# Patient Record
Sex: FEMALE | Race: WHITE | NOT HISPANIC OR LATINO | Employment: OTHER | URBAN - METROPOLITAN AREA
[De-identification: names, ages, dates, MRNs, and addresses within clinical notes are randomized per-mention and may not be internally consistent; named-entity substitution may affect disease eponyms.]

---

## 2017-01-04 ENCOUNTER — OFFICE VISIT (OUTPATIENT)
Dept: NEUROLOGY | Facility: CLINIC | Age: 82
End: 2017-01-04

## 2017-01-04 VITALS
DIASTOLIC BLOOD PRESSURE: 62 MMHG | HEIGHT: 60 IN | WEIGHT: 162 LBS | SYSTOLIC BLOOD PRESSURE: 120 MMHG | BODY MASS INDEX: 31.8 KG/M2

## 2017-01-04 DIAGNOSIS — F02.80 LATE ONSET ALZHEIMER'S DISEASE WITHOUT BEHAVIORAL DISTURBANCE (HCC): ICD-10-CM

## 2017-01-04 DIAGNOSIS — I63.10 CEREBRAL INFARCTION DUE TO EMBOLISM OF PRECEREBRAL ARTERY (HCC): Primary | ICD-10-CM

## 2017-01-04 DIAGNOSIS — I10 ESSENTIAL HYPERTENSION: ICD-10-CM

## 2017-01-04 DIAGNOSIS — G30.1 LATE ONSET ALZHEIMER'S DISEASE WITHOUT BEHAVIORAL DISTURBANCE (HCC): ICD-10-CM

## 2017-01-04 PROCEDURE — 99213 OFFICE O/P EST LOW 20 MIN: CPT | Performed by: NURSE PRACTITIONER

## 2017-01-04 RX ORDER — ALBUTEROL SULFATE 90 UG/1
2 AEROSOL, METERED RESPIRATORY (INHALATION) EVERY 6 HOURS PRN
COMMUNITY
Start: 2015-12-31 | End: 2017-08-03 | Stop reason: SDUPTHER

## 2017-01-04 NOTE — MR AVS SNAPSHOT
"                        Jihan Teresa   1/4/2017 3:00 PM   Office Visit    Dept Phone:  [phone]   Encounter #:  [id number]    Provider:  KAREN Pacheco   Department:  [facility] NEUROLOGY                Your Full Care Plan              Today's Medication Changes          These changes are accurate as of: 1/4/17  3:55 PM.  If you have any questions, ask your nurse or doctor.               Stop taking medication(s)listed here:     carbonyl iron 45 MG tablet tablet   Commonly known as:  FEOSOL   Stopped by:  KAREN Pacheco                      Your Updated Medication List          This list is accurate as of: 1/4/17  3:55 PM.  Always use your most recent med list.                albuterol 108 (90 BASE) MCG/ACT inhaler   Commonly known as:  PROVENTIL HFA;VENTOLIN HFA       ascorbic acid 250 MG chewable tablet chewable tablet   Commonly known as:  VITAMIN C       aspirin 325 MG tablet       atorvastatin 80 MG tablet   Commonly known as:  LIPITOR   TAKE 1 TABLET BY MOUTH AT BEDTIME       B-D SYRINGE/NEEDLE 3CC/23GX1\" 23G X 1\" 3 ML misc   Generic drug:  Syringe/Needle (Disp)       budesonide-formoterol 160-4.5 MCG/ACT inhaler   Commonly known as:  SYMBICORT       Calcium Citrate-Vitamin D 250-200 MG-UNIT tablet       cyanocobalamin 1000 MCG/ML injection       furosemide 40 MG tablet   Commonly known as:  LASIX   TAKE 1 TABLET BY MOUTH TWICE A DAY       isosorbide mononitrate 60 MG 24 hr tablet   Commonly known as:  IMDUR   Take 1 tablet by mouth daily.       levothyroxine 150 MCG tablet   Commonly known as:  SYNTHROID, LEVOTHROID       lisinopril 20 MG tablet   Commonly known as:  PRINIVIL,ZESTRIL   Take 2 tablets by mouth Daily.       metFORMIN 500 MG tablet   Commonly known as:  GLUCOPHAGE   TAKE 1 TABLET BY MOUTH TWICE A DAY WITH MEALS       metoprolol tartrate 50 MG tablet   Commonly known as:  LOPRESSOR       multivitamin tablet       Omeprazole Magnesium 20.6 (20 BASE) " MG capsule delayed-release       potassium chloride 10 MEQ CR capsule   Commonly known as:  MICRO-K   TAKE 2 CAPSULES BY MOUTH TWICE A DAY       warfarin 3 MG tablet   Commonly known as:  COUMADIN               Instructions     None    Patient Instructions History      Upcoming Appointments     Visit Type Date Time Department    FOLLOW UP 2017  3:00 PM [facility]    OFFICE VISIT 2017  3:40 PM [facility]    OFFICE VISIT 2017 11:15 AM [facility]      [employer] Signup     [facility] [employer] allows you to send messages to your doctor, view your test results, renew your prescriptions, schedule appointments, and more. To sign up, go to [employer] and click on the Sign Up Now link in the New User? box. Enter your [employer] Activation Code exactly as it appears below along with the last four digits of your Social Security Number and your Date of Birth () to complete the sign-up process. If you do not sign up before the expiration date, you must request a new code.    [employer] Activation Code: [email or web address]  Expires: 2017  3:43 PM    If you have questions, you can email [email or web address] or call [phone] to talk to our [employer] staff. Remember, [employer] is NOT to be used for urgent needs. For medical emergencies, dial 911.               Other Info from Your Visit           Your Appointments     2017  3:40 PM EDT   Office Visit with Gustavo Jackman Jr., MD   [facility] INTERNAL MEDICINE (--)    [address]  [facility] [address] [phone]           Arrive 15 minutes prior to appointment.            Sep 14, 2017 11:15 AM EDT   Office Visit with Jerry Colmenares MD   [facility] CARDIAC SURGERY (--)    [address]  [facility] [address] [phone]           Arrive 15 minutes prior to appointment.              Allergies     Promethazine  Other (See Comments)    Pt becomes  "\"out of it\" when on this medication      Reason for Visit     Stroke     Alzheimer's Disease           Vital Signs     Blood Pressure Height Weight Body Mass Index Smoking Status       120/62 60\" (152.4 cm) 162 lb (73.5 kg) 31.64 kg/m2 Never Smoker         "

## 2017-01-04 NOTE — PROGRESS NOTES
"Subjective:     Patient ID: Jihan Teresa is a 83 y.o. RHWF with a history of HTN, DM, CABG, equine aortic valve replacement 2009, stroke in  due to portable \"blockage\" around the heart. She presents for follow up for TIA on 2016. The patient was in her usual state of health when she began having aphasia. This lasted about 45 minutes and resolved. She had an event similar to this a few years ago when she was informed that her daughter had  of cancer but that episode lasted for far less time. The stroke in 2013 caused left-sided weakness, which the patient and her daughter state they feel has resolved almost completely although she does feel she is slightly weaker to her left side. She has a history of atrial fibrillation and is maintained on Coumadin. INR on admission was 2.2.      MRI of the brain showed a couple of small areas of very questionable small punctate DWI positive lesions in the elft cortex, but these are likely artifactual per Dr. Jesenia Diaz's hospital note. 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    MRA of the neck shows patent vessels. No significant carotid disease.    MRA of the head shows a severe left P1 stenosis and moderate right P1 stenosis and diffuse irregularity of the intracranial vessels.        A1c 5.9     She was on ASA 81 mg at the time of the event. Her ASA was increased to 325 daily and she has continued Coumadin. She was initiated on atorvastatin 80 mg daily. She has not had her cholesterol rechecked.    B12 was 1158 and RPR nonreactive    EEG normal  She had an echocardiogram but results are not in the patient's chart. Will obtain those results for Dr. Jesenia Diaz to review.     History of Present Illness  The following portions of the patient's history were reviewed and updated as appropriate: allergies, current medications, past family history, past medical history, past social history, " "past surgical history and problem list.    Current Outpatient Prescriptions on File Prior to Visit   Medication Sig Dispense Refill   • ascorbic acid (VITAMIN C) 250 MG chewable tablet chewable tablet Chew 250 mg.     • aspirin 325 MG tablet Take 325 mg by mouth Daily.     • atorvastatin (LIPITOR) 80 MG tablet TAKE 1 TABLET BY MOUTH AT BEDTIME 90 tablet 0   • budesonide-formoterol (SYMBICORT) 160-4.5 MCG/ACT inhaler Inhale 2 puffs 2 (two) times a day as needed.     • Calcium Citrate-Vitamin D 250-200 MG-UNIT tablet Take  by mouth.     • cyanocobalamin 1000 MCG/ML injection Inject 1,000 mcg into the shoulder, thigh, or buttocks Every 30 (Thirty) Days.     • furosemide (LASIX) 40 MG tablet TAKE 1 TABLET BY MOUTH TWICE A DAY 30 tablet 0   • isosorbide mononitrate (IMDUR) 60 MG 24 hr tablet Take 1 tablet by mouth daily. 90 tablet 3   • levothyroxine (SYNTHROID, LEVOTHROID) 150 MCG tablet Take 75 mcg by mouth See Admin Instructions. Monday, Tuesday, Thursday, Friday, Saturday, Sunday     • lisinopril (PRINIVIL,ZESTRIL) 20 MG tablet Take 2 tablets by mouth Daily. 120 tablet 5   • metFORMIN (GLUCOPHAGE) 500 MG tablet TAKE 1 TABLET BY MOUTH TWICE A DAY WITH MEALS (Patient taking differently: TAKING 1/2 TABLET TWO TIMES A DAY) 60 tablet 5   • metoprolol tartrate (LOPRESSOR) 50 MG tablet Take 50 mg by mouth daily. 25 MG IN AM AND 25 MG IN PM      • Multiple Vitamin (MULTIVITAMIN) tablet Take 1 tablet by mouth Daily.     • Omeprazole Magnesium 20.6 (20 BASE) MG capsule delayed-release Take 1 capsule by mouth Daily.     • potassium chloride (MICRO-K) 10 MEQ CR capsule TAKE 2 CAPSULES BY MOUTH TWICE A  capsule 11   • Syringe/Needle, Disp, (B-D SYRINGE/NEEDLE 3CC/23GX1\") 23G X 1\" 3 ML misc Use every week for 4 weeks then monthly for B12 injections.     • warfarin (COUMADIN) 3 MG tablet Take 3 mg by mouth See Admin Instructions. Daily Thursday, Sunday     • [DISCONTINUED] carbonyl iron (FEOSOL) 45 MG tablet tablet Take 1 " tablet by mouth Daily.       No current facility-administered medications on file prior to visit.        Review of Systems   Constitutional: Negative for activity change, appetite change, chills, diaphoresis, fatigue, fever and unexpected weight change.   HENT: Negative.    Eyes: Negative.    Respiratory: Negative.    Cardiovascular: Negative.    Gastrointestinal: Positive for nausea. Negative for abdominal distention, abdominal pain, anal bleeding, blood in stool, constipation, diarrhea, rectal pain and vomiting.   Endocrine: Negative.    Musculoskeletal: Negative.    Skin: Negative.    Allergic/Immunologic: Negative.    Neurological: Negative for dizziness, tremors, seizures, syncope, facial asymmetry, speech difficulty, weakness, light-headedness, numbness and headaches.   Hematological: Negative for adenopathy. Bruises/bleeds easily.   Psychiatric/Behavioral: Negative for agitation, behavioral problems, confusion, decreased concentration, dysphoric mood, hallucinations, self-injury, sleep disturbance and suicidal ideas. The patient is not nervous/anxious and is not hyperactive.         Objective:    Neurologic Exam      GENERAL: Well nourished, well developed, no acute distress.    HEENT: Normocephalic, atraumatic. She has a surgical pupil on the right and  very pinpoint pupil on the left. Bilateral faint carotid bruits.  CARDIAC: Irregularly irregular 2/6 systolic murmur    EXTREMITIES: There are 2+ radial pulses, 1+ pedal pulses.    NEUROLOGIC: Oriented to place, not year. Could recall 1 out of 3 with clues. She is attentive. Calculation was poor.  Proverb interpretation was poor, visual spatial function was intact. Fund of  knowledge was limited particularly for recent events. Visuospatial function is intact. Language is  normal. Cranial nerves II-XII intact.    MOTOR: Strength 5 out of 5 throughout. Normal tone and bulk. No abnormal  movements. Reflexes 1+ in the arms, absent in the knees, 1+ at the  ankles.  Plantar responses are flexor.    SENSORY: Light touch, temperature, proprioception, and graphesthesia intact.  Vibration slightly diminished in the feet. No extinction to double simultaneous  stimulation.    COORDINATION: Finger-nose-finger and heel-to-shin intact.      Physical Exam       Assessment/Plan:     There are no diagnoses linked to this encounter.     Mrs. Teresa has been doing well since her last list. They deny any further episodes or aphasia or other S/S of TIA/stroke. Denies headaches. She has not had her cholesterol rechecked. She is going to call her primary care physician regarding this and will have the results faxed to us. She is to continue with risk factor control, , coumadin, and Lipitor 80 mg. Discussed S/S of stroke and to call 911.      F/u in 1 year.

## 2017-01-04 NOTE — LETTER
"2017     Gustavo Jackman Jr., MD  [address]  [address] [address]    Patient: Jihan Teresa   YOB: 1933   Date of Visit: 2017       Dear Dr. Gasper MD:    Thank you for referring Jihan Teresa to me for evaluation. Below are the relevant portions of my assessment and plan of care.    If you have questions, please do not hesitate to call me. I look forward to following Jihan along with you.         Sincerely,        KAREN Pacheco        CC: No Recipients  Javier SheKAREN Magaña  2017  2:25 PM  Signed  Subjective:     Patient ID: Jihan Teresa is a 83 y.o. RHWF with a history of HTN, DM, CABG, equine aortic valve replacement 2009, stroke in  due to portable \"blockage\" around the heart. She presents for follow up for TIA on 2016. The patient was in her usual state of health when she began having aphasia. This lasted about 45 minutes and resolved. She had an event similar to this a few years ago when she was informed that her daughter had  of cancer but that episode lasted for far less time. The stroke in  caused left-sided weakness, which the patient and her daughter state they feel has resolved almost completely although she does feel she is slightly weaker to her left side. She has a history of atrial fibrillation and is maintained on Coumadin. INR on admission was 2.2.      MRI of the brain showed a couple of small areas of very questionable small punctate DWI positive lesions in the elft cortex, but these are likely artifactual per Dr. Jesenia Diaz's hospitals note. 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    MRA of the neck shows patent vessels. No significant carotid disease.    MRA of the head shows a severe left P1 stenosis and moderate right P1 stenosis and diffuse irregularity of the intracranial vessels.        A1c 5.9     She was on ASA 81 mg at the " time of the event. Her ASA was increased to 325 daily and she has continued Coumadin. She was initiated on atorvastatin 80 mg daily. She has not had her cholesterol rechecked.    B12 was 1158 and RPR nonreactive    EEG normal  She had an echocardiogram but results are not in the patient's chart. Will obtain those results for Dr. Jesenia Diaz to review.     History of Present Illness  The following portions of the patient's history were reviewed and updated as appropriate: allergies, current medications, past family history, past medical history, past social history, past surgical history and problem list.    Current Outpatient Prescriptions on File Prior to Visit   Medication Sig Dispense Refill   • ascorbic acid (VITAMIN C) 250 MG chewable tablet chewable tablet Chew 250 mg.     • aspirin 325 MG tablet Take 325 mg by mouth Daily.     • atorvastatin (LIPITOR) 80 MG tablet TAKE 1 TABLET BY MOUTH AT BEDTIME 90 tablet 0   • budesonide-formoterol (SYMBICORT) 160-4.5 MCG/ACT inhaler Inhale 2 puffs 2 (two) times a day as needed.     • Calcium Citrate-Vitamin D 250-200 MG-UNIT tablet Take  by mouth.     • cyanocobalamin 1000 MCG/ML injection Inject 1,000 mcg into the shoulder, thigh, or buttocks Every 30 (Thirty) Days.     • furosemide (LASIX) 40 MG tablet TAKE 1 TABLET BY MOUTH TWICE A DAY 30 tablet 0   • isosorbide mononitrate (IMDUR) 60 MG 24 hr tablet Take 1 tablet by mouth daily. 90 tablet 3   • levothyroxine (SYNTHROID, LEVOTHROID) 150 MCG tablet Take 75 mcg by mouth See Admin Instructions. Monday, Tuesday, Thursday, Friday, Saturday, Sunday     • lisinopril (PRINIVIL,ZESTRIL) 20 MG tablet Take 2 tablets by mouth Daily. 120 tablet 5   • metFORMIN (GLUCOPHAGE) 500 MG tablet TAKE 1 TABLET BY MOUTH TWICE A DAY WITH MEALS (Patient taking differently: TAKING 1/2 TABLET TWO TIMES A DAY) 60 tablet 5   • metoprolol tartrate (LOPRESSOR) 50 MG tablet Take 50 mg by mouth daily. 25 MG IN AM AND 25 MG IN PM      • Multiple Vitamin  "(MULTIVITAMIN) tablet Take 1 tablet by mouth Daily.     • Omeprazole Magnesium 20.6 (20 BASE) MG capsule delayed-release Take 1 capsule by mouth Daily.     • potassium chloride (MICRO-K) 10 MEQ CR capsule TAKE 2 CAPSULES BY MOUTH TWICE A  capsule 11   • Syringe/Needle, Disp, (B-D SYRINGE/NEEDLE 3CC/23GX1\") 23G X 1\" 3 ML misc Use every week for 4 weeks then monthly for B12 injections.     • warfarin (COUMADIN) 3 MG tablet Take 3 mg by mouth See Admin Instructions. Daily Thursday, Sunday     • [DISCONTINUED] carbonyl iron (FEOSOL) 45 MG tablet tablet Take 1 tablet by mouth Daily.       No current facility-administered medications on file prior to visit.        Review of Systems   Constitutional: Negative for activity change, appetite change, chills, diaphoresis, fatigue, fever and unexpected weight change.   HENT: Negative.    Eyes: Negative.    Respiratory: Negative.    Cardiovascular: Negative.    Gastrointestinal: Positive for nausea. Negative for abdominal distention, abdominal pain, anal bleeding, blood in stool, constipation, diarrhea, rectal pain and vomiting.   Endocrine: Negative.    Musculoskeletal: Negative.    Skin: Negative.    Allergic/Immunologic: Negative.    Neurological: Negative for dizziness, tremors, seizures, syncope, facial asymmetry, speech difficulty, weakness, light-headedness, numbness and headaches.   Hematological: Negative for adenopathy. Bruises/bleeds easily.   Psychiatric/Behavioral: Negative for agitation, behavioral problems, confusion, decreased concentration, dysphoric mood, hallucinations, self-injury, sleep disturbance and suicidal ideas. The patient is not nervous/anxious and is not hyperactive.         Objective:    Neurologic Exam      GENERAL: Well nourished, well developed, no acute distress.    HEENT: Normocephalic, atraumatic. She has a surgical pupil on the right and  very pinpoint pupil on the left. Bilateral faint carotid bruits.  CARDIAC: Irregularly irregular " 2/6 systolic murmur    EXTREMITIES: There are 2+ radial pulses, 1+ pedal pulses.    NEUROLOGIC: Oriented to place, not year. Could recall 1 out of 3 with clues. She is attentive. Calculation was poor.  Proverb interpretation was poor, visual spatial function was intact. Fund of  knowledge was limited particularly for recent events. Visuospatial function is intact. Language is  normal. Cranial nerves II-XII intact.    MOTOR: Strength 5 out of 5 throughout. Normal tone and bulk. No abnormal  movements. Reflexes 1+ in the arms, absent in the knees, 1+ at the ankles.  Plantar responses are flexor.    SENSORY: Light touch, temperature, proprioception, and graphesthesia intact.  Vibration slightly diminished in the feet. No extinction to double simultaneous  stimulation.    COORDINATION: Finger-nose-finger and heel-to-shin intact.      Physical Exam       Assessment/Plan:     There are no diagnoses linked to this encounter.     Mrs. Teresa has been doing well since her last list. They deny any further episodes or aphasia or other S/S of TIA/stroke. Denies headaches. She has not had her cholesterol rechecked. She is going to call her primary care physician regarding this and will have the results faxed to us. She is to continue with risk factor control, , coumadin, and Lipitor 80 mg. Discussed S/S of stroke and to call 911.      F/u in 1 year.

## 2017-02-07 RX ORDER — ATORVASTATIN CALCIUM 80 MG/1
TABLET, FILM COATED ORAL
Qty: 90 TABLET | Refills: 1 | Status: SHIPPED | OUTPATIENT
Start: 2017-02-07 | End: 2017-11-13 | Stop reason: SDUPTHER

## 2017-02-09 RX ORDER — POTASSIUM CHLORIDE 750 MG/1
20 CAPSULE, EXTENDED RELEASE ORAL 2 TIMES DAILY
Qty: 360 CAPSULE | Refills: 3 | Status: SHIPPED | OUTPATIENT
Start: 2017-02-09 | End: 2017-06-16 | Stop reason: HOSPADM

## 2017-02-10 ENCOUNTER — ANTICOAGULATION VISIT (OUTPATIENT)
Dept: INTERNAL MEDICINE | Facility: CLINIC | Age: 82
End: 2017-02-10

## 2017-02-10 ENCOUNTER — TELEPHONE (OUTPATIENT)
Dept: INTERNAL MEDICINE | Facility: CLINIC | Age: 82
End: 2017-02-10

## 2017-02-10 ENCOUNTER — CLINICAL SUPPORT (OUTPATIENT)
Dept: INTERNAL MEDICINE | Facility: CLINIC | Age: 82
End: 2017-02-10

## 2017-02-10 DIAGNOSIS — G45.8 OTHER SPECIFIED TRANSIENT CEREBRAL ISCHEMIAS: Primary | ICD-10-CM

## 2017-02-10 DIAGNOSIS — E11.9 TYPE 2 DIABETES MELLITUS WITHOUT COMPLICATION, WITHOUT LONG-TERM CURRENT USE OF INSULIN (HCC): ICD-10-CM

## 2017-02-10 DIAGNOSIS — E53.8 B12 DEFICIENCY: Primary | ICD-10-CM

## 2017-02-10 DIAGNOSIS — I48.91 ATRIAL FIBRILLATION, UNSPECIFIED TYPE (HCC): Primary | ICD-10-CM

## 2017-02-10 DIAGNOSIS — Z79.01 LONG TERM (CURRENT) USE OF ANTICOAGULANTS: ICD-10-CM

## 2017-02-10 LAB — INR PPP: 2.3

## 2017-02-10 PROCEDURE — 36416 COLLJ CAPILLARY BLOOD SPEC: CPT | Performed by: INTERNAL MEDICINE

## 2017-02-10 PROCEDURE — 85610 PROTHROMBIN TIME: CPT | Performed by: INTERNAL MEDICINE

## 2017-02-10 PROCEDURE — 96372 THER/PROPH/DIAG INJ SC/IM: CPT | Performed by: INTERNAL MEDICINE

## 2017-02-10 PROCEDURE — 80061 LIPID PANEL: CPT | Performed by: INTERNAL MEDICINE

## 2017-02-10 RX ORDER — CYANOCOBALAMIN 1000 UG/ML
1000 INJECTION, SOLUTION INTRAMUSCULAR; SUBCUTANEOUS
Status: DISCONTINUED | OUTPATIENT
Start: 2017-02-10 | End: 2017-06-16 | Stop reason: HOSPADM

## 2017-02-10 RX ORDER — FUROSEMIDE 40 MG/1
40 TABLET ORAL 2 TIMES DAILY
Qty: 180 TABLET | Refills: 1 | Status: SHIPPED | OUTPATIENT
Start: 2017-02-10 | End: 2017-07-14 | Stop reason: SDUPTHER

## 2017-02-10 RX ADMIN — CYANOCOBALAMIN 1000 MCG: 1000 INJECTION, SOLUTION INTRAMUSCULAR; SUBCUTANEOUS at 16:00

## 2017-02-10 NOTE — TELEPHONE ENCOUNTER
----- Message from Malu Lawrence sent at 2/10/2017  3:26 PM EST -----  Contact: pt - Dr Jackman's pt - RE: appt  Pt's daughter calling and would like to have pt's PT/INR drawn, B-12 injection given and Lipid panel drawn per Neurologist -She has not had her cholesterol rechecked. She is going to call her primary care physician regarding this and will have the results faxed to us. Could you please put orders in pt's chart? Pt is on way to office for appt. Please advise. Thanks

## 2017-02-10 NOTE — TELEPHONE ENCOUNTER
I spoke with Dr Jackman and he states the patient can get her PT/INR and B12 today but can not get lipid unless she has fasted all day today.

## 2017-02-13 LAB
CHOLEST SERPL-MCNC: 96 MG/DL (ref 0–200)
HDLC SERPL-MCNC: 42 MG/DL (ref 40–81)
LDLC SERPL CALC-MCNC: 38 MG/DL (ref 0–100)
LDLC/HDLC SERPL: 0.9 {RATIO}
TRIGL SERPL-MCNC: 81 MG/DL (ref 0–150)
VLDLC SERPL-MCNC: 16.2 MG/DL

## 2017-02-15 ENCOUNTER — RESULTS ENCOUNTER (OUTPATIENT)
Dept: NEUROLOGY | Facility: CLINIC | Age: 82
End: 2017-02-15

## 2017-02-15 DIAGNOSIS — G45.8 OTHER SPECIFIED TRANSIENT CEREBRAL ISCHEMIAS: ICD-10-CM

## 2017-02-15 RX ORDER — WARFARIN SODIUM 3 MG/1
TABLET ORAL
Qty: 90 TABLET | Refills: 2 | Status: SHIPPED | OUTPATIENT
Start: 2017-02-15 | End: 2017-06-16 | Stop reason: HOSPADM

## 2017-03-05 ENCOUNTER — APPOINTMENT (OUTPATIENT)
Dept: CT IMAGING | Facility: HOSPITAL | Age: 82
End: 2017-03-05

## 2017-03-05 ENCOUNTER — HOSPITAL ENCOUNTER (EMERGENCY)
Facility: HOSPITAL | Age: 82
Discharge: HOME | End: 2017-03-05
Attending: EMERGENCY MEDICINE | Admitting: EMERGENCY MEDICINE

## 2017-03-05 VITALS
OXYGEN SATURATION: 95 % | HEIGHT: 60 IN | BODY MASS INDEX: 30.43 KG/M2 | RESPIRATION RATE: 16 BRPM | DIASTOLIC BLOOD PRESSURE: 83 MMHG | SYSTOLIC BLOOD PRESSURE: 164 MMHG | TEMPERATURE: 98.4 F | WEIGHT: 155 LBS | HEART RATE: 63 BPM
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
## 2017-03-05 DIAGNOSIS — S20.221A BACK CONTUSION, RIGHT, INITIAL ENCOUNTER: Primary | ICD-10-CM

## 2017-03-05 LAB
INR PPP: 2.66 (ref 0.9–1.1)
PROTHROMBIN TIME: 27.5 SECONDS (ref 11.7–14.2)

## 2017-03-05 PROCEDURE — 99283 EMERGENCY DEPT VISIT LOW MDM: CPT

## 2017-03-05 PROCEDURE — 85610 PROTHROMBIN TIME: CPT | Performed by: EMERGENCY MEDICINE

## 2017-03-05 PROCEDURE — 70450 CT HEAD/BRAIN W/O DYE: CPT

## 2017-03-05 NOTE — ED PROVIDER NOTES
" EMERGENCY DEPARTMENT ENCOUNTER    CHIEF COMPLAINT  Chief Complaint: Fall  History given by: patient   History limited by: n/a  Room Number: 10/10  PMD: Gustavo Jackman Jr., MD      HPI:  Pt is a 83 y.o. female who presents complaining of after a fall. Pt states that her leg \"gave out on her\" and she fell backwards down 4 steps and hit her head. She denies LOC, headache, nausea, or any other complaints. Pt is currently on Coumadin.    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   • Cerebral infarction 02/05/2016   • A-fib 02/05/2016   • GERD without esophagitis 02/05/2016   • Sleep apnea in adult 02/05/2016   • Congestive heart failure 02/05/2016   • Cardiomyopathy 02/05/2016   • Cognitive dysfunction 02/05/2016   • Hypothyroidism (acquired) 02/05/2016   • History of recurrent UTIs 02/05/2016   • Mild reactive airways disease 02/05/2016   • Essential hypertension 02/05/2016   • Pneumonia of both lungs due to infectious organism 11/25/2016   • Pulmonary hypertension 12/13/2016   • B12 deficiency 12/13/2016     Resolved Ambulatory Problems     Diagnosis Date Noted   • 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   • Hypokalemia 11/26/2016   • CKD (chronic kidney disease) stage 3, GFR 30-59 ml/min 11/27/2016     Past Medical History   Diagnosis Date   • Anemia    • Aortic valve stenosis    • Asthma    • Atelectasis    • Congenital heart disease    • Diabetes mellitus    • Emphysema of lung    • Esophageal reflux    • HLD (hyperlipidemia)    • Hypotension    • LVH (left ventricular " hypertrophy)    • Pleural effusion        PAST SURGICAL HISTORY  Past Surgical History   Procedure Laterality Date   • Aortic valve repair/replacement     • Knee arthroplasty         FAMILY HISTORY  Family History   Problem Relation Age of Onset   • Dementia Sister    • Stroke Brother        SOCIAL HISTORY  Social History     Social History   • Marital status:      Spouse name: N/A   • Number of children: N/A   • Years of education: N/A     Occupational History   • Not on file.     Social History Main Topics   • Smoking status: Never Smoker   • Smokeless tobacco: Never Used   • Alcohol use No   • Drug use: Not on file   • Sexual activity: Not on file     Other Topics Concern   • Not on file     Social History Narrative       ALLERGIES  Promethazine    REVIEW OF SYSTEMS  Review of Systems   Constitutional: Negative for chills and fever.   HENT: Negative for sore throat.    Respiratory: Negative for cough.    Gastrointestinal: Negative for nausea and vomiting.   Genitourinary: Negative for dysuria.   Musculoskeletal: Negative for back pain.   Psychiatric/Behavioral: The patient is not nervous/anxious.        PHYSICAL EXAM  ED Triage Vitals   Temp Heart Rate Resp BP SpO2   03/05/17 0451 03/05/17 0451 03/05/17 0451 -- 03/05/17 0451   98.4 °F (36.9 °C) 71 18  98 %      Temp src Heart Rate Source Patient Position BP Location FiO2 (%)   -- -- -- -- --              Physical Exam   Constitutional: She is oriented to person, place, and time and well-developed, well-nourished, and in no distress. No distress.   HENT:   Head: Normocephalic and atraumatic.   Eyes: EOM are normal. Pupils are equal, round, and reactive to light.   Neck: Normal range of motion. Neck supple.   Cardiovascular: Normal rate, regular rhythm and normal heart sounds.    Pulmonary/Chest: Effort normal and breath sounds normal. No respiratory distress.   Abdominal: Soft. There is no tenderness. There is no rebound and no guarding.   Musculoskeletal:  Normal range of motion. She exhibits no edema.   Small area of ecchymosis over the right posterior hip. No deformity present   Neurological: She is alert and oriented to person, place, and time.   Skin: Skin is warm and dry. No rash noted.   Psychiatric: Mood and affect normal.   Nursing note and vitals reviewed.      LAB RESULTS  Lab Results (last 24 hours)     ** No results found for the last 24 hours. **          I ordered the above labs and reviewed the results    RADIOLOGY  CT Head Without Contrast   Final Result      CT head shows NAD    I ordered the above noted radiological studies. Interpreted by radiologist. Discussed with radiologist (Dr Lockhart). Reviewed by me in PACS.       PROCEDURES  Procedures      PROGRESS AND CONSULTS  ED Course     05:02  PT/INR and CT head ordered for further evaluation.     05:43  BP-   HR- 71 Temp- 98.4 °F (36.9 °C) O2 sat- 98%  Rechecked the patient who is in NAD and is resting comfortably. Advised pt that the CT shows NAD. Pt will be discharged. Pt understands and agrees with the plan, all questions answered.    MEDICAL DECISION MAKING  Results were reviewed/discussed with the patient and they were also made aware of online access. Pt also made aware that some labs, such as cultures, will not be resulted during ER visit and follow up with PMD is necessary.     MDM  Number of Diagnoses or Management Options     Amount and/or Complexity of Data Reviewed  Tests in the radiology section of CPT®: ordered and reviewed (CT head shows NAD)  Discussion of test results with the performing providers: yes (Dr Lockhart)    Patient Progress  Patient progress: stable         DIAGNOSIS  Final diagnoses:   Back contusion, right, initial encounter   Fall, initial encounter       DISPOSITION  DISCHARGE    Patient discharged in stable condition.    Reviewed implications of results, diagnosis, meds, responsibility to follow up, warning signs and symptoms of possible worsening, potential  complications and reasons to return to ER.    Patient/Family voiced understanding of above instructions.    Discussed plan for discharge, as there is no emergent indication for admission.  Pt/family is agreeable and understands need for follow up and repeat testing.  Pt is aware that discharge does not mean that nothing is wrong but it indicates no emergency is present that requires admission and they must continue care with follow-up as given below or physician of their choice.     FOLLOW-UP  Gustavo Jackman Jr., MD  [address] [address]  [phone]    Schedule an appointment as soon as possible for a visit           Medication List      Notice     No changes were made to your prescriptions during this visit.        Latest Documented Vital Signs:  As of 2:26 AM  BP- 164/83 HR- 63 Temp- 98.4 °F (36.9 °C) O2 sat- 95%    --  Documentation assistance provided by telma Gutiérrez for Dr Lakhani.  Information recorded by the scribe was done at my direction and has been verified and validated by me.        Christine Gutiérrez  03/05/17 0545       Joaquin Lakhani MD  03/05/17 0612       Joaquin Lakhani MD  03/11/17 2667

## 2017-03-07 ENCOUNTER — TELEPHONE (OUTPATIENT)
Dept: SOCIAL WORK | Facility: HOSPITAL | Age: 82
End: 2017-03-07

## 2017-03-13 RX ORDER — METOPROLOL TARTRATE 50 MG/1
TABLET, FILM COATED ORAL
Qty: 180 TABLET | Refills: 2 | Status: SHIPPED | OUTPATIENT
Start: 2017-03-13 | End: 2017-07-14 | Stop reason: SDUPTHER

## 2017-04-03 RX ORDER — ISOSORBIDE MONONITRATE 60 MG/1
TABLET, EXTENDED RELEASE ORAL
Qty: 90 TABLET | Refills: 3 | Status: SHIPPED | OUTPATIENT
Start: 2017-04-03 | End: 2018-04-23 | Stop reason: SDUPTHER

## 2017-05-03 ENCOUNTER — ANTICOAGULATION VISIT (OUTPATIENT)
Dept: INTERNAL MEDICINE | Facility: CLINIC | Age: 82
End: 2017-05-03

## 2017-05-03 ENCOUNTER — CLINICAL SUPPORT (OUTPATIENT)
Dept: INTERNAL MEDICINE | Facility: CLINIC | Age: 82
End: 2017-05-03

## 2017-05-03 DIAGNOSIS — Z79.01 LONG TERM (CURRENT) USE OF ANTICOAGULANTS: ICD-10-CM

## 2017-05-03 DIAGNOSIS — E53.8 B12 DEFICIENCY: ICD-10-CM

## 2017-05-03 DIAGNOSIS — I48.91 ATRIAL FIBRILLATION, UNSPECIFIED TYPE (HCC): Primary | ICD-10-CM

## 2017-05-03 DIAGNOSIS — E53.8 B12 DEFICIENCY: Primary | ICD-10-CM

## 2017-05-03 LAB — INR PPP: 4.3

## 2017-05-03 PROCEDURE — 96372 THER/PROPH/DIAG INJ SC/IM: CPT | Performed by: INTERNAL MEDICINE

## 2017-05-03 PROCEDURE — 36416 COLLJ CAPILLARY BLOOD SPEC: CPT | Performed by: INTERNAL MEDICINE

## 2017-05-03 PROCEDURE — 85610 PROTHROMBIN TIME: CPT | Performed by: INTERNAL MEDICINE

## 2017-05-03 RX ORDER — CYANOCOBALAMIN 1000 UG/ML
1000 INJECTION, SOLUTION INTRAMUSCULAR; SUBCUTANEOUS
Status: DISCONTINUED | OUTPATIENT
Start: 2017-05-03 | End: 2017-06-16 | Stop reason: HOSPADM

## 2017-05-03 RX ADMIN — CYANOCOBALAMIN 1000 MCG: 1000 INJECTION, SOLUTION INTRAMUSCULAR; SUBCUTANEOUS at 15:34

## 2017-06-09 LAB
ALBUMIN SERPL-MCNC: 4 G/DL (ref 3.5–5.2)
ALBUMIN/GLOB SERPL: 1.3 G/DL
ALP SERPL-CCNC: 67 U/L (ref 39–117)
ALT SERPL W P-5'-P-CCNC: 29 U/L (ref 1–33)
ANION GAP SERPL CALCULATED.3IONS-SCNC: 13.2 MMOL/L
AST SERPL-CCNC: 24 U/L (ref 1–32)
BASOPHILS # BLD AUTO: 0.04 10*3/MM3 (ref 0–0.2)
BASOPHILS NFR BLD AUTO: 0.4 % (ref 0–1.5)
BILIRUB SERPL-MCNC: 0.5 MG/DL (ref 0.1–1.2)
BUN BLD-MCNC: 14 MG/DL (ref 8–23)
BUN/CREAT SERPL: 13.9 (ref 7–25)
CALCIUM SPEC-SCNC: 9.4 MG/DL (ref 8.6–10.5)
CHLORIDE SERPL-SCNC: 91 MMOL/L (ref 98–107)
CO2 SERPL-SCNC: 26.8 MMOL/L (ref 22–29)
CREAT BLD-MCNC: 1.01 MG/DL (ref 0.57–1)
DEPRECATED RDW RBC AUTO: 50.5 FL (ref 37–54)
EOSINOPHIL # BLD AUTO: 0.19 10*3/MM3 (ref 0–0.7)
EOSINOPHIL NFR BLD AUTO: 1.8 % (ref 0.3–6.2)
ERYTHROCYTE [DISTWIDTH] IN BLOOD BY AUTOMATED COUNT: 16 % (ref 11.7–13)
GFR SERPL CREATININE-BSD FRML MDRD: 52 ML/MIN/1.73
GLOBULIN UR ELPH-MCNC: 3.1 GM/DL
GLUCOSE BLD-MCNC: 140 MG/DL (ref 65–99)
HCT VFR BLD AUTO: 39.3 % (ref 35.6–45.5)
HGB BLD-MCNC: 13 G/DL (ref 11.9–15.5)
IMM GRANULOCYTES # BLD: 0.02 10*3/MM3 (ref 0–0.03)
IMM GRANULOCYTES NFR BLD: 0.2 % (ref 0–0.5)
INR PPP: 2.01 (ref 0.9–1.1)
LYMPHOCYTES # BLD AUTO: 1.64 10*3/MM3 (ref 0.9–4.8)
LYMPHOCYTES NFR BLD AUTO: 15.7 % (ref 19.6–45.3)
MCH RBC QN AUTO: 29.1 PG (ref 26.9–32)
MCHC RBC AUTO-ENTMCNC: 33.1 G/DL (ref 32.4–36.3)
MCV RBC AUTO: 87.9 FL (ref 80.5–98.2)
MONOCYTES # BLD AUTO: 0.88 10*3/MM3 (ref 0.2–1.2)
MONOCYTES NFR BLD AUTO: 8.4 % (ref 5–12)
NEUTROPHILS # BLD AUTO: 7.67 10*3/MM3 (ref 1.9–8.1)
NEUTROPHILS NFR BLD AUTO: 73.5 % (ref 42.7–76)
PLATELET # BLD AUTO: 179 10*3/MM3 (ref 140–500)
PMV BLD AUTO: 9.5 FL (ref 6–12)
POTASSIUM BLD-SCNC: 3.8 MMOL/L (ref 3.5–5.2)
PROT SERPL-MCNC: 7.1 G/DL (ref 6–8.5)
PROTHROMBIN TIME: 22.1 SECONDS (ref 11.7–14.2)
RBC # BLD AUTO: 4.47 10*6/MM3 (ref 3.9–5.2)
SODIUM BLD-SCNC: 131 MMOL/L (ref 136–145)
WBC NRBC COR # BLD: 10.44 10*3/MM3 (ref 4.5–10.7)

## 2017-06-09 PROCEDURE — 80053 COMPREHEN METABOLIC PANEL: CPT | Performed by: EMERGENCY MEDICINE

## 2017-06-09 PROCEDURE — 99285 EMERGENCY DEPT VISIT HI MDM: CPT

## 2017-06-09 PROCEDURE — 85610 PROTHROMBIN TIME: CPT | Performed by: EMERGENCY MEDICINE

## 2017-06-09 PROCEDURE — 85025 COMPLETE CBC W/AUTO DIFF WBC: CPT | Performed by: EMERGENCY MEDICINE

## 2017-06-09 PROCEDURE — 93005 ELECTROCARDIOGRAM TRACING: CPT | Performed by: EMERGENCY MEDICINE

## 2017-06-09 RX ORDER — SODIUM CHLORIDE 0.9 % (FLUSH) 0.9 %
10 SYRINGE (ML) INJECTION AS NEEDED
Status: DISCONTINUED | OUTPATIENT
Start: 2017-06-09 | End: 2017-06-16 | Stop reason: HOSPADM

## 2017-06-10 ENCOUNTER — HOSPITAL ENCOUNTER (INPATIENT)
Facility: HOSPITAL | Age: 82
LOS: 6 days | Discharge: NURSING, REHAB OR LONG-TERM CARE | End: 2017-06-16
Attending: EMERGENCY MEDICINE | Admitting: INTERNAL MEDICINE

## 2017-06-10 ENCOUNTER — APPOINTMENT (OUTPATIENT)
Dept: GENERAL RADIOLOGY | Facility: HOSPITAL | Age: 82
End: 2017-06-10

## 2017-06-10 ENCOUNTER — APPOINTMENT (OUTPATIENT)
Dept: CT IMAGING | Facility: HOSPITAL | Age: 82
End: 2017-06-10

## 2017-06-10 DIAGNOSIS — R53.1 GENERALIZED WEAKNESS: ICD-10-CM

## 2017-06-10 DIAGNOSIS — E53.8 B12 DEFICIENCY: ICD-10-CM

## 2017-06-10 DIAGNOSIS — A49.9 UTI (URINARY TRACT INFECTION), BACTERIAL: Primary | ICD-10-CM

## 2017-06-10 DIAGNOSIS — R26.2 DIFFICULTY WALKING: ICD-10-CM

## 2017-06-10 DIAGNOSIS — R41.0 CONFUSION: ICD-10-CM

## 2017-06-10 DIAGNOSIS — N39.0 UTI (URINARY TRACT INFECTION), BACTERIAL: Primary | ICD-10-CM

## 2017-06-10 LAB
ALBUMIN SERPL-MCNC: 3.7 G/DL (ref 3.5–5.2)
ALBUMIN/GLOB SERPL: 1.3 G/DL
ALP SERPL-CCNC: 61 U/L (ref 39–117)
ALT SERPL W P-5'-P-CCNC: 26 U/L (ref 1–33)
ANION GAP SERPL CALCULATED.3IONS-SCNC: 12.2 MMOL/L
AST SERPL-CCNC: 19 U/L (ref 1–32)
BACTERIA UR QL AUTO: ABNORMAL /HPF
BASOPHILS # BLD AUTO: 0.03 10*3/MM3 (ref 0–0.2)
BASOPHILS NFR BLD AUTO: 0.3 % (ref 0–1.5)
BILIRUB SERPL-MCNC: 0.5 MG/DL (ref 0.1–1.2)
BILIRUB UR QL STRIP: NEGATIVE
BUN BLD-MCNC: 12 MG/DL (ref 8–23)
BUN/CREAT SERPL: 12.8 (ref 7–25)
CALCIUM SPEC-SCNC: 8.9 MG/DL (ref 8.6–10.5)
CHLORIDE SERPL-SCNC: 95 MMOL/L (ref 98–107)
CLARITY UR: ABNORMAL
CO2 SERPL-SCNC: 26.8 MMOL/L (ref 22–29)
COLOR UR: YELLOW
CREAT BLD-MCNC: 0.94 MG/DL (ref 0.57–1)
D-LACTATE SERPL-SCNC: 1.2 MMOL/L (ref 0.5–2)
DEPRECATED RDW RBC AUTO: 50.9 FL (ref 37–54)
EOSINOPHIL # BLD AUTO: 0.22 10*3/MM3 (ref 0–0.7)
EOSINOPHIL NFR BLD AUTO: 2 % (ref 0.3–6.2)
ERYTHROCYTE [DISTWIDTH] IN BLOOD BY AUTOMATED COUNT: 16.2 % (ref 11.7–13)
GFR SERPL CREATININE-BSD FRML MDRD: 57 ML/MIN/1.73
GLOBULIN UR ELPH-MCNC: 2.9 GM/DL
GLUCOSE BLD-MCNC: 133 MG/DL (ref 65–99)
GLUCOSE BLDC GLUCOMTR-MCNC: 139 MG/DL (ref 70–130)
GLUCOSE BLDC GLUCOMTR-MCNC: 157 MG/DL (ref 70–130)
GLUCOSE BLDC GLUCOMTR-MCNC: 211 MG/DL (ref 70–130)
GLUCOSE UR STRIP-MCNC: NEGATIVE MG/DL
HCT VFR BLD AUTO: 37.4 % (ref 35.6–45.5)
HGB BLD-MCNC: 12 G/DL (ref 11.9–15.5)
HGB UR QL STRIP.AUTO: NEGATIVE
HOLD SPECIMEN: NORMAL
HYALINE CASTS UR QL AUTO: ABNORMAL /LPF
IMM GRANULOCYTES # BLD: 0.02 10*3/MM3 (ref 0–0.03)
IMM GRANULOCYTES NFR BLD: 0.2 % (ref 0–0.5)
INR PPP: 2.29 (ref 0.9–1.1)
KETONES UR QL STRIP: NEGATIVE
LEUKOCYTE ESTERASE UR QL STRIP.AUTO: ABNORMAL
LYMPHOCYTES # BLD AUTO: 2.37 10*3/MM3 (ref 0.9–4.8)
LYMPHOCYTES NFR BLD AUTO: 21.3 % (ref 19.6–45.3)
MCH RBC QN AUTO: 28.1 PG (ref 26.9–32)
MCHC RBC AUTO-ENTMCNC: 32.1 G/DL (ref 32.4–36.3)
MCV RBC AUTO: 87.6 FL (ref 80.5–98.2)
MONOCYTES # BLD AUTO: 0.56 10*3/MM3 (ref 0.2–1.2)
MONOCYTES NFR BLD AUTO: 5 % (ref 5–12)
NEUTROPHILS # BLD AUTO: 7.93 10*3/MM3 (ref 1.9–8.1)
NEUTROPHILS NFR BLD AUTO: 71.2 % (ref 42.7–76)
NITRITE UR QL STRIP: POSITIVE
PH UR STRIP.AUTO: 5.5 [PH] (ref 5–8)
PLATELET # BLD AUTO: 189 10*3/MM3 (ref 140–500)
PMV BLD AUTO: 9.7 FL (ref 6–12)
POTASSIUM BLD-SCNC: 4 MMOL/L (ref 3.5–5.2)
PROT SERPL-MCNC: 6.6 G/DL (ref 6–8.5)
PROT UR QL STRIP: NEGATIVE
PROTHROMBIN TIME: 24.5 SECONDS (ref 11.7–14.2)
RBC # BLD AUTO: 4.27 10*6/MM3 (ref 3.9–5.2)
RBC # UR: ABNORMAL /HPF
REF LAB TEST METHOD: ABNORMAL
SODIUM BLD-SCNC: 134 MMOL/L (ref 136–145)
SP GR UR STRIP: 1.01 (ref 1–1.03)
SQUAMOUS #/AREA URNS HPF: ABNORMAL /HPF
UROBILINOGEN UR QL STRIP: ABNORMAL
WBC NRBC COR # BLD: 11.13 10*3/MM3 (ref 4.5–10.7)
WBC UR QL AUTO: ABNORMAL /HPF
WHOLE BLOOD HOLD SPECIMEN: NORMAL
WHOLE BLOOD HOLD SPECIMEN: NORMAL

## 2017-06-10 PROCEDURE — 25810000003 SODIUM CHLORIDE 0.9 % WITH KCL 20 MEQ 20-0.9 MEQ/L-% SOLUTION: Performed by: INTERNAL MEDICINE

## 2017-06-10 PROCEDURE — 81001 URINALYSIS AUTO W/SCOPE: CPT | Performed by: EMERGENCY MEDICINE

## 2017-06-10 PROCEDURE — 82962 GLUCOSE BLOOD TEST: CPT

## 2017-06-10 PROCEDURE — 25010000002 CYANOCOBALAMIN PER 1000 MCG: Performed by: INTERNAL MEDICINE

## 2017-06-10 PROCEDURE — 85610 PROTHROMBIN TIME: CPT | Performed by: INTERNAL MEDICINE

## 2017-06-10 PROCEDURE — 85025 COMPLETE CBC W/AUTO DIFF WBC: CPT | Performed by: INTERNAL MEDICINE

## 2017-06-10 PROCEDURE — 93010 ELECTROCARDIOGRAM REPORT: CPT | Performed by: INTERNAL MEDICINE

## 2017-06-10 PROCEDURE — 87086 URINE CULTURE/COLONY COUNT: CPT | Performed by: EMERGENCY MEDICINE

## 2017-06-10 PROCEDURE — 94799 UNLISTED PULMONARY SVC/PX: CPT

## 2017-06-10 PROCEDURE — 25010000003 CEFTRIAXONE PER 250 MG: Performed by: INTERNAL MEDICINE

## 2017-06-10 PROCEDURE — 70450 CT HEAD/BRAIN W/O DYE: CPT

## 2017-06-10 PROCEDURE — 83605 ASSAY OF LACTIC ACID: CPT | Performed by: INTERNAL MEDICINE

## 2017-06-10 PROCEDURE — 80053 COMPREHEN METABOLIC PANEL: CPT | Performed by: INTERNAL MEDICINE

## 2017-06-10 PROCEDURE — 71020 HC CHEST PA AND LATERAL: CPT

## 2017-06-10 PROCEDURE — 25010000003 CEFTRIAXONE PER 250 MG: Performed by: EMERGENCY MEDICINE

## 2017-06-10 PROCEDURE — 94640 AIRWAY INHALATION TREATMENT: CPT

## 2017-06-10 PROCEDURE — 87186 SC STD MICRODIL/AGAR DIL: CPT | Performed by: EMERGENCY MEDICINE

## 2017-06-10 RX ORDER — WARFARIN SODIUM 3 MG/1
3 TABLET ORAL
Status: DISCONTINUED | OUTPATIENT
Start: 2017-06-11 | End: 2017-06-10

## 2017-06-10 RX ORDER — SODIUM CHLORIDE 0.9 % (FLUSH) 0.9 %
10 SYRINGE (ML) INJECTION AS NEEDED
Status: DISCONTINUED | OUTPATIENT
Start: 2017-06-10 | End: 2017-06-16 | Stop reason: HOSPADM

## 2017-06-10 RX ORDER — PANTOPRAZOLE SODIUM 40 MG/1
40 TABLET, DELAYED RELEASE ORAL EVERY MORNING
Status: DISCONTINUED | OUTPATIENT
Start: 2017-06-10 | End: 2017-06-16 | Stop reason: HOSPADM

## 2017-06-10 RX ORDER — WARFARIN SODIUM 3 MG/1
3 TABLET ORAL
Status: DISCONTINUED | OUTPATIENT
Start: 2017-06-10 | End: 2017-06-14

## 2017-06-10 RX ORDER — CYANOCOBALAMIN 1000 UG/ML
1000 INJECTION, SOLUTION INTRAMUSCULAR; SUBCUTANEOUS
Status: DISCONTINUED | OUTPATIENT
Start: 2017-06-10 | End: 2017-06-10 | Stop reason: SDUPTHER

## 2017-06-10 RX ORDER — CEFTRIAXONE SODIUM 1 G/50ML
1 INJECTION, SOLUTION INTRAVENOUS ONCE
Status: COMPLETED | OUTPATIENT
Start: 2017-06-10 | End: 2017-06-10

## 2017-06-10 RX ORDER — ALBUTEROL SULFATE 2.5 MG/3ML
2.5 SOLUTION RESPIRATORY (INHALATION)
Status: DISCONTINUED | OUTPATIENT
Start: 2017-06-10 | End: 2017-06-16 | Stop reason: HOSPADM

## 2017-06-10 RX ORDER — LISINOPRIL 40 MG/1
40 TABLET ORAL DAILY
Status: DISCONTINUED | OUTPATIENT
Start: 2017-06-10 | End: 2017-06-16 | Stop reason: HOSPADM

## 2017-06-10 RX ORDER — NICOTINE POLACRILEX 4 MG
15 LOZENGE BUCCAL
Status: DISCONTINUED | OUTPATIENT
Start: 2017-06-10 | End: 2017-06-16 | Stop reason: HOSPADM

## 2017-06-10 RX ORDER — ASPIRIN 325 MG
325 TABLET ORAL DAILY
Status: DISCONTINUED | OUTPATIENT
Start: 2017-06-10 | End: 2017-06-16 | Stop reason: HOSPADM

## 2017-06-10 RX ORDER — WARFARIN SODIUM 3 MG/1
1.5 TABLET ORAL
Status: DISCONTINUED | OUTPATIENT
Start: 2017-06-12 | End: 2017-06-10

## 2017-06-10 RX ORDER — ONDANSETRON 2 MG/ML
4 INJECTION INTRAMUSCULAR; INTRAVENOUS EVERY 6 HOURS PRN
Status: DISCONTINUED | OUTPATIENT
Start: 2017-06-10 | End: 2017-06-16 | Stop reason: HOSPADM

## 2017-06-10 RX ORDER — SODIUM CHLORIDE 0.9 % (FLUSH) 0.9 %
1-10 SYRINGE (ML) INJECTION AS NEEDED
Status: DISCONTINUED | OUTPATIENT
Start: 2017-06-10 | End: 2017-06-16 | Stop reason: HOSPADM

## 2017-06-10 RX ORDER — SODIUM CHLORIDE AND POTASSIUM CHLORIDE 150; 900 MG/100ML; MG/100ML
75 INJECTION, SOLUTION INTRAVENOUS CONTINUOUS
Status: DISCONTINUED | OUTPATIENT
Start: 2017-06-10 | End: 2017-06-12

## 2017-06-10 RX ORDER — POTASSIUM CHLORIDE 750 MG/1
20 CAPSULE, EXTENDED RELEASE ORAL 2 TIMES DAILY
Status: DISCONTINUED | OUTPATIENT
Start: 2017-06-10 | End: 2017-06-15

## 2017-06-10 RX ORDER — DEXTROSE MONOHYDRATE 25 G/50ML
25 INJECTION, SOLUTION INTRAVENOUS
Status: DISCONTINUED | OUTPATIENT
Start: 2017-06-10 | End: 2017-06-16 | Stop reason: HOSPADM

## 2017-06-10 RX ORDER — CEFTRIAXONE SODIUM 1 G/50ML
1 INJECTION, SOLUTION INTRAVENOUS EVERY 24 HOURS
Status: DISCONTINUED | OUTPATIENT
Start: 2017-06-10 | End: 2017-06-13

## 2017-06-10 RX ORDER — LEVOTHYROXINE SODIUM 0.07 MG/1
75 TABLET ORAL SEE ADMIN INSTRUCTIONS
Status: DISCONTINUED | OUTPATIENT
Start: 2017-06-10 | End: 2017-06-12 | Stop reason: SDUPTHER

## 2017-06-10 RX ORDER — DIPHENOXYLATE HYDROCHLORIDE AND ATROPINE SULFATE 2.5; .025 MG/1; MG/1
1 TABLET ORAL DAILY
Status: DISCONTINUED | OUTPATIENT
Start: 2017-06-10 | End: 2017-06-16 | Stop reason: HOSPADM

## 2017-06-10 RX ORDER — FUROSEMIDE 40 MG/1
40 TABLET ORAL 2 TIMES DAILY
Status: DISCONTINUED | OUTPATIENT
Start: 2017-06-10 | End: 2017-06-16 | Stop reason: HOSPADM

## 2017-06-10 RX ORDER — CYANOCOBALAMIN 1000 UG/ML
1000 INJECTION, SOLUTION INTRAMUSCULAR; SUBCUTANEOUS
Status: DISCONTINUED | OUTPATIENT
Start: 2017-06-10 | End: 2017-06-16 | Stop reason: HOSPADM

## 2017-06-10 RX ORDER — ASCORBIC ACID 500 MG
1000 TABLET ORAL DAILY
Status: DISCONTINUED | OUTPATIENT
Start: 2017-06-10 | End: 2017-06-16 | Stop reason: HOSPADM

## 2017-06-10 RX ORDER — METOPROLOL TARTRATE 50 MG/1
50 TABLET, FILM COATED ORAL EVERY 12 HOURS SCHEDULED
Status: DISCONTINUED | OUTPATIENT
Start: 2017-06-10 | End: 2017-06-11

## 2017-06-10 RX ORDER — ALBUTEROL SULFATE 90 UG/1
2 AEROSOL, METERED RESPIRATORY (INHALATION) EVERY 6 HOURS
Status: DISCONTINUED | OUTPATIENT
Start: 2017-06-10 | End: 2017-06-10 | Stop reason: CLARIF

## 2017-06-10 RX ORDER — ATORVASTATIN CALCIUM 80 MG/1
80 TABLET, FILM COATED ORAL DAILY
Status: DISCONTINUED | OUTPATIENT
Start: 2017-06-10 | End: 2017-06-14

## 2017-06-10 RX ORDER — ISOSORBIDE MONONITRATE 60 MG/1
60 TABLET, EXTENDED RELEASE ORAL
Status: DISCONTINUED | OUTPATIENT
Start: 2017-06-10 | End: 2017-06-16 | Stop reason: HOSPADM

## 2017-06-10 RX ORDER — BUDESONIDE AND FORMOTEROL FUMARATE DIHYDRATE 160; 4.5 UG/1; UG/1
2 AEROSOL RESPIRATORY (INHALATION)
Status: DISCONTINUED | OUTPATIENT
Start: 2017-06-10 | End: 2017-06-16 | Stop reason: HOSPADM

## 2017-06-10 RX ADMIN — WARFARIN SODIUM 3 MG: 3 TABLET ORAL at 23:15

## 2017-06-10 RX ADMIN — ALBUTEROL SULFATE 2.5 MG: 2.5 SOLUTION RESPIRATORY (INHALATION) at 19:17

## 2017-06-10 RX ADMIN — ASPIRIN 325 MG: 325 TABLET ORAL at 10:28

## 2017-06-10 RX ADMIN — SODIUM CHLORIDE 1000 ML: 9 INJECTION, SOLUTION INTRAVENOUS at 01:36

## 2017-06-10 RX ADMIN — ALBUTEROL SULFATE 2.5 MG: 2.5 SOLUTION RESPIRATORY (INHALATION) at 08:20

## 2017-06-10 RX ADMIN — CYANOCOBALAMIN 1000 MCG: 1000 INJECTION, SOLUTION INTRAMUSCULAR at 09:37

## 2017-06-10 RX ADMIN — Medication 1 TABLET: at 10:28

## 2017-06-10 RX ADMIN — ALBUTEROL SULFATE 2.5 MG: 2.5 SOLUTION RESPIRATORY (INHALATION) at 14:42

## 2017-06-10 RX ADMIN — CALCIUM CARBONATE-VITAMIN D TAB 500 MG-200 UNIT 500 MG: 500-200 TAB at 10:28

## 2017-06-10 RX ADMIN — CALCIUM CARBONATE-VITAMIN D TAB 500 MG-200 UNIT 500 MG: 500-200 TAB at 23:15

## 2017-06-10 RX ADMIN — ATORVASTATIN CALCIUM 80 MG: 80 TABLET, FILM COATED ORAL at 23:15

## 2017-06-10 RX ADMIN — CEFTRIAXONE SODIUM 1 G: 1 INJECTION, SOLUTION INTRAVENOUS at 02:18

## 2017-06-10 RX ADMIN — CEFTRIAXONE SODIUM 1 G: 1 INJECTION, SOLUTION INTRAVENOUS at 23:13

## 2017-06-10 RX ADMIN — OXYCODONE HYDROCHLORIDE AND ACETAMINOPHEN 1000 MG: 500 TABLET ORAL at 10:28

## 2017-06-10 RX ADMIN — LISINOPRIL 40 MG: 40 TABLET ORAL at 10:28

## 2017-06-10 RX ADMIN — POTASSIUM CHLORIDE AND SODIUM CHLORIDE 75 ML/HR: 900; 150 INJECTION, SOLUTION INTRAVENOUS at 07:41

## 2017-06-10 RX ADMIN — ISOSORBIDE MONONITRATE 60 MG: 60 TABLET, EXTENDED RELEASE ORAL at 10:29

## 2017-06-10 RX ADMIN — METOPROLOL TARTRATE 25 MG: 50 TABLET ORAL at 10:29

## 2017-06-10 RX ADMIN — METOPROLOL TARTRATE 50 MG: 50 TABLET ORAL at 23:14

## 2017-06-10 RX ADMIN — PANTOPRAZOLE SODIUM 40 MG: 40 TABLET, DELAYED RELEASE ORAL at 08:38

## 2017-06-10 NOTE — ED PROVIDER NOTES
EMERGENCY DEPARTMENT ENCOUNTER    CHIEF COMPLAINT  Chief Complaint: Fatigue  History given by: family, patient  History limited by: n/a  Room Number: P690/1  PMD: Gustavo Jackman Jr., MD      HPI:  Pt is a 83 y.o. female who presents for evaluation of fatigue and AMS that has been intermittent for the last few days. Per family, pt has more confused and having hallucinations. Family also reports that the pt is having difficulty transferring to a bedside commode. Family also reports a non productive cough that has been ongoing for about 1 month. Family states that the pt has had similar sx in the past with prior UTI's.     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   • Cerebral infarction 02/05/2016   • A-fib 02/05/2016   • GERD without esophagitis 02/05/2016   • Sleep apnea in adult 02/05/2016   • Congestive heart failure 02/05/2016   • Cardiomyopathy 02/05/2016   • Cognitive dysfunction 02/05/2016   • Hypothyroidism (acquired) 02/05/2016   • History of recurrent UTIs 02/05/2016   • Mild reactive airways disease 02/05/2016   • Essential hypertension 02/05/2016   • Pneumonia of both lungs due to infectious organism 11/25/2016   • Pulmonary hypertension 12/13/2016   • B12 deficiency 12/13/2016     Resolved Ambulatory Problems     Diagnosis Date Noted   • 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   • Hypokalemia 11/26/2016   • CKD (chronic kidney disease) stage 3, GFR 30-59 ml/min 11/27/2016     Past Medical History:   Diagnosis Date   • Anemia    •  Aortic valve stenosis    • Asthma    • Atelectasis    • CHF (congestive heart failure)    • Congenital heart disease    • Diabetes mellitus    • Esophageal reflux    • Essential hypertension    • HLD (hyperlipidemia)    • Hypotension    • LVH (left ventricular hypertrophy)    • Pleural effusion    • Pulmonary hypertension        PAST SURGICAL HISTORY  Past Surgical History:   Procedure Laterality Date   • AORTIC VALVE REPAIR/REPLACEMENT     • CATARACT EXTRACTION     • KNEE ARTHROPLASTY         FAMILY HISTORY  Family History   Problem Relation Age of Onset   • Dementia Sister    • Stroke Brother        SOCIAL HISTORY  Social History     Social History   • Marital status:      Spouse name: N/A   • Number of children: N/A   • Years of education: N/A     Occupational History   • Not on file.     Social History Main Topics   • Smoking status: Never Smoker   • Smokeless tobacco: Never Used   • Alcohol use Yes      Comment: ocasional   • Drug use: No   • Sexual activity: Not on file     Other Topics Concern   • Not on file     Social History Narrative       ALLERGIES  Promethazine    REVIEW OF SYSTEMS  Review of Systems   Constitutional: Positive for fatigue. Negative for chills and fever.   HENT: Negative for congestion and sore throat.    Eyes: Negative.    Respiratory: Negative for cough and shortness of breath.    Cardiovascular: Negative for chest pain and leg swelling.   Gastrointestinal: Negative for abdominal pain, diarrhea and vomiting.   Genitourinary: Negative for difficulty urinating and dysuria.   Musculoskeletal: Negative for back pain and neck pain.   Skin: Negative for rash and wound.   Allergic/Immunologic: Negative.    Neurological: Negative for dizziness, weakness, numbness and headaches.   Psychiatric/Behavioral: Positive for confusion and hallucinations.   All other systems reviewed and are negative.      PHYSICAL EXAM  ED Triage Vitals   Temp Heart Rate Resp BP SpO2   06/09/17 2255 06/09/17  2255 06/09/17 2255 06/09/17 2257 06/09/17 2255   98.2 °F (36.8 °C) 76 20 159/85 91 %      Temp src Heart Rate Source Patient Position BP Location FiO2 (%)   06/09/17 2255 06/09/17 2255 06/09/17 2257 06/09/17 2255 --   Tympanic Monitor Sitting Right arm        Physical Exam   Constitutional: She is oriented to person, place, and time and well-developed, well-nourished, and in no distress. No distress.   HENT:   Head: Normocephalic and atraumatic.   Eyes: EOM are normal. Pupils are equal, round, and reactive to light.   Neck: Normal range of motion. Neck supple.   Cardiovascular: Normal rate, regular rhythm and normal heart sounds.    Pulmonary/Chest: Effort normal and breath sounds normal. No respiratory distress.   Abdominal: Soft. There is no tenderness. There is no rebound and no guarding.   Musculoskeletal: Normal range of motion. She exhibits no edema.   Neurological: She is alert and oriented to person, place, and time.   Skin: Skin is warm and dry. No rash noted.   Psychiatric: Mood and affect normal.   Nursing note and vitals reviewed.      LAB RESULTS  Lab Results (last 24 hours)     Procedure Component Value Units Date/Time    CBC & Differential [[id number]] Collected:  06/09/17 2309    Specimen:  Blood Updated:  06/09/17 2330    Narrative:       The following orders were created for panel order CBC & Differential.  Procedure                               Abnormality         Status                     ---------                               -----------         ------                     CBC Auto Differential[[id number]]        Abnormal            Final result                 Please view results for these tests on the individual orders.    Comprehensive Metabolic Panel [[id number]]  (Abnormal) Collected:  06/09/17 2309    Specimen:  Blood Updated:  06/09/17 2342     Glucose 140 (H) mg/dL      BUN 14 mg/dL      Creatinine 1.01 (H) mg/dL      Sodium 131 (L) mmol/L      Potassium 3.8 mmol/L      Chloride 91 (L)  mmol/L      CO2 26.8 mmol/L      Calcium 9.4 mg/dL      Total Protein 7.1 g/dL      Albumin 4.00 g/dL      ALT (SGPT) 29 U/L      AST (SGOT) 24 U/L      Alkaline Phosphatase 67 U/L      Total Bilirubin 0.5 mg/dL      eGFR Non African Amer 52 (L) mL/min/1.73      Globulin 3.1 gm/dL      A/G Ratio 1.3 g/dL      BUN/Creatinine Ratio 13.9     Anion Gap 13.2 mmol/L     Narrative:       The MDRD GFR formula is only valid for adults with stable renal function between ages 18 and 70.    Protime-INR [[id number]]  (Abnormal) Collected:  06/09/17 2309    Specimen:  Blood Updated:  06/09/17 2333     Protime 22.1 (H) Seconds      INR 2.01 (H)    CBC Auto Differential [[id number]]  (Abnormal) Collected:  06/09/17 2309    Specimen:  Blood Updated:  06/09/17 2330     WBC 10.44 10*3/mm3      RBC 4.47 10*6/mm3      Hemoglobin 13.0 g/dL      Hematocrit 39.3 %      MCV 87.9 fL      MCH 29.1 pg      MCHC 33.1 g/dL      RDW 16.0 (H) %      RDW-SD 50.5 fl      MPV 9.5 fL      Platelets 179 10*3/mm3      Neutrophil % 73.5 %      Lymphocyte % 15.7 (L) %      Monocyte % 8.4 %      Eosinophil % 1.8 %      Basophil % 0.4 %      Immature Grans % 0.2 %      Neutrophils, Absolute 7.67 10*3/mm3      Lymphocytes, Absolute 1.64 10*3/mm3      Monocytes, Absolute 0.88 10*3/mm3      Eosinophils, Absolute 0.19 10*3/mm3      Basophils, Absolute 0.04 10*3/mm3      Immature Grans, Absolute 0.02 10*3/mm3     Urinalysis With / Culture If Indicated [[id number]]  (Abnormal) Collected:  06/10/17 0122    Specimen:  Urine from Urine, Catheter Updated:  06/10/17 0136     Color, UA Yellow     Appearance, UA Cloudy (A)     pH, UA 5.5     Specific Gravity, UA 1.015     Glucose, UA Negative     Ketones, UA Negative     Bilirubin, UA Negative     Blood, UA Negative     Protein, UA Negative     Leuk Esterase, UA Moderate (2+) (A)     Nitrite, UA Positive (A)     Urobilinogen, UA 0.2 E.U./dL    Urinalysis, Microscopic Only [[id number]]  (Abnormal) Collected:  06/10/17 0125     Specimen:  Urine from Urine, Catheter Updated:  06/10/17 0136     RBC, UA 3-5 (A) /HPF      WBC, UA Too Numerous to Count (A) /HPF      Bacteria, UA 4+ (A) /HPF      Squamous Epithelial Cells, UA 0-2 /HPF      Hyaline Casts, UA 3-6 /LPF      Methodology Automated Microscopy    Urine Culture [[id number]] Collected:  06/10/17 0122    Specimen:  Urine from Urine, Catheter Updated:  06/10/17 0133          I ordered the above labs and reviewed the results    RADIOLOGY  XR Chest 2 View   Preliminary Result   No acute findings.              CT Head Without Contrast    (Results Pending)        I ordered the above noted radiological studies. Interpreted by radiologist. Reviewed by me in PACS.       PROCEDURES  Procedures    EKG           EKG time: 00:41  Rhythm/Rate: Afib 78  P waves and MI: absent   QRS, axis: nml   ST and T waves: nml     Interpreted Contemporaneously by me, independently viewed  Unchanged compared to prior 11/2016    PROGRESS AND CONSULTS  ED Course     23:04  Labs and EKG ordered for further evaluation.     02:00  BP- (!) 198/97 HR- 81 Temp- 98.2 °F (36.8 °C) (Tympanic) O2 sat- 93%  Rechecked the patient who is in NAD and is resting comfortably. Advised pt and family of the findings of a UTI and the plan for admission. Pt understands and agrees with the plan, all questions answered.    02:03   Call placed to [facility] for admission. Rocephin ordered to treat UTI.     02:16  Discussed pt's case with Dr Dias ([facility]), who agrees to admit the pt to a med/surg bed.     MEDICAL DECISION MAKING  Results were reviewed/discussed with the patient and they were also made aware of online access. Pt also made aware that some labs, such as cultures, will not be resulted during ER visit and follow up with PMD is necessary.     MDM  Number of Diagnoses or Management Options  Generalized weakness:   UTI (urinary tract infection), bacterial:      Amount and/or Complexity of Data Reviewed  Clinical lab tests: ordered and  reviewed (Urinalysis- RBC 3-5, WBC too numerous to count, Bacteria 4+  )  Tests in the radiology section of CPT®: reviewed and ordered (CXR shows NAD)  Tests in the medicine section of CPT®: ordered and reviewed (See EKG procedure note. )  Decide to obtain previous medical records or to obtain history from someone other than the patient: yes  Review and summarize past medical records: yes (Last admitted in 11/2016 secondary to a fever and pneumonia. )  Discuss the patient with other providers: yes (Dr Dias, admitting physician )  Independent visualization of images, tracings, or specimens: yes    Patient Progress  Patient progress: stable         DIAGNOSIS  Final diagnoses:   UTI (urinary tract infection), bacterial   Generalized weakness   Confusion       DISPOSITION  ADMISSION    Discussed treatment plan and reason for admission with pt/family and admitting physician.  Pt/family voiced understanding of the plan for admission for further testing/treatment as needed.     Latest Documented Vital Signs:  As of 6:03 AM  BP- 170/78 HR- 68 Temp- 97.8 °F (36.6 °C) (Oral) O2 sat- 95%    --  Documentation assistance provided by telma Gutiérrez for Dr Wellington .  Information recorded by the scrruth was done at my direction and has been verified and validated by me.          Christine Gutiérrez  06/10/17 0217       Micah Wellington MD  06/10/17 0603

## 2017-06-10 NOTE — PLAN OF CARE
Problem: Patient Care Overview (Adult)  Goal: Plan of Care Review  Outcome: Ongoing (interventions implemented as appropriate)    06/10/17 0900   Coping/Psychosocial Response Interventions   Plan Of Care Reviewed With patient;daughter   Patient Care Overview   Progress improving   Outcome Evaluation   Outcome Summary/Follow up Plan pt recieved from ed at 0300, vss, no c/o pain or nausea at this time. oriented pt to room and md orders and enc pt to call with needs, pt voiced understanding       Goal: Adult Individualization and Mutuality  Outcome: Ongoing (interventions implemented as appropriate)  Goal: Discharge Needs Assessment  Outcome: Ongoing (interventions implemented as appropriate)    Problem: Fall Risk (Adult)  Goal: Identify Related Risk Factors and Signs and Symptoms  Outcome: Outcome(s) achieved Date Met:  06/10/17  Goal: Absence of Falls  Outcome: Ongoing (interventions implemented as appropriate)    Problem: Infection, Risk/Actual (Adult)  Goal: Identify Related Risk Factors and Signs and Symptoms  Outcome: Outcome(s) achieved Date Met:  06/10/17  Goal: Infection Prevention/Resolution  Outcome: Ongoing (interventions implemented as appropriate)

## 2017-06-10 NOTE — ED TRIAGE NOTES
"Pt's daughter states \"on Wednesday she was talking to people that where not there. We have been trying to give her a lot of water.\"  Pt states \" I just don't feel right.\" Pt is unable to get to bedside commode by herself since Sunday.   Pt mental status is normal for her. She knows where she is, just is unsure of the day.  Pt put in wheelchair in triage     "

## 2017-06-11 ENCOUNTER — APPOINTMENT (OUTPATIENT)
Dept: CT IMAGING | Facility: HOSPITAL | Age: 82
End: 2017-06-11

## 2017-06-11 LAB
B PERT DNA SPEC QL NAA+PROBE: NOT DETECTED
C PNEUM DNA NPH QL NAA+NON-PROBE: NOT DETECTED
CRP SERPL-MCNC: 0.6 MG/DL (ref 0–0.5)
FLUAV H1 2009 PAND RNA NPH QL NAA+PROBE: NOT DETECTED
FLUAV H1 HA GENE NPH QL NAA+PROBE: NOT DETECTED
FLUAV H3 RNA NPH QL NAA+PROBE: NOT DETECTED
FLUAV SUBTYP SPEC NAA+PROBE: NOT DETECTED
FLUBV RNA ISLT QL NAA+PROBE: NOT DETECTED
GLUCOSE BLDC GLUCOMTR-MCNC: 117 MG/DL (ref 70–130)
GLUCOSE BLDC GLUCOMTR-MCNC: 142 MG/DL (ref 70–130)
GLUCOSE BLDC GLUCOMTR-MCNC: 150 MG/DL (ref 70–130)
GLUCOSE BLDC GLUCOMTR-MCNC: 171 MG/DL (ref 70–130)
HADV DNA SPEC NAA+PROBE: NOT DETECTED
HCOV 229E RNA SPEC QL NAA+PROBE: NOT DETECTED
HCOV HKU1 RNA SPEC QL NAA+PROBE: NOT DETECTED
HCOV NL63 RNA SPEC QL NAA+PROBE: NOT DETECTED
HCOV OC43 RNA SPEC QL NAA+PROBE: NOT DETECTED
HMPV RNA NPH QL NAA+NON-PROBE: NOT DETECTED
HPIV1 RNA SPEC QL NAA+PROBE: NOT DETECTED
HPIV2 RNA SPEC QL NAA+PROBE: NOT DETECTED
HPIV3 RNA NPH QL NAA+PROBE: NOT DETECTED
HPIV4 P GENE NPH QL NAA+PROBE: NOT DETECTED
INR PPP: 2.39 (ref 0.9–1.1)
M PNEUMO IGG SER IA-ACNC: NOT DETECTED
PROTHROMBIN TIME: 25.3 SECONDS (ref 11.7–14.2)
RHINOVIRUS RNA SPEC NAA+PROBE: DETECTED
RSV RNA NPH QL NAA+NON-PROBE: NOT DETECTED

## 2017-06-11 PROCEDURE — 86140 C-REACTIVE PROTEIN: CPT | Performed by: HOSPITALIST

## 2017-06-11 PROCEDURE — 85610 PROTHROMBIN TIME: CPT | Performed by: INTERNAL MEDICINE

## 2017-06-11 PROCEDURE — 87581 M.PNEUMON DNA AMP PROBE: CPT | Performed by: HOSPITALIST

## 2017-06-11 PROCEDURE — 82962 GLUCOSE BLOOD TEST: CPT

## 2017-06-11 PROCEDURE — 87633 RESP VIRUS 12-25 TARGETS: CPT | Performed by: HOSPITALIST

## 2017-06-11 PROCEDURE — 87486 CHLMYD PNEUM DNA AMP PROBE: CPT | Performed by: HOSPITALIST

## 2017-06-11 PROCEDURE — 94799 UNLISTED PULMONARY SVC/PX: CPT

## 2017-06-11 PROCEDURE — 87040 BLOOD CULTURE FOR BACTERIA: CPT | Performed by: HOSPITALIST

## 2017-06-11 PROCEDURE — 87205 SMEAR GRAM STAIN: CPT | Performed by: HOSPITALIST

## 2017-06-11 PROCEDURE — 87798 DETECT AGENT NOS DNA AMP: CPT | Performed by: HOSPITALIST

## 2017-06-11 PROCEDURE — 71250 CT THORAX DX C-: CPT

## 2017-06-11 PROCEDURE — 25810000003 SODIUM CHLORIDE 0.9 % WITH KCL 20 MEQ 20-0.9 MEQ/L-% SOLUTION: Performed by: INTERNAL MEDICINE

## 2017-06-11 PROCEDURE — 70450 CT HEAD/BRAIN W/O DYE: CPT

## 2017-06-11 PROCEDURE — 25010000003 CEFTRIAXONE PER 250 MG: Performed by: INTERNAL MEDICINE

## 2017-06-11 PROCEDURE — 87070 CULTURE OTHR SPECIMN AEROBIC: CPT | Performed by: HOSPITALIST

## 2017-06-11 RX ADMIN — ATORVASTATIN CALCIUM 80 MG: 80 TABLET, FILM COATED ORAL at 08:58

## 2017-06-11 RX ADMIN — METOPROLOL TARTRATE 25 MG: 50 TABLET ORAL at 08:58

## 2017-06-11 RX ADMIN — CALCIUM CARBONATE-VITAMIN D TAB 500 MG-200 UNIT 500 MG: 500-200 TAB at 08:58

## 2017-06-11 RX ADMIN — LISINOPRIL 40 MG: 40 TABLET ORAL at 08:58

## 2017-06-11 RX ADMIN — METOPROLOL TARTRATE 25 MG: 25 TABLET ORAL at 20:48

## 2017-06-11 RX ADMIN — OXYCODONE HYDROCHLORIDE AND ACETAMINOPHEN 1000 MG: 500 TABLET ORAL at 08:58

## 2017-06-11 RX ADMIN — CALCIUM CARBONATE-VITAMIN D TAB 500 MG-200 UNIT 500 MG: 500-200 TAB at 18:51

## 2017-06-11 RX ADMIN — POTASSIUM CHLORIDE 20 MEQ: 750 CAPSULE, EXTENDED RELEASE ORAL at 08:59

## 2017-06-11 RX ADMIN — ALBUTEROL SULFATE 2.5 MG: 2.5 SOLUTION RESPIRATORY (INHALATION) at 16:55

## 2017-06-11 RX ADMIN — ALBUTEROL SULFATE 2.5 MG: 2.5 SOLUTION RESPIRATORY (INHALATION) at 10:03

## 2017-06-11 RX ADMIN — ISOSORBIDE MONONITRATE 60 MG: 60 TABLET, EXTENDED RELEASE ORAL at 08:58

## 2017-06-11 RX ADMIN — WARFARIN SODIUM 3 MG: 3 TABLET ORAL at 18:42

## 2017-06-11 RX ADMIN — POTASSIUM CHLORIDE 20 MEQ: 750 CAPSULE, EXTENDED RELEASE ORAL at 18:42

## 2017-06-11 RX ADMIN — POTASSIUM CHLORIDE AND SODIUM CHLORIDE 75 ML/HR: 900; 150 INJECTION, SOLUTION INTRAVENOUS at 10:51

## 2017-06-11 RX ADMIN — ASPIRIN 325 MG: 325 TABLET ORAL at 08:58

## 2017-06-11 RX ADMIN — ALBUTEROL SULFATE 2.5 MG: 2.5 SOLUTION RESPIRATORY (INHALATION) at 01:15

## 2017-06-11 RX ADMIN — Medication 1 TABLET: at 08:58

## 2017-06-11 RX ADMIN — CEFTRIAXONE SODIUM 1 G: 1 INJECTION, SOLUTION INTRAVENOUS at 23:03

## 2017-06-11 RX ADMIN — FUROSEMIDE 40 MG: 40 TABLET ORAL at 08:58

## 2017-06-11 RX ADMIN — POTASSIUM CHLORIDE AND SODIUM CHLORIDE 75 ML/HR: 900; 150 INJECTION, SOLUTION INTRAVENOUS at 23:04

## 2017-06-11 RX ADMIN — PANTOPRAZOLE SODIUM 40 MG: 40 TABLET, DELAYED RELEASE ORAL at 07:07

## 2017-06-11 RX ADMIN — FUROSEMIDE 40 MG: 40 TABLET ORAL at 18:42

## 2017-06-11 NOTE — PROGRESS NOTES
[facility]     LOS: 1 day     Subjective:  Eating some  Wed last week very confused    Usually [facility] with urinary tract infection    Last uti    Coughing and congestion for one month    Objective:    Vital Signs:  Temp:  [96.9 °F (36.1 °C)-98.5 °F (36.9 °C)] 96.9 °F (36.1 °C)  Heart Rate:  [57-75] 69  Resp:  [16-20] 16  BP: (147-177)/(65-92) 156/68    General Appearance: no acute distress, appears comfortable  Heart: regular rate and rhythm  Lungs: clear to auscultation bilaterally, respirations unlabored, good air entry  Abdomen: soft, non-tender, no guarding, no rebound, non-distended  Extremities: no edema, no cyanosis  Neurology: speech normal, CN grossly normal  Psychiatric: normal mood and affect    Results Review:    Glucose   Date Value Ref Range Status   06/10/2017 133 (H) 65 - 99 mg/dL Final   06/09/2017 140 (H) 65 - 99 mg/dL Final       Results from last 7 days  Lab Units 06/10/17  0605   WBC 10*3/mm3 11.13*   HEMOGLOBIN g/dL 12.0   HEMATOCRIT % 37.4   PLATELETS 10*3/mm3 189       Results from last 7 days  Lab Units 06/10/17  0605   SODIUM mmol/L 134*   POTASSIUM mmol/L 4.0   CHLORIDE mmol/L 95*   TOTAL CO2 mmol/L 26.8   BUN mg/dL 12   CREATININE mg/dL 0.94   CALCIUM mg/dL 8.9   BILIRUBIN mg/dL 0.5   ALK PHOS U/L 61   ALT (SGPT) U/L 26   AST (SGOT) U/L 19   GLUCOSE mg/dL 133*       Results from last 7 days  Lab Units 06/11/17  0426   INR  2.39*                                 Urine Culture   Date Value Ref Range Status   06/10/2017 >100,000 CFU/mL Gram Negative Bacilli (A)  Preliminary   06/10/2017 >100,000 CFU/mL Gram Positive Cocci (A)  Preliminary        I have reviewed daily medications and changes in CPOE    Scheduled meds    albuterol 2.5 mg Nebulization Q6H - RT   aspirin 325 mg Oral Daily   atorvastatin 80 mg Oral Daily   budesonide-formoterol 2 puff Inhalation BID - RT   calcium-vitamin D 500 mg Oral BID   ceftriaxone 1 g Intravenous Q24H   cyanocobalamin  1,000 mcg Intramuscular Q30 Days   furosemide 40 mg Oral BID   insulin aspart 0-9 Units Subcutaneous 4x Daily With Meals & Nightly   isosorbide mononitrate 60 mg Oral Q24H   levothyroxine 75 mcg Oral See Admin Instructions   lisinopril 40 mg Oral Daily   metoprolol tartrate 50 mg Oral Q12H   multivitamin 1 tablet Oral Daily   pantoprazole 40 mg Oral QAM   potassium chloride 20 mEq Oral BID   vitamin C 1,000 mg Oral Daily   warfarin 3 mg Oral Daily         sodium chloride 0.9 % with KCl 20 mEq 75 mL/hr Last Rate: 75 mL/hr (06/11/17 1051)     PRN meds  dextrose  •  dextrose  •  glucagon (human recombinant)  •  ondansetron  •  sodium chloride  •  sodium chloride  •  Insert peripheral IV **AND** sodium chloride    Urine cx with 2 bacteria    Assessment:    Principal Problem:    UTI (urinary tract infection), bacterial  Active Problems:    Type 2 diabetes mellitus    Aortic valve replaced, equine valve    A-fib    Hypothyroidism (acquired)    History of recurrent UTIs    Essential hypertension      Plan:  Probable pneumonia, check Ct scan chest    rocephin    ST tx eval    Blood cx/sputum cx/viral resp     Tim Preciado MD  06/11/17  12:11 PM

## 2017-06-11 NOTE — PLAN OF CARE
Problem: Patient Care Overview (Adult)  Goal: Plan of Care Review  Outcome: Ongoing (interventions implemented as appropriate)    06/11/17 4192   Coping/Psychosocial Response Interventions   Plan Of Care Reviewed With patient   Patient Care Overview   Progress improving   Outcome Evaluation   Outcome Summary/Follow up Plan Sputum and resp swab sent. Put in isol for rhinovirus, sputum cx pending. Chest Ct pending. Up to bsc with assist. Voiding well. Rested well.        Goal: Adult Individualization and Mutuality  Outcome: Ongoing (interventions implemented as appropriate)  Goal: Discharge Needs Assessment  Outcome: Ongoing (interventions implemented as appropriate)    Problem: Fall Risk (Adult)  Goal: Absence of Falls  Outcome: Ongoing (interventions implemented as appropriate)    Problem: Infection, Risk/Actual (Adult)  Goal: Infection Prevention/Resolution  Outcome: Ongoing (interventions implemented as appropriate)

## 2017-06-12 ENCOUNTER — APPOINTMENT (OUTPATIENT)
Dept: MRI IMAGING | Facility: HOSPITAL | Age: 82
End: 2017-06-12

## 2017-06-12 LAB
GLUCOSE BLDC GLUCOMTR-MCNC: 111 MG/DL (ref 70–130)
GLUCOSE BLDC GLUCOMTR-MCNC: 114 MG/DL (ref 70–130)
GLUCOSE BLDC GLUCOMTR-MCNC: 161 MG/DL (ref 70–130)
GLUCOSE BLDC GLUCOMTR-MCNC: 171 MG/DL (ref 70–130)
INR PPP: 2.62 (ref 0.9–1.1)
PROTHROMBIN TIME: 27.2 SECONDS (ref 11.7–14.2)

## 2017-06-12 PROCEDURE — 82962 GLUCOSE BLOOD TEST: CPT

## 2017-06-12 PROCEDURE — 94799 UNLISTED PULMONARY SVC/PX: CPT

## 2017-06-12 PROCEDURE — 63710000001 PREDNISONE PER 1 MG: Performed by: HOSPITALIST

## 2017-06-12 PROCEDURE — 92610 EVALUATE SWALLOWING FUNCTION: CPT | Performed by: SPEECH-LANGUAGE PATHOLOGIST

## 2017-06-12 PROCEDURE — 70551 MRI BRAIN STEM W/O DYE: CPT

## 2017-06-12 PROCEDURE — 85610 PROTHROMBIN TIME: CPT | Performed by: INTERNAL MEDICINE

## 2017-06-12 PROCEDURE — 99222 1ST HOSP IP/OBS MODERATE 55: CPT | Performed by: PSYCHIATRY & NEUROLOGY

## 2017-06-12 RX ORDER — PREDNISONE 20 MG/1
40 TABLET ORAL
Status: DISCONTINUED | OUTPATIENT
Start: 2017-06-12 | End: 2017-06-16 | Stop reason: HOSPADM

## 2017-06-12 RX ORDER — LEVOTHYROXINE SODIUM 0.07 MG/1
75 TABLET ORAL
Status: DISCONTINUED | OUTPATIENT
Start: 2017-06-12 | End: 2017-06-16 | Stop reason: HOSPADM

## 2017-06-12 RX ADMIN — CALCIUM CARBONATE-VITAMIN D TAB 500 MG-200 UNIT 500 MG: 500-200 TAB at 09:54

## 2017-06-12 RX ADMIN — ALBUTEROL SULFATE 2.5 MG: 2.5 SOLUTION RESPIRATORY (INHALATION) at 10:37

## 2017-06-12 RX ADMIN — WARFARIN SODIUM 3 MG: 3 TABLET ORAL at 19:14

## 2017-06-12 RX ADMIN — POTASSIUM CHLORIDE 20 MEQ: 750 CAPSULE, EXTENDED RELEASE ORAL at 19:15

## 2017-06-12 RX ADMIN — POTASSIUM CHLORIDE 20 MEQ: 750 CAPSULE, EXTENDED RELEASE ORAL at 09:54

## 2017-06-12 RX ADMIN — FUROSEMIDE 40 MG: 40 TABLET ORAL at 19:14

## 2017-06-12 RX ADMIN — METOPROLOL TARTRATE 25 MG: 25 TABLET ORAL at 21:57

## 2017-06-12 RX ADMIN — ATORVASTATIN CALCIUM 80 MG: 80 TABLET, FILM COATED ORAL at 09:54

## 2017-06-12 RX ADMIN — ALBUTEROL SULFATE 2.5 MG: 2.5 SOLUTION RESPIRATORY (INHALATION) at 20:27

## 2017-06-12 RX ADMIN — ASPIRIN 325 MG: 325 TABLET ORAL at 09:54

## 2017-06-12 RX ADMIN — ALBUTEROL SULFATE 2.5 MG: 2.5 SOLUTION RESPIRATORY (INHALATION) at 01:56

## 2017-06-12 RX ADMIN — ALBUTEROL SULFATE 2.5 MG: 2.5 SOLUTION RESPIRATORY (INHALATION) at 23:42

## 2017-06-12 RX ADMIN — LEVOTHYROXINE SODIUM 75 MCG: 75 TABLET ORAL at 18:09

## 2017-06-12 RX ADMIN — LISINOPRIL 40 MG: 40 TABLET ORAL at 09:55

## 2017-06-12 RX ADMIN — PREDNISONE 40 MG: 20 TABLET ORAL at 19:14

## 2017-06-12 RX ADMIN — Medication 1 TABLET: at 09:55

## 2017-06-12 RX ADMIN — OXYCODONE HYDROCHLORIDE AND ACETAMINOPHEN 1000 MG: 500 TABLET ORAL at 09:54

## 2017-06-12 RX ADMIN — ALBUTEROL SULFATE 2.5 MG: 2.5 SOLUTION RESPIRATORY (INHALATION) at 17:09

## 2017-06-12 RX ADMIN — METOPROLOL TARTRATE 25 MG: 25 TABLET ORAL at 09:54

## 2017-06-12 RX ADMIN — CALCIUM CARBONATE-VITAMIN D TAB 500 MG-200 UNIT 500 MG: 500-200 TAB at 19:15

## 2017-06-12 RX ADMIN — PANTOPRAZOLE SODIUM 40 MG: 40 TABLET, DELAYED RELEASE ORAL at 06:52

## 2017-06-12 RX ADMIN — FUROSEMIDE 40 MG: 40 TABLET ORAL at 09:54

## 2017-06-12 RX ADMIN — ISOSORBIDE MONONITRATE 60 MG: 60 TABLET, EXTENDED RELEASE ORAL at 10:09

## 2017-06-12 NOTE — DISCHARGE PLACEMENT REQUEST
"Jihan Teresa N (83 y.o. Female)     Date of Birth Social Security Number Address Home Phone MRN    09/09/1933  [address] [id number] [phone] [id number]    [employer] Marital Status          [place]        Admission Date Admission Type Admitting Provider Attending Provider Department, Room/Bed    6/10/17 Emergency Avis Dias MD [place], Bert Jimenez MD [address], P690/1    Discharge Date Discharge Disposition Discharge Destination                      Attending Provider: Bert Starr MD     Allergies:  Promethazine    Isolation:  Droplet   Infection:  Rhinovirus  (06/11/17)   Code Status:  FULL    Ht:  60\" (152.4 cm)   Wt:  154 lb 9.6 oz (70.1 kg)    Admission Cmt:  None   Principal Problem:  UTI (urinary tract infection), bacterial [N39.0,A49.9]                 Active Insurance as of 6/10/2017     Primary Coverage     Payor Plan Insurance Group Employer/Plan Group    MEDICARE MEDICARE A & B      Payor Plan Address Payor Plan Phone Number Effective From Effective To    [address] [phone] 9/1/1998     [place] [address]       Subscriber Name Subscriber Birth Date Member ID       JIHAN TERESA N 9/9/1933 [id number]           Secondary Coverage     Payor Plan Insurance Group Employer/Plan Group    [facility][1] [id number]     Payor Plan Address Payor Plan Phone Number Effective From Effective To    [address]  12/1/2016     [place] [address]       Subscriber Name Subscriber Birth Date Member ID       JIHAN TERESA N 9/9/1933 [id number]                 Emergency Contacts      (Rel.) Home Phone Work Phone Mobile Phone    Dione Teresa (Daughter) [phone] -- [phone]    Lo Moralez (Daughter) [phone] -- [phone]    AdrienneBharti (Daughter) [phone] -- --              "

## 2017-06-12 NOTE — PLAN OF CARE
Problem: Patient Care Overview (Adult)  Goal: Plan of Care Review    06/12/17 1018   Coping/Psychosocial Response Interventions   Plan Of Care Reviewed With patient   Outcome Evaluation   Outcome Summary/Follow up Plan swallow WNL at baseline

## 2017-06-12 NOTE — THERAPY EVALUATION
Acute Care - Speech Language Pathology   Swallow Initial Evaluation [facility]     Patient Name: Jihan Teresa  : 1933  MRN: [id number]  Today's Date: 2017               Admit Date: 6/10/2017    SPEECH-LANGUAGE PATHOLOGY EVALUATION - SWALLOW  Subjective: The patient was seen on this date for a Clinical Swallow evaluation.  Patient was alert and cooperative.    The patient's history is significant for admitted with confusion and cough. DX=  UTI and rhinovirus. CT of chest=no definite airspace consolidation to suggest PNA.   Objective: Textures given included thin liquid, puree consistency, mechanical soft consistency and regular consistency.  Assessment: Difficulties were noted with none of the above consistencies, characterized by SLP assessed patient during her breakfast meal, and no overt s/s of pen/asp with tested consistencies. Functional mastication and timely swallow initiation.  SLP Findings:  Patient presents with functional swallow, without esophageal component.   Recommendations: Diet Textures: thin liquid, regular consistency food.  Medications should be taken whole with thin liquids.   Recommended Strategies: Upright for PO and small bites and sips. Oral care before breakfast, after all meals and PRN.  Dysphagia therapy is not recommended. Rationale: functional swallow at baseline.  Visit Dx:     ICD-10-CM ICD-9-CM   1. UTI (urinary tract infection), bacterial N39.0 599.0    A49.9 041.9   2. Generalized weakness R53.1 780.79   3. Confusion R41.0 298.9   4. B12 deficiency E53.8 266.2     Patient Active Problem List   Diagnosis   • Type 2 diabetes mellitus   • Aortic valve replaced, equine valve   • Cerebral infarction   • A-fib   • GERD without esophagitis   • Sleep apnea in adult   • Congestive heart failure   • Cardiomyopathy   • Cognitive dysfunction   • Hypothyroidism (acquired)   • History of recurrent UTIs   • Mild reactive airways disease   • Essential hypertension   • Pneumonia  of both lungs due to infectious organism   • Pulmonary hypertension   • B12 deficiency   • UTI (urinary tract infection), bacterial     Past Medical History:   Diagnosis Date   • Anemia    • Aortic valve stenosis    • Asthma    • Atelectasis    • CHF (congestive heart failure)    • Congenital heart disease    • Diabetes mellitus    • Esophageal reflux    • Essential hypertension    • HLD (hyperlipidemia)    • Hypotension    • LVH (left ventricular hypertrophy)    • Pleural effusion    • Pulmonary hypertension      Past Surgical History:   Procedure Laterality Date   • AORTIC VALVE REPAIR/REPLACEMENT     • CATARACT EXTRACTION     • KNEE ARTHROPLASTY            SWALLOW EVALUATION (last 72 hours)      Swallow Evaluation       06/12/17 1000                Rehab Evaluation    Document Type evaluation  -KA        Subjective Information no complaints  -KA        Patient Effort, Rehab Treatment excellent  -KA        Symptoms Noted During/After Treatment none  -KA        General Information    Patient Profile Review yes  -KA        Current Diet Limitations thin liquids;regular solid  -KA        Prior Level of Function- Swallowing no diet consistency restrictions;safe, efficient swallowing in all situations  -KA        Plans/Goals Discussed With patient and family  -KA        Barriers to Rehab cognitive status  -KA        Clinical Impression    Patient's Goals For Discharge return home  -KA        Family Goals For Discharge family did not state  -KA        SLP Swallowing Diagnosis other (see comments)   WNL  -KA        Criteria for Skilled Therapeutic Interventions Met no problems identified which require skilled intervention  -KA        SLP Diet Recommendation regular textures;thin liquids  -KA        Recommended Diagnostics other (see comments)   not indicated   -KA        Recommended Feeding/Eating Techniques small sips/bites  -KA        SLP Rec. for Method of Medication Administration meds whole with thin liquid  -KA         Monitor For Signs Of Aspiration right lower lobe infiltrates  -KA        Anticipated Discharge Disposition other (see comments)   unknown   -KA        Oral Motor Structure and Function    Oral Motor Anatomy and Physiology patient demonstrates anatomy and physiology that is WNL  -KA        Dentition Assessment present and adequate  -KA        Secretion Management WNL/WFL  -KA        Oral Musculature General Assessment WNL (within normal limits)  -KA          User Key  (r) = Recorded By, (t) = Taken By, (c) = Cosigned By    Initials Name Effective Dates    TRINO Medrano MA,CCC-SLP 04/13/15 -         EDUCATION  The patient has been educated in the following areas:   Dysphagia (Swallowing Impairment).    SLP Recommendation and Plan  SLP Swallowing Diagnosis: other (see comments) (WNL)  SLP Diet Recommendation: regular textures, thin liquids  Recommended Feeding/Eating Techniques: small sips/bites  SLP Rec. for Method of Medication Administration: meds whole with thin liquid  Monitor For Signs Of Aspiration: right lower lobe infiltrates  Recommended Diagnostics: other (see comments) (not indicated )  Criteria for Skilled Therapeutic Interventions Met: no problems identified which require skilled intervention  Anticipated Discharge Disposition: other (see comments) (unknown )                   Plan of Care Review  Plan Of Care Reviewed With: patient  Outcome Summary/Follow up Plan: swallow WNL at baseline            SLP Outcome Measures (last 72 hours)      SLP Outcome Measures       06/12/17 1000          SLP Outcome Measures    Outcome Measure Used? Adult NOMS  -KA      FCM Scores    FCM Chosen Swallowing  -KA      Swallowing FCM Score 7  -KA        User Key  (r) = Recorded By, (t) = Taken By, (c) = Cosigned By    Initials Name Effective Dates    TRINO Medrano MA,CCC-SLP 04/13/15 -            Time Calculation:         Time Calculation- SLP       06/12/17 1019          Time Calculation- SLP    SLP Start Time  0830  -      SLP Stop Time 0915  -      SLP Time Calculation (min) 45 min  -      SLP Received On 06/12/17  -        User Key  (r) = Recorded By, (t) = Taken By, (c) = Cosigned By    Initials Name Provider Type    TRINO Medrano MA,CCC-SLP Speech and Language Pathologist          Therapy Charges for Today     Code Description Service Date Service Provider Modifiers Qty    [id number] HC ST EVAL ORAL PHARYNG SWALLOW 3 6/12/2017 Ron Medrano MA,CCC-SLP GN 1               Ron Medrano MA,ALEKSANDRA-SLP  6/12/2017

## 2017-06-12 NOTE — PROGRESS NOTES
"    DAILY PROGRESS NOTE  [facility]    Patient Identification:  Name: Jihan Teresa  Age: 83 y.o.  Sex: female  :  1933  MRN: [id number]         Primary Care Physician: Gustavo Jackman Jr., MD    Subjective:  Interval History: transient facial droop overnight led to stat CT which was NAD - today doing just fine - good PO w/ breakfast w/out dysphagia - still w/ NP cough. No new seizure/loc/lof/cp    Objective:daughter at bs    Scheduled Meds:  albuterol 2.5 mg Nebulization Q6H - RT   aspirin 325 mg Oral Daily   atorvastatin 80 mg Oral Daily   budesonide-formoterol 2 puff Inhalation BID - RT   calcium-vitamin D 500 mg Oral BID   ceftriaxone 1 g Intravenous Q24H   cyanocobalamin 1,000 mcg Intramuscular Q30 Days   furosemide 40 mg Oral BID   insulin aspart 0-9 Units Subcutaneous 4x Daily With Meals & Nightly   isosorbide mononitrate 60 mg Oral Q24H   levothyroxine 75 mcg Oral Q AM   lisinopril 40 mg Oral Daily   metoprolol tartrate 25 mg Oral Q12H   multivitamin 1 tablet Oral Daily   pantoprazole 40 mg Oral QAM   potassium chloride 20 mEq Oral BID   vitamin C 1,000 mg Oral Daily   warfarin 3 mg Oral Daily     Continuous Infusions:  sodium chloride 0.9 % with KCl 20 mEq 75 mL/hr Last Rate: 75 mL/hr (17 2304)       Vital signs in last 24 hours:  Temp:  [97.5 °F (36.4 °C)-99.2 °F (37.3 °C)] 98.3 °F (36.8 °C)  Heart Rate:  [53-73] 53  Resp:  [16-20] 20  BP: (131-178)/(73-86) 177/86    Intake/Output:    Intake/Output Summary (Last 24 hours) at 17 1224  Last data filed at 17 1000   Gross per 24 hour   Intake             1641 ml   Output              150 ml   Net             1491 ml       Exam:  /86 (BP Location: Left arm, Patient Position: Lying)  Pulse 53  Temp 98.3 °F (36.8 °C) (Oral)   Resp 20  Ht 60\" (152.4 cm)  Wt 154 lb 9.6 oz (70.1 kg)  SpO2 93%  BMI 30.19 kg/m2    General Appearance:    Alert, cooperative, no distress, AAOx1 - pleasantly demented, fluent " speech                          Head:    Normocephalic, without obvious abnormality, atraumatic                           Eyes:    PERRL, conjunctiva/corneas clear, EOM's intact, both eyes                         Throat:   Lips, tongue, gums normal; oral mucosa pink and moist                           Neck:   Supple, no JVD                         Lungs:    Coarse rhonchi throughout R>L to auscultation bilaterally, respirations unlabored                 Chest Wall:    No tenderness or deformity                          Heart:    Regular rate and rhythm, S1 and S2 normal                  Abdomen:     Soft, non-tender, bowel sounds active, no masses                 Extremities:   Extremities normal, atraumatic, no cyanosis or edema                 Neurologic:   CNII-XII intact, motor strength grossly intact, no focal deficits noted     Data Review:  Labs in chart were reviewed.    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       Plan:  UCx sensitivity pending - on [place]    Transient facial droop overnight - CT NAD - I don't see need for MRI but will ask Neuro to evaluate    AF - RC w/ therapeutic INR    RSV bronchitis - CT chest negative - needs steroids as cough will linger - nebs helping     Pancreatic cyst - klaudia d/w further - outpt CT surveillance       DM2 - watch for reactive hyperglycemia     SLIVF    Dispo - home hopefully 6/13    Bert Starr MD  6/12/2017  12:24 PM

## 2017-06-12 NOTE — PLAN OF CARE
Problem: Patient Care Overview (Adult)  Goal: Plan of Care Review  Outcome: Ongoing (interventions implemented as appropriate)    06/12/17 0457   Coping/Psychosocial Response Interventions   Plan Of Care Reviewed With patient   Patient Care Overview   Progress improving   Outcome Evaluation   Outcome Summary/Follow up Plan Patient resting well. No signs of acute distress. Tolerating diet. Vital signs stable. Denies pain. family at bedside       Goal: Adult Individualization and Mutuality  Outcome: Ongoing (interventions implemented as appropriate)  Goal: Discharge Needs Assessment  Outcome: Ongoing (interventions implemented as appropriate)    Problem: Fall Risk (Adult)  Goal: Absence of Falls  Outcome: Ongoing (interventions implemented as appropriate)    Problem: Infection, Risk/Actual (Adult)  Goal: Infection Prevention/Resolution  Outcome: Ongoing (interventions implemented as appropriate)

## 2017-06-12 NOTE — PLAN OF CARE
Problem: Patient Care Overview (Adult)  Goal: Plan of Care Review  Outcome: Ongoing (interventions implemented as appropriate)    06/12/17 1800   Coping/Psychosocial Response Interventions   Plan Of Care Reviewed With patient   Patient Care Overview   Progress progress towards functional goals is fair   Outcome Evaluation   Outcome Summary/Follow up Plan no complaints        Goal: Adult Individualization and Mutuality  Outcome: Ongoing (interventions implemented as appropriate)  Goal: Discharge Needs Assessment  Outcome: Ongoing (interventions implemented as appropriate)    Problem: Fall Risk (Adult)  Goal: Absence of Falls  Outcome: Ongoing (interventions implemented as appropriate)    Problem: Infection, Risk/Actual (Adult)  Goal: Infection Prevention/Resolution  Outcome: Ongoing (interventions implemented as appropriate)

## 2017-06-12 NOTE — CONSULTS
Patient Identification:  NAME:  Jihan Teresa  Age:  83 y.o.   Sex:  female   :  1933   MRN:  [id number]       Chief complaint: She doesn't have one reason for consult facial droop    History of present illness:  This patient is an 83-year-old right-handed white female to history of hypotension and anemia aortic valve stenosis congenital heart disease diabetes essential hypertension who comes to the hospital with urinary tract infection associated symptoms some mild lethargy and confusion additional symptoms last night she had some right facial weakness the duration is unknown the quality is described no other associated symptoms she had a CAT scan that shows no acute abnormality certainly no bleed I'm being called to see the patient.  The patient denies any weakness numbness or any problems.  The daughter notes she had been a little confused but may be better today.      Past medical history:  Past Medical History:   Diagnosis Date   • Anemia    • Aortic valve stenosis    • Asthma    • Atelectasis    • CHF (congestive heart failure)    • Congenital heart disease    • Diabetes mellitus    • Esophageal reflux    • Essential hypertension    • HLD (hyperlipidemia)    • Hypotension    • LVH (left ventricular hypertrophy)    • Pleural effusion    • Pulmonary hypertension        Past surgical history:  Past Surgical History:   Procedure Laterality Date   • AORTIC VALVE REPAIR/REPLACEMENT     • CATARACT EXTRACTION     • KNEE ARTHROPLASTY         Allergies:  Promethazine    Home medications:  Facility-Administered Medications Prior to Admission   Medication Dose Route Frequency Provider Last Rate Last Dose   • cyanocobalamin injection 1,000 mcg  1,000 mcg Intramuscular Q28 Days Gustavo Jackman Jr., MD   1,000 mcg at 02/10/17 1600   • cyanocobalamin injection 1,000 mcg  1,000 mcg Intramuscular Q28 Days Gustavo Jackman Jr., MD   1,000 mcg at 17 1534     Prescriptions Prior to Admission   Medication Sig  Dispense Refill Last Dose   • ascorbic acid (VITAMIN C) 250 MG chewable tablet chewable tablet Chew 500 mg Daily.   6/9/2017 at Unknown time   • aspirin 325 MG tablet Take 325 mg by mouth Daily.   6/9/2017 at Unknown time   • atorvastatin (LIPITOR) 80 MG tablet TAKE 1 TABLET BY MOUTH AT BEDTIME 90 tablet 1 6/9/2017 at Unknown time   • Calcium Citrate-Vitamin D 250-200 MG-UNIT tablet Take  by mouth 2 (Two) Times a Day.   6/9/2017 at Unknown time   • cyanocobalamin 1000 MCG/ML injection Inject 1,000 mcg into the shoulder, thigh, or buttocks Every 30 (Thirty) Days.   Past Month at Unknown time   • furosemide (LASIX) 40 MG tablet Take 1 tablet by mouth 2 (Two) Times a Day. 180 tablet 1 6/9/2017 at Unknown time   • isosorbide mononitrate (IMDUR) 60 MG 24 hr tablet TAKE 1 TABLET EVERY DAY 90 tablet 3 6/9/2017 at Unknown time   • levothyroxine (SYNTHROID, LEVOTHROID) 150 MCG tablet Take 75 mcg by mouth See Admin Instructions. Monday, Tuesday, Thursday, Friday, Saturday, Sunday 6/9/2017 at Unknown time   • lisinopril (PRINIVIL,ZESTRIL) 20 MG tablet Take 2 tablets by mouth Daily. (Patient taking differently: Take 20 mg by mouth Daily.) 120 tablet 5 6/9/2017 at Unknown time   • metFORMIN (GLUCOPHAGE) 500 MG tablet TAKE 1 TABLET BY MOUTH TWICE A DAY WITH MEALS (Patient taking differently: takes half a day 250mg) 60 tablet 5 6/9/2017 at Unknown time   • metoprolol tartrate (LOPRESSOR) 50 MG tablet TAKE 1 TABLET BY MOUTH 2 (TWO) TIMES DAILY (Patient taking differently: takes 25mg twice a dy) 180 tablet 2 6/9/2017 at Unknown time   • Multiple Vitamin (MULTIVITAMIN) tablet Take 1 tablet by mouth Daily.   6/9/2017 at Unknown time   • Omeprazole Magnesium 20.6 (20 BASE) MG capsule delayed-release Take 1 capsule by mouth Daily.   6/9/2017 at Unknown time   • potassium chloride (MICRO-K) 10 MEQ CR capsule Take 2 capsules by mouth 2 (Two) Times a Day. 360 capsule 3 6/9/2017 at Unknown time   • warfarin (COUMADIN) 3 MG tablet TUES-SUN  "TAKE 3 MG. DO NOT TAKE ON MONDAY 90 tablet 2 6/8/2017 at Unknown time   • albuterol (PROVENTIL HFA;VENTOLIN HFA) 108 (90 BASE) MCG/ACT inhaler Inhale 2 puffs Every 6 (Six) Hours As Needed.   More than a month at Unknown time   • budesonide-formoterol (SYMBICORT) 160-4.5 MCG/ACT inhaler Inhale 2 puffs 2 (two) times a day as needed.   More than a month at Unknown time   • Syringe/Needle, Disp, (B-D SYRINGE/NEEDLE 3CC/23GX1\") 23G X 1\" 3 ML misc Use every week for 4 weeks then monthly for B12 injections.   Taking   • warfarin (COUMADIN) 3 MG tablet Take 3 mg by mouth See Admin Instructions. Daily Thursday, Sunday   Taking        Hospital medications:    albuterol 2.5 mg Nebulization Q6H - RT   aspirin 325 mg Oral Daily   atorvastatin 80 mg Oral Daily   budesonide-formoterol 2 puff Inhalation BID - RT   calcium-vitamin D 500 mg Oral BID   ceftriaxone 1 g Intravenous Q24H   cyanocobalamin 1,000 mcg Intramuscular Q30 Days   furosemide 40 mg Oral BID   insulin aspart 0-9 Units Subcutaneous 4x Daily With Meals & Nightly   isosorbide mononitrate 60 mg Oral Q24H   levothyroxine 75 mcg Oral Q AM   lisinopril 40 mg Oral Daily   metoprolol tartrate 25 mg Oral Q12H   multivitamin 1 tablet Oral Daily   pantoprazole 40 mg Oral QAM   potassium chloride 20 mEq Oral BID   predniSONE 40 mg Oral Daily With Breakfast   vitamin C 1,000 mg Oral Daily   warfarin 3 mg Oral Daily        dextrose  •  dextrose  •  glucagon (human recombinant)  •  ondansetron  •  sodium chloride  •  sodium chloride  •  Insert peripheral IV **AND** sodium chloride    Family history:  Family History   Problem Relation Age of Onset   • Dementia Sister    • Stroke Brother        Social history:  Social History   Substance Use Topics   • Smoking status: Never Smoker   • Smokeless tobacco: Never Used   • Alcohol use Yes      Comment: ocasional       Review of systems:    He denies everything hallucinations hair eyes ears nose throat skin bone joint  lymphatic " hematologic or oncologic complaints no neck pain chest pain abdominal pain bowel bladder incontinence fever chills or rash    Objective:  Vitals Ranges:   Temp:  [97.5 °F (36.4 °C)-99.2 °F (37.3 °C)] 98 °F (36.7 °C)  Heart Rate:  [53-73] 64  Resp:  [18-20] 18  BP: (157-178)/(69-91) 158/69      Physical Exam:  Awake alert moderately well oriented fund of knowledge good attention span and concentration fair recent and remote memory fair language function normal well-developed well-nourished in no distress pupils to constricting to 1 one half normal disc retinas visual fields extraocular movements full without nystagmus nasolabial folds palate tongue roughly symmetrical normal hearing facial sensation head turning shoulder shrug motor 5 minus out of 5 times for not the best effort but no rigidity some distal atrophy mild bradykinesia but no resting tremor reflexes all toes downgoing bilaterally sensation normal light touch in the face both arms both legs coordination in the upper extremities bilaterally station and gait was not tested for fear of would let her fall heart regular without murmur neck supple without bruits    Results review:   I reviewed the patient's new clinical results.    Data review:  Lab Results (last 24 hours)     Procedure Component Value Units Date/Time    POC Glucose Fingerstick [[id number]]  (Abnormal) Collected:  06/11/17 1842    Specimen:  Blood Updated:  06/11/17 1851     Glucose 150 (H) mg/dL     Narrative:       Meter: [id number] : [id number] Miki Costa    POC Glucose Fingerstick [[id number]]  (Normal) Collected:  06/11/17 1927    Specimen:  Blood Updated:  06/11/17 1937     Glucose 117 mg/dL     Narrative:       Meter: [id number] : [id number] Ganesh Coreas    Blood Culture [[id number]]  (Normal) Collected:  06/11/17 1640    Specimen:  Blood from Hand, Right Updated:  06/12/17 0501     Blood Culture No growth at less than 24 hours    Protime-INR [[id number]]  (Abnormal) Collected:   06/12/17 0508    Specimen:  Blood Updated:  06/12/17 0532     Protime 27.2 (H) Seconds      INR 2.62 (H)    POC Glucose Fingerstick [[id number]]  (Normal) Collected:  06/12/17 0558    Specimen:  Blood Updated:  06/12/17 0614     Glucose 114 mg/dL     Narrative:       Meter: [id number] : [id number] Alonso Garduno    Urine Culture [[id number]]  (Abnormal) Collected:  06/10/17 0122    Specimen:  Urine from Urine, Catheter Updated:  06/12/17 0853     Urine Culture >100,000 CFU/mL Gram Negative Bacilli (A)      >100,000 CFU/mL Gram Positive Cocci (A)    Respiratory Culture [[id number]] Collected:  06/11/17 1244    Specimen:  Sputum from Cough Updated:  06/12/17 1014     Respiratory Culture Moderate growth (3+) Normal Respiratory Aliyah     Gram Stain Result Moderate (3+) Mixed bacterial morphotypes seen on Gram Stain    POC Glucose Fingerstick [[id number]]  (Abnormal) Collected:  06/12/17 1202    Specimen:  Blood Updated:  06/12/17 1223     Glucose 171 (H) mg/dL     Narrative:       Meter: [id number] : [id number] Dana Malikaldmisty MILES    Blood Culture [[id number]]  (Normal) Collected:  06/11/17 1439    Specimen:  Blood from Arm, Left Updated:  06/12/17 1501     Blood Culture No growth at 24 hours    POC Glucose Fingerstick [[id number]]  (Normal) Collected:  06/12/17 1626    Specimen:  Blood Updated:  06/12/17 1629     Glucose 111 mg/dL     Narrative:       Meter: [id number] : [id number] Renetta Burnette           Imaging:  Imaging Results (last 24 hours)     Procedure Component Value Units Date/Time    XR Chest 2 View [[id number]] Collected:  06/10/17 0153     Updated:  06/12/17 0827    Narrative:       CHEST PA AND LATERAL.     HISTORY: Cough.     COMPARISON: 11/28/2016.     FINDINGS:  Cardiomegaly is unchanged. Pulmonary congestion has improved.     There is no consolidation or effusion. Chronic lung changes are present.          Impression:       No acute findings.         This report was finalized on 6/12/2017  8:24 AM by Dr. John Valdivia MD.       CT Head Without Contrast [[id number]] Collected:  06/10/17 1749     Updated:  06/12/17 0940    Narrative:       CRANIAL CT SCAN WITHOUT CONTRAST     CLINICAL HISTORY: Confusion and history of anticoagulation;  N39.0-Urinary tract infection, site not specified; A49.9-Bacterial  infection, unspecified; R53.1-Weakness; R41.0-Disorientation,  unspecified; E53.8-Deficiency of other specified B group vitamins.     COMPARISON: None.     FINDINGS: Multiple axial images of the head were obtained without  contrast. There is very mild atrophy. There are patchy areas of  decreased attenuation in the white matter likely related to chronic  ischemic gliotic changes. There is an old appearing lacunar type infarct  anteriorly in the left thalamus, image 24. Ventricles, sulci, and  cisterns appear normal. Bone window images show an air-fluid level in  the right maxillary sinus possibly related to acute sinusitis. There is  chronic appearing mucosal thickening in the right ethmoid and left  maxillary sinuses.       Impression:       1. Mild atrophy with chronic appearing parenchymal changes, no acute  finding.  2. Paranasal sinus findings as above.         This study was performed with techniques to keep radiation doses as low  as reasonably achievable (ALARA). Individualized dose reduction  techniques using automated exposure control or adjustment of vA and/or  kV according to the patient size were employed.      This report was finalized on 6/10/2017 7:46 PM by Dr. Guido Richard MD.       CT Head Without Contrast [[id number]] Collected:  06/11/17 2014     Updated:  06/12/17 1033    Narrative:       CRANIAL CT SCAN WITHOUT CONTRAST     CLINICAL HISTORY: Slight facial droop /RO stroke; N39.0-Urinary tract  infection, site not specified; A49.9-Bacterial infection, unspecified;  R53.1-Weakness; R41.0-Disorientation, unspecified; E53.8-Deficiency of  other specified B group vitamins.      COMPARISON: 06/10/2017.     FINDINGS: Multiple axial images of the head were obtained without  contrast. Very mild atrophy again noted along with patchy hypodensities  in the white matter felt to reflect chronic ischemic gliotic changes. 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 No acute hemorrhage is  demonstrated. Ventricles, sulci, and cisterns appear normal. Bone window  images again showing an air-fluid level in the right maxillary sinus  possibly indicative of acute sinusitis.     Called to patient's nurse at extension [phone] at 8:09 PM.       Impression:       No change from one day earlier.         This study was performed with techniques to keep radiation doses as low  as reasonably achievable (ALARA). Individualized dose reduction  techniques using automated exposure control or adjustment of vA and/or  kV according to the patient size were employed.        CT Chest Without Contrast [[id number]] Collected:  06/11/17 1359     Updated:  06/12/17 1126    Narrative:       CT CHEST WITHOUT CONTRAST     HISTORY: 83-year-old female with cough. Possible pneumonia.     TECHNIQUE: 3 mm images were obtained through the chest without the  administration of IV contrast. Compared with yesterday's chest  radiograph as well as chest CT from 04/06/2013.     FINDINGS: No definite new airspace consolidations are seen. There are  scattered regions of pleural parenchymal thickening and coarsened  interstitium. Nodular pleural-based opacity at the right lower lobe  which is stable. There is fine linear scarring at the lower lobes which  is unchanged. There are no pleural or pericardial effusions. There is a  mosaic pattern of density within both lung fields which was present  previously as well. There are multiple shotty mediastinal nodes which  appear stable. There are extensive thoracic aortic atherosclerotic  changes and prominent coronary artery calcifications. There is aberrant  origin of the right subclavian artery coursing  posterior to the  esophagus. The pulmonary arteries are markedly enlarged with the main  pulmonary artery measuring 4.2 cm transversely, not significantly  changed. There is a tiny hiatal hernia. There has been interval increase  in size of a 2.3 cm right renal cyst, previously measuring 1.6 cm on CT  of the abdomen from 05/29/2012. There has also been interval increase in  size of a cystic lesion exophytic from the pancreatic body measuring 2.3  cm, previously up to 1.7 cm. The internal density measurements average  less than 10 Hounsfield units.       Impression:       1. There is no definite acute airspace consolidation to suggest  pneumonia. There are chronic interstitial lung markings which are stable  and there is also a stable mosaic pattern of density within both lung  fields which can be seen with air trapping.  2. Extensive atherosclerotic disease. Aberrant origin of the right  subclavian artery.  3. Markedly enlarged pulmonary arteries consistent with pulmonary  arterial hypertension.  4. There has been interval increase in size of a 2.3 cm cystic lesion  exophytic from the pancreatic body measuring up to 1.7 cm on the CT of  the abdomen from 05/29/2012. The slow-growing cystic lesion is  indeterminant. Follow-up is recommended. FNA with endoscopic ultrasound  may be difficult given the location of the lesion. Conservative  surveillance can be performed with CT of the abdomen in 6 months and  surgical consultation may be considered as well.     This report was finalized on 6/12/2017 11:23 AM by Dr. Aileen Gloria MD.                Assessment and Plan:     Principal Problem:    UTI (urinary tract infection), bacterial  Active Problems:    Type 2 diabetes mellitus    Aortic valve replaced, equine valve    A-fib    Hypothyroidism (acquired)    History of recurrent UTIs    Essential hypertension  I do not believe this patient has suffered a true stroke or TIA syndrome.  She may of had some facial asymmetry but  "also appeared to have some lethargy and confusion consistent with metabolic encephalopathy.  She currently has a nonfocal non-lateralized exam and I will check additional diagnostic testing in the form of an MRI of the brain.  She has been on Coumadin with a \"good\" INR of 2.01 and aspirin and I will not change that at this point.  I have ordered additional diagnostic testing in the form of lipid panel and hemoglobin A1c, in addition to the MRI scan that I will independently eyeball review!!    Shane Mota MD  06/12/17  4:42 PM  "

## 2017-06-12 NOTE — PROGRESS NOTES
Discharge Planning Assessment  [facility]     Patient Name: Jihan Teresa  MRN: [id number]  Today's Date: 6/12/2017    Admit Date: 6/10/2017          Discharge Needs Assessment       06/12/17 8540    Living Environment    Lives With child(david), adult    Living Arrangements house    Transportation Available car;family or friend will provide    Living Environment    Provides Primary Care For no one, unable/limited ability to care for self    Quality Of Family Relationships supportive    Able to Return to Prior Living Arrangements other (see comments)    Discharge Needs Assessment    Concerns To Be Addressed adjustment to diagnosis/illness concerns    Anticipated Changes Related to Illness none    Equipment Currently Used at Home walker, standard    Equipment Needed After Discharge none    Discharge Facility/Level Of Care Needs nursing facility, skilled            Discharge Plan       06/12/17 5273    Case Management/Social Work Plan    Additional Comments I met with pt and her daughter Bharti Deleon ([phone]), pt confirmed the address, PCP & pharmacy are correct. Pt said she is assisted with ADL by her daughter Dione who lives wih her, pt said seh ambulates with a walker, has never had home ben, Bharti said pt has been to [facility] and [facility] and if rehab is needed they would want [facility] and if they do not have a bed she will obtain a back-up facility. Pt said she plans to return home at discharge. Pt said her daugher provides her with transportation, Bharti said her sister with make the ([facility]) follow-up appointment with Dr Jackman.      06/12/17 7651    Case Management/Social Work Plan    Plan home at discharge w/o needs, if rehab needed wants [facility]    Patient/Family In Agreement With Plan yes        Discharge Placement     Facility/Agency Request Status Selected? Address Phone Number Fax Number    [facility] Pending - Request Sent     [address] [facility] [address]  [phone]                Demographic Summary       06/12/17 1648    Referral Information    Admission Type inpatient    Arrived From admitted as an inpatient;home or self-care    Referral Source admission list    Reason For Consult discharge planning    Contact Information    Permission Granted to Share Information With facility ;family/designee            Functional Status       06/12/17 1648    Functional Status Current    Ambulation 2-->assistive person    Transferring 2-->assistive person    Toileting 2-->assistive person    Bathing 2-->assistive person    Dressing 2-->assistive person    Eating 0-->independent    Communication 0-->understands/communicates without difficulty    Swallowing (if score 2 or more for any item, consult Rehab Services) 0-->swallows foods/liquids without difficulty    Change in Functional Status Since Onset of Current Illness/Injury yes    Functional Status Prior    Ambulation 1-->assistive equipment    Transferring 1-->assistive equipment    Toileting 1-->assistive equipment    Bathing 2-->assistive person    Dressing 2-->assistive person    Eating 0-->independent    Communication 0-->understands/communicates without difficulty    Swallowing 0-->swallows foods/liquids without difficulty               Patient Forms       06/12/17 1649    Patient Forms    Provider Choice List Delivered    Delivered to Support person   given to pt's teodoro Hay    Method of delivery In person          Tanika Hatfield RN

## 2017-06-13 ENCOUNTER — APPOINTMENT (OUTPATIENT)
Dept: CT IMAGING | Facility: HOSPITAL | Age: 82
End: 2017-06-13
Attending: PSYCHIATRY & NEUROLOGY

## 2017-06-13 LAB
BACTERIA SPEC AEROBE CULT: ABNORMAL
BACTERIA SPEC AEROBE CULT: ABNORMAL
BACTERIA SPEC RESP CULT: NORMAL
CHOLEST SERPL-MCNC: 109 MG/DL (ref 0–200)
GLUCOSE BLDC GLUCOMTR-MCNC: 190 MG/DL (ref 70–130)
GLUCOSE BLDC GLUCOMTR-MCNC: 216 MG/DL (ref 70–130)
GLUCOSE BLDC GLUCOMTR-MCNC: 266 MG/DL (ref 70–130)
GLUCOSE BLDC GLUCOMTR-MCNC: 270 MG/DL (ref 70–130)
GRAM STN SPEC: NORMAL
HBA1C MFR BLD: 6.07 % (ref 4.8–5.6)
HDLC SERPL-MCNC: 51 MG/DL (ref 40–60)
LDLC SERPL CALC-MCNC: 51 MG/DL (ref 0–100)
LDLC/HDLC SERPL: 1 {RATIO}
TRIGL SERPL-MCNC: 36 MG/DL (ref 0–150)
VLDLC SERPL-MCNC: 7.2 MG/DL (ref 5–40)

## 2017-06-13 PROCEDURE — 82962 GLUCOSE BLOOD TEST: CPT

## 2017-06-13 PROCEDURE — 0 IOPAMIDOL 61 % SOLUTION: Performed by: HOSPITALIST

## 2017-06-13 PROCEDURE — 99233 SBSQ HOSP IP/OBS HIGH 50: CPT | Performed by: NURSE PRACTITIONER

## 2017-06-13 PROCEDURE — 94799 UNLISTED PULMONARY SVC/PX: CPT

## 2017-06-13 PROCEDURE — 70496 CT ANGIOGRAPHY HEAD: CPT

## 2017-06-13 PROCEDURE — 70498 CT ANGIOGRAPHY NECK: CPT

## 2017-06-13 PROCEDURE — 80061 LIPID PANEL: CPT | Performed by: PSYCHIATRY & NEUROLOGY

## 2017-06-13 PROCEDURE — 83036 HEMOGLOBIN GLYCOSYLATED A1C: CPT | Performed by: PSYCHIATRY & NEUROLOGY

## 2017-06-13 PROCEDURE — 63710000001 PREDNISONE PER 1 MG: Performed by: HOSPITALIST

## 2017-06-13 PROCEDURE — 25010000003 CEFTRIAXONE PER 250 MG: Performed by: INTERNAL MEDICINE

## 2017-06-13 RX ORDER — SODIUM CHLORIDE 9 MG/ML
75 INJECTION, SOLUTION INTRAVENOUS CONTINUOUS
Status: ACTIVE | OUTPATIENT
Start: 2017-06-13 | End: 2017-06-14

## 2017-06-13 RX ORDER — AMOXICILLIN 250 MG/1
500 CAPSULE ORAL EVERY 12 HOURS SCHEDULED
Status: DISCONTINUED | OUTPATIENT
Start: 2017-06-13 | End: 2017-06-16 | Stop reason: HOSPADM

## 2017-06-13 RX ADMIN — POTASSIUM CHLORIDE 20 MEQ: 750 CAPSULE, EXTENDED RELEASE ORAL at 09:17

## 2017-06-13 RX ADMIN — FUROSEMIDE 40 MG: 40 TABLET ORAL at 09:17

## 2017-06-13 RX ADMIN — ATORVASTATIN CALCIUM 80 MG: 80 TABLET, FILM COATED ORAL at 09:18

## 2017-06-13 RX ADMIN — FUROSEMIDE 40 MG: 40 TABLET ORAL at 18:00

## 2017-06-13 RX ADMIN — POTASSIUM CHLORIDE 20 MEQ: 750 CAPSULE, EXTENDED RELEASE ORAL at 18:00

## 2017-06-13 RX ADMIN — Medication 1 TABLET: at 09:19

## 2017-06-13 RX ADMIN — PREDNISONE 40 MG: 20 TABLET ORAL at 09:17

## 2017-06-13 RX ADMIN — AMOXICILLIN 500 MG: 250 CAPSULE ORAL at 14:57

## 2017-06-13 RX ADMIN — ASPIRIN 325 MG: 325 TABLET ORAL at 09:17

## 2017-06-13 RX ADMIN — ALBUTEROL SULFATE 2.5 MG: 2.5 SOLUTION RESPIRATORY (INHALATION) at 14:38

## 2017-06-13 RX ADMIN — LISINOPRIL 40 MG: 40 TABLET ORAL at 09:17

## 2017-06-13 RX ADMIN — METOPROLOL TARTRATE 25 MG: 25 TABLET ORAL at 20:34

## 2017-06-13 RX ADMIN — WARFARIN SODIUM 3 MG: 3 TABLET ORAL at 18:00

## 2017-06-13 RX ADMIN — METOPROLOL TARTRATE 25 MG: 25 TABLET ORAL at 09:17

## 2017-06-13 RX ADMIN — CALCIUM CARBONATE-VITAMIN D TAB 500 MG-200 UNIT 500 MG: 500-200 TAB at 09:17

## 2017-06-13 RX ADMIN — CEFTRIAXONE SODIUM 1 G: 1 INJECTION, SOLUTION INTRAVENOUS at 00:03

## 2017-06-13 RX ADMIN — ALBUTEROL SULFATE 2.5 MG: 2.5 SOLUTION RESPIRATORY (INHALATION) at 09:04

## 2017-06-13 RX ADMIN — OXYCODONE HYDROCHLORIDE AND ACETAMINOPHEN 1000 MG: 500 TABLET ORAL at 09:17

## 2017-06-13 RX ADMIN — AMOXICILLIN 500 MG: 250 CAPSULE ORAL at 20:34

## 2017-06-13 RX ADMIN — SODIUM CHLORIDE 75 ML/HR: 9 INJECTION, SOLUTION INTRAVENOUS at 14:57

## 2017-06-13 RX ADMIN — PANTOPRAZOLE SODIUM 40 MG: 40 TABLET, DELAYED RELEASE ORAL at 06:15

## 2017-06-13 RX ADMIN — ISOSORBIDE MONONITRATE 60 MG: 60 TABLET, EXTENDED RELEASE ORAL at 14:26

## 2017-06-13 RX ADMIN — ALBUTEROL SULFATE 2.5 MG: 2.5 SOLUTION RESPIRATORY (INHALATION) at 19:49

## 2017-06-13 RX ADMIN — CALCIUM CARBONATE-VITAMIN D TAB 500 MG-200 UNIT 500 MG: 500-200 TAB at 18:00

## 2017-06-13 RX ADMIN — IOPAMIDOL 95 ML: 612 INJECTION, SOLUTION INTRAVENOUS at 14:09

## 2017-06-13 RX ADMIN — LEVOTHYROXINE SODIUM 75 MCG: 75 TABLET ORAL at 06:15

## 2017-06-13 NOTE — PROGRESS NOTES
Continued Stay Note  [facility]     Patient Name: Jihan Teresa  MRN: [id number]  Today's Date: 6/13/2017    Admit Date: 6/10/2017          Discharge Plan       06/13/17 1249    Case Management/Social Work Plan    Plan [facility] (pending PT eval) v/s home    Additional Comments Janie with [facility] said they have bed available today pending PT eval.              Discharge Codes     None        Expected Discharge Date and Time     Expected Discharge Date Expected Discharge Time    Jun 13, 2017             Tanika Hatfield RN

## 2017-06-13 NOTE — PROGRESS NOTES
"DOS: 2017  NAME: Jihan Teresa   : 1933  PCP: Gustavo Jackman Jr., MD  Chief Complaint   Patient presents with   • Fatigue     CC: Left facial droop    Subjective: Ms. Teresa is a 82 yo RHW female with a PMHx of DM, HLD, HTN, Chronic afib, AVR, baseline dementia and stroke (in  with residual left side weakness) who presented on 17 with progressive generalized weakness and confusion and  is being treated for UTI and RSV. History is obtained from her daughter who is at bedside and lives with patient. According to the daughter on while in the hospital on  the patient developed a left facial droop that lasted about 10-20 minutes along with \"slurred speech\". She denies the patient had any unilateral weakness any other associated symptoms. She had a CT head and was evaluate by Dr. Mota who ordered an MRI that was negative for acute findings. She had a second episode today that was essentially the same in character as the previous episode and a CTA head/neck was ordered.     Interval History  Patient Complaints: none  Patient Denies: Headache, weakness, speech difficulty  History taken from: patient chart family RN    Objective:  Vital signs: /82 (BP Location: Right arm, Patient Position: Lying)  Pulse 74  Temp 97.2 °F (36.2 °C) (Oral)   Resp 20  Ht 60\" (152.4 cm)  Wt 154 lb 9.6 oz (70.1 kg)  SpO2 92%  BMI 30.19 kg/m2      Physical Exam:  GENERAL: NAD  HEENT: Normocephalic, atraumatic   Resp: Even and unlabored  Extremities: No signs of distal embolization.    Neurological:   MS: Alert. Oriented to self, daughter, place, year. She could pick president out of multiple choice options. Naming, repetition and reading intact. No neglect. She followed all commands. Somewhat literal interpretation of idioms. Higher integrative function fair.  CN: II-XII normal, specifically no facial weakness.  Motor: Normal strength and tone throughout except for some mild downward drift of RUE on " initial examination but likely due to poor effort.   Sensory: Intact to light touch in all extremities.  Coordination: Finger to nose normal. Heel to shin normal.     Laboratory results:  Lab Results   Component Value Date    GLUCOSE 133 (H) 06/10/2017    CALCIUM 8.9 06/10/2017     (L) 06/10/2017    K 4.0 06/10/2017    CO2 26.8 06/10/2017    CL 95 (L) 06/10/2017    BUN 12 06/10/2017    CREATININE 0.94 06/10/2017    EGFRIFAFRI 56 (L) 05/05/2016    EGFRIFNONA 57 (L) 06/10/2017    BCR 12.8 06/10/2017    ANIONGAP 12.2 06/10/2017     Lab Results   Component Value Date    WBC 11.13 (H) 06/10/2017    HGB 12.0 06/10/2017    HCT 37.4 06/10/2017    MCV 87.6 06/10/2017     06/10/2017        Results from last 7 days  Lab Units 06/13/17  0547   CHOLESTEROL mg/dL 109     Lab Results   Component Value Date    INR 2.62 (H) 06/12/2017    INR 2.39 (H) 06/11/2017    INR 2.29 (H) 06/10/2017    PROTIME 27.2 (H) 06/12/2017    PROTIME 25.3 (H) 06/11/2017    PROTIME 24.5 (H) 06/10/2017     Lab Results   Component Value Date    LDLCALC 51 06/13/2017     Lab Results   Component Value Date    HGBA1C 6.07 (H) 06/13/2017       Review  of imaging:  CT head 6/10/17: IMPRESSION:  1. Mild atrophy with chronic appearing parenchymal changes, no acute  finding.  2. Paranasal sinus findings as above.   CT head 6/11/17: IMPRESSION:  No change from one day earlier.   MRI brain without contrast 6/12/17: IMPRESSION:  1. Stable atrophy and chronic-appearing parenchymal changes as  discussed. No acute intracranial abnormality.  2. Paranasal sinus findings as above  MRA head 1/6/2016: IMPRESSION- No evidence of focal high-grade stenosis or aneurysm.  MRA neck 1/6/2016: IMPRESSION- No evidence of carotid stenosis. Aberrant right subclavian  artery.   CTA head/neck 6/13/17: Report pending.     Impression: Ms. Teresa is a 82 yo RHW female with a PMHx of DM, HLD, HTN, Chronic afib (on warfarin), AVR (11/2009), baseline dementia, stroke (in 2013 with  residual left side weakness) and TIA (2016) who presented on 6/9/17 with progressive generalized weakness and confusion in the setting of a UTI and RSV. Since her admission she had two episodes of acute left facial weakness, each lasting about 10-20 minutes. I reviewed her imaging with Dr. Strickland and her MRI after the first event was negative for a stroke. MRA head/neck done in in 2016 was negative for any significant stenosis.CTA head/neck report pending. We cannot rule out that these are cerebrovascular events and the question is the etiology. She has chronic afib and h/o bioprothestic AV. Her INR on admission was 2.01 and has remained in the therapeutic range. In reviewing her previous cardiology records, it is noted that in 2013 a ALEX revealed a LINETTE thrombus despite being on Pradaxa at the time and hematology recommended that the patient be changed to warfarin with ASA 81 mg. After a TIA in January 2016 her ASA was increased to 325 mg.     Given that she is having recurrent events on  mg and therapeutic levels of warfarin, will check a transthoracic echocardiogram and consult cardiology for their opinion on whether she may benefit from further diagnostic testing, possible Watchman's device or another NOAC agent. For now, continue  mg, warfarin and Lipitor. Patient is to be moved to [facility]. I discussed plan with RN and with Dr. Strickland.    Plan:  Patient to be moved to [facility].  TTE  Consult cardiology  ?ALEX/NOAC  Aspirin 325mg  Anticoagulation  Lipitor 40 mg  [facility]ydrate  [facility]  Non-pharmacological DVT prophylaxis  EKG Tele  PT/OT/ST  Stroke Education      I have discussed the above with the RN and Dr. Em Mazariegos, APRN  06/13/17  4:25 PM

## 2017-06-13 NOTE — SIGNIFICANT NOTE
06/13/17 1338   Rehab Treatment   Discipline physical therapist   Rehab Evaluation   Evaluation Not Performed patient unavailable for evaluation  (off the floor to CT. Will follow up tomorrow)   Recommendation   PT - Next Appointment 06/14/17

## 2017-06-13 NOTE — PLAN OF CARE
Problem: Patient Care Overview (Adult)  Goal: Plan of Care Review  Outcome: Ongoing (interventions implemented as appropriate)    06/13/17 1603   Coping/Psychosocial Response Interventions   Plan Of Care Reviewed With patient;daughter   Patient Care Overview   Progress progress toward functional goals is gradual   Outcome Evaluation   Outcome Summary/Follow up Plan brief episode of left facial drooping. no pain or nausea       Goal: Adult Individualization and Mutuality  Outcome: Ongoing (interventions implemented as appropriate)  Goal: Discharge Needs Assessment  Outcome: Ongoing (interventions implemented as appropriate)    Problem: Fall Risk (Adult)  Goal: Absence of Falls  Outcome: Ongoing (interventions implemented as appropriate)    Problem: Infection, Risk/Actual (Adult)  Goal: Infection Prevention/Resolution  Outcome: Ongoing (interventions implemented as appropriate)

## 2017-06-13 NOTE — PROGRESS NOTES
"    DAILY PROGRESS NOTE  [facility]    Patient Identification:  Name: Jihan Teresa  Age: 83 y.o.  Sex: female  :  1933  MRN: [id number]         Primary Care Physician: Gustavo Jackman Jr., MD    Subjective:  Interval History: another transient neuro spell and now has pending CTA. Per patient \"I feel tired\" - denies ha/n/v/d/cp    Objective:adifferent daughter at  today     Scheduled Meds:  albuterol 2.5 mg Nebulization Q6H - RT   aspirin 325 mg Oral Daily   atorvastatin 80 mg Oral Daily   budesonide-formoterol 2 puff Inhalation BID - RT   calcium-vitamin D 500 mg Oral BID   ceftriaxone 1 g Intravenous Q24H   cyanocobalamin 1,000 mcg Intramuscular Q30 Days   furosemide 40 mg Oral BID   insulin aspart 0-9 Units Subcutaneous 4x Daily With Meals & Nightly   isosorbide mononitrate 60 mg Oral Q24H   levothyroxine 75 mcg Oral Q AM   lisinopril 40 mg Oral Daily   metoprolol tartrate 25 mg Oral Q12H   multivitamin 1 tablet Oral Daily   pantoprazole 40 mg Oral QAM   potassium chloride 20 mEq Oral BID   predniSONE 40 mg Oral Daily With Breakfast   vitamin C 1,000 mg Oral Daily   warfarin 3 mg Oral Daily     Continuous Infusions:     Vital signs in last 24 hours:  Temp:  [96.7 °F (35.9 °C)-98.1 °F (36.7 °C)] 97.2 °F (36.2 °C)  Heart Rate:  [54-80] 76  Resp:  [16-20] 20  BP: (132-177)/(69-81) 177/81    Intake/Output:    Intake/Output Summary (Last 24 hours) at 17 1317  Last data filed at 17 0930   Gross per 24 hour   Intake             1025 ml   Output              120 ml   Net              905 ml       Exam:  /81 (BP Location: Right arm, Patient Position: Lying)  Pulse 76  Temp 97.2 °F (36.2 °C) (Oral)   Resp 20  Ht 60\" (152.4 cm)  Wt 154 lb 9.6 oz (70.1 kg)  SpO2 93%  BMI 30.19 kg/m2    General Appearance:    Alert, cooperative, no distress, AAOx3, fluent speech                           Head:    Normocephalic, without obvious abnormality, atraumatic                         "   Eyes:    PERRL, conjunctiva/corneas clear, EOM's intact, both eyes                         Throat:   Lips, tongue, gums normal; oral mucosa pink and moist                           Neck:   Supple, no JVD                         Lungs:    Improved rhonchi throughout right side to auscultation bilaterally, respirations unlabored                          Heart:    Regular rate and rhythm, S1 and S2 normal, + murmur                  Abdomen:     Soft, non-tender, bowel sounds active                 Extremities:   Extremities normal, atraumatic, no cyanosis or edema                 Neurologic:   CNII-XII intact, moving all extremities, no focal deficits noted     Data Review:  Labs in chart were reviewed.    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       Plan:  pansensitive UTI - change Rocephin to Amox    Persistent neuro changes - MRI negative and now doing CTA per Neuro - I don't really like putting this patient through additional testing if if is not going to change treatment but daughters seem all for it   -already on ASA/statin/therapeutic INR   -add IVF [id number] for renal protection and will recheck crt in am    RSV bronchitis - prednisone/nebs    AF w/ therapeutic INR    DM2 - mild reactive hyperglycemia - A1c 6.07    Dispo - ready 6/14 pending Neuro/CTA    Bert Starr MD  6/13/2017  1:17 PM

## 2017-06-14 ENCOUNTER — APPOINTMENT (OUTPATIENT)
Dept: CARDIOLOGY | Facility: HOSPITAL | Age: 82
End: 2017-06-14

## 2017-06-14 LAB
ANION GAP SERPL CALCULATED.3IONS-SCNC: 15.9 MMOL/L
ASCENDING AORTA: 1.8 CM
BH CV ECHO MEAS - ACS: 1.1 CM
BH CV ECHO MEAS - AO MAX PG: 4 MMHG
BH CV ECHO MEAS - AO MEAN PG (FULL): 9 MMHG
BH CV ECHO MEAS - AO MEAN PG: 11 MMHG
BH CV ECHO MEAS - AO ROOT AREA (BSA CORRECTED): 1.8
BH CV ECHO MEAS - AO ROOT AREA: 7.1 CM^2
BH CV ECHO MEAS - AO ROOT DIAM: 3 CM
BH CV ECHO MEAS - AO V2 MAX: 224 CM/SEC
BH CV ECHO MEAS - AO V2 MEAN: 158 CM/SEC
BH CV ECHO MEAS - AO V2 VTI: 52.8 CM
BH CV ECHO MEAS - ASC AORTA: 1.8 CM
BH CV ECHO MEAS - AVA(I,A): 0.76 CM^2
BH CV ECHO MEAS - AVA(I,D): 0.76 CM^2
BH CV ECHO MEAS - BSA(HAYCOCK): 1.7 M^2
BH CV ECHO MEAS - BSA: 1.7 M^2
BH CV ECHO MEAS - BZI_BMI: 30.1 KILOGRAMS/M^2
BH CV ECHO MEAS - BZI_METRIC_HEIGHT: 152.4 CM
BH CV ECHO MEAS - BZI_METRIC_WEIGHT: 69.9 KG
BH CV ECHO MEAS - CONTRAST EF (2CH): 61.7 ML/M^2
BH CV ECHO MEAS - CONTRAST EF 4CH: 46.9 ML/M^2
BH CV ECHO MEAS - EDV(CUBED): 68.9 ML
BH CV ECHO MEAS - EDV(MOD-SP2): 94 ML
BH CV ECHO MEAS - EDV(MOD-SP4): 64 ML
BH CV ECHO MEAS - EDV(TEICH): 74.2 ML
BH CV ECHO MEAS - EF(CUBED): 56.8 %
BH CV ECHO MEAS - EF(MOD-SP2): 61.7 %
BH CV ECHO MEAS - EF(MOD-SP4): 46.9 %
BH CV ECHO MEAS - EF(TEICH): 48.9 %
BH CV ECHO MEAS - ESV(CUBED): 29.8 ML
BH CV ECHO MEAS - ESV(MOD-SP2): 36 ML
BH CV ECHO MEAS - ESV(MOD-SP4): 34 ML
BH CV ECHO MEAS - ESV(TEICH): 37.9 ML
BH CV ECHO MEAS - FS: 24.4 %
BH CV ECHO MEAS - IVS/LVPW: 1
BH CV ECHO MEAS - IVSD: 1.1 CM
BH CV ECHO MEAS - LA DIMENSION(2D): 24 CM
BH CV ECHO MEAS - LA DIMENSION: 6 CM
BH CV ECHO MEAS - LAT PEAK E' VEL: 11 CM/SEC
BH CV ECHO MEAS - LV DIASTOLIC VOL/BSA (35-75): 38.3 ML/M^2
BH CV ECHO MEAS - LV MASS(C)D: 151.3 GRAMS
BH CV ECHO MEAS - LV MASS(C)DI: 90.6 GRAMS/M^2
BH CV ECHO MEAS - LV MEAN PG: 2 MMHG
BH CV ECHO MEAS - LV SYSTOLIC VOL/BSA (12-30): 20.4 ML/M^2
BH CV ECHO MEAS - LV V1 MEAN: 63.6 CM/SEC
BH CV ECHO MEAS - LV V1 VTI: 12.7 CM
BH CV ECHO MEAS - LVIDD: 4.1 CM
BH CV ECHO MEAS - LVIDS: 3.1 CM
BH CV ECHO MEAS - LVLD AP2: 8.3 CM
BH CV ECHO MEAS - LVLD AP4: 6.9 CM
BH CV ECHO MEAS - LVLS AP2: 6.4 CM
BH CV ECHO MEAS - LVLS AP4: 6 CM
BH CV ECHO MEAS - LVOT AREA (M): 3.1 CM^2
BH CV ECHO MEAS - LVOT AREA: 3.1 CM^2
BH CV ECHO MEAS - LVOT DIAM: 2 CM
BH CV ECHO MEAS - LVPWD: 1.1 CM
BH CV ECHO MEAS - MED PEAK E' VEL: 5 CM/SEC
BH CV ECHO MEAS - MR MAX PG: 86.1 MMHG
BH CV ECHO MEAS - MR MAX VEL: 457.3 CM/SEC
BH CV ECHO MEAS - MV A DUR: 0.09 SEC
BH CV ECHO MEAS - MV A MAX VEL: 51.5 CM/SEC
BH CV ECHO MEAS - MV DEC SLOPE: 1122 CM/SEC^2
BH CV ECHO MEAS - MV DEC TIME: 0.16 SEC
BH CV ECHO MEAS - MV E MAX VEL: 145 CM/SEC
BH CV ECHO MEAS - MV E/A: 2.8
BH CV ECHO MEAS - MV MEAN PG: 3 MMHG
BH CV ECHO MEAS - MV P1/2T MAX VEL: 178 CM/SEC
BH CV ECHO MEAS - MV P1/2T: 46.5 MSEC
BH CV ECHO MEAS - MV V2 MEAN: 68.6 CM/SEC
BH CV ECHO MEAS - MV V2 VTI: 44.2 CM
BH CV ECHO MEAS - MVA P1/2T LCG: 1.2 CM^2
BH CV ECHO MEAS - MVA(P1/2T): 4.7 CM^2
BH CV ECHO MEAS - MVA(VTI): 0.9 CM^2
BH CV ECHO MEAS - PA ACC SLOPE: 1289 CM/SEC^2
BH CV ECHO MEAS - PA ACC TIME: 0.12 SEC
BH CV ECHO MEAS - PA MAX PG (FULL): 5.5 MMHG
BH CV ECHO MEAS - PA MAX PG: 9.2 MMHG
BH CV ECHO MEAS - PA PR(ACCEL): 23.7 MMHG
BH CV ECHO MEAS - PA V2 MAX: 152 CM/SEC
BH CV ECHO MEAS - PULM DIAS VEL: 47.4 CM/SEC
BH CV ECHO MEAS - PULM S/D: 1.8
BH CV ECHO MEAS - PULM SYS VEL: 85.4 CM/SEC
BH CV ECHO MEAS - PVA(V,A): 2.6 CM^2
BH CV ECHO MEAS - PVA(V,D): 2.6 CM^2
BH CV ECHO MEAS - QP/QS: 2.2
BH CV ECHO MEAS - RAP SYSTOLE: 3 MMHG
BH CV ECHO MEAS - RV MAX PG: 3.7 MMHG
BH CV ECHO MEAS - RV MEAN PG: 2 MMHG
BH CV ECHO MEAS - RV V1 MAX: 96.1 CM/SEC
BH CV ECHO MEAS - RV V1 MEAN: 67.1 CM/SEC
BH CV ECHO MEAS - RV V1 VTI: 20.7 CM
BH CV ECHO MEAS - RVOT AREA: 4.2 CM^2
BH CV ECHO MEAS - RVOT DIAM: 2.3 CM
BH CV ECHO MEAS - RVSP: 66.7 MMHG
BH CV ECHO MEAS - SI(AO): 223.4 ML/M^2
BH CV ECHO MEAS - SI(CUBED): 23.4 ML/M^2
BH CV ECHO MEAS - SI(LVOT): 23.9 ML/M^2
BH CV ECHO MEAS - SI(MOD-SP2): 34.7 ML/M^2
BH CV ECHO MEAS - SI(MOD-SP4): 18 ML/M^2
BH CV ECHO MEAS - SI(TEICH): 21.7 ML/M^2
BH CV ECHO MEAS - SV(AO): 373.2 ML
BH CV ECHO MEAS - SV(CUBED): 39.1 ML
BH CV ECHO MEAS - SV(LVOT): 39.9 ML
BH CV ECHO MEAS - SV(MOD-SP2): 58 ML
BH CV ECHO MEAS - SV(MOD-SP4): 30 ML
BH CV ECHO MEAS - SV(RVOT): 86 ML
BH CV ECHO MEAS - SV(TEICH): 36.3 ML
BH CV ECHO MEAS - TAPSE (>1.6): 1.5 CM2
BH CV ECHO MEAS - TR MAX VEL: 399 CM/SEC
BH CV VAS BP RIGHT ARM: NORMAL MMHG
BH CV XLRA - RV BASE: 4.7 CM
BH CV XLRA - TDI S': 11 CM/SEC
BUN BLD-MCNC: 20 MG/DL (ref 8–23)
BUN/CREAT SERPL: 20.8 (ref 7–25)
CALCIUM SPEC-SCNC: 9 MG/DL (ref 8.6–10.5)
CHLORIDE SERPL-SCNC: 98 MMOL/L (ref 98–107)
CO2 SERPL-SCNC: 26.1 MMOL/L (ref 22–29)
CREAT BLD-MCNC: 0.96 MG/DL (ref 0.57–1)
DEPRECATED RDW RBC AUTO: 53 FL (ref 37–54)
E/E' RATIO: 20
ERYTHROCYTE [DISTWIDTH] IN BLOOD BY AUTOMATED COUNT: 16.6 % (ref 11.7–13)
GFR SERPL CREATININE-BSD FRML MDRD: 56 ML/MIN/1.73
GLUCOSE BLD-MCNC: 152 MG/DL (ref 65–99)
GLUCOSE BLDC GLUCOMTR-MCNC: 147 MG/DL (ref 70–130)
GLUCOSE BLDC GLUCOMTR-MCNC: 163 MG/DL (ref 70–130)
GLUCOSE BLDC GLUCOMTR-MCNC: 167 MG/DL (ref 70–130)
GLUCOSE BLDC GLUCOMTR-MCNC: 294 MG/DL (ref 70–130)
HCT VFR BLD AUTO: 35.2 % (ref 35.6–45.5)
HGB BLD-MCNC: 11.6 G/DL (ref 11.9–15.5)
INR PPP: 4.6 (ref 0.9–1.1)
LEFT ATRIUM VOLUME INDEX: 49 ML/M2
LEFT ATRIUM VOLUME: 77 CM3
MCH RBC QN AUTO: 29 PG (ref 26.9–32)
MCHC RBC AUTO-ENTMCNC: 33 G/DL (ref 32.4–36.3)
MCV RBC AUTO: 88 FL (ref 80.5–98.2)
PLATELET # BLD AUTO: 182 10*3/MM3 (ref 140–500)
PMV BLD AUTO: 10.5 FL (ref 6–12)
POTASSIUM BLD-SCNC: 3.2 MMOL/L (ref 3.5–5.2)
PROTHROMBIN TIME: 42.1 SECONDS (ref 11.7–14.2)
RBC # BLD AUTO: 4 10*6/MM3 (ref 3.9–5.2)
SODIUM BLD-SCNC: 140 MMOL/L (ref 136–145)
WBC NRBC COR # BLD: 13.24 10*3/MM3 (ref 4.5–10.7)

## 2017-06-14 PROCEDURE — 97110 THERAPEUTIC EXERCISES: CPT

## 2017-06-14 PROCEDURE — 85610 PROTHROMBIN TIME: CPT | Performed by: HOSPITALIST

## 2017-06-14 PROCEDURE — 99222 1ST HOSP IP/OBS MODERATE 55: CPT | Performed by: INTERNAL MEDICINE

## 2017-06-14 PROCEDURE — 93010 ELECTROCARDIOGRAM REPORT: CPT | Performed by: INTERNAL MEDICINE

## 2017-06-14 PROCEDURE — 63710000001 PREDNISONE PER 1 MG: Performed by: HOSPITALIST

## 2017-06-14 PROCEDURE — 25010000002 PERFLUTREN (DEFINITY) 8.476 MG IN SODIUM CHLORIDE 10 ML INJECTION: Performed by: HOSPITALIST

## 2017-06-14 PROCEDURE — 80048 BASIC METABOLIC PNL TOTAL CA: CPT | Performed by: HOSPITALIST

## 2017-06-14 PROCEDURE — 97162 PT EVAL MOD COMPLEX 30 MIN: CPT

## 2017-06-14 PROCEDURE — 93306 TTE W/DOPPLER COMPLETE: CPT | Performed by: INTERNAL MEDICINE

## 2017-06-14 PROCEDURE — 93005 ELECTROCARDIOGRAM TRACING: CPT | Performed by: INTERNAL MEDICINE

## 2017-06-14 PROCEDURE — 85027 COMPLETE CBC AUTOMATED: CPT | Performed by: HOSPITALIST

## 2017-06-14 PROCEDURE — 99232 SBSQ HOSP IP/OBS MODERATE 35: CPT | Performed by: NURSE PRACTITIONER

## 2017-06-14 PROCEDURE — 82962 GLUCOSE BLOOD TEST: CPT

## 2017-06-14 PROCEDURE — C8929 TTE W OR WO FOL WCON,DOPPLER: HCPCS

## 2017-06-14 PROCEDURE — 94799 UNLISTED PULMONARY SVC/PX: CPT

## 2017-06-14 RX ORDER — POTASSIUM CHLORIDE 750 MG/1
40 CAPSULE, EXTENDED RELEASE ORAL ONCE
Status: COMPLETED | OUTPATIENT
Start: 2017-06-14 | End: 2017-06-14

## 2017-06-14 RX ORDER — METOPROLOL TARTRATE 50 MG/1
50 TABLET, FILM COATED ORAL EVERY 12 HOURS SCHEDULED
Status: DISCONTINUED | OUTPATIENT
Start: 2017-06-14 | End: 2017-06-16 | Stop reason: HOSPADM

## 2017-06-14 RX ORDER — ATORVASTATIN CALCIUM 80 MG/1
80 TABLET, FILM COATED ORAL NIGHTLY
Status: DISCONTINUED | OUTPATIENT
Start: 2017-06-14 | End: 2017-06-16 | Stop reason: HOSPADM

## 2017-06-14 RX ORDER — HYDRALAZINE HYDROCHLORIDE 20 MG/ML
10 INJECTION INTRAMUSCULAR; INTRAVENOUS EVERY 4 HOURS PRN
Status: DISCONTINUED | OUTPATIENT
Start: 2017-06-14 | End: 2017-06-16 | Stop reason: HOSPADM

## 2017-06-14 RX ORDER — HYDRALAZINE HYDROCHLORIDE 50 MG/1
50 TABLET, FILM COATED ORAL EVERY 8 HOURS SCHEDULED
Status: DISCONTINUED | OUTPATIENT
Start: 2017-06-14 | End: 2017-06-15

## 2017-06-14 RX ORDER — HYDRALAZINE HYDROCHLORIDE 25 MG/1
25 TABLET, FILM COATED ORAL EVERY 8 HOURS SCHEDULED
Status: DISCONTINUED | OUTPATIENT
Start: 2017-06-14 | End: 2017-06-14

## 2017-06-14 RX ADMIN — LISINOPRIL 40 MG: 40 TABLET ORAL at 06:43

## 2017-06-14 RX ADMIN — ALBUTEROL SULFATE 2.5 MG: 2.5 SOLUTION RESPIRATORY (INHALATION) at 07:51

## 2017-06-14 RX ADMIN — AMOXICILLIN 500 MG: 250 CAPSULE ORAL at 08:07

## 2017-06-14 RX ADMIN — LEVOTHYROXINE SODIUM 75 MCG: 75 TABLET ORAL at 05:47

## 2017-06-14 RX ADMIN — PANTOPRAZOLE SODIUM 40 MG: 40 TABLET, DELAYED RELEASE ORAL at 05:47

## 2017-06-14 RX ADMIN — CALCIUM CARBONATE-VITAMIN D TAB 500 MG-200 UNIT 500 MG: 500-200 TAB at 18:02

## 2017-06-14 RX ADMIN — ALBUTEROL SULFATE 2.5 MG: 2.5 SOLUTION RESPIRATORY (INHALATION) at 19:58

## 2017-06-14 RX ADMIN — CALCIUM CARBONATE-VITAMIN D TAB 500 MG-200 UNIT 500 MG: 500-200 TAB at 08:05

## 2017-06-14 RX ADMIN — METOPROLOL TARTRATE 25 MG: 25 TABLET ORAL at 06:43

## 2017-06-14 RX ADMIN — HYDRALAZINE HYDROCHLORIDE 50 MG: 50 TABLET, FILM COATED ORAL at 21:35

## 2017-06-14 RX ADMIN — OXYCODONE HYDROCHLORIDE AND ACETAMINOPHEN 1000 MG: 500 TABLET ORAL at 08:05

## 2017-06-14 RX ADMIN — Medication 1 TABLET: at 08:07

## 2017-06-14 RX ADMIN — POTASSIUM CHLORIDE 40 MEQ: 750 CAPSULE, EXTENDED RELEASE ORAL at 08:08

## 2017-06-14 RX ADMIN — BUDESONIDE AND FORMOTEROL FUMARATE DIHYDRATE 2 PUFF: 160; 4.5 AEROSOL RESPIRATORY (INHALATION) at 07:53

## 2017-06-14 RX ADMIN — HYDRALAZINE HYDROCHLORIDE 50 MG: 50 TABLET, FILM COATED ORAL at 15:34

## 2017-06-14 RX ADMIN — METOPROLOL TARTRATE 50 MG: 50 TABLET ORAL at 21:35

## 2017-06-14 RX ADMIN — PERFLUTREN 4 ML: 6.52 INJECTION, SUSPENSION INTRAVENOUS at 14:28

## 2017-06-14 RX ADMIN — ATORVASTATIN CALCIUM 80 MG: 80 TABLET, FILM COATED ORAL at 21:34

## 2017-06-14 RX ADMIN — FUROSEMIDE 40 MG: 40 TABLET ORAL at 18:02

## 2017-06-14 RX ADMIN — POTASSIUM CHLORIDE 20 MEQ: 750 CAPSULE, EXTENDED RELEASE ORAL at 18:02

## 2017-06-14 RX ADMIN — AMOXICILLIN 500 MG: 250 CAPSULE ORAL at 21:34

## 2017-06-14 RX ADMIN — POTASSIUM CHLORIDE 20 MEQ: 750 CAPSULE, EXTENDED RELEASE ORAL at 08:07

## 2017-06-14 RX ADMIN — ASPIRIN 325 MG: 325 TABLET ORAL at 08:05

## 2017-06-14 RX ADMIN — PREDNISONE 40 MG: 20 TABLET ORAL at 08:07

## 2017-06-14 RX ADMIN — ISOSORBIDE MONONITRATE 60 MG: 60 TABLET, EXTENDED RELEASE ORAL at 06:43

## 2017-06-14 RX ADMIN — FUROSEMIDE 40 MG: 40 TABLET ORAL at 08:05

## 2017-06-14 RX ADMIN — ALBUTEROL SULFATE 2.5 MG: 2.5 SOLUTION RESPIRATORY (INHALATION) at 00:09

## 2017-06-14 NOTE — PROGRESS NOTES
Continued Stay Note  [facility]     Patient Name: Jihan Teresa  MRN: [id number]  Today's Date: 6/14/2017    Admit Date: 6/10/2017          Discharge Plan       06/14/17 1148    Case Management/Social Work Plan    Plan Pt has been approved for a bed @ [facility], and they will have a bed on Thursday    Patient/Family In Agreement With Plan yes              Discharge Codes     None        Expected Discharge Date and Time     Expected Discharge Date Expected Discharge Time    Jun 14, 2017             Luiza Lamb RN

## 2017-06-14 NOTE — THERAPY EVALUATION
Acute Care - Physical Therapy Initial Evaluation  [facility]     Patient Name: Jihan Teresa  : 1933  MRN: [id number]  Today's Date: 2017   Onset of Illness/Injury or Date of Surgery Date: 06/10/17            Admit Date: 6/10/2017     Visit Dx:    ICD-10-CM ICD-9-CM   1. UTI (urinary tract infection), bacterial N39.0 599.0    A49.9 041.9   2. Generalized weakness R53.1 780.79   3. Confusion R41.0 298.9   4. B12 deficiency E53.8 266.2   5. Difficulty walking R26.2 719.7     Patient Active Problem List   Diagnosis   • Type 2 diabetes mellitus   • Aortic valve replaced, equine valve   • Cerebral infarction   • A-fib   • GERD without esophagitis   • Sleep apnea in adult   • Congestive heart failure   • Cardiomyopathy   • Cognitive dysfunction   • Hypothyroidism (acquired)   • History of recurrent UTIs   • Mild reactive airways disease   • Essential hypertension   • Pneumonia of both lungs due to infectious organism   • Pulmonary hypertension   • B12 deficiency   • UTI (urinary tract infection), bacterial     Past Medical History:   Diagnosis Date   • Anemia    • Aortic valve stenosis    • Asthma    • Atelectasis    • CHF (congestive heart failure)    • Congenital heart disease    • Diabetes mellitus    • Esophageal reflux    • Essential hypertension    • HLD (hyperlipidemia)    • Hypotension    • LVH (left ventricular hypertrophy)    • Pleural effusion    • Pulmonary hypertension      Past Surgical History:   Procedure Laterality Date   • AORTIC VALVE REPAIR/REPLACEMENT     • CATARACT EXTRACTION     • KNEE ARTHROPLASTY            PT ASSESSMENT (last 72 hours)      PT Evaluation       17 1026 17 1338    Rehab Evaluation    Document Type evaluation  -EF     Subjective Information agree to therapy;complains of;fatigue;weakness  -EF     Evaluation Not Performed  patient unavailable for evaluation   off the floor to CT. Will follow up tomorrow  -KH    Patient Effort, Rehab Treatment good  -EF      Symptoms Noted During/After Treatment fatigue  -EF     General Information    Patient Profile Review yes  -EF     Onset of Illness/Injury or Date of Surgery Date 06/10/17  -EF     General Observations sitting on BSC with NA  -EF     Pertinent History Of Current Problem Pt admitted after episode of L facial weakness  -EF     Precautions/Limitations fall precautions   droplet  -EF     Prior Level of Function --   per dgt, pt hasn't walked in ~1 week due to weakness  -EF     Plans/Goals Discussed With patient;family;agreed upon  -EF     Barriers to Rehab medically complex  -EF     Clinical Impression    Criteria for Skilled Therapeutic Interventions Met yes;treatment indicated  -EF     Impairments Found (describe specific impairments) aerobic capacity/endurance;gait, locomotion, and balance;muscle performance;ROM  -EF     Rehab Potential good, to achieve stated therapy goals  -EF     Predicted Duration of Therapy Intervention (days/wks) 2 weeks  -EF     Pain Assessment    Pain Assessment No/denies pain  -EF     Cognitive Assessment/Intervention    Current Cognitive/Communication Assessment functional  -EF     Orientation Status oriented x 4  -EF     Follows Commands/Answers Questions 100% of the time  -EF     Personal Safety good awareness, safety precautions  -EF     ROM (Range of Motion)    General ROM Detail WFL except B shoulders ~90 degrees  -EF     MMT (Manual Muscle Testing)    General MMT Assessment Detail WFL except B shoulders 2+/5  -EF     Bed Mobility, Assessment/Treatment    Bed Mobility, Assistive Device bed rails  -EF     Bed Mob, Sit to Supine, [place] verbal cues required;nonverbal cues required (demo/gesture);minimum assist (75% patient effort);moderate assist (50% patient effort)  -EF     Bed Mobility, Safety Issues decreased use of arms for pushing/pulling  -EF     Bed Mobility, Impairments strength decreased  -EF     Transfer Assessment/Treatment    Transfers, Sit-Stand [place] verbal  cues required;nonverbal cues required (demo/gesture);minimum assist (75% patient effort);moderate assist (50% patient effort)  -EF     Transfers, Stand-Sit [place] verbal cues required;nonverbal cues required (demo/gesture);minimum assist (75% patient effort);moderate assist (50% patient effort)  -EF     Toilet Transfer, [place] verbal cues required;nonverbal cues required (demo/gesture);minimum assist (75% patient effort);moderate assist (50% patient effort)  -EF     Transfer, Safety Issues step length decreased;weight-shifting ability decreased  -EF     Transfer, Impairments strength decreased;impaired balance  -EF     Gait Assessment/Treatment    Gait, [place] Level verbal cues required;nonverbal cues required (demo/gesture);moderate assist (50% patient effort);upper extremity support  -EF     Gait, Assistive Device --   HHA  -EF     Gait, Distance (Feet) --   several steps fwd, back, & to HOB  -EF     Gait, Gait Deviations laxmi decreased;step length decreased;weight-shifting ability decreased  -EF     Gait, Safety Issues balance decreased during turns;step length decreased;weight-shifting ability decreased  -EF     Gait, Impairments strength decreased;impaired balance  -EF     Therapy Exercises    Bilateral Lower Extremities AROM:;10 reps;sitting;ankle pumps/circles;hip flexion;LAQ  -EF     Positioning and Restraints    Pre-Treatment Position bedside commode  -EF     Post Treatment Position bed  -EF     In Bed supine;call light within reach;encouraged to call for assist;with family/caregiver  -EF       06/12/17 1648 06/12/17 1000    Rehab Evaluation    Document Type  evaluation  -KA    Subjective Information  no complaints  -KA    Patient Effort, Rehab Treatment  excellent  -KA    Symptoms Noted During/After Treatment  none  -KA    General Information    Equipment Currently Used at Home walker, standard  -AW     Living Environment    Lives With child(david), adult  -AW     Living Arrangements  house  -AW     Transportation Available car;family or friend will provide  -AW       User Key  (r) = Recorded By, (t) = Taken By, (c) = Cosigned By    Initials Name Provider Type    SANTO Yuen, PT Physical Therapist     Reyna Souza, PT Physical Therapist    TRINO Medrano MA,CCC-SLP Speech and Language Pathologist     Tanika Hatfield, RN Case Manager          Physical Therapy Education     Title: PT OT SLP Therapies (Done)     Topic: Physical Therapy (Done)     Point: Mobility training (Done)    Learning Progress Summary    Learner Readiness Method Response Comment Documented by Status   Patient Acceptance E VU,NR  EF 06/14/17 1035 Done   Family Acceptance E VU,NR   06/14/17 1035 Done               Point: Home exercise program (Done)    Learning Progress Summary    Learner Readiness Method Response Comment Documented by Status   Patient Acceptance E VU,NR   06/14/17 1035 Done   Family Acceptance E VU,NR   06/14/17 1035 Done               Point: Body mechanics (Done)    Learning Progress Summary    Learner Readiness Method Response Comment Documented by Status   Patient Acceptance E VU,NR   06/14/17 1035 Done   Family Acceptance E VU,NR   06/14/17 1035 Done               Point: Precautions (Done)    Learning Progress Summary    Learner Readiness Method Response Comment Documented by Status   Patient Acceptance E VU,NR   06/14/17 1035 Done   Family Acceptance E VU,NR   06/14/17 1035 Done                      User Key     Initials Effective Dates Name Provider Type Discipline     04/24/15 -  Reyna Souza, PT Physical Therapist PT                PT Recommendation and Plan  Anticipated Discharge Disposition: skilled nursing facility, home with home health  Planned Therapy Interventions: bed mobility training, gait training, home exercise program, transfer training  PT Frequency: daily  Plan of Care Review  Plan Of Care Reviewed With: patient, daughter  Outcome  Summary/Follow up Plan: Pt presents with overall weakness and impaired bedmobility, txfs, & gait. She will benfit from skilled PT to address deficits to facilitate improved function & safety at discharge.          IP PT Goals       06/14/17 1035          Bed Mobility PT LTG    Bed Mobility PT LTG, Time to Achieve 2 wks  -EF      Bed Mobility PT LTG, Activity Type all bed mobility  -EF      Bed Mobility PT LTG, [place] Level conditional independence;supervision required  -EF      Transfer Training PT LTG    Transfer Training PT LTG, Time to Achieve 2 wks  -EF      Transfer Training PT LTG, Activity Type all transfers  -EF      Transfer Training PT LTG, [place] Level contact guard assist  -EF      Transfer Training PT LTG, Assist Device walker, rolling  -EF      Gait Training PT LTG    Gait Training Goal PT LTG, Time to Achieve 2 wks  -EF      Gait Training Goal PT LTG, [place] Level contact guard assist  -EF      Gait Training Goal PT LTG, Assist Device walker, rolling  -EF      Gait Training Goal PT LTG, Distance to Achieve 60  -EF        User Key  (r) = Recorded By, (t) = Taken By, (c) = Cosigned By    Initials Name Provider Type    EF Reyna Souza, PT Physical Therapist                Outcome Measures       06/14/17 1000          How much help from another person do you currently need...    Turning from your back to your side while in flat bed without using bedrails? 3  -EF      Moving from lying on back to sitting on the side of a flat bed without bedrails? 3  -EF      Moving to and from a bed to a chair (including a wheelchair)? 2  -EF      Standing up from a chair using your arms (e.g., wheelchair, bedside chair)? 2  -EF      Climbing 3-5 steps with a railing? 1  -EF      To walk in hospital room? 2  -EF      AM-PAC 6 Clicks Score 13  -EF      Functional Assessment    Outcome Measure Options AM-PAC 6 Clicks Basic Mobility (PT)  -EF        User Key  (r) = Recorded By, (t) = Taken By, (c) =  Cosigned By    Initials Name Provider Type    EF Reyna Souza PT Physical Therapist           Time Calculation:         PT Charges       06/14/17 1038          Time Calculation    Start Time 1005  -EF      Stop Time 1024  -EF      Time Calculation (min) 19 min  -EF      PT Received On 06/14/17  -EF      PT - Next Appointment 06/15/17  -EF      PT Goal Re-Cert Due Date 06/28/17  -EF        User Key  (r) = Recorded By, (t) = Taken By, (c) = Cosigned By    Initials Name Provider Type    NATHALIE Souza PT Physical Therapist          Therapy Charges for Today     Code Description Service Date Service Provider Modifiers Qty    57471548856 HC PT EVAL MOD COMPLEXITY 2 6/14/2017 Reyna Souza, PT GP 1    26907845941 HC PT THER PROC EA 15 MIN 6/14/2017 Reyna Souza, PT GP 1          PT G-Codes  Outcome Measure Options: AM-PAC 6 Clicks Basic Mobility (PT)      Reyna Souza PT  6/14/2017

## 2017-06-14 NOTE — CONSULTS
Patient Name: Jihan Teresa  :1933  83 y.o.    Date of Admission: 6/10/2017  Date of Consultation:  17  Encounter Provider: Ger Fiore MD  Place of Service: [facility] CARDIOLOGY  Referring Provider: Avis Dias MD  Patient Care Team:  Gustavo Jackman Jr., MD as PCP - General  Gustavo Jackman Jr., MD as PCP - Family Medicine  KAREN Morse as PCP - Claims Attributed      Chief complaint: confusion, left facial droop, evaluation for ALEX    History of Present Illness:    83-year-old female usually followed by Dr. Barton.  Patient has a history of a bioprosthetic aortic valve that was placed in .  She also has a history of chronic atrial fibrillation history of stroke and a history of a left atrial appendage thrombus.  Patient now presents with possible TIA symptoms in which she had facial droop on the right side.  Patient was admitted for urinary tract infection as well as RSV bronchitis.  She said 2 days ago there was no episode of facial droop that resolved in a few minutes and then she had another episode.  We were asked to see for evaluation of a ALEX, possible watchman's device, or further anticoagulation.  Patient was therapeutic on the day of presentation with an INR of 2.62.        MRI of brain on 17-  IMPRESSION:  1. Stable atrophy and chronic-appearing parenchymal changes as  discussed. No acute intracranial abnormality.  2. Paranasal sinus findings as above    Previous Testing at [facility]-  Echocardiogram on 4/8/15-  Conclusions:  The estimated ejection fraction is 60-65%.   The left atrium is moderately enlarged.  The right atrial cavity size is moderately enlarged.  The right ventricular chamber size and systolic function are within  normal limits.  The bio-prosthetic aortic valve appears to be functioning normally.   There is evidence of borderline pulmonary hypertension.    Past Medical History:   Diagnosis Date   • Anemia    •  Aortic valve stenosis    • Asthma    • Atelectasis    • CHF (congestive heart failure)    • Congenital heart disease    • Diabetes mellitus    • Esophageal reflux    • Essential hypertension    • HLD (hyperlipidemia)    • Hypotension    • LVH (left ventricular hypertrophy)    • Pleural effusion    • Pulmonary hypertension        Past Surgical History:   Procedure Laterality Date   • AORTIC VALVE REPAIR/REPLACEMENT     • CATARACT EXTRACTION     • KNEE ARTHROPLASTY           Prior to Admission medications    Medication Sig Start Date End Date Taking? Authorizing Provider   ascorbic acid (VITAMIN C) 250 MG chewable tablet chewable tablet Chew 500 mg Daily.   Yes Historical Provider, MD   aspirin 325 MG tablet Take 325 mg by mouth Daily.   Yes Historical Provider, MD   atorvastatin (LIPITOR) 80 MG tablet TAKE 1 TABLET BY MOUTH AT BEDTIME 2/7/17  Yes Gustavo Jackman Jr., MD   Calcium Citrate-Vitamin D 250-200 MG-UNIT tablet Take  by mouth 2 (Two) Times a Day.   Yes Historical Provider, MD   cyanocobalamin 1000 MCG/ML injection Inject 1,000 mcg into the shoulder, thigh, or buttocks Every 30 (Thirty) Days. 7/25/13  Yes Historical Provider, MD   furosemide (LASIX) 40 MG tablet Take 1 tablet by mouth 2 (Two) Times a Day. 2/10/17  Yes Gustavo Jackman Jr., MD   isosorbide mononitrate (IMDUR) 60 MG 24 hr tablet TAKE 1 TABLET EVERY DAY 4/3/17  Yes Gustavo Jackman Jr., MD   levothyroxine (SYNTHROID, LEVOTHROID) 150 MCG tablet Take 75 mcg by mouth See Admin Instructions. Monday, Tuesday, Thursday, Friday, Saturday, Sunday   Yes Historical Provider, MD   lisinopril (PRINIVIL,ZESTRIL) 20 MG tablet Take 2 tablets by mouth Daily.  Patient taking differently: Take 20 mg by mouth Daily. 12/1/16  Yes Milton Gonzalez MD   metFORMIN (GLUCOPHAGE) 500 MG tablet TAKE 1 TABLET BY MOUTH TWICE A DAY WITH MEALS  Patient taking differently: takes half a day 250mg 2/13/17  Yes Gustavo Jackman Jr., MD   metoprolol tartrate (LOPRESSOR) 50 MG tablet  "TAKE 1 TABLET BY MOUTH 2 (TWO) TIMES DAILY  Patient taking differently: takes 25mg twice a dy 3/13/17  Yes Gustavo Jackman Jr., MD   Multiple Vitamin (MULTIVITAMIN) tablet Take 1 tablet by mouth Daily.   Yes Historical Provider, MD   Omeprazole Magnesium 20.6 (20 BASE) MG capsule delayed-release Take 1 capsule by mouth Daily.   Yes Historical Provider, MD   potassium chloride (MICRO-K) 10 MEQ CR capsule Take 2 capsules by mouth 2 (Two) Times a Day. 2/9/17  Yes Gustavo Jackman Jr., MD   warfarin (COUMADIN) 3 MG tablet TUES-SUN TAKE 3 MG. DO NOT TAKE ON MONDAY 2/15/17  Yes Gustavo Jackman Jr., MD   albuterol (PROVENTIL HFA;VENTOLIN HFA) 108 (90 BASE) MCG/ACT inhaler Inhale 2 puffs Every 6 (Six) Hours As Needed. 12/31/15   Historical Provider, MD   budesonide-formoterol (SYMBICORT) 160-4.5 MCG/ACT inhaler Inhale 2 puffs 2 (two) times a day as needed.    Historical Provider, MD   Syringe/Needle, Disp, (B-D SYRINGE/NEEDLE 3CC/23GX1\") 23G X 1\" 3 ML misc Use every week for 4 weeks then monthly for B12 injections. 7/19/13   Historical Provider, MD   warfarin (COUMADIN) 3 MG tablet Take 3 mg by mouth See Admin Instructions. Daily Thursday, Sunday    Historical Provider, MD       Allergies   Allergen Reactions   • Promethazine Other (See Comments)     Pt becomes \"out of it\" when on this medication  Pt becomes \"out of it\" when on this medication       Social History     Social History   • Marital status:      Spouse name: N/A   • Number of children: N/A   • Years of education: N/A     Social History Main Topics   • Smoking status: Never Smoker   • Smokeless tobacco: Never Used   • Alcohol use Yes      Comment: ocasional   • Drug use: No   • Sexual activity: Not Asked     Other Topics Concern   • None     Social History Narrative       Family History   Problem Relation Age of Onset   • Dementia Sister    • Stroke Brother        REVIEW OF SYSTEMS:   All systems reviewed.  Pertinent positives identified in HPI.  All other " systems are negative.      Objective:     Vitals:    06/14/17 0009 06/14/17 0534 06/14/17 0753 06/14/17 1010   BP:  (!) 194/91  (!) 184/81   BP Location:  Left arm  Right arm   Patient Position:  Lying  Sitting   Pulse: 67 66 65 66   Resp: 18 18 18 24   Temp:  98.2 °F (36.8 °C)  97.5 °F (36.4 °C)   TempSrc:  Oral  Oral   SpO2: 91% 90% 92% 93%   Weight:       Height:         Body mass index is 30.19 kg/(m^2).    General Appearance:    Alert, cooperative, in no acute distress   Head:    Normocephalic, without obvious abnormality, atraumatic   Eyes:            Lids and lashes normal, conjunctivae and sclerae normal, no   icterus, no pallor, corneas clear, PERRLA   Ears:    Ears appear intact with no abnormalities noted   Throat:   No oral lesions, no thrush, oral mucosa moist   Neck:   No adenopathy, supple, trachea midline, no thyromegaly, no   carotid bruit, no JVD   Back:     No kyphosis present, no scoliosis present, no skin lesions, erythema or scars, no tenderness to percussion or palpation, range of motion normal   Lungs:     Clear to auscultation,respirations regular, even and unlabored    Heart:    irregular rhythm and normal rate, normal S1 and S2, no murmur, no gallop, no rub, no click   Chest Wall:    No abnormalities observed   Abdomen:     Normal bowel sounds, no masses, no organomegaly, soft        non-tender, non-distended, no guarding, no rebound  tenderness   Extremities:   Moves all extremities well, no edema, no cyanosis, no redness   Pulses:   Pulses palpable and equal bilaterally. Normal radial, carotid, femoral, dorsalis pedis and posterior tibial pulses bilaterally. Normal abdominal aorta   Skin:  Psychiatric:   No bleeding, bruising or rash    Alert and oriented x 3, normal mood and affect   Lab Review:       Results from last 7 days  Lab Units 06/14/17  0705 06/10/17  0605   SODIUM mmol/L 140 134*   POTASSIUM mmol/L 3.2* 4.0   CHLORIDE mmol/L 98 95*   TOTAL CO2 mmol/L 26.1 26.8   BUN mg/dL 20  12   CREATININE mg/dL 0.96 0.94   CALCIUM mg/dL 9.0 8.9   BILIRUBIN mg/dL  --  0.5   ALK PHOS U/L  --  61   ALT (SGPT) U/L  --  26   AST (SGOT) U/L  --  19   GLUCOSE mg/dL 152* 133*           Results from last 7 days  Lab Units 06/14/17  0705   WBC 10*3/mm3 13.24*   HEMOGLOBIN g/dL 11.6*   HEMATOCRIT % 35.2*   PLATELETS 10*3/mm3 182       Results from last 7 days  Lab Units 06/14/17  0705 06/12/17  0508 06/11/17  0426   INR  4.60* 2.62* 2.39*           Results from last 7 days  Lab Units 06/13/17  0547   CHOLESTEROL mg/dL 109   TRIGLYCERIDES mg/dL 36   HDL CHOL mg/dL 51             EKG on 6/10/17-        I personally viewed and interpreted the patient's EKG/Telemetry data.        Assessment and Plan:       1.  History of CVA chronic atrial fibrillation as well as a bioprosthetic aortic valve.  Ration is on anticoagulation and was therapeutic upon presentation.  There is now concerned because she had 2 episodes of a facial droop in the exact same side.  I was asked to see for possible options including a transesophageal echocardiogram, watchman his device, or changing of anticoagulation.  In my current opinion a transesophageal echocardiogram is not appropriate.  We know she has atrial fibrillation.  There is also no evidence of stroke by MRI this is a clinical finding of what is probably a TIA.  This however is also in the setting of a significant urinary tract infection with neurologic changes associated with that.  Patient also has RSV bronchitis.  In light of the multiple illnesses I do not think that I would aggressively pursue any further workup at this point.  A watchman his device is not appropriate in this setting either.  Patient is anticoagulated.  Patient did have a baseline echo ordered which I'll follow make sure there is nothing on her valvular incompetency of the valve.  The time being I would let all infections clear if there is evidence of a clear stroke or she continues to have TIA symptoms then I  would consider a further workup at that point.    Ger Fiore MD  06/14/17  1:07 PM

## 2017-06-14 NOTE — PROGRESS NOTES
"DOS: 2017  NAME: Jihan Teresa   : 1933  PCP: Gustavo Jackman Jr., MD  Chief Complaint   Patient presents with   • Fatigue     CC: Transient left facial droop    Subjective: No recurrence of episode per RN, patient and daughter who is at bedside. No new stroke/TIA symptoms.     Interval History  Patient Complaints: \"When can I go home?\"  Patient Denies:  Headache, new stroke/TIA symptoms.   History taken from: patient chart family RN    Objective:  Vital signs: BP (!) 184/81 (BP Location: Right arm, Patient Position: Sitting)  Pulse 66  Temp 97.5 °F (36.4 °C) (Oral)   Resp 24  Ht 60\" (152.4 cm)  Wt 154 lb 9.6 oz (70.1 kg)  SpO2 93%  BMI 30.19 kg/m2      Physical Exam:  GENERAL: NAD  HEENT: Normocephalic, atraumatic   COR: RRR  Resp: Even and unlabored  Extremities: Equal pulses, no signs of distal embolization. TEDs and SCDs in place.    Neurological:   MS: Alert to self. Oriented to self, daughter, place. Language normal. No neglect. Higher integrative function normal  CN: II-XII normal  Motor: Normal strength and tone throughout.  Sensory: Intact  Coordination: Normal    Laboratory results:  Lab Results   Component Value Date    GLUCOSE 152 (H) 2017    CALCIUM 9.0 2017     2017    K 3.2 (L) 2017    CO2 26.1 2017    CL 98 2017    BUN 20 2017    CREATININE 0.96 2017    EGFRIFAFRI 56 (L) 2016    EGFRIFNONA 56 (L) 2017    BCR 20.8 2017    ANIONGAP 15.9 2017     Lab Results   Component Value Date    WBC 13.24 (H) 2017    HGB 11.6 (L) 2017    HCT 35.2 (L) 2017    MCV 88.0 2017     2017        Results from last 7 days  Lab Units 17  0547   CHOLESTEROL mg/dL 109     Lab Results   Component Value Date    INR 4.60 (C) 2017    INR 2.62 (H) 2017    INR 2.39 (H) 2017    PROTIME 42.1 (H) 2017    PROTIME 27.2 (H) 2017    PROTIME 25.3 (H) 2017     Lab " Results   Component Value Date    LDLCALC 51 06/13/2017     Lab Results   Component Value Date    HGBA1C 6.07 (H) 06/13/2017       Review and interpretation of imaging:  CT head 6/10/17: IMPRESSION:  1. Mild atrophy with chronic appearing parenchymal changes, no acute  finding.  2. Paranasal sinus findings as above.   CT head 6/11/17: IMPRESSION:  No change from one day earlier.   MRI brain without contrast 6/12/17: IMPRESSION:  1. Stable atrophy and chronic-appearing parenchymal changes as  discussed. No acute intracranial abnormality.  2. Paranasal sinus findings as above  MRA head 1/6/2016: IMPRESSION- No evidence of focal high-grade stenosis or aneurysm.  MRA neck 1/6/2016: IMPRESSION- No evidence of carotid stenosis. Aberrant right subclavian  artery.   CTA head/neck 6/13/17: IMPRESSION:  Small vessel ischemic disease and lacunar infarcts as  described. There is no evidence of carotid stenosis using NASCET  criteria. There is irregularity involving the right posterior cerebral  artery and involving the anterior cerebral arteries bilaterally  suggesting intracranial atherosclerotic disease with moderate stenoses  as described.    TTE: Pending    Impression: Ms. Teresa is a 84 yo RHW female with a PMHx of DM, HLD, HTN, Chronic afib (on warfarin), AVR (11/2009), baseline dementia, stroke (in 2013 with residual left side weakness) and TIA (2016) who presented on 6/9/17 with progressive generalized weakness and confusion in the setting of a UTI and RSV. Since her admission she had two episodes of acute left facial weakness, each lasting about 10-20 minutes. Her MRI following her first episode  was negative for a stroke. MRA head/neck done in in 2016 was negative for any significant stenosis. I reviewed her CTA head/neck with Dr. Strickland which is negative for any significant stenosis that would explain her symptoms. A TTE is pending. In light of her chronic afib, bioprosthetic AV and h.o of LINETTE thrombus while on  Pradaxa, we have asked cardiology to give an opinion on need for any further diagnostic testing or change in anticoagulation as her INR at the time of her episodes. For now continue ASA, Lipitor.     Plan:  TTE pending  Consult cardiology  ?ALEX/change AC to NOAC  Continue Aspirin 325mg  Anticoagulation  Lipitor 40 mg  Hydrate  Neurochecks  Non-pharmacological DVT prophylaxis  EKG Tele  PT/OT/ST  Stroke Education    I have discussed the above with the patient and family.  Eulalia Mazariegos, APRN  06/14/17  11:51 AM       ADDENDUM:   TTE 6/14/17: LVEF 47%, normal size; RV severely dilated; LA severely dilated, saline tests negative; RA severely dilated.     TTE results noted, unrevealing. Appreciate cardiology input, no further testing/medication changes at this time. Recommend continued therapy with warfarin, full dose ASA and Lipitor 40 mg. No further neurologic work-up indicated at this time. Patient is to follow-up in stroke clinic in 3 months. Call [phone] for an appointment. We discussed importance of calling [phone] for any signs/symptoms of stroke.     Please call Dr. Strickland if there are question or concerns.  Discussed above with RN.

## 2017-06-14 NOTE — PROGRESS NOTES
"    DAILY PROGRESS NOTE  [facility]    Patient Identification:  Name: Jihan Teresa  Age: 83 y.o.  Sex: female  :  1933  MRN: [id number]         Primary Care Physician: Gustavo Jackman Jr., MD    Subjective:  Interval History: no further issues - \"I'm feeling much better\" - denies cp/sob/n/v/d/loc/lof    Objective:all ?'s answered to daughter at bs - also d/w rn     Scheduled Meds:  albuterol 2.5 mg Nebulization Q6H - RT   amoxicillin 500 mg Oral Q12H   aspirin 325 mg Oral Daily   atorvastatin 80 mg Oral Nightly   budesonide-formoterol 2 puff Inhalation BID - RT   calcium-vitamin D 500 mg Oral BID   cyanocobalamin 1,000 mcg Intramuscular Q30 Days   furosemide 40 mg Oral BID   hydrALAZINE 25 mg Oral Q8H   insulin aspart 0-9 Units Subcutaneous 4x Daily With Meals & Nightly   isosorbide mononitrate 60 mg Oral Q24H   levothyroxine 75 mcg Oral Q AM   lisinopril 40 mg Oral Daily   metoprolol tartrate 50 mg Oral Q12H   multivitamin 1 tablet Oral Daily   pantoprazole 40 mg Oral QAM   potassium chloride 20 mEq Oral BID   predniSONE 40 mg Oral Daily With Breakfast   vitamin C 1,000 mg Oral Daily   warfarin 3 mg Oral Daily     Continuous Infusions:     Vital signs in last 24 hours:  Temp:  [97.2 °F (36.2 °C)-98.2 °F (36.8 °C)] 97.5 °F (36.4 °C)  Heart Rate:  [65-76] 66  Resp:  [18-24] 24  BP: (151-194)/(74-91) 184/81    Intake/Output:    Intake/Output Summary (Last 24 hours) at 17 1116  Last data filed at 17 1600   Gross per 24 hour   Intake              240 ml   Output               50 ml   Net              190 ml       Exam:  BP (!) 184/81 (BP Location: Right arm, Patient Position: Sitting)  Pulse 66  Temp 97.5 °F (36.4 °C) (Oral)   Resp 24  Ht 60\" (152.4 cm)  Wt 154 lb 9.6 oz (70.1 kg)  SpO2 93%  BMI 30.19 kg/m2    General Appearance:    Alert, cooperative, no distress, AAOx3, fluent speech                           Head:    Normocephalic, without obvious abnormality, " atraumatic                           Eyes:    PERRL, conjunctiva/corneas clear, EOM's intact, both eyes                         Throat:   Lips, tongue, gums normal; oral mucosa pink and moist                           Neck:   Supple, no JVD                         Lungs:    Much improved right sided rhonchi to auscultation bilaterally, respirations unlabored                          Heart:    Regular rate and rhythm, S1 and S2 normal                  Abdomen:     Soft, non-tender, bowel sounds active                 Extremities:   Extremities normal, atraumatic, no cyanosis or edema                 Neurologic:   CNII-XII intact, motor strength grossly intact, no focal deficits noted     Data Review:  Labs in chart were reviewed.    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       Plan:  Pan-sensitive UTI - amox (completed tomorrow)    RSV bronchitis - Prednisone D3/5    Persistent neuro changes - MRI/CTA negative for acute disease   -d/w Dr Strickland - ASAincreased/statin/AC via Coumadin (dc'd for now given INR4+)   -check EEG   -no Cards note but no plans for ALEX or changes to AC per Cards per d/w daughter at bedside    HTN - increase Hydralazine and monitor    Hypokalemia - replace K     Dispo - d/w CCP - probable DC 6/15 whether [facility] vs HH    Bert Starr MD  6/14/2017  11:16 AM

## 2017-06-14 NOTE — PLAN OF CARE
Problem: Patient Care Overview (Adult)  Goal: Plan of Care Review    06/14/17 1035   Coping/Psychosocial Response Interventions   Plan Of Care Reviewed With patient;daughter   Outcome Evaluation   Outcome Summary/Follow up Plan Pt presents with overall weakness and impaired bedmobility, txfs, & gait. She will benfit from skilled PT to address deficits to facilitate improved function & safety at discharge.         Problem: Inpatient Physical Therapy  Goal: Bed Mobility Goal LTG- PT    06/14/17 1035   Bed Mobility PT LTG   Bed Mobility PT LTG, Time to Achieve 2 wks   Bed Mobility PT LTG, Activity Type all bed mobility   Bed Mobility PT LTG, [place] Level conditional independence;supervision required       Goal: Transfer Training Goal 1 LTG- PT    06/14/17 1035   Transfer Training PT LTG   Transfer Training PT LTG, Time to Achieve 2 wks   Transfer Training PT LTG, Activity Type all transfers   Transfer Training PT LTG, [place] Level contact guard assist   Transfer Training PT LTG, Assist Device walker, rolling       Goal: Gait Training Goal LTG- PT    06/14/17 1035   Gait Training PT LTG   Gait Training Goal PT LTG, Time to Achieve 2 wks   Gait Training Goal PT LTG, [place] Level contact guard assist   Gait Training Goal PT LTG, Assist Device walker, rolling   Gait Training Goal PT LTG, Distance to Achieve 60

## 2017-06-15 ENCOUNTER — APPOINTMENT (OUTPATIENT)
Dept: NEUROLOGY | Facility: HOSPITAL | Age: 82
End: 2017-06-15
Attending: HOSPITALIST

## 2017-06-15 LAB
ANION GAP SERPL CALCULATED.3IONS-SCNC: 15.9 MMOL/L
BUN BLD-MCNC: 25 MG/DL (ref 8–23)
BUN/CREAT SERPL: 25.5 (ref 7–25)
CALCIUM SPEC-SCNC: 9.2 MG/DL (ref 8.6–10.5)
CHLORIDE SERPL-SCNC: 93 MMOL/L (ref 98–107)
CO2 SERPL-SCNC: 28.1 MMOL/L (ref 22–29)
CREAT BLD-MCNC: 0.98 MG/DL (ref 0.57–1)
GFR SERPL CREATININE-BSD FRML MDRD: 54 ML/MIN/1.73
GLUCOSE BLD-MCNC: 135 MG/DL (ref 65–99)
GLUCOSE BLDC GLUCOMTR-MCNC: 123 MG/DL (ref 70–130)
GLUCOSE BLDC GLUCOMTR-MCNC: 155 MG/DL (ref 70–130)
GLUCOSE BLDC GLUCOMTR-MCNC: 171 MG/DL (ref 70–130)
GLUCOSE BLDC GLUCOMTR-MCNC: 182 MG/DL (ref 70–130)
INR PPP: 3.64 (ref 0.9–1.1)
POTASSIUM BLD-SCNC: 3.2 MMOL/L (ref 3.5–5.2)
PROTHROMBIN TIME: 35.1 SECONDS (ref 11.7–14.2)
SODIUM BLD-SCNC: 137 MMOL/L (ref 136–145)

## 2017-06-15 PROCEDURE — 94799 UNLISTED PULMONARY SVC/PX: CPT

## 2017-06-15 PROCEDURE — 95816 EEG AWAKE AND DROWSY: CPT | Performed by: PSYCHIATRY & NEUROLOGY

## 2017-06-15 PROCEDURE — 95819 EEG AWAKE AND ASLEEP: CPT

## 2017-06-15 PROCEDURE — 82962 GLUCOSE BLOOD TEST: CPT

## 2017-06-15 PROCEDURE — 97110 THERAPEUTIC EXERCISES: CPT

## 2017-06-15 PROCEDURE — 63710000001 PREDNISONE PER 1 MG: Performed by: HOSPITALIST

## 2017-06-15 PROCEDURE — 99232 SBSQ HOSP IP/OBS MODERATE 35: CPT | Performed by: INTERNAL MEDICINE

## 2017-06-15 PROCEDURE — 85610 PROTHROMBIN TIME: CPT | Performed by: HOSPITALIST

## 2017-06-15 PROCEDURE — 80048 BASIC METABOLIC PNL TOTAL CA: CPT | Performed by: HOSPITALIST

## 2017-06-15 RX ORDER — POTASSIUM CHLORIDE 750 MG/1
40 CAPSULE, EXTENDED RELEASE ORAL 2 TIMES DAILY
Status: DISCONTINUED | OUTPATIENT
Start: 2017-06-15 | End: 2017-06-16 | Stop reason: HOSPADM

## 2017-06-15 RX ORDER — HYDRALAZINE HYDROCHLORIDE 25 MG/1
25 TABLET, FILM COATED ORAL EVERY 8 HOURS SCHEDULED
Status: DISCONTINUED | OUTPATIENT
Start: 2017-06-15 | End: 2017-06-16 | Stop reason: HOSPADM

## 2017-06-15 RX ORDER — POTASSIUM CHLORIDE 750 MG/1
40 CAPSULE, EXTENDED RELEASE ORAL ONCE
Status: COMPLETED | OUTPATIENT
Start: 2017-06-15 | End: 2017-06-15

## 2017-06-15 RX ADMIN — METOPROLOL TARTRATE 50 MG: 50 TABLET ORAL at 22:27

## 2017-06-15 RX ADMIN — OXYCODONE HYDROCHLORIDE AND ACETAMINOPHEN 1000 MG: 500 TABLET ORAL at 07:48

## 2017-06-15 RX ADMIN — ALBUTEROL SULFATE 2.5 MG: 2.5 SOLUTION RESPIRATORY (INHALATION) at 00:21

## 2017-06-15 RX ADMIN — POTASSIUM CHLORIDE 20 MEQ: 750 CAPSULE, EXTENDED RELEASE ORAL at 07:48

## 2017-06-15 RX ADMIN — METOPROLOL TARTRATE 50 MG: 50 TABLET ORAL at 07:47

## 2017-06-15 RX ADMIN — FUROSEMIDE 40 MG: 40 TABLET ORAL at 07:47

## 2017-06-15 RX ADMIN — PANTOPRAZOLE SODIUM 40 MG: 40 TABLET, DELAYED RELEASE ORAL at 04:39

## 2017-06-15 RX ADMIN — CALCIUM CARBONATE-VITAMIN D TAB 500 MG-200 UNIT 500 MG: 500-200 TAB at 16:43

## 2017-06-15 RX ADMIN — ISOSORBIDE MONONITRATE 60 MG: 60 TABLET, EXTENDED RELEASE ORAL at 07:47

## 2017-06-15 RX ADMIN — ALBUTEROL SULFATE 2.5 MG: 2.5 SOLUTION RESPIRATORY (INHALATION) at 19:14

## 2017-06-15 RX ADMIN — HYDRALAZINE HYDROCHLORIDE 25 MG: 25 TABLET, FILM COATED ORAL at 16:42

## 2017-06-15 RX ADMIN — ALBUTEROL SULFATE 2.5 MG: 2.5 SOLUTION RESPIRATORY (INHALATION) at 11:43

## 2017-06-15 RX ADMIN — HYDRALAZINE HYDROCHLORIDE 25 MG: 25 TABLET, FILM COATED ORAL at 22:27

## 2017-06-15 RX ADMIN — LEVOTHYROXINE SODIUM 75 MCG: 75 TABLET ORAL at 04:38

## 2017-06-15 RX ADMIN — POTASSIUM CHLORIDE 40 MEQ: 750 CAPSULE, EXTENDED RELEASE ORAL at 16:42

## 2017-06-15 RX ADMIN — CALCIUM CARBONATE-VITAMIN D TAB 500 MG-200 UNIT 500 MG: 500-200 TAB at 07:47

## 2017-06-15 RX ADMIN — LISINOPRIL 40 MG: 40 TABLET ORAL at 07:47

## 2017-06-15 RX ADMIN — ATORVASTATIN CALCIUM 80 MG: 80 TABLET, FILM COATED ORAL at 22:27

## 2017-06-15 RX ADMIN — PREDNISONE 40 MG: 20 TABLET ORAL at 07:47

## 2017-06-15 RX ADMIN — ASPIRIN 325 MG: 325 TABLET ORAL at 07:47

## 2017-06-15 RX ADMIN — Medication 1 TABLET: at 07:47

## 2017-06-15 RX ADMIN — AMOXICILLIN 500 MG: 250 CAPSULE ORAL at 22:26

## 2017-06-15 RX ADMIN — AMOXICILLIN 500 MG: 250 CAPSULE ORAL at 07:46

## 2017-06-15 RX ADMIN — FUROSEMIDE 40 MG: 40 TABLET ORAL at 16:42

## 2017-06-15 NOTE — PROGRESS NOTES
Hospital Follow Up    LOS:  LOS: 5 days   Patient Name: Jihan Teresa  Age/Sex: 83 y.o. female  : 1933  MRN: [id number]    Day of Service: 06/15/17   Length of Stay: 5  Encounter Provider: Ger Fiore MD  Place of Service: [facility]  Patient Care Team:  Gustavo Jackman Jr., MD as PCP - General  Gustavo Jackman Jr., MD as PCP - Family Medicine  KAREN Morse as PCP - Claims Attributed    Subjective:     Chief Complaint:Possible TIA.    Interval History: Patient more sedate today.  I spoke with another daughter this morning.  She was concerned because she said she just wanted to go home.    Objective:     Objective:  Temp:  [96.2 °F (35.7 °C)-98.1 °F (36.7 °C)] 98.1 °F (36.7 °C)  Heart Rate:  [64-94] 68  Resp:  [18-24] 18  BP: (154-210)/() 160/100     Intake/Output Summary (Last 24 hours) at 06/15/17 0952  Last data filed at 17 1300   Gross per 24 hour   Intake              360 ml   Output                0 ml   Net              360 ml     Body mass index is 30.19 kg/(m^2).  Last 3 weights    17  2255 06/10/17  0330   Weight: 156 lb (70.8 kg) 154 lb 9.6 oz (70.1 kg)     Weight change:       Physical Exam:   General :Patient sedate not interacting  Neuro: alert,cooperative and oriented  Lungs: CTAB. Normal respiratory effort and rate.  CV:: Regular rate and rhythm, normal S1 and S2, no murmurs, gallops or rubs.  ABD: Soft, nontender, non-distended. positive bowel sounds  Extr: No edema or cyanosis, moves all extremities    Lab Review:     Results from last 7 days  Lab Units 06/15/17  0436 17  0705 06/10/17  0605 17  2309   SODIUM mmol/L 137 140 134* 131*   POTASSIUM mmol/L 3.2* 3.2* 4.0 3.8   CHLORIDE mmol/L 93* 98 95* 91*   TOTAL CO2 mmol/L 28.1 26.1 26.8 26.8   BUN mg/dL 25* 20 12 14   CREATININE mg/dL 0.98 0.96 0.94 1.01*   GLUCOSE mg/dL 135* 152* 133* 140*   CALCIUM mg/dL 9.2 9.0 8.9 9.4   AST (SGOT) U/L  --   --  19  "24   ALT (SGPT) U/L  --   --  26 29           Results from last 7 days  Lab Units 06/14/17  0705 06/10/17  0605   WBC 10*3/mm3 13.24* 11.13*   HEMOGLOBIN g/dL 11.6* 12.0   HEMATOCRIT % 35.2* 37.4   PLATELETS 10*3/mm3 182 189       Results from last 7 days  Lab Units 06/15/17  0436 06/14/17  0705   INR  3.64* 4.60*           Results from last 7 days  Lab Units 06/13/17  0547   CHOLESTEROL mg/dL 109   TRIGLYCERIDES mg/dL 36   HDL CHOL mg/dL 51   LDL CHOL mg/dL 51               Current Medications:   Scheduled Meds:  albuterol 2.5 mg Nebulization Q6H - RT   amoxicillin 500 mg Oral Q12H   aspirin 325 mg Oral Daily   atorvastatin 80 mg Oral Nightly   budesonide-formoterol 2 puff Inhalation BID - RT   calcium-vitamin D 500 mg Oral BID   cyanocobalamin 1,000 mcg Intramuscular Q30 Days   furosemide 40 mg Oral BID   hydrALAZINE 50 mg Oral Q8H   insulin aspart 0-9 Units Subcutaneous 4x Daily With Meals & Nightly   isosorbide mononitrate 60 mg Oral Q24H   levothyroxine 75 mcg Oral Q AM   lisinopril 40 mg Oral Daily   metoprolol tartrate 50 mg Oral Q12H   multivitamin 1 tablet Oral Daily   pantoprazole 40 mg Oral QAM   potassium chloride 20 mEq Oral BID   predniSONE 40 mg Oral Daily With Breakfast   vitamin C 1,000 mg Oral Daily     Continuous Infusions:     Allergies:  Allergies   Allergen Reactions   • Promethazine Other (See Comments)     Pt becomes \"out of it\" when on this medication  Pt becomes \"out of it\" when on this medication     · Calculated EF = 46.9%.  · Left ventricular systolic function is low normal.  · Left amd right atrial cavity size is severely dilated.  · Right ventricular cavity is severely dilated.  · calcification of the aortic valve  · Severe mitral valve regurgitation is present  · Mitral annular calcification is present. Posterior leaflet very calcified and immobile.  · Severe tricuspid valve regurgitation is present.  · Estimated right ventricular systolic pressure from tricuspid regurgitation is " markedly elevated (>55 mmHg). Calculated right ventricular systolic pressure from tricuspid regurgitation is 66.7 mmHg.      Assessment:     Principal Problem:    UTI (urinary tract infection), bacterial  Active Problems:    Type 2 diabetes mellitus    Aortic valve replaced, equine valve    A-fib    Hypothyroidism (acquired)    History of recurrent UTIs    Essential hypertension        Plan:     1.  Chronic atrial fibrillation.  2.  Bioprosthetic aortic valve.  3.  Echocardiogram yesterday showed severe point hypertension.  4.  RSV bronchitis.  5.  Urinary tract infection  6.  Patient is more sedate today my concern is she is deteriorating.  Nothing really to add from my standpoint except supportive care will follow infections will improve and she'll turn around.        Ger Fiore MD  06/15/17  9:52 AM

## 2017-06-15 NOTE — PLAN OF CARE
Problem: Patient Care Overview (Adult)  Goal: Plan of Care Review  Outcome: Ongoing (interventions implemented as appropriate)    06/15/17 9846   Coping/Psychosocial Response Interventions   Plan Of Care Reviewed With patient   Outcome Evaluation   Outcome Summary/Follow up Plan Pt with improved upright stability and ambulation endurance this date, but requires additional skilled PT prior to returning home. Pt demo's decreased strength and activity endurance, requires occasional cuing for improved safety awareness and sequencing w/ RWX.

## 2017-06-15 NOTE — PROGRESS NOTES
"    DAILY PROGRESS NOTE  [facility]    Patient Identification:  Name: Jihan Teresa  Age: 83 y.o.  Sex: female  :  1933  MRN: [id number]         Primary Care Physician: Gustavo Jackman Jr., MD    Subjective:  Interval History: called by RN last night for some dizziness and today because daughter declining BP meds - regardless Hydralazine is a great choice for this patient given remainder of BP regimen. Patient still feeling better - breathing back to baseline. While daughters are very nice, they are also hindering care at times w/ too much emphasis on the small things. Patient denies cp/n/v/d    Objective:    Scheduled Meds:  albuterol 2.5 mg Nebulization Q6H - RT   amoxicillin 500 mg Oral Q12H   aspirin 325 mg Oral Daily   atorvastatin 80 mg Oral Nightly   budesonide-formoterol 2 puff Inhalation BID - RT   calcium-vitamin D 500 mg Oral BID   cyanocobalamin 1,000 mcg Intramuscular Q30 Days   furosemide 40 mg Oral BID   hydrALAZINE 25 mg Oral Q8H   insulin aspart 0-9 Units Subcutaneous 4x Daily With Meals & Nightly   isosorbide mononitrate 60 mg Oral Q24H   levothyroxine 75 mcg Oral Q AM   lisinopril 40 mg Oral Daily   metoprolol tartrate 50 mg Oral Q12H   multivitamin 1 tablet Oral Daily   pantoprazole 40 mg Oral QAM   potassium chloride 20 mEq Oral BID   predniSONE 40 mg Oral Daily With Breakfast   vitamin C 1,000 mg Oral Daily     Continuous Infusions:     Vital signs in last 24 hours:  Temp:  [96.2 °F (35.7 °C)-98.1 °F (36.7 °C)] 98.1 °F (36.7 °C)  Heart Rate:  [59-94] 59  Resp:  [18] 18  BP: (154-210)/() 160/100    Intake/Output:  No intake or output data in the 24 hours ending 06/15/17 1415    Exam:  /100 (BP Location: Right arm, Patient Position: Lying)  Pulse 59  Temp 98.1 °F (36.7 °C) (Oral)   Resp 18  Ht 60\" (152.4 cm)  Wt 154 lb 9.6 oz (70.1 kg)  SpO2 94%  BMI 30.19 kg/m2    General Appearance:    Alert, cooperative, no distress, AAOx3                         " Throat:   Lips, tongue, gums normal; oral mucosa pink and moist                           Neck:   no JVD                         Lungs:    Clear to auscultation bilaterally, respirations unlabored                          Heart:    Regular rate and rhythm, S1 and S2 normal                  Abdomen:     Soft, non-tender, bowel sounds active                 Extremities:   Extremities normal, atraumatic, no cyanosis or edema                  Neurologic:   CNII-XII intact, moving all extremities, no focal deficits noted     Data Review:  Labs in chart were reviewed.    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       Plan:  Pan-sensitive UTI - amox completed tonight     RSV bronchitis - Prednisone D4/5     Persistent neuro changes - MRI/CTA negative for acute disease  -d/w Dr Strickland - ASAincreased/statin/AC via Coumadin (dc'd for now given INR4+)  -pending EEG  -no Cards note but no plans for ALEX or changes to AC per Cards per d/w daughter at bedside     HTN - continue Hydralazine -d/w importance of better BP control      Hypokalemia - replace K - check Mg     Dispo - d/w CCP - probable DC 6/16 - LUIS Starr MD  6/15/2017  2:15 PM  ]

## 2017-06-15 NOTE — SIGNIFICANT NOTE
06/15/17 0825   Rehab Treatment   Discipline physical therapy assistant   Treatment Not Performed patient/family declined treatment  (Pt's family has stated pt had restless night has just fallen asleep, requests PT follow-up at later time.  PT to follow-up in PM. )   Recommendation   PT - Next Appointment 06/15/17

## 2017-06-15 NOTE — THERAPY TREATMENT NOTE
Acute Care - Physical Therapy Treatment Note  [facility]     Patient Name: Jihan Teresa  : 1933  MRN: [id number]  Today's Date: 6/15/2017  Onset of Illness/Injury or Date of Surgery Date: 06/10/17          Admit Date: 6/10/2017    Visit Dx:    ICD-10-CM ICD-9-CM   1. UTI (urinary tract infection), bacterial N39.0 599.0    A49.9 041.9   2. Generalized weakness R53.1 780.79   3. Confusion R41.0 298.9   4. B12 deficiency E53.8 266.2   5. Difficulty walking R26.2 719.7     Patient Active Problem List   Diagnosis   • Type 2 diabetes mellitus   • Aortic valve replaced, equine valve   • Cerebral infarction   • A-fib   • GERD without esophagitis   • Sleep apnea in adult   • Congestive heart failure   • Cardiomyopathy   • Cognitive dysfunction   • Hypothyroidism (acquired)   • History of recurrent UTIs   • Mild reactive airways disease   • Essential hypertension   • Pneumonia of both lungs due to infectious organism   • Pulmonary hypertension   • B12 deficiency   • UTI (urinary tract infection), bacterial               Adult Rehabilitation Note       06/15/17 1312          Rehab Assessment/Intervention    Discipline physical therapy assistant  -JI      Document Type therapy note (daily note)  -JI      Subjective Information agree to therapy  -JI      Patient Effort, Rehab Treatment good  -JI      Precautions/Limitations fall precautions  -JI      Recorded by [IJ] Carl Le PTA      Pain Assessment    Pain Assessment No/denies pain  -JI      Recorded by [JI] Carl Le PTA      Cognitive Assessment/Intervention    Current Cognitive/Communication Assessment functional  -JI      Orientation Status oriented x 4  -JI      Follows Commands/Answers Questions 100% of the time  -JI      Personal Safety WNL/WFL  -JI      Personal Safety Interventions fall prevention program maintained;gait belt;nonskid shoes/slippers when out of bed  -JI      Recorded by [JI] Carl Le PTA      Bed Mobility, Assessment/Treatment     Bed Mobility, Assistive Device bed rails;head of bed elevated  -JI      Bed Mob, Supine to Sit, [place] minimum assist (75% patient effort)  -JI      Bed Mob, Sit to Supine, [place] minimum assist (75% patient effort);verbal cues required;nonverbal cues required (demo/gesture)  -JI      Bed Mobility, Safety Issues decreased use of arms for pushing/pulling;decreased use of legs for bridging/pushing  -JI      Bed Mobility, Impairments strength decreased  -JI      Recorded by [JI] Carl Le PTA      Transfer Assessment/Treatment    Transfers, Sit-Stand [place] minimum assist (75% patient effort);verbal cues required;nonverbal cues required (demo/gesture)  -JI      Transfers, Stand-Sit [place] minimum assist (75% patient effort);verbal cues required;nonverbal cues required (demo/gesture)  -JI      Transfers, Sit-Stand-Sit, Assist Device rolling walker  -JI      Toilet Transfer, [place] minimum assist (75% patient effort);verbal cues required;nonverbal cues required (demo/gesture)  -JI      Toilet Transfer, Assistive Device rolling walker  -JI      Transfer, Safety Issues step length decreased;weight-shifting ability decreased  -JI      Transfer, Impairments strength decreased;impaired balance  -JI      Recorded by [JI] Carl Le PTA      Gait Assessment/Treatment    Gait, [place] Level contact guard assist;minimum assist (75% patient effort);2 person assist required;verbal cues required;nonverbal cues required (demo/gesture)  -JI      Gait, Assistive Device rolling walker  -JI      Gait, Distance (Feet) 45  -JI      Gait, Gait Deviations laxmi decreased;decreased heel strike;forward flexed posture;limb motion velocity decreased;step length decreased;toe-to-floor clearance decreased  -JI      Gait, Safety Issues step length decreased;balance decreased during turns  -JI      Gait, Impairments strength decreased;impaired balance  -JI      Gait, Comment Cued for improved step  length/height as well as improved upright posture.   -JI      Recorded by [JI] Carl Le PTA      Therapy Exercises    Bilateral Lower Extremities AROM:;10 reps;ankle pumps/circles;LAQ  -JI      Recorded by [JI] Carl Le PTA      Positioning and Restraints    Pre-Treatment Position in bed  -JI      Post Treatment Position bed  -JI      In Bed supine;call light within reach;encouraged to call for assist;with family/caregiver;exit alarm on;with nsg   w/ RN Judith  -JI      Recorded by [JI] Carl Le PTA        User Key  (r) = Recorded By, (t) = Taken By, (c) = Cosigned By    Initials Name Effective Dates    JI Le PTA 04/24/15 -                 IP PT Goals       06/14/17 1035          Bed Mobility PT LTG    Bed Mobility PT LTG, Time to Achieve 2 wks  -EF      Bed Mobility PT LTG, Activity Type all bed mobility  -EF      Bed Mobility PT LTG, [place] Level conditional independence;supervision required  -EF      Transfer Training PT LTG    Transfer Training PT LTG, Time to Achieve 2 wks  -EF      Transfer Training PT LTG, Activity Type all transfers  -EF      Transfer Training PT LTG, [place] Level contact guard assist  -EF      Transfer Training PT LTG, Assist Device walker, rolling  -EF      Gait Training PT LTG    Gait Training Goal PT LTG, Time to Achieve 2 wks  -EF      Gait Training Goal PT LTG, [place] Level contact guard assist  -EF      Gait Training Goal PT LTG, Assist Device walker, rolling  -EF      Gait Training Goal PT LTG, Distance to Achieve 60  -EF        User Key  (r) = Recorded By, (t) = Taken By, (c) = Cosigned By    Initials Name Provider Type    NATHALIE Souza PT Physical Therapist          Physical Therapy Education     Title: PT OT SLP Therapies (Done)     Topic: Physical Therapy (Done)     Point: Mobility training (Done)    Learning Progress Summary    Learner Readiness Method Response Comment Documented by Status   Patient Acceptance PRABHAKAR VILLARREAL 06/15/17  1336 Done    Acceptance E VU  AK 06/15/17 0529 Done    Acceptance E VU,NR   06/14/17 1035 Done   Family Acceptance E VU,NR   06/14/17 1035 Done               Point: Home exercise program (Done)    Learning Progress Summary    Learner Readiness Method Response Comment Documented by Status   Patient Acceptance E VU,NR  JI 06/15/17 1336 Done    Acceptance E VU  AK 06/15/17 0529 Done    Acceptance E VU,NR   06/14/17 1035 Done   Family Acceptance E VU,NR   06/14/17 1035 Done               Point: Body mechanics (Done)    Learning Progress Summary    Learner Readiness Method Response Comment Documented by Status   Patient Acceptance E VU,NR   06/15/17 1336 Done    Acceptance E VU  AK 06/15/17 0529 Done    Acceptance E VU,NR   06/14/17 1035 Done   Family Acceptance E VU,NR   06/14/17 1035 Done               Point: Precautions (Done)    Learning Progress Summary    Learner Readiness Method Response Comment Documented by Status   Patient Acceptance E VU,NR   06/15/17 1336 Done    Acceptance E VU  AK 06/15/17 0529 Done    Acceptance E VU,NR   06/14/17 1035 Done   Family Acceptance E VU,NR   06/14/17 1035 Done                      User Key     Initials Effective Dates Name Provider Type Discipline     04/24/15 -  Reyna Souza, PT Physical Therapist PT     04/24/15 -  Carl Le PTA Physical Therapy Assistant PT    AK 03/17/17 -  Katelyn Shah RN Registered Nurse Nurse                    PT Recommendation and Plan  Anticipated Discharge Disposition: skilled nursing facility, home with home health  Planned Therapy Interventions: bed mobility training, gait training, home exercise program, transfer training  PT Frequency: daily  Plan of Care Review  Plan Of Care Reviewed With: patient  Outcome Summary/Follow up Plan: Pt with improved upright stability and ambulation endurance this date, but requires additional skilled PT prior to returning home.  Pt demo's decreased strength and activity endurance,  requires occasional cuing for improved safety awareness and sequencing w/ RWX.           Outcome Measures       06/15/17 1300 06/14/17 1000       How much help from another person do you currently need...    Turning from your back to your side while in flat bed without using bedrails? 3  -JI 3  -EF     Moving from lying on back to sitting on the side of a flat bed without bedrails? 3  -JI 3  -EF     Moving to and from a bed to a chair (including a wheelchair)? 3  -JI 2  -EF     Standing up from a chair using your arms (e.g., wheelchair, bedside chair)? 3  -JI 2  -EF     Climbing 3-5 steps with a railing? 1  -JI 1  -EF     To walk in hospital room? 3  -JI 2  -EF     AM-PAC 6 Clicks Score 16  -JI 13  -EF     Functional Assessment    Outcome Measure Options AM-PAC 6 Clicks Basic Mobility (PT)  -JI AM-PAC 6 Clicks Basic Mobility (PT)  -EF       User Key  (r) = Recorded By, (t) = Taken By, (c) = Cosigned By    Initials Name Provider Type    EF Reyna Souza, PT Physical Therapist    JI Le PTA Physical Therapy Assistant           Time Calculation:         PT Charges       06/15/17 1335 06/15/17 0825       Time Calculation    Start Time 1312  -JI      Stop Time 1328  -JI      Time Calculation (min) 16 min  -JI      PT Received On 06/15/17  -JI      PT - Next Appointment 06/16/17  -JI 06/15/17  -JI       User Key  (r) = Recorded By, (t) = Taken By, (c) = Cosigned By    Initials Name Provider Type    JI Le PTA Physical Therapy Assistant          Therapy Charges for Today     Code Description Service Date Service Provider Modifiers Qty    60245425983 HC PT THER PROC EA 15 MIN 6/15/2017 Carl Le PTA GP 1    33152613636 HC PT THER SUPP EA 15 MIN 6/15/2017 Carl Le PTA GP 1          PT G-Codes  Outcome Measure Options: AM-PAC 6 Clicks Basic Mobility (PT)    Carl Le PTA  6/15/2017

## 2017-06-15 NOTE — PLAN OF CARE
"Problem: Patient Care Overview (Adult)  Goal: Plan of Care Review  Outcome: Ongoing (interventions implemented as appropriate)    06/15/17 0544   Coping/Psychosocial Response Interventions   Plan Of Care Reviewed With patient;family   Patient Care Overview   Progress progress toward functional goals as expected   Outcome Evaluation   Outcome Summary/Follow up Plan Pt improving both in mobility and breathing. Pt had two bouts of \"vibrating\" sensation with dizziness. Family attributes to addition of hydralazine for BP control. Medicine contacted, no further orders given. Will continue to monitor patient and assess for any differences in neuro status         Problem: Fall Risk (Adult)  Goal: Absence of Falls  Outcome: Ongoing (interventions implemented as appropriate)    Problem: Infection, Risk/Actual (Adult)  Goal: Infection Prevention/Resolution  Outcome: Ongoing (interventions implemented as appropriate)      "

## 2017-06-16 VITALS
BODY MASS INDEX: 30.35 KG/M2 | DIASTOLIC BLOOD PRESSURE: 90 MMHG | OXYGEN SATURATION: 94 % | WEIGHT: 154.6 LBS | TEMPERATURE: 96.6 F | HEIGHT: 60 IN | RESPIRATION RATE: 20 BRPM | SYSTOLIC BLOOD PRESSURE: 170 MMHG | HEART RATE: 58 BPM

## 2017-06-16 PROBLEM — A49.9 UTI (URINARY TRACT INFECTION), BACTERIAL: Status: RESOLVED | Noted: 2017-06-10 | Resolved: 2017-06-16

## 2017-06-16 PROBLEM — N39.0 UTI (URINARY TRACT INFECTION), BACTERIAL: Status: RESOLVED | Noted: 2017-06-10 | Resolved: 2017-06-16

## 2017-06-16 LAB
ANION GAP SERPL CALCULATED.3IONS-SCNC: 15.6 MMOL/L
BACTERIA SPEC AEROBE CULT: NORMAL
BACTERIA SPEC AEROBE CULT: NORMAL
BUN BLD-MCNC: 27 MG/DL (ref 8–23)
BUN/CREAT SERPL: 21.6 (ref 7–25)
CALCIUM SPEC-SCNC: 9.1 MG/DL (ref 8.6–10.5)
CHLORIDE SERPL-SCNC: 93 MMOL/L (ref 98–107)
CO2 SERPL-SCNC: 28.4 MMOL/L (ref 22–29)
CREAT BLD-MCNC: 1.25 MG/DL (ref 0.57–1)
GFR SERPL CREATININE-BSD FRML MDRD: 41 ML/MIN/1.73
GLUCOSE BLD-MCNC: 161 MG/DL (ref 65–99)
GLUCOSE BLDC GLUCOMTR-MCNC: 119 MG/DL (ref 70–130)
GLUCOSE BLDC GLUCOMTR-MCNC: 123 MG/DL (ref 70–130)
INR PPP: 2.38 (ref 0.9–1.1)
MAGNESIUM SERPL-MCNC: 2.3 MG/DL (ref 1.6–2.4)
POTASSIUM BLD-SCNC: 3.4 MMOL/L (ref 3.5–5.2)
PROTHROMBIN TIME: 25.2 SECONDS (ref 11.7–14.2)
SODIUM BLD-SCNC: 137 MMOL/L (ref 136–145)

## 2017-06-16 PROCEDURE — 80048 BASIC METABOLIC PNL TOTAL CA: CPT | Performed by: HOSPITALIST

## 2017-06-16 PROCEDURE — 83735 ASSAY OF MAGNESIUM: CPT | Performed by: HOSPITALIST

## 2017-06-16 PROCEDURE — 97110 THERAPEUTIC EXERCISES: CPT

## 2017-06-16 PROCEDURE — 82962 GLUCOSE BLOOD TEST: CPT

## 2017-06-16 PROCEDURE — 85610 PROTHROMBIN TIME: CPT | Performed by: HOSPITALIST

## 2017-06-16 PROCEDURE — 94799 UNLISTED PULMONARY SVC/PX: CPT

## 2017-06-16 RX ORDER — POTASSIUM CHLORIDE 750 MG/1
40 CAPSULE, EXTENDED RELEASE ORAL 2 TIMES DAILY
Start: 2017-06-16 | End: 2017-07-14

## 2017-06-16 RX ORDER — HYDRALAZINE HYDROCHLORIDE 25 MG/1
25 TABLET, FILM COATED ORAL EVERY 8 HOURS SCHEDULED
Start: 2017-06-16 | End: 2017-07-14

## 2017-06-16 RX ORDER — WARFARIN SODIUM 3 MG/1
3 TABLET ORAL
Status: DISCONTINUED | OUTPATIENT
Start: 2017-06-16 | End: 2017-06-16 | Stop reason: HOSPADM

## 2017-06-16 RX ORDER — WARFARIN SODIUM 3 MG/1
TABLET ORAL
Start: 2017-06-16 | End: 2017-11-14

## 2017-06-16 RX ADMIN — ALBUTEROL SULFATE 2.5 MG: 2.5 SOLUTION RESPIRATORY (INHALATION) at 12:35

## 2017-06-16 RX ADMIN — CALCIUM CARBONATE-VITAMIN D TAB 500 MG-200 UNIT 500 MG: 500-200 TAB at 14:16

## 2017-06-16 RX ADMIN — OXYCODONE HYDROCHLORIDE AND ACETAMINOPHEN 1000 MG: 500 TABLET ORAL at 14:14

## 2017-06-16 RX ADMIN — AMOXICILLIN 500 MG: 250 CAPSULE ORAL at 14:16

## 2017-06-16 RX ADMIN — HYDRALAZINE HYDROCHLORIDE 25 MG: 25 TABLET, FILM COATED ORAL at 14:19

## 2017-06-16 RX ADMIN — ISOSORBIDE MONONITRATE 60 MG: 60 TABLET, EXTENDED RELEASE ORAL at 14:16

## 2017-06-16 RX ADMIN — METOPROLOL TARTRATE 50 MG: 50 TABLET ORAL at 14:15

## 2017-06-16 RX ADMIN — ASPIRIN 325 MG: 325 TABLET ORAL at 14:16

## 2017-06-16 RX ADMIN — FUROSEMIDE 40 MG: 40 TABLET ORAL at 14:17

## 2017-06-16 RX ADMIN — LEVOTHYROXINE SODIUM 75 MCG: 75 TABLET ORAL at 12:16

## 2017-06-16 RX ADMIN — ALBUTEROL SULFATE 2.5 MG: 2.5 SOLUTION RESPIRATORY (INHALATION) at 00:28

## 2017-06-16 RX ADMIN — PANTOPRAZOLE SODIUM 40 MG: 40 TABLET, DELAYED RELEASE ORAL at 12:16

## 2017-06-16 RX ADMIN — POTASSIUM CHLORIDE 40 MEQ: 750 CAPSULE, EXTENDED RELEASE ORAL at 14:14

## 2017-06-16 RX ADMIN — Medication 1 TABLET: at 14:15

## 2017-06-16 RX ADMIN — ALBUTEROL SULFATE 2.5 MG: 2.5 SOLUTION RESPIRATORY (INHALATION) at 07:41

## 2017-06-16 RX ADMIN — LISINOPRIL 40 MG: 40 TABLET ORAL at 14:17

## 2017-06-16 NOTE — PROGRESS NOTES
Continued Stay Note  [facility]     Patient Name: Jihan Teresa  MRN: [id number]  Today's Date: 6/16/2017    Admit Date: 6/10/2017          Discharge Plan       06/16/17 1227    Case Management/Social Work Plan    Plan Skilled care at [facility]    Patient/Family In Agreement With Plan yes    Additional Comments Bed available today at [facility].  [facility] to [facility] at 1500.  Patient and family agree to transfer.      Final Note    Final Note Skilled care at [facility]              Discharge Codes     None        Expected Discharge Date and Time     Expected Discharge Date Expected Discharge Time    Jun 16, 2017             Sarah Guerrero RN

## 2017-06-16 NOTE — PLAN OF CARE
Problem: Inpatient Physical Therapy  Goal: Bed Mobility Goal LTG- PT  Outcome: Unable to achieve outcome(s) by discharge Date Met:  06/16/17 06/16/17 1341   Bed Mobility PT LTG   Bed Mobility PT LTG, Date Goal Reviewed 06/16/17   Bed Mobility PT LTG, Outcome goal partially met   Bed Mobility PT LTG, Reason Goal Not Met discharged from facility       Goal: Transfer Training Goal 1 LTG- PT  Outcome: Unable to achieve outcome(s) by discharge Date Met:  06/16/17 06/16/17 1341   Transfer Training PT LTG   Transfer Training PT LTG, Date Goal Reviewed 06/16/17   Transfer Training PT LTG, Outcome goal partially met   Transfer Training PT LTG, Reason Goal Not Met discharged from facility       Goal: Gait Training Goal LTG- PT  Outcome: Unable to achieve outcome(s) by discharge Date Met:  06/16/17 06/16/17 1341   Gait Training PT LTG   Gait Training Goal PT LTG, Date Goal Reviewed 06/16/17   Gait Training Goal PT LTG, Outcome goal partially met   Gait Training Goal PT LTG, Reason Goal Not Met discharged from facility

## 2017-06-16 NOTE — THERAPY DISCHARGE NOTE
Acute Care - Physical Therapy Treatment Note/Discharge  [facility]     Patient Name: Jihan Teresa  : 1933  MRN: [id number]  Today's Date: 2017  Onset of Illness/Injury or Date of Surgery Date: 06/10/17          Admit Date: 6/10/2017    Visit Dx:    ICD-10-CM ICD-9-CM   1. UTI (urinary tract infection), bacterial N39.0 599.0    A49.9 041.9   2. Generalized weakness R53.1 780.79   3. Confusion R41.0 298.9   4. B12 deficiency E53.8 266.2   5. Difficulty walking R26.2 719.7     Patient Active Problem List   Diagnosis   • Type 2 diabetes mellitus   • Aortic valve replaced, equine valve   • Cerebral infarction   • A-fib   • GERD without esophagitis   • Sleep apnea in adult   • Congestive heart failure   • Cardiomyopathy   • Cognitive dysfunction   • Hypothyroidism (acquired)   • History of recurrent UTIs   • Mild reactive airways disease   • Essential hypertension   • Pneumonia of both lungs due to infectious organism   • Pulmonary hypertension   • B12 deficiency       Physical Therapy Education     Title: PT OT SLP Therapies (Resolved)     Topic: Physical Therapy (Resolved)     Point: Mobility training (Resolved)    Learning Progress Summary    Learner Readiness Method Response Comment Documented by Status   Patient Acceptance E VU  JI 17 1340 Done    Acceptance E VU,NR   06/15/17 1336 Done    Acceptance E VU  AK 06/15/17 0529 Done    Acceptance E VU,NR   17 1035 Done   Family Acceptance E VU,NR   17 1035 Done               Point: Home exercise program (Resolved)    Learning Progress Summary    Learner Readiness Method Response Comment Documented by Status   Patient Acceptance E VU  JI 17 1340 Done    Acceptance E VU,NR   06/15/17 1336 Done    Acceptance E VU  AK 06/15/17 0529 Done    Acceptance E VU,NR   17 1035 Done   Family Acceptance E VU,NR   17 1035 Done               Point: Body mechanics (Resolved)    Learning Progress Summary    Learner  Readiness Method Response Comment Documented by Status   Patient Acceptance E VU  JI 06/16/17 1340 Done    Acceptance E VU,NR  JI 06/15/17 1336 Done    Acceptance E VU  AK 06/15/17 0529 Done    Acceptance E VU,NR  EF 06/14/17 1035 Done   Family Acceptance E VU,NR  EF 06/14/17 1035 Done               Point: Precautions (Resolved)    Learning Progress Summary    Learner Readiness Method Response Comment Documented by Status   Patient Acceptance E VU  JI 06/16/17 1340 Done    Acceptance E VU,NR  JI 06/15/17 1336 Done    Acceptance E VU  AK 06/15/17 0529 Done    Acceptance E VU,NR  EF 06/14/17 1035 Done   Family Acceptance E VU,NR  EF 06/14/17 1035 Done                      User Key     Initials Effective Dates Name Provider Type Discipline     04/24/15 -  Reyna Souza, PT Physical Therapist PT     04/24/15 -  Carl Le, PTA Physical Therapy Assistant PT    AK 03/17/17 -  Katelyn Shah, RN Registered Nurse Nurse                    IP PT Goals       06/16/17 1341 06/14/17 1035       Bed Mobility PT LTG    Bed Mobility PT LTG, Time to Achieve  2 wks  -EF     Bed Mobility PT LTG, Activity Type  all bed mobility  -EF     Bed Mobility PT LTG, [place] Level  conditional independence;supervision required  -EF     Bed Mobility PT LTG, Date Goal Reviewed 06/16/17  -JI      Bed Mobility PT LTG, Outcome goal partially met  -JI      Bed Mobility PT LTG, Reason Goal Not Met discharged from facility  -JI      Transfer Training PT LTG    Transfer Training PT LTG, Time to Achieve  2 wks  -EF     Transfer Training PT LTG, Activity Type  all transfers  -EF     Transfer Training PT LTG, [place] Level  contact guard assist  -EF     Transfer Training PT LTG, Assist Device  walker, rolling  -EF     Transfer Training PT  LTG, Date Goal Reviewed 06/16/17  -JI      Transfer Training PT LTG, Outcome goal partially met  -JI      Transfer Training PT LTG, Reason Goal Not Met discharged from facility  -JI      Gait Training PT LTG     Gait Training Goal PT LTG, Time to Achieve  2 wks  -EF     Gait Training Goal PT LTG, [place] Level  contact guard assist  -EF     Gait Training Goal PT LTG, Assist Device  walker, rolling  -EF     Gait Training Goal PT LTG, Distance to Achieve  60  -EF     Gait Training Goal PT LTG, Date Goal Reviewed 06/16/17  -JI      Gait Training Goal PT LTG, Outcome goal partially met  -JI      Gait Training Goal PT LTG, Reason Goal Not Met discharged from facility  -JI        User Key  (r) = Recorded By, (t) = Taken By, (c) = Cosigned By    Initials Name Provider Type    EF Reyna Souza, PT Physical Therapist    JI Le PTA Physical Therapy Assistant              Adult Rehabilitation Note       06/16/17 1320 06/15/17 1312       Rehab Assessment/Intervention    Discipline physical therapy assistant  -JI physical therapy assistant  -JI     Document Type therapy note (daily note)  -JI therapy note (daily note)  -JI     Subjective Information agree to therapy  -JI agree to therapy  -JI     Patient Effort, Rehab Treatment good  -JI good  -JI     Precautions/Limitations fall precautions  -JI fall precautions  -JI     Recorded by [JI] Carl Le PTA [JI] Carl Le PTA     Pain Assessment    Pain Assessment No/denies pain  -JI No/denies pain  -JI     Recorded by [JI] Carl Le PTA [JI] Carl Le PTA     Cognitive Assessment/Intervention    Current Cognitive/Communication Assessment functional  -JI functional  -JI     Orientation Status oriented x 4  -JI oriented x 4  -JI     Follows Commands/Answers Questions 100% of the time  -% of the time  -JI     Personal Safety WNL/WFL  -JI WNL/WFL  -JI     Personal Safety Interventions fall prevention program maintained;gait belt;nonskid shoes/slippers when out of bed  -JI fall prevention program maintained;gait belt;nonskid shoes/slippers when out of bed  -JI     Recorded by [JI] Carl Le PTA [JI] Carl Le PTA     Bed Mobility, Assessment/Treatment     Bed Mobility, Assistive Device bed rails;head of bed elevated  -JI bed rails;head of bed elevated  -JI     Bed Mob, Supine to Sit, [place] minimum assist (75% patient effort)  -JI minimum assist (75% patient effort)  -JI     Bed Mob, Sit to Supine, [place] minimum assist (75% patient effort);verbal cues required;nonverbal cues required (demo/gesture)  -JI minimum assist (75% patient effort);verbal cues required;nonverbal cues required (demo/gesture)  -JI     Bed Mobility, Safety Issues decreased use of arms for pushing/pulling;decreased use of legs for bridging/pushing  -JI decreased use of arms for pushing/pulling;decreased use of legs for bridging/pushing  -JI     Bed Mobility, Impairments strength decreased  -JI strength decreased  -JI     Recorded by [JI] Carl Le PTA [JI] Carl Le PTA     Transfer Assessment/Treatment    Transfers, Sit-Stand [place] minimum assist (75% patient effort);verbal cues required;nonverbal cues required (demo/gesture)  -JI minimum assist (75% patient effort);verbal cues required;nonverbal cues required (demo/gesture)  -JI     Transfers, Stand-Sit [place] minimum assist (75% patient effort);verbal cues required;nonverbal cues required (demo/gesture)  -JI minimum assist (75% patient effort);verbal cues required;nonverbal cues required (demo/gesture)  -JI     Transfers, Sit-Stand-Sit, Assist Device rolling walker  -JI rolling walker  -JI     Toilet Transfer, [place]  minimum assist (75% patient effort);verbal cues required;nonverbal cues required (demo/gesture)  -JI     Toilet Transfer, Assistive Device  rolling walker  -JI     Transfer, Safety Issues step length decreased;weight-shifting ability decreased  -JI step length decreased;weight-shifting ability decreased  -JI     Transfer, Impairments strength decreased;impaired balance  -JI strength decreased;impaired balance  -JI     Recorded by [JI] Carl Le PTA [JI] Carl Le PTA     Gait  Assessment/Treatment    Gait, [place] Level contact guard assist;minimum assist (75% patient effort);2 person assist required;verbal cues required;nonverbal cues required (demo/gesture)  -JI contact guard assist;minimum assist (75% patient effort);2 person assist required;verbal cues required;nonverbal cues required (demo/gesture)  -JI     Gait, Assistive Device rolling walker  -JI rolling walker  -JI     Gait, Distance (Feet) 75  -JI 45  -JI     Gait, Gait Deviations laxmi decreased;decreased heel strike;forward flexed posture;limb motion velocity decreased;step length decreased;toe-to-floor clearance decreased  -JI laxmi decreased;decreased heel strike;forward flexed posture;limb motion velocity decreased;step length decreased;toe-to-floor clearance decreased  -JI     Gait, Safety Issues step length decreased;balance decreased during turns  -JI step length decreased;balance decreased during turns  -JI     Gait, Impairments strength decreased;impaired balance  -JI strength decreased;impaired balance  -JI     Gait, Comment  Cued for improved step length/height as well as improved upright posture.   -JI     Recorded by [JI] Carl Le PTA [JI] Carl Le PTA     Therapy Exercises    Bilateral Lower Extremities AROM:;10 reps;ankle pumps/circles;LAQ  -JI AROM:;10 reps;ankle pumps/circles;LAQ  -JI     Recorded by [JI] Carl Le PTA [JI] Carl Le PTA     Positioning and Restraints    Pre-Treatment Position in bed  -JI in bed  -JI     Post Treatment Position chair  -JI bed  -JI     In Bed  supine;call light within reach;encouraged to call for assist;with family/caregiver;exit alarm on;with nsg   w/ RN Judith  -JI     In Chair reclined;call light within reach;encouraged to call for assist;with family/caregiver;notified nsg  -JI      Recorded by [JI] Carl Le PTA [JI] Carl Le PTA       User Key  (r) = Recorded By, (t) = Taken By, (c) = Cosigned By    Initials Name Effective Dates    JI Henry  REJI Le 04/24/15 -           PT Recommendation and Plan  Anticipated Discharge Disposition: skilled nursing facility, home with home health  Planned Therapy Interventions: bed mobility training, gait training, home exercise program, transfer training  PT Frequency: daily  Plan of Care Review  Plan Of Care Reviewed With: patient  Outcome Summary/Follow up Plan: Pt with improved upright stability and ambulation endurance this date, but requires additional skilled PT prior to returning home.  Pt demo's decreased strength and activity endurance, requires occasional cuing for improved safety awareness and sequencing w/ RWX.           Outcome Measures       06/16/17 1300 06/15/17 1300 06/14/17 1000    How much help from another person do you currently need...    Turning from your back to your side while in flat bed without using bedrails? 3  -JI 3  -JI 3  -EF    Moving from lying on back to sitting on the side of a flat bed without bedrails? 3  -JI 3  -JI 3  -EF    Moving to and from a bed to a chair (including a wheelchair)? 3  -JI 3  -JI 2  -EF    Standing up from a chair using your arms (e.g., wheelchair, bedside chair)? 3  -JI 3  -JI 2  -EF    Climbing 3-5 steps with a railing? 1  -JI 1  -JI 1  -EF    To walk in hospital room? 3  -JI 3  -JI 2  -EF    AM-PAC 6 Clicks Score 16  -JI 16  -JI 13  -EF    Functional Assessment    Outcome Measure Options AM-PAC 6 Clicks Basic Mobility (PT)  -JI AM-PAC 6 Clicks Basic Mobility (PT)  -JI AM-PAC 6 Clicks Basic Mobility (PT)  -EF      User Key  (r) = Recorded By, (t) = Taken By, (c) = Cosigned By    Initials Name Provider Type    EF Reyna Souza, PT Physical Therapist    JI Le, REJI Physical Therapy Assistant           Time Calculation:         PT Charges       06/16/17 1339          Time Calculation    Start Time 1320  -JI      Stop Time 1338  -JI      Time Calculation (min) 18 min  -JI      PT Received On 06/16/17  -JI        User Key  (r) = Recorded By, (t) =  Taken By, (c) = Cosigned By    Initials Name Provider Type    JI Carl Le PTA Physical Therapy Assistant          Therapy Charges for Today     Code Description Service Date Service Provider Modifiers Qty    00609036942 HC PT THER PROC EA 15 MIN 6/15/2017 Carl Le PTA GP 1    73445591338 HC PT THER SUPP EA 15 MIN 6/15/2017 Carl Le PTA GP 1    01812353296 HC PT THER PROC EA 15 MIN 6/16/2017 Carl Le PTA GP 1    16629415181 HC PT THER SUPP EA 15 MIN 6/16/2017 Carl Le PTA GP 1          PT G-Codes  Outcome Measure Options: AM-PAC 6 Clicks Basic Mobility (PT)    PT Discharge Summary  Reason for Discharge: Discharge from facility  Outcomes Achieved: Patient able to partially acheive established goals  Discharge Destination: SNF    Carl Le PTA  6/16/2017

## 2017-06-16 NOTE — PLAN OF CARE
Problem: Patient Care Overview (Adult)  Goal: Plan of Care Review    06/16/17 0435   Outcome Evaluation   Outcome Summary/Follow up Plan Patient plans to go home soon. Working with PT with some improvement. Patient will most likely need further PT to gain addditional strenght          Problem: Fall Risk (Adult)  Goal: Absence of Falls  Outcome: Ongoing (interventions implemented as appropriate)    06/16/17 0435   Fall Risk (Adult)   Absence of Falls making progress toward outcome         Problem: Infection, Risk/Actual (Adult)  Goal: Infection Prevention/Resolution  Outcome: Ongoing (interventions implemented as appropriate)    06/16/17 0435   Infection, Risk/Actual (Adult)   Infection Prevention/Resolution making progress toward outcome

## 2017-07-03 ENCOUNTER — TELEPHONE (OUTPATIENT)
Dept: CARDIOLOGY | Facility: CLINIC | Age: 82
End: 2017-07-03

## 2017-07-03 NOTE — TELEPHONE ENCOUNTER
This is a Dr. Fiore pt.  He is out all week.  I appreciate you addressing this msg.    Dione, pt's daughter, called and said her mother was admitted to [facility] on June 10th and during her stay Dr. Fiore was consulted & ordered an echo.  She is now in [facility] in rehab and they are telling her one thing about her results and they were told something very different in the hospital.  They are very upset by this and would like some clarification today if possible.   I spoke with her and told her Dr. Fiore was not available this week but I would send this msg to the on call doctor.     Dione can be reached @ [phone]      Thanks,  Adali

## 2017-07-10 ENCOUNTER — PATIENT OUTREACH (OUTPATIENT)
Dept: CASE MANAGEMENT | Facility: OTHER | Age: 82
End: 2017-07-10

## 2017-07-11 ENCOUNTER — ANTICOAGULATION VISIT (OUTPATIENT)
Dept: INTERNAL MEDICINE | Facility: CLINIC | Age: 82
End: 2017-07-11

## 2017-07-11 DIAGNOSIS — Z79.01 LONG TERM (CURRENT) USE OF ANTICOAGULANTS: ICD-10-CM

## 2017-07-11 DIAGNOSIS — I48.91 ATRIAL FIBRILLATION, UNSPECIFIED TYPE (HCC): Primary | ICD-10-CM

## 2017-07-11 LAB — INR PPP: 4.3

## 2017-07-11 PROCEDURE — 85610 PROTHROMBIN TIME: CPT | Performed by: INTERNAL MEDICINE

## 2017-07-11 PROCEDURE — 36416 COLLJ CAPILLARY BLOOD SPEC: CPT | Performed by: INTERNAL MEDICINE

## 2017-07-14 ENCOUNTER — TELEPHONE (OUTPATIENT)
Dept: INTERNAL MEDICINE | Facility: CLINIC | Age: 82
End: 2017-07-14

## 2017-07-14 ENCOUNTER — OFFICE VISIT (OUTPATIENT)
Dept: INTERNAL MEDICINE | Facility: CLINIC | Age: 82
End: 2017-07-14

## 2017-07-14 VITALS
BODY MASS INDEX: 30.23 KG/M2 | DIASTOLIC BLOOD PRESSURE: 50 MMHG | SYSTOLIC BLOOD PRESSURE: 90 MMHG | HEART RATE: 72 BPM | HEIGHT: 60 IN | WEIGHT: 154 LBS

## 2017-07-14 DIAGNOSIS — I42.9 CARDIOMYOPATHY (HCC): ICD-10-CM

## 2017-07-14 DIAGNOSIS — I48.91 ATRIAL FIBRILLATION, UNSPECIFIED TYPE (HCC): Primary | ICD-10-CM

## 2017-07-14 DIAGNOSIS — J45.909 MILD REACTIVE AIRWAYS DISEASE: ICD-10-CM

## 2017-07-14 DIAGNOSIS — E11.9 TYPE 2 DIABETES MELLITUS WITHOUT COMPLICATION, WITHOUT LONG-TERM CURRENT USE OF INSULIN (HCC): ICD-10-CM

## 2017-07-14 DIAGNOSIS — Z74.09 MOBILITY IMPAIRED: ICD-10-CM

## 2017-07-14 DIAGNOSIS — I27.20 PULMONARY HYPERTENSION (HCC): ICD-10-CM

## 2017-07-14 DIAGNOSIS — I10 ESSENTIAL HYPERTENSION: ICD-10-CM

## 2017-07-14 DIAGNOSIS — Z87.440 HISTORY OF RECURRENT UTIS: ICD-10-CM

## 2017-07-14 DIAGNOSIS — I63.10 CEREBRAL INFARCTION DUE TO EMBOLISM OF PRECEREBRAL ARTERY (HCC): ICD-10-CM

## 2017-07-14 DIAGNOSIS — I34.0 MITRAL VALVE INSUFFICIENCY, UNSPECIFIED ETIOLOGY: Primary | ICD-10-CM

## 2017-07-14 DIAGNOSIS — F09 COGNITIVE DYSFUNCTION: ICD-10-CM

## 2017-07-14 DIAGNOSIS — I50.42 CHRONIC COMBINED SYSTOLIC AND DIASTOLIC CONGESTIVE HEART FAILURE (HCC): ICD-10-CM

## 2017-07-14 DIAGNOSIS — E03.9 HYPOTHYROIDISM (ACQUIRED): ICD-10-CM

## 2017-07-14 LAB
ALBUMIN SERPL-MCNC: 3.4 G/DL (ref 3.4–4.6)
ALBUMIN/GLOB SERPL: 1.3 G/DL
ALP SERPL-CCNC: 71 U/L (ref 46–116)
ALT SERPL W P-5'-P-CCNC: 39 U/L (ref 14–59)
ANION GAP SERPL CALCULATED.3IONS-SCNC: 9 MMOL/L
AST SERPL-CCNC: 19 U/L (ref 7–37)
BASOPHILS # BLD AUTO: 0.02 10*3/MM3 (ref 0–0.2)
BASOPHILS NFR BLD AUTO: 0.2 % (ref 0–2)
BILIRUB SERPL-MCNC: 0.7 MG/DL (ref 0.2–1)
BUN BLD-MCNC: 16 MG/DL (ref 6–22)
BUN/CREAT SERPL: 13.7 (ref 7–25)
CALCIUM SPEC-SCNC: 9.4 MG/DL (ref 8.6–10.5)
CHLORIDE SERPL-SCNC: 102 MMOL/L (ref 95–107)
CO2 SERPL-SCNC: 28 MMOL/L (ref 23–32)
CREAT BLD-MCNC: 1.17 MG/DL (ref 0.55–1.02)
DEPRECATED RDW RBC AUTO: 56.6 FL (ref 37–54)
EOSINOPHIL # BLD AUTO: 0.18 10*3/MM3 (ref 0–0.7)
EOSINOPHIL NFR BLD AUTO: 2.2 % (ref 0–5)
ERYTHROCYTE [DISTWIDTH] IN BLOOD BY AUTOMATED COUNT: 17.7 % (ref 11.5–15)
GFR SERPL CREATININE-BSD FRML MDRD: 44 ML/MIN/1.73
GLOBULIN UR ELPH-MCNC: 2.7 GM/DL
GLUCOSE BLD-MCNC: 199 MG/DL (ref 70–100)
HCT VFR BLD AUTO: 34.5 % (ref 34.1–44.9)
HGB BLD-MCNC: 11 G/DL (ref 11.2–15.7)
INR PPP: 1.7
LYMPHOCYTES # BLD AUTO: 1.72 10*3/MM3 (ref 0.8–7)
LYMPHOCYTES NFR BLD AUTO: 21 % (ref 10–60)
MCH RBC QN AUTO: 28.7 PG (ref 26–34)
MCHC RBC AUTO-ENTMCNC: 31.9 G/DL (ref 31–37)
MCV RBC AUTO: 90.1 FL (ref 80–100)
MONOCYTES # BLD AUTO: 0.54 10*3/MM3 (ref 0–1)
MONOCYTES NFR BLD AUTO: 6.6 % (ref 0–13)
NEUTROPHILS # BLD AUTO: 5.72 10*3/MM3 (ref 1–11)
NEUTROPHILS NFR BLD AUTO: 70 % (ref 30–85)
PLATELET # BLD AUTO: 258 10*3/MM3 (ref 150–450)
PMV BLD AUTO: 9.7 FL (ref 6–12)
POTASSIUM BLD-SCNC: 3.9 MMOL/L (ref 3.3–5.3)
PROT SERPL-MCNC: 6.1 G/DL (ref 6.3–8.4)
RBC # BLD AUTO: 3.83 10*6/MM3 (ref 3.93–5.22)
SODIUM BLD-SCNC: 139 MMOL/L (ref 136–145)
TSH SERPL DL<=0.05 MIU/L-ACNC: 1.88 MIU/ML (ref 0.4–4.2)
WBC NRBC COR # BLD: 8.18 10*3/MM3 (ref 5–10)

## 2017-07-14 PROCEDURE — 99215 OFFICE O/P EST HI 40 MIN: CPT | Performed by: INTERNAL MEDICINE

## 2017-07-14 PROCEDURE — 84443 ASSAY THYROID STIM HORMONE: CPT | Performed by: INTERNAL MEDICINE

## 2017-07-14 PROCEDURE — 80053 COMPREHEN METABOLIC PANEL: CPT | Performed by: INTERNAL MEDICINE

## 2017-07-14 PROCEDURE — 36416 COLLJ CAPILLARY BLOOD SPEC: CPT | Performed by: INTERNAL MEDICINE

## 2017-07-14 PROCEDURE — 85610 PROTHROMBIN TIME: CPT | Performed by: INTERNAL MEDICINE

## 2017-07-14 PROCEDURE — 85025 COMPLETE CBC W/AUTO DIFF WBC: CPT | Performed by: INTERNAL MEDICINE

## 2017-07-14 RX ORDER — LISINOPRIL 20 MG/1
20 TABLET ORAL DAILY
Qty: 90 TABLET | Refills: 3 | OUTPATIENT
Start: 2017-07-14 | End: 2018-01-29 | Stop reason: SDUPTHER

## 2017-07-14 RX ORDER — POTASSIUM CHLORIDE 1500 MG/1
40 TABLET, FILM COATED, EXTENDED RELEASE ORAL 4 TIMES DAILY
Qty: 240 TABLET | Refills: 0 | OUTPATIENT
Start: 2017-07-14 | End: 2017-08-03 | Stop reason: SDUPTHER

## 2017-07-14 RX ORDER — METOPROLOL TARTRATE 50 MG/1
50 TABLET, FILM COATED ORAL 2 TIMES DAILY
Qty: 180 TABLET | Refills: 3 | OUTPATIENT
Start: 2017-07-14 | End: 2017-08-03 | Stop reason: SDUPTHER

## 2017-07-14 RX ORDER — FUROSEMIDE 40 MG/1
60 TABLET ORAL 2 TIMES DAILY
Qty: 135 TABLET | Refills: 3 | OUTPATIENT
Start: 2017-07-14 | End: 2018-03-13 | Stop reason: DRUGHIGH

## 2017-07-14 NOTE — TELEPHONE ENCOUNTER
She does have significant mitral regurgitation.  It is not an emergent danger but it does need to be addressed in the near future.  She may require valvuloplasty of the mitral valve.  I would suggest we set up an appointment for her to see Dr. Parada her thoracic surgeon.

## 2017-07-14 NOTE — TELEPHONE ENCOUNTER
Tried calling Bharti. She did not answer and her voice mail box was full.  I was NOT able to leave a message.

## 2017-07-14 NOTE — PROGRESS NOTES
Subjective   Jihan Teresa is a 83 y.o. female.     History of Present Illness she is here today for her yearly follow-up examination which includes hypertension, cardiomyopathy, severe pulmonary hypertension, cognitive dysfunction, and recurrent urinary tract infection.  She was hospitalized in mid June for urinary tract infection.  Subsequently she was transferred to rehabilitation and she has been home approximately one week.  She actually has done well.  Her appetite is good.  She is using a walker to get about at home he has not had any falls.  Her medications were modified somewhat at rehabilitation.  She denies any dizziness or focal neurologic symptoms.  She has not been having any dyspnea on exertion or PND.  There has been no swelling in the ankles.  She denies any chest pain.  She has not been having any abdominal pain, melanotic stool, or rectal bleeding.  Occasionally she will have some wheezing but nothing frequent prolonged or severe.    Metformin was discontinued at rehabilitation.  Generally she is on Lasix 60 mg twice a day and metoprolol 50 mg twice a day.  She is on lisinopril 20 mg by mouth daily and KCl 40 mEq by mouth daily.  Early this week her PT/INR was 4.1 and her Coumadin was held for 2 days.  She took 1.5 mg last night.        Review of Systems   Constitutional: Positive for activity change and appetite change. Negative for chills, diaphoresis, fatigue and fever.   HENT: Negative for trouble swallowing.    Respiratory: Positive for wheezing. Negative for cough, chest tightness and shortness of breath.    Cardiovascular: Negative for chest pain, palpitations and leg swelling.   Gastrointestinal: Negative for abdominal pain, anal bleeding, blood in stool, constipation, diarrhea, nausea and vomiting.   Genitourinary: Negative for dysuria, flank pain, frequency and hematuria.   Musculoskeletal: Positive for gait problem. Negative for arthralgias and back pain.   Neurological: Negative  for dizziness, syncope, facial asymmetry, speech difficulty, numbness and headaches.   Psychiatric/Behavioral: Negative for dysphoric mood. The patient is not nervous/anxious.        Objective   Physical Exam   Constitutional: She is oriented to person, place, and time. Vital signs are normal. She appears well-developed and well-nourished. She is active. She does not appear ill.   Eyes: Conjunctivae are normal.   Fundoscopic exam:       The right eye shows no AV nicking, no exudate and no hemorrhage.        The left eye shows no AV nicking, no exudate and no hemorrhage.   Neck: Carotid bruit is not present. No thyroid mass and no thyromegaly present.   Cardiovascular: Normal rate, regular rhythm, S1 normal and S2 normal.  Exam reveals no S3 and no S4.    Murmur heard.   Systolic murmur is present with a grade of 4/6   Pulses:       Posterior tibial pulses are 2+ on the right side, and 2+ on the left side.   As a grade 4/6 systolic blow heard best at the left lower sternal border radiating to the base and toward the apex   Pulmonary/Chest: No tachypnea. No respiratory distress. She has no decreased breath sounds. She has no wheezes. She has no rhonchi. She has no rales.   Abdominal: Soft. Normal appearance and bowel sounds are normal. She exhibits no abdominal bruit and no mass. There is no hepatosplenomegaly. There is no tenderness.       Vascular Status -  Her exam exhibits no right foot edema. Her exam exhibits no left foot edema.  Neurological: She is alert and oriented to person, place, and time.   Reflex Scores:       Bicep reflexes are 2+ on the right side.  Psychiatric: She has a normal mood and affect. Her speech is normal and behavior is normal. Judgment and thought content normal. Cognition and memory are normal.       Assessment/Plan in June MRI of the head showed old infarcts only.  The MRA showed moderate right posterior cerebral artery and bilateral anterior cerebral artery stenosis.  The carotids did  not evidence any significant stenosis.  Echocardiogram showed an ejection fraction of 47%.  There was severe MR and severe TR.  Pulmonary hypertension was present with right ventricular pressure 66 mmHg.  Blood pressure today by me is 118/76    Assessment #1 severe pulmonary hypertension-controlled at present #2 asthma-mild and not really a factor for her #3 cerebral vascular disease-without recurrent symptoms #4 chronic atrial fibrillation-rate controlled and she is on anticoagulative therapy #5 cognitive dysfunction-mild to moderate severity and stable without medication #6 severe MR-at present she is asymptomatic but at some point she may require valvuloplasty #7diabetes mellitus-question control of metformin    Plan #1 no change medication #2 physical therapy as an outpatient #3 CBC, CMP, PT INR, hemoglobin A1c today.  Routine follow-up with me in 4 months.  40 minutes was spent in this visit.

## 2017-07-14 NOTE — TELEPHONE ENCOUNTER
"----- Message from Jihan Henriquez sent at 2017  3:06 PM EDT -----  Pt's daughter stated she could not talk to Dr. Jcakman at today's . Pt's daughter stated Dr. Melendez that sees her mother at the [facility] stated when pt was in the hospital her Echo (done on 17) showed the tricuspid valve and the mitral valve showed a lot back flow. Pt's daughter stated that Dr. Melendez stated that pt's health was greatly declining and would be surprised if pt was  within six months. Pt's daughter stated that when she inpatient at , the cardiologist stated the Echo was \"normal.\" Pt's daughter tried to get an appt with her cadiologist, but he is out of the office until 17. Pt's family would like to discuss this with Dr. Jackman without the pt. Please advise    Bharti (pt's daughter) # [phone]  "

## 2017-07-17 ENCOUNTER — PATIENT OUTREACH (OUTPATIENT)
Dept: CASE MANAGEMENT | Facility: OTHER | Age: 82
End: 2017-07-17

## 2017-07-24 ENCOUNTER — PATIENT OUTREACH (OUTPATIENT)
Dept: CASE MANAGEMENT | Facility: OTHER | Age: 82
End: 2017-07-24

## 2017-07-31 ENCOUNTER — ANTICOAGULATION VISIT (OUTPATIENT)
Dept: INTERNAL MEDICINE | Facility: CLINIC | Age: 82
End: 2017-07-31

## 2017-07-31 ENCOUNTER — TELEPHONE (OUTPATIENT)
Dept: INTERNAL MEDICINE | Facility: CLINIC | Age: 82
End: 2017-07-31

## 2017-07-31 DIAGNOSIS — Z79.01 LONG TERM (CURRENT) USE OF ANTICOAGULANTS: ICD-10-CM

## 2017-07-31 DIAGNOSIS — I48.91 ATRIAL FIBRILLATION, UNSPECIFIED TYPE (HCC): Primary | ICD-10-CM

## 2017-07-31 LAB — INR PPP: 2.1

## 2017-07-31 PROCEDURE — 85610 PROTHROMBIN TIME: CPT | Performed by: INTERNAL MEDICINE

## 2017-07-31 PROCEDURE — 36416 COLLJ CAPILLARY BLOOD SPEC: CPT | Performed by: INTERNAL MEDICINE

## 2017-07-31 RX ORDER — LEVOTHYROXINE SODIUM 0.15 MG/1
TABLET ORAL
Qty: 90 TABLET | Refills: 2 | Status: SHIPPED | OUTPATIENT
Start: 2017-07-31 | End: 2017-08-03 | Stop reason: ALTCHOICE

## 2017-07-31 NOTE — TELEPHONE ENCOUNTER
Left message that patient could call and schedule a lab appointment for anytime.  She does need a protime.

## 2017-07-31 NOTE — TELEPHONE ENCOUNTER
----- Message from Gayle Valdovinos sent at 7/31/2017 10:47 AM EDT -----  Contact: Pt Daughter jennifer  Pt would like to get her protime done possibly today, She said she thought they were scheduled to have one 7/28/17 last Friday however there wasn't an appointment in the chart or an order.  Last protime in chart was 7/14/17.  Please advise.    [phone]

## 2017-08-03 ENCOUNTER — OFFICE VISIT (OUTPATIENT)
Dept: CARDIAC SURGERY | Facility: CLINIC | Age: 82
End: 2017-08-03

## 2017-08-03 VITALS
HEIGHT: 60 IN | OXYGEN SATURATION: 94 % | DIASTOLIC BLOOD PRESSURE: 84 MMHG | BODY MASS INDEX: 30.63 KG/M2 | HEART RATE: 64 BPM | TEMPERATURE: 97.7 F | SYSTOLIC BLOOD PRESSURE: 120 MMHG | RESPIRATION RATE: 20 BRPM | WEIGHT: 156 LBS

## 2017-08-03 DIAGNOSIS — Z95.2 AORTIC VALVE REPLACED: Primary | ICD-10-CM

## 2017-08-03 PROCEDURE — 99213 OFFICE O/P EST LOW 20 MIN: CPT | Performed by: THORACIC SURGERY (CARDIOTHORACIC VASCULAR SURGERY)

## 2017-08-03 RX ORDER — ALBUTEROL SULFATE 90 UG/1
2 AEROSOL, METERED RESPIRATORY (INHALATION) AS NEEDED
COMMUNITY
Start: 2015-12-31 | End: 2020-01-01

## 2017-08-03 RX ORDER — ATORVASTATIN CALCIUM 80 MG/1
40 TABLET, FILM COATED ORAL
COMMUNITY
End: 2017-08-03

## 2017-08-03 RX ORDER — KETOCONAZOLE 20 MG/ML
SHAMPOO TOPICAL
Refills: 5 | COMMUNITY
Start: 2017-05-03 | End: 2020-01-01 | Stop reason: HOSPADM

## 2017-08-03 RX ORDER — FUROSEMIDE 40 MG/1
TABLET ORAL
COMMUNITY
End: 2017-08-03

## 2017-08-03 RX ORDER — ASPIRIN 325 MG
325 TABLET ORAL
COMMUNITY
End: 2017-08-03

## 2017-08-03 RX ORDER — CYANOCOBALAMIN 1000 UG/ML
1000 INJECTION, SOLUTION INTRAMUSCULAR; SUBCUTANEOUS
COMMUNITY
Start: 2013-07-25 | End: 2017-08-03

## 2017-08-03 RX ORDER — BUDESONIDE AND FORMOTEROL FUMARATE DIHYDRATE 160; 4.5 UG/1; UG/1
AEROSOL RESPIRATORY (INHALATION)
COMMUNITY
Start: 2015-10-29 | End: 2017-08-03

## 2017-08-03 RX ORDER — METOPROLOL TARTRATE 50 MG/1
50 TABLET, FILM COATED ORAL 2 TIMES DAILY
COMMUNITY
End: 2018-02-17 | Stop reason: SDUPTHER

## 2017-08-03 RX ORDER — AMOXICILLIN 500 MG/1
2000 CAPSULE ORAL
COMMUNITY
Start: 2014-05-09 | End: 2017-08-03

## 2017-08-03 RX ORDER — POTASSIUM CHLORIDE 750 MG/1
20 CAPSULE, EXTENDED RELEASE ORAL
COMMUNITY
End: 2018-07-17 | Stop reason: SDUPTHER

## 2017-08-03 RX ORDER — ALBUTEROL SULFATE 90 UG/1
2 AEROSOL, METERED RESPIRATORY (INHALATION)
COMMUNITY
End: 2017-08-03

## 2017-08-03 RX ORDER — ACETAMINOPHEN 500 MG
TABLET ORAL AS NEEDED
COMMUNITY
End: 2020-01-01 | Stop reason: HOSPADM

## 2017-08-03 RX ORDER — MULTIVITAMIN
1 TABLET ORAL
COMMUNITY
End: 2017-08-03 | Stop reason: SDUPTHER

## 2017-08-03 RX ORDER — WARFARIN SODIUM 3 MG/1
TABLET ORAL
COMMUNITY
Start: 2014-11-28 | End: 2018-12-11

## 2017-08-03 RX ORDER — CLOBETASOL PROPIONATE 0.5 MG/G
AEROSOL, FOAM TOPICAL
Refills: 1 | COMMUNITY
Start: 2017-05-03 | End: 2020-01-01 | Stop reason: HOSPADM

## 2017-08-03 RX ORDER — LISINOPRIL 20 MG/1
40 TABLET ORAL
COMMUNITY
Start: 2015-08-03 | End: 2017-08-03

## 2017-08-03 RX ORDER — OMEPRAZOLE 20 MG/1
20 TABLET, DELAYED RELEASE ORAL
COMMUNITY
End: 2017-08-03

## 2017-08-03 RX ORDER — LEVOTHYROXINE SODIUM 0.15 MG/1
150 TABLET ORAL
COMMUNITY
Start: 2015-02-20 | End: 2017-08-03

## 2017-08-03 RX ORDER — ISOSORBIDE MONONITRATE 60 MG/1
60 TABLET, EXTENDED RELEASE ORAL
COMMUNITY
End: 2017-08-03

## 2017-08-03 NOTE — PROGRESS NOTES
"CARDIOVASCULAR SURGERY FOLLOW-UP PROGRESS NOTE  Chief Complaint: Here for follow-up after aortic valve replacement 8 years ago, mitral incompetence tricuspid incompetence.        HPI:   Dear Dr. Gustavo Jackman Jr., MD and colleagues:    It was nice to see Jihan Teresa in follow up 8 years  after surgery.  As you know, she is a 83 y.o. female with aortic stenosis who underwent aortic valve replacement on 11/4/2009. She did well postoperatively and continues to do well.  She was recently in the hospital in June and had an echocardiogram that showed severe mitral incompetence and tricuspid incompetence but she was quite ill at the time.  She comes in today complaining of nothing.  Her activity level has been fair.   She is followed closely by Dr. Barton and is to see him soon.  She is completely asymptomatic now and with her age and frailty I would not recommend any intervention.    Physical Exam:         /84 (BP Location: Right arm, Patient Position: Sitting, Cuff Size: Adult)  Pulse 64  Temp 97.7 °F (36.5 °C) (Oral)   Resp 20  Ht 60\" (152.4 cm)  Wt 156 lb (70.8 kg)  SpO2 94%  BMI 30.47 kg/m2  Heart:  regular rate and rhythm, S1, S2 normal, unchanged 2/6 systolic ejection murmur, click, rub or gallop  Lungs:  clear to auscultation bilaterally  Extremities:  no edema  Incision(s):  sternum stable    Assessment/Plan:     S/P AVR. Overall, she is doing well.I don't see any indication to have surgery and I don't think she would survive at any rate and if recommended medical treatment.    I would recommend continued medical management    Follow-up as scheduled with cardiology  Follow-up as scheduled with PCP  Return to clinic in 1 year(s) with no new studies    No restrictions of activity.      Thank you for allowing me to participate in the care of your   patient.  Regards,  Jerry Colmenares MD    "

## 2017-08-21 ENCOUNTER — EPISODE CHANGES (OUTPATIENT)
Dept: CASE MANAGEMENT | Facility: OTHER | Age: 82
End: 2017-08-21

## 2017-09-07 ENCOUNTER — CLINICAL SUPPORT (OUTPATIENT)
Dept: INTERNAL MEDICINE | Facility: CLINIC | Age: 82
End: 2017-09-07

## 2017-09-07 ENCOUNTER — ANTICOAGULATION VISIT (OUTPATIENT)
Dept: INTERNAL MEDICINE | Facility: CLINIC | Age: 82
End: 2017-09-07

## 2017-09-07 DIAGNOSIS — E53.8 B12 DEFICIENCY: Primary | ICD-10-CM

## 2017-09-07 DIAGNOSIS — Z79.01 LONG TERM (CURRENT) USE OF ANTICOAGULANTS: ICD-10-CM

## 2017-09-07 DIAGNOSIS — I48.91 ATRIAL FIBRILLATION, UNSPECIFIED TYPE (HCC): Primary | ICD-10-CM

## 2017-09-07 LAB — INR PPP: 4.2

## 2017-09-07 PROCEDURE — 96372 THER/PROPH/DIAG INJ SC/IM: CPT | Performed by: INTERNAL MEDICINE

## 2017-09-07 PROCEDURE — 36416 COLLJ CAPILLARY BLOOD SPEC: CPT | Performed by: INTERNAL MEDICINE

## 2017-09-07 PROCEDURE — 85610 PROTHROMBIN TIME: CPT | Performed by: INTERNAL MEDICINE

## 2017-09-07 RX ORDER — CYANOCOBALAMIN 1000 UG/ML
1000 INJECTION, SOLUTION INTRAMUSCULAR; SUBCUTANEOUS
Status: DISCONTINUED | OUTPATIENT
Start: 2017-09-07 | End: 2020-01-01 | Stop reason: HOSPADM

## 2017-09-07 RX ADMIN — CYANOCOBALAMIN 1000 MCG: 1000 INJECTION, SOLUTION INTRAMUSCULAR; SUBCUTANEOUS at 12:15

## 2017-09-18 ENCOUNTER — EPISODE CHANGES (OUTPATIENT)
Dept: CASE MANAGEMENT | Facility: OTHER | Age: 82
End: 2017-09-18

## 2017-09-28 ENCOUNTER — ANTICOAGULATION VISIT (OUTPATIENT)
Dept: INTERNAL MEDICINE | Facility: CLINIC | Age: 82
End: 2017-09-28

## 2017-09-28 DIAGNOSIS — Z79.01 LONG TERM (CURRENT) USE OF ANTICOAGULANTS: ICD-10-CM

## 2017-09-28 DIAGNOSIS — I48.91 ATRIAL FIBRILLATION, UNSPECIFIED TYPE (HCC): Primary | ICD-10-CM

## 2017-09-28 LAB — INR PPP: 2.2

## 2017-09-28 PROCEDURE — 36416 COLLJ CAPILLARY BLOOD SPEC: CPT | Performed by: INTERNAL MEDICINE

## 2017-09-28 PROCEDURE — 85610 PROTHROMBIN TIME: CPT | Performed by: INTERNAL MEDICINE

## 2017-10-05 RX ORDER — FUROSEMIDE 40 MG/1
TABLET ORAL
Qty: 180 TABLET | Refills: 1 | Status: SHIPPED | OUTPATIENT
Start: 2017-10-05 | End: 2017-11-14

## 2017-11-02 ENCOUNTER — ANTICOAGULATION VISIT (OUTPATIENT)
Dept: INTERNAL MEDICINE | Facility: CLINIC | Age: 82
End: 2017-11-02

## 2017-11-02 ENCOUNTER — CLINICAL SUPPORT (OUTPATIENT)
Dept: INTERNAL MEDICINE | Facility: CLINIC | Age: 82
End: 2017-11-02

## 2017-11-02 DIAGNOSIS — Z23 NEED FOR IMMUNIZATION AGAINST INFLUENZA: Primary | ICD-10-CM

## 2017-11-02 DIAGNOSIS — Z79.01 LONG TERM CURRENT USE OF ANTICOAGULANT: ICD-10-CM

## 2017-11-02 DIAGNOSIS — I48.91 ATRIAL FIBRILLATION, UNSPECIFIED TYPE (HCC): Primary | ICD-10-CM

## 2017-11-02 DIAGNOSIS — E53.8 B12 DEFICIENCY: ICD-10-CM

## 2017-11-02 LAB — INR PPP: 2

## 2017-11-02 PROCEDURE — 96372 THER/PROPH/DIAG INJ SC/IM: CPT | Performed by: INTERNAL MEDICINE

## 2017-11-02 PROCEDURE — G0008 ADMIN INFLUENZA VIRUS VAC: HCPCS | Performed by: INTERNAL MEDICINE

## 2017-11-02 PROCEDURE — 85610 PROTHROMBIN TIME: CPT | Performed by: INTERNAL MEDICINE

## 2017-11-02 PROCEDURE — 36416 COLLJ CAPILLARY BLOOD SPEC: CPT | Performed by: INTERNAL MEDICINE

## 2017-11-02 RX ORDER — CYANOCOBALAMIN 1000 UG/ML
1000 INJECTION, SOLUTION INTRAMUSCULAR; SUBCUTANEOUS
Status: DISCONTINUED | OUTPATIENT
Start: 2017-11-02 | End: 2018-03-13

## 2017-11-02 RX ADMIN — CYANOCOBALAMIN 1000 MCG: 1000 INJECTION, SOLUTION INTRAMUSCULAR; SUBCUTANEOUS at 15:30

## 2017-11-13 RX ORDER — ATORVASTATIN CALCIUM 80 MG/1
TABLET, FILM COATED ORAL
Qty: 90 TABLET | Refills: 1 | Status: SHIPPED | OUTPATIENT
Start: 2017-11-13 | End: 2018-03-13 | Stop reason: DRUGHIGH

## 2017-11-14 ENCOUNTER — OFFICE VISIT (OUTPATIENT)
Dept: INTERNAL MEDICINE | Facility: CLINIC | Age: 82
End: 2017-11-14

## 2017-11-14 VITALS
HEIGHT: 60 IN | WEIGHT: 155 LBS | HEART RATE: 58 BPM | BODY MASS INDEX: 30.43 KG/M2 | SYSTOLIC BLOOD PRESSURE: 110 MMHG | DIASTOLIC BLOOD PRESSURE: 68 MMHG

## 2017-11-14 DIAGNOSIS — I42.9 CARDIOMYOPATHY, UNSPECIFIED TYPE (HCC): ICD-10-CM

## 2017-11-14 DIAGNOSIS — I63.10 CEREBRAL INFARCTION DUE TO EMBOLISM OF PRECEREBRAL ARTERY (HCC): ICD-10-CM

## 2017-11-14 DIAGNOSIS — Z95.2 AORTIC VALVE REPLACED: Primary | ICD-10-CM

## 2017-11-14 DIAGNOSIS — I10 ESSENTIAL HYPERTENSION: ICD-10-CM

## 2017-11-14 DIAGNOSIS — E11.9 TYPE 2 DIABETES MELLITUS WITHOUT COMPLICATION, WITHOUT LONG-TERM CURRENT USE OF INSULIN (HCC): ICD-10-CM

## 2017-11-14 DIAGNOSIS — I27.20 PULMONARY HYPERTENSION (HCC): ICD-10-CM

## 2017-11-14 DIAGNOSIS — D64.9 CHRONIC ANEMIA: ICD-10-CM

## 2017-11-14 DIAGNOSIS — F09 COGNITIVE DYSFUNCTION: ICD-10-CM

## 2017-11-14 DIAGNOSIS — J45.20 MILD INTERMITTENT REACTIVE AIRWAY DISEASE WITHOUT COMPLICATION: ICD-10-CM

## 2017-11-14 DIAGNOSIS — I34.0 NON-RHEUMATIC MITRAL REGURGITATION: ICD-10-CM

## 2017-11-14 DIAGNOSIS — I36.1 NON-RHEUMATIC TRICUSPID VALVE INSUFFICIENCY: ICD-10-CM

## 2017-11-14 DIAGNOSIS — E03.9 HYPOTHYROIDISM (ACQUIRED): ICD-10-CM

## 2017-11-14 DIAGNOSIS — I48.91 ATRIAL FIBRILLATION, UNSPECIFIED TYPE (HCC): ICD-10-CM

## 2017-11-14 LAB
ALBUMIN SERPL-MCNC: 4.3 G/DL (ref 3.5–5.2)
ALBUMIN/GLOB SERPL: 1.5 G/DL
ALP SERPL-CCNC: 69 U/L (ref 39–117)
ALT SERPL W P-5'-P-CCNC: 20 U/L (ref 1–33)
ANION GAP SERPL CALCULATED.3IONS-SCNC: 14.7 MMOL/L
AST SERPL-CCNC: 21 U/L (ref 1–32)
BILIRUB SERPL-MCNC: 0.5 MG/DL (ref 0.1–1.2)
BUN BLD-MCNC: 24 MG/DL (ref 8–23)
BUN/CREAT SERPL: 21.2 (ref 7–25)
CALCIUM SPEC-SCNC: 9.8 MG/DL (ref 8.6–10.5)
CHLORIDE SERPL-SCNC: 99 MMOL/L (ref 98–107)
CO2 SERPL-SCNC: 29.3 MMOL/L (ref 22–29)
CREAT BLD-MCNC: 1.13 MG/DL (ref 0.57–1)
GFR SERPL CREATININE-BSD FRML MDRD: 46 ML/MIN/1.73
GLOBULIN UR ELPH-MCNC: 2.9 GM/DL
GLUCOSE BLD-MCNC: 174 MG/DL (ref 65–99)
HBA1C MFR BLD: 6.6 % (ref 4.8–5.6)
POTASSIUM BLD-SCNC: 4.3 MMOL/L (ref 3.5–5.2)
PROT SERPL-MCNC: 7.2 G/DL (ref 6–8.5)
SODIUM BLD-SCNC: 143 MMOL/L (ref 136–145)

## 2017-11-14 PROCEDURE — 80053 COMPREHEN METABOLIC PANEL: CPT | Performed by: INTERNAL MEDICINE

## 2017-11-14 PROCEDURE — 83036 HEMOGLOBIN GLYCOSYLATED A1C: CPT | Performed by: INTERNAL MEDICINE

## 2017-11-14 PROCEDURE — 99214 OFFICE O/P EST MOD 30 MIN: CPT | Performed by: INTERNAL MEDICINE

## 2017-11-14 NOTE — PROGRESS NOTES
Subjective   Jihan Teresa is a 84 y.o. female.     History of Present Illness she is here today for her yearly follow-up of hypertension, chronic atrial fibrillation, nonischemic cardiomyopathy, severe pulmonary hypertension due to valvular heart disease, history of aortic valve replacement, and cognitive dysfunction.  Since being hospitalized earlier this year with bilateral pneumonia she really has done well.  Her cognition has improved and she is even playing bridge.  She is much more alert and interactive with her daughters.  She uses a walker to get about at home.  She denies any PND, AGUILERA, swelling in the ankles, or chest pain.  She has one per night nocturia.  She denies any dysuria.  She has not had any dizziness or focal neurologic events.  Presently she is taking Lasix 80 mg in the morning with a second dose of 40 mg 2 or 3 hours later.      Review of Systems   Constitutional: Positive for activity change. Negative for appetite change and fatigue.   HENT: Negative for trouble swallowing.    Respiratory: Negative for cough, chest tightness, shortness of breath and wheezing.    Cardiovascular: Negative for chest pain, palpitations and leg swelling.   Gastrointestinal: Negative for abdominal pain, anal bleeding, blood in stool, constipation, diarrhea, nausea and vomiting.   Genitourinary: Negative for dysuria, frequency and hematuria.   Musculoskeletal: Positive for gait problem.   Neurological: Negative for dizziness, syncope, facial asymmetry, speech difficulty, weakness, numbness and headaches.   Psychiatric/Behavioral: Negative for confusion and dysphoric mood. The patient is not nervous/anxious.        Objective   Physical Exam   Constitutional: Vital signs are normal. She appears well-developed and well-nourished. She is active. She does not appear ill.   Eyes: Conjunctivae are normal.   Fundoscopic exam:       The right eye shows no AV nicking, no exudate and no hemorrhage.        The left eye  shows no AV nicking, no exudate and no hemorrhage.   Neck: Carotid bruit is not present. No thyroid mass and no thyromegaly present.   Cardiovascular: Normal rate, S1 normal and S2 normal.  An irregularly irregular rhythm present. Exam reveals no S4.    Murmur heard.   Systolic murmur is present with a grade of 3/6   Pulses:       Dorsalis pedis pulses are 2+ on the right side, and 2+ on the left side.   There is a grade 3/6 systolic ejection murmur heard best at the base radiating down left sternal border.  There is a grade 3 to 4/6 systolic blow heard best at the left lower sternal border radiating to the apex.   Pulmonary/Chest: No tachypnea. No respiratory distress. She has no decreased breath sounds. She has no wheezes. She has no rhonchi. She has no rales.   Abdominal: Soft. Normal appearance and bowel sounds are normal. She exhibits no abdominal bruit and no mass. There is no hepatosplenomegaly. There is no tenderness.       Vascular Status -  Her exam exhibits no right foot edema. Her exam exhibits no left foot edema.  Neurological: She is alert. Gait abnormal.   Her walk is very slow when she requires assistance   Psychiatric: She has a normal mood and affect. Her speech is normal and behavior is normal. Judgment and thought content normal. Cognition and memory are impaired.       Assessment/Plan July lab showed a normal TSH and CMP.  Hematocrit 34.5 hemoglobin 11.5 MCV 90.1.  Hemoglobin A1c 6.0.  Total cholesterol 109 triglycerides 36 HDL cholesterol 51 LDL cholesterol 51 echocardiogram done earlier in the year showed an ejection fraction of 47%.  RVP was 66 mmHg.  Severe mitral regurgitation and severe tricuspid regurgitation were noted    Assessment #1 cardiomyopathy-compensated #2 hypertension-good control #3 pulmonary hypertension-severe but totally asymptomatic on present regimen #4 diabetes mellitus-thus far controlled without medication #5 hypothyroidism-euthyroid clinically #6  hypercholesterolemia-good control.    Plan #1 no change medication #2 BMP and hemoglobin A1c today.  She has already received flu vaccination for this season.  Routine follow-up with me in 4 months.

## 2017-11-17 ENCOUNTER — EPISODE CHANGES (OUTPATIENT)
Dept: CASE MANAGEMENT | Facility: OTHER | Age: 82
End: 2017-11-17

## 2017-12-12 ENCOUNTER — ANTICOAGULATION VISIT (OUTPATIENT)
Dept: INTERNAL MEDICINE | Facility: CLINIC | Age: 82
End: 2017-12-12

## 2017-12-12 ENCOUNTER — CLINICAL SUPPORT (OUTPATIENT)
Dept: INTERNAL MEDICINE | Facility: CLINIC | Age: 82
End: 2017-12-12

## 2017-12-12 DIAGNOSIS — Z79.01 LONG-TERM (CURRENT) USE OF ANTICOAGULANTS: ICD-10-CM

## 2017-12-12 DIAGNOSIS — I48.91 ATRIAL FIBRILLATION, UNSPECIFIED TYPE (HCC): Primary | ICD-10-CM

## 2017-12-12 DIAGNOSIS — E53.8 B12 DEFICIENCY: Primary | ICD-10-CM

## 2017-12-12 LAB — INR PPP: 3

## 2017-12-12 PROCEDURE — 96372 THER/PROPH/DIAG INJ SC/IM: CPT | Performed by: INTERNAL MEDICINE

## 2017-12-12 PROCEDURE — 36416 COLLJ CAPILLARY BLOOD SPEC: CPT | Performed by: INTERNAL MEDICINE

## 2017-12-12 PROCEDURE — 85610 PROTHROMBIN TIME: CPT | Performed by: INTERNAL MEDICINE

## 2017-12-12 RX ORDER — CYANOCOBALAMIN 1000 UG/ML
1000 INJECTION, SOLUTION INTRAMUSCULAR; SUBCUTANEOUS ONCE
Status: COMPLETED | OUTPATIENT
Start: 2017-12-12 | End: 2017-12-12

## 2017-12-12 RX ADMIN — CYANOCOBALAMIN 1000 MCG: 1000 INJECTION, SOLUTION INTRAMUSCULAR; SUBCUTANEOUS at 16:01

## 2018-01-29 ENCOUNTER — CLINICAL SUPPORT (OUTPATIENT)
Dept: INTERNAL MEDICINE | Facility: CLINIC | Age: 83
End: 2018-01-29

## 2018-01-29 ENCOUNTER — ANTICOAGULATION VISIT (OUTPATIENT)
Dept: INTERNAL MEDICINE | Facility: CLINIC | Age: 83
End: 2018-01-29

## 2018-01-29 DIAGNOSIS — I10 ESSENTIAL HYPERTENSION: ICD-10-CM

## 2018-01-29 DIAGNOSIS — E53.8 B12 DEFICIENCY: Primary | ICD-10-CM

## 2018-01-29 DIAGNOSIS — Z79.01 LONG-TERM (CURRENT) USE OF ANTICOAGULANTS: ICD-10-CM

## 2018-01-29 DIAGNOSIS — I48.91 ATRIAL FIBRILLATION, UNSPECIFIED TYPE (HCC): Primary | ICD-10-CM

## 2018-01-29 LAB — INR PPP: 2.5

## 2018-01-29 PROCEDURE — 96372 THER/PROPH/DIAG INJ SC/IM: CPT | Performed by: INTERNAL MEDICINE

## 2018-01-29 PROCEDURE — 85610 PROTHROMBIN TIME: CPT | Performed by: INTERNAL MEDICINE

## 2018-01-29 PROCEDURE — 36416 COLLJ CAPILLARY BLOOD SPEC: CPT | Performed by: INTERNAL MEDICINE

## 2018-01-29 RX ORDER — CYANOCOBALAMIN 1000 UG/ML
1000 INJECTION, SOLUTION INTRAMUSCULAR; SUBCUTANEOUS
Status: DISCONTINUED | OUTPATIENT
Start: 2018-01-29 | End: 2018-03-13

## 2018-01-29 RX ADMIN — CYANOCOBALAMIN 1000 MCG: 1000 INJECTION, SOLUTION INTRAMUSCULAR; SUBCUTANEOUS at 15:53

## 2018-01-30 RX ORDER — LISINOPRIL 20 MG/1
TABLET ORAL
Qty: 120 TABLET | Refills: 2 | Status: SHIPPED | OUTPATIENT
Start: 2018-01-30 | End: 2018-03-13 | Stop reason: DRUGHIGH

## 2018-02-19 RX ORDER — METOPROLOL TARTRATE 50 MG/1
TABLET, FILM COATED ORAL
Qty: 180 TABLET | Refills: 2 | Status: SHIPPED | OUTPATIENT
Start: 2018-02-19 | End: 2020-01-01 | Stop reason: HOSPADM

## 2018-03-06 RX ORDER — FUROSEMIDE 40 MG/1
TABLET ORAL
Qty: 180 TABLET | Refills: 1 | Status: SHIPPED | OUTPATIENT
Start: 2018-03-06 | End: 2018-03-11 | Stop reason: SDUPTHER

## 2018-03-12 RX ORDER — FUROSEMIDE 40 MG/1
TABLET ORAL
Qty: 180 TABLET | Refills: 1 | Status: SHIPPED | OUTPATIENT
Start: 2018-03-12 | End: 2018-03-13

## 2018-03-13 ENCOUNTER — OFFICE VISIT (OUTPATIENT)
Dept: INTERNAL MEDICINE | Facility: CLINIC | Age: 83
End: 2018-03-13

## 2018-03-13 VITALS
RESPIRATION RATE: 14 BRPM | WEIGHT: 157.4 LBS | BODY MASS INDEX: 30.9 KG/M2 | HEIGHT: 60 IN | SYSTOLIC BLOOD PRESSURE: 112 MMHG | DIASTOLIC BLOOD PRESSURE: 68 MMHG

## 2018-03-13 DIAGNOSIS — I34.0 NON-RHEUMATIC MITRAL REGURGITATION: ICD-10-CM

## 2018-03-13 DIAGNOSIS — I27.20 PULMONARY HYPERTENSION (HCC): ICD-10-CM

## 2018-03-13 DIAGNOSIS — I48.91 ATRIAL FIBRILLATION, UNSPECIFIED TYPE (HCC): ICD-10-CM

## 2018-03-13 DIAGNOSIS — E11.9 TYPE 2 DIABETES MELLITUS WITHOUT COMPLICATION, WITHOUT LONG-TERM CURRENT USE OF INSULIN (HCC): ICD-10-CM

## 2018-03-13 DIAGNOSIS — I42.9 CARDIOMYOPATHY, UNSPECIFIED TYPE (HCC): ICD-10-CM

## 2018-03-13 DIAGNOSIS — I50.42 CHRONIC COMBINED SYSTOLIC AND DIASTOLIC CONGESTIVE HEART FAILURE (HCC): ICD-10-CM

## 2018-03-13 DIAGNOSIS — I63.10 CEREBRAL INFARCTION DUE TO EMBOLISM OF PRECEREBRAL ARTERY (HCC): ICD-10-CM

## 2018-03-13 DIAGNOSIS — Z95.2 AORTIC VALVE REPLACED: ICD-10-CM

## 2018-03-13 DIAGNOSIS — I10 ESSENTIAL HYPERTENSION: Primary | ICD-10-CM

## 2018-03-13 PROCEDURE — 99214 OFFICE O/P EST MOD 30 MIN: CPT | Performed by: INTERNAL MEDICINE

## 2018-03-13 RX ORDER — ATORVASTATIN CALCIUM 40 MG/1
40 TABLET, FILM COATED ORAL DAILY
Qty: 90 TABLET | Refills: 3 | Status: SHIPPED | OUTPATIENT
Start: 2018-03-13 | End: 2019-05-09 | Stop reason: SDUPTHER

## 2018-03-13 RX ORDER — OMEPRAZOLE 20 MG/1
20 CAPSULE, DELAYED RELEASE ORAL DAILY
COMMUNITY
End: 2020-01-01 | Stop reason: HOSPADM

## 2018-03-13 RX ORDER — LEVOTHYROXINE SODIUM 0.07 MG/1
75 TABLET ORAL DAILY
Qty: 90 TABLET | Refills: 3 | Status: SHIPPED | OUTPATIENT
Start: 2018-03-13 | End: 2018-11-21 | Stop reason: SDUPTHER

## 2018-03-13 RX ORDER — FUROSEMIDE 40 MG/1
40 TABLET ORAL 3 TIMES DAILY
Qty: 270 TABLET | Refills: 3 | Status: SHIPPED | OUTPATIENT
Start: 2018-03-13 | End: 2019-05-16 | Stop reason: SDUPTHER

## 2018-03-13 RX ORDER — LISINOPRIL 10 MG/1
10 TABLET ORAL DAILY
Qty: 90 TABLET | Refills: 3 | Status: SHIPPED | OUTPATIENT
Start: 2018-03-13 | End: 2018-05-02 | Stop reason: SDUPTHER

## 2018-03-13 NOTE — PROGRESS NOTES
Subjective   Jihan Teresa is a 84 y.o. female.     History of Present Illness she is here today for follow-up of chronic atrial fibrillation, pulmonary hypertension, recurrent congestive heart failure, hypertension, diabetes mellitus, and cerebral vascular disease with a history of prior stroke.  All that being said she has continued to do very well under the care of HER-2 daughters.  She gets about at home walker and she has not had any falls.  Her appetite is good.  She denies any swelling in the ankles, AGUILERA, PND, or chest pain.  She has not had dizziness, gait, syncope, or focal neurologic episodes.  There has been no abdominal pain, melanotic stool, or rectal bleeding.  Presently she is using Lasix 80 mg at breakfast time followed by 40 mg at lunch.        Review of Systems   Constitutional: Negative for activity change, appetite change, fatigue and unexpected weight gain.   HENT: Negative for trouble swallowing.    Respiratory: Negative for cough, chest tightness, shortness of breath and wheezing.    Cardiovascular: Negative for chest pain, palpitations and leg swelling.   Gastrointestinal: Negative for abdominal pain, anal bleeding, blood in stool, diarrhea, nausea and vomiting.   Genitourinary: Negative for flank pain and hematuria.   Neurological: Negative for dizziness, syncope, facial asymmetry, speech difficulty, weakness, numbness and headaches.   Psychiatric/Behavioral: Negative for depressed mood. The patient is not nervous/anxious.        Objective   Physical Exam   Constitutional: She is oriented to person, place, and time. Vital signs are normal. She appears well-developed and well-nourished. She is active. She does not appear ill.   Eyes: Conjunctivae are normal.   Neck: Carotid bruit is not present.   Cardiovascular: Normal rate and S1 normal.  An irregularly irregular rhythm present. Exam reveals no S3 and no S4.    Murmur heard.   Systolic murmur is present with a grade of 3/6   As a grade   3/6 systolic ejection murmur heard best at the base radiating down left sternal border.   Pulmonary/Chest: No tachypnea. No respiratory distress. She has no decreased breath sounds. She has no wheezes. She has no rhonchi. She has no rales.   Abdominal: Soft. Normal appearance and bowel sounds are normal. She exhibits no abdominal bruit and no mass. There is no hepatosplenomegaly. There is no tenderness.     Vascular Status -  Her right foot exhibits normal right foot edema. Her left foot exhibits normal left foot edema.  Neurological: She is alert and oriented to person, place, and time.   Psychiatric: She has a normal mood and affect. Her speech is normal and behavior is normal. Judgment and thought content normal. Cognition and memory are impaired.         Assessment/Plan recent lab showed normal CMP.  Hemoglobin A1c 6.6.    Assessment #1 chronic atrial fibrillation-rate controlled and she is on chronic anticoagulation therapy #2 pulmonary hypertension-severe but well compensated #3 hypertension-good control #4 diabetes mellitus-good control with diet #5 cognitive dysfunction-mild to moderate severity but stable    Plan #1 no change medication.  Routine follow-up with me in 4 months.  45 minutes was spent in this visit 30 minutes of which related to counseling and answering of questions and the verification of present medication doses.

## 2018-03-14 RX ORDER — FUROSEMIDE 40 MG/1
TABLET ORAL
Qty: 180 TABLET | Refills: 1 | Status: SHIPPED | OUTPATIENT
Start: 2018-03-14 | End: 2018-04-25

## 2018-03-19 RX ORDER — POTASSIUM CHLORIDE 750 MG/1
20 CAPSULE, EXTENDED RELEASE ORAL
Qty: 360 CAPSULE | Refills: 2 | Status: SHIPPED | OUTPATIENT
Start: 2018-03-19 | End: 2019-02-08 | Stop reason: SDUPTHER

## 2018-03-21 ENCOUNTER — EPISODE CHANGES (OUTPATIENT)
Dept: CASE MANAGEMENT | Facility: OTHER | Age: 83
End: 2018-03-21

## 2018-04-11 ENCOUNTER — LAB (OUTPATIENT)
Dept: INTERNAL MEDICINE | Facility: CLINIC | Age: 83
End: 2018-04-11

## 2018-04-11 ENCOUNTER — CLINICAL SUPPORT (OUTPATIENT)
Dept: INTERNAL MEDICINE | Facility: CLINIC | Age: 83
End: 2018-04-11

## 2018-04-11 DIAGNOSIS — N39.0 URINARY TRACT INFECTION WITHOUT HEMATURIA, SITE UNSPECIFIED: Primary | ICD-10-CM

## 2018-04-11 DIAGNOSIS — E53.8 B12 DEFICIENCY: Primary | ICD-10-CM

## 2018-04-11 PROCEDURE — 96372 THER/PROPH/DIAG INJ SC/IM: CPT | Performed by: INTERNAL MEDICINE

## 2018-04-11 PROCEDURE — 87086 URINE CULTURE/COLONY COUNT: CPT | Performed by: INTERNAL MEDICINE

## 2018-04-11 PROCEDURE — 87186 SC STD MICRODIL/AGAR DIL: CPT | Performed by: INTERNAL MEDICINE

## 2018-04-11 RX ORDER — CYANOCOBALAMIN 1000 UG/ML
1000 INJECTION, SOLUTION INTRAMUSCULAR; SUBCUTANEOUS
Status: DISCONTINUED | OUTPATIENT
Start: 2018-04-11 | End: 2018-04-25

## 2018-04-11 RX ADMIN — CYANOCOBALAMIN 1000 MCG: 1000 INJECTION, SOLUTION INTRAMUSCULAR; SUBCUTANEOUS at 16:16

## 2018-04-12 ENCOUNTER — OFFICE VISIT (OUTPATIENT)
Dept: INTERNAL MEDICINE | Facility: CLINIC | Age: 83
End: 2018-04-12

## 2018-04-12 ENCOUNTER — TELEPHONE (OUTPATIENT)
Dept: INTERNAL MEDICINE | Facility: CLINIC | Age: 83
End: 2018-04-12

## 2018-04-12 VITALS
BODY MASS INDEX: 30.63 KG/M2 | OXYGEN SATURATION: 95 % | SYSTOLIC BLOOD PRESSURE: 118 MMHG | HEART RATE: 53 BPM | DIASTOLIC BLOOD PRESSURE: 76 MMHG | HEIGHT: 60 IN | WEIGHT: 156 LBS

## 2018-04-12 DIAGNOSIS — I63.10 CEREBRAL INFARCTION DUE TO EMBOLISM OF PRECEREBRAL ARTERY (HCC): Primary | ICD-10-CM

## 2018-04-12 PROCEDURE — 99213 OFFICE O/P EST LOW 20 MIN: CPT | Performed by: INTERNAL MEDICINE

## 2018-04-12 RX ORDER — CLOPIDOGREL BISULFATE 75 MG/1
75 TABLET ORAL DAILY
Qty: 90 TABLET | Refills: 3 | Status: SHIPPED | OUTPATIENT
Start: 2018-04-12 | End: 2019-04-19 | Stop reason: SDUPTHER

## 2018-04-12 RX ORDER — NITROFURANTOIN MACROCRYSTALS 100 MG/1
CAPSULE ORAL
Qty: 15 CAPSULE | Refills: 0 | Status: SHIPPED | OUTPATIENT
Start: 2018-04-12 | End: 2018-06-01

## 2018-04-12 RX ORDER — NITROFURANTOIN MACROCRYSTALS 100 MG/1
100 CAPSULE ORAL 3 TIMES DAILY
Qty: 15 CAPSULE | Refills: 0 | Status: SHIPPED | OUTPATIENT
Start: 2018-04-12 | End: 2018-06-01

## 2018-04-12 RX ORDER — NITROFURANTOIN MACROCRYSTALS 100 MG/1
CAPSULE ORAL
Qty: 15 CAPSULE | Refills: 0 | Status: SHIPPED | OUTPATIENT
Start: 2018-04-12 | End: 2018-04-12 | Stop reason: SDUPTHER

## 2018-04-12 NOTE — PROGRESS NOTES
Subjective   Jihan Teresa is a 84 y.o. female.     History of Present Illness she is here today for evaluation and treatment of right facial numbness and tingling over the last 2 or 3 days.  Fortunately it has not been associated with any other symptoms.  She denies any headache, dizziness, dysarthria, dysphagia, focal paresis, diplopia, blurred vision, or ataxia.        Review of Systems   Constitutional: Negative for activity change, appetite change and fatigue.   HENT: Negative for trouble swallowing.    Respiratory: Negative for cough, chest tightness, shortness of breath and wheezing.    Cardiovascular: Negative for chest pain, palpitations and leg swelling.   Neurological: Positive for numbness. Negative for dizziness, syncope, facial asymmetry, weakness and headaches.   Psychiatric/Behavioral: Negative for depressed mood. The patient is not nervous/anxious.        Objective   Physical Exam   Constitutional: She is oriented to person, place, and time. Vital signs are normal. She appears well-developed and well-nourished. She is active. She does not appear ill.   Neck: Carotid bruit is not present.   Cardiovascular: Normal rate, S1 normal and S2 normal.  An irregularly irregular rhythm present. Exam reveals no S3 and no S4.    Murmur heard.   Systolic murmur is present with a grade of 3/6   There is a grade 3/6 systolic ejection murmur heard best at the base radiating down the left sternal border.   Pulmonary/Chest: No tachypnea. No respiratory distress. She has no decreased breath sounds. She has no wheezes. She has no rhonchi. She has no rales.   Abdominal: Soft. Normal appearance and bowel sounds are normal. She exhibits no abdominal bruit and no mass. There is no hepatosplenomegaly. There is no tenderness.     Vascular Status -  Her right foot exhibits no edema. Her left foot exhibits no edema.  Neurological: She is alert and oriented to person, place, and time. She has normal strength. No cranial  nerve deficit. Gait abnormal.   Reflex Scores:       Bicep reflexes are 2+ on the right side and 2+ on the left side.  Mild slowing but stable with use of a walker.  Sensation does appear somewhat diminished on the right side of the face.   Psychiatric: She has a normal mood and affect. Her speech is normal and behavior is normal. Judgment and thought content normal. Cognition and memory are impaired.         Assessment/Plan PT INR yesterday was 3.8.  MRI of the brain in June 2017 showed several small lacunar strokes.  MRA showed no carotid disease of significance.  There was moderate bilateral anterior cerebral artery stenosis.    Assessment #1 cerebral vascular disease-likely a very limited repeat CVA.  This was discussed with the patient and her daughter.    Plan #1 discontinue aspirin No. 2 begin Plavix 75 mg by mouth daily.  Routine follow-up with me in one month.  She will call if other symptoms or signs develop.

## 2018-04-12 NOTE — TELEPHONE ENCOUNTER
----- Message from Gustavo Jackman Jr., MD sent at 4/12/2018  8:10 AM EDT -----  Urine culture shows urinary tract infection.  Macrodantin 100 mg 3 times a day for 5 days.

## 2018-04-13 LAB
BACTERIA SPEC AEROBE CULT: ABNORMAL
BACTERIA SPEC AEROBE CULT: ABNORMAL

## 2018-04-23 RX ORDER — ISOSORBIDE MONONITRATE 60 MG/1
TABLET, EXTENDED RELEASE ORAL
Qty: 90 TABLET | Refills: 3 | Status: SHIPPED | OUTPATIENT
Start: 2018-04-23 | End: 2019-04-22 | Stop reason: SDUPTHER

## 2018-04-24 ENCOUNTER — TELEPHONE (OUTPATIENT)
Dept: INTERNAL MEDICINE | Facility: CLINIC | Age: 83
End: 2018-04-24

## 2018-04-24 DIAGNOSIS — N39.0 RECURRENT UTI: Primary | ICD-10-CM

## 2018-04-24 NOTE — TELEPHONE ENCOUNTER
----- Message from Daphne Abraham sent at 4/24/2018 10:39 AM EDT -----  Patient scheduled for UA tomorrow afternoon.  Need order please.  Thanks.

## 2018-04-25 ENCOUNTER — OFFICE VISIT (OUTPATIENT)
Dept: INTERNAL MEDICINE | Facility: CLINIC | Age: 83
End: 2018-04-25

## 2018-04-25 ENCOUNTER — LAB (OUTPATIENT)
Dept: INTERNAL MEDICINE | Facility: CLINIC | Age: 83
End: 2018-04-25

## 2018-04-25 VITALS
WEIGHT: 151 LBS | SYSTOLIC BLOOD PRESSURE: 108 MMHG | BODY MASS INDEX: 29.64 KG/M2 | DIASTOLIC BLOOD PRESSURE: 64 MMHG | HEIGHT: 60 IN

## 2018-04-25 DIAGNOSIS — N39.0 RECURRENT UTI: ICD-10-CM

## 2018-04-25 DIAGNOSIS — R35.0 FREQUENCY OF URINATION: Primary | ICD-10-CM

## 2018-04-25 DIAGNOSIS — L03.011 CELLULITIS OF FINGER OF RIGHT HAND: ICD-10-CM

## 2018-04-25 DIAGNOSIS — Z87.440 HISTORY OF RECURRENT UTIS: ICD-10-CM

## 2018-04-25 PROBLEM — L03.90 CELLULITIS: Status: ACTIVE | Noted: 2018-04-25

## 2018-04-25 LAB
BACTERIA UR QL AUTO: ABNORMAL /HPF
BILIRUB UR QL STRIP: NEGATIVE
CLARITY UR: ABNORMAL
COLOR UR: YELLOW
GLUCOSE UR STRIP-MCNC: NEGATIVE MG/DL
HGB UR QL STRIP.AUTO: NEGATIVE
HYALINE CASTS UR QL AUTO: ABNORMAL /LPF
KETONES UR QL STRIP: NEGATIVE
LEUKOCYTE ESTERASE UR QL STRIP.AUTO: ABNORMAL
NITRITE UR QL STRIP: NEGATIVE
PH UR STRIP.AUTO: 6.5 [PH] (ref 5–8)
PROT UR QL STRIP: NEGATIVE
RBC # UR: ABNORMAL /HPF
REF LAB TEST METHOD: ABNORMAL
SP GR UR STRIP: 1.01 (ref 1–1.03)
SQUAMOUS #/AREA URNS HPF: ABNORMAL /HPF
UROBILINOGEN UR QL STRIP: ABNORMAL
WBC CLUMPS # UR AUTO: ABNORMAL /HPF
WBC UR QL AUTO: ABNORMAL /HPF

## 2018-04-25 PROCEDURE — 81001 URINALYSIS AUTO W/SCOPE: CPT | Performed by: INTERNAL MEDICINE

## 2018-04-25 PROCEDURE — 87086 URINE CULTURE/COLONY COUNT: CPT | Performed by: INTERNAL MEDICINE

## 2018-04-25 PROCEDURE — 87186 SC STD MICRODIL/AGAR DIL: CPT | Performed by: INTERNAL MEDICINE

## 2018-04-25 PROCEDURE — 99213 OFFICE O/P EST LOW 20 MIN: CPT | Performed by: INTERNAL MEDICINE

## 2018-04-25 RX ORDER — CEPHALEXIN 250 MG/1
250 CAPSULE ORAL 4 TIMES DAILY
Qty: 40 CAPSULE | Refills: 0 | Status: SHIPPED | OUTPATIENT
Start: 2018-04-25 | End: 2018-06-01

## 2018-04-27 LAB
BACTERIA SPEC AEROBE CULT: ABNORMAL
BACTERIA SPEC AEROBE CULT: ABNORMAL

## 2018-05-02 RX ORDER — LISINOPRIL 10 MG/1
10 TABLET ORAL DAILY
Qty: 90 TABLET | Refills: 3 | Status: SHIPPED | OUTPATIENT
Start: 2018-05-02 | End: 2018-12-11 | Stop reason: SDUPTHER

## 2018-05-14 ENCOUNTER — TELEPHONE (OUTPATIENT)
Dept: INTERNAL MEDICINE | Facility: CLINIC | Age: 83
End: 2018-05-14

## 2018-05-14 RX ORDER — WARFARIN SODIUM 3 MG/1
TABLET ORAL
Qty: 90 TABLET | Refills: 2 | Status: SHIPPED | OUTPATIENT
Start: 2018-05-14 | End: 2019-05-29 | Stop reason: SDUPTHER

## 2018-05-14 NOTE — TELEPHONE ENCOUNTER
[facility] pharmacy called to stating we sent in coumadin today and patient is also taking Plavix. I informed them patient is supposed to be taking both.

## 2018-05-30 ENCOUNTER — TELEPHONE (OUTPATIENT)
Dept: INTERNAL MEDICINE | Facility: CLINIC | Age: 83
End: 2018-05-30

## 2018-05-30 DIAGNOSIS — R35.0 FREQUENT URINATION: Primary | ICD-10-CM

## 2018-05-30 NOTE — TELEPHONE ENCOUNTER
----- Message from Joe Che MLT sent at 5/30/2018 12:03 PM EDT -----  Regarding: urine order  This patient is coming in tomorrow for urinalysis and we need an order.  The patient's daughter said that her she has frequency, dark urine, and is hearing voices.  Could you please ask Dr. Jackman if it is ok to do the urine?    Thanks, Joe

## 2018-06-01 ENCOUNTER — OFFICE VISIT (OUTPATIENT)
Dept: INTERNAL MEDICINE | Facility: CLINIC | Age: 83
End: 2018-06-01

## 2018-06-01 ENCOUNTER — LAB (OUTPATIENT)
Dept: INTERNAL MEDICINE | Facility: CLINIC | Age: 83
End: 2018-06-01

## 2018-06-01 VITALS
DIASTOLIC BLOOD PRESSURE: 60 MMHG | BODY MASS INDEX: 30.04 KG/M2 | WEIGHT: 153 LBS | SYSTOLIC BLOOD PRESSURE: 118 MMHG | HEIGHT: 60 IN

## 2018-06-01 DIAGNOSIS — R35.0 FREQUENT URINATION: ICD-10-CM

## 2018-06-01 DIAGNOSIS — Z87.440 HISTORY OF RECURRENT UTIS: ICD-10-CM

## 2018-06-01 DIAGNOSIS — I63.10 CEREBRAL INFARCTION DUE TO EMBOLISM OF PRECEREBRAL ARTERY (HCC): ICD-10-CM

## 2018-06-01 DIAGNOSIS — R82.90 ABNORMAL URINE FINDINGS: ICD-10-CM

## 2018-06-01 DIAGNOSIS — R35.0 FREQUENT URINATION: Primary | ICD-10-CM

## 2018-06-01 PROBLEM — L03.90 CELLULITIS: Status: RESOLVED | Noted: 2018-04-25 | Resolved: 2018-06-01

## 2018-06-01 LAB
BACTERIA UR QL AUTO: ABNORMAL /HPF
BILIRUB UR QL STRIP: NEGATIVE
CLARITY UR: ABNORMAL
COLOR UR: YELLOW
GLUCOSE UR STRIP-MCNC: NEGATIVE MG/DL
HGB UR QL STRIP.AUTO: ABNORMAL
HYALINE CASTS UR QL AUTO: ABNORMAL /LPF
KETONES UR QL STRIP: NEGATIVE
LEUKOCYTE ESTERASE UR QL STRIP.AUTO: ABNORMAL
NITRITE UR QL STRIP: NEGATIVE
PH UR STRIP.AUTO: <=5 [PH] (ref 5–8)
PROT UR QL STRIP: NEGATIVE
RBC # UR: ABNORMAL /HPF
REF LAB TEST METHOD: ABNORMAL
SP GR UR STRIP: 1.01 (ref 1–1.03)
SQUAMOUS #/AREA URNS HPF: ABNORMAL /HPF
UROBILINOGEN UR QL STRIP: ABNORMAL
WBC UR QL AUTO: ABNORMAL /HPF

## 2018-06-01 PROCEDURE — 87086 URINE CULTURE/COLONY COUNT: CPT | Performed by: INTERNAL MEDICINE

## 2018-06-01 PROCEDURE — 81001 URINALYSIS AUTO W/SCOPE: CPT | Performed by: INTERNAL MEDICINE

## 2018-06-01 PROCEDURE — 99213 OFFICE O/P EST LOW 20 MIN: CPT | Performed by: INTERNAL MEDICINE

## 2018-06-01 PROCEDURE — 87186 SC STD MICRODIL/AGAR DIL: CPT | Performed by: INTERNAL MEDICINE

## 2018-06-01 RX ORDER — CIPROFLOXACIN 250 MG/1
TABLET, FILM COATED ORAL
Qty: 40 TABLET | Refills: 0 | Status: SHIPPED | OUTPATIENT
Start: 2018-06-01 | End: 2018-12-11

## 2018-06-01 NOTE — PROGRESS NOTES
Subjective   Jihan Teresa is a 84 y.o. female.     History of Present Illness she is here today for follow-up of recent left CVA as well as recurrent urinary tract infections.  The right facial and right arm and leg numbness lasted for 3 weeks and then resolved.  She denies any recurrence of similar symptoms and she has not had any dizziness, diplopia, dysarthria, dysphagia.  She does relate episodic hearing voices and music when there are no people or obvious sources of the music over the last 2 or 3 years.  Her daughters state that usually this means she has a urinary tract infection.  It may last for an hour.  She has been having them more frequently here in the last few weeks.  She denies any visual hallucinations.  She denies any dysuria or nocturia though she does have some increase in daytime urinary frequency.        Review of Systems   Constitutional: Negative for activity change, appetite change, diaphoresis and fatigue.   Respiratory: Negative for cough, chest tightness, shortness of breath and wheezing.    Cardiovascular: Negative for chest pain and leg swelling.   Gastrointestinal: Negative for abdominal pain, nausea and vomiting.   Genitourinary: Negative for dysuria, flank pain, frequency and hematuria.   Neurological: Negative for dizziness, syncope, facial asymmetry, speech difficulty, weakness, numbness and headache.   Psychiatric/Behavioral: Negative for depressed mood. The patient is not nervous/anxious.        Objective   Physical Exam   Constitutional: She is oriented to person, place, and time. Vital signs are normal. She appears well-developed and well-nourished. She is active. She does not appear ill.   Eyes: Conjunctivae are normal.   Cardiovascular: Normal rate, regular rhythm, S1 normal and S2 normal.  Exam reveals no S3 and no S4.    Murmur heard.   Systolic murmur is present with a grade of 3/6   There is a grade 2 to 3/6 systolic ejection murmur heard best at the base radiating  down the left sternal border   Pulmonary/Chest: No tachypnea. No respiratory distress. She has no decreased breath sounds. She has no wheezes. She has no rhonchi. She has no rales.     Vascular Status -  Her right foot exhibits no edema. Her left foot exhibits no edema.  Neurological: She is alert and oriented to person, place, and time.   Psychiatric: She has a normal mood and affect. Her speech is normal and behavior is normal. Judgment and thought content normal. Cognition and memory are impaired.         Assessment/Plan urinalysis shows 4+ bacteria and too numerous to count white cells once again    Assessment #1 recurrent urinary tract infection #2 recent CVA-without recurrent symptoms now on Plavix and Coumadin    Plan #1 Cipro 250 mg twice a day for 5 days and then 250 mg daily for one month.  #2 decrease Coumadin to 1.5 mg by mouth daily.  Routine follow-up scheduled for approximately 6 weeks.

## 2018-06-03 LAB — BACTERIA SPEC AEROBE CULT: ABNORMAL

## 2018-07-17 ENCOUNTER — OFFICE VISIT (OUTPATIENT)
Dept: INTERNAL MEDICINE | Facility: CLINIC | Age: 83
End: 2018-07-17

## 2018-07-17 VITALS
TEMPERATURE: 98.7 F | SYSTOLIC BLOOD PRESSURE: 116 MMHG | WEIGHT: 154 LBS | HEIGHT: 60 IN | DIASTOLIC BLOOD PRESSURE: 70 MMHG | BODY MASS INDEX: 30.23 KG/M2

## 2018-07-17 DIAGNOSIS — I42.9 CARDIOMYOPATHY, UNSPECIFIED TYPE (HCC): ICD-10-CM

## 2018-07-17 DIAGNOSIS — E53.8 B12 DEFICIENCY: ICD-10-CM

## 2018-07-17 DIAGNOSIS — I27.20 PULMONARY HYPERTENSION (HCC): ICD-10-CM

## 2018-07-17 DIAGNOSIS — I10 ESSENTIAL HYPERTENSION: ICD-10-CM

## 2018-07-17 DIAGNOSIS — Z87.440 HISTORY OF RECURRENT UTIS: Primary | ICD-10-CM

## 2018-07-17 DIAGNOSIS — I48.91 ATRIAL FIBRILLATION, UNSPECIFIED TYPE (HCC): ICD-10-CM

## 2018-07-17 DIAGNOSIS — R35.0 URINARY FREQUENCY: ICD-10-CM

## 2018-07-17 DIAGNOSIS — I50.42 CHRONIC COMBINED SYSTOLIC AND DIASTOLIC CONGESTIVE HEART FAILURE (HCC): ICD-10-CM

## 2018-07-17 LAB
BACTERIA UR QL AUTO: ABNORMAL /HPF
BILIRUB UR QL STRIP: NEGATIVE
CLARITY UR: ABNORMAL
COLOR UR: YELLOW
GLUCOSE UR STRIP-MCNC: NEGATIVE MG/DL
HGB UR QL STRIP.AUTO: NEGATIVE
HYALINE CASTS UR QL AUTO: ABNORMAL /LPF
KETONES UR QL STRIP: NEGATIVE
LEUKOCYTE ESTERASE UR QL STRIP.AUTO: ABNORMAL
MUCOUS THREADS URNS QL MICRO: ABNORMAL /HPF
NITRITE UR QL STRIP: NEGATIVE
PH UR STRIP.AUTO: 6 [PH] (ref 5–8)
PROT UR QL STRIP: NEGATIVE
RBC # UR: ABNORMAL /HPF
REF LAB TEST METHOD: ABNORMAL
SP GR UR STRIP: 1.01 (ref 1–1.03)
SQUAMOUS #/AREA URNS HPF: ABNORMAL /HPF
TRANS CELLS #/AREA URNS HPF: ABNORMAL /HPF
UROBILINOGEN UR QL STRIP: ABNORMAL
WBC UR QL AUTO: ABNORMAL /HPF

## 2018-07-17 PROCEDURE — 87086 URINE CULTURE/COLONY COUNT: CPT | Performed by: INTERNAL MEDICINE

## 2018-07-17 PROCEDURE — 81001 URINALYSIS AUTO W/SCOPE: CPT | Performed by: INTERNAL MEDICINE

## 2018-07-17 PROCEDURE — 87186 SC STD MICRODIL/AGAR DIL: CPT | Performed by: INTERNAL MEDICINE

## 2018-07-17 PROCEDURE — 99213 OFFICE O/P EST LOW 20 MIN: CPT | Performed by: INTERNAL MEDICINE

## 2018-07-17 NOTE — PROGRESS NOTES
Subjective   Jihan Teresa is a 84 y.o. female.     History of Present Illness she is here today for follow-up of chronic atrial fibrillation with chronic anticoagulation, history of congestive heart failure with cardiomyopathy and pulmonary hypertension, recurrent urinary tract infection.  She has done well since her last visit approximately 6 weeks ago.  She was placed on Cipro for 5 days for acute urinary tract infection and then a suppressive dose for one month which was completed 2 days ago.  She denies any daytime urinary frequency or dysuria.  Occasionally she'll have one or 2 per night nocturia.  She has not had any abdominal pain fever sweats or chills.  She denies any dyspnea at rest or with exertion on walking with her walker.  She denies any chest pain, dizziness, syncope, or focal neurologic episodes.  She has not had any abdominal pain, melanotic stool, or rectal bleeding.        Review of Systems   Constitutional: Negative for activity change, appetite change, chills, diaphoresis, fatigue and fever.   HENT: Negative for trouble swallowing.    Respiratory: Negative for cough, chest tightness, shortness of breath and wheezing.    Cardiovascular: Negative for chest pain, palpitations and leg swelling.   Gastrointestinal: Negative for abdominal pain, anal bleeding, blood in stool, diarrhea, nausea and vomiting.   Genitourinary: Negative for dysuria, flank pain, frequency and hematuria.   Neurological: Negative for dizziness, syncope, facial asymmetry, speech difficulty, weakness, numbness and headache.   Psychiatric/Behavioral: Negative for depressed mood. The patient is not nervous/anxious.        Objective   Physical Exam   Constitutional: She is oriented to person, place, and time. Vital signs are normal. She appears well-developed and well-nourished. She is active. She does not appear ill.   Cardiovascular: Normal rate, S1 normal and S2 normal.  An irregularly irregular rhythm present. Exam  reveals no S3 and no S4.    Murmur heard.   Systolic murmur is present with a grade of 3/6   There is a grade 3/6 systolic ejection murmur heard best at the base radiating down the left sternal border   Pulmonary/Chest: No tachypnea. No respiratory distress. She has no decreased breath sounds. She has no wheezes. She has no rhonchi. She has no rales.     Vascular Status -  Her right foot exhibits no edema. Her left foot exhibits no edema.  Neurological: She is alert and oriented to person, place, and time.   Psychiatric: She has a normal mood and affect. Her speech is normal and behavior is normal. Judgment and thought content normal.         Assessment/Plan PT INR today 1.7    Assessment #1 chronic atrial fibrillation-rate controlled #2 history of congestive heart failure secondary to cardiomyopathy-she continues to be nicely compensated #3 recurrent urinary tract infection #4 cerebral vascular disease-without recurrent episodes    Plan #1 increase Coumadin to 3 mg by mouth tonight and then 3 mg on Mondays and Fridays with 1.5 mg the other 5 days a week #2 urine C&S today.  #3 PT/INR 1 month.  Routine follow-up in 3 months.

## 2018-07-19 DIAGNOSIS — R31.9 URINARY TRACT INFECTION WITH HEMATURIA, SITE UNSPECIFIED: Primary | ICD-10-CM

## 2018-07-19 DIAGNOSIS — N39.0 URINARY TRACT INFECTION WITH HEMATURIA, SITE UNSPECIFIED: Primary | ICD-10-CM

## 2018-07-19 LAB — BACTERIA SPEC AEROBE CULT: ABNORMAL

## 2018-07-19 RX ORDER — CEPHALEXIN 500 MG/1
500 CAPSULE ORAL 2 TIMES DAILY
Qty: 10 CAPSULE | Refills: 0 | Status: SHIPPED | OUTPATIENT
Start: 2018-07-19 | End: 2018-12-11

## 2018-07-19 RX ORDER — CEPHALEXIN 250 MG/1
250 CAPSULE ORAL DAILY
Qty: 90 CAPSULE | Refills: 0 | Status: SHIPPED | OUTPATIENT
Start: 2018-07-19 | End: 2018-12-11

## 2018-07-25 ENCOUNTER — TELEPHONE (OUTPATIENT)
Dept: INTERNAL MEDICINE | Facility: CLINIC | Age: 83
End: 2018-07-25

## 2018-07-25 NOTE — TELEPHONE ENCOUNTER
Dione patient's daughter states you have instructed for patient to continue keflex for 3 months but her concern is is that patient could get resistant to this antibiotic and it will not help patient any longer

## 2018-07-25 NOTE — TELEPHONE ENCOUNTER
----- Message from Gayle Valdovinos sent at 7/25/2018  3:41 PM EDT -----  Contact: Pt daughter audrey   Pt's daughter is calling concerned about Jihan being on   cephalexin (KEFLEX) 250 MG capsule     For 3 weeks and would like to speak to MD.    She is on the release   Caller# [phone]

## 2018-10-03 ENCOUNTER — OFFICE VISIT (OUTPATIENT)
Dept: CARDIAC SURGERY | Facility: CLINIC | Age: 83
End: 2018-10-03

## 2018-10-03 VITALS
TEMPERATURE: 97.9 F | HEIGHT: 60 IN | WEIGHT: 153 LBS | DIASTOLIC BLOOD PRESSURE: 68 MMHG | RESPIRATION RATE: 20 BRPM | OXYGEN SATURATION: 93 % | BODY MASS INDEX: 30.04 KG/M2 | SYSTOLIC BLOOD PRESSURE: 108 MMHG | HEART RATE: 65 BPM

## 2018-10-03 DIAGNOSIS — Z95.2 AORTIC VALVE REPLACED: Primary | ICD-10-CM

## 2018-10-03 PROCEDURE — 99213 OFFICE O/P EST LOW 20 MIN: CPT | Performed by: THORACIC SURGERY (CARDIOTHORACIC VASCULAR SURGERY)

## 2018-10-03 NOTE — PROGRESS NOTES
"CARDIOVASCULAR SURGERY FOLLOW-UP PROGRESS NOTE  Chief Complaint: Here for follow-up 9 years after aortic valve replacement.        HPI:   Dear Russ and colleagues:    It was nice to see Jihan Teresa in follow up 9 years  after surgery.  As you know, she is a 85 y.o. female with aortic stenosis who underwent aortic valve replacement in 2009. She did well postoperatively and continues to do well. She comes in today complaining of really nothing and doing fairly well.  Her activity level has been good.  He is looking for another family doctor.     Physical Exam:         /68 (BP Location: Left arm, Patient Position: Sitting, Cuff Size: Adult)   Pulse 65   Temp 97.9 °F (36.6 °C) (Oral)   Resp 20   Ht 152.4 cm (60\")   Wt 69.4 kg (153 lb)   SpO2 93%   BMI 29.88 kg/m²   Heart:  irregularly irregular rhythm, normal rate, grade 2/6 systolic murmur no change  Lungs:  clear to auscultation bilaterally  Extremities:  no edema  Incision(s):  sternum stable    Assessment/Plan:     S/P AVR. Overall, she is doing well.    Overall she is doing well and there are no signs of any heart failure.    Follow-up as scheduled with cardiology  Follow-up as scheduled with PCP  Return to clinic in 1 year(s) with no new studies    No restrictions of activity.      Thank you for allowing me to participate in the care of your   patient.  Regards,  Jerry Colmenares MD    "

## 2018-10-08 ENCOUNTER — EPISODE CHANGES (OUTPATIENT)
Dept: CASE MANAGEMENT | Facility: OTHER | Age: 83
End: 2018-10-08

## 2018-11-21 RX ORDER — LEVOTHYROXINE SODIUM 0.15 MG/1
150 TABLET ORAL DAILY
Qty: 30 TABLET | Refills: 0 | Status: SHIPPED | OUTPATIENT
Start: 2018-11-21 | End: 2018-12-21 | Stop reason: SDUPTHER

## 2018-12-11 ENCOUNTER — OFFICE VISIT (OUTPATIENT)
Dept: INTERNAL MEDICINE | Facility: CLINIC | Age: 83
End: 2018-12-11

## 2018-12-11 ENCOUNTER — ANTICOAGULATION VISIT (OUTPATIENT)
Dept: INTERNAL MEDICINE | Facility: CLINIC | Age: 83
End: 2018-12-11

## 2018-12-11 VITALS
OXYGEN SATURATION: 88 % | SYSTOLIC BLOOD PRESSURE: 136 MMHG | HEART RATE: 58 BPM | WEIGHT: 155 LBS | DIASTOLIC BLOOD PRESSURE: 72 MMHG | TEMPERATURE: 98 F | HEIGHT: 60 IN | BODY MASS INDEX: 30.43 KG/M2

## 2018-12-11 DIAGNOSIS — I10 ESSENTIAL HYPERTENSION: ICD-10-CM

## 2018-12-11 DIAGNOSIS — G47.30 SLEEP APNEA IN ADULT: ICD-10-CM

## 2018-12-11 DIAGNOSIS — G81.94 LEFT HEMIPARESIS (HCC): ICD-10-CM

## 2018-12-11 DIAGNOSIS — I42.9 CARDIOMYOPATHY, UNSPECIFIED TYPE (HCC): Primary | ICD-10-CM

## 2018-12-11 DIAGNOSIS — I63.10 CEREBRAL INFARCTION DUE TO EMBOLISM OF PRECEREBRAL ARTERY (HCC): ICD-10-CM

## 2018-12-11 DIAGNOSIS — E11.9 TYPE 2 DIABETES MELLITUS WITHOUT COMPLICATION, WITHOUT LONG-TERM CURRENT USE OF INSULIN (HCC): ICD-10-CM

## 2018-12-11 DIAGNOSIS — E53.8 B12 DEFICIENCY: ICD-10-CM

## 2018-12-11 DIAGNOSIS — Z79.01 WARFARIN ANTICOAGULATION: ICD-10-CM

## 2018-12-11 DIAGNOSIS — E03.9 HYPOTHYROIDISM (ACQUIRED): ICD-10-CM

## 2018-12-11 LAB — INR PPP: 3.3 (ref 2–3)

## 2018-12-11 PROCEDURE — G0009 ADMIN PNEUMOCOCCAL VACCINE: HCPCS | Performed by: FAMILY MEDICINE

## 2018-12-11 PROCEDURE — 90662 IIV NO PRSV INCREASED AG IM: CPT | Performed by: FAMILY MEDICINE

## 2018-12-11 PROCEDURE — 96372 THER/PROPH/DIAG INJ SC/IM: CPT | Performed by: FAMILY MEDICINE

## 2018-12-11 PROCEDURE — 99214 OFFICE O/P EST MOD 30 MIN: CPT | Performed by: FAMILY MEDICINE

## 2018-12-11 PROCEDURE — 85610 PROTHROMBIN TIME: CPT | Performed by: FAMILY MEDICINE

## 2018-12-11 PROCEDURE — G0008 ADMIN INFLUENZA VIRUS VAC: HCPCS | Performed by: FAMILY MEDICINE

## 2018-12-11 PROCEDURE — 36416 COLLJ CAPILLARY BLOOD SPEC: CPT | Performed by: FAMILY MEDICINE

## 2018-12-11 PROCEDURE — 90732 PPSV23 VACC 2 YRS+ SUBQ/IM: CPT | Performed by: FAMILY MEDICINE

## 2018-12-11 RX ORDER — LISINOPRIL 10 MG/1
10 TABLET ORAL DAILY
Qty: 90 TABLET | Refills: 3 | Status: SHIPPED | OUTPATIENT
Start: 2018-12-11 | End: 2019-12-12 | Stop reason: SDUPTHER

## 2018-12-11 RX ADMIN — CYANOCOBALAMIN 1000 MCG: 1000 INJECTION, SOLUTION INTRAMUSCULAR; SUBCUTANEOUS at 14:05

## 2018-12-11 NOTE — PROGRESS NOTES
Subjective   Jihan Teresa is a 85 y.o. female.     Chief Complaint   Patient presents with   • Hypertension   • Diabetes   • Hypothyroidism         History of Present Illness   New patient is delightful lady who has a history of some degree of gait abnormality based on bilateral knee replacements ×2.  We discussed plan for reducing fall risk with her daughters.    Otherwise treatment of hypertension hypothyroidism diabetes type 2 as well as anticoagulation with history of equine heart valve replacement.  She has close follow-up with cardiology.    We discussed immunizations with Pneumovax 23 and Prevnar 13 next year.  Also flu shot.  She is B12 injections monthly.  She gets monthly INR.      The following portions of the patient's history were reviewed and updated as appropriate: allergies, current medications, past social history and problem list.    Review of Systems   Constitutional: Negative.    HENT: Negative.    Eyes: Negative.    Respiratory: Negative.    Cardiovascular: Negative.    Gastrointestinal: Negative.    Endocrine: Negative.    Genitourinary: Negative.    Musculoskeletal: Negative.    Skin: Negative.    Allergic/Immunologic: Negative.    Neurological: Negative.    Hematological: Negative.    Psychiatric/Behavioral: Negative.        Objective   Vitals:    12/11/18 1304   BP: 136/72   Pulse: 58   Temp: 98 °F (36.7 °C)   SpO2: (!) 88%     Physical Exam   Constitutional: She is oriented to person, place, and time. She appears well-developed and well-nourished.   HENT:   Head: Normocephalic and atraumatic.   Right Ear: Tympanic membrane and external ear normal.   Left Ear: Tympanic membrane and external ear normal.   Nose: Nose normal.   Mouth/Throat: Oropharynx is clear and moist.   Eyes: Conjunctivae and EOM are normal. Pupils are equal, round, and reactive to light.   Neck: Normal range of motion. Neck supple. No JVD present. No thyromegaly present.   Cardiovascular: Normal rate, regular rhythm  and intact distal pulses.   Murmur heard.   Systolic murmur is present with a grade of 2/6.  Pulmonary/Chest: Effort normal and breath sounds normal.   Abdominal: Soft. Bowel sounds are normal.   Musculoskeletal:        Right knee: She exhibits decreased range of motion. Tenderness found.        Left knee: She exhibits decreased range of motion. Tenderness found.   Lymphadenopathy:     She has no cervical adenopathy.   Neurological: She is alert and oriented to person, place, and time. No cranial nerve deficit. Coordination normal.   Skin: Skin is warm and dry. No rash noted.   Psychiatric: She has a normal mood and affect. Her behavior is normal. Judgment and thought content normal.   Vitals reviewed.      Assessment/Plan   Problem List Items Addressed This Visit        Cardiovascular and Mediastinum    Essential hypertension    Relevant Medications    lisinopril (PRINIVIL,ZESTRIL) 10 MG tablet    Cardiomyopathy (CMS/HCC) - Primary       Respiratory    Sleep apnea in adult       Digestive    B12 deficiency       Endocrine    Type 2 diabetes mellitus (CMS/HCC)    Hypothyroidism (acquired)       Nervous and Auditory    Cerebral infarction (CMS/HCC)      Other Visit Diagnoses     Left hemiparesis (CMS/HCC)        Warfarin anticoagulation        Relevant Orders    POCT INR      Plan: INR 3.3 B12 injection Pneumovax 23 flu shot given return visit in 6 months sooner if needed.  Refill lisinopril.

## 2018-12-21 RX ORDER — LEVOTHYROXINE SODIUM 0.15 MG/1
TABLET ORAL
Qty: 30 TABLET | Refills: 5 | Status: SHIPPED | OUTPATIENT
Start: 2018-12-21 | End: 2019-12-14 | Stop reason: SDUPTHER

## 2019-01-11 ENCOUNTER — ANTICOAGULATION VISIT (OUTPATIENT)
Dept: INTERNAL MEDICINE | Facility: CLINIC | Age: 84
End: 2019-01-11

## 2019-01-11 ENCOUNTER — CLINICAL SUPPORT (OUTPATIENT)
Dept: INTERNAL MEDICINE | Facility: CLINIC | Age: 84
End: 2019-01-11

## 2019-01-11 DIAGNOSIS — R30.0 DYSURIA: Primary | ICD-10-CM

## 2019-01-11 DIAGNOSIS — E53.8 B12 DEFICIENCY: ICD-10-CM

## 2019-01-11 LAB — INR PPP: 2.5 (ref 2–3)

## 2019-01-11 PROCEDURE — 85610 PROTHROMBIN TIME: CPT | Performed by: FAMILY MEDICINE

## 2019-01-11 PROCEDURE — 36416 COLLJ CAPILLARY BLOOD SPEC: CPT | Performed by: FAMILY MEDICINE

## 2019-01-11 PROCEDURE — 96372 THER/PROPH/DIAG INJ SC/IM: CPT | Performed by: FAMILY MEDICINE

## 2019-01-11 PROCEDURE — 93793 ANTICOAG MGMT PT WARFARIN: CPT | Performed by: FAMILY MEDICINE

## 2019-01-11 RX ADMIN — CYANOCOBALAMIN 1000 MCG: 1000 INJECTION, SOLUTION INTRAMUSCULAR; SUBCUTANEOUS at 16:40

## 2019-01-14 LAB
APPEARANCE UR: CLEAR
BACTERIA #/AREA URNS HPF: ABNORMAL /HPF
BACTERIA UR CULT: ABNORMAL
BACTERIA UR CULT: ABNORMAL
BILIRUB UR QL STRIP: NEGATIVE
COLOR UR: YELLOW
CRYSTALS URNS MICRO: ABNORMAL
EPI CELLS #/AREA URNS HPF: ABNORMAL /HPF
GLUCOSE UR QL: NEGATIVE
HGB UR QL STRIP: NEGATIVE
KETONES UR QL STRIP: NEGATIVE
LEUKOCYTE ESTERASE UR QL STRIP: ABNORMAL
MICRO URNS: ABNORMAL
MUCOUS THREADS URNS QL MICRO: PRESENT /HPF
NITRITE UR QL STRIP: NEGATIVE
OTHER ANTIBIOTIC SUSC ISLT: ABNORMAL
PH UR STRIP: 6.5 [PH] (ref 5–7.5)
PROT UR QL STRIP: NEGATIVE
RBC #/AREA URNS HPF: ABNORMAL /HPF
SP GR UR: 1.01 (ref 1–1.03)
UNIDENT CRYS URNS QL MICRO: PRESENT /LPF
URINALYSIS REFLEX: ABNORMAL
UROBILINOGEN UR STRIP-MCNC: 0.2 MG/DL (ref 0.2–1)
WBC #/AREA URNS HPF: >30 /HPF

## 2019-01-14 RX ORDER — CEPHALEXIN 500 MG/1
500 CAPSULE ORAL 2 TIMES DAILY
Qty: 20 CAPSULE | Refills: 0 | Status: SHIPPED | OUTPATIENT
Start: 2019-01-14 | End: 2019-02-08

## 2019-02-08 ENCOUNTER — TELEPHONE (OUTPATIENT)
Dept: INTERNAL MEDICINE | Facility: CLINIC | Age: 84
End: 2019-02-08

## 2019-02-08 RX ORDER — CEFDINIR 300 MG/1
300 CAPSULE ORAL DAILY
Qty: 7 CAPSULE | Refills: 0 | Status: SHIPPED | OUTPATIENT
Start: 2019-02-08 | End: 2020-01-01

## 2019-02-08 RX ORDER — POTASSIUM CHLORIDE 750 MG/1
20 CAPSULE, EXTENDED RELEASE ORAL
Qty: 360 CAPSULE | Refills: 2 | Status: SHIPPED | OUTPATIENT
Start: 2019-02-08 | End: 2020-01-01

## 2019-02-08 NOTE — TELEPHONE ENCOUNTER
I sent her Omnicef 300 mg daily #7.  I talked her daughter on the phone.  She will get a specimen cup on Monday and bring back urinalysis for culture.

## 2019-02-08 NOTE — TELEPHONE ENCOUNTER
Pt's Daughter called because she believes the pt has another UTI or the one she had never went away  She'd like to bring in another specimen to recheck it on Monday  Do you want to go ahead and treat her again and then after she completes the RX have her daughter bring in another specimen or have her bring the specimen in first?    The only symptom she has is being out of it and seeing things that arent there, but the daughter says she never has sx's

## 2019-02-11 ENCOUNTER — RESULTS ENCOUNTER (OUTPATIENT)
Dept: INTERNAL MEDICINE | Facility: CLINIC | Age: 84
End: 2019-02-11

## 2019-02-11 DIAGNOSIS — N39.0 URINARY TRACT INFECTION WITHOUT HEMATURIA, SITE UNSPECIFIED: Primary | ICD-10-CM

## 2019-02-11 DIAGNOSIS — N39.0 URINARY TRACT INFECTION WITHOUT HEMATURIA, SITE UNSPECIFIED: ICD-10-CM

## 2019-02-14 LAB
BACTERIA UR CULT: ABNORMAL
BACTERIA UR CULT: ABNORMAL
OTHER ANTIBIOTIC SUSC ISLT: ABNORMAL

## 2019-02-26 ENCOUNTER — TELEPHONE (OUTPATIENT)
Dept: INTERNAL MEDICINE | Facility: CLINIC | Age: 84
End: 2019-02-26

## 2019-02-26 DIAGNOSIS — N39.0 RECURRENT UTI: Primary | ICD-10-CM

## 2019-02-26 NOTE — TELEPHONE ENCOUNTER
.  Urine culture again grew out Citrobacter which should be susceptible to her recent antibiotic.  I would like to consider sending her to urology Dr. Antelmo Lopez for recurrent UTI.  Please send her a copy of the culture.

## 2019-04-22 RX ORDER — CLOPIDOGREL BISULFATE 75 MG/1
75 TABLET ORAL DAILY
Qty: 90 TABLET | Refills: 1 | Status: SHIPPED | OUTPATIENT
Start: 2019-04-22 | End: 2019-10-29 | Stop reason: SDUPTHER

## 2019-04-22 RX ORDER — ISOSORBIDE MONONITRATE 60 MG/1
TABLET, EXTENDED RELEASE ORAL
Qty: 90 TABLET | Refills: 1 | Status: SHIPPED | OUTPATIENT
Start: 2019-04-22 | End: 2019-10-12 | Stop reason: SDUPTHER

## 2019-05-09 RX ORDER — ATORVASTATIN CALCIUM 40 MG/1
40 TABLET, FILM COATED ORAL DAILY
Qty: 90 TABLET | Refills: 0 | Status: SHIPPED | OUTPATIENT
Start: 2019-05-09 | End: 2019-08-15 | Stop reason: SDUPTHER

## 2019-05-16 RX ORDER — FUROSEMIDE 40 MG/1
40 TABLET ORAL 3 TIMES DAILY
Qty: 270 TABLET | Refills: 0 | Status: SHIPPED | OUTPATIENT
Start: 2019-05-16 | End: 2019-09-23 | Stop reason: SDUPTHER

## 2019-05-29 RX ORDER — WARFARIN SODIUM 3 MG/1
TABLET ORAL
Qty: 90 TABLET | Refills: 1 | Status: SHIPPED | OUTPATIENT
Start: 2019-05-29 | End: 2020-01-01 | Stop reason: SDUPTHER

## 2019-08-15 RX ORDER — ATORVASTATIN CALCIUM 40 MG/1
40 TABLET, FILM COATED ORAL DAILY
Qty: 90 TABLET | Refills: 0 | Status: SHIPPED | OUTPATIENT
Start: 2019-08-15 | End: 2019-11-06 | Stop reason: SDUPTHER

## 2019-09-23 RX ORDER — FUROSEMIDE 40 MG/1
40 TABLET ORAL 3 TIMES DAILY
Qty: 90 TABLET | Refills: 0 | Status: SHIPPED | OUTPATIENT
Start: 2019-09-23 | End: 2019-10-17 | Stop reason: SDUPTHER

## 2019-10-14 RX ORDER — ISOSORBIDE MONONITRATE 60 MG/1
TABLET, EXTENDED RELEASE ORAL
Qty: 90 TABLET | Refills: 0 | Status: SHIPPED | OUTPATIENT
Start: 2019-10-14 | End: 2020-01-01

## 2019-10-17 RX ORDER — FUROSEMIDE 40 MG/1
TABLET ORAL
Qty: 90 TABLET | Refills: 0 | Status: SHIPPED | OUTPATIENT
Start: 2019-10-17 | End: 2019-11-15 | Stop reason: SDUPTHER

## 2019-10-29 RX ORDER — CLOPIDOGREL BISULFATE 75 MG/1
75 TABLET ORAL DAILY
Qty: 90 TABLET | Refills: 0 | Status: SHIPPED | OUTPATIENT
Start: 2019-10-29 | End: 2020-01-01

## 2019-11-06 RX ORDER — ATORVASTATIN CALCIUM 40 MG/1
TABLET, FILM COATED ORAL
Qty: 30 TABLET | Refills: 0 | Status: SHIPPED | OUTPATIENT
Start: 2019-11-06 | End: 2020-01-01

## 2019-11-15 RX ORDER — FUROSEMIDE 40 MG/1
TABLET ORAL
Qty: 90 TABLET | Refills: 0 | Status: SHIPPED | OUTPATIENT
Start: 2019-11-15 | End: 2019-12-19

## 2019-12-12 RX ORDER — LISINOPRIL 10 MG/1
TABLET ORAL
Qty: 30 TABLET | Refills: 0 | Status: SHIPPED | OUTPATIENT
Start: 2019-12-12 | End: 2020-01-01

## 2019-12-16 RX ORDER — LEVOTHYROXINE SODIUM 0.15 MG/1
TABLET ORAL
Qty: 30 TABLET | Refills: 0 | Status: SHIPPED | OUTPATIENT
Start: 2019-12-16 | End: 2020-01-01

## 2019-12-19 RX ORDER — FUROSEMIDE 40 MG/1
TABLET ORAL
Qty: 30 TABLET | Refills: 0 | Status: SHIPPED | OUTPATIENT
Start: 2019-12-19 | End: 2020-01-01

## 2020-01-01 ENCOUNTER — APPOINTMENT (OUTPATIENT)
Dept: CT IMAGING | Facility: HOSPITAL | Age: 85
End: 2020-01-01

## 2020-01-01 ENCOUNTER — HOSPITAL ENCOUNTER (OUTPATIENT)
Dept: CT IMAGING | Facility: HOSPITAL | Age: 85
Discharge: HOME | End: 2020-09-01
Admitting: NEUROLOGICAL SURGERY

## 2020-01-01 ENCOUNTER — HOSPITAL ENCOUNTER (INPATIENT)
Facility: HOSPITAL | Age: 85
LOS: 21 days | Discharge: NURSING, REHAB OR LONG-TERM CARE | End: 2020-08-24
Attending: EMERGENCY MEDICINE | Admitting: INTERNAL MEDICINE

## 2020-01-01 ENCOUNTER — APPOINTMENT (OUTPATIENT)
Dept: NUCLEAR MEDICINE | Facility: HOSPITAL | Age: 85
End: 2020-01-01

## 2020-01-01 ENCOUNTER — APPOINTMENT (OUTPATIENT)
Dept: GENERAL RADIOLOGY | Facility: HOSPITAL | Age: 85
End: 2020-01-01

## 2020-01-01 ENCOUNTER — APPOINTMENT (OUTPATIENT)
Dept: MRI IMAGING | Facility: HOSPITAL | Age: 85
End: 2020-01-01

## 2020-01-01 ENCOUNTER — TELEPHONE (OUTPATIENT)
Dept: NEUROSURGERY | Facility: CLINIC | Age: 85
End: 2020-01-01

## 2020-01-01 ENCOUNTER — ANESTHESIA (OUTPATIENT)
Dept: PERIOP | Facility: HOSPITAL | Age: 85
End: 2020-01-01

## 2020-01-01 ENCOUNTER — OFFICE VISIT (OUTPATIENT)
Dept: INTERNAL MEDICINE | Facility: CLINIC | Age: 85
End: 2020-01-01

## 2020-01-01 ENCOUNTER — TELEPHONE (OUTPATIENT)
Dept: INTERNAL MEDICINE | Facility: CLINIC | Age: 85
End: 2020-01-01

## 2020-01-01 ENCOUNTER — LAB REQUISITION (OUTPATIENT)
Dept: LAB | Facility: HOSPITAL | Age: 85
End: 2020-01-01

## 2020-01-01 ENCOUNTER — ANESTHESIA (OUTPATIENT)
Dept: GASTROENTEROLOGY | Facility: HOSPITAL | Age: 85
End: 2020-01-01

## 2020-01-01 ENCOUNTER — HOSPITAL ENCOUNTER (INPATIENT)
Facility: HOSPITAL | Age: 85
LOS: 10 days | Discharge: NURSING, REHAB OR LONG-TERM CARE | End: 2020-09-21
Attending: EMERGENCY MEDICINE | Admitting: HOSPITALIST

## 2020-01-01 ENCOUNTER — ANESTHESIA EVENT (OUTPATIENT)
Dept: PERIOP | Facility: HOSPITAL | Age: 85
End: 2020-01-01

## 2020-01-01 ENCOUNTER — OFFICE VISIT (OUTPATIENT)
Dept: NEUROSURGERY | Facility: CLINIC | Age: 85
End: 2020-01-01

## 2020-01-01 ENCOUNTER — APPOINTMENT (OUTPATIENT)
Dept: CARDIOLOGY | Facility: HOSPITAL | Age: 85
End: 2020-01-01

## 2020-01-01 ENCOUNTER — TELEPHONE (OUTPATIENT)
Dept: GASTROENTEROLOGY | Facility: CLINIC | Age: 85
End: 2020-01-01

## 2020-01-01 ENCOUNTER — HOSPITAL ENCOUNTER (INPATIENT)
Facility: HOSPITAL | Age: 85
LOS: 3 days | Discharge: NURSING, REHAB OR LONG-TERM CARE | End: 2020-11-27
Attending: EMERGENCY MEDICINE | Admitting: HOSPITALIST

## 2020-01-01 ENCOUNTER — APPOINTMENT (OUTPATIENT)
Dept: NEUROLOGY | Facility: HOSPITAL | Age: 85
End: 2020-01-01

## 2020-01-01 ENCOUNTER — OFFICE VISIT (OUTPATIENT)
Dept: GASTROENTEROLOGY | Facility: CLINIC | Age: 85
End: 2020-01-01

## 2020-01-01 ENCOUNTER — ANESTHESIA EVENT (OUTPATIENT)
Dept: GASTROENTEROLOGY | Facility: HOSPITAL | Age: 85
End: 2020-01-01

## 2020-01-01 ENCOUNTER — HOSPITAL ENCOUNTER (INPATIENT)
Facility: HOSPITAL | Age: 85
LOS: 3 days | End: 2021-01-02
Attending: EMERGENCY MEDICINE | Admitting: INTERNAL MEDICINE

## 2020-01-01 VITALS
OXYGEN SATURATION: 98 % | DIASTOLIC BLOOD PRESSURE: 73 MMHG | TEMPERATURE: 98 F | HEART RATE: 67 BPM | HEIGHT: 61 IN | RESPIRATION RATE: 18 BRPM | SYSTOLIC BLOOD PRESSURE: 147 MMHG | BODY MASS INDEX: 25.11 KG/M2 | WEIGHT: 133 LBS

## 2020-01-01 VITALS
WEIGHT: 137 LBS | TEMPERATURE: 97.8 F | BODY MASS INDEX: 26.9 KG/M2 | OXYGEN SATURATION: 83 % | HEART RATE: 75 BPM | SYSTOLIC BLOOD PRESSURE: 124 MMHG | DIASTOLIC BLOOD PRESSURE: 70 MMHG | HEIGHT: 60 IN

## 2020-01-01 VITALS
WEIGHT: 149 LBS | BODY MASS INDEX: 29.1 KG/M2 | TEMPERATURE: 98.7 F | SYSTOLIC BLOOD PRESSURE: 116 MMHG | HEART RATE: 60 BPM | DIASTOLIC BLOOD PRESSURE: 68 MMHG | OXYGEN SATURATION: 90 %

## 2020-01-01 VITALS
WEIGHT: 136.8 LBS | SYSTOLIC BLOOD PRESSURE: 145 MMHG | TEMPERATURE: 99 F | DIASTOLIC BLOOD PRESSURE: 64 MMHG | HEIGHT: 60 IN | OXYGEN SATURATION: 90 % | BODY MASS INDEX: 26.86 KG/M2 | HEART RATE: 65 BPM | RESPIRATION RATE: 16 BRPM

## 2020-01-01 VITALS
WEIGHT: 152.34 LBS | HEART RATE: 70 BPM | BODY MASS INDEX: 29.91 KG/M2 | DIASTOLIC BLOOD PRESSURE: 64 MMHG | HEIGHT: 60 IN | SYSTOLIC BLOOD PRESSURE: 113 MMHG | TEMPERATURE: 97.8 F | OXYGEN SATURATION: 92 % | RESPIRATION RATE: 16 BRPM

## 2020-01-01 VITALS
SYSTOLIC BLOOD PRESSURE: 130 MMHG | WEIGHT: 122 LBS | HEIGHT: 60 IN | BODY MASS INDEX: 23.95 KG/M2 | DIASTOLIC BLOOD PRESSURE: 65 MMHG

## 2020-01-01 DIAGNOSIS — R54 FRAILTY: ICD-10-CM

## 2020-01-01 DIAGNOSIS — R79.1 SUBTHERAPEUTIC INTERNATIONAL NORMALIZED RATIO (INR): ICD-10-CM

## 2020-01-01 DIAGNOSIS — R09.02 HYPOXEMIA: ICD-10-CM

## 2020-01-01 DIAGNOSIS — I61.0 NONTRAUMATIC SUBCORTICAL HEMORRHAGE OF LEFT CEREBRAL HEMISPHERE (HCC): Primary | ICD-10-CM

## 2020-01-01 DIAGNOSIS — Z87.440 HISTORY OF RECURRENT UTIS: ICD-10-CM

## 2020-01-01 DIAGNOSIS — D50.0 BLOOD LOSS ANEMIA: Primary | ICD-10-CM

## 2020-01-01 DIAGNOSIS — D68.32 WARFARIN-INDUCED COAGULOPATHY (HCC): ICD-10-CM

## 2020-01-01 DIAGNOSIS — E11.9 TYPE 2 DIABETES MELLITUS WITHOUT COMPLICATION, WITHOUT LONG-TERM CURRENT USE OF INSULIN (HCC): ICD-10-CM

## 2020-01-01 DIAGNOSIS — N39.0 UTI (URINARY TRACT INFECTION), BACTERIAL: ICD-10-CM

## 2020-01-01 DIAGNOSIS — I61.0 NONTRAUMATIC SUBCORTICAL HEMORRHAGE OF LEFT CEREBRAL HEMISPHERE (HCC): ICD-10-CM

## 2020-01-01 DIAGNOSIS — E53.8 B12 DEFICIENCY: ICD-10-CM

## 2020-01-01 DIAGNOSIS — I48.91 ATRIAL FIBRILLATION, UNSPECIFIED TYPE (HCC): Primary | ICD-10-CM

## 2020-01-01 DIAGNOSIS — D50.0 BLOOD LOSS ANEMIA: ICD-10-CM

## 2020-01-01 DIAGNOSIS — K64.2 GRADE III HEMORRHOIDS: ICD-10-CM

## 2020-01-01 DIAGNOSIS — K92.1 MELENA: ICD-10-CM

## 2020-01-01 DIAGNOSIS — R79.1 SUPRATHERAPEUTIC INR: ICD-10-CM

## 2020-01-01 DIAGNOSIS — K20.90 ESOPHAGITIS: ICD-10-CM

## 2020-01-01 DIAGNOSIS — J96.01 ACUTE HYPOXEMIC RESPIRATORY FAILURE (HCC): Primary | ICD-10-CM

## 2020-01-01 DIAGNOSIS — I50.9 CONGESTIVE HEART FAILURE, UNSPECIFIED HF CHRONICITY, UNSPECIFIED HEART FAILURE TYPE (HCC): ICD-10-CM

## 2020-01-01 DIAGNOSIS — Z79.01 ANTICOAGULATED: ICD-10-CM

## 2020-01-01 DIAGNOSIS — I25.5 ISCHEMIC CARDIOMYOPATHY: ICD-10-CM

## 2020-01-01 DIAGNOSIS — I48.19 PERSISTENT ATRIAL FIBRILLATION (HCC): Primary | ICD-10-CM

## 2020-01-01 DIAGNOSIS — I63.9 CEREBROVASCULAR ACCIDENT (CVA), UNSPECIFIED MECHANISM (HCC): Primary | ICD-10-CM

## 2020-01-01 DIAGNOSIS — E55.9 VITAMIN D DEFICIENCY: ICD-10-CM

## 2020-01-01 DIAGNOSIS — N39.0 ACUTE UTI: ICD-10-CM

## 2020-01-01 DIAGNOSIS — Z20.822 SUSPECTED 2019 NOVEL CORONAVIRUS INFECTION: ICD-10-CM

## 2020-01-01 DIAGNOSIS — I50.43 ACUTE ON CHRONIC COMBINED SYSTOLIC AND DIASTOLIC CONGESTIVE HEART FAILURE (HCC): ICD-10-CM

## 2020-01-01 DIAGNOSIS — A49.9 UTI (URINARY TRACT INFECTION), BACTERIAL: ICD-10-CM

## 2020-01-01 DIAGNOSIS — I48.20 CHRONIC ATRIAL FIBRILLATION (HCC): ICD-10-CM

## 2020-01-01 DIAGNOSIS — J45.20 MILD INTERMITTENT REACTIVE AIRWAY DISEASE WITHOUT COMPLICATION: ICD-10-CM

## 2020-01-01 DIAGNOSIS — D62 ACUTE BLOOD LOSS ANEMIA: ICD-10-CM

## 2020-01-01 DIAGNOSIS — E03.9 HYPOTHYROIDISM (ACQUIRED): ICD-10-CM

## 2020-01-01 DIAGNOSIS — I50.22 CHRONIC SYSTOLIC CONGESTIVE HEART FAILURE (HCC): ICD-10-CM

## 2020-01-01 DIAGNOSIS — Z00.00 ROUTINE GENERAL MEDICAL EXAMINATION AT A HEALTH CARE FACILITY: ICD-10-CM

## 2020-01-01 DIAGNOSIS — K92.2 ACUTE UPPER GASTROINTESTINAL BLEEDING: Primary | ICD-10-CM

## 2020-01-01 DIAGNOSIS — Z95.2 AORTIC VALVE REPLACED: ICD-10-CM

## 2020-01-01 DIAGNOSIS — E78.2 MIXED HYPERLIPIDEMIA: ICD-10-CM

## 2020-01-01 DIAGNOSIS — T45.515A WARFARIN-INDUCED COAGULOPATHY (HCC): ICD-10-CM

## 2020-01-01 DIAGNOSIS — I61.9 NONTRAUMATIC INTRACEREBRAL HEMORRHAGE, UNSPECIFIED CEREBRAL LOCATION, UNSPECIFIED LATERALITY (HCC): Primary | ICD-10-CM

## 2020-01-01 LAB
25(OH)D3+25(OH)D2 SERPL-MCNC: 40.8 NG/ML (ref 30–100)
ABO GROUP BLD: NORMAL
ALBUMIN SERPL-MCNC: 3 G/DL (ref 3.5–5.2)
ALBUMIN SERPL-MCNC: 3.2 G/DL (ref 3.5–5.2)
ALBUMIN SERPL-MCNC: 3.3 G/DL (ref 3.5–5.2)
ALBUMIN SERPL-MCNC: 3.8 G/DL (ref 3.5–5.2)
ALBUMIN SERPL-MCNC: 3.8 G/DL (ref 3.5–5.2)
ALBUMIN SERPL-MCNC: 4 G/DL (ref 3.5–5.2)
ALBUMIN SERPL-MCNC: 4 G/DL (ref 3.5–5.2)
ALBUMIN SERPL-MCNC: 4.2 G/DL (ref 3.5–4.7)
ALBUMIN/GLOB SERPL: 1.3 G/DL
ALBUMIN/GLOB SERPL: 1.4 G/DL
ALBUMIN/GLOB SERPL: 1.5 G/DL
ALBUMIN/GLOB SERPL: 1.8 G/DL
ALBUMIN/GLOB SERPL: 2 G/DL
ALBUMIN/GLOB SERPL: 2 {RATIO} (ref 1.2–2.2)
ALP SERPL-CCNC: 36 U/L (ref 39–117)
ALP SERPL-CCNC: 38 U/L (ref 39–117)
ALP SERPL-CCNC: 46 U/L (ref 39–117)
ALP SERPL-CCNC: 52 U/L (ref 39–117)
ALP SERPL-CCNC: 55 U/L (ref 39–117)
ALP SERPL-CCNC: 60 IU/L (ref 39–117)
ALP SERPL-CCNC: 70 U/L (ref 39–117)
ALP SERPL-CCNC: 73 U/L (ref 39–117)
ALT SERPL W P-5'-P-CCNC: 10 U/L (ref 1–33)
ALT SERPL W P-5'-P-CCNC: 15 U/L (ref 1–33)
ALT SERPL W P-5'-P-CCNC: 17 U/L (ref 1–33)
ALT SERPL W P-5'-P-CCNC: 31 U/L (ref 1–33)
ALT SERPL W P-5'-P-CCNC: 6 U/L (ref 1–33)
ALT SERPL W P-5'-P-CCNC: 7 U/L (ref 1–33)
ALT SERPL W P-5'-P-CCNC: 8 U/L (ref 1–33)
ALT SERPL-CCNC: 23 IU/L (ref 0–32)
AMMONIA BLD-SCNC: 11 UMOL/L (ref 11–51)
ANION GAP SERPL CALCULATED.3IONS-SCNC: 10 MMOL/L (ref 5–15)
ANION GAP SERPL CALCULATED.3IONS-SCNC: 10.1 MMOL/L (ref 5–15)
ANION GAP SERPL CALCULATED.3IONS-SCNC: 10.3 MMOL/L (ref 5–15)
ANION GAP SERPL CALCULATED.3IONS-SCNC: 10.5 MMOL/L (ref 5–15)
ANION GAP SERPL CALCULATED.3IONS-SCNC: 10.5 MMOL/L (ref 5–15)
ANION GAP SERPL CALCULATED.3IONS-SCNC: 11.8 MMOL/L (ref 5–15)
ANION GAP SERPL CALCULATED.3IONS-SCNC: 11.8 MMOL/L (ref 5–15)
ANION GAP SERPL CALCULATED.3IONS-SCNC: 11.9 MMOL/L (ref 5–15)
ANION GAP SERPL CALCULATED.3IONS-SCNC: 12.1 MMOL/L (ref 5–15)
ANION GAP SERPL CALCULATED.3IONS-SCNC: 12.6 MMOL/L (ref 5–15)
ANION GAP SERPL CALCULATED.3IONS-SCNC: 17.3 MMOL/L (ref 5–15)
ANION GAP SERPL CALCULATED.3IONS-SCNC: 5.5 MMOL/L (ref 5–15)
ANION GAP SERPL CALCULATED.3IONS-SCNC: 6 MMOL/L (ref 5–15)
ANION GAP SERPL CALCULATED.3IONS-SCNC: 6.1 MMOL/L (ref 5–15)
ANION GAP SERPL CALCULATED.3IONS-SCNC: 6.5 MMOL/L (ref 5–15)
ANION GAP SERPL CALCULATED.3IONS-SCNC: 6.6 MMOL/L (ref 5–15)
ANION GAP SERPL CALCULATED.3IONS-SCNC: 6.8 MMOL/L (ref 5–15)
ANION GAP SERPL CALCULATED.3IONS-SCNC: 6.9 MMOL/L (ref 5–15)
ANION GAP SERPL CALCULATED.3IONS-SCNC: 7.2 MMOL/L (ref 5–15)
ANION GAP SERPL CALCULATED.3IONS-SCNC: 7.7 MMOL/L (ref 5–15)
ANION GAP SERPL CALCULATED.3IONS-SCNC: 8 MMOL/L (ref 5–15)
ANION GAP SERPL CALCULATED.3IONS-SCNC: 8.1 MMOL/L (ref 5–15)
ANION GAP SERPL CALCULATED.3IONS-SCNC: 8.3 MMOL/L (ref 5–15)
ANION GAP SERPL CALCULATED.3IONS-SCNC: 8.4 MMOL/L (ref 5–15)
ANION GAP SERPL CALCULATED.3IONS-SCNC: 8.6 MMOL/L (ref 5–15)
ANION GAP SERPL CALCULATED.3IONS-SCNC: 8.6 MMOL/L (ref 5–15)
ANION GAP SERPL CALCULATED.3IONS-SCNC: 8.7 MMOL/L (ref 5–15)
ANION GAP SERPL CALCULATED.3IONS-SCNC: 8.8 MMOL/L (ref 5–15)
ANION GAP SERPL CALCULATED.3IONS-SCNC: 8.8 MMOL/L (ref 5–15)
ANION GAP SERPL CALCULATED.3IONS-SCNC: 9.3 MMOL/L (ref 5–15)
ANION GAP SERPL CALCULATED.3IONS-SCNC: 9.3 MMOL/L (ref 5–15)
ANION GAP SERPL CALCULATED.3IONS-SCNC: 9.4 MMOL/L (ref 5–15)
APTT PPP: 102.2 SECONDS (ref 22.7–35.4)
APTT PPP: 117.5 SECONDS (ref 22.7–35.4)
APTT PPP: 150.7 SECONDS (ref 22.7–35.4)
APTT PPP: 30.5 SECONDS (ref 22.7–35.4)
APTT PPP: 31.5 SECONDS (ref 22.7–35.4)
APTT PPP: 32.7 SECONDS (ref 22.7–35.4)
APTT PPP: 33.4 SECONDS (ref 22.7–35.4)
APTT PPP: 34.5 SECONDS (ref 22.7–35.4)
APTT PPP: 35.2 SECONDS (ref 22.7–35.4)
APTT PPP: 36.9 SECONDS (ref 22.7–35.4)
APTT PPP: 41 SECONDS (ref 22.7–35.4)
APTT PPP: 42.7 SECONDS (ref 22.7–35.4)
APTT PPP: 43.1 SECONDS (ref 22.7–35.4)
APTT PPP: 43.9 SECONDS (ref 22.7–35.4)
APTT PPP: 48.9 SECONDS (ref 22.7–35.4)
APTT PPP: 53.6 SECONDS (ref 22.7–35.4)
APTT PPP: 63.5 SECONDS (ref 22.7–35.4)
APTT PPP: 67.5 SECONDS (ref 22.7–35.4)
APTT PPP: 68.2 SECONDS (ref 22.7–35.4)
APTT PPP: 68.7 SECONDS (ref 22.7–35.4)
APTT PPP: 73.8 SECONDS (ref 22.7–35.4)
APTT PPP: 74.3 SECONDS (ref 22.7–35.4)
APTT PPP: 83.7 SECONDS (ref 22.7–35.4)
APTT PPP: 85.6 SECONDS (ref 22.7–35.4)
ARTERIAL PATENCY WRIST A: POSITIVE
ARTERIAL PATENCY WRIST A: POSITIVE
AST SERPL-CCNC: 17 U/L (ref 1–32)
AST SERPL-CCNC: 21 U/L (ref 1–32)
AST SERPL-CCNC: 23 U/L (ref 1–32)
AST SERPL-CCNC: 24 IU/L (ref 0–40)
AST SERPL-CCNC: 24 U/L (ref 1–32)
AST SERPL-CCNC: 6 U/L (ref 1–32)
AST SERPL-CCNC: 8 U/L (ref 1–32)
AST SERPL-CCNC: 8 U/L (ref 1–32)
ATMOSPHERIC PRESS: 751.8 MMHG
ATMOSPHERIC PRESS: 754.2 MMHG
B PARAPERT DNA SPEC QL NAA+PROBE: NOT DETECTED
B PARAPERT DNA SPEC QL NAA+PROBE: NOT DETECTED
B PERT DNA SPEC QL NAA+PROBE: NOT DETECTED
B PERT DNA SPEC QL NAA+PROBE: NOT DETECTED
BACTERIA BLD CULT: NORMAL
BACTERIA SPEC AEROBE CULT: ABNORMAL
BACTERIA SPEC AEROBE CULT: ABNORMAL
BACTERIA SPEC AEROBE CULT: NORMAL
BACTERIA UR QL AUTO: ABNORMAL /HPF
BASE EXCESS BLDA CALC-SCNC: 2.4 MMOL/L (ref 0–2)
BASE EXCESS BLDA CALC-SCNC: 4.7 MMOL/L (ref 0–2)
BASOPHILS # BLD AUTO: 0 X10E3/UL (ref 0–0.2)
BASOPHILS # BLD AUTO: 0.01 10*3/MM3 (ref 0–0.2)
BASOPHILS # BLD AUTO: 0.03 10*3/MM3 (ref 0–0.2)
BASOPHILS # BLD AUTO: 0.04 10*3/MM3 (ref 0–0.2)
BASOPHILS # BLD AUTO: 0.05 10*3/MM3 (ref 0–0.2)
BASOPHILS # BLD AUTO: 0.06 10*3/MM3 (ref 0–0.2)
BASOPHILS NFR BLD AUTO: 0 %
BASOPHILS NFR BLD AUTO: 0.2 % (ref 0–1.5)
BASOPHILS NFR BLD AUTO: 0.5 % (ref 0–1.5)
BASOPHILS NFR BLD AUTO: 0.6 % (ref 0–1.5)
BASOPHILS NFR BLD AUTO: 0.6 % (ref 0–1.5)
BASOPHILS NFR BLD AUTO: 0.7 % (ref 0–1.5)
BASOPHILS NFR BLD AUTO: 0.7 % (ref 0–1.5)
BASOPHILS NFR BLD AUTO: 0.8 % (ref 0–1.5)
BASOPHILS NFR BLD AUTO: 0.8 % (ref 0–1.5)
BASOPHILS NFR BLD AUTO: 0.9 % (ref 0–1.5)
BASOPHILS NFR BLD AUTO: 1 % (ref 0–1.5)
BDY SITE: ABNORMAL
BDY SITE: ABNORMAL
BH BB BLOOD EXPIRATION DATE: NORMAL
BH BB BLOOD TYPE BARCODE: 6200
BH BB DISPENSE STATUS: NORMAL
BH BB PRODUCT CODE: NORMAL
BH BB UNIT NUMBER: NORMAL
BH CV LOWER VASCULAR LEFT COMMON FEMORAL AUGMENT: NORMAL
BH CV LOWER VASCULAR LEFT COMMON FEMORAL COMPETENT: NORMAL
BH CV LOWER VASCULAR LEFT COMMON FEMORAL COMPRESS: NORMAL
BH CV LOWER VASCULAR LEFT COMMON FEMORAL PHASIC: NORMAL
BH CV LOWER VASCULAR LEFT COMMON FEMORAL SPONT: NORMAL
BH CV LOWER VASCULAR RIGHT COMMON FEMORAL AUGMENT: NORMAL
BH CV LOWER VASCULAR RIGHT COMMON FEMORAL COMPETENT: NORMAL
BH CV LOWER VASCULAR RIGHT COMMON FEMORAL COMPRESS: NORMAL
BH CV LOWER VASCULAR RIGHT COMMON FEMORAL PHASIC: NORMAL
BH CV LOWER VASCULAR RIGHT COMMON FEMORAL SPONT: NORMAL
BH CV LOWER VASCULAR RIGHT DISTAL FEMORAL COMPRESS: NORMAL
BH CV LOWER VASCULAR RIGHT GASTRONEMIUS COMPRESS: NORMAL
BH CV LOWER VASCULAR RIGHT GREATER SAPH AK COMPRESS: NORMAL
BH CV LOWER VASCULAR RIGHT GREATER SAPH BK COMPRESS: NORMAL
BH CV LOWER VASCULAR RIGHT MID FEMORAL AUGMENT: NORMAL
BH CV LOWER VASCULAR RIGHT MID FEMORAL COMPETENT: NORMAL
BH CV LOWER VASCULAR RIGHT MID FEMORAL COMPRESS: NORMAL
BH CV LOWER VASCULAR RIGHT MID FEMORAL PHASIC: NORMAL
BH CV LOWER VASCULAR RIGHT MID FEMORAL SPONT: NORMAL
BH CV LOWER VASCULAR RIGHT PERONEAL COMPRESS: NORMAL
BH CV LOWER VASCULAR RIGHT POPLITEAL AUGMENT: NORMAL
BH CV LOWER VASCULAR RIGHT POPLITEAL COMPETENT: NORMAL
BH CV LOWER VASCULAR RIGHT POPLITEAL COMPRESS: NORMAL
BH CV LOWER VASCULAR RIGHT POPLITEAL PHASIC: NORMAL
BH CV LOWER VASCULAR RIGHT POPLITEAL SPONT: NORMAL
BH CV LOWER VASCULAR RIGHT POSTERIOR TIBIAL COMPRESS: NORMAL
BH CV LOWER VASCULAR RIGHT PROFUNDA FEMORAL COMPRESS: NORMAL
BH CV LOWER VASCULAR RIGHT PROXIMAL FEMORAL COMPRESS: NORMAL
BH CV LOWER VASCULAR RIGHT SAPHENOFEMORAL JUNCTION COMPRESS: NORMAL
BH CV STRESS COMMENTS STAGE 1: NORMAL
BH CV STRESS DOSE REGADENOSON STAGE 1: 0.4
BH CV STRESS DURATION MIN STAGE 1: 0
BH CV STRESS DURATION SEC STAGE 1: 10
BH CV STRESS PROTOCOL 1: NORMAL
BH CV STRESS RECOVERY BP: NORMAL MMHG
BH CV STRESS RECOVERY HR: 90 BPM
BH CV STRESS STAGE 1: 1
BILIRUB SERPL-MCNC: 0.3 MG/DL (ref 0–1.2)
BILIRUB SERPL-MCNC: 0.3 MG/DL (ref 0–1.2)
BILIRUB SERPL-MCNC: 0.4 MG/DL (ref 0–1.2)
BILIRUB SERPL-MCNC: 0.4 MG/DL (ref 0–1.2)
BILIRUB SERPL-MCNC: 0.5 MG/DL (ref 0–1.2)
BILIRUB SERPL-MCNC: 0.6 MG/DL (ref 0–1.2)
BILIRUB SERPL-MCNC: 0.6 MG/DL (ref 0–1.2)
BILIRUB SERPL-MCNC: 0.8 MG/DL (ref 0–1.2)
BILIRUB UR QL STRIP: NEGATIVE
BLD GP AB SCN SERPL QL: NEGATIVE
BUN SERPL-MCNC: 10 MG/DL (ref 8–23)
BUN SERPL-MCNC: 10 MG/DL (ref 8–23)
BUN SERPL-MCNC: 11 MG/DL (ref 8–23)
BUN SERPL-MCNC: 12 MG/DL (ref 8–23)
BUN SERPL-MCNC: 14 MG/DL (ref 8–23)
BUN SERPL-MCNC: 14 MG/DL (ref 8–23)
BUN SERPL-MCNC: 15 MG/DL (ref 8–23)
BUN SERPL-MCNC: 16 MG/DL (ref 8–23)
BUN SERPL-MCNC: 17 MG/DL (ref 8–23)
BUN SERPL-MCNC: 17 MG/DL (ref 8–23)
BUN SERPL-MCNC: 19 MG/DL (ref 8–23)
BUN SERPL-MCNC: 2 MG/DL (ref 8–23)
BUN SERPL-MCNC: 2 MG/DL (ref 8–23)
BUN SERPL-MCNC: 21 MG/DL (ref 8–23)
BUN SERPL-MCNC: 23 MG/DL (ref 8–23)
BUN SERPL-MCNC: 23 MG/DL (ref 8–23)
BUN SERPL-MCNC: 26 MG/DL (ref 8–27)
BUN SERPL-MCNC: 29 MG/DL (ref 8–23)
BUN SERPL-MCNC: 46 MG/DL (ref 8–23)
BUN SERPL-MCNC: 56 MG/DL (ref 8–23)
BUN SERPL-MCNC: 7 MG/DL (ref 8–23)
BUN SERPL-MCNC: 8 MG/DL (ref 8–23)
BUN SERPL-MCNC: 9 MG/DL (ref 8–23)
BUN SERPL-MCNC: 9 MG/DL (ref 8–23)
BUN/CREAT SERPL: 10.1 (ref 7–25)
BUN/CREAT SERPL: 10.9 (ref 7–25)
BUN/CREAT SERPL: 11 (ref 7–25)
BUN/CREAT SERPL: 11.1 (ref 7–25)
BUN/CREAT SERPL: 11.1 (ref 7–25)
BUN/CREAT SERPL: 11.3 (ref 7–25)
BUN/CREAT SERPL: 11.4 (ref 7–25)
BUN/CREAT SERPL: 11.8 (ref 7–25)
BUN/CREAT SERPL: 12.2 (ref 7–25)
BUN/CREAT SERPL: 12.7 (ref 7–25)
BUN/CREAT SERPL: 14.1 (ref 7–25)
BUN/CREAT SERPL: 15 (ref 7–25)
BUN/CREAT SERPL: 15.2 (ref 7–25)
BUN/CREAT SERPL: 15.7 (ref 7–25)
BUN/CREAT SERPL: 16.1 (ref 7–25)
BUN/CREAT SERPL: 16.3 (ref 7–25)
BUN/CREAT SERPL: 16.4 (ref 7–25)
BUN/CREAT SERPL: 16.9 (ref 7–25)
BUN/CREAT SERPL: 17.6 (ref 7–25)
BUN/CREAT SERPL: 19.3 (ref 7–25)
BUN/CREAT SERPL: 19.8 (ref 7–25)
BUN/CREAT SERPL: 22.9 (ref 7–25)
BUN/CREAT SERPL: 23.9 (ref 7–25)
BUN/CREAT SERPL: 24.2 (ref 7–25)
BUN/CREAT SERPL: 25 (ref 7–25)
BUN/CREAT SERPL: 25 (ref 7–25)
BUN/CREAT SERPL: 26 (ref 12–28)
BUN/CREAT SERPL: 26.3 (ref 7–25)
BUN/CREAT SERPL: 26.6 (ref 7–25)
BUN/CREAT SERPL: 28 (ref 7–25)
BUN/CREAT SERPL: 3.3 (ref 7–25)
BUN/CREAT SERPL: 3.7 (ref 7–25)
BUN/CREAT SERPL: 35.8 (ref 7–25)
BUN/CREAT SERPL: 49.5 (ref 7–25)
BUN/CREAT SERPL: 52.3 (ref 7–25)
C PNEUM DNA NPH QL NAA+NON-PROBE: NOT DETECTED
C PNEUM DNA NPH QL NAA+NON-PROBE: NOT DETECTED
CALCIUM SERPL-MCNC: 9.3 MG/DL (ref 8.7–10.3)
CALCIUM SPEC-SCNC: 7.9 MG/DL (ref 8.6–10.5)
CALCIUM SPEC-SCNC: 8 MG/DL (ref 8.6–10.5)
CALCIUM SPEC-SCNC: 8 MG/DL (ref 8.6–10.5)
CALCIUM SPEC-SCNC: 8.1 MG/DL (ref 8.6–10.5)
CALCIUM SPEC-SCNC: 8.2 MG/DL (ref 8.6–10.5)
CALCIUM SPEC-SCNC: 8.3 MG/DL (ref 8.6–10.5)
CALCIUM SPEC-SCNC: 8.4 MG/DL (ref 8.6–10.5)
CALCIUM SPEC-SCNC: 8.4 MG/DL (ref 8.6–10.5)
CALCIUM SPEC-SCNC: 8.5 MG/DL (ref 8.6–10.5)
CALCIUM SPEC-SCNC: 8.5 MG/DL (ref 8.6–10.5)
CALCIUM SPEC-SCNC: 8.6 MG/DL (ref 8.6–10.5)
CALCIUM SPEC-SCNC: 8.7 MG/DL (ref 8.6–10.5)
CALCIUM SPEC-SCNC: 8.8 MG/DL (ref 8.6–10.5)
CALCIUM SPEC-SCNC: 9 MG/DL (ref 8.6–10.5)
CALCIUM SPEC-SCNC: 9.1 MG/DL (ref 8.6–10.5)
CALCIUM SPEC-SCNC: 9.2 MG/DL (ref 8.6–10.5)
CALCIUM SPEC-SCNC: 9.8 MG/DL (ref 8.6–10.5)
CHLORIDE SERPL-SCNC: 100 MMOL/L (ref 98–107)
CHLORIDE SERPL-SCNC: 101 MMOL/L (ref 98–107)
CHLORIDE SERPL-SCNC: 101 MMOL/L (ref 98–107)
CHLORIDE SERPL-SCNC: 102 MMOL/L (ref 98–107)
CHLORIDE SERPL-SCNC: 103 MMOL/L (ref 98–107)
CHLORIDE SERPL-SCNC: 103 MMOL/L (ref 98–107)
CHLORIDE SERPL-SCNC: 104 MMOL/L (ref 96–106)
CHLORIDE SERPL-SCNC: 104 MMOL/L (ref 98–107)
CHLORIDE SERPL-SCNC: 105 MMOL/L (ref 98–107)
CHLORIDE SERPL-SCNC: 106 MMOL/L (ref 98–107)
CHLORIDE SERPL-SCNC: 108 MMOL/L (ref 98–107)
CHLORIDE SERPL-SCNC: 108 MMOL/L (ref 98–107)
CHLORIDE SERPL-SCNC: 109 MMOL/L (ref 98–107)
CHLORIDE SERPL-SCNC: 109 MMOL/L (ref 98–107)
CHLORIDE SERPL-SCNC: 110 MMOL/L (ref 98–107)
CHLORIDE SERPL-SCNC: 110 MMOL/L (ref 98–107)
CHLORIDE SERPL-SCNC: 112 MMOL/L (ref 98–107)
CHLORIDE SERPL-SCNC: 95 MMOL/L (ref 98–107)
CHLORIDE SERPL-SCNC: 99 MMOL/L (ref 98–107)
CHLORIDE SERPL-SCNC: 99 MMOL/L (ref 98–107)
CHOLEST SERPL-MCNC: 108 MG/DL (ref 100–199)
CHOLEST SERPL-MCNC: 204 MG/DL (ref 0–200)
CHOLEST/HDLC SERPL: 3 RATIO (ref 0–4.4)
CK SERPL-CCNC: 26 U/L (ref 20–180)
CLARITY UR: ABNORMAL
CLARITY UR: CLEAR
CO2 SERPL-SCNC: 18 MMOL/L (ref 20–29)
CO2 SERPL-SCNC: 20.5 MMOL/L (ref 22–29)
CO2 SERPL-SCNC: 20.7 MMOL/L (ref 22–29)
CO2 SERPL-SCNC: 20.9 MMOL/L (ref 22–29)
CO2 SERPL-SCNC: 21.7 MMOL/L (ref 22–29)
CO2 SERPL-SCNC: 21.8 MMOL/L (ref 22–29)
CO2 SERPL-SCNC: 21.9 MMOL/L (ref 22–29)
CO2 SERPL-SCNC: 22.2 MMOL/L (ref 22–29)
CO2 SERPL-SCNC: 23.2 MMOL/L (ref 22–29)
CO2 SERPL-SCNC: 23.3 MMOL/L (ref 22–29)
CO2 SERPL-SCNC: 23.5 MMOL/L (ref 22–29)
CO2 SERPL-SCNC: 23.7 MMOL/L (ref 22–29)
CO2 SERPL-SCNC: 24.1 MMOL/L (ref 22–29)
CO2 SERPL-SCNC: 24.4 MMOL/L (ref 22–29)
CO2 SERPL-SCNC: 24.4 MMOL/L (ref 22–29)
CO2 SERPL-SCNC: 25.2 MMOL/L (ref 22–29)
CO2 SERPL-SCNC: 25.6 MMOL/L (ref 22–29)
CO2 SERPL-SCNC: 26 MMOL/L (ref 22–29)
CO2 SERPL-SCNC: 26.5 MMOL/L (ref 22–29)
CO2 SERPL-SCNC: 26.7 MMOL/L (ref 22–29)
CO2 SERPL-SCNC: 26.9 MMOL/L (ref 22–29)
CO2 SERPL-SCNC: 27.2 MMOL/L (ref 22–29)
CO2 SERPL-SCNC: 27.4 MMOL/L (ref 22–29)
CO2 SERPL-SCNC: 27.4 MMOL/L (ref 22–29)
CO2 SERPL-SCNC: 27.6 MMOL/L (ref 22–29)
CO2 SERPL-SCNC: 28.5 MMOL/L (ref 22–29)
CO2 SERPL-SCNC: 28.7 MMOL/L (ref 22–29)
CO2 SERPL-SCNC: 28.9 MMOL/L (ref 22–29)
CO2 SERPL-SCNC: 29 MMOL/L (ref 22–29)
CO2 SERPL-SCNC: 29.3 MMOL/L (ref 22–29)
CO2 SERPL-SCNC: 29.9 MMOL/L (ref 22–29)
CO2 SERPL-SCNC: 29.9 MMOL/L (ref 22–29)
CO2 SERPL-SCNC: 30 MMOL/L (ref 22–29)
CO2 SERPL-SCNC: 31.1 MMOL/L (ref 22–29)
CO2 SERPL-SCNC: 31.2 MMOL/L (ref 22–29)
COLOR UR: YELLOW
CREAT SERPL-MCNC: 0.54 MG/DL (ref 0.57–1)
CREAT SERPL-MCNC: 0.57 MG/DL (ref 0.57–1)
CREAT SERPL-MCNC: 0.6 MG/DL (ref 0.57–1)
CREAT SERPL-MCNC: 0.61 MG/DL (ref 0.57–1)
CREAT SERPL-MCNC: 0.63 MG/DL (ref 0.57–1)
CREAT SERPL-MCNC: 0.64 MG/DL (ref 0.57–1)
CREAT SERPL-MCNC: 0.66 MG/DL (ref 0.57–1)
CREAT SERPL-MCNC: 0.66 MG/DL (ref 0.57–1)
CREAT SERPL-MCNC: 0.68 MG/DL (ref 0.57–1)
CREAT SERPL-MCNC: 0.68 MG/DL (ref 0.57–1)
CREAT SERPL-MCNC: 0.69 MG/DL (ref 0.57–1)
CREAT SERPL-MCNC: 0.7 MG/DL (ref 0.57–1)
CREAT SERPL-MCNC: 0.71 MG/DL (ref 0.57–1)
CREAT SERPL-MCNC: 0.73 MG/DL (ref 0.57–1)
CREAT SERPL-MCNC: 0.76 MG/DL (ref 0.57–1)
CREAT SERPL-MCNC: 0.79 MG/DL (ref 0.57–1)
CREAT SERPL-MCNC: 0.8 MG/DL (ref 0.57–1)
CREAT SERPL-MCNC: 0.81 MG/DL (ref 0.57–1)
CREAT SERPL-MCNC: 0.81 MG/DL (ref 0.57–1)
CREAT SERPL-MCNC: 0.82 MG/DL (ref 0.57–1)
CREAT SERPL-MCNC: 0.82 MG/DL (ref 0.57–1)
CREAT SERPL-MCNC: 0.83 MG/DL (ref 0.57–1)
CREAT SERPL-MCNC: 0.83 MG/DL (ref 0.57–1)
CREAT SERPL-MCNC: 0.85 MG/DL (ref 0.57–1)
CREAT SERPL-MCNC: 0.86 MG/DL (ref 0.57–1)
CREAT SERPL-MCNC: 0.86 MG/DL (ref 0.57–1)
CREAT SERPL-MCNC: 0.93 MG/DL (ref 0.57–1)
CREAT SERPL-MCNC: 0.93 MG/DL (ref 0.57–1)
CREAT SERPL-MCNC: 0.99 MG/DL (ref 0.57–1)
CREAT SERPL-MCNC: 1.07 MG/DL (ref 0.57–1)
CREAT SERPL-MCNC: 1.4 MG/DL (ref 0.57–1)
CROSSMATCH INTERPRETATION: NORMAL
CRP SERPL-MCNC: 0.58 MG/DL (ref 0–0.5)
CRP SERPL-MCNC: 1 MG/DL (ref 0–0.5)
CRP SERPL-MCNC: 1.08 MG/DL (ref 0–0.5)
CYTO UR: NORMAL
D DIMER PPP FEU-MCNC: 0.67 MCGFEU/ML (ref 0–0.49)
D DIMER PPP FEU-MCNC: 0.75 MCGFEU/ML (ref 0–0.49)
D-LACTATE SERPL-SCNC: 0.6 MMOL/L (ref 0.5–2)
D-LACTATE SERPL-SCNC: 1.6 MMOL/L (ref 0.5–2)
DEPRECATED RDW RBC AUTO: 47.4 FL (ref 37–54)
DEPRECATED RDW RBC AUTO: 47.6 FL (ref 37–54)
DEPRECATED RDW RBC AUTO: 48.3 FL (ref 37–54)
DEPRECATED RDW RBC AUTO: 48.6 FL (ref 37–54)
DEPRECATED RDW RBC AUTO: 48.7 FL (ref 37–54)
DEPRECATED RDW RBC AUTO: 49.3 FL (ref 37–54)
DEPRECATED RDW RBC AUTO: 49.9 FL (ref 37–54)
DEPRECATED RDW RBC AUTO: 50.2 FL (ref 37–54)
DEPRECATED RDW RBC AUTO: 50.3 FL (ref 37–54)
DEPRECATED RDW RBC AUTO: 50.4 FL (ref 37–54)
DEPRECATED RDW RBC AUTO: 50.6 FL (ref 37–54)
DEPRECATED RDW RBC AUTO: 50.7 FL (ref 37–54)
DEPRECATED RDW RBC AUTO: 50.7 FL (ref 37–54)
DEPRECATED RDW RBC AUTO: 51.1 FL (ref 37–54)
DEPRECATED RDW RBC AUTO: 51.6 FL (ref 37–54)
DEPRECATED RDW RBC AUTO: 51.6 FL (ref 37–54)
DEPRECATED RDW RBC AUTO: 52 FL (ref 37–54)
DEPRECATED RDW RBC AUTO: 52.3 FL (ref 37–54)
DEPRECATED RDW RBC AUTO: 52.3 FL (ref 37–54)
DEPRECATED RDW RBC AUTO: 52.4 FL (ref 37–54)
DEPRECATED RDW RBC AUTO: 52.4 FL (ref 37–54)
DEPRECATED RDW RBC AUTO: 52.6 FL (ref 37–54)
DEPRECATED RDW RBC AUTO: 52.7 FL (ref 37–54)
DEPRECATED RDW RBC AUTO: 52.8 FL (ref 37–54)
DEPRECATED RDW RBC AUTO: 52.9 FL (ref 37–54)
DEPRECATED RDW RBC AUTO: 53 FL (ref 37–54)
DEPRECATED RDW RBC AUTO: 53.2 FL (ref 37–54)
DEPRECATED RDW RBC AUTO: 53.4 FL (ref 37–54)
DEPRECATED RDW RBC AUTO: 53.4 FL (ref 37–54)
DEPRECATED RDW RBC AUTO: 53.6 FL (ref 37–54)
DEPRECATED RDW RBC AUTO: 53.6 FL (ref 37–54)
DEPRECATED RDW RBC AUTO: 53.7 FL (ref 37–54)
DEPRECATED RDW RBC AUTO: 53.8 FL (ref 37–54)
DEPRECATED RDW RBC AUTO: 54.2 FL (ref 37–54)
DEPRECATED RDW RBC AUTO: 54.4 FL (ref 37–54)
DEPRECATED RDW RBC AUTO: 54.4 FL (ref 37–54)
DEPRECATED RDW RBC AUTO: 54.7 FL (ref 37–54)
DEPRECATED RDW RBC AUTO: 55 FL (ref 37–54)
DEPRECATED RDW RBC AUTO: 55.2 FL (ref 37–54)
DEPRECATED RDW RBC AUTO: 55.2 FL (ref 37–54)
DEPRECATED RDW RBC AUTO: 55.5 FL (ref 37–54)
DEPRECATED RDW RBC AUTO: 55.6 FL (ref 37–54)
DEPRECATED RDW RBC AUTO: 55.7 FL (ref 37–54)
DEPRECATED RDW RBC AUTO: 56.3 FL (ref 37–54)
DEPRECATED RDW RBC AUTO: 56.4 FL (ref 37–54)
DEPRECATED RDW RBC AUTO: 58.1 FL (ref 37–54)
EOSINOPHIL # BLD AUTO: 0.06 10*3/MM3 (ref 0–0.4)
EOSINOPHIL # BLD AUTO: 0.06 10*3/MM3 (ref 0–0.4)
EOSINOPHIL # BLD AUTO: 0.09 10*3/MM3 (ref 0–0.4)
EOSINOPHIL # BLD AUTO: 0.1 10*3/MM3 (ref 0–0.4)
EOSINOPHIL # BLD AUTO: 0.1 X10E3/UL (ref 0–0.4)
EOSINOPHIL # BLD AUTO: 0.11 10*3/MM3 (ref 0–0.4)
EOSINOPHIL # BLD AUTO: 0.11 10*3/MM3 (ref 0–0.4)
EOSINOPHIL # BLD AUTO: 0.12 10*3/MM3 (ref 0–0.4)
EOSINOPHIL # BLD AUTO: 0.12 10*3/MM3 (ref 0–0.4)
EOSINOPHIL # BLD AUTO: 0.14 10*3/MM3 (ref 0–0.4)
EOSINOPHIL # BLD AUTO: 0.17 10*3/MM3 (ref 0–0.4)
EOSINOPHIL # BLD AUTO: 0.19 10*3/MM3 (ref 0–0.4)
EOSINOPHIL # BLD AUTO: 0.23 10*3/MM3 (ref 0–0.4)
EOSINOPHIL NFR BLD AUTO: 0.7 % (ref 0.3–6.2)
EOSINOPHIL NFR BLD AUTO: 1 %
EOSINOPHIL NFR BLD AUTO: 1.1 % (ref 0.3–6.2)
EOSINOPHIL NFR BLD AUTO: 1.2 % (ref 0.3–6.2)
EOSINOPHIL NFR BLD AUTO: 1.6 % (ref 0.3–6.2)
EOSINOPHIL NFR BLD AUTO: 1.8 % (ref 0.3–6.2)
EOSINOPHIL NFR BLD AUTO: 2.1 % (ref 0.3–6.2)
EOSINOPHIL NFR BLD AUTO: 2.2 % (ref 0.3–6.2)
EOSINOPHIL NFR BLD AUTO: 2.4 % (ref 0.3–6.2)
EOSINOPHIL NFR BLD AUTO: 2.4 % (ref 0.3–6.2)
EOSINOPHIL NFR BLD AUTO: 2.5 % (ref 0.3–6.2)
EOSINOPHIL NFR BLD AUTO: 2.6 % (ref 0.3–6.2)
EOSINOPHIL NFR BLD AUTO: 3.7 % (ref 0.3–6.2)
ERYTHROCYTE [DISTWIDTH] IN BLOOD BY AUTOMATED COUNT: 15.6 % (ref 12.3–15.4)
ERYTHROCYTE [DISTWIDTH] IN BLOOD BY AUTOMATED COUNT: 15.8 % (ref 12.3–15.4)
ERYTHROCYTE [DISTWIDTH] IN BLOOD BY AUTOMATED COUNT: 15.8 % (ref 12.3–15.4)
ERYTHROCYTE [DISTWIDTH] IN BLOOD BY AUTOMATED COUNT: 16 % (ref 12.3–15.4)
ERYTHROCYTE [DISTWIDTH] IN BLOOD BY AUTOMATED COUNT: 16 % (ref 12.3–15.4)
ERYTHROCYTE [DISTWIDTH] IN BLOOD BY AUTOMATED COUNT: 16.1 % (ref 12.3–15.4)
ERYTHROCYTE [DISTWIDTH] IN BLOOD BY AUTOMATED COUNT: 16.2 % (ref 12.3–15.4)
ERYTHROCYTE [DISTWIDTH] IN BLOOD BY AUTOMATED COUNT: 16.3 % (ref 12.3–15.4)
ERYTHROCYTE [DISTWIDTH] IN BLOOD BY AUTOMATED COUNT: 16.4 % (ref 12.3–15.4)
ERYTHROCYTE [DISTWIDTH] IN BLOOD BY AUTOMATED COUNT: 16.5 % (ref 12.3–15.4)
ERYTHROCYTE [DISTWIDTH] IN BLOOD BY AUTOMATED COUNT: 16.5 % (ref 12.3–15.4)
ERYTHROCYTE [DISTWIDTH] IN BLOOD BY AUTOMATED COUNT: 16.6 % (ref 11.7–15.4)
ERYTHROCYTE [DISTWIDTH] IN BLOOD BY AUTOMATED COUNT: 16.7 % (ref 12.3–15.4)
ERYTHROCYTE [DISTWIDTH] IN BLOOD BY AUTOMATED COUNT: 16.7 % (ref 12.3–15.4)
ERYTHROCYTE [DISTWIDTH] IN BLOOD BY AUTOMATED COUNT: 16.9 % (ref 12.3–15.4)
ERYTHROCYTE [DISTWIDTH] IN BLOOD BY AUTOMATED COUNT: 17 % (ref 12.3–15.4)
ERYTHROCYTE [DISTWIDTH] IN BLOOD BY AUTOMATED COUNT: 17 % (ref 12.3–15.4)
ERYTHROCYTE [DISTWIDTH] IN BLOOD BY AUTOMATED COUNT: 17.1 % (ref 12.3–15.4)
ERYTHROCYTE [DISTWIDTH] IN BLOOD BY AUTOMATED COUNT: 17.2 % (ref 12.3–15.4)
ERYTHROCYTE [DISTWIDTH] IN BLOOD BY AUTOMATED COUNT: 17.3 % (ref 12.3–15.4)
ERYTHROCYTE [DISTWIDTH] IN BLOOD BY AUTOMATED COUNT: 17.4 % (ref 12.3–15.4)
ERYTHROCYTE [DISTWIDTH] IN BLOOD BY AUTOMATED COUNT: 17.4 % (ref 12.3–15.4)
ERYTHROCYTE [DISTWIDTH] IN BLOOD BY AUTOMATED COUNT: 17.6 % (ref 12.3–15.4)
ERYTHROCYTE [DISTWIDTH] IN BLOOD BY AUTOMATED COUNT: 17.7 % (ref 12.3–15.4)
ERYTHROCYTE [DISTWIDTH] IN BLOOD BY AUTOMATED COUNT: 17.8 % (ref 12.3–15.4)
ERYTHROCYTE [SEDIMENTATION RATE] IN BLOOD: 10 MM/HR (ref 0–30)
FERRITIN SERPL-MCNC: 102 NG/ML (ref 13–150)
FERRITIN SERPL-MCNC: 94.7 NG/ML (ref 13–150)
FIBRINOGEN PPP-MCNC: 368 MG/DL (ref 219–464)
FLUAV H1 2009 PAND RNA NPH QL NAA+PROBE: NOT DETECTED
FLUAV H1 HA GENE NPH QL NAA+PROBE: NOT DETECTED
FLUAV H3 RNA NPH QL NAA+PROBE: NOT DETECTED
FLUAV SUBTYP SPEC NAA+PROBE: NOT DETECTED
FLUAV SUBTYP SPEC NAA+PROBE: NOT DETECTED
FLUBV RNA ISLT QL NAA+PROBE: NOT DETECTED
FLUBV RNA ISLT QL NAA+PROBE: NOT DETECTED
FOLATE SERPL-MCNC: >20 NG/ML
GAS FLOW AIRWAY: 2 LPM
GAS FLOW AIRWAY: 2 LPM
GFR SERPL CREATININE-BSD FRML MDRD: 101 ML/MIN/1.73
GFR SERPL CREATININE-BSD FRML MDRD: 107 ML/MIN/1.73
GFR SERPL CREATININE-BSD FRML MDRD: 36 ML/MIN/1.73
GFR SERPL CREATININE-BSD FRML MDRD: 49 ML/MIN/1.73
GFR SERPL CREATININE-BSD FRML MDRD: 57 ML/MIN/1.73
GFR SERPL CREATININE-BSD FRML MDRD: 57 ML/MIN/1.73
GFR SERPL CREATININE-BSD FRML MDRD: 62 ML/MIN/1.73
GFR SERPL CREATININE-BSD FRML MDRD: 62 ML/MIN/1.73
GFR SERPL CREATININE-BSD FRML MDRD: 63 ML/MIN/1.73
GFR SERPL CREATININE-BSD FRML MDRD: 65 ML/MIN/1.73
GFR SERPL CREATININE-BSD FRML MDRD: 65 ML/MIN/1.73
GFR SERPL CREATININE-BSD FRML MDRD: 66 ML/MIN/1.73
GFR SERPL CREATININE-BSD FRML MDRD: 66 ML/MIN/1.73
GFR SERPL CREATININE-BSD FRML MDRD: 67 ML/MIN/1.73
GFR SERPL CREATININE-BSD FRML MDRD: 67 ML/MIN/1.73
GFR SERPL CREATININE-BSD FRML MDRD: 68 ML/MIN/1.73
GFR SERPL CREATININE-BSD FRML MDRD: 69 ML/MIN/1.73
GFR SERPL CREATININE-BSD FRML MDRD: 72 ML/MIN/1.73
GFR SERPL CREATININE-BSD FRML MDRD: 76 ML/MIN/1.73
GFR SERPL CREATININE-BSD FRML MDRD: 78 ML/MIN/1.73
GFR SERPL CREATININE-BSD FRML MDRD: 79 ML/MIN/1.73
GFR SERPL CREATININE-BSD FRML MDRD: 81 ML/MIN/1.73
GFR SERPL CREATININE-BSD FRML MDRD: 82 ML/MIN/1.73
GFR SERPL CREATININE-BSD FRML MDRD: 82 ML/MIN/1.73
GFR SERPL CREATININE-BSD FRML MDRD: 85 ML/MIN/1.73
GFR SERPL CREATININE-BSD FRML MDRD: 85 ML/MIN/1.73
GFR SERPL CREATININE-BSD FRML MDRD: 88 ML/MIN/1.73
GFR SERPL CREATININE-BSD FRML MDRD: 90 ML/MIN/1.73
GFR SERPL CREATININE-BSD FRML MDRD: 93 ML/MIN/1.73
GFR SERPL CREATININE-BSD FRML MDRD: 95 ML/MIN/1.73
GLOBULIN SER CALC-MCNC: 2.1 G/DL (ref 1.5–4.5)
GLOBULIN UR ELPH-MCNC: 1.5 GM/DL
GLOBULIN UR ELPH-MCNC: 1.8 GM/DL
GLOBULIN UR ELPH-MCNC: 2.6 GM/DL
GLOBULIN UR ELPH-MCNC: 2.6 GM/DL
GLOBULIN UR ELPH-MCNC: 2.7 GM/DL
GLOBULIN UR ELPH-MCNC: 3 GM/DL
GLOBULIN UR ELPH-MCNC: 3.1 GM/DL
GLUCOSE BLDC GLUCOMTR-MCNC: 101 MG/DL (ref 70–130)
GLUCOSE BLDC GLUCOMTR-MCNC: 105 MG/DL (ref 70–130)
GLUCOSE BLDC GLUCOMTR-MCNC: 106 MG/DL (ref 70–130)
GLUCOSE BLDC GLUCOMTR-MCNC: 108 MG/DL (ref 70–130)
GLUCOSE BLDC GLUCOMTR-MCNC: 116 MG/DL (ref 70–130)
GLUCOSE BLDC GLUCOMTR-MCNC: 119 MG/DL (ref 70–130)
GLUCOSE BLDC GLUCOMTR-MCNC: 122 MG/DL (ref 70–130)
GLUCOSE BLDC GLUCOMTR-MCNC: 122 MG/DL (ref 70–130)
GLUCOSE BLDC GLUCOMTR-MCNC: 125 MG/DL (ref 70–130)
GLUCOSE BLDC GLUCOMTR-MCNC: 126 MG/DL (ref 70–130)
GLUCOSE BLDC GLUCOMTR-MCNC: 127 MG/DL (ref 70–130)
GLUCOSE BLDC GLUCOMTR-MCNC: 127 MG/DL (ref 70–130)
GLUCOSE BLDC GLUCOMTR-MCNC: 128 MG/DL (ref 70–130)
GLUCOSE BLDC GLUCOMTR-MCNC: 129 MG/DL (ref 70–130)
GLUCOSE BLDC GLUCOMTR-MCNC: 129 MG/DL (ref 70–130)
GLUCOSE BLDC GLUCOMTR-MCNC: 130 MG/DL (ref 70–130)
GLUCOSE BLDC GLUCOMTR-MCNC: 132 MG/DL (ref 70–130)
GLUCOSE BLDC GLUCOMTR-MCNC: 133 MG/DL (ref 70–130)
GLUCOSE BLDC GLUCOMTR-MCNC: 133 MG/DL (ref 70–130)
GLUCOSE BLDC GLUCOMTR-MCNC: 141 MG/DL (ref 70–130)
GLUCOSE BLDC GLUCOMTR-MCNC: 141 MG/DL (ref 70–130)
GLUCOSE BLDC GLUCOMTR-MCNC: 155 MG/DL (ref 70–130)
GLUCOSE BLDC GLUCOMTR-MCNC: 183 MG/DL (ref 70–130)
GLUCOSE BLDC GLUCOMTR-MCNC: 222 MG/DL (ref 70–130)
GLUCOSE BLDC GLUCOMTR-MCNC: 69 MG/DL (ref 70–130)
GLUCOSE BLDC GLUCOMTR-MCNC: 83 MG/DL (ref 70–130)
GLUCOSE BLDC GLUCOMTR-MCNC: 87 MG/DL (ref 70–130)
GLUCOSE SERPL-MCNC: 101 MG/DL (ref 65–99)
GLUCOSE SERPL-MCNC: 103 MG/DL (ref 65–99)
GLUCOSE SERPL-MCNC: 105 MG/DL (ref 65–99)
GLUCOSE SERPL-MCNC: 106 MG/DL (ref 65–99)
GLUCOSE SERPL-MCNC: 108 MG/DL (ref 65–99)
GLUCOSE SERPL-MCNC: 111 MG/DL (ref 65–99)
GLUCOSE SERPL-MCNC: 111 MG/DL (ref 65–99)
GLUCOSE SERPL-MCNC: 113 MG/DL (ref 65–99)
GLUCOSE SERPL-MCNC: 115 MG/DL (ref 65–99)
GLUCOSE SERPL-MCNC: 115 MG/DL (ref 65–99)
GLUCOSE SERPL-MCNC: 117 MG/DL (ref 65–99)
GLUCOSE SERPL-MCNC: 118 MG/DL (ref 65–99)
GLUCOSE SERPL-MCNC: 120 MG/DL (ref 65–99)
GLUCOSE SERPL-MCNC: 120 MG/DL (ref 65–99)
GLUCOSE SERPL-MCNC: 126 MG/DL (ref 65–99)
GLUCOSE SERPL-MCNC: 128 MG/DL (ref 65–99)
GLUCOSE SERPL-MCNC: 130 MG/DL (ref 65–99)
GLUCOSE SERPL-MCNC: 132 MG/DL (ref 65–99)
GLUCOSE SERPL-MCNC: 134 MG/DL (ref 65–99)
GLUCOSE SERPL-MCNC: 140 MG/DL (ref 65–99)
GLUCOSE SERPL-MCNC: 145 MG/DL (ref 65–99)
GLUCOSE SERPL-MCNC: 146 MG/DL (ref 65–99)
GLUCOSE SERPL-MCNC: 157 MG/DL (ref 65–99)
GLUCOSE SERPL-MCNC: 76 MG/DL (ref 65–99)
GLUCOSE SERPL-MCNC: 81 MG/DL (ref 65–99)
GLUCOSE SERPL-MCNC: 87 MG/DL (ref 65–99)
GLUCOSE SERPL-MCNC: 88 MG/DL (ref 65–99)
GLUCOSE SERPL-MCNC: 89 MG/DL (ref 65–99)
GLUCOSE SERPL-MCNC: 90 MG/DL (ref 65–99)
GLUCOSE SERPL-MCNC: 92 MG/DL (ref 65–99)
GLUCOSE SERPL-MCNC: 94 MG/DL (ref 65–99)
GLUCOSE SERPL-MCNC: 95 MG/DL (ref 65–99)
GLUCOSE SERPL-MCNC: 99 MG/DL (ref 65–99)
GLUCOSE UR QL: NORMAL
GLUCOSE UR STRIP-MCNC: NEGATIVE MG/DL
GRAM STN SPEC: ABNORMAL
HADV DNA SPEC NAA+PROBE: NOT DETECTED
HADV DNA SPEC NAA+PROBE: NOT DETECTED
HBA1C MFR BLD: 6.32 % (ref 4.8–5.6)
HBA1C MFR BLD: 6.8 % (ref 4.8–5.6)
HCO3 BLDA-SCNC: 26.3 MMOL/L (ref 22–28)
HCO3 BLDA-SCNC: 29.8 MMOL/L (ref 22–28)
HCOV 229E RNA SPEC QL NAA+PROBE: NOT DETECTED
HCOV 229E RNA SPEC QL NAA+PROBE: NOT DETECTED
HCOV HKU1 RNA SPEC QL NAA+PROBE: NOT DETECTED
HCOV HKU1 RNA SPEC QL NAA+PROBE: NOT DETECTED
HCOV NL63 RNA SPEC QL NAA+PROBE: NOT DETECTED
HCOV NL63 RNA SPEC QL NAA+PROBE: NOT DETECTED
HCOV OC43 RNA SPEC QL NAA+PROBE: NOT DETECTED
HCOV OC43 RNA SPEC QL NAA+PROBE: NOT DETECTED
HCT VFR BLD AUTO: 17.9 % (ref 34–46.6)
HCT VFR BLD AUTO: 21 % (ref 34–46.6)
HCT VFR BLD AUTO: 21.4 % (ref 34–46.6)
HCT VFR BLD AUTO: 21.6 % (ref 34–46.6)
HCT VFR BLD AUTO: 21.8 % (ref 34–46.6)
HCT VFR BLD AUTO: 21.9 % (ref 34–46.6)
HCT VFR BLD AUTO: 22.3 % (ref 34–46.6)
HCT VFR BLD AUTO: 22.8 % (ref 34–46.6)
HCT VFR BLD AUTO: 23.1 % (ref 34–46.6)
HCT VFR BLD AUTO: 23.4 % (ref 34–46.6)
HCT VFR BLD AUTO: 24.5 % (ref 34–46.6)
HCT VFR BLD AUTO: 24.5 % (ref 34–46.6)
HCT VFR BLD AUTO: 25.6 % (ref 34–46.6)
HCT VFR BLD AUTO: 25.7 % (ref 34–46.6)
HCT VFR BLD AUTO: 25.8 % (ref 34–46.6)
HCT VFR BLD AUTO: 25.9 % (ref 34–46.6)
HCT VFR BLD AUTO: 26 % (ref 34–46.6)
HCT VFR BLD AUTO: 26.1 % (ref 34–46.6)
HCT VFR BLD AUTO: 26.4 % (ref 34–46.6)
HCT VFR BLD AUTO: 26.5 % (ref 34–46.6)
HCT VFR BLD AUTO: 27 % (ref 34–46.6)
HCT VFR BLD AUTO: 27.3 % (ref 34–46.6)
HCT VFR BLD AUTO: 27.7 % (ref 34–46.6)
HCT VFR BLD AUTO: 28 % (ref 34–46.6)
HCT VFR BLD AUTO: 28.2 % (ref 34–46.6)
HCT VFR BLD AUTO: 28.6 % (ref 34–46.6)
HCT VFR BLD AUTO: 28.8 % (ref 34–46.6)
HCT VFR BLD AUTO: 28.9 % (ref 34–46.6)
HCT VFR BLD AUTO: 29 % (ref 34–46.6)
HCT VFR BLD AUTO: 30.1 % (ref 34–46.6)
HCT VFR BLD AUTO: 30.2 % (ref 34–46.6)
HCT VFR BLD AUTO: 31.1 % (ref 34–46.6)
HCT VFR BLD AUTO: 31.6 % (ref 34–46.6)
HCT VFR BLD AUTO: 31.6 % (ref 34–46.6)
HCT VFR BLD AUTO: 31.7 % (ref 34–46.6)
HCT VFR BLD AUTO: 31.8 % (ref 34–46.6)
HCT VFR BLD AUTO: 32.1 % (ref 34–46.6)
HCT VFR BLD AUTO: 32.1 % (ref 34–46.6)
HCT VFR BLD AUTO: 32.3 % (ref 34–46.6)
HCT VFR BLD AUTO: 33.9 % (ref 34–46.6)
HCT VFR BLD AUTO: 34.3 % (ref 34–46.6)
HCT VFR BLD AUTO: 34.4 % (ref 34–46.6)
HCT VFR BLD AUTO: 36.5 % (ref 34–46.6)
HCT VFR BLD AUTO: 37.2 % (ref 34–46.6)
HCT VFR BLD AUTO: 37.5 % (ref 34–46.6)
HCT VFR BLD AUTO: 37.9 % (ref 34–46.6)
HCT VFR BLD AUTO: 38.5 % (ref 34–46.6)
HCT VFR BLD AUTO: 39 % (ref 34–46.6)
HCT VFR BLD AUTO: 39.5 % (ref 34–46.6)
HCT VFR BLD AUTO: 39.8 % (ref 34–46.6)
HCT VFR BLD AUTO: 44.5 % (ref 34–46.6)
HDLC SERPL-MCNC: 35 MG/DL (ref 40–60)
HDLC SERPL-MCNC: 36 MG/DL
HGB BLD-MCNC: 10 G/DL (ref 12–15.9)
HGB BLD-MCNC: 10.1 G/DL (ref 12–15.9)
HGB BLD-MCNC: 10.2 G/DL (ref 12–15.9)
HGB BLD-MCNC: 10.3 G/DL (ref 12–15.9)
HGB BLD-MCNC: 10.3 G/DL (ref 12–15.9)
HGB BLD-MCNC: 10.4 G/DL (ref 12–15.9)
HGB BLD-MCNC: 10.5 G/DL (ref 12–15.9)
HGB BLD-MCNC: 10.8 G/DL (ref 12–15.9)
HGB BLD-MCNC: 11.1 G/DL (ref 12–15.9)
HGB BLD-MCNC: 11.2 G/DL (ref 12–15.9)
HGB BLD-MCNC: 11.4 G/DL (ref 12–15.9)
HGB BLD-MCNC: 11.6 G/DL (ref 12–15.9)
HGB BLD-MCNC: 12.2 G/DL (ref 12–15.9)
HGB BLD-MCNC: 12.4 G/DL (ref 12–15.9)
HGB BLD-MCNC: 12.6 G/DL (ref 12–15.9)
HGB BLD-MCNC: 12.6 G/DL (ref 12–15.9)
HGB BLD-MCNC: 12.7 G/DL (ref 12–15.9)
HGB BLD-MCNC: 13.1 G/DL (ref 11.1–15.9)
HGB BLD-MCNC: 13.4 G/DL (ref 12–15.9)
HGB BLD-MCNC: 14.1 G/DL (ref 12–15.9)
HGB BLD-MCNC: 5.8 G/DL (ref 12–15.9)
HGB BLD-MCNC: 7 G/DL (ref 12–15.9)
HGB BLD-MCNC: 7.1 G/DL (ref 12–15.9)
HGB BLD-MCNC: 7.2 G/DL (ref 12–15.9)
HGB BLD-MCNC: 7.3 G/DL (ref 12–15.9)
HGB BLD-MCNC: 7.3 G/DL (ref 12–15.9)
HGB BLD-MCNC: 7.4 G/DL (ref 12–15.9)
HGB BLD-MCNC: 7.5 G/DL (ref 12–15.9)
HGB BLD-MCNC: 8 G/DL (ref 12–15.9)
HGB BLD-MCNC: 8.2 G/DL (ref 12–15.9)
HGB BLD-MCNC: 8.2 G/DL (ref 12–15.9)
HGB BLD-MCNC: 8.5 G/DL (ref 12–15.9)
HGB BLD-MCNC: 8.6 G/DL (ref 12–15.9)
HGB BLD-MCNC: 8.6 G/DL (ref 12–15.9)
HGB BLD-MCNC: 8.7 G/DL (ref 12–15.9)
HGB BLD-MCNC: 8.9 G/DL (ref 12–15.9)
HGB BLD-MCNC: 9 G/DL (ref 12–15.9)
HGB BLD-MCNC: 9.2 G/DL (ref 12–15.9)
HGB BLD-MCNC: 9.2 G/DL (ref 12–15.9)
HGB BLD-MCNC: 9.4 G/DL (ref 12–15.9)
HGB BLD-MCNC: 9.4 G/DL (ref 12–15.9)
HGB BLD-MCNC: 9.5 G/DL (ref 12–15.9)
HGB BLD-MCNC: 9.6 G/DL (ref 12–15.9)
HGB UR QL STRIP.AUTO: NEGATIVE
HMPV RNA NPH QL NAA+NON-PROBE: NOT DETECTED
HMPV RNA NPH QL NAA+NON-PROBE: NOT DETECTED
HOLD SPECIMEN: NORMAL
HOLD SPECIMEN: NORMAL
HPIV1 RNA SPEC QL NAA+PROBE: NOT DETECTED
HPIV1 RNA SPEC QL NAA+PROBE: NOT DETECTED
HPIV2 RNA SPEC QL NAA+PROBE: NOT DETECTED
HPIV2 RNA SPEC QL NAA+PROBE: NOT DETECTED
HPIV3 RNA NPH QL NAA+PROBE: NOT DETECTED
HPIV3 RNA NPH QL NAA+PROBE: NOT DETECTED
HPIV4 P GENE NPH QL NAA+PROBE: NOT DETECTED
HPIV4 P GENE NPH QL NAA+PROBE: NOT DETECTED
HYALINE CASTS UR QL AUTO: ABNORMAL /LPF
IMM GRANULOCYTES # BLD AUTO: 0 X10E3/UL (ref 0–0.1)
IMM GRANULOCYTES # BLD AUTO: 0.01 10*3/MM3 (ref 0–0.05)
IMM GRANULOCYTES # BLD AUTO: 0.01 10*3/MM3 (ref 0–0.05)
IMM GRANULOCYTES # BLD AUTO: 0.03 10*3/MM3 (ref 0–0.05)
IMM GRANULOCYTES # BLD AUTO: 0.04 10*3/MM3 (ref 0–0.05)
IMM GRANULOCYTES # BLD AUTO: 0.08 10*3/MM3 (ref 0–0.05)
IMM GRANULOCYTES # BLD AUTO: 0.08 10*3/MM3 (ref 0–0.05)
IMM GRANULOCYTES # BLD AUTO: 0.1 10*3/MM3 (ref 0–0.05)
IMM GRANULOCYTES # BLD AUTO: 0.17 10*3/MM3 (ref 0–0.05)
IMM GRANULOCYTES # BLD AUTO: 0.17 10*3/MM3 (ref 0–0.05)
IMM GRANULOCYTES # BLD AUTO: 0.24 10*3/MM3 (ref 0–0.05)
IMM GRANULOCYTES NFR BLD AUTO: 0 %
IMM GRANULOCYTES NFR BLD AUTO: 0.2 % (ref 0–0.5)
IMM GRANULOCYTES NFR BLD AUTO: 0.2 % (ref 0–0.5)
IMM GRANULOCYTES NFR BLD AUTO: 0.4 % (ref 0–0.5)
IMM GRANULOCYTES NFR BLD AUTO: 0.6 % (ref 0–0.5)
IMM GRANULOCYTES NFR BLD AUTO: 0.7 % (ref 0–0.5)
IMM GRANULOCYTES NFR BLD AUTO: 0.8 % (ref 0–0.5)
IMM GRANULOCYTES NFR BLD AUTO: 0.9 % (ref 0–0.5)
IMM GRANULOCYTES NFR BLD AUTO: 1.1 % (ref 0–0.5)
IMM GRANULOCYTES NFR BLD AUTO: 1.9 % (ref 0–0.5)
IMM GRANULOCYTES NFR BLD AUTO: 2.6 % (ref 0–0.5)
IMM GRANULOCYTES NFR BLD AUTO: 3 % (ref 0–0.5)
IMM GRANULOCYTES NFR BLD AUTO: 4.2 % (ref 0–0.5)
INR PPP: 1.21 (ref 0.9–1.1)
INR PPP: 1.24 (ref 0.9–1.1)
INR PPP: 1.24 (ref 0.9–1.1)
INR PPP: 1.26 (ref 0.9–1.1)
INR PPP: 1.3 (ref 0.9–1.1)
INR PPP: 1.31 (ref 0.9–1.1)
INR PPP: 1.32 (ref 0.9–1.1)
INR PPP: 1.34 (ref 0.9–1.1)
INR PPP: 1.37 (ref 0.9–1.1)
INR PPP: 1.38 (ref 0.9–1.1)
INR PPP: 1.4 (ref 0.9–1.1)
INR PPP: 1.44 (ref 0.9–1.1)
INR PPP: 1.49 (ref 0.9–1.1)
INR PPP: 1.53 (ref 0.9–1.1)
INR PPP: 1.6 (ref 0.9–1.1)
INR PPP: 1.63 (ref 0.9–1.1)
INR PPP: 1.74 (ref 0.9–1.1)
INR PPP: 1.77 (ref 0.9–1.1)
INR PPP: 1.8 (ref 0.9–1.1)
INR PPP: 1.81 (ref 0.9–1.1)
INR PPP: 1.85 (ref 0.9–1.1)
INR PPP: 1.89 (ref 0.9–1.1)
INR PPP: 1.9 (ref 0.9–1.1)
INR PPP: 1.94 (ref 0.9–1.1)
INR PPP: 1.99 (ref 0.9–1.1)
INR PPP: 2.03 (ref 0.9–1.1)
INR PPP: 2.18 (ref 0.9–1.1)
INR PPP: 2.26 (ref 0.9–1.1)
INR PPP: 2.27 (ref 0.9–1.1)
INR PPP: 2.34 (ref 0.9–1.1)
INR PPP: 2.43 (ref 0.9–1.1)
INR PPP: 2.52 (ref 0.9–1.1)
INR PPP: 2.75 (ref 0.9–1.1)
INR PPP: 2.77 (ref 0.9–1.1)
INR PPP: 2.8 (ref 0.9–1.1)
INR PPP: 2.86 (ref 0.9–1.1)
INR PPP: 8.77 (ref 0.9–1.1)
ISOLATED FROM: ABNORMAL
KETONES UR QL STRIP: NEGATIVE
KETONES UR QL STRIP: NORMAL
LAB AP CASE REPORT: NORMAL
LAB AP DIAGNOSIS COMMENT: NORMAL
LDH SERPL-CCNC: 208 U/L (ref 135–214)
LDH SERPL-CCNC: 295 U/L (ref 135–214)
LDLC SERPL CALC-MCNC: 150 MG/DL (ref 0–100)
LDLC SERPL CALC-MCNC: 49 MG/DL (ref 0–99)
LDLC/HDLC SERPL: 4.23 {RATIO}
LEUKOCYTE ESTERASE UR QL STRIP.AUTO: ABNORMAL
LEUKOCYTE ESTERASE UR QL STRIP.AUTO: NEGATIVE
LV EF NUC BP: 60 %
LYMPHOCYTES # BLD AUTO: 1.03 10*3/MM3 (ref 0.7–3.1)
LYMPHOCYTES # BLD AUTO: 1.08 10*3/MM3 (ref 0.7–3.1)
LYMPHOCYTES # BLD AUTO: 1.1 10*3/MM3 (ref 0.7–3.1)
LYMPHOCYTES # BLD AUTO: 1.11 10*3/MM3 (ref 0.7–3.1)
LYMPHOCYTES # BLD AUTO: 1.2 10*3/MM3 (ref 0.7–3.1)
LYMPHOCYTES # BLD AUTO: 1.25 10*3/MM3 (ref 0.7–3.1)
LYMPHOCYTES # BLD AUTO: 1.4 10*3/MM3 (ref 0.7–3.1)
LYMPHOCYTES # BLD AUTO: 1.48 10*3/MM3 (ref 0.7–3.1)
LYMPHOCYTES # BLD AUTO: 1.51 10*3/MM3 (ref 0.7–3.1)
LYMPHOCYTES # BLD AUTO: 1.52 10*3/MM3 (ref 0.7–3.1)
LYMPHOCYTES # BLD AUTO: 2.14 10*3/MM3 (ref 0.7–3.1)
LYMPHOCYTES # BLD AUTO: 2.25 10*3/MM3 (ref 0.7–3.1)
LYMPHOCYTES # BLD AUTO: 2.3 X10E3/UL (ref 0.7–3.1)
LYMPHOCYTES NFR BLD AUTO: 16.3 % (ref 19.6–45.3)
LYMPHOCYTES NFR BLD AUTO: 17 % (ref 19.6–45.3)
LYMPHOCYTES NFR BLD AUTO: 17.3 % (ref 19.6–45.3)
LYMPHOCYTES NFR BLD AUTO: 17.5 % (ref 19.6–45.3)
LYMPHOCYTES NFR BLD AUTO: 18.8 % (ref 19.6–45.3)
LYMPHOCYTES NFR BLD AUTO: 19.4 % (ref 19.6–45.3)
LYMPHOCYTES NFR BLD AUTO: 21.5 % (ref 19.6–45.3)
LYMPHOCYTES NFR BLD AUTO: 22 %
LYMPHOCYTES NFR BLD AUTO: 23.3 % (ref 19.6–45.3)
LYMPHOCYTES NFR BLD AUTO: 24.7 % (ref 19.6–45.3)
LYMPHOCYTES NFR BLD AUTO: 29.7 % (ref 19.6–45.3)
LYMPHOCYTES NFR BLD AUTO: 32.9 % (ref 19.6–45.3)
LYMPHOCYTES NFR BLD AUTO: 33.1 % (ref 19.6–45.3)
M PNEUMO IGG SER IA-ACNC: NOT DETECTED
M PNEUMO IGG SER IA-ACNC: NOT DETECTED
MAGNESIUM SERPL-MCNC: 1.7 MG/DL (ref 1.6–2.4)
MAGNESIUM SERPL-MCNC: 2.1 MG/DL (ref 1.6–2.4)
MAGNESIUM SERPL-MCNC: 2.1 MG/DL (ref 1.6–2.4)
MAGNESIUM SERPL-MCNC: 2.5 MG/DL (ref 1.6–2.4)
MAXIMAL PREDICTED HEART RATE: 134 BPM
MCH RBC QN AUTO: 25.4 PG (ref 26.6–33)
MCH RBC QN AUTO: 26.1 PG (ref 26.6–33)
MCH RBC QN AUTO: 26.6 PG (ref 26.6–33)
MCH RBC QN AUTO: 27.6 PG (ref 26.6–33)
MCH RBC QN AUTO: 27.7 PG (ref 26.6–33)
MCH RBC QN AUTO: 27.9 PG (ref 26.6–33)
MCH RBC QN AUTO: 28 PG (ref 26.6–33)
MCH RBC QN AUTO: 28 PG (ref 26.6–33)
MCH RBC QN AUTO: 28.2 PG (ref 26.6–33)
MCH RBC QN AUTO: 28.3 PG (ref 26.6–33)
MCH RBC QN AUTO: 28.4 PG (ref 26.6–33)
MCH RBC QN AUTO: 28.5 PG (ref 26.6–33)
MCH RBC QN AUTO: 28.5 PG (ref 26.6–33)
MCH RBC QN AUTO: 28.6 PG (ref 26.6–33)
MCH RBC QN AUTO: 28.6 PG (ref 26.6–33)
MCH RBC QN AUTO: 28.7 PG (ref 26.6–33)
MCH RBC QN AUTO: 28.7 PG (ref 26.6–33)
MCH RBC QN AUTO: 28.8 PG (ref 26.6–33)
MCH RBC QN AUTO: 28.9 PG (ref 26.6–33)
MCH RBC QN AUTO: 29 PG (ref 26.6–33)
MCH RBC QN AUTO: 29.1 PG (ref 26.6–33)
MCH RBC QN AUTO: 29.2 PG (ref 26.6–33)
MCH RBC QN AUTO: 29.2 PG (ref 26.6–33)
MCH RBC QN AUTO: 29.3 PG (ref 26.6–33)
MCH RBC QN AUTO: 29.3 PG (ref 26.6–33)
MCH RBC QN AUTO: 29.4 PG (ref 26.6–33)
MCH RBC QN AUTO: 29.7 PG (ref 26.6–33)
MCHC RBC AUTO-ENTMCNC: 31.2 G/DL (ref 31.5–35.7)
MCHC RBC AUTO-ENTMCNC: 31.2 G/DL (ref 31.5–35.7)
MCHC RBC AUTO-ENTMCNC: 31.5 G/DL (ref 31.5–35.7)
MCHC RBC AUTO-ENTMCNC: 31.6 G/DL (ref 31.5–35.7)
MCHC RBC AUTO-ENTMCNC: 31.7 G/DL (ref 31.5–35.7)
MCHC RBC AUTO-ENTMCNC: 31.8 G/DL (ref 31.5–35.7)
MCHC RBC AUTO-ENTMCNC: 31.9 G/DL (ref 31.5–35.7)
MCHC RBC AUTO-ENTMCNC: 32 G/DL (ref 31.5–35.7)
MCHC RBC AUTO-ENTMCNC: 32 G/DL (ref 31.5–35.7)
MCHC RBC AUTO-ENTMCNC: 32.1 G/DL (ref 31.5–35.7)
MCHC RBC AUTO-ENTMCNC: 32.1 G/DL (ref 31.5–35.7)
MCHC RBC AUTO-ENTMCNC: 32.3 G/DL (ref 31.5–35.7)
MCHC RBC AUTO-ENTMCNC: 32.4 G/DL (ref 31.5–35.7)
MCHC RBC AUTO-ENTMCNC: 32.4 G/DL (ref 31.5–35.7)
MCHC RBC AUTO-ENTMCNC: 32.5 G/DL (ref 31.5–35.7)
MCHC RBC AUTO-ENTMCNC: 32.6 G/DL (ref 31.5–35.7)
MCHC RBC AUTO-ENTMCNC: 32.6 G/DL (ref 31.5–35.7)
MCHC RBC AUTO-ENTMCNC: 32.9 G/DL (ref 31.5–35.7)
MCHC RBC AUTO-ENTMCNC: 33 G/DL (ref 31.5–35.7)
MCHC RBC AUTO-ENTMCNC: 33.1 G/DL (ref 31.5–35.7)
MCHC RBC AUTO-ENTMCNC: 33.2 G/DL (ref 31.5–35.7)
MCHC RBC AUTO-ENTMCNC: 33.2 G/DL (ref 31.5–35.7)
MCHC RBC AUTO-ENTMCNC: 33.3 G/DL (ref 31.5–35.7)
MCHC RBC AUTO-ENTMCNC: 33.4 G/DL (ref 31.5–35.7)
MCHC RBC AUTO-ENTMCNC: 33.4 G/DL (ref 31.5–35.7)
MCHC RBC AUTO-ENTMCNC: 33.6 G/DL (ref 31.5–35.7)
MCHC RBC AUTO-ENTMCNC: 33.8 G/DL (ref 31.5–35.7)
MCHC RBC AUTO-ENTMCNC: 33.9 G/DL (ref 31.5–35.7)
MCHC RBC AUTO-ENTMCNC: 34 G/DL (ref 31.5–35.7)
MCHC RBC AUTO-ENTMCNC: 34.1 G/DL (ref 31.5–35.7)
MCHC RBC AUTO-ENTMCNC: 34.2 G/DL (ref 31.5–35.7)
MCHC RBC AUTO-ENTMCNC: 34.2 G/DL (ref 31.5–35.7)
MCV RBC AUTO: 81.5 FL (ref 79–97)
MCV RBC AUTO: 81.9 FL (ref 79–97)
MCV RBC AUTO: 82.8 FL (ref 79–97)
MCV RBC AUTO: 82.9 FL (ref 79–97)
MCV RBC AUTO: 83 FL (ref 79–97)
MCV RBC AUTO: 83.8 FL (ref 79–97)
MCV RBC AUTO: 84.4 FL (ref 79–97)
MCV RBC AUTO: 84.5 FL (ref 79–97)
MCV RBC AUTO: 84.5 FL (ref 79–97)
MCV RBC AUTO: 84.6 FL (ref 79–97)
MCV RBC AUTO: 85 FL (ref 79–97)
MCV RBC AUTO: 85 FL (ref 79–97)
MCV RBC AUTO: 85.3 FL (ref 79–97)
MCV RBC AUTO: 85.4 FL (ref 79–97)
MCV RBC AUTO: 85.8 FL (ref 79–97)
MCV RBC AUTO: 85.8 FL (ref 79–97)
MCV RBC AUTO: 86 FL (ref 79–97)
MCV RBC AUTO: 86 FL (ref 79–97)
MCV RBC AUTO: 86.4 FL (ref 79–97)
MCV RBC AUTO: 86.4 FL (ref 79–97)
MCV RBC AUTO: 86.6 FL (ref 79–97)
MCV RBC AUTO: 86.7 FL (ref 79–97)
MCV RBC AUTO: 86.8 FL (ref 79–97)
MCV RBC AUTO: 87 FL (ref 79–97)
MCV RBC AUTO: 87 FL (ref 79–97)
MCV RBC AUTO: 87.1 FL (ref 79–97)
MCV RBC AUTO: 87.4 FL (ref 79–97)
MCV RBC AUTO: 87.4 FL (ref 79–97)
MCV RBC AUTO: 87.5 FL (ref 79–97)
MCV RBC AUTO: 87.7 FL (ref 79–97)
MCV RBC AUTO: 87.9 FL (ref 79–97)
MCV RBC AUTO: 88 FL (ref 79–97)
MCV RBC AUTO: 88.4 FL (ref 79–97)
MCV RBC AUTO: 88.5 FL (ref 79–97)
MCV RBC AUTO: 88.7 FL (ref 79–97)
MCV RBC AUTO: 88.9 FL (ref 79–97)
MCV RBC AUTO: 89 FL (ref 79–97)
MCV RBC AUTO: 89.5 FL (ref 79–97)
MCV RBC AUTO: 90.1 FL (ref 79–97)
MCV RBC AUTO: 90.7 FL (ref 79–97)
MODALITY: ABNORMAL
MODALITY: ABNORMAL
MONOCYTES # BLD AUTO: 0.33 10*3/MM3 (ref 0.1–0.9)
MONOCYTES # BLD AUTO: 0.36 10*3/MM3 (ref 0.1–0.9)
MONOCYTES # BLD AUTO: 0.37 10*3/MM3 (ref 0.1–0.9)
MONOCYTES # BLD AUTO: 0.37 10*3/MM3 (ref 0.1–0.9)
MONOCYTES # BLD AUTO: 0.38 10*3/MM3 (ref 0.1–0.9)
MONOCYTES # BLD AUTO: 0.41 10*3/MM3 (ref 0.1–0.9)
MONOCYTES # BLD AUTO: 0.44 10*3/MM3 (ref 0.1–0.9)
MONOCYTES # BLD AUTO: 0.53 10*3/MM3 (ref 0.1–0.9)
MONOCYTES # BLD AUTO: 0.55 10*3/MM3 (ref 0.1–0.9)
MONOCYTES # BLD AUTO: 0.56 10*3/MM3 (ref 0.1–0.9)
MONOCYTES # BLD AUTO: 0.56 10*3/MM3 (ref 0.1–0.9)
MONOCYTES # BLD AUTO: 0.59 10*3/MM3 (ref 0.1–0.9)
MONOCYTES # BLD AUTO: 0.8 X10E3/UL (ref 0.1–0.9)
MONOCYTES NFR BLD AUTO: 5.8 % (ref 5–12)
MONOCYTES NFR BLD AUTO: 6.1 % (ref 5–12)
MONOCYTES NFR BLD AUTO: 6.2 % (ref 5–12)
MONOCYTES NFR BLD AUTO: 6.4 % (ref 5–12)
MONOCYTES NFR BLD AUTO: 6.5 % (ref 5–12)
MONOCYTES NFR BLD AUTO: 7 %
MONOCYTES NFR BLD AUTO: 7.4 % (ref 5–12)
MONOCYTES NFR BLD AUTO: 9.3 % (ref 5–12)
MONOCYTES NFR BLD AUTO: 9.6 % (ref 5–12)
MONOCYTES NFR BLD AUTO: 9.6 % (ref 5–12)
NEUTROPHILS # BLD AUTO: 7.5 X10E3/UL (ref 1.4–7)
NEUTROPHILS NFR BLD AUTO: 2.22 10*3/MM3 (ref 1.7–7)
NEUTROPHILS NFR BLD AUTO: 2.46 10*3/MM3 (ref 1.7–7)
NEUTROPHILS NFR BLD AUTO: 2.98 10*3/MM3 (ref 1.7–7)
NEUTROPHILS NFR BLD AUTO: 3.81 10*3/MM3 (ref 1.7–7)
NEUTROPHILS NFR BLD AUTO: 3.94 10*3/MM3 (ref 1.7–7)
NEUTROPHILS NFR BLD AUTO: 3.98 10*3/MM3 (ref 1.7–7)
NEUTROPHILS NFR BLD AUTO: 4.15 10*3/MM3 (ref 1.7–7)
NEUTROPHILS NFR BLD AUTO: 4.71 10*3/MM3 (ref 1.7–7)
NEUTROPHILS NFR BLD AUTO: 5.13 10*3/MM3 (ref 1.7–7)
NEUTROPHILS NFR BLD AUTO: 5.9 10*3/MM3 (ref 1.7–7)
NEUTROPHILS NFR BLD AUTO: 52.1 % (ref 42.7–76)
NEUTROPHILS NFR BLD AUTO: 53.5 % (ref 42.7–76)
NEUTROPHILS NFR BLD AUTO: 58.5 % (ref 42.7–76)
NEUTROPHILS NFR BLD AUTO: 6.25 10*3/MM3 (ref 1.7–7)
NEUTROPHILS NFR BLD AUTO: 6.84 10*3/MM3 (ref 1.7–7)
NEUTROPHILS NFR BLD AUTO: 64.7 % (ref 42.7–76)
NEUTROPHILS NFR BLD AUTO: 67.4 % (ref 42.7–76)
NEUTROPHILS NFR BLD AUTO: 68.1 % (ref 42.7–76)
NEUTROPHILS NFR BLD AUTO: 69.2 % (ref 42.7–76)
NEUTROPHILS NFR BLD AUTO: 69.8 % (ref 42.7–76)
NEUTROPHILS NFR BLD AUTO: 70 %
NEUTROPHILS NFR BLD AUTO: 70.5 % (ref 42.7–76)
NEUTROPHILS NFR BLD AUTO: 71.5 % (ref 42.7–76)
NEUTROPHILS NFR BLD AUTO: 72 % (ref 42.7–76)
NEUTROPHILS NFR BLD AUTO: 75.3 % (ref 42.7–76)
NITRITE UR QL STRIP: NEGATIVE
NRBC BLD AUTO-RTO: 0 /100 WBC (ref 0–0.2)
NRBC BLD AUTO-RTO: 0.2 /100 WBC (ref 0–0.2)
NRBC BLD AUTO-RTO: 0.2 /100 WBC (ref 0–0.2)
NRBC BLD AUTO-RTO: 0.5 /100 WBC (ref 0–0.2)
NRBC BLD AUTO-RTO: 0.5 /100 WBC (ref 0–0.2)
NRBC BLD AUTO-RTO: 0.6 /100 WBC (ref 0–0.2)
NRBC BLD AUTO-RTO: 0.9 /100 WBC (ref 0–0.2)
NRBC BLD AUTO-RTO: 1.1 /100 WBC (ref 0–0.2)
NT-PROBNP SERPL-MCNC: 1434 PG/ML (ref 0–1800)
NT-PROBNP SERPL-MCNC: 2908 PG/ML (ref 0–1800)
PATH REPORT.FINAL DX SPEC: NORMAL
PATH REPORT.GROSS SPEC: NORMAL
PCO2 BLDA: 36.6 MM HG (ref 35–45)
PCO2 BLDA: 45.8 MM HG (ref 35–45)
PERCENT MAX PREDICTED HR: 77.61 %
PH BLDA: 7.42 PH UNITS (ref 7.35–7.45)
PH BLDA: 7.46 PH UNITS (ref 7.35–7.45)
PH UR STRIP.AUTO: 5.5 [PH] (ref 5–8)
PH UR STRIP.AUTO: 6.5 [PH] (ref 5–8)
PH UR STRIP.AUTO: 7 [PH] (ref 5–8)
PH UR STRIP.AUTO: 7 [PH] (ref 5–8)
PH UR STRIP.AUTO: <=5 [PH] (ref 5–8)
PH UR STRIP: NORMAL [PH]
PLATELET # BLD AUTO: 104 10*3/MM3 (ref 140–450)
PLATELET # BLD AUTO: 107 10*3/MM3 (ref 140–450)
PLATELET # BLD AUTO: 109 10*3/MM3 (ref 140–450)
PLATELET # BLD AUTO: 110 10*3/MM3 (ref 140–450)
PLATELET # BLD AUTO: 122 10*3/MM3 (ref 140–450)
PLATELET # BLD AUTO: 123 10*3/MM3 (ref 140–450)
PLATELET # BLD AUTO: 125 10*3/MM3 (ref 140–450)
PLATELET # BLD AUTO: 129 10*3/MM3 (ref 140–450)
PLATELET # BLD AUTO: 130 10*3/MM3 (ref 140–450)
PLATELET # BLD AUTO: 131 10*3/MM3 (ref 140–450)
PLATELET # BLD AUTO: 132 10*3/MM3 (ref 140–450)
PLATELET # BLD AUTO: 133 10*3/MM3 (ref 140–450)
PLATELET # BLD AUTO: 135 10*3/MM3 (ref 140–450)
PLATELET # BLD AUTO: 138 10*3/MM3 (ref 140–450)
PLATELET # BLD AUTO: 138 10*3/MM3 (ref 140–450)
PLATELET # BLD AUTO: 140 10*3/MM3 (ref 140–450)
PLATELET # BLD AUTO: 141 10*3/MM3 (ref 140–450)
PLATELET # BLD AUTO: 146 10*3/MM3 (ref 140–450)
PLATELET # BLD AUTO: 147 10*3/MM3 (ref 140–450)
PLATELET # BLD AUTO: 149 10*3/MM3 (ref 140–450)
PLATELET # BLD AUTO: 149 10*3/MM3 (ref 140–450)
PLATELET # BLD AUTO: 152 10*3/MM3 (ref 140–450)
PLATELET # BLD AUTO: 153 10*3/MM3 (ref 140–450)
PLATELET # BLD AUTO: 161 10*3/MM3 (ref 140–450)
PLATELET # BLD AUTO: 164 10*3/MM3 (ref 140–450)
PLATELET # BLD AUTO: 166 10*3/MM3 (ref 140–450)
PLATELET # BLD AUTO: 168 10*3/MM3 (ref 140–450)
PLATELET # BLD AUTO: 170 10*3/MM3 (ref 140–450)
PLATELET # BLD AUTO: 171 10*3/MM3 (ref 140–450)
PLATELET # BLD AUTO: 178 10*3/MM3 (ref 140–450)
PLATELET # BLD AUTO: 178 10*3/MM3 (ref 140–450)
PLATELET # BLD AUTO: 179 10*3/MM3 (ref 140–450)
PLATELET # BLD AUTO: 179 10*3/MM3 (ref 140–450)
PLATELET # BLD AUTO: 188 10*3/MM3 (ref 140–450)
PLATELET # BLD AUTO: 189 10*3/MM3 (ref 140–450)
PLATELET # BLD AUTO: 192 X10E3/UL (ref 150–450)
PLATELET # BLD AUTO: 201 10*3/MM3 (ref 140–450)
PLATELET # BLD AUTO: 210 10*3/MM3 (ref 140–450)
PLATELET # BLD AUTO: 222 10*3/MM3 (ref 140–450)
PLATELET # BLD AUTO: 244 10*3/MM3 (ref 140–450)
PLATELET # BLD AUTO: 247 10*3/MM3 (ref 140–450)
PLATELET # BLD AUTO: 262 10*3/MM3 (ref 140–450)
PLATELET # BLD AUTO: 265 10*3/MM3 (ref 140–450)
PLATELET # BLD AUTO: 268 10*3/MM3 (ref 140–450)
PLATELET # BLD AUTO: 280 10*3/MM3 (ref 140–450)
PLATELET # BLD AUTO: 292 10*3/MM3 (ref 140–450)
PLATELET # BLD AUTO: 313 10*3/MM3 (ref 140–450)
PMV BLD AUTO: 10 FL (ref 6–12)
PMV BLD AUTO: 10.1 FL (ref 6–12)
PMV BLD AUTO: 10.2 FL (ref 6–12)
PMV BLD AUTO: 10.2 FL (ref 6–12)
PMV BLD AUTO: 10.3 FL (ref 6–12)
PMV BLD AUTO: 10.3 FL (ref 6–12)
PMV BLD AUTO: 10.5 FL (ref 6–12)
PMV BLD AUTO: 10.5 FL (ref 6–12)
PMV BLD AUTO: 10.6 FL (ref 6–12)
PMV BLD AUTO: 8.6 FL (ref 6–12)
PMV BLD AUTO: 8.8 FL (ref 6–12)
PMV BLD AUTO: 9 FL (ref 6–12)
PMV BLD AUTO: 9 FL (ref 6–12)
PMV BLD AUTO: 9.1 FL (ref 6–12)
PMV BLD AUTO: 9.2 FL (ref 6–12)
PMV BLD AUTO: 9.2 FL (ref 6–12)
PMV BLD AUTO: 9.3 FL (ref 6–12)
PMV BLD AUTO: 9.4 FL (ref 6–12)
PMV BLD AUTO: 9.5 FL (ref 6–12)
PMV BLD AUTO: 9.5 FL (ref 6–12)
PMV BLD AUTO: 9.6 FL (ref 6–12)
PMV BLD AUTO: 9.7 FL (ref 6–12)
PO2 BLDA: 59.6 MM HG (ref 80–100)
PO2 BLDA: 86.9 MM HG (ref 80–100)
POTASSIUM SERPL-SCNC: 3 MMOL/L (ref 3.5–5.2)
POTASSIUM SERPL-SCNC: 3.1 MMOL/L (ref 3.5–5.2)
POTASSIUM SERPL-SCNC: 3.2 MMOL/L (ref 3.5–5.2)
POTASSIUM SERPL-SCNC: 3.3 MMOL/L (ref 3.5–5.2)
POTASSIUM SERPL-SCNC: 3.4 MMOL/L (ref 3.5–5.2)
POTASSIUM SERPL-SCNC: 3.5 MMOL/L (ref 3.5–5.2)
POTASSIUM SERPL-SCNC: 3.6 MMOL/L (ref 3.5–5.2)
POTASSIUM SERPL-SCNC: 3.7 MMOL/L (ref 3.5–5.2)
POTASSIUM SERPL-SCNC: 3.8 MMOL/L (ref 3.5–5.2)
POTASSIUM SERPL-SCNC: 3.9 MMOL/L (ref 3.5–5.2)
POTASSIUM SERPL-SCNC: 4 MMOL/L (ref 3.5–5.2)
POTASSIUM SERPL-SCNC: 4 MMOL/L (ref 3.5–5.2)
POTASSIUM SERPL-SCNC: 4.1 MMOL/L (ref 3.5–5.2)
POTASSIUM SERPL-SCNC: 4.9 MMOL/L (ref 3.5–5.2)
PROCALCITONIN SERPL-MCNC: 0.06 NG/ML (ref 0–0.25)
PROCALCITONIN SERPL-MCNC: 0.07 NG/ML (ref 0–0.25)
PROCALCITONIN SERPL-MCNC: 0.09 NG/ML (ref 0–0.25)
PROT SERPL-MCNC: 4.5 G/DL (ref 6–8.5)
PROT SERPL-MCNC: 5 G/DL (ref 6–8.5)
PROT SERPL-MCNC: 5.9 G/DL (ref 6–8.5)
PROT SERPL-MCNC: 6.3 G/DL (ref 6–8.5)
PROT SERPL-MCNC: 6.4 G/DL (ref 6–8.5)
PROT SERPL-MCNC: 6.5 G/DL (ref 6–8.5)
PROT SERPL-MCNC: 7 G/DL (ref 6–8.5)
PROT SERPL-MCNC: 7.1 G/DL (ref 6–8.5)
PROT UR QL STRIP: NEGATIVE
PROT UR QL STRIP: NORMAL
PROTHROMBIN TIME: 15.2 SECONDS (ref 11.7–14.2)
PROTHROMBIN TIME: 15.4 SECONDS (ref 11.7–14.2)
PROTHROMBIN TIME: 15.4 SECONDS (ref 11.7–14.2)
PROTHROMBIN TIME: 15.6 SECONDS (ref 11.7–14.2)
PROTHROMBIN TIME: 16 SECONDS (ref 11.7–14.2)
PROTHROMBIN TIME: 16 SECONDS (ref 11.7–14.2)
PROTHROMBIN TIME: 16.2 SECONDS (ref 11.7–14.2)
PROTHROMBIN TIME: 16.3 SECONDS (ref 11.7–14.2)
PROTHROMBIN TIME: 16.6 SECONDS (ref 11.7–14.2)
PROTHROMBIN TIME: 16.7 SECONDS (ref 11.7–14.2)
PROTHROMBIN TIME: 16.9 SECONDS (ref 11.7–14.2)
PROTHROMBIN TIME: 17.3 SECONDS (ref 11.7–14.2)
PROTHROMBIN TIME: 17.8 SECONDS (ref 11.7–14.2)
PROTHROMBIN TIME: 18.1 SECONDS (ref 11.7–14.2)
PROTHROMBIN TIME: 18.7 SECONDS (ref 11.7–14.2)
PROTHROMBIN TIME: 19.1 SECONDS (ref 11.7–14.2)
PROTHROMBIN TIME: 20.1 SECONDS (ref 11.7–14.2)
PROTHROMBIN TIME: 20.2 SECONDS (ref 11.7–14.2)
PROTHROMBIN TIME: 20.5 SECONDS (ref 11.7–14.2)
PROTHROMBIN TIME: 20.7 SECONDS (ref 11.7–14.2)
PROTHROMBIN TIME: 21.1 SECONDS (ref 11.7–14.2)
PROTHROMBIN TIME: 21.2 SECONDS (ref 11.7–14.2)
PROTHROMBIN TIME: 21.5 SECONDS (ref 11.7–14.2)
PROTHROMBIN TIME: 21.9 SECONDS (ref 11.7–14.2)
PROTHROMBIN TIME: 22.3 SECONDS (ref 11.7–14.2)
PROTHROMBIN TIME: 22.4 SECONDS (ref 11.7–14.2)
PROTHROMBIN TIME: 23.7 SECONDS (ref 11.7–14.2)
PROTHROMBIN TIME: 24.3 SECONDS (ref 11.7–14.2)
PROTHROMBIN TIME: 24.3 SECONDS (ref 11.7–14.2)
PROTHROMBIN TIME: 24.9 SECONDS (ref 11.7–14.2)
PROTHROMBIN TIME: 25.7 SECONDS (ref 11.7–14.2)
PROTHROMBIN TIME: 26.3 SECONDS (ref 11.7–14.2)
PROTHROMBIN TIME: 28.2 SECONDS (ref 11.7–14.2)
PROTHROMBIN TIME: 28.3 SECONDS (ref 11.7–14.2)
PROTHROMBIN TIME: 28.6 SECONDS (ref 11.7–14.2)
PROTHROMBIN TIME: 29 SECONDS (ref 11.7–14.2)
PROTHROMBIN TIME: 67.8 SECONDS (ref 11.7–14.2)
QT INTERVAL: 439 MS
QT INTERVAL: 456 MS
RBC # BLD AUTO: 2.04 10*6/MM3 (ref 3.77–5.28)
RBC # BLD AUTO: 2.43 10*6/MM3 (ref 3.77–5.28)
RBC # BLD AUTO: 2.47 10*6/MM3 (ref 3.77–5.28)
RBC # BLD AUTO: 2.47 10*6/MM3 (ref 3.77–5.28)
RBC # BLD AUTO: 2.52 10*6/MM3 (ref 3.77–5.28)
RBC # BLD AUTO: 2.54 10*6/MM3 (ref 3.77–5.28)
RBC # BLD AUTO: 2.58 10*6/MM3 (ref 3.77–5.28)
RBC # BLD AUTO: 2.58 10*6/MM3 (ref 3.77–5.28)
RBC # BLD AUTO: 2.59 10*6/MM3 (ref 3.77–5.28)
RBC # BLD AUTO: 2.63 10*6/MM3 (ref 3.77–5.28)
RBC # BLD AUTO: 2.94 10*6/MM3 (ref 3.77–5.28)
RBC # BLD AUTO: 2.95 10*6/MM3 (ref 3.77–5.28)
RBC # BLD AUTO: 3 10*6/MM3 (ref 3.77–5.28)
RBC # BLD AUTO: 3.01 10*6/MM3 (ref 3.77–5.28)
RBC # BLD AUTO: 3.03 10*6/MM3 (ref 3.77–5.28)
RBC # BLD AUTO: 3.07 10*6/MM3 (ref 3.77–5.28)
RBC # BLD AUTO: 3.11 10*6/MM3 (ref 3.77–5.28)
RBC # BLD AUTO: 3.15 10*6/MM3 (ref 3.77–5.28)
RBC # BLD AUTO: 3.19 10*6/MM3 (ref 3.77–5.28)
RBC # BLD AUTO: 3.22 10*6/MM3 (ref 3.77–5.28)
RBC # BLD AUTO: 3.24 10*6/MM3 (ref 3.77–5.28)
RBC # BLD AUTO: 3.25 10*6/MM3 (ref 3.77–5.28)
RBC # BLD AUTO: 3.26 10*6/MM3 (ref 3.77–5.28)
RBC # BLD AUTO: 3.28 10*6/MM3 (ref 3.77–5.28)
RBC # BLD AUTO: 3.32 10*6/MM3 (ref 3.77–5.28)
RBC # BLD AUTO: 3.4 10*6/MM3 (ref 3.77–5.28)
RBC # BLD AUTO: 3.55 10*6/MM3 (ref 3.77–5.28)
RBC # BLD AUTO: 3.57 10*6/MM3 (ref 3.77–5.28)
RBC # BLD AUTO: 3.64 10*6/MM3 (ref 3.77–5.28)
RBC # BLD AUTO: 3.65 10*6/MM3 (ref 3.77–5.28)
RBC # BLD AUTO: 3.65 10*6/MM3 (ref 3.77–5.28)
RBC # BLD AUTO: 3.68 10*6/MM3 (ref 3.77–5.28)
RBC # BLD AUTO: 3.73 10*6/MM3 (ref 3.77–5.28)
RBC # BLD AUTO: 3.8 10*6/MM3 (ref 3.77–5.28)
RBC # BLD AUTO: 3.8 10*6/MM3 (ref 3.77–5.28)
RBC # BLD AUTO: 3.86 10*6/MM3 (ref 3.77–5.28)
RBC # BLD AUTO: 3.93 10*6/MM3 (ref 3.77–5.28)
RBC # BLD AUTO: 4.09 10*6/MM3 (ref 3.77–5.28)
RBC # BLD AUTO: 4.31 10*6/MM3 (ref 3.77–5.28)
RBC # BLD AUTO: 4.37 10*6/MM3 (ref 3.77–5.28)
RBC # BLD AUTO: 4.44 10*6/MM3 (ref 3.77–5.28)
RBC # BLD AUTO: 4.48 10*6/MM3 (ref 3.77–5.28)
RBC # BLD AUTO: 4.56 10*6/MM3 (ref 3.77–5.28)
RBC # BLD AUTO: 4.57 10*6/MM3 (ref 3.77–5.28)
RBC # BLD AUTO: 4.61 X10E6/UL (ref 3.77–5.28)
RBC # BLD AUTO: 4.76 10*6/MM3 (ref 3.77–5.28)
RBC # BLD AUTO: 5.09 10*6/MM3 (ref 3.77–5.28)
RBC # UR: ABNORMAL /HPF
REF LAB TEST METHOD: ABNORMAL
RH BLD: POSITIVE
RHINOVIRUS RNA SPEC NAA+PROBE: NOT DETECTED
RHINOVIRUS RNA SPEC NAA+PROBE: NOT DETECTED
RSV RNA NPH QL NAA+NON-PROBE: NOT DETECTED
RSV RNA NPH QL NAA+NON-PROBE: NOT DETECTED
SAO2 % BLDCOA: 90.7 % (ref 92–99)
SAO2 % BLDCOA: 97.2 % (ref 92–99)
SARS-COV-2 ORF1AB RESP QL NAA+PROBE: NOT DETECTED
SARS-COV-2 RNA NPH QL NAA+NON-PROBE: NOT DETECTED
SARS-COV-2 RNA NPH QL NAA+NON-PROBE: NOT DETECTED
SARS-COV-2 RNA RESP QL NAA+PROBE: NOT DETECTED
SODIUM SERPL-SCNC: 129 MMOL/L (ref 136–145)
SODIUM SERPL-SCNC: 134 MMOL/L (ref 136–145)
SODIUM SERPL-SCNC: 134 MMOL/L (ref 136–145)
SODIUM SERPL-SCNC: 136 MMOL/L (ref 136–145)
SODIUM SERPL-SCNC: 137 MMOL/L (ref 136–145)
SODIUM SERPL-SCNC: 137 MMOL/L (ref 136–145)
SODIUM SERPL-SCNC: 138 MMOL/L (ref 136–145)
SODIUM SERPL-SCNC: 139 MMOL/L (ref 134–144)
SODIUM SERPL-SCNC: 139 MMOL/L (ref 136–145)
SODIUM SERPL-SCNC: 140 MMOL/L (ref 136–145)
SODIUM SERPL-SCNC: 141 MMOL/L (ref 136–145)
SODIUM SERPL-SCNC: 142 MMOL/L (ref 136–145)
SODIUM SERPL-SCNC: 142 MMOL/L (ref 136–145)
SODIUM SERPL-SCNC: 143 MMOL/L (ref 136–145)
SODIUM SERPL-SCNC: 143 MMOL/L (ref 136–145)
SODIUM SERPL-SCNC: 144 MMOL/L (ref 136–145)
SODIUM SERPL-SCNC: 146 MMOL/L (ref 136–145)
SP GR UR STRIP: 1.01 (ref 1–1.03)
SP GR UR STRIP: <=1.005 (ref 1–1.03)
SP GR UR: NORMAL
SQUAMOUS #/AREA URNS HPF: ABNORMAL /HPF
STRESS BASELINE BP: NORMAL MMHG
STRESS BASELINE HR: 104 BPM
STRESS PERCENT HR: 91 %
STRESS POST PEAK BP: NORMAL MMHG
STRESS POST PEAK HR: 104 BPM
STRESS TARGET HR: 114 BPM
T&S EXPIRATION DATE: NORMAL
T3FREE SERPL-MCNC: 2.2 PG/ML (ref 2–4.4)
T4 FREE SERPL-MCNC: 1.15 NG/DL (ref 0.93–1.7)
T4 FREE SERPL-MCNC: 1.43 NG/DL (ref 0.82–1.77)
TOTAL RATE: 18 BREATHS/MINUTE
TRIGL SERPL-MCNC: 104 MG/DL (ref 0–150)
TRIGL SERPL-MCNC: 114 MG/DL (ref 0–149)
TROPONIN T SERPL-MCNC: <0.01 NG/ML (ref 0–0.03)
TSH SERPL DL<=0.005 MIU/L-ACNC: 2.29 UIU/ML (ref 0.45–4.5)
TSH SERPL DL<=0.05 MIU/L-ACNC: 1 UIU/ML (ref 0.27–4.2)
TSH SERPL DL<=0.05 MIU/L-ACNC: 3.8 UIU/ML (ref 0.27–4.2)
UNIT  ABO: NORMAL
UNIT  RH: NORMAL
URATE SERPL-MCNC: 8.7 MG/DL (ref 2.4–5.7)
URATE SERPL-MCNC: 9.4 MG/DL (ref 2.4–5.7)
UROBILINOGEN UR QL STRIP: ABNORMAL
UROBILINOGEN UR QL STRIP: NORMAL
VIT B12 BLD-MCNC: 1344 PG/ML (ref 211–946)
VIT B12 SERPL-MCNC: 1337 PG/ML (ref 232–1245)
VLDLC SERPL CALC-MCNC: 23 MG/DL (ref 5–40)
VLDLC SERPL-MCNC: 19 MG/DL (ref 5–40)
WBC # BLD AUTO: 10.05 10*3/MM3 (ref 3.4–10.8)
WBC # BLD AUTO: 10.07 10*3/MM3 (ref 3.4–10.8)
WBC # BLD AUTO: 10.1 10*3/MM3 (ref 3.4–10.8)
WBC # BLD AUTO: 10.15 10*3/MM3 (ref 3.4–10.8)
WBC # BLD AUTO: 10.19 10*3/MM3 (ref 3.4–10.8)
WBC # BLD AUTO: 10.7 X10E3/UL (ref 3.4–10.8)
WBC # BLD AUTO: 10.77 10*3/MM3 (ref 3.4–10.8)
WBC # BLD AUTO: 11.45 10*3/MM3 (ref 3.4–10.8)
WBC # BLD AUTO: 11.76 10*3/MM3 (ref 3.4–10.8)
WBC # BLD AUTO: 12.19 10*3/MM3 (ref 3.4–10.8)
WBC # BLD AUTO: 12.24 10*3/MM3 (ref 3.4–10.8)
WBC # BLD AUTO: 4.26 10*3/MM3 (ref 3.4–10.8)
WBC # BLD AUTO: 4.27 10*3/MM3 (ref 3.4–10.8)
WBC # BLD AUTO: 4.44 10*3/MM3 (ref 3.4–10.8)
WBC # BLD AUTO: 4.59 10*3/MM3 (ref 3.4–10.8)
WBC # BLD AUTO: 4.86 10*3/MM3 (ref 3.4–10.8)
WBC # BLD AUTO: 4.9 10*3/MM3 (ref 3.4–10.8)
WBC # BLD AUTO: 5.09 10*3/MM3 (ref 3.4–10.8)
WBC # BLD AUTO: 5.23 10*3/MM3 (ref 3.4–10.8)
WBC # BLD AUTO: 5.58 10*3/MM3 (ref 3.4–10.8)
WBC # BLD AUTO: 5.58 10*3/MM3 (ref 3.4–10.8)
WBC # BLD AUTO: 5.66 10*3/MM3 (ref 3.4–10.8)
WBC # BLD AUTO: 5.75 10*3/MM3 (ref 3.4–10.8)
WBC # BLD AUTO: 5.78 10*3/MM3 (ref 3.4–10.8)
WBC # BLD AUTO: 5.89 10*3/MM3 (ref 3.4–10.8)
WBC # BLD AUTO: 5.94 10*3/MM3 (ref 3.4–10.8)
WBC # BLD AUTO: 5.95 10*3/MM3 (ref 3.4–10.8)
WBC # BLD AUTO: 6.18 10*3/MM3 (ref 3.4–10.8)
WBC # BLD AUTO: 6.35 10*3/MM3 (ref 3.4–10.8)
WBC # BLD AUTO: 6.54 10*3/MM3 (ref 3.4–10.8)
WBC # BLD AUTO: 6.73 10*3/MM3 (ref 3.4–10.8)
WBC # BLD AUTO: 7.12 10*3/MM3 (ref 3.4–10.8)
WBC # BLD AUTO: 7.16 10*3/MM3 (ref 3.4–10.8)
WBC # BLD AUTO: 7.16 10*3/MM3 (ref 3.4–10.8)
WBC # BLD AUTO: 7.42 10*3/MM3 (ref 3.4–10.8)
WBC # BLD AUTO: 8.33 10*3/MM3 (ref 3.4–10.8)
WBC # BLD AUTO: 8.42 10*3/MM3 (ref 3.4–10.8)
WBC # BLD AUTO: 8.52 10*3/MM3 (ref 3.4–10.8)
WBC # BLD AUTO: 8.67 10*3/MM3 (ref 3.4–10.8)
WBC # BLD AUTO: 8.69 10*3/MM3 (ref 3.4–10.8)
WBC # BLD AUTO: 9.03 10*3/MM3 (ref 3.4–10.8)
WBC # BLD AUTO: 9.08 10*3/MM3 (ref 3.4–10.8)
WBC # BLD AUTO: 9.1 10*3/MM3 (ref 3.4–10.8)
WBC # BLD AUTO: 9.11 10*3/MM3 (ref 3.4–10.8)
WBC # BLD AUTO: 9.17 10*3/MM3 (ref 3.4–10.8)
WBC # BLD AUTO: 9.18 10*3/MM3 (ref 3.4–10.8)
WBC # BLD AUTO: 9.3 10*3/MM3 (ref 3.4–10.8)
WBC UR QL AUTO: ABNORMAL /HPF
WHOLE BLOOD HOLD SPECIMEN: NORMAL
WHOLE BLOOD HOLD SPECIMEN: NORMAL

## 2020-01-01 PROCEDURE — 85610 PROTHROMBIN TIME: CPT | Performed by: SURGERY

## 2020-01-01 PROCEDURE — 99232 SBSQ HOSP IP/OBS MODERATE 35: CPT | Performed by: NURSE PRACTITIONER

## 2020-01-01 PROCEDURE — 70551 MRI BRAIN STEM W/O DYE: CPT

## 2020-01-01 PROCEDURE — 94799 UNLISTED PULMONARY SVC/PX: CPT

## 2020-01-01 PROCEDURE — 85610 PROTHROMBIN TIME: CPT | Performed by: INTERNAL MEDICINE

## 2020-01-01 PROCEDURE — 82962 GLUCOSE BLOOD TEST: CPT

## 2020-01-01 PROCEDURE — A9577 INJ MULTIHANCE: HCPCS | Performed by: INTERNAL MEDICINE

## 2020-01-01 PROCEDURE — 86850 RBC ANTIBODY SCREEN: CPT | Performed by: ANESTHESIOLOGY

## 2020-01-01 PROCEDURE — 85730 THROMBOPLASTIN TIME PARTIAL: CPT | Performed by: SURGERY

## 2020-01-01 PROCEDURE — 70450 CT HEAD/BRAIN W/O DYE: CPT

## 2020-01-01 PROCEDURE — 85027 COMPLETE CBC AUTOMATED: CPT | Performed by: SURGERY

## 2020-01-01 PROCEDURE — 25010000002 PROPOFOL 10 MG/ML EMULSION: Performed by: ANESTHESIOLOGY

## 2020-01-01 PROCEDURE — U0004 COV-19 TEST NON-CDC HGH THRU: HCPCS | Performed by: EMERGENCY MEDICINE

## 2020-01-01 PROCEDURE — 99231 SBSQ HOSP IP/OBS SF/LOW 25: CPT | Performed by: INTERNAL MEDICINE

## 2020-01-01 PROCEDURE — 85027 COMPLETE CBC AUTOMATED: CPT | Performed by: INTERNAL MEDICINE

## 2020-01-01 PROCEDURE — 25010000002 REGADENOSON 0.4 MG/5ML SOLUTION: Performed by: INTERNAL MEDICINE

## 2020-01-01 PROCEDURE — 83615 LACTATE (LD) (LDH) ENZYME: CPT | Performed by: INTERNAL MEDICINE

## 2020-01-01 PROCEDURE — 93010 ELECTROCARDIOGRAM REPORT: CPT | Performed by: INTERNAL MEDICINE

## 2020-01-01 PROCEDURE — 25010000002 HYDRALAZINE PER 20 MG: Performed by: INTERNAL MEDICINE

## 2020-01-01 PROCEDURE — 85730 THROMBOPLASTIN TIME PARTIAL: CPT | Performed by: EMERGENCY MEDICINE

## 2020-01-01 PROCEDURE — 99223 1ST HOSP IP/OBS HIGH 75: CPT | Performed by: PSYCHIATRY & NEUROLOGY

## 2020-01-01 PROCEDURE — 25010000003 CEFAZOLIN IN DEXTROSE 2-4 GM/100ML-% SOLUTION: Performed by: SURGERY

## 2020-01-01 PROCEDURE — 80048 BASIC METABOLIC PNL TOTAL CA: CPT | Performed by: HOSPITALIST

## 2020-01-01 PROCEDURE — 80053 COMPREHEN METABOLIC PANEL: CPT | Performed by: EMERGENCY MEDICINE

## 2020-01-01 PROCEDURE — P9016 RBC LEUKOCYTES REDUCED: HCPCS

## 2020-01-01 PROCEDURE — 84145 PROCALCITONIN (PCT): CPT | Performed by: EMERGENCY MEDICINE

## 2020-01-01 PROCEDURE — C1769 GUIDE WIRE: HCPCS | Performed by: NEUROLOGICAL SURGERY

## 2020-01-01 PROCEDURE — 85025 COMPLETE CBC W/AUTO DIFF WBC: CPT | Performed by: EMERGENCY MEDICINE

## 2020-01-01 PROCEDURE — 70496 CT ANGIOGRAPHY HEAD: CPT

## 2020-01-01 PROCEDURE — 80048 BASIC METABOLIC PNL TOTAL CA: CPT | Performed by: SURGERY

## 2020-01-01 PROCEDURE — 99231 SBSQ HOSP IP/OBS SF/LOW 25: CPT | Performed by: NURSE PRACTITIONER

## 2020-01-01 PROCEDURE — 84443 ASSAY THYROID STIM HORMONE: CPT | Performed by: NURSE PRACTITIONER

## 2020-01-01 PROCEDURE — 99232 SBSQ HOSP IP/OBS MODERATE 35: CPT | Performed by: INTERNAL MEDICINE

## 2020-01-01 PROCEDURE — 86901 BLOOD TYPING SEROLOGIC RH(D): CPT | Performed by: HOSPITALIST

## 2020-01-01 PROCEDURE — 25010000003 LEVETIRACETAM IN NACL 0.75% 1000 MG/100ML SOLUTION: Performed by: PSYCHIATRY & NEUROLOGY

## 2020-01-01 PROCEDURE — 36227 PLACE CATH XTRNL CAROTID: CPT | Performed by: NEUROLOGICAL SURGERY

## 2020-01-01 PROCEDURE — 36430 TRANSFUSION BLD/BLD COMPNT: CPT

## 2020-01-01 PROCEDURE — C9132 KCENTRA, PER I.U.: HCPCS | Performed by: EMERGENCY MEDICINE

## 2020-01-01 PROCEDURE — 83036 HEMOGLOBIN GLYCOSYLATED A1C: CPT | Performed by: NURSE PRACTITIONER

## 2020-01-01 PROCEDURE — 0042T HC CT CEREBRAL PERFUSION W/WO CONTRAST: CPT

## 2020-01-01 PROCEDURE — 83735 ASSAY OF MAGNESIUM: CPT | Performed by: NURSE PRACTITIONER

## 2020-01-01 PROCEDURE — 97110 THERAPEUTIC EXERCISES: CPT

## 2020-01-01 PROCEDURE — 71045 X-RAY EXAM CHEST 1 VIEW: CPT

## 2020-01-01 PROCEDURE — 25010000003 POTASSIUM CHLORIDE 10 MEQ/100ML SOLUTION: Performed by: INTERNAL MEDICINE

## 2020-01-01 PROCEDURE — 25010000002 CEFTRIAXONE PER 250 MG: Performed by: INTERNAL MEDICINE

## 2020-01-01 PROCEDURE — C1894 INTRO/SHEATH, NON-LASER: HCPCS | Performed by: NEUROLOGICAL SURGERY

## 2020-01-01 PROCEDURE — 25010000002 ONDANSETRON PER 1 MG: Performed by: ANESTHESIOLOGY

## 2020-01-01 PROCEDURE — 80048 BASIC METABOLIC PNL TOTAL CA: CPT | Performed by: INTERNAL MEDICINE

## 2020-01-01 PROCEDURE — 99285 EMERGENCY DEPT VISIT HI MDM: CPT

## 2020-01-01 PROCEDURE — A9500 TC99M SESTAMIBI: HCPCS | Performed by: INTERNAL MEDICINE

## 2020-01-01 PROCEDURE — 97530 THERAPEUTIC ACTIVITIES: CPT

## 2020-01-01 PROCEDURE — 36226 PLACE CATH VERTEBRAL ART: CPT | Performed by: NEUROLOGICAL SURGERY

## 2020-01-01 PROCEDURE — 25010000002 PHENYLEPHRINE PER 1 ML: Performed by: ANESTHESIOLOGY

## 2020-01-01 PROCEDURE — 25010000002 FUROSEMIDE PER 20 MG: Performed by: INTERNAL MEDICINE

## 2020-01-01 PROCEDURE — 93971 EXTREMITY STUDY: CPT

## 2020-01-01 PROCEDURE — 97110 THERAPEUTIC EXERCISES: CPT | Performed by: PHYSICAL THERAPIST

## 2020-01-01 PROCEDURE — 36600 WITHDRAWAL OF ARTERIAL BLOOD: CPT

## 2020-01-01 PROCEDURE — 87086 URINE CULTURE/COLONY COUNT: CPT | Performed by: EMERGENCY MEDICINE

## 2020-01-01 PROCEDURE — 85730 THROMBOPLASTIN TIME PARTIAL: CPT | Performed by: NURSE PRACTITIONER

## 2020-01-01 PROCEDURE — 78452 HT MUSCLE IMAGE SPECT MULT: CPT

## 2020-01-01 PROCEDURE — C1757 CATH, THROMBECTOMY/EMBOLECT: HCPCS | Performed by: SURGERY

## 2020-01-01 PROCEDURE — 86901 BLOOD TYPING SEROLOGIC RH(D): CPT

## 2020-01-01 PROCEDURE — 86850 RBC ANTIBODY SCREEN: CPT | Performed by: INTERNAL MEDICINE

## 2020-01-01 PROCEDURE — 85025 COMPLETE CBC W/AUTO DIFF WBC: CPT | Performed by: INTERNAL MEDICINE

## 2020-01-01 PROCEDURE — 93005 ELECTROCARDIOGRAM TRACING: CPT | Performed by: EMERGENCY MEDICINE

## 2020-01-01 PROCEDURE — 90670 PCV13 VACCINE IM: CPT | Performed by: FAMILY MEDICINE

## 2020-01-01 PROCEDURE — 94640 AIRWAY INHALATION TREATMENT: CPT

## 2020-01-01 PROCEDURE — 78452 HT MUSCLE IMAGE SPECT MULT: CPT | Performed by: INTERNAL MEDICINE

## 2020-01-01 PROCEDURE — 25010000002 IOPAMIDOL 61 % SOLUTION: Performed by: INTERNAL MEDICINE

## 2020-01-01 PROCEDURE — 36415 COLL VENOUS BLD VENIPUNCTURE: CPT | Performed by: INTERNAL MEDICINE

## 2020-01-01 PROCEDURE — 99233 SBSQ HOSP IP/OBS HIGH 50: CPT | Performed by: INTERNAL MEDICINE

## 2020-01-01 PROCEDURE — 25010000002 HEPARIN (PORCINE) PER 1000 UNITS: Performed by: SURGERY

## 2020-01-01 PROCEDURE — 84484 ASSAY OF TROPONIN QUANT: CPT | Performed by: EMERGENCY MEDICINE

## 2020-01-01 PROCEDURE — 85610 PROTHROMBIN TIME: CPT | Performed by: NURSE PRACTITIONER

## 2020-01-01 PROCEDURE — 83880 ASSAY OF NATRIURETIC PEPTIDE: CPT | Performed by: EMERGENCY MEDICINE

## 2020-01-01 PROCEDURE — 85652 RBC SED RATE AUTOMATED: CPT | Performed by: NEUROLOGICAL SURGERY

## 2020-01-01 PROCEDURE — 25010000002 FUROSEMIDE PER 20 MG: Performed by: EMERGENCY MEDICINE

## 2020-01-01 PROCEDURE — 86923 COMPATIBILITY TEST ELECTRIC: CPT

## 2020-01-01 PROCEDURE — 99231 SBSQ HOSP IP/OBS SF/LOW 25: CPT | Performed by: PSYCHIATRY & NEUROLOGY

## 2020-01-01 PROCEDURE — 0 IOPAMIDOL PER 1 ML: Performed by: INTERNAL MEDICINE

## 2020-01-01 PROCEDURE — 85014 HEMATOCRIT: CPT | Performed by: NURSE PRACTITIONER

## 2020-01-01 PROCEDURE — 25010000003 CEFAZOLIN PER 500 MG: Performed by: SURGERY

## 2020-01-01 PROCEDURE — 81003 URINALYSIS AUTO W/O SCOPE: CPT | Performed by: EMERGENCY MEDICINE

## 2020-01-01 PROCEDURE — 74176 CT ABD & PELVIS W/O CONTRAST: CPT

## 2020-01-01 PROCEDURE — 82728 ASSAY OF FERRITIN: CPT | Performed by: INTERNAL MEDICINE

## 2020-01-01 PROCEDURE — 92610 EVALUATE SWALLOWING FUNCTION: CPT

## 2020-01-01 PROCEDURE — C1760 CLOSURE DEV, VASC: HCPCS | Performed by: NEUROLOGICAL SURGERY

## 2020-01-01 PROCEDURE — 85018 HEMOGLOBIN: CPT | Performed by: INTERNAL MEDICINE

## 2020-01-01 PROCEDURE — 85610 PROTHROMBIN TIME: CPT | Performed by: EMERGENCY MEDICINE

## 2020-01-01 PROCEDURE — 99222 1ST HOSP IP/OBS MODERATE 55: CPT | Performed by: INTERNAL MEDICINE

## 2020-01-01 PROCEDURE — 0202U NFCT DS 22 TRGT SARS-COV-2: CPT | Performed by: INTERNAL MEDICINE

## 2020-01-01 PROCEDURE — 97535 SELF CARE MNGMENT TRAINING: CPT

## 2020-01-01 PROCEDURE — 81001 URINALYSIS AUTO W/SCOPE: CPT | Performed by: NURSE PRACTITIONER

## 2020-01-01 PROCEDURE — 99221 1ST HOSP IP/OBS SF/LOW 40: CPT | Performed by: INTERNAL MEDICINE

## 2020-01-01 PROCEDURE — 82728 ASSAY OF FERRITIN: CPT | Performed by: EMERGENCY MEDICINE

## 2020-01-01 PROCEDURE — 85379 FIBRIN DEGRADATION QUANT: CPT | Performed by: INTERNAL MEDICINE

## 2020-01-01 PROCEDURE — 85730 THROMBOPLASTIN TIME PARTIAL: CPT | Performed by: HOSPITALIST

## 2020-01-01 PROCEDURE — 25010000002 HEPARIN (PORCINE) PER 1000 UNITS: Performed by: NURSE PRACTITIONER

## 2020-01-01 PROCEDURE — 97162 PT EVAL MOD COMPLEX 30 MIN: CPT

## 2020-01-01 PROCEDURE — 84484 ASSAY OF TROPONIN QUANT: CPT | Performed by: NURSE PRACTITIONER

## 2020-01-01 PROCEDURE — 25010000003 POTASSIUM CHLORIDE 10 MEQ/100ML SOLUTION: Performed by: HOSPITALIST

## 2020-01-01 PROCEDURE — 99233 SBSQ HOSP IP/OBS HIGH 50: CPT | Performed by: NURSE PRACTITIONER

## 2020-01-01 PROCEDURE — 86140 C-REACTIVE PROTEIN: CPT | Performed by: NEUROLOGICAL SURGERY

## 2020-01-01 PROCEDURE — 25010000002 ENOXAPARIN PER 10 MG: Performed by: NURSE PRACTITIONER

## 2020-01-01 PROCEDURE — 84145 PROCALCITONIN (PCT): CPT | Performed by: NURSE PRACTITIONER

## 2020-01-01 PROCEDURE — 80048 BASIC METABOLIC PNL TOTAL CA: CPT | Performed by: NURSE PRACTITIONER

## 2020-01-01 PROCEDURE — 0 TECHNETIUM SESTAMIBI: Performed by: INTERNAL MEDICINE

## 2020-01-01 PROCEDURE — 86900 BLOOD TYPING SEROLOGIC ABO: CPT | Performed by: EMERGENCY MEDICINE

## 2020-01-01 PROCEDURE — 95816 EEG AWAKE AND DROWSY: CPT | Performed by: PSYCHIATRY & NEUROLOGY

## 2020-01-01 PROCEDURE — 25010000002 MIDAZOLAM PER 1 MG: Performed by: ANESTHESIOLOGY

## 2020-01-01 PROCEDURE — 85018 HEMOGLOBIN: CPT | Performed by: NURSE PRACTITIONER

## 2020-01-01 PROCEDURE — 87040 BLOOD CULTURE FOR BACTERIA: CPT | Performed by: NURSE PRACTITIONER

## 2020-01-01 PROCEDURE — P9612 CATHETERIZE FOR URINE SPEC: HCPCS

## 2020-01-01 PROCEDURE — 0202U NFCT DS 22 TRGT SARS-COV-2: CPT | Performed by: EMERGENCY MEDICINE

## 2020-01-01 PROCEDURE — 99232 SBSQ HOSP IP/OBS MODERATE 35: CPT | Performed by: PSYCHIATRY & NEUROLOGY

## 2020-01-01 PROCEDURE — 0DB58ZX EXCISION OF ESOPHAGUS, VIA NATURAL OR ARTIFICIAL OPENING ENDOSCOPIC, DIAGNOSTIC: ICD-10-PCS | Performed by: INTERNAL MEDICINE

## 2020-01-01 PROCEDURE — 85025 COMPLETE CBC W/AUTO DIFF WBC: CPT | Performed by: NURSE PRACTITIONER

## 2020-01-01 PROCEDURE — 86900 BLOOD TYPING SEROLOGIC ABO: CPT | Performed by: INTERNAL MEDICINE

## 2020-01-01 PROCEDURE — 83735 ASSAY OF MAGNESIUM: CPT | Performed by: INTERNAL MEDICINE

## 2020-01-01 PROCEDURE — 87088 URINE BACTERIA CULTURE: CPT | Performed by: EMERGENCY MEDICINE

## 2020-01-01 PROCEDURE — 99214 OFFICE O/P EST MOD 30 MIN: CPT | Performed by: FAMILY MEDICINE

## 2020-01-01 PROCEDURE — 86901 BLOOD TYPING SEROLOGIC RH(D): CPT | Performed by: ANESTHESIOLOGY

## 2020-01-01 PROCEDURE — 25010000002 ONDANSETRON PER 1 MG: Performed by: NURSE PRACTITIONER

## 2020-01-01 PROCEDURE — 0DB68ZX EXCISION OF STOMACH, VIA NATURAL OR ARTIFICIAL OPENING ENDOSCOPIC, DIAGNOSTIC: ICD-10-PCS | Performed by: INTERNAL MEDICINE

## 2020-01-01 PROCEDURE — 02HV33Z INSERTION OF INFUSION DEVICE INTO SUPERIOR VENA CAVA, PERCUTANEOUS APPROACH: ICD-10-PCS | Performed by: HOSPITALIST

## 2020-01-01 PROCEDURE — 93005 ELECTROCARDIOGRAM TRACING: CPT | Performed by: NURSE PRACTITIONER

## 2020-01-01 PROCEDURE — 36225 PLACE CATH SUBCLAVIAN ART: CPT | Performed by: NEUROLOGICAL SURGERY

## 2020-01-01 PROCEDURE — 86850 RBC ANTIBODY SCREEN: CPT | Performed by: HOSPITALIST

## 2020-01-01 PROCEDURE — 25010000002 HYDRALAZINE PER 20 MG: Performed by: NEUROLOGICAL SURGERY

## 2020-01-01 PROCEDURE — 84550 ASSAY OF BLOOD/URIC ACID: CPT | Performed by: HOSPITALIST

## 2020-01-01 PROCEDURE — 85610 PROTHROMBIN TIME: CPT

## 2020-01-01 PROCEDURE — 86900 BLOOD TYPING SEROLOGIC ABO: CPT | Performed by: ANESTHESIOLOGY

## 2020-01-01 PROCEDURE — 86140 C-REACTIVE PROTEIN: CPT | Performed by: INTERNAL MEDICINE

## 2020-01-01 PROCEDURE — 86140 C-REACTIVE PROTEIN: CPT | Performed by: EMERGENCY MEDICINE

## 2020-01-01 PROCEDURE — 80053 COMPREHEN METABOLIC PANEL: CPT | Performed by: NURSE PRACTITIONER

## 2020-01-01 PROCEDURE — 85027 COMPLETE CBC AUTOMATED: CPT | Performed by: NEUROLOGICAL SURGERY

## 2020-01-01 PROCEDURE — 83605 ASSAY OF LACTIC ACID: CPT | Performed by: NURSE PRACTITIONER

## 2020-01-01 PROCEDURE — 86901 BLOOD TYPING SEROLOGIC RH(D): CPT | Performed by: INTERNAL MEDICINE

## 2020-01-01 PROCEDURE — 85730 THROMBOPLASTIN TIME PARTIAL: CPT | Performed by: INTERNAL MEDICINE

## 2020-01-01 PROCEDURE — 04CK0ZZ EXTIRPATION OF MATTER FROM RIGHT FEMORAL ARTERY, OPEN APPROACH: ICD-10-PCS | Performed by: SURGERY

## 2020-01-01 PROCEDURE — 80048 BASIC METABOLIC PNL TOTAL CA: CPT | Performed by: NEUROLOGICAL SURGERY

## 2020-01-01 PROCEDURE — C1887 CATHETER, GUIDING: HCPCS | Performed by: NEUROLOGICAL SURGERY

## 2020-01-01 PROCEDURE — 86900 BLOOD TYPING SEROLOGIC ABO: CPT

## 2020-01-01 PROCEDURE — 70498 CT ANGIOGRAPHY NECK: CPT

## 2020-01-01 PROCEDURE — 36415 COLL VENOUS BLD VENIPUNCTURE: CPT | Performed by: NURSE PRACTITIONER

## 2020-01-01 PROCEDURE — 97162 PT EVAL MOD COMPLEX 30 MIN: CPT | Performed by: PHYSICAL THERAPIST

## 2020-01-01 PROCEDURE — 86901 BLOOD TYPING SEROLOGIC RH(D): CPT | Performed by: EMERGENCY MEDICINE

## 2020-01-01 PROCEDURE — 82565 ASSAY OF CREATININE: CPT

## 2020-01-01 PROCEDURE — 83605 ASSAY OF LACTIC ACID: CPT | Performed by: EMERGENCY MEDICINE

## 2020-01-01 PROCEDURE — 25010000002 PROTHROMBIN COMPLEX CONC HUMAN 500 UNITS KIT: Performed by: EMERGENCY MEDICINE

## 2020-01-01 PROCEDURE — 97164 PT RE-EVAL EST PLAN CARE: CPT

## 2020-01-01 PROCEDURE — B31R1ZZ FLUOROSCOPY OF INTRACRANIAL ARTERIES USING LOW OSMOLAR CONTRAST: ICD-10-PCS | Performed by: NEUROLOGICAL SURGERY

## 2020-01-01 PROCEDURE — 74177 CT ABD & PELVIS W/CONTRAST: CPT

## 2020-01-01 PROCEDURE — C1751 CATH, INF, PER/CENT/MIDLINE: HCPCS | Performed by: ANESTHESIOLOGY

## 2020-01-01 PROCEDURE — 25010000002 SUCCINYLCHOLINE PER 20 MG: Performed by: ANESTHESIOLOGY

## 2020-01-01 PROCEDURE — 99222 1ST HOSP IP/OBS MODERATE 55: CPT | Performed by: PSYCHIATRY & NEUROLOGY

## 2020-01-01 PROCEDURE — 82607 VITAMIN B-12: CPT | Performed by: NURSE PRACTITIONER

## 2020-01-01 PROCEDURE — 0DB98ZX EXCISION OF DUODENUM, VIA NATURAL OR ARTIFICIAL OPENING ENDOSCOPIC, DIAGNOSTIC: ICD-10-PCS | Performed by: INTERNAL MEDICINE

## 2020-01-01 PROCEDURE — 85027 COMPLETE CBC AUTOMATED: CPT | Performed by: NURSE PRACTITIONER

## 2020-01-01 PROCEDURE — 76377 3D RENDER W/INTRP POSTPROCES: CPT

## 2020-01-01 PROCEDURE — 25010000002 ENOXAPARIN PER 10 MG: Performed by: EMERGENCY MEDICINE

## 2020-01-01 PROCEDURE — 99253 IP/OBS CNSLTJ NEW/EST LOW 45: CPT | Performed by: INTERNAL MEDICINE

## 2020-01-01 PROCEDURE — 84145 PROCALCITONIN (PCT): CPT | Performed by: INTERNAL MEDICINE

## 2020-01-01 PROCEDURE — 81003 URINALYSIS AUTO W/O SCOPE: CPT | Performed by: INTERNAL MEDICINE

## 2020-01-01 PROCEDURE — 25010000002 MIDAZOLAM PER 1 MG: Performed by: NURSE ANESTHETIST, CERTIFIED REGISTERED

## 2020-01-01 PROCEDURE — 84132 ASSAY OF SERUM POTASSIUM: CPT | Performed by: INTERNAL MEDICINE

## 2020-01-01 PROCEDURE — 99223 1ST HOSP IP/OBS HIGH 75: CPT | Performed by: INTERNAL MEDICINE

## 2020-01-01 PROCEDURE — 84439 ASSAY OF FREE THYROXINE: CPT | Performed by: NURSE PRACTITIONER

## 2020-01-01 PROCEDURE — 86850 RBC ANTIBODY SCREEN: CPT | Performed by: EMERGENCY MEDICINE

## 2020-01-01 PROCEDURE — 70544 MR ANGIOGRAPHY HEAD W/O DYE: CPT

## 2020-01-01 PROCEDURE — 25010000002 CEFTRIAXONE PER 250 MG: Performed by: NEUROLOGICAL SURGERY

## 2020-01-01 PROCEDURE — 99441 PR PHYS/QHP TELEPHONE EVALUATION 5-10 MIN: CPT | Performed by: NEUROLOGICAL SURGERY

## 2020-01-01 PROCEDURE — 93018 CV STRESS TEST I&R ONLY: CPT | Performed by: INTERNAL MEDICINE

## 2020-01-01 PROCEDURE — 87150 DNA/RNA AMPLIFIED PROBE: CPT | Performed by: NURSE PRACTITIONER

## 2020-01-01 PROCEDURE — 80053 COMPREHEN METABOLIC PANEL: CPT | Performed by: INTERNAL MEDICINE

## 2020-01-01 PROCEDURE — 82140 ASSAY OF AMMONIA: CPT | Performed by: NURSE PRACTITIONER

## 2020-01-01 PROCEDURE — 25010000002 HEPARIN (PORCINE) PER 1000 UNITS: Performed by: ANESTHESIOLOGY

## 2020-01-01 PROCEDURE — 0 IODIXANOL PER 1 ML: Performed by: NEUROLOGICAL SURGERY

## 2020-01-01 PROCEDURE — 87186 SC STD MICRODIL/AGAR DIL: CPT | Performed by: EMERGENCY MEDICINE

## 2020-01-01 PROCEDURE — 97166 OT EVAL MOD COMPLEX 45 MIN: CPT

## 2020-01-01 PROCEDURE — 81003 URINALYSIS AUTO W/O SCOPE: CPT | Performed by: NURSE PRACTITIONER

## 2020-01-01 PROCEDURE — 92526 ORAL FUNCTION THERAPY: CPT

## 2020-01-01 PROCEDURE — 82550 ASSAY OF CK (CPK): CPT | Performed by: INTERNAL MEDICINE

## 2020-01-01 PROCEDURE — 93017 CV STRESS TEST TRACING ONLY: CPT

## 2020-01-01 PROCEDURE — 43239 EGD BIOPSY SINGLE/MULTIPLE: CPT | Performed by: INTERNAL MEDICINE

## 2020-01-01 PROCEDURE — 0 GADOBENATE DIMEGLUMINE 529 MG/ML SOLUTION: Performed by: INTERNAL MEDICINE

## 2020-01-01 PROCEDURE — 25010000002 CEFTRIAXONE PER 250 MG: Performed by: EMERGENCY MEDICINE

## 2020-01-01 PROCEDURE — 82803 BLOOD GASES ANY COMBINATION: CPT

## 2020-01-01 PROCEDURE — 86900 BLOOD TYPING SEROLOGIC ABO: CPT | Performed by: HOSPITALIST

## 2020-01-01 PROCEDURE — U0004 COV-19 TEST NON-CDC HGH THRU: HCPCS | Performed by: NURSE PRACTITIONER

## 2020-01-01 PROCEDURE — 99284 EMERGENCY DEPT VISIT MOD MDM: CPT

## 2020-01-01 PROCEDURE — 85384 FIBRINOGEN ACTIVITY: CPT | Performed by: INTERNAL MEDICINE

## 2020-01-01 PROCEDURE — 99214 OFFICE O/P EST MOD 30 MIN: CPT | Performed by: INTERNAL MEDICINE

## 2020-01-01 PROCEDURE — 88305 TISSUE EXAM BY PATHOLOGIST: CPT | Performed by: INTERNAL MEDICINE

## 2020-01-01 PROCEDURE — 36224 PLACE CATH CAROTD ART: CPT | Performed by: NEUROLOGICAL SURGERY

## 2020-01-01 PROCEDURE — 95816 EEG AWAKE AND DROWSY: CPT

## 2020-01-01 PROCEDURE — 25010000002 HEPARIN (PORCINE) PER 1000 UNITS: Performed by: NEUROLOGICAL SURGERY

## 2020-01-01 PROCEDURE — 25010000002 PROTHROMBIN COMPLEX CONC HUMAN 1000 UNITS KIT: Performed by: EMERGENCY MEDICINE

## 2020-01-01 PROCEDURE — 25010000002 FENTANYL CITRATE (PF) 100 MCG/2ML SOLUTION: Performed by: NURSE ANESTHETIST, CERTIFIED REGISTERED

## 2020-01-01 PROCEDURE — 25010000002 HYDROMORPHONE PER 4 MG: Performed by: ANESTHESIOLOGY

## 2020-01-01 PROCEDURE — 80061 LIPID PANEL: CPT | Performed by: NURSE PRACTITIONER

## 2020-01-01 PROCEDURE — 25010000003 PHYTONADIONE 10 MG/ML SOLUTION 1 ML AMPULE: Performed by: EMERGENCY MEDICINE

## 2020-01-01 PROCEDURE — 70553 MRI BRAIN STEM W/O & W/DYE: CPT

## 2020-01-01 PROCEDURE — 85014 HEMATOCRIT: CPT | Performed by: INTERNAL MEDICINE

## 2020-01-01 PROCEDURE — C1751 CATH, INF, PER/CENT/MIDLINE: HCPCS

## 2020-01-01 PROCEDURE — 83615 LACTATE (LD) (LDH) ENZYME: CPT | Performed by: EMERGENCY MEDICINE

## 2020-01-01 PROCEDURE — G0009 ADMIN PNEUMOCOCCAL VACCINE: HCPCS | Performed by: FAMILY MEDICINE

## 2020-01-01 RX ORDER — POTASSIUM CHLORIDE 7.45 MG/ML
10 INJECTION INTRAVENOUS
Status: DISCONTINUED | OUTPATIENT
Start: 2020-01-01 | End: 2020-01-01 | Stop reason: HOSPADM

## 2020-01-01 RX ORDER — CEFAZOLIN SODIUM 2 G/100ML
2 INJECTION, SOLUTION INTRAVENOUS ONCE
Status: COMPLETED | OUTPATIENT
Start: 2020-01-01 | End: 2020-01-01

## 2020-01-01 RX ORDER — ACETAMINOPHEN 325 MG/1
650 TABLET ORAL EVERY 4 HOURS PRN
Status: DISCONTINUED | OUTPATIENT
Start: 2020-01-01 | End: 2020-01-01 | Stop reason: HOSPADM

## 2020-01-01 RX ORDER — HEPARIN SODIUM 5000 [USP'U]/ML
30-60 INJECTION, SOLUTION INTRAVENOUS; SUBCUTANEOUS EVERY 6 HOURS PRN
Status: DISCONTINUED | OUTPATIENT
Start: 2020-01-01 | End: 2020-01-01 | Stop reason: HOSPADM

## 2020-01-01 RX ORDER — BISACODYL 10 MG
10 SUPPOSITORY, RECTAL RECTAL DAILY PRN
Status: DISCONTINUED | OUTPATIENT
Start: 2020-01-01 | End: 2020-01-01 | Stop reason: HOSPADM

## 2020-01-01 RX ORDER — ACETAMINOPHEN 325 MG/1
650 TABLET ORAL ONCE
Status: COMPLETED | OUTPATIENT
Start: 2020-01-01 | End: 2020-01-01

## 2020-01-01 RX ORDER — FUROSEMIDE 40 MG/1
TABLET ORAL
Qty: 30 TABLET | Refills: 4 | Status: SHIPPED | OUTPATIENT
Start: 2020-01-01 | End: 2020-01-01

## 2020-01-01 RX ORDER — PANTOPRAZOLE SODIUM 40 MG/1
40 TABLET, DELAYED RELEASE ORAL
Start: 2020-01-01

## 2020-01-01 RX ORDER — HYDROMORPHONE HYDROCHLORIDE 1 MG/ML
0.5 INJECTION, SOLUTION INTRAMUSCULAR; INTRAVENOUS; SUBCUTANEOUS
Status: DISCONTINUED | OUTPATIENT
Start: 2020-01-01 | End: 2020-01-01 | Stop reason: HOSPADM

## 2020-01-01 RX ORDER — ISOSORBIDE MONONITRATE 60 MG/1
60 TABLET, EXTENDED RELEASE ORAL DAILY
Status: DISCONTINUED | OUTPATIENT
Start: 2020-01-01 | End: 2020-01-01 | Stop reason: HOSPADM

## 2020-01-01 RX ORDER — FUROSEMIDE 20 MG/1
20 TABLET ORAL DAILY
Status: DISCONTINUED | OUTPATIENT
Start: 2020-01-01 | End: 2020-01-01 | Stop reason: HOSPADM

## 2020-01-01 RX ORDER — LISINOPRIL 10 MG/1
TABLET ORAL
Qty: 30 TABLET | Refills: 5 | Status: ON HOLD | OUTPATIENT
Start: 2020-01-01 | End: 2020-01-01

## 2020-01-01 RX ORDER — ONDANSETRON 2 MG/ML
4 INJECTION INTRAMUSCULAR; INTRAVENOUS EVERY 6 HOURS PRN
Status: DISCONTINUED | OUTPATIENT
Start: 2020-01-01 | End: 2020-01-01 | Stop reason: HOSPADM

## 2020-01-01 RX ORDER — SODIUM CHLORIDE 0.9 % (FLUSH) 0.9 %
10 SYRINGE (ML) INJECTION EVERY 12 HOURS SCHEDULED
Status: DISCONTINUED | OUTPATIENT
Start: 2020-01-01 | End: 2020-01-01 | Stop reason: HOSPADM

## 2020-01-01 RX ORDER — FENTANYL CITRATE 50 UG/ML
50 INJECTION, SOLUTION INTRAMUSCULAR; INTRAVENOUS
Status: DISCONTINUED | OUTPATIENT
Start: 2020-01-01 | End: 2020-01-01 | Stop reason: HOSPADM

## 2020-01-01 RX ORDER — PROPOFOL 10 MG/ML
VIAL (ML) INTRAVENOUS AS NEEDED
Status: DISCONTINUED | OUTPATIENT
Start: 2020-01-01 | End: 2020-01-01 | Stop reason: SURG

## 2020-01-01 RX ORDER — FUROSEMIDE 40 MG/1
TABLET ORAL
Qty: 90 TABLET | Refills: 1 | Status: SHIPPED | OUTPATIENT
Start: 2020-01-01 | End: 2020-01-01 | Stop reason: HOSPADM

## 2020-01-01 RX ORDER — HYDROCODONE BITARTRATE AND ACETAMINOPHEN 7.5; 325 MG/1; MG/1
1 TABLET ORAL ONCE AS NEEDED
Status: DISCONTINUED | OUTPATIENT
Start: 2020-01-01 | End: 2020-01-01 | Stop reason: HOSPADM

## 2020-01-01 RX ORDER — HYDROCODONE BITARTRATE AND ACETAMINOPHEN 7.5; 325 MG/1; MG/1
1 TABLET ORAL ONCE AS NEEDED
Status: DISCONTINUED | OUTPATIENT
Start: 2020-01-01 | End: 2020-01-01

## 2020-01-01 RX ORDER — FUROSEMIDE 10 MG/ML
40 INJECTION INTRAMUSCULAR; INTRAVENOUS EVERY 12 HOURS
Status: DISCONTINUED | OUTPATIENT
Start: 2020-01-01 | End: 2020-01-01 | Stop reason: HOSPADM

## 2020-01-01 RX ORDER — FENTANYL CITRATE 50 UG/ML
INJECTION, SOLUTION INTRAMUSCULAR; INTRAVENOUS AS NEEDED
Status: DISCONTINUED | OUTPATIENT
Start: 2020-01-01 | End: 2020-01-01 | Stop reason: SURG

## 2020-01-01 RX ORDER — FLUMAZENIL 0.1 MG/ML
0.2 INJECTION INTRAVENOUS AS NEEDED
Status: DISCONTINUED | OUTPATIENT
Start: 2020-01-01 | End: 2020-01-01 | Stop reason: HOSPADM

## 2020-01-01 RX ORDER — LEVOTHYROXINE SODIUM 0.15 MG/1
TABLET ORAL
Qty: 30 TABLET | Refills: 0 | Status: SHIPPED | OUTPATIENT
Start: 2020-01-01 | End: 2020-01-01

## 2020-01-01 RX ORDER — ACETAMINOPHEN 160 MG/5ML
650 SOLUTION ORAL EVERY 4 HOURS PRN
Status: DISCONTINUED | OUTPATIENT
Start: 2020-01-01 | End: 2020-01-01 | Stop reason: HOSPADM

## 2020-01-01 RX ORDER — IPRATROPIUM BROMIDE AND ALBUTEROL SULFATE 2.5; .5 MG/3ML; MG/3ML
3 SOLUTION RESPIRATORY (INHALATION) EVERY 4 HOURS PRN
Qty: 360 ML
Start: 2020-01-01

## 2020-01-01 RX ORDER — ACETAMINOPHEN 325 MG/1
650 TABLET ORAL EVERY 6 HOURS PRN
Status: DISPENSED | OUTPATIENT
Start: 2020-01-01 | End: 2020-01-01

## 2020-01-01 RX ORDER — HYDRALAZINE HYDROCHLORIDE 20 MG/ML
5 INJECTION INTRAMUSCULAR; INTRAVENOUS
Status: DISCONTINUED | OUTPATIENT
Start: 2020-01-01 | End: 2020-01-01

## 2020-01-01 RX ORDER — WARFARIN SODIUM 3 MG/1
1.5 TABLET ORAL
Status: DISCONTINUED | OUTPATIENT
Start: 2020-01-01 | End: 2020-01-01

## 2020-01-01 RX ORDER — SODIUM CHLORIDE 0.9 % (FLUSH) 0.9 %
10 SYRINGE (ML) INJECTION AS NEEDED
Status: DISCONTINUED | OUTPATIENT
Start: 2020-01-01 | End: 2020-01-01 | Stop reason: HOSPADM

## 2020-01-01 RX ORDER — OXYCODONE AND ACETAMINOPHEN 7.5; 325 MG/1; MG/1
1 TABLET ORAL ONCE AS NEEDED
Status: DISCONTINUED | OUTPATIENT
Start: 2020-01-01 | End: 2020-01-01 | Stop reason: HOSPADM

## 2020-01-01 RX ORDER — WARFARIN SODIUM 3 MG/1
3 TABLET ORAL
Status: DISCONTINUED | OUTPATIENT
Start: 2020-01-01 | End: 2020-01-01

## 2020-01-01 RX ORDER — CEFDINIR 300 MG/1
300 CAPSULE ORAL EVERY 12 HOURS SCHEDULED
Status: DISCONTINUED | OUTPATIENT
Start: 2020-01-01 | End: 2020-01-01

## 2020-01-01 RX ORDER — ASPIRIN 325 MG
325 TABLET ORAL DAILY
Status: DISCONTINUED | OUTPATIENT
Start: 2020-01-01 | End: 2020-01-01

## 2020-01-01 RX ORDER — OLANZAPINE 2.5 MG/1
2.5 TABLET ORAL
Status: DISCONTINUED | OUTPATIENT
Start: 2020-01-01 | End: 2020-01-01

## 2020-01-01 RX ORDER — FUROSEMIDE 10 MG/ML
40 INJECTION INTRAMUSCULAR; INTRAVENOUS ONCE
Status: COMPLETED | OUTPATIENT
Start: 2020-01-01 | End: 2020-01-01

## 2020-01-01 RX ORDER — MIDAZOLAM HYDROCHLORIDE 1 MG/ML
INJECTION INTRAMUSCULAR; INTRAVENOUS AS NEEDED
Status: DISCONTINUED | OUTPATIENT
Start: 2020-01-01 | End: 2020-01-01 | Stop reason: SURG

## 2020-01-01 RX ORDER — SODIUM PHOSPHATE, DIBASIC AND SODIUM PHOSPHATE, MONOBASIC 7; 19 G/133ML; G/133ML
1 ENEMA RECTAL ONCE AS NEEDED
COMMUNITY
End: 2020-01-01 | Stop reason: HOSPADM

## 2020-01-01 RX ORDER — HEPARIN SODIUM 5000 [USP'U]/ML
60 INJECTION, SOLUTION INTRAVENOUS; SUBCUTANEOUS ONCE
Status: COMPLETED | OUTPATIENT
Start: 2020-01-01 | End: 2020-01-01

## 2020-01-01 RX ORDER — IPRATROPIUM BROMIDE AND ALBUTEROL SULFATE 2.5; .5 MG/3ML; MG/3ML
3 SOLUTION RESPIRATORY (INHALATION) EVERY 4 HOURS PRN
Status: DISCONTINUED | OUTPATIENT
Start: 2020-01-01 | End: 2020-01-01 | Stop reason: SDUPTHER

## 2020-01-01 RX ORDER — DIPHENHYDRAMINE HCL 25 MG
25 CAPSULE ORAL
Status: DISCONTINUED | OUTPATIENT
Start: 2020-01-01 | End: 2020-01-01 | Stop reason: HOSPADM

## 2020-01-01 RX ORDER — ISOSORBIDE MONONITRATE 60 MG/1
TABLET, EXTENDED RELEASE ORAL
Qty: 90 TABLET | Refills: 1 | Status: SHIPPED | OUTPATIENT
Start: 2020-01-01 | End: 2020-01-01

## 2020-01-01 RX ORDER — FUROSEMIDE 20 MG/1
20 TABLET ORAL DAILY
Start: 2020-01-01 | End: 2020-01-01

## 2020-01-01 RX ORDER — AMLODIPINE BESYLATE 10 MG/1
10 TABLET ORAL
Status: DISCONTINUED | OUTPATIENT
Start: 2020-01-01 | End: 2020-01-01 | Stop reason: HOSPADM

## 2020-01-01 RX ORDER — CLOPIDOGREL BISULFATE 75 MG/1
TABLET ORAL
Qty: 30 TABLET | Refills: 2 | Status: SHIPPED | OUTPATIENT
Start: 2020-01-01 | End: 2020-01-01 | Stop reason: HOSPADM

## 2020-01-01 RX ORDER — LEVOTHYROXINE SODIUM 0.07 MG/1
75 TABLET ORAL EVERY MORNING
Status: DISCONTINUED | OUTPATIENT
Start: 2020-01-01 | End: 2020-01-01 | Stop reason: HOSPADM

## 2020-01-01 RX ORDER — LEVETIRACETAM 10 MG/ML
1000 INJECTION INTRAVASCULAR EVERY 12 HOURS SCHEDULED
Status: DISCONTINUED | OUTPATIENT
Start: 2020-01-01 | End: 2021-01-01

## 2020-01-01 RX ORDER — DIPHENHYDRAMINE HYDROCHLORIDE 50 MG/ML
12.5 INJECTION INTRAMUSCULAR; INTRAVENOUS
Status: DISCONTINUED | OUTPATIENT
Start: 2020-01-01 | End: 2020-01-01

## 2020-01-01 RX ORDER — POTASSIUM CHLORIDE 750 MG/1
40 CAPSULE, EXTENDED RELEASE ORAL AS NEEDED
Status: DISCONTINUED | OUTPATIENT
Start: 2020-01-01 | End: 2020-01-01 | Stop reason: HOSPADM

## 2020-01-01 RX ORDER — NICOTINE POLACRILEX 4 MG
15 LOZENGE BUCCAL
Status: DISCONTINUED | OUTPATIENT
Start: 2020-01-01 | End: 2020-01-01 | Stop reason: HOSPADM

## 2020-01-01 RX ORDER — LIDOCAINE HYDROCHLORIDE 10 MG/ML
0.5 INJECTION, SOLUTION EPIDURAL; INFILTRATION; INTRACAUDAL; PERINEURAL ONCE AS NEEDED
Status: DISCONTINUED | OUTPATIENT
Start: 2020-01-01 | End: 2020-01-01 | Stop reason: HOSPADM

## 2020-01-01 RX ORDER — LEVOTHYROXINE SODIUM 0.15 MG/1
TABLET ORAL
Qty: 30 TABLET | Refills: 2 | Status: SHIPPED | OUTPATIENT
Start: 2020-01-01 | End: 2020-01-01 | Stop reason: HOSPADM

## 2020-01-01 RX ORDER — CEFUROXIME AXETIL 500 MG/1
500 TABLET ORAL 2 TIMES DAILY
COMMUNITY
Start: 2020-01-01 | End: 2020-01-01 | Stop reason: HOSPADM

## 2020-01-01 RX ORDER — POTASSIUM CHLORIDE 750 MG/1
40 TABLET, FILM COATED, EXTENDED RELEASE ORAL ONCE
Status: COMPLETED | OUTPATIENT
Start: 2020-01-01 | End: 2020-01-01

## 2020-01-01 RX ORDER — SODIUM CHLORIDE, SODIUM LACTATE, POTASSIUM CHLORIDE, CALCIUM CHLORIDE 600; 310; 30; 20 MG/100ML; MG/100ML; MG/100ML; MG/100ML
9 INJECTION, SOLUTION INTRAVENOUS CONTINUOUS
Status: DISCONTINUED | OUTPATIENT
Start: 2020-01-01 | End: 2020-01-01 | Stop reason: HOSPADM

## 2020-01-01 RX ORDER — EPHEDRINE SULFATE 50 MG/ML
5 INJECTION, SOLUTION INTRAVENOUS ONCE AS NEEDED
Status: CANCELLED | OUTPATIENT
Start: 2020-01-01

## 2020-01-01 RX ORDER — SACCHAROMYCES BOULARDII 250 MG
250 CAPSULE ORAL DAILY
Status: DISCONTINUED | OUTPATIENT
Start: 2020-01-01 | End: 2020-01-01 | Stop reason: HOSPADM

## 2020-01-01 RX ORDER — MIDAZOLAM HYDROCHLORIDE 1 MG/ML
1 INJECTION INTRAMUSCULAR; INTRAVENOUS
Status: DISCONTINUED | OUTPATIENT
Start: 2020-01-01 | End: 2020-01-01 | Stop reason: HOSPADM

## 2020-01-01 RX ORDER — LABETALOL HYDROCHLORIDE 5 MG/ML
5 INJECTION, SOLUTION INTRAVENOUS
Status: DISCONTINUED | OUTPATIENT
Start: 2020-01-01 | End: 2020-01-01 | Stop reason: HOSPADM

## 2020-01-01 RX ORDER — POTASSIUM CHLORIDE 1.5 G/1.77G
40 POWDER, FOR SOLUTION ORAL AS NEEDED
Status: DISCONTINUED | OUTPATIENT
Start: 2020-01-01 | End: 2021-01-01 | Stop reason: HOSPADM

## 2020-01-01 RX ORDER — ONDANSETRON 2 MG/ML
4 INJECTION INTRAMUSCULAR; INTRAVENOUS ONCE AS NEEDED
Status: DISCONTINUED | OUTPATIENT
Start: 2020-01-01 | End: 2020-01-01

## 2020-01-01 RX ORDER — LISINOPRIL 10 MG/1
TABLET ORAL
Qty: 30 TABLET | Refills: 5 | OUTPATIENT
Start: 2020-01-01 | End: 2020-01-01 | Stop reason: HOSPADM

## 2020-01-01 RX ORDER — WARFARIN SODIUM 3 MG/1
TABLET ORAL
Qty: 90 TABLET | Refills: 1 | Status: SHIPPED | OUTPATIENT
Start: 2020-01-01 | End: 2020-01-01 | Stop reason: HOSPADM

## 2020-01-01 RX ORDER — ASPIRIN 300 MG/1
300 SUPPOSITORY RECTAL DAILY
Status: DISCONTINUED | OUTPATIENT
Start: 2020-01-01 | End: 2021-01-01

## 2020-01-01 RX ORDER — FLUMAZENIL 0.1 MG/ML
0.2 INJECTION INTRAVENOUS AS NEEDED
Status: DISCONTINUED | OUTPATIENT
Start: 2020-01-01 | End: 2020-01-01

## 2020-01-01 RX ORDER — SODIUM CHLORIDE 0.9 % (FLUSH) 0.9 %
3-10 SYRINGE (ML) INJECTION AS NEEDED
Status: DISCONTINUED | OUTPATIENT
Start: 2020-01-01 | End: 2020-01-01 | Stop reason: HOSPADM

## 2020-01-01 RX ORDER — POTASSIUM CHLORIDE 750 MG/1
10 TABLET, FILM COATED, EXTENDED RELEASE ORAL DAILY
Status: DISCONTINUED | OUTPATIENT
Start: 2020-01-01 | End: 2020-01-01

## 2020-01-01 RX ORDER — EPHEDRINE SULFATE 50 MG/ML
5 INJECTION, SOLUTION INTRAVENOUS ONCE AS NEEDED
Status: DISCONTINUED | OUTPATIENT
Start: 2020-01-01 | End: 2020-01-01 | Stop reason: HOSPADM

## 2020-01-01 RX ORDER — IPRATROPIUM BROMIDE AND ALBUTEROL SULFATE 2.5; .5 MG/3ML; MG/3ML
3 SOLUTION RESPIRATORY (INHALATION) EVERY 4 HOURS PRN
Status: DISCONTINUED | OUTPATIENT
Start: 2020-01-01 | End: 2020-01-01 | Stop reason: HOSPADM

## 2020-01-01 RX ORDER — POTASSIUM CHLORIDE 750 MG/1
40 TABLET, FILM COATED, EXTENDED RELEASE ORAL AS NEEDED
Status: DISCONTINUED | OUTPATIENT
Start: 2020-01-01 | End: 2021-01-01 | Stop reason: HOSPADM

## 2020-01-01 RX ORDER — POTASSIUM CHLORIDE 750 MG/1
20 TABLET, FILM COATED, EXTENDED RELEASE ORAL DAILY
Status: DISCONTINUED | OUTPATIENT
Start: 2020-01-01 | End: 2020-01-01 | Stop reason: HOSPADM

## 2020-01-01 RX ORDER — DIPHENHYDRAMINE HYDROCHLORIDE 50 MG/ML
12.5 INJECTION INTRAMUSCULAR; INTRAVENOUS
Status: DISCONTINUED | OUTPATIENT
Start: 2020-01-01 | End: 2020-01-01 | Stop reason: HOSPADM

## 2020-01-01 RX ORDER — HYDRALAZINE HYDROCHLORIDE 20 MG/ML
10 INJECTION INTRAMUSCULAR; INTRAVENOUS EVERY 4 HOURS PRN
Status: DISCONTINUED | OUTPATIENT
Start: 2020-01-01 | End: 2020-01-01 | Stop reason: HOSPADM

## 2020-01-01 RX ORDER — BISACODYL 10 MG
10 SUPPOSITORY, RECTAL RECTAL DAILY PRN
Start: 2020-01-01

## 2020-01-01 RX ORDER — ZINC OXIDE 20 %
OINTMENT (GRAM) TOPICAL 2 TIMES DAILY PRN
COMMUNITY

## 2020-01-01 RX ORDER — CLOPIDOGREL BISULFATE 75 MG/1
75 TABLET ORAL DAILY
Qty: 30 TABLET | Refills: 2 | Status: SHIPPED | OUTPATIENT
Start: 2020-01-01 | End: 2020-01-01

## 2020-01-01 RX ORDER — IPRATROPIUM BROMIDE AND ALBUTEROL SULFATE 2.5; .5 MG/3ML; MG/3ML
3 SOLUTION RESPIRATORY (INHALATION) ONCE
Status: COMPLETED | OUTPATIENT
Start: 2020-01-01 | End: 2020-01-01

## 2020-01-01 RX ORDER — NALOXONE HCL 0.4 MG/ML
0.2 VIAL (ML) INJECTION AS NEEDED
Status: DISCONTINUED | OUTPATIENT
Start: 2020-01-01 | End: 2020-01-01 | Stop reason: HOSPADM

## 2020-01-01 RX ORDER — POTASSIUM CHLORIDE 1.5 G/1.77G
40 POWDER, FOR SOLUTION ORAL AS NEEDED
Status: DISCONTINUED | OUTPATIENT
Start: 2020-01-01 | End: 2020-01-01 | Stop reason: HOSPADM

## 2020-01-01 RX ORDER — FAMOTIDINE 10 MG/ML
20 INJECTION, SOLUTION INTRAVENOUS ONCE
Status: COMPLETED | OUTPATIENT
Start: 2020-01-01 | End: 2020-01-01

## 2020-01-01 RX ORDER — WARFARIN SODIUM 2.5 MG/1
2.5 TABLET ORAL
Status: ON HOLD | COMMUNITY
End: 2020-01-01

## 2020-01-01 RX ORDER — HEPARIN SODIUM 10000 [USP'U]/100ML
500 INJECTION, SOLUTION INTRAVENOUS CONTINUOUS
Status: DISCONTINUED | OUTPATIENT
Start: 2020-01-01 | End: 2020-01-01

## 2020-01-01 RX ORDER — NALOXONE HCL 0.4 MG/ML
0.2 VIAL (ML) INJECTION AS NEEDED
Status: DISCONTINUED | OUTPATIENT
Start: 2020-01-01 | End: 2020-01-01

## 2020-01-01 RX ORDER — LEVOTHYROXINE SODIUM 0.07 MG/1
75 TABLET ORAL EVERY MORNING
Start: 2020-01-01

## 2020-01-01 RX ORDER — HYDROMORPHONE HCL 110MG/55ML
PATIENT CONTROLLED ANALGESIA SYRINGE INTRAVENOUS AS NEEDED
Status: DISCONTINUED | OUTPATIENT
Start: 2020-01-01 | End: 2020-01-01 | Stop reason: SURG

## 2020-01-01 RX ORDER — ATORVASTATIN CALCIUM 40 MG/1
TABLET, FILM COATED ORAL
Qty: 30 TABLET | Refills: 5 | Status: SHIPPED | OUTPATIENT
Start: 2020-01-01 | End: 2020-01-01 | Stop reason: HOSPADM

## 2020-01-01 RX ORDER — SODIUM CHLORIDE, SODIUM LACTATE, POTASSIUM CHLORIDE, CALCIUM CHLORIDE 600; 310; 30; 20 MG/100ML; MG/100ML; MG/100ML; MG/100ML
9 INJECTION, SOLUTION INTRAVENOUS CONTINUOUS
Status: DISCONTINUED | OUTPATIENT
Start: 2020-01-01 | End: 2020-01-01

## 2020-01-01 RX ORDER — POTASSIUM CHLORIDE 750 MG/1
20 CAPSULE, EXTENDED RELEASE ORAL
Qty: 120 CAPSULE | Refills: 5 | Status: SHIPPED | OUTPATIENT
Start: 2020-01-01 | End: 2020-01-01

## 2020-01-01 RX ORDER — WARFARIN SODIUM 1 MG/1
1 TABLET ORAL
Status: COMPLETED | OUTPATIENT
Start: 2020-01-01 | End: 2020-01-01

## 2020-01-01 RX ORDER — LIDOCAINE HYDROCHLORIDE 20 MG/ML
INJECTION, SOLUTION INFILTRATION; PERINEURAL AS NEEDED
Status: DISCONTINUED | OUTPATIENT
Start: 2020-01-01 | End: 2020-01-01 | Stop reason: SURG

## 2020-01-01 RX ORDER — ONDANSETRON 2 MG/ML
4 INJECTION INTRAMUSCULAR; INTRAVENOUS ONCE AS NEEDED
Status: DISCONTINUED | OUTPATIENT
Start: 2020-01-01 | End: 2020-01-01 | Stop reason: HOSPADM

## 2020-01-01 RX ORDER — WARFARIN SODIUM 1 MG/1
0.5 TABLET ORAL
Status: DISCONTINUED | OUTPATIENT
Start: 2020-01-01 | End: 2021-01-01

## 2020-01-01 RX ORDER — AMLODIPINE BESYLATE 5 MG/1
5 TABLET ORAL
Status: DISCONTINUED | OUTPATIENT
Start: 2020-01-01 | End: 2020-01-01

## 2020-01-01 RX ORDER — SODIUM CHLORIDE, SODIUM LACTATE, POTASSIUM CHLORIDE, CALCIUM CHLORIDE 600; 310; 30; 20 MG/100ML; MG/100ML; MG/100ML; MG/100ML
9 INJECTION, SOLUTION INTRAVENOUS CONTINUOUS
Status: CANCELLED | OUTPATIENT
Start: 2020-01-01

## 2020-01-01 RX ORDER — HALOPERIDOL 5 MG/ML
2 INJECTION INTRAMUSCULAR
Status: DISCONTINUED | OUTPATIENT
Start: 2020-01-01 | End: 2020-01-01 | Stop reason: HOSPADM

## 2020-01-01 RX ORDER — EPHEDRINE SULFATE 50 MG/ML
5 INJECTION, SOLUTION INTRAVENOUS ONCE AS NEEDED
Status: DISCONTINUED | OUTPATIENT
Start: 2020-01-01 | End: 2020-01-01

## 2020-01-01 RX ORDER — GLYCOPYRROLATE 0.2 MG/ML
INJECTION INTRAMUSCULAR; INTRAVENOUS AS NEEDED
Status: DISCONTINUED | OUTPATIENT
Start: 2020-01-01 | End: 2020-01-01 | Stop reason: SURG

## 2020-01-01 RX ORDER — CEFTRIAXONE SODIUM 1 G/50ML
1 INJECTION, SOLUTION INTRAVENOUS ONCE
Status: COMPLETED | OUTPATIENT
Start: 2020-01-01 | End: 2020-01-01

## 2020-01-01 RX ORDER — LEVOTHYROXINE SODIUM 0.07 MG/1
75 TABLET ORAL
Status: DISCONTINUED | OUTPATIENT
Start: 2020-01-01 | End: 2020-01-01 | Stop reason: HOSPADM

## 2020-01-01 RX ORDER — POTASSIUM CHLORIDE 750 MG/1
10 CAPSULE, EXTENDED RELEASE ORAL DAILY
Status: DISCONTINUED | OUTPATIENT
Start: 2020-01-01 | End: 2020-01-01 | Stop reason: HOSPADM

## 2020-01-01 RX ORDER — LIDOCAINE 50 MG/G
1 PATCH TOPICAL EVERY 24 HOURS
COMMUNITY
Start: 2020-01-01

## 2020-01-01 RX ORDER — FLUTICASONE PROPIONATE 50 MCG
1 SPRAY, SUSPENSION (ML) NASAL DAILY
Status: DISCONTINUED | OUTPATIENT
Start: 2020-01-01 | End: 2020-01-01 | Stop reason: HOSPADM

## 2020-01-01 RX ORDER — FUROSEMIDE 40 MG/1
TABLET ORAL
Qty: 90 TABLET | Refills: 1 | Status: SHIPPED | OUTPATIENT
Start: 2020-01-01 | End: 2020-01-01

## 2020-01-01 RX ORDER — CEFTRIAXONE SODIUM 1 G/50ML
1 INJECTION, SOLUTION INTRAVENOUS EVERY 24 HOURS
Status: DISCONTINUED | OUTPATIENT
Start: 2020-01-01 | End: 2020-01-01

## 2020-01-01 RX ORDER — SODIUM CHLORIDE 0.9 % (FLUSH) 0.9 %
10 SYRINGE (ML) INJECTION AS NEEDED
Status: DISCONTINUED | OUTPATIENT
Start: 2020-01-01 | End: 2020-01-01

## 2020-01-01 RX ORDER — ASPIRIN 300 MG/1
300 SUPPOSITORY RECTAL ONCE
Status: COMPLETED | OUTPATIENT
Start: 2020-01-01 | End: 2020-01-01

## 2020-01-01 RX ORDER — IODIXANOL 320 MG/ML
400 INJECTION, SOLUTION INTRAVASCULAR
Status: COMPLETED | OUTPATIENT
Start: 2020-01-01 | End: 2020-01-01

## 2020-01-01 RX ORDER — ATORVASTATIN CALCIUM 80 MG/1
80 TABLET, FILM COATED ORAL NIGHTLY
Status: DISCONTINUED | OUTPATIENT
Start: 2020-01-01 | End: 2021-01-01

## 2020-01-01 RX ORDER — DIPHENHYDRAMINE HCL 25 MG
25 CAPSULE ORAL
Status: DISCONTINUED | OUTPATIENT
Start: 2020-01-01 | End: 2020-01-01

## 2020-01-01 RX ORDER — HEPARIN SODIUM 1000 [USP'U]/ML
INJECTION, SOLUTION INTRAVENOUS; SUBCUTANEOUS AS NEEDED
Status: DISCONTINUED | OUTPATIENT
Start: 2020-01-01 | End: 2020-01-01 | Stop reason: SURG

## 2020-01-01 RX ORDER — HYDROCODONE BITARTRATE AND ACETAMINOPHEN 5; 325 MG/1; MG/1
1 TABLET ORAL EVERY 4 HOURS PRN
Status: DISCONTINUED | OUTPATIENT
Start: 2020-01-01 | End: 2020-01-01

## 2020-01-01 RX ORDER — ALBUTEROL SULFATE 1.25 MG/3ML
1 SOLUTION RESPIRATORY (INHALATION) 2 TIMES DAILY
COMMUNITY
Start: 2020-01-01

## 2020-01-01 RX ORDER — HYDRALAZINE HYDROCHLORIDE 20 MG/ML
5 INJECTION INTRAMUSCULAR; INTRAVENOUS
Status: CANCELLED | OUTPATIENT
Start: 2020-01-01

## 2020-01-01 RX ORDER — ACETAMINOPHEN 325 MG/1
650 TABLET ORAL ONCE AS NEEDED
Status: DISCONTINUED | OUTPATIENT
Start: 2020-01-01 | End: 2020-01-01 | Stop reason: HOSPADM

## 2020-01-01 RX ORDER — SODIUM CHLORIDE 9 MG/ML
75 INJECTION, SOLUTION INTRAVENOUS CONTINUOUS
Status: DISCONTINUED | OUTPATIENT
Start: 2020-01-01 | End: 2020-01-01

## 2020-01-01 RX ORDER — AMLODIPINE BESYLATE 10 MG/1
10 TABLET ORAL DAILY
Status: DISCONTINUED | OUTPATIENT
Start: 2020-01-01 | End: 2020-01-01 | Stop reason: HOSPADM

## 2020-01-01 RX ORDER — NALOXONE HCL 0.4 MG/ML
0.4 VIAL (ML) INJECTION AS NEEDED
Status: CANCELLED | OUTPATIENT
Start: 2020-01-01

## 2020-01-01 RX ORDER — LIDOCAINE HYDROCHLORIDE AND EPINEPHRINE 10; 10 MG/ML; UG/ML
INJECTION, SOLUTION INFILTRATION; PERINEURAL AS NEEDED
Status: DISCONTINUED | OUTPATIENT
Start: 2020-01-01 | End: 2020-01-01 | Stop reason: HOSPADM

## 2020-01-01 RX ORDER — SODIUM CHLORIDE 0.9 % (FLUSH) 0.9 %
10 SYRINGE (ML) INJECTION EVERY 12 HOURS SCHEDULED
Status: DISCONTINUED | OUTPATIENT
Start: 2020-01-01 | End: 2020-01-01

## 2020-01-01 RX ORDER — NITROGLYCERIN 0.4 MG/1
0.4 TABLET SUBLINGUAL
Status: DISCONTINUED | OUTPATIENT
Start: 2020-01-01 | End: 2020-01-01 | Stop reason: HOSPADM

## 2020-01-01 RX ORDER — DIPHENHYDRAMINE HYDROCHLORIDE 50 MG/ML
6.25 INJECTION INTRAMUSCULAR; INTRAVENOUS
Status: CANCELLED | OUTPATIENT
Start: 2020-01-01

## 2020-01-01 RX ORDER — ONDANSETRON 2 MG/ML
4 INJECTION INTRAMUSCULAR; INTRAVENOUS EVERY 6 HOURS PRN
Status: DISCONTINUED | OUTPATIENT
Start: 2020-01-01 | End: 2021-01-01 | Stop reason: HOSPADM

## 2020-01-01 RX ORDER — POTASSIUM CHLORIDE 750 MG/1
20 CAPSULE, EXTENDED RELEASE ORAL
Qty: 120 CAPSULE | Refills: 5 | Status: SHIPPED | OUTPATIENT
Start: 2020-01-01 | End: 2020-01-01 | Stop reason: HOSPADM

## 2020-01-01 RX ORDER — WARFARIN SODIUM 3 MG/1
1.5 TABLET ORAL NIGHTLY
Status: DISCONTINUED | OUTPATIENT
Start: 2020-01-01 | End: 2020-01-01 | Stop reason: HOSPADM

## 2020-01-01 RX ORDER — POTASSIUM CHLORIDE 1.5 G/1.77G
40 POWDER, FOR SOLUTION ORAL AS NEEDED
Status: DISCONTINUED | OUTPATIENT
Start: 2020-01-01 | End: 2020-01-01

## 2020-01-01 RX ORDER — AMLODIPINE BESYLATE 5 MG/1
10 TABLET ORAL DAILY
COMMUNITY

## 2020-01-01 RX ORDER — ATORVASTATIN CALCIUM 40 MG/1
TABLET, FILM COATED ORAL
Qty: 30 TABLET | Refills: 0 | Status: SHIPPED | OUTPATIENT
Start: 2020-01-01 | End: 2020-01-01

## 2020-01-01 RX ORDER — CEPHALEXIN 500 MG/1
500 CAPSULE ORAL EVERY 8 HOURS SCHEDULED
Status: DISPENSED | OUTPATIENT
Start: 2020-01-01 | End: 2020-01-01

## 2020-01-01 RX ORDER — SODIUM CHLORIDE, SODIUM LACTATE, POTASSIUM CHLORIDE, CALCIUM CHLORIDE 600; 310; 30; 20 MG/100ML; MG/100ML; MG/100ML; MG/100ML
INJECTION, SOLUTION INTRAVENOUS CONTINUOUS PRN
Status: DISCONTINUED | OUTPATIENT
Start: 2020-01-01 | End: 2020-01-01 | Stop reason: SURG

## 2020-01-01 RX ORDER — LABETALOL HYDROCHLORIDE 5 MG/ML
5 INJECTION, SOLUTION INTRAVENOUS
Status: DISCONTINUED | OUTPATIENT
Start: 2020-01-01 | End: 2020-01-01

## 2020-01-01 RX ORDER — PANTOPRAZOLE SODIUM 40 MG/10ML
80 INJECTION, POWDER, LYOPHILIZED, FOR SOLUTION INTRAVENOUS ONCE
Status: COMPLETED | OUTPATIENT
Start: 2020-01-01 | End: 2020-01-01

## 2020-01-01 RX ORDER — CLOPIDOGREL BISULFATE 75 MG/1
TABLET ORAL
Qty: 30 TABLET | Refills: 2 | Status: SHIPPED | OUTPATIENT
Start: 2020-01-01 | End: 2020-01-01

## 2020-01-01 RX ORDER — FUROSEMIDE 20 MG/1
60 TABLET ORAL 2 TIMES DAILY
Qty: 40 TABLET | Refills: 0 | Status: SHIPPED | OUTPATIENT
Start: 2020-01-01

## 2020-01-01 RX ORDER — WARFARIN SODIUM 1 MG/1
1.5 TABLET ORAL NIGHTLY
Start: 2020-01-01

## 2020-01-01 RX ORDER — PANTOPRAZOLE SODIUM 40 MG/1
40 TABLET, DELAYED RELEASE ORAL
Status: DISCONTINUED | OUTPATIENT
Start: 2020-01-01 | End: 2020-01-01 | Stop reason: HOSPADM

## 2020-01-01 RX ORDER — SUCCINYLCHOLINE CHLORIDE 20 MG/ML
INJECTION INTRAMUSCULAR; INTRAVENOUS AS NEEDED
Status: DISCONTINUED | OUTPATIENT
Start: 2020-01-01 | End: 2020-01-01 | Stop reason: SURG

## 2020-01-01 RX ORDER — ACETAMINOPHEN 650 MG/1
650 SUPPOSITORY RECTAL EVERY 4 HOURS PRN
Status: DISCONTINUED | OUTPATIENT
Start: 2020-01-01 | End: 2021-01-01 | Stop reason: HOSPADM

## 2020-01-01 RX ORDER — ACETAMINOPHEN 650 MG/1
650 SUPPOSITORY RECTAL EVERY 4 HOURS PRN
Status: DISCONTINUED | OUTPATIENT
Start: 2020-01-01 | End: 2020-01-01 | Stop reason: HOSPADM

## 2020-01-01 RX ORDER — ASPIRIN 325 MG
325 TABLET ORAL DAILY
Status: DISCONTINUED | OUTPATIENT
Start: 2020-01-01 | End: 2021-01-01

## 2020-01-01 RX ORDER — SODIUM CHLORIDE 0.9 % (FLUSH) 0.9 %
20 SYRINGE (ML) INJECTION AS NEEDED
Status: DISCONTINUED | OUTPATIENT
Start: 2020-01-01 | End: 2020-01-01 | Stop reason: HOSPADM

## 2020-01-01 RX ORDER — SODIUM CHLORIDE 0.9 % (FLUSH) 0.9 %
3 SYRINGE (ML) INJECTION EVERY 12 HOURS SCHEDULED
Status: DISCONTINUED | OUTPATIENT
Start: 2020-01-01 | End: 2020-01-01 | Stop reason: HOSPADM

## 2020-01-01 RX ORDER — FLUTICASONE PROPIONATE 50 MCG
1 SPRAY, SUSPENSION (ML) NASAL DAILY
COMMUNITY

## 2020-01-01 RX ORDER — POTASSIUM CHLORIDE 750 MG/1
10 CAPSULE, EXTENDED RELEASE ORAL DAILY
Start: 2020-01-01 | End: 2020-01-01

## 2020-01-01 RX ORDER — BISACODYL 10 MG
10 SUPPOSITORY, RECTAL RECTAL DAILY PRN
Status: DISCONTINUED | OUTPATIENT
Start: 2020-01-01 | End: 2021-01-01 | Stop reason: HOSPADM

## 2020-01-01 RX ORDER — ASPIRIN 300 MG/1
300 SUPPOSITORY RECTAL DAILY
Status: DISCONTINUED | OUTPATIENT
Start: 2020-01-01 | End: 2020-01-01

## 2020-01-01 RX ORDER — FENTANYL CITRATE 50 UG/ML
25 INJECTION, SOLUTION INTRAMUSCULAR; INTRAVENOUS
Status: CANCELLED | OUTPATIENT
Start: 2020-01-01

## 2020-01-01 RX ORDER — AMLODIPINE BESYLATE 10 MG/1
10 TABLET ORAL
Start: 2020-01-01 | End: 2020-01-01

## 2020-01-01 RX ORDER — SACCHAROMYCES BOULARDII 250 MG
250 CAPSULE ORAL DAILY
COMMUNITY
Start: 2020-01-01 | End: 2020-01-01

## 2020-01-01 RX ORDER — ALBUTEROL SULFATE 1.25 MG/3ML
1 SOLUTION RESPIRATORY (INHALATION)
Status: DISCONTINUED | OUTPATIENT
Start: 2020-01-01 | End: 2020-01-01 | Stop reason: HOSPADM

## 2020-01-01 RX ORDER — PROPOFOL 10 MG/ML
VIAL (ML) INTRAVENOUS CONTINUOUS PRN
Status: DISCONTINUED | OUTPATIENT
Start: 2020-01-01 | End: 2020-01-01

## 2020-01-01 RX ORDER — WARFARIN SODIUM 2.5 MG/1
2.5 TABLET ORAL NIGHTLY
Status: COMPLETED | OUTPATIENT
Start: 2020-01-01 | End: 2020-01-01

## 2020-01-01 RX ORDER — WARFARIN SODIUM 3 MG/1
3 TABLET ORAL
Status: COMPLETED | OUTPATIENT
Start: 2020-01-01 | End: 2020-01-01

## 2020-01-01 RX ORDER — ACETAMINOPHEN 650 MG/1
650 SUPPOSITORY RECTAL ONCE AS NEEDED
Status: DISCONTINUED | OUTPATIENT
Start: 2020-01-01 | End: 2020-01-01

## 2020-01-01 RX ORDER — HEPARIN SODIUM 10000 [USP'U]/100ML
12 INJECTION, SOLUTION INTRAVENOUS
Status: DISCONTINUED | OUTPATIENT
Start: 2020-01-01 | End: 2020-01-01

## 2020-01-01 RX ORDER — LEVOTHYROXINE SODIUM 0.15 MG/1
150 TABLET ORAL EVERY MORNING
Status: DISCONTINUED | OUTPATIENT
Start: 2020-01-01 | End: 2020-01-01

## 2020-01-01 RX ORDER — SODIUM CHLORIDE, SODIUM LACTATE, POTASSIUM CHLORIDE, CALCIUM CHLORIDE 600; 310; 30; 20 MG/100ML; MG/100ML; MG/100ML; MG/100ML
1000 INJECTION, SOLUTION INTRAVENOUS CONTINUOUS
Status: CANCELLED | OUTPATIENT
Start: 2020-01-01

## 2020-01-01 RX ORDER — ZINC OXIDE 20 %
OINTMENT (GRAM) TOPICAL 2 TIMES DAILY PRN
Status: DISCONTINUED | OUTPATIENT
Start: 2020-01-01 | End: 2020-01-01 | Stop reason: HOSPADM

## 2020-01-01 RX ORDER — ACETAMINOPHEN 325 MG/1
650 TABLET ORAL ONCE AS NEEDED
Status: DISCONTINUED | OUTPATIENT
Start: 2020-01-01 | End: 2020-01-01

## 2020-01-01 RX ORDER — FUROSEMIDE 40 MG/1
40 TABLET ORAL 2 TIMES DAILY
COMMUNITY
End: 2020-01-01 | Stop reason: HOSPADM

## 2020-01-01 RX ORDER — DOXYCYCLINE HYCLATE 100 MG
TABLET ORAL
COMMUNITY
Start: 2020-01-01 | End: 2020-01-01

## 2020-01-01 RX ORDER — SODIUM CHLORIDE, SODIUM LACTATE, POTASSIUM CHLORIDE, CALCIUM CHLORIDE 600; 310; 30; 20 MG/100ML; MG/100ML; MG/100ML; MG/100ML
30 INJECTION, SOLUTION INTRAVENOUS CONTINUOUS
Status: DISCONTINUED | OUTPATIENT
Start: 2020-01-01 | End: 2020-01-01

## 2020-01-01 RX ORDER — ROCURONIUM BROMIDE 10 MG/ML
INJECTION, SOLUTION INTRAVENOUS AS NEEDED
Status: DISCONTINUED | OUTPATIENT
Start: 2020-01-01 | End: 2020-01-01 | Stop reason: SURG

## 2020-01-01 RX ORDER — EPHEDRINE SULFATE 50 MG/ML
INJECTION, SOLUTION INTRAVENOUS AS NEEDED
Status: DISCONTINUED | OUTPATIENT
Start: 2020-01-01 | End: 2020-01-01 | Stop reason: SURG

## 2020-01-01 RX ORDER — WARFARIN SODIUM 3 MG/1
1.5 TABLET ORAL
Status: COMPLETED | OUTPATIENT
Start: 2020-01-01 | End: 2020-01-01

## 2020-01-01 RX ORDER — POTASSIUM CHLORIDE 750 MG/1
10 TABLET, EXTENDED RELEASE ORAL DAILY
COMMUNITY
Start: 2020-01-01

## 2020-01-01 RX ORDER — ATORVASTATIN CALCIUM 40 MG/1
TABLET, FILM COATED ORAL
Qty: 30 TABLET | Refills: 5 | Status: SHIPPED | OUTPATIENT
Start: 2020-01-01 | End: 2020-01-01

## 2020-01-01 RX ORDER — ISOSORBIDE MONONITRATE 60 MG/1
60 TABLET, EXTENDED RELEASE ORAL DAILY
Start: 2020-01-01

## 2020-01-01 RX ORDER — LIDOCAINE 50 MG/G
1 PATCH TOPICAL EVERY 24 HOURS
Status: DISCONTINUED | OUTPATIENT
Start: 2020-01-01 | End: 2020-01-01 | Stop reason: HOSPADM

## 2020-01-01 RX ORDER — POTASSIUM CHLORIDE 750 MG/1
40 TABLET, FILM COATED, EXTENDED RELEASE ORAL AS NEEDED
Status: DISCONTINUED | OUTPATIENT
Start: 2020-01-01 | End: 2020-01-01 | Stop reason: HOSPADM

## 2020-01-01 RX ORDER — HYDROMORPHONE HYDROCHLORIDE 1 MG/ML
0.5 INJECTION, SOLUTION INTRAMUSCULAR; INTRAVENOUS; SUBCUTANEOUS
Status: DISCONTINUED | OUTPATIENT
Start: 2020-01-01 | End: 2020-01-01

## 2020-01-01 RX ORDER — LABETALOL HYDROCHLORIDE 5 MG/ML
10 INJECTION, SOLUTION INTRAVENOUS
Status: DISCONTINUED | OUTPATIENT
Start: 2020-01-01 | End: 2021-01-01 | Stop reason: HOSPADM

## 2020-01-01 RX ORDER — ONDANSETRON 4 MG/1
4 TABLET, FILM COATED ORAL AS NEEDED
COMMUNITY

## 2020-01-01 RX ORDER — HYDRALAZINE HYDROCHLORIDE 20 MG/ML
5 INJECTION INTRAMUSCULAR; INTRAVENOUS
Status: DISCONTINUED | OUTPATIENT
Start: 2020-01-01 | End: 2020-01-01 | Stop reason: HOSPADM

## 2020-01-01 RX ORDER — LISINOPRIL 10 MG/1
TABLET ORAL
Qty: 30 TABLET | Refills: 0 | Status: SHIPPED | OUTPATIENT
Start: 2020-01-01 | End: 2020-01-01

## 2020-01-01 RX ORDER — FAMOTIDINE 10 MG/ML
20 INJECTION, SOLUTION INTRAVENOUS ONCE
Status: DISCONTINUED | OUTPATIENT
Start: 2020-01-01 | End: 2020-01-01

## 2020-01-01 RX ORDER — POTASSIUM CHLORIDE 7.45 MG/ML
10 INJECTION INTRAVENOUS
Status: DISCONTINUED | OUTPATIENT
Start: 2020-01-01 | End: 2021-01-01 | Stop reason: HOSPADM

## 2020-01-01 RX ORDER — LABETALOL HYDROCHLORIDE 5 MG/ML
5 INJECTION, SOLUTION INTRAVENOUS
Status: CANCELLED | OUTPATIENT
Start: 2020-01-01

## 2020-01-01 RX ORDER — ONDANSETRON 2 MG/ML
INJECTION INTRAMUSCULAR; INTRAVENOUS AS NEEDED
Status: DISCONTINUED | OUTPATIENT
Start: 2020-01-01 | End: 2020-01-01 | Stop reason: SURG

## 2020-01-01 RX ORDER — WARFARIN SODIUM 3 MG/1
1.5 TABLET ORAL NIGHTLY
Status: DISCONTINUED | OUTPATIENT
Start: 2020-01-01 | End: 2020-01-01

## 2020-01-01 RX ORDER — POTASSIUM CHLORIDE 29.8 MG/ML
20 INJECTION INTRAVENOUS
Status: DISCONTINUED | OUTPATIENT
Start: 2020-01-01 | End: 2020-01-01 | Stop reason: HOSPADM

## 2020-01-01 RX ORDER — SACCHAROMYCES BOULARDII 250 MG
250 CAPSULE ORAL DAILY
COMMUNITY

## 2020-01-01 RX ORDER — WARFARIN SODIUM 2 MG/1
TABLET ORAL
Start: 2020-01-01 | End: 2020-01-01

## 2020-01-01 RX ORDER — OXYCODONE AND ACETAMINOPHEN 7.5; 325 MG/1; MG/1
1 TABLET ORAL ONCE AS NEEDED
Status: DISCONTINUED | OUTPATIENT
Start: 2020-01-01 | End: 2020-01-01

## 2020-01-01 RX ORDER — WARFARIN SODIUM 2 MG/1
2 TABLET ORAL
Status: DISCONTINUED | OUTPATIENT
Start: 2020-01-01 | End: 2020-01-01 | Stop reason: HOSPADM

## 2020-01-01 RX ORDER — ONDANSETRON 2 MG/ML
4 INJECTION INTRAMUSCULAR; INTRAVENOUS ONCE AS NEEDED
Status: CANCELLED | OUTPATIENT
Start: 2020-01-01

## 2020-01-01 RX ORDER — SODIUM CHLORIDE 9 MG/ML
50 INJECTION, SOLUTION INTRAVENOUS CONTINUOUS
Status: DISCONTINUED | OUTPATIENT
Start: 2020-01-01 | End: 2020-01-01

## 2020-01-01 RX ORDER — ACETAMINOPHEN 325 MG/1
650 TABLET ORAL EVERY 4 HOURS PRN
Status: DISCONTINUED | OUTPATIENT
Start: 2020-01-01 | End: 2021-01-01 | Stop reason: HOSPADM

## 2020-01-01 RX ORDER — DEXTROSE MONOHYDRATE 25 G/50ML
25 INJECTION, SOLUTION INTRAVENOUS
Status: DISCONTINUED | OUTPATIENT
Start: 2020-01-01 | End: 2020-01-01 | Stop reason: HOSPADM

## 2020-01-01 RX ORDER — FENTANYL CITRATE 50 UG/ML
50 INJECTION, SOLUTION INTRAMUSCULAR; INTRAVENOUS
Status: DISCONTINUED | OUTPATIENT
Start: 2020-01-01 | End: 2020-01-01

## 2020-01-01 RX ORDER — SODIUM CHLORIDE 0.9 % (FLUSH) 0.9 %
10 SYRINGE (ML) INJECTION AS NEEDED
Status: DISCONTINUED | OUTPATIENT
Start: 2020-01-01 | End: 2021-01-01

## 2020-01-01 RX ORDER — ISOSORBIDE MONONITRATE 60 MG/1
TABLET, EXTENDED RELEASE ORAL
Qty: 90 TABLET | Refills: 1 | Status: SHIPPED | OUTPATIENT
Start: 2020-01-01 | End: 2020-01-01 | Stop reason: HOSPADM

## 2020-01-01 RX ORDER — POTASSIUM CHLORIDE 750 MG/1
40 CAPSULE, EXTENDED RELEASE ORAL AS NEEDED
Status: DISCONTINUED | OUTPATIENT
Start: 2020-01-01 | End: 2020-01-01

## 2020-01-01 RX ADMIN — PANTOPRAZOLE SODIUM 40 MG: 40 TABLET, DELAYED RELEASE ORAL at 16:25

## 2020-01-01 RX ADMIN — ISOSORBIDE MONONITRATE 60 MG: 60 TABLET ORAL at 09:25

## 2020-01-01 RX ADMIN — CEPHALEXIN 500 MG: 500 CAPSULE ORAL at 21:08

## 2020-01-01 RX ADMIN — NYSTATIN 500000 UNITS: 100000 SUSPENSION ORAL at 12:30

## 2020-01-01 RX ADMIN — LEVOTHYROXINE SODIUM 75 MCG: 75 TABLET ORAL at 10:27

## 2020-01-01 RX ADMIN — PROPOFOL 120 MG: 10 INJECTION, EMULSION INTRAVENOUS at 20:15

## 2020-01-01 RX ADMIN — ASPIRIN 300 MG: 300 SUPPOSITORY RECTAL at 07:28

## 2020-01-01 RX ADMIN — SODIUM CHLORIDE, PRESERVATIVE FREE 10 ML: 5 INJECTION INTRAVENOUS at 17:01

## 2020-01-01 RX ADMIN — NYSTATIN 500000 UNITS: 100000 SUSPENSION ORAL at 10:12

## 2020-01-01 RX ADMIN — METOPROLOL TARTRATE 25 MG: 25 TABLET, FILM COATED ORAL at 21:36

## 2020-01-01 RX ADMIN — ISOSORBIDE MONONITRATE 60 MG: 60 TABLET ORAL at 09:17

## 2020-01-01 RX ADMIN — ISOSORBIDE MONONITRATE 60 MG: 60 TABLET ORAL at 08:21

## 2020-01-01 RX ADMIN — AMLODIPINE BESYLATE 5 MG: 5 TABLET ORAL at 08:29

## 2020-01-01 RX ADMIN — FUROSEMIDE 40 MG: 10 INJECTION, SOLUTION INTRAMUSCULAR; INTRAVENOUS at 12:21

## 2020-01-01 RX ADMIN — METOPROLOL TARTRATE 25 MG: 25 TABLET, FILM COATED ORAL at 20:22

## 2020-01-01 RX ADMIN — LEVOTHYROXINE SODIUM 75 MCG: 75 TABLET ORAL at 07:33

## 2020-01-01 RX ADMIN — CEPHALEXIN 500 MG: 500 CAPSULE ORAL at 20:14

## 2020-01-01 RX ADMIN — NYSTATIN 500000 UNITS: 100000 SUSPENSION ORAL at 18:37

## 2020-01-01 RX ADMIN — AMLODIPINE BESYLATE 10 MG: 10 TABLET ORAL at 08:21

## 2020-01-01 RX ADMIN — HYDRALAZINE HYDROCHLORIDE 10 MG: 20 INJECTION INTRAMUSCULAR; INTRAVENOUS at 09:02

## 2020-01-01 RX ADMIN — PANTOPRAZOLE SODIUM 40 MG: 40 TABLET, DELAYED RELEASE ORAL at 13:23

## 2020-01-01 RX ADMIN — NYSTATIN 500000 UNITS: 100000 SUSPENSION ORAL at 13:05

## 2020-01-01 RX ADMIN — SODIUM CHLORIDE, PRESERVATIVE FREE 10 ML: 5 INJECTION INTRAVENOUS at 08:00

## 2020-01-01 RX ADMIN — SODIUM CHLORIDE 100 ML/HR: 9 INJECTION, SOLUTION INTRAVENOUS at 04:00

## 2020-01-01 RX ADMIN — WARFARIN 2.5 MG: 2.5 TABLET ORAL at 21:00

## 2020-01-01 RX ADMIN — FENTANYL CITRATE 25 MCG: 50 INJECTION INTRAMUSCULAR; INTRAVENOUS at 18:57

## 2020-01-01 RX ADMIN — AMLODIPINE BESYLATE 10 MG: 10 TABLET ORAL at 13:22

## 2020-01-01 RX ADMIN — METOPROLOL TARTRATE 25 MG: 25 TABLET, FILM COATED ORAL at 09:22

## 2020-01-01 RX ADMIN — METOPROLOL TARTRATE 25 MG: 25 TABLET, FILM COATED ORAL at 08:06

## 2020-01-01 RX ADMIN — SODIUM CHLORIDE, PRESERVATIVE FREE 10 ML: 5 INJECTION INTRAVENOUS at 20:57

## 2020-01-01 RX ADMIN — PANTOPRAZOLE SODIUM 40 MG: 40 TABLET, DELAYED RELEASE ORAL at 16:48

## 2020-01-01 RX ADMIN — PROPOFOL 80 MG: 10 INJECTION, EMULSION INTRAVENOUS at 09:13

## 2020-01-01 RX ADMIN — METOPROLOL TARTRATE 25 MG: 25 TABLET, FILM COATED ORAL at 08:55

## 2020-01-01 RX ADMIN — LEVETIRACETAM 1000 MG: 10 INJECTION INTRAVENOUS at 21:54

## 2020-01-01 RX ADMIN — LEVOTHYROXINE SODIUM 75 MCG: 75 TABLET ORAL at 13:23

## 2020-01-01 RX ADMIN — METOPROLOL TARTRATE 25 MG: 25 TABLET, FILM COATED ORAL at 13:24

## 2020-01-01 RX ADMIN — WARFARIN 1.5 MG: 3 TABLET ORAL at 18:49

## 2020-01-01 RX ADMIN — MIDAZOLAM 0.5 MG: 1 INJECTION INTRAMUSCULAR; INTRAVENOUS at 18:31

## 2020-01-01 RX ADMIN — HEPARIN SODIUM 500 UNITS/HR: 10000 INJECTION, SOLUTION INTRAVENOUS at 02:49

## 2020-01-01 RX ADMIN — SODIUM CHLORIDE 100 ML/HR: 9 INJECTION, SOLUTION INTRAVENOUS at 14:16

## 2020-01-01 RX ADMIN — ISOSORBIDE MONONITRATE 60 MG: 60 TABLET ORAL at 13:55

## 2020-01-01 RX ADMIN — CEFTRIAXONE SODIUM 1 G: 1 INJECTION, SOLUTION INTRAVENOUS at 08:04

## 2020-01-01 RX ADMIN — POTASSIUM CHLORIDE 10 MEQ: 7.46 INJECTION, SOLUTION INTRAVENOUS at 03:14

## 2020-01-01 RX ADMIN — IPRATROPIUM BROMIDE AND ALBUTEROL SULFATE 3 ML: 2.5; .5 SOLUTION RESPIRATORY (INHALATION) at 14:43

## 2020-01-01 RX ADMIN — SODIUM CHLORIDE 8 MG/HR: 900 INJECTION INTRAVENOUS at 18:01

## 2020-01-01 RX ADMIN — SODIUM CHLORIDE, PRESERVATIVE FREE 10 ML: 5 INJECTION INTRAVENOUS at 20:19

## 2020-01-01 RX ADMIN — LIDOCAINE HYDROCHLORIDE 100 MG: 20 INJECTION, SOLUTION INFILTRATION; PERINEURAL at 19:35

## 2020-01-01 RX ADMIN — ISOSORBIDE MONONITRATE 60 MG: 60 TABLET ORAL at 08:54

## 2020-01-01 RX ADMIN — ISOSORBIDE MONONITRATE 60 MG: 60 TABLET ORAL at 09:39

## 2020-01-01 RX ADMIN — Medication 250 MG: at 08:41

## 2020-01-01 RX ADMIN — IOPAMIDOL 150 ML: 755 INJECTION, SOLUTION INTRAVENOUS at 13:00

## 2020-01-01 RX ADMIN — METOPROLOL TARTRATE 25 MG: 25 TABLET, FILM COATED ORAL at 20:45

## 2020-01-01 RX ADMIN — FENTANYL CITRATE 25 MCG: 50 INJECTION INTRAMUSCULAR; INTRAVENOUS at 18:28

## 2020-01-01 RX ADMIN — Medication 250 MG: at 09:25

## 2020-01-01 RX ADMIN — SODIUM CHLORIDE, POTASSIUM CHLORIDE, SODIUM LACTATE AND CALCIUM CHLORIDE 9 ML/HR: 600; 310; 30; 20 INJECTION, SOLUTION INTRAVENOUS at 19:23

## 2020-01-01 RX ADMIN — POTASSIUM CHLORIDE 40 MEQ: 750 TABLET, EXTENDED RELEASE ORAL at 06:03

## 2020-01-01 RX ADMIN — SODIUM CHLORIDE, PRESERVATIVE FREE 10 ML: 5 INJECTION INTRAVENOUS at 23:37

## 2020-01-01 RX ADMIN — LIDOCAINE 1 PATCH: 50 PATCH TOPICAL at 21:51

## 2020-01-01 RX ADMIN — METOPROLOL TARTRATE 25 MG: 25 TABLET, FILM COATED ORAL at 13:22

## 2020-01-01 RX ADMIN — AMLODIPINE BESYLATE 10 MG: 10 TABLET ORAL at 08:40

## 2020-01-01 RX ADMIN — SODIUM CHLORIDE, PRESERVATIVE FREE 10 ML: 5 INJECTION INTRAVENOUS at 20:56

## 2020-01-01 RX ADMIN — SODIUM CHLORIDE 50 ML/HR: 9 INJECTION, SOLUTION INTRAVENOUS at 10:21

## 2020-01-01 RX ADMIN — ISOSORBIDE MONONITRATE 60 MG: 60 TABLET ORAL at 08:20

## 2020-01-01 RX ADMIN — ISOSORBIDE MONONITRATE 60 MG: 60 TABLET ORAL at 09:38

## 2020-01-01 RX ADMIN — SODIUM CHLORIDE 100 ML/HR: 9 INJECTION, SOLUTION INTRAVENOUS at 07:08

## 2020-01-01 RX ADMIN — AMLODIPINE BESYLATE 10 MG: 10 TABLET ORAL at 09:39

## 2020-01-01 RX ADMIN — LEVOTHYROXINE SODIUM 75 MCG: 75 TABLET ORAL at 06:24

## 2020-01-01 RX ADMIN — METOPROLOL TARTRATE 25 MG: 25 TABLET, FILM COATED ORAL at 14:28

## 2020-01-01 RX ADMIN — ALBUTEROL SULFATE 1.25 MG: 1.25 SOLUTION RESPIRATORY (INHALATION) at 19:39

## 2020-01-01 RX ADMIN — CEFTRIAXONE SODIUM 1 G: 1 INJECTION, SOLUTION INTRAVENOUS at 08:29

## 2020-01-01 RX ADMIN — METOPROLOL TARTRATE 25 MG: 25 TABLET, FILM COATED ORAL at 08:56

## 2020-01-01 RX ADMIN — METOPROLOL TARTRATE 25 MG: 25 TABLET, FILM COATED ORAL at 09:39

## 2020-01-01 RX ADMIN — POTASSIUM CHLORIDE 10 MEQ: 7.46 INJECTION, SOLUTION INTRAVENOUS at 21:28

## 2020-01-01 RX ADMIN — SODIUM CHLORIDE, PRESERVATIVE FREE 10 ML: 5 INJECTION INTRAVENOUS at 23:48

## 2020-01-01 RX ADMIN — FUROSEMIDE 20 MG: 20 TABLET ORAL at 08:51

## 2020-01-01 RX ADMIN — PHENYLEPHRINE HYDROCHLORIDE 100 MCG: 10 INJECTION INTRAVENOUS at 20:33

## 2020-01-01 RX ADMIN — GLYCOPYRROLATE 0.2 MG: 0.2 INJECTION INTRAMUSCULAR; INTRAVENOUS at 21:33

## 2020-01-01 RX ADMIN — PANTOPRAZOLE SODIUM 40 MG: 40 TABLET, DELAYED RELEASE ORAL at 07:33

## 2020-01-01 RX ADMIN — LEVOTHYROXINE SODIUM 75 MCG: 75 TABLET ORAL at 13:10

## 2020-01-01 RX ADMIN — AMLODIPINE BESYLATE 10 MG: 10 TABLET ORAL at 13:23

## 2020-01-01 RX ADMIN — PANTOPRAZOLE SODIUM 40 MG: 40 TABLET, DELAYED RELEASE ORAL at 13:56

## 2020-01-01 RX ADMIN — METOPROLOL TARTRATE 25 MG: 25 TABLET, FILM COATED ORAL at 20:28

## 2020-01-01 RX ADMIN — ISOSORBIDE MONONITRATE 60 MG: 60 TABLET ORAL at 08:02

## 2020-01-01 RX ADMIN — HYDROCODONE BITARTRATE AND ACETAMINOPHEN 1 TABLET: 5; 325 TABLET ORAL at 12:07

## 2020-01-01 RX ADMIN — HYDRALAZINE HYDROCHLORIDE 10 MG: 20 INJECTION INTRAMUSCULAR; INTRAVENOUS at 02:32

## 2020-01-01 RX ADMIN — NYSTATIN 500000 UNITS: 100000 SUSPENSION ORAL at 10:29

## 2020-01-01 RX ADMIN — METOPROLOL TARTRATE 25 MG: 25 TABLET, FILM COATED ORAL at 13:07

## 2020-01-01 RX ADMIN — IODIXANOL 278 ML: 320 INJECTION, SOLUTION INTRAVASCULAR at 21:50

## 2020-01-01 RX ADMIN — PANTOPRAZOLE SODIUM 40 MG: 40 TABLET, DELAYED RELEASE ORAL at 13:25

## 2020-01-01 RX ADMIN — FUROSEMIDE 20 MG: 20 TABLET ORAL at 16:58

## 2020-01-01 RX ADMIN — REGADENOSON 0.4 MG: 0.08 INJECTION, SOLUTION INTRAVENOUS at 10:45

## 2020-01-01 RX ADMIN — HYDRALAZINE HYDROCHLORIDE 10 MG: 20 INJECTION INTRAMUSCULAR; INTRAVENOUS at 20:42

## 2020-01-01 RX ADMIN — FUROSEMIDE 20 MG: 20 TABLET ORAL at 08:21

## 2020-01-01 RX ADMIN — AMLODIPINE BESYLATE 5 MG: 5 TABLET ORAL at 14:28

## 2020-01-01 RX ADMIN — NYSTATIN 500000 UNITS: 100000 SUSPENSION ORAL at 09:17

## 2020-01-01 RX ADMIN — SODIUM CHLORIDE 8 MG/HR: 900 INJECTION INTRAVENOUS at 08:21

## 2020-01-01 RX ADMIN — ISOSORBIDE MONONITRATE 60 MG: 60 TABLET ORAL at 08:56

## 2020-01-01 RX ADMIN — SODIUM CHLORIDE 50 ML/HR: 9 INJECTION, SOLUTION INTRAVENOUS at 18:05

## 2020-01-01 RX ADMIN — HYDRALAZINE HYDROCHLORIDE 10 MG: 20 INJECTION INTRAMUSCULAR; INTRAVENOUS at 14:35

## 2020-01-01 RX ADMIN — LEVOTHYROXINE SODIUM 75 MCG: 75 TABLET ORAL at 06:08

## 2020-01-01 RX ADMIN — HEPARIN SODIUM 3000 UNITS: 1000 INJECTION, SOLUTION INTRAVENOUS; SUBCUTANEOUS at 19:43

## 2020-01-01 RX ADMIN — POTASSIUM CHLORIDE 10 MEQ: 7.46 INJECTION, SOLUTION INTRAVENOUS at 19:20

## 2020-01-01 RX ADMIN — POTASSIUM CHLORIDE 10 MEQ: 10 CAPSULE, COATED, EXTENDED RELEASE ORAL at 13:23

## 2020-01-01 RX ADMIN — METOPROLOL TARTRATE 25 MG: 25 TABLET, FILM COATED ORAL at 09:17

## 2020-01-01 RX ADMIN — WARFARIN 1.5 MG: 3 TABLET ORAL at 17:06

## 2020-01-01 RX ADMIN — METOPROLOL TARTRATE 25 MG: 25 TABLET, FILM COATED ORAL at 20:13

## 2020-01-01 RX ADMIN — AMLODIPINE BESYLATE 10 MG: 10 TABLET ORAL at 10:22

## 2020-01-01 RX ADMIN — HYDRALAZINE HYDROCHLORIDE 10 MG: 20 INJECTION INTRAMUSCULAR; INTRAVENOUS at 03:24

## 2020-01-01 RX ADMIN — Medication 250 MG: at 09:30

## 2020-01-01 RX ADMIN — POTASSIUM CHLORIDE 10 MEQ: 10 CAPSULE, COATED, EXTENDED RELEASE ORAL at 09:39

## 2020-01-01 RX ADMIN — LEVOTHYROXINE SODIUM 75 MCG: 75 TABLET ORAL at 06:32

## 2020-01-01 RX ADMIN — SODIUM CHLORIDE 8 MG/HR: 900 INJECTION INTRAVENOUS at 00:32

## 2020-01-01 RX ADMIN — PHENYLEPHRINE HYDROCHLORIDE 200 MCG: 10 INJECTION INTRAVENOUS at 09:17

## 2020-01-01 RX ADMIN — METOPROLOL TARTRATE 25 MG: 25 TABLET, FILM COATED ORAL at 21:16

## 2020-01-01 RX ADMIN — ALBUTEROL SULFATE 1.25 MG: 1.25 SOLUTION RESPIRATORY (INHALATION) at 07:43

## 2020-01-01 RX ADMIN — LEVOTHYROXINE SODIUM 75 MCG: 75 TABLET ORAL at 07:44

## 2020-01-01 RX ADMIN — SODIUM CHLORIDE 5 MG/HR: 9 INJECTION, SOLUTION INTRAVENOUS at 15:12

## 2020-01-01 RX ADMIN — METOPROLOL TARTRATE 25 MG: 25 TABLET, FILM COATED ORAL at 20:14

## 2020-01-01 RX ADMIN — HEPARIN SODIUM 3600 UNITS: 5000 INJECTION, SOLUTION INTRAVENOUS; SUBCUTANEOUS at 16:27

## 2020-01-01 RX ADMIN — ACETAMINOPHEN 650 MG: 325 TABLET, FILM COATED ORAL at 17:53

## 2020-01-01 RX ADMIN — ENOXAPARIN SODIUM 60 MG: 60 INJECTION SUBCUTANEOUS at 21:54

## 2020-01-01 RX ADMIN — SODIUM CHLORIDE 8 MG/HR: 900 INJECTION INTRAVENOUS at 15:15

## 2020-01-01 RX ADMIN — SODIUM CHLORIDE, PRESERVATIVE FREE 10 ML: 5 INJECTION INTRAVENOUS at 08:02

## 2020-01-01 RX ADMIN — POTASSIUM CHLORIDE 10 MEQ: 7.46 INJECTION, SOLUTION INTRAVENOUS at 22:43

## 2020-01-01 RX ADMIN — NYSTATIN 500000 UNITS: 100000 SUSPENSION ORAL at 21:16

## 2020-01-01 RX ADMIN — ISOSORBIDE MONONITRATE 60 MG: 60 TABLET ORAL at 11:12

## 2020-01-01 RX ADMIN — METOPROLOL TARTRATE 25 MG: 25 TABLET, FILM COATED ORAL at 09:37

## 2020-01-01 RX ADMIN — POTASSIUM CHLORIDE 40 MEQ: 10 CAPSULE, COATED, EXTENDED RELEASE ORAL at 15:01

## 2020-01-01 RX ADMIN — CEPHALEXIN 500 MG: 500 CAPSULE ORAL at 06:19

## 2020-01-01 RX ADMIN — HYDROMORPHONE HYDROCHLORIDE 0.25 MG: 2 INJECTION, SOLUTION INTRAMUSCULAR; INTRAVENOUS; SUBCUTANEOUS at 20:21

## 2020-01-01 RX ADMIN — CEFTRIAXONE SODIUM 1 G: 1 INJECTION, SOLUTION INTRAVENOUS at 23:33

## 2020-01-01 RX ADMIN — POTASSIUM CHLORIDE 40 MEQ: 10 CAPSULE, COATED, EXTENDED RELEASE ORAL at 08:05

## 2020-01-01 RX ADMIN — SODIUM CHLORIDE, PRESERVATIVE FREE 10 ML: 5 INJECTION INTRAVENOUS at 20:27

## 2020-01-01 RX ADMIN — LEVOTHYROXINE SODIUM 75 MCG: 75 TABLET ORAL at 06:36

## 2020-01-01 RX ADMIN — FUROSEMIDE 20 MG: 20 TABLET ORAL at 09:41

## 2020-01-01 RX ADMIN — SODIUM CHLORIDE, PRESERVATIVE FREE 10 ML: 5 INJECTION INTRAVENOUS at 13:24

## 2020-01-01 RX ADMIN — ACETAMINOPHEN 650 MG: 325 TABLET, FILM COATED ORAL at 23:36

## 2020-01-01 RX ADMIN — METOPROLOL TARTRATE 25 MG: 25 TABLET, FILM COATED ORAL at 23:52

## 2020-01-01 RX ADMIN — METOPROLOL TARTRATE 25 MG: 25 TABLET, FILM COATED ORAL at 20:00

## 2020-01-01 RX ADMIN — LEVETIRACETAM 1000 MG: 10 INJECTION INTRAVENOUS at 08:36

## 2020-01-01 RX ADMIN — METOPROLOL TARTRATE 25 MG: 25 TABLET, FILM COATED ORAL at 08:39

## 2020-01-01 RX ADMIN — HEPARIN SODIUM 500 UNITS/HR: 10000 INJECTION, SOLUTION INTRAVENOUS at 19:44

## 2020-01-01 RX ADMIN — SODIUM CHLORIDE, PRESERVATIVE FREE 10 ML: 5 INJECTION INTRAVENOUS at 21:17

## 2020-01-01 RX ADMIN — ROCURONIUM BROMIDE 20 MG: 10 INJECTION INTRAVENOUS at 20:15

## 2020-01-01 RX ADMIN — SODIUM CHLORIDE 5 MG/HR: 9 INJECTION, SOLUTION INTRAVENOUS at 18:47

## 2020-01-01 RX ADMIN — LEVETIRACETAM 1000 MG: 10 INJECTION INTRAVENOUS at 15:09

## 2020-01-01 RX ADMIN — ISOSORBIDE MONONITRATE 60 MG: 60 TABLET ORAL at 10:10

## 2020-01-01 RX ADMIN — SODIUM CHLORIDE, PRESERVATIVE FREE 10 ML: 5 INJECTION INTRAVENOUS at 00:41

## 2020-01-01 RX ADMIN — SODIUM CHLORIDE, PRESERVATIVE FREE 10 ML: 5 INJECTION INTRAVENOUS at 13:57

## 2020-01-01 RX ADMIN — FLUTICASONE PROPIONATE 1 SPRAY: 50 SPRAY, METERED NASAL at 09:25

## 2020-01-01 RX ADMIN — NYSTATIN 500000 UNITS: 100000 SUSPENSION ORAL at 08:51

## 2020-01-01 RX ADMIN — ALBUTEROL SULFATE 1.25 MG: 1.25 SOLUTION RESPIRATORY (INHALATION) at 07:00

## 2020-01-01 RX ADMIN — POTASSIUM CHLORIDE 40 MEQ: 750 TABLET, EXTENDED RELEASE ORAL at 09:30

## 2020-01-01 RX ADMIN — SODIUM CHLORIDE, PRESERVATIVE FREE 10 ML: 5 INJECTION INTRAVENOUS at 09:30

## 2020-01-01 RX ADMIN — METOPROLOL TARTRATE 25 MG: 25 TABLET, FILM COATED ORAL at 08:51

## 2020-01-01 RX ADMIN — SODIUM CHLORIDE, POTASSIUM CHLORIDE, SODIUM LACTATE AND CALCIUM CHLORIDE 30 ML/HR: 600; 310; 30; 20 INJECTION, SOLUTION INTRAVENOUS at 08:39

## 2020-01-01 RX ADMIN — SODIUM CHLORIDE, PRESERVATIVE FREE 10 ML: 5 INJECTION INTRAVENOUS at 21:27

## 2020-01-01 RX ADMIN — SODIUM CHLORIDE, PRESERVATIVE FREE 10 ML: 5 INJECTION INTRAVENOUS at 23:47

## 2020-01-01 RX ADMIN — HYDRALAZINE HYDROCHLORIDE 10 MG: 20 INJECTION INTRAMUSCULAR; INTRAVENOUS at 08:41

## 2020-01-01 RX ADMIN — AMLODIPINE BESYLATE 10 MG: 10 TABLET ORAL at 09:30

## 2020-01-01 RX ADMIN — SODIUM CHLORIDE, PRESERVATIVE FREE 10 ML: 5 INJECTION INTRAVENOUS at 20:38

## 2020-01-01 RX ADMIN — CEPHALEXIN 500 MG: 500 CAPSULE ORAL at 09:37

## 2020-01-01 RX ADMIN — ISOSORBIDE MONONITRATE 60 MG: 60 TABLET ORAL at 13:22

## 2020-01-01 RX ADMIN — PANTOPRAZOLE SODIUM 40 MG: 40 TABLET, DELAYED RELEASE ORAL at 17:42

## 2020-01-01 RX ADMIN — FUROSEMIDE 20 MG: 20 TABLET ORAL at 08:05

## 2020-01-01 RX ADMIN — METOPROLOL TARTRATE 25 MG: 25 TABLET, FILM COATED ORAL at 10:30

## 2020-01-01 RX ADMIN — METOPROLOL TARTRATE 25 MG: 25 TABLET, FILM COATED ORAL at 08:02

## 2020-01-01 RX ADMIN — METOPROLOL TARTRATE 25 MG: 25 TABLET, FILM COATED ORAL at 20:20

## 2020-01-01 RX ADMIN — FUROSEMIDE 20 MG: 20 TABLET ORAL at 10:12

## 2020-01-01 RX ADMIN — SODIUM CHLORIDE 8 MG/HR: 900 INJECTION INTRAVENOUS at 05:20

## 2020-01-01 RX ADMIN — POTASSIUM CHLORIDE 20 MEQ: 750 TABLET, EXTENDED RELEASE ORAL at 08:41

## 2020-01-01 RX ADMIN — SODIUM CHLORIDE 50 ML/HR: 9 INJECTION, SOLUTION INTRAVENOUS at 14:16

## 2020-01-01 RX ADMIN — SODIUM CHLORIDE, POTASSIUM CHLORIDE, SODIUM LACTATE AND CALCIUM CHLORIDE 9 ML/HR: 600; 310; 30; 20 INJECTION, SOLUTION INTRAVENOUS at 23:01

## 2020-01-01 RX ADMIN — AMLODIPINE BESYLATE 10 MG: 10 TABLET ORAL at 10:28

## 2020-01-01 RX ADMIN — METOPROLOL TARTRATE 25 MG: 25 TABLET, FILM COATED ORAL at 08:41

## 2020-01-01 RX ADMIN — CEFDINIR 300 MG: 300 CAPSULE ORAL at 20:00

## 2020-01-01 RX ADMIN — PANTOPRAZOLE SODIUM 40 MG: 40 TABLET, DELAYED RELEASE ORAL at 09:26

## 2020-01-01 RX ADMIN — PANTOPRAZOLE SODIUM 40 MG: 40 TABLET, DELAYED RELEASE ORAL at 16:33

## 2020-01-01 RX ADMIN — HYDRALAZINE HYDROCHLORIDE 10 MG: 20 INJECTION INTRAMUSCULAR; INTRAVENOUS at 22:28

## 2020-01-01 RX ADMIN — SODIUM CHLORIDE 1000 ML: 9 INJECTION, SOLUTION INTRAVENOUS at 08:07

## 2020-01-01 RX ADMIN — METOPROLOL TARTRATE 25 MG: 25 TABLET, FILM COATED ORAL at 09:41

## 2020-01-01 RX ADMIN — SODIUM CHLORIDE, PRESERVATIVE FREE 10 ML: 5 INJECTION INTRAVENOUS at 08:28

## 2020-01-01 RX ADMIN — ACETAMINOPHEN 650 MG: 325 TABLET, FILM COATED ORAL at 18:33

## 2020-01-01 RX ADMIN — FUROSEMIDE 20 MG: 20 TABLET ORAL at 13:09

## 2020-01-01 RX ADMIN — WARFARIN 3 MG: 3 TABLET ORAL at 17:44

## 2020-01-01 RX ADMIN — SODIUM CHLORIDE, PRESERVATIVE FREE 10 ML: 5 INJECTION INTRAVENOUS at 21:12

## 2020-01-01 RX ADMIN — WARFARIN 3 MG: 3 TABLET ORAL at 17:42

## 2020-01-01 RX ADMIN — HEPARIN SODIUM 12 UNITS/KG/HR: 10000 INJECTION, SOLUTION INTRAVENOUS at 16:28

## 2020-01-01 RX ADMIN — FENTANYL CITRATE 25 MCG: 50 INJECTION INTRAMUSCULAR; INTRAVENOUS at 19:52

## 2020-01-01 RX ADMIN — METOPROLOL TARTRATE 25 MG: 25 TABLET, FILM COATED ORAL at 16:57

## 2020-01-01 RX ADMIN — PANTOPRAZOLE SODIUM 40 MG: 40 TABLET, DELAYED RELEASE ORAL at 06:32

## 2020-01-01 RX ADMIN — LEVOTHYROXINE SODIUM 75 MCG: 75 TABLET ORAL at 10:10

## 2020-01-01 RX ADMIN — SODIUM CHLORIDE, POTASSIUM CHLORIDE, SODIUM LACTATE AND CALCIUM CHLORIDE: 600; 310; 30; 20 INJECTION, SOLUTION INTRAVENOUS at 18:17

## 2020-01-01 RX ADMIN — WARFARIN 3 MG: 3 TABLET ORAL at 18:50

## 2020-01-01 RX ADMIN — AMLODIPINE BESYLATE 10 MG: 10 TABLET ORAL at 08:51

## 2020-01-01 RX ADMIN — LEVOTHYROXINE SODIUM 75 MCG: 75 TABLET ORAL at 08:21

## 2020-01-01 RX ADMIN — SODIUM CHLORIDE, PRESERVATIVE FREE 10 ML: 5 INJECTION INTRAVENOUS at 09:53

## 2020-01-01 RX ADMIN — WARFARIN 1.5 MG: 3 TABLET ORAL at 16:23

## 2020-01-01 RX ADMIN — CEFAZOLIN SODIUM 2 G: 2 INJECTION, SOLUTION INTRAVENOUS at 19:38

## 2020-01-01 RX ADMIN — METOPROLOL TARTRATE 25 MG: 25 TABLET, FILM COATED ORAL at 09:38

## 2020-01-01 RX ADMIN — ACETAMINOPHEN 650 MG: 325 TABLET, FILM COATED ORAL at 15:09

## 2020-01-01 RX ADMIN — FLUTICASONE PROPIONATE 1 SPRAY: 50 SPRAY, METERED NASAL at 08:41

## 2020-01-01 RX ADMIN — FUROSEMIDE 20 MG: 20 TABLET ORAL at 10:21

## 2020-01-01 RX ADMIN — CEPHALEXIN 500 MG: 500 CAPSULE ORAL at 06:18

## 2020-01-01 RX ADMIN — ISOSORBIDE MONONITRATE 60 MG: 60 TABLET ORAL at 16:58

## 2020-01-01 RX ADMIN — IOPAMIDOL 85 ML: 612 INJECTION, SOLUTION INTRAVENOUS at 21:04

## 2020-01-01 RX ADMIN — POTASSIUM CHLORIDE 40 MEQ: 10 CAPSULE, COATED, EXTENDED RELEASE ORAL at 15:56

## 2020-01-01 RX ADMIN — POTASSIUM CHLORIDE 40 MEQ: 1.5 POWDER, FOR SOLUTION ORAL at 18:09

## 2020-01-01 RX ADMIN — HEPARIN SODIUM 9 UNITS/KG/HR: 10000 INJECTION, SOLUTION INTRAVENOUS at 13:29

## 2020-01-01 RX ADMIN — METOPROLOL TARTRATE 25 MG: 25 TABLET, FILM COATED ORAL at 13:56

## 2020-01-01 RX ADMIN — ISOSORBIDE MONONITRATE 60 MG: 60 TABLET ORAL at 08:41

## 2020-01-01 RX ADMIN — SODIUM CHLORIDE 50 ML/HR: 9 INJECTION, SOLUTION INTRAVENOUS at 06:32

## 2020-01-01 RX ADMIN — FUROSEMIDE 20 MG: 20 TABLET ORAL at 08:29

## 2020-01-01 RX ADMIN — LEVOTHYROXINE SODIUM 75 MCG: 75 TABLET ORAL at 06:19

## 2020-01-01 RX ADMIN — CEPHALEXIN 500 MG: 500 CAPSULE ORAL at 13:25

## 2020-01-01 RX ADMIN — CEFTRIAXONE SODIUM 1 G: 1 INJECTION, SOLUTION INTRAVENOUS at 09:19

## 2020-01-01 RX ADMIN — SODIUM CHLORIDE 8 MG/HR: 900 INJECTION INTRAVENOUS at 04:00

## 2020-01-01 RX ADMIN — PANTOPRAZOLE SODIUM 40 MG: 40 TABLET, DELAYED RELEASE ORAL at 17:06

## 2020-01-01 RX ADMIN — LEVOTHYROXINE SODIUM 75 MCG: 75 TABLET ORAL at 05:35

## 2020-01-01 RX ADMIN — ISOSORBIDE MONONITRATE 60 MG: 60 TABLET ORAL at 10:28

## 2020-01-01 RX ADMIN — METOPROLOL TARTRATE 25 MG: 25 TABLET, FILM COATED ORAL at 06:30

## 2020-01-01 RX ADMIN — FAMOTIDINE 20 MG: 10 INJECTION INTRAVENOUS at 18:14

## 2020-01-01 RX ADMIN — PANTOPRAZOLE SODIUM 40 MG: 40 TABLET, DELAYED RELEASE ORAL at 06:30

## 2020-01-01 RX ADMIN — SODIUM CHLORIDE 8 MG/HR: 900 INJECTION INTRAVENOUS at 02:33

## 2020-01-01 RX ADMIN — HYDRALAZINE HYDROCHLORIDE 10 MG: 20 INJECTION INTRAMUSCULAR; INTRAVENOUS at 15:53

## 2020-01-01 RX ADMIN — METOPROLOL TARTRATE 25 MG: 25 TABLET, FILM COATED ORAL at 09:30

## 2020-01-01 RX ADMIN — ISOSORBIDE MONONITRATE 60 MG: 60 TABLET ORAL at 13:11

## 2020-01-01 RX ADMIN — FUROSEMIDE 20 MG: 20 TABLET ORAL at 13:22

## 2020-01-01 RX ADMIN — FAMOTIDINE 20 MG: 10 INJECTION INTRAVENOUS at 19:23

## 2020-01-01 RX ADMIN — WARFARIN 3 MG: 3 TABLET ORAL at 18:06

## 2020-01-01 RX ADMIN — FUROSEMIDE 20 MG: 20 TABLET ORAL at 09:17

## 2020-01-01 RX ADMIN — SODIUM CHLORIDE 8 MG/HR: 900 INJECTION INTRAVENOUS at 23:28

## 2020-01-01 RX ADMIN — SODIUM CHLORIDE 8 MG/HR: 900 INJECTION INTRAVENOUS at 21:52

## 2020-01-01 RX ADMIN — ACETAMINOPHEN 650 MG: 325 TABLET, FILM COATED ORAL at 15:37

## 2020-01-01 RX ADMIN — ISOSORBIDE MONONITRATE 60 MG: 60 TABLET ORAL at 08:05

## 2020-01-01 RX ADMIN — SODIUM CHLORIDE 8 MG/HR: 900 INJECTION INTRAVENOUS at 10:17

## 2020-01-01 RX ADMIN — POTASSIUM CHLORIDE 10 MEQ: 7.46 INJECTION, SOLUTION INTRAVENOUS at 04:17

## 2020-01-01 RX ADMIN — HYDRALAZINE HYDROCHLORIDE 10 MG: 20 INJECTION INTRAMUSCULAR; INTRAVENOUS at 09:45

## 2020-01-01 RX ADMIN — PANTOPRAZOLE SODIUM 40 MG: 40 TABLET, DELAYED RELEASE ORAL at 16:57

## 2020-01-01 RX ADMIN — SODIUM CHLORIDE, PRESERVATIVE FREE 10 ML: 5 INJECTION INTRAVENOUS at 08:40

## 2020-01-01 RX ADMIN — OLANZAPINE 2.5 MG: 2.5 TABLET ORAL at 17:58

## 2020-01-01 RX ADMIN — PANTOPRAZOLE SODIUM 80 MG: 40 INJECTION, POWDER, FOR SOLUTION INTRAVENOUS at 17:59

## 2020-01-01 RX ADMIN — METOPROLOL TARTRATE 25 MG: 25 TABLET, FILM COATED ORAL at 20:27

## 2020-01-01 RX ADMIN — ALBUTEROL SULFATE 1.25 MG: 1.25 SOLUTION RESPIRATORY (INHALATION) at 19:36

## 2020-01-01 RX ADMIN — METOPROLOL TARTRATE 25 MG: 25 TABLET, FILM COATED ORAL at 20:19

## 2020-01-01 RX ADMIN — FUROSEMIDE 40 MG: 10 INJECTION, SOLUTION INTRAMUSCULAR; INTRAVENOUS at 02:34

## 2020-01-01 RX ADMIN — LEVOTHYROXINE SODIUM 75 MCG: 75 TABLET ORAL at 08:05

## 2020-01-01 RX ADMIN — PANTOPRAZOLE SODIUM 40 MG: 40 TABLET, DELAYED RELEASE ORAL at 07:44

## 2020-01-01 RX ADMIN — METOPROLOL TARTRATE 25 MG: 25 TABLET, FILM COATED ORAL at 21:32

## 2020-01-01 RX ADMIN — SODIUM CHLORIDE, PRESERVATIVE FREE 10 ML: 5 INJECTION INTRAVENOUS at 13:58

## 2020-01-01 RX ADMIN — POTASSIUM CHLORIDE 10 MEQ: 7.46 INJECTION, SOLUTION INTRAVENOUS at 20:28

## 2020-01-01 RX ADMIN — FUROSEMIDE 40 MG: 10 INJECTION, SOLUTION INTRAMUSCULAR; INTRAVENOUS at 13:52

## 2020-01-01 RX ADMIN — ISOSORBIDE MONONITRATE 60 MG: 60 TABLET ORAL at 08:40

## 2020-01-01 RX ADMIN — METOPROLOL TARTRATE 25 MG: 25 TABLET, FILM COATED ORAL at 20:56

## 2020-01-01 RX ADMIN — NYSTATIN 500000 UNITS: 100000 SUSPENSION ORAL at 18:34

## 2020-01-01 RX ADMIN — FUROSEMIDE 40 MG: 10 INJECTION, SOLUTION INTRAMUSCULAR; INTRAVENOUS at 00:28

## 2020-01-01 RX ADMIN — LIDOCAINE HYDROCHLORIDE 60 MG: 20 INJECTION, SOLUTION INFILTRATION; PERINEURAL at 09:13

## 2020-01-01 RX ADMIN — ENOXAPARIN SODIUM 60 MG: 60 INJECTION SUBCUTANEOUS at 07:32

## 2020-01-01 RX ADMIN — SODIUM CHLORIDE, PRESERVATIVE FREE 10 ML: 5 INJECTION INTRAVENOUS at 09:08

## 2020-01-01 RX ADMIN — EPHEDRINE SULFATE 10 MG: 50 INJECTION INTRAVENOUS at 20:28

## 2020-01-01 RX ADMIN — TECHNETIUM TC 99M SESTAMIBI 1 DOSE: 1 INJECTION INTRAVENOUS at 09:15

## 2020-01-01 RX ADMIN — ISOSORBIDE MONONITRATE 60 MG: 60 TABLET ORAL at 13:09

## 2020-01-01 RX ADMIN — AMLODIPINE BESYLATE 10 MG: 10 TABLET ORAL at 09:17

## 2020-01-01 RX ADMIN — SODIUM CHLORIDE 5 MG/HR: 9 INJECTION, SOLUTION INTRAVENOUS at 10:28

## 2020-01-01 RX ADMIN — WARFARIN 1 MG: 1 TABLET ORAL at 18:34

## 2020-01-01 RX ADMIN — SODIUM CHLORIDE 8 MG/HR: 900 INJECTION INTRAVENOUS at 13:27

## 2020-01-01 RX ADMIN — LEVOTHYROXINE SODIUM 75 MCG: 75 TABLET ORAL at 07:07

## 2020-01-01 RX ADMIN — LEVOTHYROXINE SODIUM 75 MCG: 75 TABLET ORAL at 10:21

## 2020-01-01 RX ADMIN — METOPROLOL TARTRATE 25 MG: 25 TABLET, FILM COATED ORAL at 20:52

## 2020-01-01 RX ADMIN — NYSTATIN 500000 UNITS: 100000 SUSPENSION ORAL at 20:20

## 2020-01-01 RX ADMIN — MIDAZOLAM 0.5 MG: 1 INJECTION INTRAMUSCULAR; INTRAVENOUS at 19:07

## 2020-01-01 RX ADMIN — WARFARIN 3 MG: 3 TABLET ORAL at 17:09

## 2020-01-01 RX ADMIN — TECHNETIUM TC 99M SESTAMIBI 1 DOSE: 1 INJECTION INTRAVENOUS at 11:01

## 2020-01-01 RX ADMIN — ISOSORBIDE MONONITRATE 60 MG: 60 TABLET ORAL at 08:30

## 2020-01-01 RX ADMIN — LEVETIRACETAM 1000 MG: 10 INJECTION INTRAVENOUS at 20:03

## 2020-01-01 RX ADMIN — METOPROLOL TARTRATE 25 MG: 25 TABLET, FILM COATED ORAL at 10:13

## 2020-01-01 RX ADMIN — CEFDINIR 300 MG: 300 CAPSULE ORAL at 08:54

## 2020-01-01 RX ADMIN — AMLODIPINE BESYLATE 10 MG: 10 TABLET ORAL at 08:42

## 2020-01-01 RX ADMIN — CARBAMIDE PEROXIDE 6.5% 5 DROP: 6.5 LIQUID AURICULAR (OTIC) at 13:26

## 2020-01-01 RX ADMIN — AMLODIPINE BESYLATE 10 MG: 10 TABLET ORAL at 09:25

## 2020-01-01 RX ADMIN — POTASSIUM CHLORIDE 40 MEQ: 750 TABLET, EXTENDED RELEASE ORAL at 21:36

## 2020-01-01 RX ADMIN — AMLODIPINE BESYLATE 10 MG: 10 TABLET ORAL at 08:06

## 2020-01-01 RX ADMIN — LIDOCAINE 1 PATCH: 50 PATCH TOPICAL at 20:52

## 2020-01-01 RX ADMIN — SODIUM CHLORIDE, PRESERVATIVE FREE 10 ML: 5 INJECTION INTRAVENOUS at 20:28

## 2020-01-01 RX ADMIN — CEPHALEXIN 500 MG: 500 CAPSULE ORAL at 05:35

## 2020-01-01 RX ADMIN — LEVOTHYROXINE SODIUM 75 MCG: 75 TABLET ORAL at 13:22

## 2020-01-01 RX ADMIN — FLUTICASONE PROPIONATE 1 SPRAY: 50 SPRAY, METERED NASAL at 09:30

## 2020-01-01 RX ADMIN — FUROSEMIDE 20 MG: 20 TABLET ORAL at 14:44

## 2020-01-01 RX ADMIN — PANTOPRAZOLE SODIUM 40 MG: 40 TABLET, DELAYED RELEASE ORAL at 06:03

## 2020-01-01 RX ADMIN — HYDRALAZINE HYDROCHLORIDE 10 MG: 20 INJECTION INTRAMUSCULAR; INTRAVENOUS at 00:09

## 2020-01-01 RX ADMIN — ISOSORBIDE MONONITRATE 60 MG: 60 TABLET ORAL at 11:56

## 2020-01-01 RX ADMIN — SODIUM CHLORIDE, PRESERVATIVE FREE 10 ML: 5 INJECTION INTRAVENOUS at 08:21

## 2020-01-01 RX ADMIN — CEPHALEXIN 500 MG: 500 CAPSULE ORAL at 06:08

## 2020-01-01 RX ADMIN — LEVOTHYROXINE SODIUM 75 MCG: 75 TABLET ORAL at 06:17

## 2020-01-01 RX ADMIN — WARFARIN 2 MG: 2 TABLET ORAL at 18:36

## 2020-01-01 RX ADMIN — PANTOPRAZOLE SODIUM 40 MG: 40 TABLET, DELAYED RELEASE ORAL at 16:40

## 2020-01-01 RX ADMIN — GADOBENATE DIMEGLUMINE 13 ML: 529 INJECTION, SOLUTION INTRAVENOUS at 18:18

## 2020-01-01 RX ADMIN — FUROSEMIDE 20 MG: 20 TABLET ORAL at 10:28

## 2020-01-01 RX ADMIN — PROTHROMBIN, COAGULATION FACTOR VII HUMAN, COAGULATION FACTOR IX HUMAN, COAGULATION FACTOR X HUMAN, PROTEIN C, PROTEIN S HUMAN, AND WATER 1596 UNITS: KIT at 09:30

## 2020-01-01 RX ADMIN — POTASSIUM CHLORIDE 40 MEQ: 1.5 POWDER, FOR SOLUTION ORAL at 13:28

## 2020-01-01 RX ADMIN — NYSTATIN 500000 UNITS: 100000 SUSPENSION ORAL at 19:27

## 2020-01-01 RX ADMIN — PROPOFOL 160 MCG/KG/MIN: 10 INJECTION, EMULSION INTRAVENOUS at 09:13

## 2020-01-01 RX ADMIN — ISOSORBIDE MONONITRATE 60 MG: 60 TABLET ORAL at 08:55

## 2020-01-01 RX ADMIN — POTASSIUM CHLORIDE 40 MEQ: 10 CAPSULE, COATED, EXTENDED RELEASE ORAL at 14:11

## 2020-01-01 RX ADMIN — ISOSORBIDE MONONITRATE 60 MG: 60 TABLET ORAL at 08:51

## 2020-01-01 RX ADMIN — CEPHALEXIN 500 MG: 500 CAPSULE ORAL at 17:14

## 2020-01-01 RX ADMIN — METOPROLOL TARTRATE 25 MG: 25 TABLET, FILM COATED ORAL at 23:47

## 2020-01-01 RX ADMIN — METOPROLOL TARTRATE 25 MG: 25 TABLET, FILM COATED ORAL at 10:57

## 2020-01-01 RX ADMIN — ACETAMINOPHEN 650 MG: 325 TABLET, FILM COATED ORAL at 10:33

## 2020-01-01 RX ADMIN — FUROSEMIDE 20 MG: 20 TABLET ORAL at 09:39

## 2020-01-01 RX ADMIN — ACETAMINOPHEN 650 MG: 325 TABLET, FILM COATED ORAL at 15:53

## 2020-01-01 RX ADMIN — ISOSORBIDE MONONITRATE 60 MG: 60 TABLET ORAL at 08:27

## 2020-01-01 RX ADMIN — FUROSEMIDE 40 MG: 10 INJECTION, SOLUTION INTRAMUSCULAR; INTRAVENOUS at 12:55

## 2020-01-01 RX ADMIN — IOPAMIDOL 95 ML: 755 INJECTION, SOLUTION INTRAVENOUS at 17:00

## 2020-01-01 RX ADMIN — ISOSORBIDE MONONITRATE 60 MG: 60 TABLET ORAL at 09:30

## 2020-01-01 RX ADMIN — CEPHALEXIN 500 MG: 500 CAPSULE ORAL at 14:55

## 2020-01-01 RX ADMIN — AMLODIPINE BESYLATE 10 MG: 10 TABLET ORAL at 13:08

## 2020-01-01 RX ADMIN — MIDAZOLAM 1 MG: 1 INJECTION INTRAMUSCULAR; INTRAVENOUS at 19:23

## 2020-01-01 RX ADMIN — METOPROLOL TARTRATE 25 MG: 25 TABLET, FILM COATED ORAL at 08:54

## 2020-01-01 RX ADMIN — ISOSORBIDE MONONITRATE 60 MG: 60 TABLET ORAL at 10:12

## 2020-01-01 RX ADMIN — SODIUM CHLORIDE, PRESERVATIVE FREE 10 ML: 5 INJECTION INTRAVENOUS at 20:04

## 2020-01-01 RX ADMIN — NYSTATIN 500000 UNITS: 100000 SUSPENSION ORAL at 16:31

## 2020-01-01 RX ADMIN — PROPOFOL 100 MG: 10 INJECTION, EMULSION INTRAVENOUS at 19:35

## 2020-01-01 RX ADMIN — ENOXAPARIN SODIUM 60 MG: 60 INJECTION SUBCUTANEOUS at 20:02

## 2020-01-01 RX ADMIN — ISOSORBIDE MONONITRATE 60 MG: 60 TABLET ORAL at 14:28

## 2020-01-01 RX ADMIN — POTASSIUM CHLORIDE 40 MEQ: 10 CAPSULE, COATED, EXTENDED RELEASE ORAL at 21:18

## 2020-01-01 RX ADMIN — AMLODIPINE BESYLATE 10 MG: 10 TABLET ORAL at 16:56

## 2020-01-01 RX ADMIN — AMLODIPINE BESYLATE 10 MG: 10 TABLET ORAL at 10:10

## 2020-01-01 RX ADMIN — AMLODIPINE BESYLATE 10 MG: 10 TABLET ORAL at 11:56

## 2020-01-01 RX ADMIN — METOPROLOL TARTRATE 25 MG: 25 TABLET, FILM COATED ORAL at 21:18

## 2020-01-01 RX ADMIN — WARFARIN 1 MG: 1 TABLET ORAL at 18:29

## 2020-01-01 RX ADMIN — ISOSORBIDE MONONITRATE 60 MG: 60 TABLET ORAL at 10:21

## 2020-01-01 RX ADMIN — METOPROLOL TARTRATE 25 MG: 25 TABLET, FILM COATED ORAL at 21:39

## 2020-01-01 RX ADMIN — ONDANSETRON HYDROCHLORIDE 4 MG: 2 SOLUTION INTRAMUSCULAR; INTRAVENOUS at 20:15

## 2020-01-01 RX ADMIN — WARFARIN 1.5 MG: 3 TABLET ORAL at 18:39

## 2020-01-01 RX ADMIN — SODIUM CHLORIDE, POTASSIUM CHLORIDE, SODIUM LACTATE AND CALCIUM CHLORIDE 30 ML/HR: 600; 310; 30; 20 INJECTION, SOLUTION INTRAVENOUS at 18:46

## 2020-01-01 RX ADMIN — WARFARIN 1.5 MG: 3 TABLET ORAL at 23:33

## 2020-01-01 RX ADMIN — LEVOTHYROXINE SODIUM 75 MCG: 75 TABLET ORAL at 06:03

## 2020-01-01 RX ADMIN — SODIUM CHLORIDE, PRESERVATIVE FREE 10 ML: 5 INJECTION INTRAVENOUS at 21:00

## 2020-01-01 RX ADMIN — FUROSEMIDE 20 MG: 20 TABLET ORAL at 09:38

## 2020-01-01 RX ADMIN — HEPARIN SODIUM 500 UNITS/HR: 10000 INJECTION, SOLUTION INTRAVENOUS at 22:39

## 2020-01-01 RX ADMIN — METOPROLOL TARTRATE 25 MG: 25 TABLET, FILM COATED ORAL at 08:21

## 2020-01-01 RX ADMIN — CEPHALEXIN 500 MG: 500 CAPSULE ORAL at 21:31

## 2020-01-01 RX ADMIN — LEVOTHYROXINE SODIUM 75 MCG: 75 TABLET ORAL at 07:11

## 2020-01-01 RX ADMIN — SODIUM CHLORIDE, PRESERVATIVE FREE 10 ML: 5 INJECTION INTRAVENOUS at 09:25

## 2020-01-01 RX ADMIN — METOPROLOL TARTRATE 25 MG: 25 TABLET, FILM COATED ORAL at 08:26

## 2020-01-01 RX ADMIN — NYSTATIN 500000 UNITS: 100000 SUSPENSION ORAL at 12:42

## 2020-01-01 RX ADMIN — LEVOTHYROXINE SODIUM 75 MCG: 75 TABLET ORAL at 09:38

## 2020-01-01 RX ADMIN — FUROSEMIDE 40 MG: 10 INJECTION, SOLUTION INTRAMUSCULAR; INTRAVENOUS at 07:29

## 2020-01-01 RX ADMIN — METOPROLOL TARTRATE 25 MG: 25 TABLET, FILM COATED ORAL at 20:48

## 2020-01-01 RX ADMIN — CEFDINIR 300 MG: 300 CAPSULE ORAL at 12:15

## 2020-01-01 RX ADMIN — FENTANYL CITRATE 25 MCG: 50 INJECTION INTRAMUSCULAR; INTRAVENOUS at 19:07

## 2020-01-01 RX ADMIN — SODIUM CHLORIDE, PRESERVATIVE FREE 10 ML: 5 INJECTION INTRAVENOUS at 08:42

## 2020-01-01 RX ADMIN — METOPROLOL TARTRATE 25 MG: 25 TABLET, FILM COATED ORAL at 10:26

## 2020-01-01 RX ADMIN — ONDANSETRON 4 MG: 2 INJECTION INTRAMUSCULAR; INTRAVENOUS at 06:56

## 2020-01-01 RX ADMIN — POTASSIUM CHLORIDE 40 MEQ: 1.5 POWDER, FOR SOLUTION ORAL at 11:13

## 2020-01-01 RX ADMIN — AMLODIPINE BESYLATE 10 MG: 10 TABLET ORAL at 10:13

## 2020-01-01 RX ADMIN — METOPROLOL TARTRATE 25 MG: 25 TABLET, FILM COATED ORAL at 09:26

## 2020-01-01 RX ADMIN — CEPHALEXIN 500 MG: 500 CAPSULE ORAL at 20:48

## 2020-01-01 RX ADMIN — METOPROLOL TARTRATE 25 MG: 25 TABLET, FILM COATED ORAL at 00:36

## 2020-01-01 RX ADMIN — SUCCINYLCHOLINE CHLORIDE 100 MG: 20 INJECTION, SOLUTION INTRAMUSCULAR; INTRAVENOUS; PARENTERAL at 19:35

## 2020-01-01 RX ADMIN — PANTOPRAZOLE SODIUM 40 MG: 40 TABLET, DELAYED RELEASE ORAL at 18:57

## 2020-01-01 RX ADMIN — FUROSEMIDE 40 MG: 10 INJECTION, SOLUTION INTRAMUSCULAR; INTRAVENOUS at 00:34

## 2020-01-01 RX ADMIN — PHYTONADIONE 10 MG: 10 INJECTION, EMULSION INTRAMUSCULAR; INTRAVENOUS; SUBCUTANEOUS at 09:48

## 2020-01-01 RX ADMIN — METOPROLOL TARTRATE 25 MG: 25 TABLET, FILM COATED ORAL at 20:38

## 2020-01-01 RX ADMIN — ENOXAPARIN SODIUM 60 MG: 60 INJECTION SUBCUTANEOUS at 08:37

## 2020-01-01 RX ADMIN — LEVOTHYROXINE SODIUM 75 MCG: 75 TABLET ORAL at 10:13

## 2020-01-01 RX ADMIN — SODIUM CHLORIDE, PRESERVATIVE FREE 10 ML: 5 INJECTION INTRAVENOUS at 13:25

## 2020-01-01 RX ADMIN — POTASSIUM CHLORIDE 10 MEQ: 7.46 INJECTION, SOLUTION INTRAVENOUS at 17:02

## 2020-01-01 RX ADMIN — NYSTATIN 500000 UNITS: 100000 SUSPENSION ORAL at 20:22

## 2020-01-01 RX ADMIN — HYDRALAZINE HYDROCHLORIDE 10 MG: 20 INJECTION INTRAMUSCULAR; INTRAVENOUS at 05:49

## 2020-01-01 RX ADMIN — MIDAZOLAM 0.5 MG: 1 INJECTION INTRAMUSCULAR; INTRAVENOUS at 19:53

## 2020-01-01 RX ADMIN — LEVOTHYROXINE SODIUM 75 MCG: 75 TABLET ORAL at 06:43

## 2020-01-01 RX ADMIN — CEPHALEXIN 500 MG: 500 CAPSULE ORAL at 15:44

## 2020-01-01 RX ADMIN — LEVOTHYROXINE SODIUM 75 MCG: 75 TABLET ORAL at 16:54

## 2020-01-01 RX ADMIN — METOPROLOL TARTRATE 25 MG: 25 TABLET, FILM COATED ORAL at 22:18

## 2020-01-01 RX ADMIN — IPRATROPIUM BROMIDE AND ALBUTEROL SULFATE 3 ML: 2.5; .5 SOLUTION RESPIRATORY (INHALATION) at 03:45

## 2020-01-01 RX ADMIN — WARFARIN 3 MG: 3 TABLET ORAL at 16:53

## 2020-01-01 RX ADMIN — POTASSIUM CHLORIDE 10 MEQ: 7.46 INJECTION, SOLUTION INTRAVENOUS at 18:07

## 2020-01-01 RX ADMIN — AMLODIPINE BESYLATE 10 MG: 10 TABLET ORAL at 09:38

## 2020-01-01 RX ADMIN — NYSTATIN 500000 UNITS: 100000 SUSPENSION ORAL at 15:38

## 2020-01-01 RX ADMIN — WARFARIN 3 MG: 3 TABLET ORAL at 16:40

## 2020-01-01 RX ADMIN — METOPROLOL TARTRATE 25 MG: 25 TABLET, FILM COATED ORAL at 20:09

## 2020-01-09 PROBLEM — K64.2 GRADE III HEMORRHOIDS: Status: ACTIVE | Noted: 2020-01-01

## 2020-01-09 NOTE — PROGRESS NOTES
Subjective   Jihan Teresa is a 86 y.o. female.     Chief Complaint   Patient presents with   • GI Problem   • Atrial Fibrillation   Hemorrhoids hypothyroidism mild reactive airway disease.      History of Present Illness   Delightful lady with a history of difficult ambulation after having  Lateral knee replacement.  She has a equine mitral valve and otherwise is treated for atrial fibrillation and is on warfarin.  History includes hemorrhoids which appear to be grade 3 by history.  There is been little bleeding.  We will check labs today.  Treatment here for hemorrhoids will include Anusol HC up D and may use Preparation H gel over-the-counter.  Consider referral to Dr. Singh for no resolution.    She will get Prevnar 13 today.  She is encouraged to get a flu shot we do not have flu vaccine today.    History of treatment of B12 deficiency is noted on B12 level be drawn today along with folate level.  Also check thyroid panel.      The following portions of the patient's history were reviewed and updated as appropriate: allergies, current medications, past social history and problem list.    Review of Systems   Constitutional: Negative.    HENT: Negative.    Eyes: Negative.    Respiratory: Negative.    Cardiovascular: Negative.    Gastrointestinal: Negative.    Endocrine: Negative.    Genitourinary: Negative.    Musculoskeletal: Negative.    Skin: Negative.    Allergic/Immunologic: Negative.    Neurological: Negative.    Hematological: Negative.    Psychiatric/Behavioral: Negative.        Objective   Vitals:    01/09/20 1503   BP: 116/68   Pulse: 60   Temp: 98.7 °F (37.1 °C)   SpO2: 90%     Physical Exam   Constitutional: She is oriented to person, place, and time. She appears well-developed and well-nourished.   HENT:   Head: Normocephalic and atraumatic.   Right Ear: Tympanic membrane and external ear normal.   Left Ear: Tympanic membrane and external ear normal.   Nose: Nose normal.   Mouth/Throat:  Oropharynx is clear and moist.   Eyes: Pupils are equal, round, and reactive to light. Conjunctivae and EOM are normal.   Neck: Normal range of motion. Neck supple. No JVD present. No thyromegaly present.   Cardiovascular: Normal rate, regular rhythm, normal heart sounds and intact distal pulses.   Pulmonary/Chest: Effort normal and breath sounds normal.   Abdominal: Soft. Bowel sounds are normal.   Musculoskeletal: Normal range of motion.   Lymphadenopathy:     She has no cervical adenopathy.   Neurological: She is alert and oriented to person, place, and time. No cranial nerve deficit. Coordination normal.   Skin: Skin is warm and dry. No rash noted.   Psychiatric: She has a normal mood and affect. Her behavior is normal. Judgment and thought content normal.   Vitals reviewed.      Assessment/Plan   Problem List Items Addressed This Visit        Cardiovascular and Mediastinum    Grade III hemorrhoids    A-fib (CMS/[facility]) - Primary    Relevant Orders    CBC & Differential    Comprehensive Metabolic Panel    Hemoglobin A1c    Lipid Panel With / Chol / HDL Ratio    Vitamin B12    TSH    T4, Free    T3, Free    Urinalysis With Culture If Indicated -    Vitamin D 25 Hydroxy    Folate       Respiratory    Mild reactive airways disease       Digestive    B12 deficiency    Relevant Orders    CBC & Differential    Comprehensive Metabolic Panel    Hemoglobin A1c    Lipid Panel With / Chol / HDL Ratio    Vitamin B12    TSH    T4, Free    T3, Free    Urinalysis With Culture If Indicated -    Vitamin D 25 Hydroxy    Folate       Endocrine    Type 2 diabetes mellitus (CMS/[facility])    Relevant Orders    CBC & Differential    Comprehensive Metabolic Panel    Hemoglobin A1c    Lipid Panel With / Chol / HDL Ratio    Vitamin B12    TSH    T4, Free    T3, Free    Urinalysis With Culture If Indicated -    Vitamin D 25 Hydroxy    Folate    Hypothyroidism (acquired)    Relevant Orders    CBC & Differential    Comprehensive Metabolic Panel     Hemoglobin A1c    Lipid Panel With / Chol / HDL Ratio    Vitamin B12    TSH    T4, Free    T3, Free    Urinalysis With Culture If Indicated -    Vitamin D 25 Hydroxy    Folate       Other    Aortic valve replaced, equine valve    History of recurrent UTIs    Relevant Orders    CBC & Differential    Comprehensive Metabolic Panel    Hemoglobin A1c    Lipid Panel With / Chol / HDL Ratio    Vitamin B12    TSH    T4, Free    T3, Free    Urinalysis With Culture If Indicated -    Vitamin D 25 Hydroxy    Folate      Other Visit Diagnoses     Vitamin D deficiency         Relevant Orders    Vitamin D 25 Hydroxy      Labs as noted above Prevnar 13 meds continued Anusol HC 2.5% twice daily to 4 times daily for hemorrhoids I use Preparation H gel over-the-counter as needed consider colorectal referral if no better.  Recheck in 6 months for Medicare wellness.

## 2020-04-14 NOTE — TELEPHONE ENCOUNTER
Refill has been electronically sent for warfarin same dose grams Tuesday through Sunday do not take on Monday.

## 2020-04-14 NOTE — TELEPHONE ENCOUNTER
Pt daughter calling for a refill for her warfarin (COUMADIN) 3 MG tablet  She is scheduled for protime in the office on Friday but is out of medication    [facility] #[id number] - [place], [place] - [address] AT [address] [facility] - [phone] [facility] [phone] FX

## 2020-08-03 PROBLEM — I61.9 ICH (INTRACEREBRAL HEMORRHAGE) (HCC): Status: ACTIVE | Noted: 2020-01-01

## 2020-08-03 NOTE — H&P
CONSULT NOTE    Patient Identification:  Jihan Teresa  86 y.o.  female  9/9/1933  [id number]                CC: Confusion    History of Present Illness:  86-year-old female history of A. fib on anticoagulation presented to the emergency room with confusion.  There is no history of fall or trauma reported by the daughter.  Patient currently awake but not the best of historian.  She denies any headache double vision or blurred vision at this time.  No nausea vomiting or shortness of breath was reported.  In the emergency room CT head showed IVH with subarachnoid hemorrhage was noted.  Patient on Coumadin and Plavix for A. fib and was noted to have  elevated INR.  She has received vitamin K and Kcentra in the emergency room.      Review of Systems  Constitutional:  Negative for appetite change, chills, fatigue and fever.   HENT: Negative for congestion, ear pain and rhinorrhea.    Eyes:. Negative for pain and discharge.   Respiratory: Negative for cough, choking, chest tightness and shortness of breath.    Cardiovascular: Negative for chest pain and leg swelling.   Gastrointestinal: Negative for abdominal distention, abdominal pain, constipation, nausea and vomiting.   Endocrine: Negative for cold intolerance and heat intolerance.   Genitourinary: Negative for difficulty urinating and dysuria.   Musculoskeletal: Negative for myalgias, weakness, pain   Skin: Negative for color change and pallor.   Neurological:  Negative for dizziness, syncope, numbness or headaches.   Psychiatric/Behavioral: Positive for confusion.    Past Medical History:  Past Medical History:   Diagnosis Date   • Anemia    • Aortic valve stenosis     S/p AVR in 09'   • Asthma    • Atelectasis    • CHF (congestive heart failure) (CMS/[facility])    • Congenital heart disease    • Diabetes mellitus (CMS/[facility])    • Esophageal reflux    • Essential hypertension    • HLD (hyperlipidemia)    • Hypotension    • LVH (left ventricular hypertrophy)    • Pleural  effusion    • Pulmonary hypertension (CMS/HCC)        Past Surgical History:  Past Surgical History:   Procedure Laterality Date   • AORTIC VALVE REPAIR/REPLACEMENT  11/14/2009    Jerry Colmenares MD   • CATARACT EXTRACTION     • KNEE ARTHROPLASTY          Home Meds:  Facility-Administered Medications Prior to Admission   Medication Dose Route Frequency Provider Last Rate Last Dose   • cyanocobalamin injection 1,000 mcg  1,000 mcg Intramuscular Q28 Days Gustavo Jackman Jr., MD   1,000 mcg at 01/11/19 1640     Medications Prior to Admission   Medication Sig Dispense Refill Last Dose   • acetaminophen (TYLENOL) 500 MG tablet Take  by mouth As Needed.   Taking   • ascorbic acid (VITAMIN C) 250 MG chewable tablet chewable tablet Chew 500 mg Daily.   Taking   • atorvastatin (LIPITOR) 40 MG tablet TAKE 1 TABLET BY MOUTH EVERY DAY 30 tablet 5    • Calcium Citrate-Vitamin D 250-200 MG-UNIT tablet Take  by mouth 2 (Two) Times a Day.   Taking   • Clobetasol Propionate Emulsion 0.05 % topical foam APPLY SPARINGLY TO SCALP TWICE DAILY X 2 WEEKS. THEN AS NEEDED FOR FLARES  1 Taking   • clopidogrel (PLAVIX) 75 MG tablet TAKE 1 TABLET BY MOUTH EVERY DAY 30 tablet 2    • furosemide (LASIX) 40 MG tablet TAKE 1 TABLET BY MOUTH THREE TIMES A DAY 90 tablet 1    • hydrocortisone (ANUSOL-HC) 2.5 % rectal cream Insert  into the rectum 2 (Two) Times a Day. 28.35 g 2    • isosorbide mononitrate (IMDUR) 60 MG 24 hr tablet TAKE 1 TABLET BY MOUTH EVERY DAY 90 tablet 1    • ketoconazole (NIZORAL) 2 % shampoo WASH SCALP AND 2-3 TIMES A WEEK. LEAVE ON FOR 2-3 MINUTES THEN RINSE.  5 Taking   • levothyroxine (SYNTHROID, LEVOTHROID) 150 MCG tablet TAKE 1 TABLET BY MOUTH EVERY DAY 30 tablet 2    • lisinopril (PRINIVIL,ZESTRIL) 10 MG tablet TAKE 1 TABLET BY MOUTH EVERY DAY 30 tablet 5    • metoprolol tartrate (LOPRESSOR) 50 MG tablet TAKE 1 TABLET BY MOUTH 2 (TWO) TIMES DAILY 180 tablet 2 Taking   • Multiple Vitamin (MULTIVITAMIN) tablet Take 1 tablet  "by mouth Daily.   Taking   • omeprazole (priLOSEC) 20 MG capsule Take 20 mg by mouth Daily.   Taking   • Phenylephrine-Witch Hazel (PREPARATION H) 0.25-50 % gel Insert 1 g into the rectum 4 (Four) Times a Day As Needed (rectal burning). 25 g 2    • potassium chloride (MICRO-K) 10 MEQ CR capsule TAKE 2 CAPSULES BY MOUTH 2 (TWO) TIMES A DAY. 120 capsule 5    • warfarin (COUMADIN) 3 MG tablet Take 3 mg daily by mouth Tuesday through Sunday.  Do not take Monday 90 tablet 1        Allergies:  Allergies   Allergen Reactions   • Promethazine Other (See Comments)     Pt becomes \"out of it\" when on this medication  Pt becomes \"out of it\" when on this medication       Social History:   Social History     Socioeconomic History   • Marital status:      Spouse name: Not on file   • Number of children: Not on file   • Years of education: Not on file   • Highest education level: Not on file   Tobacco Use   • Smoking status: Never Smoker   • Smokeless tobacco: Never Used   Substance and Sexual Activity   • Alcohol use: Yes     Comment: ocasional   • Drug use: No       Family History:  Family History   Problem Relation Age of Onset   • Dementia Sister    • Stroke Brother        Physical Exam:  /62 (BP Location: Left arm, Patient Position: Lying)   Pulse 77   Temp 98 °F (36.7 °C) (Oral)   Resp 19   Ht 152.4 cm (60\")   Wt 66.2 kg (145 lb 15.1 oz)   SpO2 92%   BMI 28.50 kg/m²  Body mass index is 28.5 kg/m². 92% 66.2 kg (145 lb 15.1 oz)  Physical Exam  Patient is examined using the personal protective equipment as per guidelines from infection control for this particular patient as enacted.  Hand hygiene was performed before and after patient interaction.  Well-developed normal body habitus  Eyes normal conjunctivae and pupils reactive to light  ENT Mallampati between 2 and 3 normal nasal exam  Neck midline trachea no thyromegaly  Chest no labored breathing clear  CVS regular rate and rhythm no lower extremity " edema  Abdomen soft nontender hepatosplenomegaly  CNS moving all extremities no obvious facial droop.  Skin no rashes no nodules  Psych slightly confused  Musculoskeletal no cyanosis no clubbing normal range of motion        LABS:  Lab Results   Component Value Date    CALCIUM 9.1 08/03/2020    PHOS 3.7 11/26/2015     Results from last 7 days   Lab Units 08/03/20  0804   SODIUM mmol/L 134*   POTASSIUM mmol/L 3.8   CHLORIDE mmol/L 99   CO2 mmol/L 29.0   BUN mg/dL 9   CREATININE mg/dL 0.81   GLUCOSE mg/dL 126*   CALCIUM mg/dL 9.1   WBC 10*3/mm3 9.11   HEMOGLOBIN g/dL 12.7   PLATELETS 10*3/mm3 244   ALT (SGPT) U/L 31   AST (SGOT) U/L 23   PROCALCITONIN ng/mL 0.06     Lab Results   Component Value Date    TROPONINT <0.010 08/03/2020     Results from last 7 days   Lab Units 08/03/20  0804   TROPONIN T ng/mL <0.010         Results from last 7 days   Lab Units 08/03/20  0804   PROCALCITONIN ng/mL 0.06   LACTATE mmol/L 1.6             Results from last 7 days   Lab Units 08/03/20  0804   INR  2.43*         Lab Results   Component Value Date    TSH 2.290 01/09/2020     Estimated Creatinine Clearance: 42.3 mL/min (by C-G formula based on SCr of 0.81 mg/dL).  Results from last 7 days   Lab Units 08/03/20  0804   NITRITE UA  Negative   WBC UA /HPF Too Numerous to Count*   BACTERIA UA /HPF 4+*   SQUAM EPITHEL UA /HPF 0-2        Imaging: I personally visualized the images of scans/x-rays performed within last 3 days.      Assessment:  Intracerebral hemorrhage  Coagulopathy  A. fib  UTI  Diabetes mellitus  Hypothyroidism      Recommendations:  Neurochecks per protocol.  Neurosurgery currently following.  Plans noted for MRI per neurosurgery.  Blood pressure management keep systolic less than 140.  Will use PRN labetalol and Cardene drip.  Coagulopathy reversed in the emergency room with vitamin K and Kcentra.  Will repeat INR.  A. fib currently on rate control.  I will ask cardiology to see  Suspected UTI causing confusion.  Will  initiate Rocephin.  Follow-up on urine culture  Blood glucose monitoring per ICU protocol.  Discussed with daughter at bedside in detail  ICU core measures                Yvette Evans MD  8/3/2020  12:53      Much of this encounter note is an electronic transcription/translation of spoken language to printed text using Dragon Software.

## 2020-08-03 NOTE — ED TRIAGE NOTES
Pt arrives via EMS from home. States that for the last several days she has had progressive generalized weakness. EMS reports that she cannot move from her bed to the bedside commode which is only 3 steps. Pt has a hx of a stroke. Pt masked at arrival and triage staff wore all appropriate PPE.

## 2020-08-03 NOTE — ED NOTES
Nursing report ED to floor  Jhian Teresa  86 y.o.  female    HPI (triage note):   Chief Complaint   Patient presents with   • Weakness - Generalized       Admitting doctor:   Yvette Evans MD    Admitting diagnosis:   The primary encounter diagnosis was Nontraumatic intracerebral hemorrhage, unspecified cerebral location, unspecified laterality (CMS/HCC). A diagnosis of Acute UTI was also pertinent to this visit.    Code status:   Current Code Status     Date Active Code Status Order ID Comments User Context       Prior          Allergies:   Promethazine    Weight:       08/03/20  0857   Weight: 68 kg (149 lb 14.4 oz)       Most recent vitals:   Vitals:    08/03/20 0857 08/03/20 0953 08/03/20 1005 08/03/20 1018   BP:  (!) 197/87 (!) 207/90    BP Location:  Right arm     Patient Position:  Lying     Pulse:  60 57 58   Resp:  14     Temp:       TempSrc:       SpO2:  93% 94% 94%   Weight: 68 kg (149 lb 14.4 oz)          Active LDAs/IV Access:   Lines, Drains & Airways    Active LDAs     Name:   Placement date:   Placement time:   Site:   Days:    Peripheral IV 08/03/20 0807 Left Antecubital   08/03/20    0807    Antecubital   less than 1    Peripheral IV 08/03/20 0931 Right Forearm   08/03/20    0931    Forearm   less than 1                Labs (abnormal labs have a star):   Labs Reviewed   COMPREHENSIVE METABOLIC PANEL - Abnormal; Notable for the following components:       Result Value    Glucose 126 (*)     Sodium 134 (*)     Total Protein 5.9 (*)     Albumin 3.30 (*)     All other components within normal limits    Narrative:     GFR Normal >60  Chronic Kidney Disease <60  Kidney Failure <15     PROTIME-INR - Abnormal; Notable for the following components:    Protime 25.7 (*)     INR 2.43 (*)     All other components within normal limits   URINALYSIS W/ MICROSCOPIC IF INDICATED (NO CULTURE) - Abnormal; Notable for the following components:    Appearance, UA Cloudy (*)     Leuk Esterase, UA Large (3+) (*)      "All other components within normal limits   CBC WITH AUTO DIFFERENTIAL - Abnormal; Notable for the following components:    RDW 15.6 (*)     Immature Grans % 0.9 (*)     Immature Grans, Absolute 0.08 (*)     All other components within normal limits   URINALYSIS, MICROSCOPIC ONLY - Abnormal; Notable for the following components:    WBC, UA Too Numerous to Count (*)     Bacteria, UA 4+ (*)     All other components within normal limits   APTT - Normal   TROPONIN (IN-HOUSE) - Normal    Narrative:     Troponin T Reference Range:  <= 0.03 ng/mL-   Negative for AMI  >0.03 ng/mL-     Abnormal for myocardial necrosis.  Clinicians would have to utilize clinical acumen, EKG, Troponin and serial changes to determine if it is an Acute Myocardial Infarction or myocardial injury due to an underlying chronic condition.       Results may be falsely decreased if patient taking Biotin.     LACTIC ACID, PLASMA - Normal   PROCALCITONIN - Normal    Narrative:     As a Marker for Sepsis (Non-Neonates):   1. <0.5 ng/mL represents a low risk of severe sepsis and/or septic shock.  1. >2 ng/mL represents a high risk of severe sepsis and/or septic shock.    As a Marker for Lower Respiratory Tract Infections that require antibiotic therapy:  PCT on Admission     Antibiotic Therapy             6-12 Hrs later  > 0.5                Strongly Recommended            >0.25 - <0.5         Recommended  0.1 - 0.25           Discouraged                   Remeasure/reassess PCT  <0.1                 Strongly Discouraged          Remeasure/reassess PCT      As 28 day mortality risk marker: \"Change in Procalcitonin Result\" (> 80 % or <=80 %) if Day 0 (or Day 1) and Day 4 values are available. Refer to http://www.[facility]-pct-calculator.com/   Change in PCT <=80 %   A decrease of PCT levels below or equal to 80 % defines a positive change in PCT test result representing a higher risk for 28-day all-cause mortality of patients diagnosed with severe sepsis or " septic shock.  Change in PCT > 80 %   A decrease of PCT levels of more than 80 % defines a negative change in PCT result representing a lower risk for 28-day all-cause mortality of patients diagnosed with severe sepsis or septic shock.                Results may be falsely decreased if patient taking Biotin.    BLOOD CULTURE   BLOOD CULTURE   URINE CULTURE   URINE CULTURE   COVID PRE-OP / PRE-PROCEDURE SCREENING ORDER (NO ISOLATION)    Narrative:     The following orders were created for panel order COVID PRE-OP / PRE-PROCEDURE SCREENING ORDER (NO ISOLATION) - Swab, Nasopharynx.  Procedure                               Abnormality         Status                     ---------                               -----------         ------                     COVID-19,BIOTAP, NP/OP S...[[id number]]                      In process                   Please view results for these tests on the individual orders.   COVID-19,BIOTAP, NP/OP SWAB IN TRANSPORT MEDIA OR SALINE 24-36 HR TAT   PROTIME-INR   PROTIME-INR   CBC AND DIFFERENTIAL    Narrative:     The following orders were created for panel order CBC & Differential.  Procedure                               Abnormality         Status                     ---------                               -----------         ------                     CBC Auto Differential[[id number]]        Abnormal            Final result                 Please view results for these tests on the individual orders.       EKG:   ECG 12 Lead   Preliminary Result   HEART RATE= 64  bpm   RR Interval= 938  ms   UT Interval=   ms   P Horizontal Axis=   deg   P Front Axis=   deg   QRSD Interval= 109  ms   QT Interval= 435  ms   QRS Axis= -43  deg   T Wave Axis= 13  deg   - ABNORMAL ECG -   Atrial fibrillation   Ventricular premature complex   Left axis deviation   Consider anterior infarct   Minimal ST depression, inferior leads   Electronically Signed By:    Date and Time of Study: 2020-08-03 08:10:01           Meds given in ED:   Medications   sodium chloride 0.9 % flush 10 mL (has no administration in time range)   niCARdipine (CARDENE) 25 mg in 250 mL NS (0.1 mg/mL) infusion kit (5 mg/hr Intravenous New Bag 8/3/20 1028)   sodium chloride 0.9 % bolus 1,000 mL (0 mL Intravenous Stopped 8/3/20 1015)   cefTRIAXone (ROCEPHIN) IVPB 1 g (0 g Intravenous Stopped 8/3/20 1015)   prothrombin complex conc human (KCentra) IV solution 1,596 Units (1,596 Units Intravenous Given 8/3/20 0930)     And   phytonadione (AQUA-MEPHYTON, VITAMIN K) 10 mg in sodium chloride 0.9 % 50 mL IVPB (0 mg Intravenous Stopped 8/3/20 1028)       Imaging results:  Ct Head Without Contrast    Result Date: 8/3/2020   There is intraventricular hemorrhage identified within the dependent aspects of both lateral ventricles. Additionally, there is increased density seen within the inferior aspect of the interhemispheric fissure suggestive of subarachnoid hemorrhage. The precise etiology of this hemorrhage is uncertain on the basis of this head CT. However, a vascular etiology should be excluded. Further evaluation is recommended with a CTA or MRA study, as well as an MRI of the brain.  These findings and recommendations were discussed with Debbie Wilson on 08/03/2020 at approximately 8:36 AM.  Radiation dose reduction techniques were utilized, including automated exposure control and exposure modulation based on body size.       Xr Chest 1 View    Result Date: 8/3/2020  There is stable mild bilateral interstitial scarring and cardiomegaly with findings suggestive of pulmonary hypertension. No evidence for superimposed acute process is appreciated.        Ambulatory status:   - bedrest      Social issues:   Social History     Socioeconomic History   • Marital status:      Spouse name: Not on file   • Number of children: Not on file   • Years of education: Not on file   • Highest education level: Not on file   Tobacco Use   • Smoking status:  Never Smoker   • Smokeless tobacco: Never Used   Substance and Sexual Activity   • Alcohol use: Yes     Comment: ocasional   • Drug use: No          Efren Guerrero RN  08/03/20 1047

## 2020-08-03 NOTE — ED PROVIDER NOTES
Pt presents to the ED c/o  confusion and altered mental status.  Her daughter provides most of the history.  Her daughter states that she noticed she seemed to be weak last night and leaning to the right while she was on the bedside commode.  There are no recent falls.  No recent fevers or chills.     On exam,  The patient is awake and mostly alert, she recognizes her daughter but is otherwise disoriented.  She is not in any acute distress.  She denies headache.  Pupils are equal round and reactive.  There are no focal neurologic deficits.  Cardiac auscultation reveals an irregularly irregular rhythm with a normal rate.  Lungs are clear.     EKG performed at 08 10 and interpreted by me shows atrial fibrillation with a heart rate of approximately 65 bpm.  There is a nonspecific intraventricular conduction delay and nonspecific ST-T changes.  There is no significant change when compared to 6/14/2017.    Plan: Cath urinalysis shows acute UTI.  CT of the head also shows intracerebral hemorrhage with intraventricular extension.  Rocephin, vitamin K and Kcentra have been ordered.  I explained the diagnoses to the patient and her daughter and also explained that she will be admitted to the ICU today for close monitoring and neurosurgical consultation.  Calls are out to pulmonologist and neurosurgeon on call.    9:08 AM  Case discussed with Dr. Evans (pulmonary critical care medicine)-he accepts the patient for admission to the ICU.    9:17 AM  Case discussed with WILMA Norris (on-call for neurosurgery)-she will see the patient in consultation with Dr. De La Cruz.    Appropriate PPE was 1 by myself and the patient throughout our entire interaction.       Attestation:  The NISHA and I have discussed this patient's history, physical exam, and treatment plan.  I have reviewed the documentation and personally had a face to face interaction with the patient. I affirm the documentation and agree with the treatment and plan.  The  attached note describes my personal findings.            Cuco Sullivan MD  08/03/20 0981

## 2020-08-03 NOTE — ED NOTES
PPE per protocol utilized.  K centra infusion completed. IV line flushed with normal saline.     Geneva Webb RN  08/03/20 2630

## 2020-08-03 NOTE — CONSULTS
"  Patient Care Team:  Yaron Ca Jr., MD as PCP - General (Family Medicine)  Yaron Ca Jr., MD as PCP - Claims Attributed    Chief complaint Confusion    Subjective .   Consult for abnormal CT findings    History of present illness:  Pleasantly confused 86 year old  female who lives with her daughter and states she \"feels fine\" but apparently was brought to ER because her daughter noticed some confusion and listing to one side per ER note. Her daughter is not present at the time of this evaluation in ICU. She says she may have fallen recently although that was denied by her daughter in the ER and she does have a bruise on her buttocks. Due to confusion, a CT head was obtained and based on those results, NS was consulted. Past medical history as below.    Review of Systems  Review of Systems   Constitutional:  Negative for appetite change, chills, fatigue and fever.   HENT: Negative for congestion, ear pain and rhinorrhea.    Eyes:. Negative for pain and discharge.   Respiratory: Negative for cough, choking, chest tightness and shortness of breath.    Cardiovascular: Negative for chest pain and leg swelling.   Gastrointestinal: Negative for abdominal distention, abdominal pain, constipation, nausea and vomiting.   Endocrine: Negative for cold intolerance and heat intolerance.   Genitourinary: Negative for difficulty urinating and dysuria.   Musculoskeletal: Negative for myalgias, weakness, pain   Skin: Negative for color change and pallor.   Neurological:  Negative for dizziness, syncope, numbness or headaches.   Psychiatric/Behavioral: Positive for confusion.      History  Past Medical History:   Diagnosis Date   • Anemia    • Aortic valve stenosis     S/p AVR in 09'   • Asthma    • Atelectasis    • CHF (congestive heart failure) (CMS/[facility])    • Congenital heart disease    • Diabetes mellitus (CMS/[facility])    • Esophageal reflux    • Essential hypertension    • HLD (hyperlipidemia)    • Hypotension    • LVH " (left ventricular hypertrophy)    • Pleural effusion    • Pulmonary hypertension (CMS/HCC)    ,   Past Surgical History:   Procedure Laterality Date   • AORTIC VALVE REPAIR/REPLACEMENT  11/14/2009    Jerry Colmenares MD   • CATARACT EXTRACTION     • KNEE ARTHROPLASTY     ,   Family History   Problem Relation Age of Onset   • Dementia Sister    • Stroke Brother    ,   Social History     Tobacco Use   • Smoking status: Never Smoker   • Smokeless tobacco: Never Used   Substance Use Topics   • Alcohol use: Yes     Comment: ocasional   • Drug use: No   ,   Facility-Administered Medications Prior to Admission   Medication Dose Route Frequency Provider Last Rate Last Dose   • cyanocobalamin injection 1,000 mcg  1,000 mcg Intramuscular Q28 Days Gustavo Jackman Jr., MD   1,000 mcg at 01/11/19 1640     Medications Prior to Admission   Medication Sig Dispense Refill Last Dose   • acetaminophen (TYLENOL) 500 MG tablet Take  by mouth As Needed.   Taking   • ascorbic acid (VITAMIN C) 250 MG chewable tablet chewable tablet Chew 500 mg Daily.   Taking   • atorvastatin (LIPITOR) 40 MG tablet TAKE 1 TABLET BY MOUTH EVERY DAY 30 tablet 5    • Calcium Citrate-Vitamin D 250-200 MG-UNIT tablet Take  by mouth 2 (Two) Times a Day.   Taking   • Clobetasol Propionate Emulsion 0.05 % topical foam APPLY SPARINGLY TO SCALP TWICE DAILY X 2 WEEKS. THEN AS NEEDED FOR FLARES  1 Taking   • clopidogrel (PLAVIX) 75 MG tablet TAKE 1 TABLET BY MOUTH EVERY DAY 30 tablet 2    • furosemide (LASIX) 40 MG tablet TAKE 1 TABLET BY MOUTH THREE TIMES A DAY 90 tablet 1    • hydrocortisone (ANUSOL-HC) 2.5 % rectal cream Insert  into the rectum 2 (Two) Times a Day. 28.35 g 2    • isosorbide mononitrate (IMDUR) 60 MG 24 hr tablet TAKE 1 TABLET BY MOUTH EVERY DAY 90 tablet 1    • ketoconazole (NIZORAL) 2 % shampoo WASH SCALP AND 2-3 TIMES A WEEK. LEAVE ON FOR 2-3 MINUTES THEN RINSE.  5 Taking   • levothyroxine (SYNTHROID, LEVOTHROID) 150 MCG tablet TAKE 1 TABLET BY  MOUTH EVERY DAY 30 tablet 2    • lisinopril (PRINIVIL,ZESTRIL) 10 MG tablet TAKE 1 TABLET BY MOUTH EVERY DAY 30 tablet 5    • metoprolol tartrate (LOPRESSOR) 50 MG tablet TAKE 1 TABLET BY MOUTH 2 (TWO) TIMES DAILY 180 tablet 2 Taking   • Multiple Vitamin (MULTIVITAMIN) tablet Take 1 tablet by mouth Daily.   Taking   • omeprazole (priLOSEC) 20 MG capsule Take 20 mg by mouth Daily.   Taking   • Phenylephrine-Witch Hazel (PREPARATION H) 0.25-50 % gel Insert 1 g into the rectum 4 (Four) Times a Day As Needed (rectal burning). 25 g 2    • potassium chloride (MICRO-K) 10 MEQ CR capsule TAKE 2 CAPSULES BY MOUTH 2 (TWO) TIMES A DAY. 120 capsule 5    • warfarin (COUMADIN) 3 MG tablet Take 3 mg daily by mouth Tuesday through Sunday.  Do not take Monday 90 tablet 1     and Allergies:  Promethazine    Objective     Vital Signs   Temp:  [97 °F (36.1 °C)-98 °F (36.7 °C)] 98 °F (36.7 °C)  Heart Rate:  [57-79] 77  Resp:  [14-19] 19  BP: (139-207)/() 139/62    Physical Exam:   Physical Exam   Constitutional: She appears well-developed and well-nourished.   HENT:   Head: Normocephalic and atraumatic.   Eyes: Pupils are equal, round, and reactive to light. EOM are normal.   Neck: Normal range of motion.   Cardiovascular: Normal rate.   Pulmonary/Chest: Effort normal.   Abdominal: Soft. She exhibits no distension.   Musculoskeletal: Normal range of motion. She exhibits no edema.   Neurological: She is alert. She has normal strength. She has a normal Finger-Nose-Finger Test.   Reflex Scores:       Tricep reflexes are 0 on the right side and 0 on the left side.       Bicep reflexes are 1+ on the right side and 1+ on the left side.       Brachioradialis reflexes are 1+ on the right side and 1+ on the left side.       Patellar reflexes are 1+ on the right side and 1+ on the left side.       Achilles reflexes are 0 on the right side and 0 on the left side.  Skin: Skin is warm and dry.   Psychiatric: She has a normal mood and affect.  Her speech is normal.   Vitals reviewed.       Neurologic Exam     Mental Status   Oriented to person.   Oriented to place.   Disoriented to time.   Attention: normal.   Speech: speech is normal   Level of consciousness: alert    Cranial Nerves     CN II   Visual acuity: normal    CN III, IV, VI   Pupils are equal, round, and reactive to light.  Extraocular motions are normal.   Right pupil: Size: 2 mm. Shape: regular. Consensual response: intact.   Left pupil: Size: 2 mm. Shape: regular. Consensual response: intact.   Nystagmus: none     CN V   Facial sensation intact.     CN VII   Facial expression full, symmetric.     CN VIII   Hearing: intact    CN IX, X   CN IX normal.     CN XI   CN XI normal.     CN XII   CN XII normal.     Motor Exam   Muscle bulk: normal  Overall muscle tone: normal  Right arm pronator drift: absent  Left arm pronator drift: absent    Strength   Strength 5/5 throughout.     Sensory Exam   Light touch normal.     Gait, Coordination, and Reflexes     Gait  Gait: (Not assessed due to risk of fall)    Coordination   Finger to nose coordination: normal    Tremor   Resting tremor: absent  Intention tremor: absent    Reflexes   Right brachioradialis: 1+  Left brachioradialis: 1+  Right biceps: 1+  Left biceps: 1+  Right triceps: 0  Left triceps: 0  Right patellar: 1+  Left patellar: 1+  Right achilles: 0  Left achilles: 0  Right Reyes: absent  Left Reyes: absent  Right ankle clonus: absent  Left ankle clonus: absent      Results Review:   I reviewed the patient's new clinical results.  I reviewed the patient's new imaging results and agree with the interpretation.    .  Results from last 7 days   Lab Units 08/03/20  0804   WBC 10*3/mm3 9.11   HEMOGLOBIN g/dL 12.7   HEMATOCRIT % 38.5   PLATELETS 10*3/mm3 244     .  Results from last 7 days   Lab Units 08/03/20  0804   SODIUM mmol/L 134*   POTASSIUM mmol/L 3.8   CHLORIDE mmol/L 99   CO2 mmol/L 29.0   BUN mg/dL 9   CREATININE mg/dL 0.81   CALCIUM  mg/dL 9.1   BILIRUBIN mg/dL 0.8   ALK PHOS U/L 46   ALT (SGPT) U/L 31   AST (SGOT) U/L 23   GLUCOSE mg/dL 126*     Ct Head Without Contrast    Result Date: 8/3/2020   There is intraventricular hemorrhage identified within the dependent aspects of both lateral ventricles. Additionally, there is increased density seen within the inferior aspect of the interhemispheric fissure suggestive of subarachnoid hemorrhage. The precise etiology of this hemorrhage is uncertain on the basis of this head CT. However, a vascular etiology should be excluded. Further evaluation is recommended with a CTA or MRA study, as well as an MRI of the brain.  These findings and recommendations were discussed with Debbie Wilson on 08/03/2020 at approximately 8:36 AM.  Radiation dose reduction techniques were utilized, including automated exposure control and exposure modulation based on body size.       Xr Chest 1 View    Result Date: 8/3/2020  There is stable mild bilateral interstitial scarring and cardiomegaly with findings suggestive of pulmonary hypertension. No evidence for superimposed acute process is appreciated.  This report was finalized on 8/3/2020 10:55 AM by Dr. Estuardo Wagner M.D.        Assessment/Plan       ICH (intracerebral hemorrhage) (CMS/[facility], [facility])    IVH  The findings on the CT do no explain her presentation and are not specific. MRI/MRA pending. Her confusion is much more likely related to UTI. Will follow up on MRI/MRA. Continue to hold Coumadin.     I discussed the patients findings and my recommendations with patient, nursing staff and Dr Jono Fuentes, KAREN  08/03/20  11:59

## 2020-08-03 NOTE — ED PROVIDER NOTES
" EMERGENCY DEPARTMENT ENCOUNTER    Room Number:  I384/1  Date of encounter:  8/3/2020  PCP: Yaron Ca Jr., MD  Historian: EMS and triage report      HPI:  Chief Complaint: Generalized weakness  A complete HPI/ROS/PMH/PSH/SH/FH are unobtainable due to: Patient very confused    Context: Jihan Teresa is a 86 y.o. female who arrives to the ED via EMS from home.  Patient presents with c/o progressive generalized weakness.  Patient confused and answers every question with \"I do not know\" therefore unable to obtain HPI or review of symptoms.        PAST MEDICAL HISTORY  Active Ambulatory Problems     Diagnosis Date Noted   • Type 2 diabetes mellitus (CMS/HCC) 02/05/2016   • Aortic valve replaced, equine valve 02/05/2016   • Cerebral infarction (CMS/HCC) 02/05/2016   • A-fib (CMS/HCC) 02/05/2016   • GERD without esophagitis 02/05/2016   • Sleep apnea in adult 02/05/2016   • Cognitive dysfunction 02/05/2016   • Hypothyroidism (acquired) 02/05/2016   • History of recurrent UTIs 02/05/2016   • Mild reactive airways disease 02/05/2016   • Essential hypertension 02/05/2016   • Pulmonary hypertension (CMS/HCC) 12/13/2016   • B12 deficiency 12/13/2016   • Non-rheumatic mitral regurgitation 11/14/2017   • Non-rheumatic tricuspid valve insufficiency 11/14/2017   • Chronic anemia 11/14/2017   • Grade III hemorrhoids 01/09/2020     Resolved Ambulatory Problems     Diagnosis Date Noted   • Congestive heart failure (CMS/HCC) 02/05/2016   • Cardiomyopathy (CMS/HCC) 02/05/2016   • Hypertensive pulmonary vascular disease (CMS/HCC) 02/05/2016   • Pneumonia of both lungs due to infectious organism 11/25/2016   • Hypokalemia 11/26/2016   • CKD (chronic kidney disease) stage 3, GFR 30-59 ml/min (CMS/HCC) 11/27/2016   • UTI (urinary tract infection), bacterial 06/10/2017   • Type 2 diabetes mellitus (CMS/HCC) 07/14/2017   • History of recurrent UTIs 04/25/2018   • Cellulitis 04/25/2018     Past Medical History:   Diagnosis Date   • " Anemia    • Aortic valve stenosis    • Asthma    • Atelectasis    • CHF (congestive heart failure) (CMS/HCC)    • Congenital heart disease    • Diabetes mellitus (CMS/HCC)    • Esophageal reflux    • HLD (hyperlipidemia)    • Hypotension    • LVH (left ventricular hypertrophy)    • Pleural effusion          PAST SURGICAL HISTORY  Past Surgical History:   Procedure Laterality Date   • AORTIC VALVE REPAIR/REPLACEMENT  11/14/2009    Jerry Colmenares MD   • CATARACT EXTRACTION     • KNEE ARTHROPLASTY           FAMILY HISTORY  Family History   Problem Relation Age of Onset   • Dementia Sister    • Stroke Brother          SOCIAL HISTORY  Social History     Socioeconomic History   • Marital status:      Spouse name: Not on file   • Number of children: Not on file   • Years of education: Not on file   • Highest education level: Not on file   Tobacco Use   • Smoking status: Never Smoker   • Smokeless tobacco: Never Used   Substance and Sexual Activity   • Alcohol use: Yes     Comment: ocasional   • Drug use: No         ALLERGIES  Promethazine        REVIEW OF SYSTEMS  Review of Systems     Unable to obtain secondary to patient confused       PHYSICAL EXAM    ED Triage Vitals   Temp Heart Rate Resp BP SpO2   08/03/20 0737 08/03/20 0729 08/03/20 0729 08/03/20 0729 08/03/20 0729   97 °F (36.1 °C) 63 16 172/81 95 %       Physical Exam  GENERAL: Chronically ill-appearing, non-toxic appearing, not distressed  HENT: normocephalic, atraumatic, dry mucous membranes  EYES: no scleral icterus, PERRL  CV: regular rhythm, regular rate, no murmur  RESPIRATORY: normal effort, CTAB  ABDOMEN: soft, nontender, normal bowel sounds  MUSCULOSKELETAL: no deformity  NEURO: alert, moves all extremities  Patient had difficulty with finger-to-nose bilaterally  Patient pleasantly confused, is aware of her name but disoriented to place and time  Patient will follow simple commands  Bilateral equal handgrips  SKIN: warm, dry, no rash, diffuse  bruising noted in different stages of healing  Psych: Pleasantly confused  Nursing notes and vital signs reviewed      LAB RESULTS  Recent Results (from the past 24 hour(s))   Comprehensive Metabolic Panel    Collection Time: 08/03/20  8:04 AM   Result Value Ref Range    Glucose 126 (H) 65 - 99 mg/dL    BUN 9 8 - 23 mg/dL    Creatinine 0.81 0.57 - 1.00 mg/dL    Sodium 134 (L) 136 - 145 mmol/L    Potassium 3.8 3.5 - 5.2 mmol/L    Chloride 99 98 - 107 mmol/L    CO2 29.0 22.0 - 29.0 mmol/L    Calcium 9.1 8.6 - 10.5 mg/dL    Total Protein 5.9 (L) 6.0 - 8.5 g/dL    Albumin 3.30 (L) 3.50 - 5.20 g/dL    ALT (SGPT) 31 1 - 33 U/L    AST (SGOT) 23 1 - 32 U/L    Alkaline Phosphatase 46 39 - 117 U/L    Total Bilirubin 0.8 0.0 - 1.2 mg/dL    eGFR Non African Amer 67 >60 mL/min/1.73    Globulin 2.6 gm/dL    A/G Ratio 1.3 g/dL    BUN/Creatinine Ratio 11.1 7.0 - 25.0    Anion Gap 6.0 5.0 - 15.0 mmol/L   Protime-INR    Collection Time: 08/03/20  8:04 AM   Result Value Ref Range    Protime 25.7 (H) 11.7 - 14.2 Seconds    INR 2.43 (H) 0.90 - 1.10   aPTT    Collection Time: 08/03/20  8:04 AM   Result Value Ref Range    PTT 32.7 22.7 - 35.4 seconds   Urinalysis With Microscopic If Indicated (No Culture) - Urine, Catheter    Collection Time: 08/03/20  8:04 AM   Result Value Ref Range    Color, UA Yellow Yellow, Straw    Appearance, UA Cloudy (A) Clear    pH, UA 7.0 5.0 - 8.0    Specific Gravity, UA 1.010 1.005 - 1.030    Glucose, UA Negative Negative    Ketones, UA Negative Negative    Bilirubin, UA Negative Negative    Blood, UA Negative Negative    Protein, UA Negative Negative    Leuk Esterase, UA Large (3+) (A) Negative    Nitrite, UA Negative Negative    Urobilinogen, UA 0.2 E.U./dL 0.2 - 1.0 E.U./dL   Troponin    Collection Time: 08/03/20  8:04 AM   Result Value Ref Range    Troponin T <0.010 0.000 - 0.030 ng/mL   Lactic Acid, Plasma    Collection Time: 08/03/20  8:04 AM   Result Value Ref Range    Lactate 1.6 0.5 - 2.0 mmol/L    Procalcitonin    Collection Time: 08/03/20  8:04 AM   Result Value Ref Range    Procalcitonin 0.06 0.00 - 0.25 ng/mL   CBC Auto Differential    Collection Time: 08/03/20  8:04 AM   Result Value Ref Range    WBC 9.11 3.40 - 10.80 10*3/mm3    RBC 4.56 3.77 - 5.28 10*6/mm3    Hemoglobin 12.7 12.0 - 15.9 g/dL    Hematocrit 38.5 34.0 - 46.6 %    MCV 84.4 79.0 - 97.0 fL    MCH 27.9 26.6 - 33.0 pg    MCHC 33.0 31.5 - 35.7 g/dL    RDW 15.6 (H) 12.3 - 15.4 %    RDW-SD 47.6 37.0 - 54.0 fl    MPV 9.1 6.0 - 12.0 fL    Platelets 244 140 - 450 10*3/mm3    Neutrophil % 64.7 42.7 - 76.0 %    Lymphocyte % 24.7 19.6 - 45.3 %    Monocyte % 6.5 5.0 - 12.0 %    Eosinophil % 2.5 0.3 - 6.2 %    Basophil % 0.7 0.0 - 1.5 %    Immature Grans % 0.9 (H) 0.0 - 0.5 %    Neutrophils, Absolute 5.90 1.70 - 7.00 10*3/mm3    Lymphocytes, Absolute 2.25 0.70 - 3.10 10*3/mm3    Monocytes, Absolute 0.59 0.10 - 0.90 10*3/mm3    Eosinophils, Absolute 0.23 0.00 - 0.40 10*3/mm3    Basophils, Absolute 0.06 0.00 - 0.20 10*3/mm3    Immature Grans, Absolute 0.08 (H) 0.00 - 0.05 10*3/mm3    nRBC 0.0 0.0 - 0.2 /100 WBC   Urinalysis, Microscopic Only - Urine, Catheter    Collection Time: 08/03/20  8:04 AM   Result Value Ref Range    RBC, UA 0-2 None Seen, 0-2 /HPF    WBC, UA Too Numerous to Count (A) None Seen, 0-2 /HPF    Bacteria, UA 4+ (A) None Seen /HPF    Squamous Epithelial Cells, UA 0-2 None Seen, 0-2 /HPF    Hyaline Casts, UA 3-6 None Seen /LPF    Methodology Automated Microscopy    Respiratory Panel PCR w/COVID-19(SARS-CoV-2) NICOLAS/LALI/MYRA In-House, NP Swab in UTM/VTP, 8-12 Hr TAT - Swab, Nasopharynx    Collection Time: 08/03/20 10:29 AM   Result Value Ref Range    ADENOVIRUS, PCR Not Detected Not Detected    Coronavirus 229E Not Detected Not Detected    Coronavirus HKU1 Not Detected Not Detected    Coronavirus NL63 Not Detected Not Detected    Coronavirus OC43 Not Detected Not Detected    COVID19 Not Detected Not Detected - Ref. Range    Human  Metapneumovirus Not Detected Not Detected    Human Rhinovirus/Enterovirus Not Detected Not Detected    Influenza A PCR Not Detected Not Detected    Influenza A H1 Not Detected Not Detected    Influenza A H1 2009 PCR Not Detected Not Detected    Influenza A H3 Not Detected Not Detected    Influenza B PCR Not Detected Not Detected    Parainfluenza Virus 1 Not Detected Not Detected    Parainfluenza Virus 2 Not Detected Not Detected    Parainfluenza Virus 3 Not Detected Not Detected    Parainfluenza Virus 4 Not Detected Not Detected    RSV, PCR Not Detected Not Detected    Bordetella pertussis pcr Not Detected Not Detected    Bordetella parapertussis PCR Not Detected Not Detected    Chlamydophila pneumoniae PCR Not Detected Not Detected    Mycoplasma pneumo by PCR Not Detected Not Detected   POC Glucose Once    Collection Time: 08/03/20 11:20 AM   Result Value Ref Range    Glucose 129 70 - 130 mg/dL       Ordered the above labs and independently reviewed the results.      RADIOLOGY  Ct Head Without Contrast    Result Date: 8/3/2020  CT SCAN OF THE HEAD WITHOUT CONTRAST  CLINICAL HISTORY: Generalized weakness and confusion.  TECHNIQUE: CT scan of the head was obtained with 3 mm axial images. No intravenous contrast was administered.  COMPARISON: Comparison is made to previous CT scan of head dated 06/13/2017.  FINDINGS: There is increased density within the dependent aspects of both lateral ventricles compatible with intraventricular hemorrhage. There are foci of increased density noted within the inferior aspect of the interhemispheric fissure worrisome for subarachnoid hemorrhage. The etiology of these areas of hemorrhage is uncertain, although a vascular etiology such as an aneurysm should be excluded. I recommend further evaluation with an MRA or CTA examination, as well as an MRI of the brain.  There are multiple chronic infarcts identified within both cerebellar hemispheres, left greater than right. The largest of  these chronic infarcts measures up to approximately 1.4 x 0.4 cm in greatest axial dimensions. Old lacunar disease is seen within the left thalamus. A chronic lacune identified within the anterior aspect of the left thalamus measuring up to 4-5 mm in diameter. Hypodense areas are identified within the lentiform nuclei bilaterally that are likely representative of a combination of enlarged perivascular spaces and old lacunar disease. Severe changes of chronic small vessel ischemic phenomena are identified.  Otherwise, the ventricles, sulci, and cisterns are age appropriate.       There is intraventricular hemorrhage identified within the dependent aspects of both lateral ventricles. Additionally, there is increased density seen within the inferior aspect of the interhemispheric fissure suggestive of subarachnoid hemorrhage. The precise etiology of this hemorrhage is uncertain on the basis of this head CT. However, a vascular etiology should be excluded. Further evaluation is recommended with a CTA or MRA study, as well as an MRI of the brain.  These findings and recommendations were discussed with Debbie Wilson on 08/03/2020 at approximately 8:36 AM.  Radiation dose reduction techniques were utilized, including automated exposure control and exposure modulation based on body size.       Xr Chest 1 View    Result Date: 8/3/2020  EXAMINATION: SINGLE VIEW CHEST RADIOGRAPH  HISTORY: 86-year-old female with history of generalized weakness.  FINDINGS: An upright AP portable chest radiograph was obtained. Comparison is made to a chest radiograph dated 06/10/2017. The lungs are normal in volume and are clear of consolidation. Stable mild scattered interstitial prominence is seen throughout both lungs. Stable soft tissue fullness in the right hilum is most consistent with a prominent right hilar vessel is noted. There are stable cardiomegaly. Midline sternal wires are noted. Atheromatous consultation seen within the  aortic arch.      There is stable mild bilateral interstitial scarring and cardiomegaly with findings suggestive of pulmonary hypertension. No evidence for superimposed acute process is appreciated.  This report was finalized on 8/3/2020 10:55 AM by Dr. Estuardo Wagner M.D.        I ordered the above noted radiological studies and viewed the images on the PACS system.       EKG      Independently viewed by me and interpreted by Dr Sullivan         MEDICAL RECORD REVIEW  Medical records reviewed in epic      PROCEDURES    Critical Care  Performed by: Debbie Wilson APRN  Authorized by: Cuco Sullivan MD     Critical care provider statement:     Critical care time (minutes):  25    Critical care was necessary to treat or prevent imminent or life-threatening deterioration of the following conditions: Intracerebral hemorrhage.    Critical care was time spent personally by me on the following activities:  Ordering and performing treatments and interventions, development of treatment plan with patient or surrogate, ordering and review of laboratory studies, discussions with consultants, ordering and review of radiographic studies, discussions with primary provider, pulse oximetry, re-evaluation of patient's condition, evaluation of patient's response to treatment, examination of patient, review of old charts and obtaining history from patient or surrogate            DIFFERENTIAL DIAGNOSIS  Differential diagnoses include but are not limited to the following: Head trauma, electrolyte abnormality, dehydration, urinary tract infection, pneumonia, TIA, CVA, brain bleed      PROGRESS, DATA ANALYSIS, CONSULTS, AND MEDICAL DECISION MAKING    PPE: Patient was placed in face mask in first look. Patient was wearing facemask when I entered the room and throughout our encounter. I wore full protective equipment throughout this patient encounter including a face mask, and gloves. Hand hygiene was performed before donning  protective equipment and after removal when leaving the room.        ED Course as of Aug 03 1551   Mon Aug 03, 2020   0804 Attempted to contact patient's daughter, left message for her to return call.    [MS]   0842  Discussed with Dr. Branch regarding the CT head results which revealed areas of intraventricular hemorrhage and subarachnoid hemorrhage.  Dr. Branch recommends that patient have an MRI brain with and without an MR a brain without to further evaluate this.        [MS]   0855 Glucose(!): 126 [MS]   0855 Sodium(!): 134 [MS]   0855 INR(!): 2.43 [MS]   0855 Leukocytes, UA(!): Large (3+) [MS]   0855 WBC, UA(!): Too Numerous to Count [MS]   0855 Bacteria, UA(!): 4+ [MS]      ED Course User Index  [MS] Debbie Wilson ZENA, APRN     8:45 AM  Rechecked with pt and family. I discussed with pt and family about head CT result showing IVH and plan to get MRI for further evaluation. Pt and family understands and agrees with plan. All concerns were addressed.  0900: See Dr. Sullivan's note for neurosurgery consult and admission.  ADMISSION    Discussed treatment plan and reason for admission with pt/family and admitting physician.  Pt/family voiced understanding of the plan for admission for further testing/treatment as needed.      DIAGNOSIS  Final diagnoses:   Nontraumatic intracerebral hemorrhage, unspecified cerebral location, unspecified laterality (CMS/[facility])   Acute UTI             MEDICATIONS GIVEN IN ED    Medications   sodium chloride 0.9 % flush 10 mL (has no administration in time range)   niCARdipine (CARDENE) 25 mg in 250 mL NS (0.1 mg/mL) infusion kit (5 mg/hr Intravenous New Bag 8/3/20 1512)   cefTRIAXone (ROCEPHIN) IVPB 1 g (has no administration in time range)   levothyroxine (SYNTHROID, LEVOTHROID) tablet 75 mcg (has no administration in time range)   sodium chloride 0.9 % bolus 1,000 mL (0 mL Intravenous Stopped 8/3/20 1015)   cefTRIAXone (ROCEPHIN) IVPB 1 g (0 g Intravenous Stopped 8/3/20 1015)    prothrombin complex conc human (KCentra) IV solution 1,596 Units (1,596 Units Intravenous Given 8/3/20 0930)     And   phytonadione (AQUA-MEPHYTON, VITAMIN K) 10 mg in sodium chloride 0.9 % 50 mL IVPB (0 mg Intravenous Stopped 8/3/20 1028)           COURSE & MEDICAL DECISION MAKING  Any/All labs and Any/All Imaging studies that were ordered were reviewed and are noted above.  Results were reviewed/discussed with the patient and they were also made aware of online assess.   Pt also made aware that some labs, such as cultures, will not be resulted during ER visit and follow up with PMD is necessary.    Documentation assistance provided by telma Lester for KAREN Godinez.  Information recorded by the scribe was done at my direction and has been verified and validated by me.          Debbie Wilson, APRN  08/03/20 5502

## 2020-08-03 NOTE — CONSULTS
[facility]  Cardiology Consult Note    Patient Identification:  Name: Jihan Teresa  Age: 86 y.o.  Sex: female  :  1933  MRN: [id number]             Requesting Physician: Dr. Evans      Reason for Consultation / Chief Complaint: atrial fibrillation with ICH    History of Present Illness:     Jihan Teresa is an 86 year old female, who is new to our service. She sees [facility] as outpatient. Her history includes asthma, aortic valve repair in , hypertension, diabetes mellitus, CHF, HLD, pleural effusion, and pulmonary hypertension. She presented to the ED via EMS with generalized weakness, and  confusion.  Neurosurgery has been consulted.    On admission troponin negative x1, potassium 3.8, creatinine 0.81, hemoglobin 12.7, and platelets 244, INR 2.43. Chest x-ray showed stable mild bilateral interstitial scarring and cardiomegaly and findings suggestive of pulmonary hypertension.  No evidence of superimposed acute process is appreciated.  CT showed anterior ventricular hemorrhage identified within the dependent aspects of both lateral ventricles.  Additionally there is increased density seen within the inferior aspect of the anterior hemispheric fissure suggestive of subarachnoid hemorrhage.  The present etiology of this hemorrhage is uncertain on the basis of the head CT.  Further evaluation is recommended with a CTA or MRA, see full report as below.    Previous echo on 2017 revealed EF 46.9%, LV systolic function low normal, left and right atrial cavity size is severe dilated. RVC is severely dilated. Calcification of the aortic valve, Severe MV regurgitation, mitral annular calcification is present. Posterior leaflet very calcified and immobile. Severe TV regurgitation, estimated RV systolic pressure from TV regurg. is markedly elevated (<55 mmHg). Calculated RV systolic pressure from TV regurg. is 66.7 mmHg.         Comorbid cardiac risk factors: Hypertension, DM, and  "HLD    Past Medical History:  Past Medical History:   Diagnosis Date   • Anemia    • Aortic valve stenosis     S/p AVR in 09'   • Asthma    • Atelectasis    • CHF (congestive heart failure) (CMS/formerly [facility])    • Congenital heart disease    • Diabetes mellitus (CMS/HCC)    • Esophageal reflux    • Essential hypertension    • HLD (hyperlipidemia)    • Hypotension    • LVH (left ventricular hypertrophy)    • Pleural effusion    • Pulmonary hypertension (CMS/formerly [facility])      Past Surgical History:  Past Surgical History:   Procedure Laterality Date   • AORTIC VALVE REPAIR/REPLACEMENT  11/14/2009    Jerry Colmenares MD   • CATARACT EXTRACTION     • KNEE ARTHROPLASTY        Allergies:  Allergies   Allergen Reactions   • Promethazine Other (See Comments)     Pt becomes \"out of it\" when on this medication  Pt becomes \"out of it\" when on this medication     Home Meds:  Facility-Administered Medications Prior to Admission   Medication Dose Route Frequency Provider Last Rate Last Dose   • cyanocobalamin injection 1,000 mcg  1,000 mcg Intramuscular Q28 Days Gustavo Jackman Jr., MD   1,000 mcg at 01/11/19 1640     Medications Prior to Admission   Medication Sig Dispense Refill Last Dose   • levothyroxine (SYNTHROID, LEVOTHROID) 150 MCG tablet TAKE 1 TABLET BY MOUTH EVERY DAY (Patient taking differently: Take 75 mcg by mouth Daily.) 30 tablet 2 Patient Taking Differently at Unknown time   • warfarin (COUMADIN) 3 MG tablet Take 3 mg daily by mouth Tuesday through Sunday.  Do not take Monday (Patient taking differently: Take 3 mg daily by mouth Tuesday Wednesday Friday Saturday and Sunday.  Monday and Thursday patient to take 1.5 mg) 90 tablet 1 Patient Taking Differently at Unknown time   • acetaminophen (TYLENOL) 500 MG tablet Take  by mouth As Needed.   Taking   • ascorbic acid (VITAMIN C) 250 MG chewable tablet chewable tablet Chew 500 mg Daily.   Taking   • atorvastatin (LIPITOR) 40 MG tablet TAKE 1 TABLET BY MOUTH EVERY DAY 30 tablet 5    • " Calcium Citrate-Vitamin D 250-200 MG-UNIT tablet Take  by mouth 2 (Two) Times a Day.   Taking   • Clobetasol Propionate Emulsion 0.05 % topical foam APPLY SPARINGLY TO SCALP TWICE DAILY X 2 WEEKS. THEN AS NEEDED FOR FLARES  1 Taking   • clopidogrel (PLAVIX) 75 MG tablet TAKE 1 TABLET BY MOUTH EVERY DAY 30 tablet 2    • furosemide (LASIX) 40 MG tablet TAKE 1 TABLET BY MOUTH THREE TIMES A DAY 90 tablet 1    • hydrocortisone (ANUSOL-HC) 2.5 % rectal cream Insert  into the rectum 2 (Two) Times a Day. 28.35 g 2    • isosorbide mononitrate (IMDUR) 60 MG 24 hr tablet TAKE 1 TABLET BY MOUTH EVERY DAY 90 tablet 1    • ketoconazole (NIZORAL) 2 % shampoo WASH SCALP AND 2-3 TIMES A WEEK. LEAVE ON FOR 2-3 MINUTES THEN RINSE.  5 Taking   • lisinopril (PRINIVIL,ZESTRIL) 10 MG tablet TAKE 1 TABLET BY MOUTH EVERY DAY 30 tablet 5    • metoprolol tartrate (LOPRESSOR) 50 MG tablet TAKE 1 TABLET BY MOUTH 2 (TWO) TIMES DAILY 180 tablet 2 Taking   • Multiple Vitamin (MULTIVITAMIN) tablet Take 1 tablet by mouth Daily.   Taking   • omeprazole (priLOSEC) 20 MG capsule Take 20 mg by mouth Daily.   Taking   • Phenylephrine-Witch Hazel (PREPARATION H) 0.25-50 % gel Insert 1 g into the rectum 4 (Four) Times a Day As Needed (rectal burning). 25 g 2    • potassium chloride (MICRO-K) 10 MEQ CR capsule TAKE 2 CAPSULES BY MOUTH 2 (TWO) TIMES A DAY. 120 capsule 5      Current Meds:   Scheduled     Medication Dose/Rate, Route, Frequency Last Action   cefTRIAXone (ROCEPHIN) IVPB 1 g 1 g, IV, Q24H Ordered   levothyroxine (SYNTHROID, LEVOTHROID) tablet 75 mcg 75 mcg, PO, QAM Ordered      Continuous     Medication Dose/Rate, Route, Frequency Last Action   niCARdipine (CARDENE) 25 mg in 250 mL NS (0.1 mg/mL) infusion kit 7.5 mg/hr, IV, Titrated Rate/Dose Change: 08/03 1044      PRN     Medication Dose/Rate, Route, Frequency Last Action   sodium chloride 0.9 % flush 10 mL 10 mL, IV, PRN Ordered          Social History:   Social History     Tobacco Use   •  "Smoking status: Never Smoker   • Smokeless tobacco: Never Used   Substance Use Topics   • Alcohol use: Yes     Comment: ocasional      Family History:  Family History   Problem Relation Age of Onset   • Dementia Sister    • Stroke Brother         Review of Systems  Constitutional: No wt loss, fever   Gastrointestinal: No nausea , abdominal pain  Behavioral/Psych: No insomnia or anxiety   Cardiovascular ----positive for sob. All other systems reviewed and are negative                 Physical Exam  /67   Pulse 69   Temp 97.4 °F (36.3 °C) (Oral)   Resp 19   Ht 152.4 cm (60\")   Wt 66.2 kg (145 lb 15.1 oz)   SpO2 93%   BMI 28.50 kg/m²     General appearance: No acute changes   Eyes: Sclera conjunctiva normal, pupils reactive   HENT: Atraumatic; oropharynx clear with moist mucous membranes and no mucosal ulcerations;  Neck: Trachea midline; NECK, supple, no thyromegaly or lymphadenopathy   Lungs: Normal size and shape, normal breath sounds, equal distribution of air, no rales and rhonchi   CV: S1-S2 regular, no murmurs, no rub, no gallop   Abdomen: Soft, non-tender; no masses , no abnormal abdominal sounds   Extremities: No deformity , normal color , no peripheral edema   Skin: Normal temperature, turgor and texture; no rash, ulcers  Psych: Appropriate affect, alert and oriented to person, place and time                   Cardiographics  ECG:           Telemetry:    Echocardiogram:   2017  Interpretation Summary     · Calculated EF = 46.9%.  · Left ventricular systolic function is low normal.  · Left amd right atrial cavity size is severely dilated.  · Right ventricular cavity is severely dilated.  · calcification of the aortic valve  · Severe mitral valve regurgitation is present  · Mitral annular calcification is present. Posterior leaflet very calcified and immobile.  · Severe tricuspid valve regurgitation is present.  · Estimated right ventricular systolic pressure from tricuspid regurgitation is " markedly elevated (>55 mmHg). Calculated right ventricular systolic pressure from tricuspid regurgitation is 66.7 mmHg.       Imaging  Chest X-ray:   Study Result     EXAMINATION: SINGLE VIEW CHEST RADIOGRAPH     HISTORY: 86-year-old female with history of generalized weakness.     FINDINGS: An upright AP portable chest radiograph was obtained.  Comparison is made to a chest radiograph dated 06/10/2017. The lungs are  normal in volume and are clear of consolidation. Stable mild scattered  interstitial prominence is seen throughout both lungs. Stable soft  tissue fullness in the right hilum is most consistent with a prominent  right hilar vessel is noted. There are stable cardiomegaly. Midline  sternal wires are noted. Atheromatous consultation seen within the  aortic arch.     IMPRESSION:  There is stable mild bilateral interstitial scarring and  cardiomegaly with findings suggestive of pulmonary hypertension. No  evidence for superimposed acute process is appreciated.     This report was finalized on 8/3/2020 10:55 AM by Dr. Estuardo Wagner M.D.        CT    Study Result     CT SCAN OF THE HEAD WITHOUT CONTRAST     CLINICAL HISTORY: Generalized weakness and confusion.     TECHNIQUE: CT scan of the head was obtained with 3 mm axial images. No  intravenous contrast was administered.     COMPARISON: Comparison is made to previous CT scan of head dated  06/13/2017.     FINDINGS: There is increased density within the dependent aspects of  both lateral ventricles compatible with intraventricular hemorrhage.  There are foci of increased density noted within the inferior aspect of  the interhemispheric fissure worrisome for subarachnoid hemorrhage. The  etiology of these areas of hemorrhage is uncertain, although a vascular  etiology such as an aneurysm should be excluded. I recommend further  evaluation with an MRA or CTA examination, as well as an MRI of the  brain.     There are multiple chronic infarcts identified  within both cerebellar  hemispheres, left greater than right. The largest of these chronic  infarcts measures up to approximately 1.4 x 0.4 cm in greatest axial  dimensions. Old lacunar disease is seen within the left thalamus. A  chronic lacune identified within the anterior aspect of the left  thalamus measuring up to 4-5 mm in diameter. Hypodense areas are  identified within the lentiform nuclei bilaterally that are likely  representative of a combination of enlarged perivascular spaces and old  lacunar disease. Severe changes of chronic small vessel ischemic  phenomena are identified.     Otherwise, the ventricles, sulci, and cisterns are age appropriate.     IMPRESSION:     There is intraventricular hemorrhage identified within the dependent  aspects of both lateral ventricles. Additionally, there is increased  density seen within the inferior aspect of the interhemispheric fissure  suggestive of subarachnoid hemorrhage. The precise etiology of this  hemorrhage is uncertain on the basis of this head CT. However, a  vascular etiology should be excluded. Further evaluation is recommended  with a CTA or MRA study, as well as an MRI of the brain.     These findings and recommendations were discussed with Debbie Wilson on 08/03/2020 at approximately 8:36 AM.     Radiation dose reduction techniques were utilized, including automated  exposure control and exposure modulation based on body size.               Lab Review   Results from last 7 days   Lab Units 08/03/20  0804   TROPONIN T ng/mL <0.010         Results from last 7 days   Lab Units 08/03/20  0804   SODIUM mmol/L 134*   POTASSIUM mmol/L 3.8   BUN mg/dL 9   CREATININE mg/dL 0.81   CALCIUM mg/dL 9.1        Results from last 7 days   Lab Units 08/03/20  0804   WBC 10*3/mm3 9.11   HEMOGLOBIN g/dL 12.7   HEMATOCRIT % 38.5   PLATELETS 10*3/mm3 244     Results from last 7 days   Lab Units 08/03/20  0804   INR  2.43*   APTT seconds 32.7     The following  medical decision was discussed in detail with Dr. Mcmahan    Assessment:  1.  Intracerebral hemorrhage  2.  chronic Atrial fibrillation  3.  Chronic anticoagulation with Coumadin  4.  Bioprosthetic aortic valve replaced in 2009    5. coagulopathy  3.  UTI  4.  Diabetes mellitus  5.  Hypothyroidism  6. CAD  7. Hypertension     Recommendations / Plan:   Jihan Teresa is a 86-year-old female, who is new to our practice. We were consulted for atrial fibrillation and anticoagulated with coumadin as outpatient.  Currently Coumadin is on hold due to intracerebral hemorrhage.  Per neurosurgery they are awaiting MRI but thinks her confusion is more likely due to her UTI.  Troponin negative x1.  EKG on admission showed atrial fib with nonspecific ST depression which were similar to previous EKG in 2017.  Currently her heart rate is controlled, with atrial fib on monitor.  Agree with Cardene drip and labetalol IV as needed for blood pressure blood pressure, currently stable. Will resume home cardiac meds once able.        Due to bleed, Coumadin is on hold for atrial fibrillation. Troponin's. Bmp and mag in a.m.    Reyna Tolbert, APRN  8/3/2020, 14:14    Patient personally interviewed and above subjective findings personally confirmed during a face to face contact with patient today  All findings of physical examination confirmed  All pertinent and performed labs, cardiac procedures ,  radiographs of the last 24 hours personally reviewed  Impression and plans discussed/elaborated and implemented jointly as described above     Bogdan Mcmahan MD            EMR Travelatus/Transcription:   Dictated utilizing Dragon dictation

## 2020-08-03 NOTE — PROGRESS NOTES
Jihan Teresa is a 86 y.o. female presenting to [facility] for ICH (intracerebral hemorrhage) (CMS/[facility]) [I61.9]  Acute UTI [N39.0]  Nontraumatic intracerebral hemorrhage, unspecified cerebral location, unspecified laterality (CMS/HCC) [I61.9]    She  has a past medical history of Anemia, Aortic valve stenosis, Asthma, Atelectasis, CHF (congestive heart failure) (CMS/[facility]), Congenital heart disease, Diabetes mellitus (CMS/[facility]), Esophageal reflux, Essential hypertension, HLD (hyperlipidemia), Hypotension, LVH (left ventricular hypertrophy), Pleural effusion, and Pulmonary hypertension (CMS/[facility]).    Allergies as of 08/03/2020 - Reviewed 08/03/2020   Allergen Reaction Noted   • Promethazine Other (See Comments) 03/23/2015       Medication information was obtained from:   Pharmacy   Pharmacy and Phone Number:   [facility] pharmacy- [phone]  Prior to Admission Medications     Prescriptions Last Dose Informant Patient Reported? Taking?    isosorbide mononitrate (IMDUR) 60 MG 24 hr tablet   No Yes    TAKE 1 TABLET BY MOUTH EVERY DAY    levothyroxine (SYNTHROID, LEVOTHROID) 150 MCG tablet Patient Taking Differently Child No Yes    TAKE 1 TABLET BY MOUTH EVERY DAY    Patient taking differently:  Take 75 mcg by mouth Daily.    warfarin (COUMADIN) 3 MG tablet Patient Taking Differently  No Yes    Take 3 mg daily by mouth Tuesday through Sunday.  Do not take Monday    Patient taking differently:  Take 3 mg daily by mouth Tuesday Wednesday Friday Saturday and Sunday.  Monday and Thursday patient to take 1.5 mg    acetaminophen (TYLENOL) 500 MG tablet   Yes No    Take  by mouth As Needed.    ascorbic acid (VITAMIN C) 250 MG chewable tablet chewable tablet   Yes No    Chew 500 mg Daily.    atorvastatin (LIPITOR) 40 MG tablet  Pharmacy No No    TAKE 1 TABLET BY MOUTH EVERY DAY    Patient taking differently:  Take 40 mg by mouth Daily.    Calcium Citrate-Vitamin D 250-200 MG-UNIT tablet   Yes No    Take  by mouth 2  (Two) Times a Day.    Clobetasol Propionate Emulsion 0.05 % topical foam   Yes No    APPLY SPARINGLY TO SCALP TWICE DAILY X 2 WEEKS. THEN AS NEEDED FOR FLARES    clopidogrel (PLAVIX) 75 MG tablet  Pharmacy No No    TAKE 1 TABLET BY MOUTH EVERY DAY    Patient taking differently:  Take 75 mg by mouth Daily.    cyanocobalamin injection 1,000 mcg   No No    furosemide (LASIX) 40 MG tablet  Pharmacy No No    TAKE 1 TABLET BY MOUTH THREE TIMES A DAY    Patient taking differently:  Take 40 mg by mouth 3 (Three) Times a Day.    hydrocortisone (ANUSOL-HC) 2.5 % rectal cream   No No    Insert  into the rectum 2 (Two) Times a Day.    ketoconazole (NIZORAL) 2 % shampoo   Yes No    WASH SCALP AND 2-3 TIMES A WEEK. LEAVE ON FOR 2-3 MINUTES THEN RINSE.    lisinopril (PRINIVIL,ZESTRIL) 10 MG tablet  Pharmacy No No    TAKE 1 TABLET BY MOUTH EVERY DAY    Patient taking differently:  Take 10 mg by mouth Daily.    metoprolol tartrate (LOPRESSOR) 50 MG tablet  Pharmacy No No    TAKE 1 TABLET BY MOUTH 2 (TWO) TIMES DAILY    Patient taking differently:  Take 50 mg by mouth 2 (Two) Times a Day.    Multiple Vitamin (MULTIVITAMIN) tablet   Yes No    Take 1 tablet by mouth Daily.    omeprazole (priLOSEC) 20 MG capsule   Yes No    Take 20 mg by mouth Daily.    Phenylephrine-Witch Hazel (PREPARATION H) 0.25-50 % gel   No No    Insert 1 g into the rectum 4 (Four) Times a Day As Needed (rectal burning).    potassium chloride (MICRO-K) 10 MEQ CR capsule  Pharmacy No No    TAKE 2 CAPSULES BY MOUTH 2 (TWO) TIMES A DAY.    Patient taking differently:  Take 20 mEq by mouth 2 (Two) Times a Day.            Medication notes:   RN said she talked to the patient's daughter and confirmed Warfarin and levothyroxine doses.     This medication list is complete to the best of my knowledge as of 8/3/2020    Please call if questions.    Lyle Pappas PharmD. Candidate   8/3/2020 15:09

## 2020-08-04 PROBLEM — I61.0 NONTRAUMATIC SUBCORTICAL HEMORRHAGE OF LEFT CEREBRAL HEMISPHERE (HCC): Status: ACTIVE | Noted: 2020-01-01

## 2020-08-04 NOTE — PLAN OF CARE
Problem: Patient Care Overview  Goal: Plan of Care Review  Flowsheets (Taken 8/4/2020 0282)  Outcome Summary: Pt is 87 y/o female admitted secondary to intracerebral hemmorhage. Pt is disoriented to situation and time, is an unreliable historian, and struggles following commands. She required mod a x2 to sit EOB and to stand up. Pt is very weak and unsteady on her feet, requiring mod a x2 and FWW to maintain static standing balance. Pt was able to perform a few weight shifts and standing marches before ultimately taking 4-5 side steps towards the head of the bed. Pt requires skilled PT to improve her strength and balance. Based on her current status, discharge is recommended to SNU. (Pended)  Note:   Patient was wearing a face mask during this therapy encounter. Therapist used appropriate personal protective equipment including eye protection, mask, and gloves.  Mask used was standard procedure mask. Appropriate PPE was worn during the entire therapy session. Hand hygiene was completed before and after therapy session. Patient is not in enhanced droplet precautions.

## 2020-08-04 NOTE — PROGRESS NOTES
"[facility] P[facility]         Dr Crawford Sayied   LOS: 1 day   Patient Care Team:  Yaron Ca Jr., MD as PCP - General (Family Medicine)  Yaron Ca Jr., MD as PCP - Claims Attributed    Chief Complaint: Intracerebral hemorrhage A. fib coagulopathy UTI    Interval History: Awake following simple commands.  No overnight issues reported.  Denies any complaints.  Denies any headache etc.    REVIEW OF SYSTEMS:   CARDIOVASCULAR: No chest pain, chest pressure or chest discomfort. No palpitations or edema.   RESPIRATORY: No shortness of breath, cough or sputum.   GASTROINTESTINAL: No anorexia, nausea, vomiting or diarrhea. No abdominal pain or blood.   HEMATOLOGIC: No bleeding or bruising.     Ventilator/Non-Invasive Ventilation Settings (From admission, onward)    None            Vital Signs  Temp:  [97.4 °F (36.3 °C)-98 °F (36.7 °C)] 97.6 °F (36.4 °C)  Heart Rate:  [57-83] 83  Resp:  [14-19] 19  BP: (113-207)/() 144/51    Intake/Output Summary (Last 24 hours) at 8/4/2020 0928  Last data filed at 8/4/2020 0500  Gross per 24 hour   Intake 1579 ml   Output 1950 ml   Net -371 ml     Flowsheet Rows      First Filed Value   Admission Height  152.4 cm (60\") Documented at 08/03/2020 1133   Admission Weight  68 kg (149 lb 14.4 oz) Documented at 08/03/2020 0857          Physical Exam:  Patient is examined using the personal protective equipment as per guidelines from infection control for this particular patient as enacted.  Hand hygiene was performed before and after patient interaction.   General Appearance:    Alert, cooperative, in no acute distress.  Following simple commands  Neck midline trachea no thyromegaly   Lungs:     Clear to auscultation,respirations regular, even and                  unlabored    Heart:    Regular rhythm and normal rate, normal S1 and S2, no            murmur, no gallop, no rub, no click   Chest Wall:    No abnormalities observed   Abdomen:     Normal bowel sounds, no masses, no " organomegaly, soft        non-tender, non-distended, no guarding, no rebound                tenderness   Extremities:   Moves all extremities well, no edema, no cyanosis, no             redness  CNS no focal neurological deficits normal sensory exam  Skin no rashes no nodules  Musculoskeletal no cyanosis no clubbing normal range of motion     Results Review:        Results from last 7 days   Lab Units 08/03/20  0804   SODIUM mmol/L 134*   POTASSIUM mmol/L 3.8   CHLORIDE mmol/L 99   CO2 mmol/L 29.0   BUN mg/dL 9   CREATININE mg/dL 0.81   GLUCOSE mg/dL 126*   CALCIUM mg/dL 9.1     Results from last 7 days   Lab Units 08/03/20  0804   TROPONIN T ng/mL <0.010     Results from last 7 days   Lab Units 08/03/20  0804   WBC 10*3/mm3 9.11   HEMOGLOBIN g/dL 12.7   HEMATOCRIT % 38.5   PLATELETS 10*3/mm3 244     Results from last 7 days   Lab Units 08/03/20  2342 08/03/20  1541 08/03/20  0804   INR  1.21* 1.26* 2.43*   APTT seconds  --   --  32.7         Results from last 7 days   Lab Units 08/04/20  0820   MAGNESIUM mg/dL 2.5*               I reviewed the patient's new clinical results.  I personally viewed and interpreted the patient's CXR        Medication Review:     cefTRIAXone 1 g Intravenous Q24H   levothyroxine 75 mcg Oral QAM         niCARdipine 5-15 mg/hr Last Rate: Stopped (08/03/20 1600)       ASSESSMENT:   Intracerebral hemorrhage  Coagulopathy  A. fib  UTI  Diabetes mellitus  Hypothyroidism    PLAN:  MRI results noted.  Will await input from neurosurgery.  Blood pressure management keep systolic less than 140  A. fib currently on rate control.  Cardiology currently following.  Currently holding anticoagulation  Rocephin for UTI.  Urine culture consistent with E. coli.  Continue Rocephin for now.  De-escalate based on cultures  Blood glucose management per ICU protocol  Diet to be advanced if okay with neurosurgery  Transfer out of the ICU once cleared by neurosurgery    Yvette Evans MD  08/04/20  09:28

## 2020-08-04 NOTE — PROGRESS NOTES
Discharge Planning Assessment  [facility]     Patient Name: Jihan Teresa  MRN: [id number]  Today's Date: 8/4/2020    Admit Date: 8/3/2020    Discharge Needs Assessment     Row Name 08/04/20 1417       Living Environment    Lives With  child(david), adult    Current Living Arrangements  home/apartment/condo    Potentially Unsafe Housing Conditions  unable to assess    Primary Care Provided by  self;child(david)    Provides Primary Care For  no one    Family Caregiver if Needed  child(david), adult    Quality of Family Relationships  supportive    Able to Return to Prior Arrangements  yes       Resource/Environmental Concerns    Resource/Environmental Concerns  none    Transportation Concerns  car, none       Transition Planning    Patient/Family Anticipates Transition to  home with family    Patient/Family Anticipated Services at Transition  none    Transportation Anticipated  family or friend will provide       Discharge Needs Assessment    Concerns to be Addressed  discharge planning    Equipment Currently Used at Home  cane, straight;walker, rolling;bath bench;wheelchair    Anticipated Changes Related to Illness  none    Equipment Needed After Discharge  none        Discharge Plan     Row Name 08/04/20 1416       Plan    Plan  Home with daughter Dione    Plan Comments  IMM noted.  CCP spoke to patient at bedside to discuss discharge planning.  Pt emergency contact is her daughter Dione, [phone].  Pt PCP is Dr. Yaron Ca.  Pt lives in a house with her daughter Dione.  She uses a rolling walker  to ambulate.  She is independent with ADL's.  She has no past history of Home Health.  She has not been to rehab.  Pt obtains her medications from [facility] on [address].   Pt has no difficulty paying for medications.    Plan is home.  CCP following           Destination      Coordination has not been started for this encounter.      Durable Medical Equipment      Coordination has not been started for this  encounter.      Dialysis/Infusion      Coordination has not been started for this encounter.      Home Medical Care      Coordination has not been started for this encounter.      Therapy      Coordination has not been started for this encounter.      Community Resources      Coordination has not been started for this encounter.          Demographic Summary    No documentation.       Functional Status    No documentation.       Psychosocial    No documentation.       Abuse/Neglect    No documentation.       Legal    No documentation.       Substance Abuse    No documentation.       Patient Forms    No documentation.           Mera Boles RN

## 2020-08-04 NOTE — PROGRESS NOTES
"[facility]  Cardiology Progress Note    Patient Identification:  Name: Jihan Teresa  Age: 86 y.o.  Sex: female  :  1933  MRN: [id number]                 Follow Up / Chief Complaint: Atrial fibrillation with ICH    Interval History:           Subjective:  No chest pains or tightness in the chest      Objective:    Past Medical History:  Past Medical History:   Diagnosis Date   • Anemia    • Aortic valve stenosis     S/p AVR in    • Asthma    • Atelectasis    • CHF (congestive heart failure) (CMS/HCC)    • Congenital heart disease    • Diabetes mellitus (CMS/HCC)    • Esophageal reflux    • Essential hypertension    • HLD (hyperlipidemia)    • Hypotension    • LVH (left ventricular hypertrophy)    • Pleural effusion    • Pulmonary hypertension (CMS/HCC)      Past Surgical History:  Past Surgical History:   Procedure Laterality Date   • AORTIC VALVE REPAIR/REPLACEMENT  2009    Jerry Colmenares MD   • CATARACT EXTRACTION     • KNEE ARTHROPLASTY          Social History:   Social History     Tobacco Use   • Smoking status: Never Smoker   • Smokeless tobacco: Never Used   Substance Use Topics   • Alcohol use: Yes     Comment: ocasional      Family History:  Family History   Problem Relation Age of Onset   • Dementia Sister    • Stroke Brother           Allergies:  Allergies   Allergen Reactions   • Promethazine Other (See Comments)     Pt becomes \"out of it\" when on this medication  Pt becomes \"out of it\" when on this medication     Scheduled Meds:    cefTRIAXone 1 g Q24H   isosorbide mononitrate 60 mg Daily   levothyroxine 75 mcg QAM   metoprolol tartrate 25 mg Q12H           INTAKE AND OUTPUT:    Intake/Output Summary (Last 24 hours) at 2020 1443  Last data filed at 2020 0500  Gross per 24 hour   Intake 379 ml   Output 1950 ml   Net -1571 ml       ROS  Constitutional: Awake and alert, no fever. No nosebleeds  Abdomen           no abdominal pain   Cardiac              no chest " "pain  Pulmonary          no shortness of breath      /93   Pulse 67   Temp 97.6 °F (36.4 °C) (Oral)   Resp 19   Ht 152.4 cm (60\")   Wt 66.2 kg (145 lb 15.1 oz)   SpO2 93%   BMI 28.50 kg/m²   General appearance: No acute changes   Neck: Trachea midline; NECK, supple, no thyromegaly or lymphadenopathy   Lungs: Normal size and shape, normal breath sounds, equal distribution of air, no rales and rhonchi   CV: S1-S2 regular, no murmurs, no rub, no gallop   Abdomen: Soft, non-tender; no masses , no abnormal abdominal sounds   Extremities: No deformity , normal color , no peripheral edema   Skin: Normal temperature, turgor and texture; no rash, ulcers            Cardiographics  Telemetry:       Atrial fibrillation      Echocardiogram:   ECG:          Echocardiogram:   2017  Interpretation Summary      · Calculated EF = 46.9%.  · Left ventricular systolic function is low normal.  · Left amd right atrial cavity size is severely dilated.  · Right ventricular cavity is severely dilated.  · calcification of the aortic valve  · Severe mitral valve regurgitation is present  · Mitral annular calcification is present. Posterior leaflet very calcified and immobile.  · Severe tricuspid valve regurgitation is present.  · Estimated right ventricular systolic pressure from tricuspid regurgitation is markedly elevated (>55 mmHg). Calculated right ventricular systolic pressure from tricuspid regurgitation is 66.7 mmHg.         Imaging  Chest X-ray:   Study Result      EXAMINATION: SINGLE VIEW CHEST RADIOGRAPH     HISTORY: 86-year-old female with history of generalized weakness.     FINDINGS: An upright AP portable chest radiograph was obtained.  Comparison is made to a chest radiograph dated 06/10/2017. The lungs are  normal in volume and are clear of consolidation. Stable mild scattered  interstitial prominence is seen throughout both lungs. Stable soft  tissue fullness in the right hilum is most consistent with a " prominent  right hilar vessel is noted. There are stable cardiomegaly. Midline  sternal wires are noted. Atheromatous consultation seen within the  aortic arch.     IMPRESSION:  There is stable mild bilateral interstitial scarring and  cardiomegaly with findings suggestive of pulmonary hypertension. No  evidence for superimposed acute process is appreciated.     This report was finalized on 8/3/2020 10:55 AM by Dr. Estuardo Wagner M.D.         CT     Study Result      CT SCAN OF THE HEAD WITHOUT CONTRAST     CLINICAL HISTORY: Generalized weakness and confusion.     TECHNIQUE: CT scan of the head was obtained with 3 mm axial images. No  intravenous contrast was administered.     COMPARISON: Comparison is made to previous CT scan of head dated  06/13/2017.     FINDINGS: There is increased density within the dependent aspects of  both lateral ventricles compatible with intraventricular hemorrhage.  There are foci of increased density noted within the inferior aspect of  the interhemispheric fissure worrisome for subarachnoid hemorrhage. The  etiology of these areas of hemorrhage is uncertain, although a vascular  etiology such as an aneurysm should be excluded. I recommend further  evaluation with an MRA or CTA examination, as well as an MRI of the  brain.     There are multiple chronic infarcts identified within both cerebellar  hemispheres, left greater than right. The largest of these chronic  infarcts measures up to approximately 1.4 x 0.4 cm in greatest axial  dimensions. Old lacunar disease is seen within the left thalamus. A  chronic lacune identified within the anterior aspect of the left  thalamus measuring up to 4-5 mm in diameter. Hypodense areas are  identified within the lentiform nuclei bilaterally that are likely  representative of a combination of enlarged perivascular spaces and old  lacunar disease. Severe changes of chronic small vessel ischemic  phenomena are identified.     Otherwise, the  ventricles, sulci, and cisterns are age appropriate.     IMPRESSION:     There is intraventricular hemorrhage identified within the dependent  aspects of both lateral ventricles. Additionally, there is increased  density seen within the inferior aspect of the interhemispheric fissure  suggestive of subarachnoid hemorrhage. The precise etiology of this  hemorrhage is uncertain on the basis of this head CT. However, a  vascular etiology should be excluded. Further evaluation is recommended  with a CTA or MRA study, as well as an MRI of the brain.     These findings and recommendations were discussed with Debbie Wilson on 08/03/2020 at approximately 8:36 AM.     Radiation dose reduction techniques were utilized, including automated  exposure control and exposure modulation based on body size.              Lab Review   Results from last 7 days   Lab Units 08/04/20  0934 08/03/20  0804   TROPONIN T ng/mL <0.010 <0.010     Results from last 7 days   Lab Units 08/04/20  0820   MAGNESIUM mg/dL 2.5*     Results from last 7 days   Lab Units 08/04/20  0934   SODIUM mmol/L 141   POTASSIUM mmol/L 3.7   BUN mg/dL 8   CREATININE mg/dL 0.73   CALCIUM mg/dL 9.8        Results from last 7 days   Lab Units 08/04/20  0934 08/03/20  0804   WBC 10*3/mm3 12.24* 9.11   HEMOGLOBIN g/dL 14.1 12.7   HEMATOCRIT % 44.5 38.5   PLATELETS 10*3/mm3 268 244     Results from last 7 days   Lab Units 08/04/20  0934 08/03/20  2342 08/03/20  1541 08/03/20  0804   INR  1.24* 1.21* 1.26* 2.43*   APTT seconds  --   --   --  32.7     The following medical decision was discussed in detail with Dr. Mcmahan    Assessment:  1. Intracerebral hemorrhage  2.  Chronic atrial fibrillation: Coumadin on hold due to bleeding.  3.  Bioprosthetic aortic valve replaced in 2009  5. coagulopathy  6.  UTI  7.  Diabetes mellitus  8. Hypothyroidism  9. CAD  10. Hypertension    Plan:  Atrial fibrillation on monitor, heart rate remains controlled.  PO cardiac  "medications have been resumed.  Per MRI report prominent diffuse atrophy and extensive chronic small vessel ischemic change.  Small areas of globular hemorrhage that appear to be intra-parenchyma, and extending anterior to the third ventricle in the region of the anterior communicating artery, see full report as above. Will defer to neurosurgery. Patient off anticoagulation due to bleeding.  Will resume alternative resources when safe, such as aspirin versus watchman's.       We will discuss with the colleagues as well as family for the course of anticoagulation/watchman    )8/4/2020  MD DAVE Murphy/Transcription:   \"Dictated utilizing Dragon dictation\".     "

## 2020-08-04 NOTE — THERAPY EVALUATION
Patient Name: Jihan Teresa  : 1933    MRN: [id number]                              Today's Date: 2020       Admit Date: 8/3/2020    Visit Dx:     ICD-10-CM ICD-9-CM   1. Nontraumatic intracerebral hemorrhage, unspecified cerebral location, unspecified laterality (CMS/[facility]) I61.9 431   2. Acute UTI N39.0 599.0     Patient Active Problem List   Diagnosis   • Type 2 diabetes mellitus (CMS/[facility])   • Aortic valve replaced, equine valve   • Cerebral infarction (CMS/[facility])   • A-fib (CMS/[facility])   • GERD without esophagitis   • Sleep apnea in adult   • Cognitive dysfunction   • Hypothyroidism (acquired)   • History of recurrent UTIs   • Mild reactive airways disease   • Essential hypertension   • Pulmonary hypertension (CMS/[facility])   • B12 deficiency   • Non-rheumatic mitral regurgitation   • Non-rheumatic tricuspid valve insufficiency   • Chronic anemia   • Grade III hemorrhoids   • ICH (intracerebral hemorrhage) (CMS/[facility])     Past Medical History:   Diagnosis Date   • Anemia    • Aortic valve stenosis     S/p AVR in    • Asthma    • Atelectasis    • CHF (congestive heart failure) (CMS/[facility])    • Congenital heart disease    • Diabetes mellitus (CMS/[facility])    • Esophageal reflux    • Essential hypertension    • HLD (hyperlipidemia)    • Hypotension    • LVH (left ventricular hypertrophy)    • Pleural effusion    • Pulmonary hypertension (CMS/[facility])      Past Surgical History:   Procedure Laterality Date   • AORTIC VALVE REPAIR/REPLACEMENT  2009    Jerry Colmenares MD   • CATARACT EXTRACTION     • KNEE ARTHROPLASTY       General Information     Row Name 20 1436          PT Evaluation Time/Intention    Document Type  evaluation  (Pended)   -AR     Mode of Treatment  individual therapy;physical therapy  (Pended)   -AR     Row Name 20 1436          General Information    Patient Profile Reviewed?  yes  (Pended)   -AR     Prior Level of Function  independent:;ADL's;min assist:;all household mobility   (Pended)   -AR     Existing Precautions/Restrictions  fall  (Pended)   -AR     Row Name 08/04/20 1436          Relationship/Environment    Lives With  child(david), adult  (Pended)   -AR     Row Name 08/04/20 1436          Resource/Environmental Concerns    Current Living Arrangements  home/apartment/condo  (Pended)   -AR     Row Name 08/04/20 1436          Cognitive Assessment/Intervention- PT/OT    Orientation Status (Cognition)  disoriented to;situation;time  (Pended)   -AR     Row Name 08/04/20 1436          Safety Issues, Functional Mobility    Safety Issues Affecting Function (Mobility)  ability to follow commands;insight into deficits/self awareness;problem solving;sequencing abilities  (Pended)   -AR     Impairments Affecting Function (Mobility)  balance;cognition;coordination;endurance/activity tolerance;strength  (Pended)   -AR       User Key  (r) = Recorded By, (t) = Taken By, (c) = Cosigned By    Initials Name Provider Type    Raghu Rajan, PT Student PT Student        Mobility     Row Name 08/04/20 1439          Bed Mobility Assessment/Treatment    Bed Mobility Assessment/Treatment  supine-sit;sit-supine  (Pended)   -AR     Supine-Sit [place] (Bed Mobility)  moderate assist (50% patient effort);2 person assist  (Pended)   -AR     Sit-Supine [place] (Bed Mobility)  moderate assist (50% patient effort);2 person assist  (Pended)   -AR     Assistive Device (Bed Mobility)  draw sheet;head of bed elevated  (Pended)   -AR     Row Name 08/04/20 1439          Sit-Stand Transfer    Sit-Stand [place] (Transfers)  moderate assist (50% patient effort);2 person assist  (Pended)   -AR     Assistive Device (Sit-Stand Transfers)  walker, front-wheeled  (Pended)   -AR     Row Name 08/04/20 1439          Gait/Stairs Assessment/Training    [place] Level (Gait)  moderate assist (50% patient effort);2 person assist  (Pended)   -AR     Assistive Device (Gait)  walker, front-wheeled  (Pended)   -AR      Distance in Feet (Gait)  4-5 side steps  (Pended)   -AR     Comment (Gait/Stairs)  Pt weak and unsteady. Toof 4-5 side steps to the left towards the head of the bed, but hard for pt to follow commands so no walking attempted.  (Pended)   -AR       User Key  (r) = Recorded By, (t) = Taken By, (c) = Cosigned By    Initials Name Provider Type    Raghu Rajan, PT Student PT Student        Obj/Interventions     Row Name 08/04/20 1442          General ROM    GENERAL ROM COMMENTS  BL U/LE WNL  (Pended)   -AR     Row Name 08/04/20 1442          MMT (Manual Muscle Testing)    General MMT Comments  BL U/LE grossly 3/5  (Pended)   -AR     Row Name 08/04/20 1442          Therapeutic Exercise    Lower Extremity (Therapeutic Exercise)  LAQ (long arc quad), bilateral;marching while seated;marching while standing  (Pended)   -AR     Lower Extremity Range of Motion (Therapeutic Exercise)  ankle dorsiflexion/plantar flexion, bilateral  (Pended)   -AR     Position (Therapeutic Exercise)  seated  (Pended)   -AR     Sets/Reps (Therapeutic Exercise)  1x5  (Pended)   -AR     Row Name 08/04/20 1442          Static Sitting Balance    Level of [place] (Unsupported Sitting, Static Balance)  minimal assist, 75% patient effort;1 person assist  (Pended)   -AR     Sitting Position (Unsupported Sitting, Static Balance)  sitting on edge of bed  (Pended)   -AR     Time Able to Maintain Position (Unsupported Sitting, Static Balance)  2 to 3 minutes  (Pended)   -AR     Row Name 08/04/20 1442          Static Standing Balance    Level of [place] (Supported Standing, Static Balance)  moderate assist, 50 to 74% patient effort;2 person assist  (Pended)   -AR     Time Able to Maintain Position (Supported Standing, Static Balance)  1 to 2 minutes  (Pended)   -AR     Assistive Device Utilized (Supported Standing, Static Balance)  walker, rolling  (Pended)   -AR     Row Name 08/04/20 1442          Dynamic Standing Balance    Level of [place],  Reaches Outside Midline (Standing, Dynamic Balance)  moderate assist, 50 to 74% patient effort;2 person assist  (Pended)   -AR     Time Able to Maintain Position, Reaches Outside Midline (Standing, Dynamic Balance)  45 to 60 seconds  (Pended)   -AR     Assistive Device Utilized (Supported Standing, Dynamic Balance)  walker, rolling  (Pended)   -AR     Comment, Reaches Outside Midline (Standing, Dynamic Balance)  Side steps towards head of bed  (Pended)   -AR       User Key  (r) = Recorded By, (t) = Taken By, (c) = Cosigned By    Initials Name Provider Type    Raghu Rajan, PT Student PT Student        Goals/Plan     [facility] Name 08/04/20 1451          Bed Mobility Goal 1 (PT)    Activity/Assistive Device (Bed Mobility Goal 1, PT)  bed mobility activities, all  (Pended)   -AR     [place] Level/Cues Needed (Bed Mobility Goal 1, PT)  independent  (Pended)   -AR     Time Frame (Bed Mobility Goal 1, PT)  10 days  (Pended)   -AR     Row Name 08/04/20 1451          Transfer Goal 1 (PT)    Activity/Assistive Device (Transfer Goal 1, PT)  sit-to-stand/stand-to-sit  (Pended)   -AR     [place] Level/Cues Needed (Transfer Goal 1, PT)  contact guard assist  (Pended)   -AR     Time Frame (Transfer Goal 1, PT)  10 days  (Pended)   -AR     Row Name 08/04/20 1451          Gait Training Goal 1 (PT)    Activity/Assistive Device (Gait Training Goal 1, PT)  gait (walking locomotion)  (Pended)   -AR     [place] Level (Gait Training Goal 1, PT)  contact guard assist  (Pended)   -AR     Distance (Gait Goal 1, PT)  50 ft  (Pended)   -AR     Time Frame (Gait Training Goal 1, PT)  10 days  (Pended)   -AR       User Key  (r) = Recorded By, (t) = Taken By, (c) = Cosigned By    Initials Name Provider Type    Raghu Rajan, PT Student PT Student        Clinical Impression     Row Name 08/04/20 1445          Pain Assessment    Additional Documentation  Pain Scale: Numbers Pre/Post-Treatment (Group)  (Pended)   -AR     [facility] Name 08/04/20  1445          Pain Scale: Numbers Pre/Post-Treatment    Pain Scale: Numbers, Pretreatment  0/10 - no pain  (Pended)   -AR     Pain Scale: Numbers, Post-Treatment  0/10 - no pain  (Pended)   -AR     Row Name 08/04/20 1445          Plan of Care Review    Plan of Care Reviewed With  patient  (Pended)   -AR     Progress  improving  (Pended)   -AR     Outcome Summary  Pt is 85 y/o female admitted secondary to intracerebral hemmorhage. Pt is disoriented to situation and time, is an unreliable historian, and struggles following commands. She required mod a x2 to sit EOB and to stand up. Pt is very weak and unsteady on her feet, requiring mod a x2 and FWW to maintain static standing balance. Pt was able to perform a few weight shifts and standing marches before ultimately taking 4-5 side steps towards the head of the bed. Pt requires skilled PT to improve her strength and balance. Based on her current status, discharge is recommended to SNU.  (Pended)   -AR     [place] Name 08/04/20 1440          Physical Therapy Clinical Impression    Criteria for Skilled Interventions Met (PT Clinical Impression)  yes  (Pended)   -AR     Rehab Potential (PT Clinical Summary)  good, to achieve stated therapy goals  (Pended)   -AR     Row Name 08/04/20 1445          Vital Signs    Pre Systolic BP Rehab  168  (Pended)   -AR     Pre Treatment Diastolic BP  74  (Pended)   -AR     Intra Systolic BP Rehab  166  (Pended)   -AR     Intra Treatment Diastolic BP  77  (Pended)   -AR     Pre SpO2 (%)  97  (Pended)   -AR     O2 Delivery Pre Treatment  room air  (Pended)   -AR     O2 Delivery Intra Treatment  room air  (Pended)   -AR     Post SpO2 (%)  98  (Pended)   -AR     Row Name 08/04/20 1448          Positioning and Restraints    Pre-Treatment Position  in bed  (Pended)   -AR     Post Treatment Position  bed  (Pended)   -AR     In Bed  supine;call light within reach;encouraged to call for assist;notified nsg  (Pended)   -AR       User Key  (r) =  Recorded By, (t) = Taken By, (c) = Cosigned By    Initials Name Provider Type    Raghu Rajan, PT Student PT Student        Outcome Measures     Row Name 08/04/20 1453          How much help from another person do you currently need...    Turning from your back to your side while in flat bed without using bedrails?  2  (Pended)   -AR     Moving from lying on back to sitting on the side of a flat bed without bedrails?  2  (Pended)   -AR     Moving to and from a bed to a chair (including a wheelchair)?  2  (Pended)   -AR     Standing up from a chair using your arms (e.g., wheelchair, bedside chair)?  2  (Pended)   -AR     Climbing 3-5 steps with a railing?  1  (Pended)   -AR     To walk in hospital room?  1  (Pended)   -AR     AM-PAC 6 Clicks Score (PT)  10  (Pended)   -AR     Row Name 08/04/20 1457          Functional Assessment    Outcome Measure Options  AM-PAC 6 Clicks Basic Mobility (PT)  (Pended)   -AR       User Key  (r) = Recorded By, (t) = Taken By, (c) = Cosigned By    Initials Name Provider Type    Raghu Rajan, PT Student PT Student        Physical Therapy Education                 Title: PT OT SLP Therapies (Done)     Topic: Physical Therapy (Done)     Point: Mobility training (Done)     Description:   Instruct learner(s) on safety and technique for assisting patient out of bed, chair or wheelchair.  Instruct in the proper use of assistive devices, such as walker, crutches, cane or brace.              Patient Friendly Description:   It's important to get you on your feet again, but we need to do so in a way that is safe for you. Falling has serious consequences, and your personal safety is the most important thing of all.        When it's time to get out of bed, one of us or a family member will sit next to you on the bed to give you support.     If your doctor or nurse tells you to use a walker, crutches, a cane, or a brace, be sure you use it every time you get out of bed, even if you think you don't  need it.    Learning Progress Summary           Patient Acceptance, E, DU,NR by AR at 8/4/2020 1453                   Point: Home exercise program (Done)     Description:   Instruct learner(s) on appropriate technique for monitoring, assisting and/or progressing patient with therapeutic exercises and activities.              Learning Progress Summary           Patient Acceptance, E, DU,NR by AR at 8/4/2020 1453                   Point: Body mechanics (Done)     Description:   Instruct learner(s) on proper positioning and spine alignment for patient and/or caregiver during mobility tasks and/or exercises.              Learning Progress Summary           Patient Acceptance, E, DU,NR by AR at 8/4/2020 1453                   Point: Precautions (Done)     Description:   Instruct learner(s) on prescribed precautions during mobility and gait tasks              Learning Progress Summary           Patient Acceptance, E, DU,NR by AR at 8/4/2020 1453                               User Key     Initials Effective Dates Name Provider Type Discipline    AR 07/02/20 -  Raghu Garcia, PT Student PT Student PT              PT Recommendation and Plan  Planned Therapy Interventions (PT Eval): (P) balance training, bed mobility training, gait training, manual therapy techniques, motor coordination training, patient/family education, stair training, strengthening, transfer training  Outcome Summary/Treatment Plan (PT)  Anticipated Discharge Disposition (PT): (P) skilled nursing facility  Plan of Care Reviewed With: (P) patient  Progress: (P) improving  Outcome Summary: (P) Pt is 87 y/o female admitted secondary to intracerebral hemmorhage. Pt is disoriented to situation and time, is an unreliable historian, and struggles following commands. She required mod a x2 to sit EOB and to stand up. Pt is very weak and unsteady on her feet, requiring mod a x2 and FWW to maintain static standing balance. Pt was able to perform a few weight shifts and  standing marches before ultimately taking 4-5 side steps towards the head of the bed. Pt requires skilled PT to improve her strength and balance. Based on her current status, discharge is recommended to SNU.     Time Calculation:   PT Charges     Row Name 08/04/20 1453             Time Calculation    Start Time  1345  (Pended)   -AR      Stop Time  1413  (Pended)   -AR      Time Calculation (min)  28 min  (Pended)   -AR      PT Received On  08/04/20  (Pended)   -AR      PT - Next Appointment  08/05/20  (Pended)   -AR      PT Goal Re-Cert Due Date  08/11/20  (Pended)   -AR         Time Calculation- PT    Total Timed Code Minutes- PT  23 minute(s)  (Pended)   -AR        User Key  (r) = Recorded By, (t) = Taken By, (c) = Cosigned By    Initials Name Provider Type    Raghu Rajan, PT Student PT Student        Therapy Charges for Today     Code Description Service Date Service Provider Modifiers Qty    12375821051  PT EVAL MOD COMPLEXITY 2 8/4/2020 Raghu Garcia, PT Student GP 1    [id number]  PT THERAPEUTIC ACT EA 15 MIN 8/4/2020 Raghu Garcia, PT Student GP 1    [id number]  PT THER PROC EA 15 MIN 8/4/2020 Raghu Garcia, PT Student GP 1    [id number]  PT THER SUPP EA 15 MIN 8/4/2020 Raghu Garcia, PT Student GP 1          PT G-Codes  Outcome Measure Options: (P) AM-PAC 6 Clicks Basic Mobility (PT)  AM-PAC 6 Clicks Score (PT): (P) 10    Raghu Garcia PT Student  8/4/2020

## 2020-08-04 NOTE — ANESTHESIA PREPROCEDURE EVALUATION
Anesthesia Evaluation     Patient summary reviewed and Nursing notes reviewed   no history of anesthetic complications:  NPO Solid Status: > 6 hours  NPO Liquid Status: > 6 hours           Airway   Mallampati: II  TM distance: >3 FB  Neck ROM: full  no difficulty expected and No difficulty expected  Dental - normal exam     Pulmonary - normal exam    breath sounds clear to auscultation  (+) sleep apnea,   (-) rhonchi, decreased breath sounds, wheezes, rales, stridor  Cardiovascular - normal exam    NYHA Classification: I  Rhythm: regular  Rate: normal    (+) hypertension, valvular problems/murmurs MR and TI, CHF , hyperlipidemia,   (-) murmur, weak pulses, friction rub, systolic click, carotid bruits, JVD, peripheral edema    ROS comment: S/P AVR (tissue) 2009    Neuro/Psych  (+) CVA, dementia,     GI/Hepatic/Renal/Endo    (+)  GERD,  diabetes mellitus type 2, thyroid problem hypothyroidism    Musculoskeletal (-) negative ROS    Abdominal  - normal exam    Abdomen: soft.   Substance History - negative use     OB/GYN negative ob/gyn ROS         Other   blood dyscrasia anemia,                     Anesthesia Plan    ASA 3 - emergent     MAC     intravenous induction     Anesthetic plan, all risks, benefits, and alternatives have been provided, discussed and informed consent has been obtained with: patient.

## 2020-08-05 NOTE — PROGRESS NOTES
NEUROSURGERY PROGRESS NOTE     LOS: 2 days   Patient Care Team:  Yaron Ca Jr., MD as PCP - General (Family Medicine)  Yaron Ca Jr., MD as PCP - Claims Attributed    Chief Complaint:  FU visit for ICH    Subjective     Interval History:     Underwent cerebral angiogram last night. No aneurysm detected. Consulted Neurology to evaluate vasculature and other possible causes for this hemorrhage.     History taken from: chart RN    Objective      Vital Signs  Temp:  [97.4 °F (36.3 °C)-97.9 °F (36.6 °C)] 97.9 °F (36.6 °C)  Heart Rate:  [67-86] 78  Resp:  [18] 18  BP: (126-173)/() 158/72  Body mass index is 27.08 kg/m².      Physical Exam    Drowsy, briefly arouseable.   Moves all four extremities x 4.   R groin site without swelling, drainage or redness.          Results Review:     I reviewed the patient's new clinical results.       CT ANGIOGRAPHY OF THE HEAD AND NECK WITHOUT AND WITH INTRAVENOUS CONTRAST AND 3-D RECONSTRUCTIONS 8/4/2020    The initial CT scan reveals globular intracerebral hemorrhage just anterior to the third ventricle as well as a tiny amount of hemorrhage layering in the occipital horns of the lateral ventricles.  This finding is unchanged from prior head CT.  Moderate carotid siphon calcification but no significant carotid stenosis.  No evidence of aneurysm involving the anterior communicating artery.  Moderate stenosis of the right A2 segment which is new since prior imaging 2017.  No aneurysm identified on this study.    Labs:    Lab Results (last 24 hours)     Procedure Component Value Units Date/Time    Magnesium [[id number]]  (Abnormal) Collected:  08/04/20 0820    Specimen:  Blood Updated:  08/04/20 0922     Magnesium 2.5 mg/dL     Protime-INR [[id number]]  (Abnormal) Collected:  08/04/20 0934    Specimen:  Blood Updated:  08/04/20 1028     Protime 15.4 Seconds      INR 1.24    CBC (No Diff) [[id number]]  (Abnormal) Collected:  08/04/20 0934    Specimen:  Blood Updated:  08/04/20  0958     WBC 12.24 10*3/mm3      RBC 5.09 10*6/mm3      Hemoglobin 14.1 g/dL      Hematocrit 44.5 %      MCV 87.4 fL      MCH 27.7 pg      MCHC 31.7 g/dL      RDW 15.8 %      RDW-SD 50.3 fl      MPV 8.6 fL      Platelets 268 10*3/mm3     Basic Metabolic Panel [[id number]]  (Abnormal) Collected:  08/04/20 0934    Specimen:  Blood Updated:  08/04/20 1100     Glucose 134 mg/dL      BUN 8 mg/dL      Creatinine 0.73 mg/dL      Sodium 141 mmol/L      Potassium 3.7 mmol/L      Chloride 104 mmol/L      CO2 26.7 mmol/L      Calcium 9.8 mg/dL      eGFR Non African Amer 76 mL/min/1.73      BUN/Creatinine Ratio 11.0     Anion Gap 10.3 mmol/L     Narrative:       GFR Normal >60  Chronic Kidney Disease <60  Kidney Failure <15      Troponin [[id number]]  (Normal) Collected:  08/04/20 0934    Specimen:  Blood Updated:  08/04/20 1114     Troponin T <0.010 ng/mL     Narrative:       Troponin T Reference Range:  <= 0.03 ng/mL-   Negative for AMI  >0.03 ng/mL-     Abnormal for myocardial necrosis.  Clinicians would have to utilize clinical acumen, EKG, Troponin and serial changes to determine if it is an Acute Myocardial Infarction or myocardial injury due to an underlying chronic condition.       Results may be falsely decreased if patient taking Biotin.      POC Glucose Once [[id number]]  (Normal) Collected:  08/04/20 1725    Specimen:  Blood Updated:  08/04/20 1728     Glucose 108 mg/dL     POC Glucose Once [[id number]]  (Abnormal) Collected:  08/04/20 2217    Specimen:  Blood Updated:  08/04/20 2221     Glucose 133 mg/dL     C-reactive Protein [[id number]]  (Abnormal) Collected:  08/04/20 2257    Specimen:  Blood Updated:  08/04/20 2336     C-Reactive Protein 1.00 mg/dL     POC Glucose Once [[id number]]  (Normal) Collected:  08/05/20 0753    Specimen:  Blood Updated:  08/05/20 0809     Glucose 116 mg/dL           Current Medications:   Scheduled Meds:  cefTRIAXone 1 g Intravenous Q24H   famotidine 20 mg Intravenous Once   isosorbide  mononitrate 60 mg Oral Daily   levothyroxine 75 mcg Oral QAM   metoprolol tartrate 25 mg Oral Q12H   sodium chloride 10 mL Intravenous Q12H     Continuous Infusions:  lactated ringers 9 mL/hr Last Rate: 9 mL/hr (08/04/20 2301)   niCARdipine 5-15 mg/hr Last Rate: Stopped (08/03/20 1600)       Assessment/Plan       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)    ICH (intracerebral hemorrhage) (CMS/HCC)      Plan:     Appreciate Dr. Pappas's input.    No further workup regarding cause of ICH necessary per Dr. Pappas's note. Recommends warfarin restart. Patient does not need Plavix per Dr. Pappas.   CT head in am.       ADDENDUM:     Contacted nurse this evening by phone who informed me that patient is back to baseline. Still having episodes of confusion. Ate dinner. Should be cleared to move out of ICU tomorrow if head CT ok.     KAREN Velasco  08/05/20  08:14

## 2020-08-05 NOTE — PROGRESS NOTES
"[facility] PULMONARY CARE         Dr Crawford Sayied   LOS: 2 days   Patient Care Team:  Yaron Ca Jr., MD as PCP - General (Family Medicine)  Yaron Ca Jr., MD as PCP - Claims Attributed    Chief Complaint: Intracerebral hemorrhage A. fib coagulopathy UTI    Interval History: Little sleepy this morning.  No overnight issues as such reported.  Denies any complaints today.    REVIEW OF SYSTEMS:   CARDIOVASCULAR: No chest pain, chest pressure or chest discomfort. No palpitations or edema.   RESPIRATORY: No shortness of breath, cough or sputum.   GASTROINTESTINAL: No anorexia, nausea, vomiting or diarrhea. No abdominal pain or blood.   HEMATOLOGIC: No bleeding or bruising.     Ventilator/Non-Invasive Ventilation Settings (From admission, onward)    None            Vital Signs  Temp:  [97.4 °F (36.3 °C)-97.9 °F (36.6 °C)] 97.4 °F (36.3 °C)  Heart Rate:  [67-90] 79  Resp:  [18-20] 20  BP: (126-173)/() 148/86    Intake/Output Summary (Last 24 hours) at 8/5/2020 1208  Last data filed at 8/5/2020 0829  Gross per 24 hour   Intake 155 ml   Output 700 ml   Net -545 ml     Flowsheet Rows      First Filed Value   Admission Height  152.4 cm (60\") Documented at 08/03/2020 1133   Admission Weight  68 kg (149 lb 14.4 oz) Documented at 08/03/2020 0857          Physical Exam:  Patient is examined using the personal protective equipment as per guidelines from infection control for this particular patient as enacted.  Hand hygiene was performed before and after patient interaction.   General Appearance:    Alert, cooperative, in no acute distress.  Following simple commands  Neck midline trachea no thyromegaly   Lungs:     Clear to auscultation,respirations regular, even and                  unlabored    Heart:    Regular rhythm and normal rate, normal S1 and S2, no            murmur, no gallop, no rub, no click   Chest Wall:    No abnormalities observed   Abdomen:     Normal bowel sounds, no masses, no organomegaly, " soft        non-tender, non-distended, no guarding, no rebound                tenderness   Extremities:   Moves all extremities well, no edema, no cyanosis, no             redness  CNS no focal neurological deficits normal sensory exam  Skin no rashes no nodules  Musculoskeletal no cyanosis no clubbing normal range of motion     Results Review:        Results from last 7 days   Lab Units 08/05/20  0853 08/04/20  0934 08/03/20  0804   SODIUM mmol/L 141 141 134*   POTASSIUM mmol/L 3.6 3.7 3.8   CHLORIDE mmol/L 106 104 99   CO2 mmol/L 23.2 26.7 29.0   BUN mg/dL 7* 8 9   CREATININE mg/dL 0.69 0.73 0.81   GLUCOSE mg/dL 118* 134* 126*   CALCIUM mg/dL 9.0 9.8 9.1     Results from last 7 days   Lab Units 08/04/20  0934 08/03/20  0804   TROPONIN T ng/mL <0.010 <0.010     Results from last 7 days   Lab Units 08/05/20  0853 08/04/20  0934 08/03/20  0804   WBC 10*3/mm3 10.77 12.24* 9.11   HEMOGLOBIN g/dL 13.4 14.1 12.7   HEMATOCRIT % 39.5 44.5 38.5   PLATELETS 10*3/mm3 210 268 244     Results from last 7 days   Lab Units 08/04/20  0934 08/03/20  2342 08/03/20  1541 08/03/20  0804   INR  1.24* 1.21* 1.26* 2.43*   APTT seconds  --   --   --  32.7         Results from last 7 days   Lab Units 08/04/20  0820   MAGNESIUM mg/dL 2.5*               I reviewed the patient's new clinical results.  I personally viewed and interpreted the patient's CXR        Medication Review:     cefdinir 300 mg Oral Q12H   famotidine 20 mg Intravenous Once   isosorbide mononitrate 60 mg Oral Daily   levothyroxine 75 mcg Oral QAM   metoprolol tartrate 25 mg Oral Q12H   sodium chloride 10 mL Intravenous Q12H         lactated ringers 9 mL/hr Last Rate: 9 mL/hr (08/04/20 2301)   niCARdipine 5-15 mg/hr Last Rate: Stopped (08/03/20 1600)       ASSESSMENT:   Intracerebral hemorrhage  Coagulopathy  A. fib  UTI  Diabetes mellitus  Hypothyroidism    PLAN:  Neuro status remained stable.  Angiogram results reviewed  Blood pressure management keep systolic less than  140  A. fib currently on rate control.  Cardiology currently following.  Currently holding anticoagulation.  Restart anticoagulation as per neurology and cardiology's recommendations  Rocephin for UTI.  Urine culture consistent with E. coli.  Switch to oral antibiotics  Blood glucose management per ICU protocol  Diet to be advanced if okay with neurosurgery  Transfer out of the ICU once cleared by neurosurgery    Yvette Evans MD  08/05/20  12:08

## 2020-08-05 NOTE — THERAPY TREATMENT NOTE
Patient Name: Jihan Teresa  : 1933    MRN: [id number]                              Today's Date: 2020       Admit Date: 8/3/2020    Visit Dx:     ICD-10-CM ICD-9-CM   1. Nontraumatic intracerebral hemorrhage, unspecified cerebral location, unspecified laterality (CMS/HCC) I61.9 431   2. Acute UTI N39.0 599.0   3. 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) I61.0 431     Patient Active Problem List   Diagnosis   • Type 2 diabetes mellitus (CMS/HCC)   • Aortic valve replaced, equine valve   • Cerebral infarction (CMS/HCC)   • A-fib (CMS/[facility])   • GERD without esophagitis   • Sleep apnea in adult   • Cognitive dysfunction   • Hypothyroidism (acquired)   • History of recurrent UTIs   • Mild reactive airways disease   • Essential hypertension   • Pulmonary hypertension (CMS/HCC)   • B12 deficiency   • Non-rheumatic mitral regurgitation   • Non-rheumatic tricuspid valve insufficiency   • Chronic anemia   • Grade III hemorrhoids   • ICH (intracerebral hemorrhage) (CMS/[facility])   • 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)     Past Medical History:   Diagnosis Date   • Anemia    • Aortic valve stenosis     S/p AVR in    • Asthma    • Atelectasis    • CHF (congestive heart failure) (CMS/[facility])    • Congenital heart disease    • Diabetes mellitus (CMS/HCC)    • Esophageal reflux    • Essential hypertension    • HLD (hyperlipidemia)    • Hypotension    • LVH (left ventricular hypertrophy)    • Pleural effusion    • Pulmonary hypertension (CMS/HCC)      Past Surgical History:   Procedure Laterality Date   • AORTIC VALVE REPAIR/REPLACEMENT  2009    Jerry Colmenares MD   • CATARACT EXTRACTION     • KNEE ARTHROPLASTY       General Information     Row Name 20 1152          PT Evaluation Time/Intention    Document Type  therapy note (daily note)  (Pended)   -AR     Mode of Treatment  individual therapy;physical therapy  (Pended)   -AR     Row Name  08/05/20 1152          General Information    Existing Precautions/Restrictions  fall  (Pended)   -AR     Barriers to Rehab  cognitive status  (Pended)   -AR     Row Name 08/05/20 1152          Cognitive Assessment/Intervention- PT/OT    Orientation Status (Cognition)  disoriented to;place;situation;time  (Pended)   -AR       User Key  (r) = Recorded By, (t) = Taken By, (c) = Cosigned By    Initials Name Provider Type    Raghu Rajan, PT Student PT Student        Mobility     Row Name 08/05/20 1153          Bed Mobility Assessment/Treatment    Supine-Sit [place] (Bed Mobility)  maximum assist (25% patient effort)  (Pended)   -AR     Sit-Supine [place] (Bed Mobility)  maximum assist (25% patient effort)  (Pended)   -AR     Assistive Device (Bed Mobility)  head of bed elevated;draw sheet  (Pended)   -AR     Row Name 08/05/20 1153          Sit-Stand Transfer    Sit-Stand [place] (Transfers)  dependent (less than 25% patient effort);2 person assist  (Pended)   -AR     Row Name 08/05/20 1153          Gait/Stairs Assessment/Training    Comment (Gait/Stairs)  Attempted to stand but required max a x2. Pt did not participate and would not put weight through her legs.  (Pended)   -AR       User Key  (r) = Recorded By, (t) = Taken By, (c) = Cosigned By    Initials Name Provider Type    Raghu Rajan, PT Student PT Student        Obj/Interventions     Row Name 08/05/20 1157          Therapeutic Exercise    Lower Extremity Range of Motion (Therapeutic Exercise)  knee flexion/extension, bilateral;ankle dorsiflexion/plantar flexion, bilateral  (Pended)   -AR     Exercise Type (Therapeutic Exercise)  AAROM (active assistive range of motion)  (Pended)   -AR     Sets/Reps (Therapeutic Exercise)  1x5  (Pended)   -AR     Row Name 08/05/20 1157          Static Sitting Balance    Level of [place] (Unsupported Sitting, Static Balance)  minimal assist, 75% patient effort  (Pended)   -AR     Sitting Position  (Unsupported Sitting, Static Balance)  sitting on edge of bed  (Pended)   -AR     Time Able to Maintain Position (Unsupported Sitting, Static Balance)  2 to 3 minutes  (Pended)   -AR       User Key  (r) = Recorded By, (t) = Taken By, (c) = Cosigned By    Initials Name Provider Type    Raghu Rajan, PT Student PT Student        Goals/Plan    No documentation.       Clinical Impression     Row Name 08/05/20 1159          Pain Scale: Numbers Pre/Post-Treatment    Pain Scale: Numbers, Pretreatment  0/10 - no pain  (Pended)   -AR     Pain Scale: Numbers, Post-Treatment  0/10 - no pain  (Pended)   -AR     Row Name 08/05/20 1159          Plan of Care Review    Plan of Care Reviewed With  patient  (Pended)   -AR     Progress  no change  (Pended)   -AR     Outcome Summary  Pt was very confused today. She struggled to follow commands and was inconsistent with participation. She required max a x2 to sit EOB where her sitting balance was good and we performed AAROM to LE. Standing was attempted but pt would not put weight through her legs and was dependent to perform sit to stand transfer. Pt progress dependent on cognitive status.  (Pended)   -AR       User Key  (r) = Recorded By, (t) = Taken By, (c) = Cosigned By    Initials Name Provider Type    Raghu Rajan, PT Student PT Student        Outcome Measures     Row Name 08/05/20 1203          How much help from another person do you currently need...    Turning from your back to your side while in flat bed without using bedrails?  1  (Pended)   -AR     Moving from lying on back to sitting on the side of a flat bed without bedrails?  1  (Pended)   -AR     Moving to and from a bed to a chair (including a wheelchair)?  1  (Pended)   -AR     Standing up from a chair using your arms (e.g., wheelchair, bedside chair)?  1  (Pended)   -AR     Climbing 3-5 steps with a railing?  1  (Pended)   -AR     To walk in hospital room?  1  (Pended)   -AR     AM-PAC 6 Clicks Score (PT)  6   (Pended)   -AR     Row Name 08/05/20 1203          Functional Assessment    Outcome Measure Options  AM-PAC 6 Clicks Basic Mobility (PT)  (Pended)   -AR       User Key  (r) = Recorded By, (t) = Taken By, (c) = Cosigned By    Initials Name Provider Type    Raghu Rajan, PT Student PT Student        Physical Therapy Education                 Title: PT OT SLP Therapies (In Progress)     Topic: Physical Therapy (In Progress)     Point: Mobility training (In Progress)     Description:   Instruct learner(s) on safety and technique for assisting patient out of bed, chair or wheelchair.  Instruct in the proper use of assistive devices, such as walker, crutches, cane or brace.              Patient Friendly Description:   It's important to get you on your feet again, but we need to do so in a way that is safe for you. Falling has serious consequences, and your personal safety is the most important thing of all.        When it's time to get out of bed, one of us or a family member will sit next to you on the bed to give you support.     If your doctor or nurse tells you to use a walker, crutches, a cane, or a brace, be sure you use it every time you get out of bed, even if you think you don't need it.    Learning Progress Summary           Patient Acceptance, E, NL,NR by AR at 8/5/2020 1204    Acceptance, E, DU,NR by AR at 8/4/2020 1453                   Point: Home exercise program (In Progress)     Description:   Instruct learner(s) on appropriate technique for monitoring, assisting and/or progressing patient with therapeutic exercises and activities.              Learning Progress Summary           Patient Acceptance, E, NL,NR by AR at 8/5/2020 1204    Acceptance, E, DU,NR by AR at 8/4/2020 1453                   Point: Body mechanics (In Progress)     Description:   Instruct learner(s) on proper positioning and spine alignment for patient and/or caregiver during mobility tasks and/or exercises.              Learning  Progress Summary           Patient Acceptance, E, NL,NR by AR at 8/5/2020 1204    Acceptance, E, DU,NR by AR at 8/4/2020 1453                   Point: Precautions (In Progress)     Description:   Instruct learner(s) on prescribed precautions during mobility and gait tasks              Learning Progress Summary           Patient Acceptance, E, NL,NR by AR at 8/5/2020 1204    Acceptance, E, DU,NR by AR at 8/4/2020 1453                               User Key     Initials Effective Dates Name Provider Type Discipline    AR 07/02/20 -  Raghu Garcia, PT Student PT Student PT              PT Recommendation and Plan  Planned Therapy Interventions (PT Eval): balance training, bed mobility training, gait training, manual therapy techniques, motor coordination training, patient/family education, stair training, strengthening, transfer training  Outcome Summary/Treatment Plan (PT)  Anticipated Discharge Disposition (PT): skilled nursing facility  Plan of Care Reviewed With: (P) patient  Progress: (P) no change  Outcome Summary: (P) Pt was very confused today. She struggled to follow commands and was inconsistent with participation. She required max a x2 to sit EOB where her sitting balance was good and we performed AAROM to LE. Standing was attempted but pt would not put weight through her legs and was dependent to perform sit to stand transfer. Pt progress dependent on cognitive status.     Time Calculation:   PT Charges     Row Name 08/05/20 1206             Time Calculation    Start Time  1111  (Pended)   -AR      Stop Time  1129  (Pended)   -AR      Time Calculation (min)  18 min  (Pended)   -AR      PT Received On  08/05/20  (Pended)   -AR      PT - Next Appointment  08/06/20  (Pended)   -AR      PT Goal Re-Cert Due Date  08/11/20  (Pended)   -AR        User Key  (r) = Recorded By, (t) = Taken By, (c) = Cosigned By    Initials Name Provider Type    AR Raghu Garcia, PT Student PT Student        Therapy Charges for Today      Code Description Service Date Service Provider Modifiers Qty    98809305137  PT EVAL MOD COMPLEXITY 2 8/4/2020 Raghu Garcia, PT Student GP 1    [id number]  PT THERAPEUTIC ACT EA 15 MIN 8/4/2020 Raghu Garcia, PT Student GP 1    [id number]  PT THER SUPP EA 15 MIN 8/4/2020 Raghu Garcia, PT Student GP 1    [id number]  PT THERAPEUTIC ACT EA 15 MIN 8/5/2020 Raghu Garcia, PT Student GP 1    [id number]  PT THER SUPP EA 15 MIN 8/5/2020 Raghu Garcia, PT Student GP 1          PT G-Codes  Outcome Measure Options: (P) AM-PAC 6 Clicks Basic Mobility (PT)  AM-PAC 6 Clicks Score (PT): (P) 6    Raghu Garcia, PT Student  8/5/2020

## 2020-08-05 NOTE — PLAN OF CARE
Problem: Patient Care Overview  Goal: Individualization and Mutuality  Outcome: Ongoing (interventions implemented as appropriate)  Goal: Discharge Needs Assessment  Outcome: Ongoing (interventions implemented as appropriate)  Goal: Interprofessional Rounds/Family Conf  Outcome: Ongoing (interventions implemented as appropriate)     Problem: Fall Risk (Adult)  Goal: Identify Related Risk Factors and Signs and Symptoms  Outcome: Ongoing (interventions implemented as appropriate)  Goal: Absence of Fall  Outcome: Ongoing (interventions implemented as appropriate)     Problem: Skin Injury Risk (Adult)  Goal: Identify Related Risk Factors and Signs and Symptoms  Outcome: Ongoing (interventions implemented as appropriate)  Goal: Skin Health and Integrity  Outcome: Ongoing (interventions implemented as appropriate)     Problem: Stroke (Hemorrhagic) (Adult)  Goal: Signs and Symptoms of Listed Potential Problems Will be Absent, Minimized or Managed (Stroke)  Outcome: Ongoing (interventions implemented as appropriate)

## 2020-08-05 NOTE — CONSULTS
"Neurology Consult Note    Consult Date: 8/5/2020    Referring MD: Yvette Evans MD    Reason for Consult I have been asked to see the patient in neurological consultation to render advice and opinion regarding intracerebral hemorrhage, possible vasculitis    Jihan Teresa is a 86 y.o. female with past medical history of aortic stenosis status post aortic valve replacement, CHF, diabetes, hypertension, prior stroke with residual left sided weakness, atrial fibrillation on warfarin and Plavix who presented to the hospital with confusion and was found to have an intraventricular hemorrhage.  Since admission she has had persistent lethargy but otherwise nonfocal exam and has been stable.  She underwent CTA as well as diagnostic angiogram which showed some multifocal stenoses overall most consistent with atherosclerotic disease.  She does have multiple known cardiovascular risk factors.    Past Medical History:   Diagnosis Date   • Anemia    • Aortic valve stenosis     S/p AVR in 09'   • Asthma    • Atelectasis    • CHF (congestive heart failure) (CMS/HCC)    • Congenital heart disease    • Diabetes mellitus (CMS/[facility])    • Esophageal reflux    • Essential hypertension    • HLD (hyperlipidemia)    • Hypotension    • LVH (left ventricular hypertrophy)    • Pleural effusion    • Pulmonary hypertension (CMS/[facility])        ROS:  No fevers, chills  No weakness, numbness, + lethargy  No chest pain, palpitations  No shortness of air, cough    Exam  /67   Pulse 90   Temp 97.4 °F (36.3 °C) (Oral)   Resp 20   Ht 152.4 cm (60\")   Wt 62.9 kg (138 lb 10.7 oz)   SpO2 96%   BMI 27.08 kg/m²   Gen: NAD, vitals reviewed  MS: Mildly lethargic, disoriented, recent/remote memory impaired, no neglect, language intact  CN: visual acuity grossly normal, PERRL, EOMI, no facial droop, no dysarthria  Motor: 5/5 throughout upper and lower extremities, normal tone  Sensory: Intact to light touch all 4 extremities  Reflexes: Plantars " downgoing    DATA:    Lab Results   Component Value Date    GLUCOSE 118 (H) 08/05/2020    CALCIUM 9.0 08/05/2020     08/05/2020    K 3.6 08/05/2020    CO2 23.2 08/05/2020     08/05/2020    BUN 7 (L) 08/05/2020    CREATININE 0.69 08/05/2020    EGFRIFAFRI 60 01/09/2020    EGFRIFNONA 81 08/05/2020    BCR 10.1 08/05/2020    ANIONGAP 11.8 08/05/2020     Lab Results   Component Value Date    WBC 10.77 08/05/2020    HGB 13.4 08/05/2020    HCT 39.5 08/05/2020    MCV 86.4 08/05/2020     08/05/2020       Lab review: CBC, BMP unremarkable, INR 2.5 on admission    Imaging review: I personally reviewed her CT head and MRI which show a small area of hemorrhage in the region of the anterior communicating artery with some dependent blood in the lateral ventricles.  Diagnostic angiogram reviewed, there are multifocal stenoses most prominent in the right anterior cerebral artery consistent with atherosclerotic disease versus localized vasospasm.    Diagnoses:  Intracerebral hemorrhage  History of atrial fibrillation  Anticoagulated  Coronary artery disease    Comment: Difficult to say if she had primary intraventricular hemorrhage or if this was a very localized intracerebral hemorrhage.  There are no vascular lesions associated with her acomm or LIOR apart from an area of stenosis in the right A2 segment which could represent vasospasm or atherosclerotic disease.  I do not think there is any evidence of an inflammatory vascular condition.  There is a well-known association between intraventricular hemorrhage and A. fib especially in the setting of anticoagulation.  I am inclined to blame her ICH/IVH on combination of atherosclerotic disease and anticoagulation.  There are a few small chronic microbleeds which also raise the possibility of amyloid. I think the best course would be to discontinue Plavix permanently and restart warfarin with no bridge and continue warfarin alone in the future for her a fib and prior  stroke.    PLAN:  No further workup needed for her ICH in my opinion  Discussed with Dr. Bunch, restart warfarin with no bridge on 8/10 after a repeat head CT  Would not use antiplatelets in addition to AC in the future if possible    Will follow

## 2020-08-05 NOTE — OP NOTE
Procedure: Diagnostic cerebral angiogram     Surgeon: Gabriel Bunch MD     Anesthesia Type         Monitored anesthesia care followed by general endotracheal anesthesia as the patient had trouble holding still during the procedure.       Pre-operative Diagnosis  Subarachnoid hemorrhage     Post-operative Diagnosis:  No evidence of aneurysm, AVM, or dural AV fistula.  There is multifocal segmental narrowing of the cortical vessels.  Most significant in the bilateral A2 and A3 segments.  This most likely represents atherosclerotic disease.      Name of Procedure: Diagnostic cerebral angiogram.     Vessels catheterized:  1.  Right radial artery  2.  Right subclavian artery  3.  Right femoral artery  4.  Right internal carotid artery  5.  Right external carotid artery  6.  Left internal carotid artery  8.  Left external carotid artery  9.  Left subclavian artery  10.  Left vertebral artery      Indications for procedure and informed consent:        Patient is an 86-year-old female recently admitted with a small area of subarachnoid hemorrhage adjacent to the anterior communicating artery.  A CT angiogram and MR angiogram were performed which demonstrated no evidence of aneurysm.  I was consulted to evaluate with a diagnostic cerebral angiogram for any occult aneurysms or vascular lesions which could have caused this hemorrhage.  The risks and benefits of the procedure were discussed with the patient's family including but not limited to hemorrhage, infection, vessel injury, stroke.  They expressed understanding of the risks and benefits of the procedure and elected to proceed with a diagnostic angiogram and possible embolization of any vascular lesions found.      Sedation and monitoring:  Monitored sedation was carried out by the anesthesia service.  See anesthesia note for complete details.       Description of the procedure: The patient was brought to the angio suite and placed in a supine position on the exam  table.  Her right wrist was supinated prepped and draped in the usual sterile fashion.  Using a micropuncture set, access into the right radial artery was obtained. There was no resistance to threading the microwire. Using modified Seldinger technique, a 5 Malawian introducer sheath was inserted into the right radial artery. Then a 5 Malawian diagnostic Lerma 2 catheter was introduced into the system over 35 Glidewire with the wire ahead of the catheter and the system was navigated through the normal vascular channels into the subclavian artery.  An angiogram of the radial artery was obtained which demonstrated no stenosis or extravasation.  No accessory radial arteries.  This was then used as a roadmap to navigate into the right subclavian artery.  The origin of the right vertebral artery was then seen from this view with moderate stenosis so this vessel was not catheterized.  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  We then converted to a right femoral access with a 5 [place]ian sheath.  A 5 Malawian angled glide cath was then navigated through the normal vascular channels selectively into the right internal carotid artery, right external carotid artery, left internal carotid artery, left external carotid artery, followed by the left subclavian artery and the left vertebral artery.  Cervical and cranial views were obtained from each vessel.  Magnified and angled views were carefully evaluated.  A 3D image was generated using a power injector and the images were processed and saved on a separate machine while the catheter was positioned in the right internal carotid artery.  On careful review of the films there is no evidence of aneurysm, AVM or dural AV fistula.  There is multiple irregularities along nearly all her cortical vessels but is most pronounced along the  bilateral A2 and A3 vessels.  Next the catheter was then removed and the groin was closed with a 5 Frisian Mynx ocular closure device.  The 5 Frisian sheath was then removed from the right radial artery and a TR band was placed and inflated to 12 cc of air.  The sites were clean and dry patient was transferred back to the ICU in stable condition.        Interpretation of the films:  1. Left common carotid artery: AP, lateral, and obliques. Cervical views. Left common carotid artery bifurcates into the internal and external carotid arteries with normal branches of the external carotid artery. Mild tortuosity is noted. There is mild to moderate atherosclerotic changes with mild stenosis of the external or internal carotid arteries distal to the bifurcation.   2. Left internal carotid artery. Intracranial views: AP, lateral, and obliques. The internal carotid artery injection shows the course of the ICA at the skull base and there is mild tortuosity with no significant stenosis. The cavernous segment is unremarkable.  The branches of the internal carotid artery are seen occluding the posterior communicating arteries. the internal carotid artery terminates bifurcating into the middle cerebral artery and anterior cerebral artery.  The a comm segment is not fully seen filling.  there is no evidence of aneurysm or AVM from these images.  There is multifocal  segmental wall stenosis of the LIOR and MCA branches.  3.  Left external carotid artery: Intracranial views: AP, lateral; the external carotid artery branches are seen with no evidence of dural arteriovenous fistula and no significant flow-limiting stenosis.  4.  Right  common carotid artery: AP, lateral, and obliques. Cervical views.  Right common carotid artery bifurcates into the internal and external carotid arteries with normal branches of the external carotid artery. Sever tortuosity is noted. There is no mild to moderate atherosclerotic changes with no significant  stenosis of the external or internal carotid arteries distal to the bifurcation.   5.  Right internal carotid artery. Intracranial views: AP, lateral, and obliques. The internal carotid artery injection shows the course of the ICA at the skull base and there is mild tortuosity with no significant stenosis. The cavernous segment is unremarkable.  Posterior communicating artery and ophthalmic artery branches are seen with no evidence of aneurysm. the internal carotid artery terminates bifurcating into the middle cerebral artery and anterior cerebral artery.  There is no evidence of aneurysm or AVM in this view.  Again there is multifocal segmental stenosis of the small cortical branches most significant in the A2 and A3 branches .  6.  Right external carotid artery.  AP, lateral views.  Normal branches of the external carotid artery are seen including the superficial temporal artery, middle meningeal artery, IMAX, occipital artery.  There is no evidence of dural AV fistula  7.  Left subclavian artery, AP view.  There is no evidence of significant stenosis, the origin of the vertebral artery is seen with moderate stenosis.  8.  Left vertebral artery, AP and lateral cervical views.  The catheter is seen positioned with the tip in the V1 segment.  There is no evidence of significant flow-limiting stenosis in the V2 or V3 segments  9.  Left vertebral artery: AP and lateral cranial views.  The left V4 segment is seen tapering as it merges into the right vertebral artery to become the basilar artery.  The normal branches are seen including the bilateral PCA, SCA's, and the AICAs.  There is significant reflux into the right vertebral artery beyond the PICA.  There is no evidence of aneurysm or AVM in this view  10.  Right radial artery: AP view.  There is no extravasation of contrast and the sheath appears to be in good position.  There is no evidence of stenosis, dissection, or tortuous loops.  There is no accessory radial  artery present.  This image was used as a roadmap for navigation to the subclavian artery.  11.  Right subclavian artery: AP view.  The right subclavian artery is seen without significant stenosis.  The origin of the vertebral artery is identified and appears to be filling normally however there is a moderate degree of stenosis at its origin in the V1 segment.  12.  Right femoral artery: AP view.  She was seen bifurcation the access above the femoral head.  There is no evidence of flow-limiting stenosis, dissection or extravasation

## 2020-08-05 NOTE — PLAN OF CARE
Pt more drowsy today able to follow simple commands, moves all extremities.  When sleepy slight L arm drift ,none when more awake.  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  Speech slurred at times when sleepy able to state at hospital at [facility] at times, not consistent.  Weaned off oxygen.  BP slable.  R wrist and groin surgical sites D/I.  Up on side of bed with PT unable to stand today with assistance.  Speech clearer when more awake.  Passed bedside swallow poor appetite remains on clear liquids till more awake.  Plan to leave in ICU today per Neurosurgery due to drowsiness.  CGressRN 08/05/2020

## 2020-08-05 NOTE — PLAN OF CARE
Problem: Patient Care Overview  Goal: Plan of Care Review  Flowsheets (Taken 8/5/2020 3554)  Outcome Summary: Pt was very confused today. She struggled to follow commands and was inconsistent with participation. She required max a x2 to sit EOB, where her sitting balance was good and we performed AAROM to LE. Standing was attempted but pt would not put weight through her legs and was dependent to perform sit to stand transfer. Pt progress dependent on cognitive status. (Pended)  Note:   Patient was intermittently wearing a face mask during this therapy encounter. Therapist used appropriate personal protective equipment including eye protection, mask, and gloves.  Mask used was standard procedure mask. Appropriate PPE was worn during the entire therapy session. Hand hygiene was completed before and after therapy session. Patient is not in enhanced droplet precautions.

## 2020-08-06 NOTE — THERAPY TREATMENT NOTE
Patient Name: Jihan Teresa  : 1933    MRN: [id number]                              Today's Date: 2020       Admit Date: 8/3/2020    Visit Dx:     ICD-10-CM ICD-9-CM   1. Nontraumatic intracerebral hemorrhage, unspecified cerebral location, unspecified laterality (CMS/HCC) I61.9 431   2. Acute UTI N39.0 599.0   3. 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) I61.0 431     Patient Active Problem List   Diagnosis   • Type 2 diabetes mellitus (CMS/HCC)   • Aortic valve replaced, equine valve   • Cerebral infarction (CMS/HCC)   • A-fib (CMS/[facility])   • GERD without esophagitis   • Sleep apnea in adult   • Cognitive dysfunction   • Hypothyroidism (acquired)   • History of recurrent UTIs   • Mild reactive airways disease   • Essential hypertension   • Pulmonary hypertension (CMS/HCC)   • B12 deficiency   • Non-rheumatic mitral regurgitation   • Non-rheumatic tricuspid valve insufficiency   • Chronic anemia   • Grade III hemorrhoids   • ICH (intracerebral hemorrhage) (CMS/[facility])   • 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)     Past Medical History:   Diagnosis Date   • Anemia    • Aortic valve stenosis     S/p AVR in    • Asthma    • Atelectasis    • CHF (congestive heart failure) (CMS/[facility])    • Congenital heart disease    • Diabetes mellitus (CMS/HCC)    • Esophageal reflux    • Essential hypertension    • HLD (hyperlipidemia)    • Hypotension    • LVH (left ventricular hypertrophy)    • Pleural effusion    • Pulmonary hypertension (CMS/HCC)      Past Surgical History:   Procedure Laterality Date   • AORTIC VALVE REPAIR/REPLACEMENT  2009    Jerry Colmenares MD   • CATARACT EXTRACTION     • KNEE ARTHROPLASTY       General Information     Row Name 20 1158          PT Evaluation Time/Intention    Document Type  therapy note (daily note)  -PC (r) AR (t) PC (c)     Mode of Treatment  individual therapy;physical therapy  -PC (r) AR (t) PC (c)      Row Name 08/06/20 1158          General Information    Existing Precautions/Restrictions  fall  -PC (r) AR (t) PC (c)     Barriers to Rehab  cognitive status  -PC (r) AR (t) PC (c)     Row Name 08/06/20 1158          Cognitive Assessment/Intervention- PT/OT    Orientation Status (Cognition)  oriented x 3  -PC (r) AR (t) PC (c)       User Key  (r) = Recorded By, (t) = Taken By, (c) = Cosigned By    Initials Name Provider Type    PC Christine Siegel, PT Physical Therapist    Raghu Rajan, PT Student PT Student        Mobility     Row Name 08/06/20 1158          Bed Mobility Assessment/Treatment    Supine-Sit [place] (Bed Mobility)  moderate assist (50% patient effort);2 person assist  -PC (r) AR (t) PC (c)     Sit-Supine [place] (Bed Mobility)  moderate assist (50% patient effort);2 person assist  -PC (r) AR (t) PC (c)     Assistive Device (Bed Mobility)  draw sheet;head of bed elevated  -PC (r) AR (t) PC (c)     Row Name 08/06/20 1158          Sit-Stand Transfer    Sit-Stand [place] (Transfers)  maximum assist (25% patient effort);2 person assist  -PC (r) AR (t) PC (c)     Assistive Device (Sit-Stand Transfers)  walker, front-wheeled  -PC (r) AR (t) PC (c)     Row Name 08/06/20 1158          Gait/Stairs Assessment/Training    [place] Level (Gait)  maximum assist (25% patient effort);2 person assist  -PC (r) AR (t) PC (c)     Assistive Device (Gait)  walker, front-wheeled  -PC (r) AR (t) PC (c)     Distance in Feet (Gait)  4 side steps to chair  -PC (r) AR (t) PC (c)     Comment (Gait/Stairs)  Pt followed commands well, but pt very weak and unable to stand without max a x2 and FWW.  -PC (r) AR (t) PC (c)       User Key  (r) = Recorded By, (t) = Taken By, (c) = Cosigned By    Initials Name Provider Type    PC Christine Siegel, PT Physical Therapist    Raghu Rajan, PT Student PT Student        Obj/Interventions     Row Name 08/06/20 1201          Therapeutic Exercise    Lower Extremity  (Therapeutic Exercise)  LAQ (long arc quad), bilateral;marching while seated  -PC (r) AR (t) PC (c)     Lower Extremity Range of Motion (Therapeutic Exercise)  ankle dorsiflexion/plantar flexion, bilateral  -PC (r) AR (t) PC (c)     Sets/Reps (Therapeutic Exercise)  1x5  -PC (r) AR (t) PC (c)     Row Name 08/06/20 1201          Static Sitting Balance    Level of [place] (Unsupported Sitting, Static Balance)  minimal assist, 75% patient effort;2 person assist  -PC (r) AR (t) PC (c)     Sitting Position (Unsupported Sitting, Static Balance)  sitting on edge of bed  -PC (r) AR (t) PC (c)     Time Able to Maintain Position (Unsupported Sitting, Static Balance)  more than 5 minutes  -PC (r) AR (t) PC (c)     Row Name 08/06/20 1201          Static Standing Balance    Level of [place] (Supported Standing, Static Balance)  maximal assist, 25 to 49% patient effort;2 person assist  -PC (r) AR (t) PC (c)     Time Able to Maintain Position (Supported Standing, Static Balance)  45 to 60 seconds  -PC (r) AR (t) PC (c)     Assistive Device Utilized (Supported Standing, Static Balance)  walker, rolling  -PC (r) AR (t) PC (c)     Row Name 08/06/20 1201          Dynamic Standing Balance    Level of [place], Reaches Outside Midline (Standing, Dynamic Balance)  maximal assist, 25 to 49% patient effort;2 person assist  -PC (r) AR (t) PC (c)     Time Able to Maintain Position, Reaches Outside Midline (Standing, Dynamic Balance)  45 to 60 seconds  -PC (r) AR (t) PC (c)     Assistive Device Utilized (Supported Standing, Dynamic Balance)  walker, rolling  -PC (r) AR (t) PC (c)     Comment, Reaches Outside Midline (Standing, Dynamic Balance)  4 side steps to chair  -PC (r) AR (t) PC (c)       User Key  (r) = Recorded By, (t) = Taken By, (c) = Cosigned By    Initials Name Provider Type    PC Christine Siegel, PT Physical Therapist    Raghu Rajan, PT Student PT Student        Goals/Plan    No documentation.       Clinical  Impression     Row Name 08/06/20 1203          Pain Scale: Numbers Pre/Post-Treatment    Pain Scale: Numbers, Pretreatment  0/10 - no pain  -PC (r) AR (t) PC (c)     Pain Scale: Numbers, Post-Treatment  0/10 - no pain  -PC (r) AR (t) PC (c)     Row Name 08/06/20 1203          Plan of Care Review    Plan of Care Reviewed With  patient  -PC (r) AR (t) PC (c)     Progress  improving  -PC (r) AR (t) PC (c)     Outcome Summary  Pt is more alert than yesterday, is A&O x3, and is better at following commands. Pt remains very weak, requiring mod a x2 to sit EOB, and max a x2 to perform sit to stand and to maintain standing. Pt was able to take a few side steps to chair but again required max a x2 for this. Pt still requires skilled PT to improve strength. D/c is still anticipated to SNF.  (Pended)   -AR     Row Name 08/06/20 1203          Vital Signs    Pre Systolic BP Rehab  139  -PC (r) AR (t) PC (c)     Pre Treatment Diastolic BP  84  -PC (r) AR (t) PC (c)     Pre SpO2 (%)  94  -PC (r) AR (t) PC (c)     O2 Delivery Pre Treatment  room air  -PC (r) AR (t) PC (c)     Post SpO2 (%)  96  -PC (r) AR (t) PC (c)     O2 Delivery Post Treatment  room air  -PC (r) AR (t) PC (c)     Row Name 08/06/20 1203          Positioning and Restraints    Pre-Treatment Position  in bed  -PC (r) AR (t) PC (c)     Post Treatment Position  chair  -PC (r) AR (t) PC (c)     In Chair  reclined;call light within reach;encouraged to call for assist;exit alarm on;notified nsg  -PC (r) AR (t) PC (c)       User Key  (r) = Recorded By, (t) = Taken By, (c) = Cosigned By    Initials Name Provider Type    PC Christine Siegel, PT Physical Therapist    Raghu Rajan, PT Student PT Student        Outcome Measures     Row Name 08/06/20 1208          How much help from another person do you currently need...    Turning from your back to your side while in flat bed without using bedrails?  2  -PC (r) AR (t) PC (c)     Moving from lying on back to sitting on the  side of a flat bed without bedrails?  2  -PC (r) AR (t) PC (c)     Moving to and from a bed to a chair (including a wheelchair)?  2  -PC (r) AR (t) PC (c)     Standing up from a chair using your arms (e.g., wheelchair, bedside chair)?  2  -PC (r) AR (t) PC (c)     Climbing 3-5 steps with a railing?  1  -PC (r) AR (t) PC (c)     To walk in hospital room?  1  -PC (r) AR (t) PC (c)     AM-PAC 6 Clicks Score (PT)  10  -PC (r) AR (t)       User Key  (r) = Recorded By, (t) = Taken By, (c) = Cosigned By    Initials Name Provider Type    PC Christine Siegel, PT Physical Therapist    Raghu Rajan, PT Student PT Student        Physical Therapy Education                 Title: PT OT SLP Therapies (Done)     Topic: Physical Therapy (Done)     Point: Mobility training (Done)     Description:   Instruct learner(s) on safety and technique for assisting patient out of bed, chair or wheelchair.  Instruct in the proper use of assistive devices, such as walker, crutches, cane or brace.              Patient Friendly Description:   It's important to get you on your feet again, but we need to do so in a way that is safe for you. Falling has serious consequences, and your personal safety is the most important thing of all.        When it's time to get out of bed, one of us or a family member will sit next to you on the bed to give you support.     If your doctor or nurse tells you to use a walker, crutches, a cane, or a brace, be sure you use it every time you get out of bed, even if you think you don't need it.    Learning Progress Summary           Patient Acceptance, E, DU,NR by AR at 8/6/2020 1208    Acceptance, E,D, NR by CG at 8/5/2020 1410    Comment:  does not appear to retain information.  CGRESSRN    Acceptance, E, NL,NR by AR at 8/5/2020 1204    Acceptance, E, DU,NR by AR at 8/4/2020 1453                   Point: Home exercise program (Done)     Description:   Instruct learner(s) on appropriate technique for monitoring,  assisting and/or progressing patient with therapeutic exercises and activities.              Learning Progress Summary           Patient Acceptance, E, DU,NR by AR at 8/6/2020 1208    Acceptance, E,D, NR by CG at 8/5/2020 1410    Comment:  does not appear to retain information.  CGRESSRN    Acceptance, E, NL,NR by AR at 8/5/2020 1204    Acceptance, E, DU,NR by AR at 8/4/2020 1453                   Point: Body mechanics (Done)     Description:   Instruct learner(s) on proper positioning and spine alignment for patient and/or caregiver during mobility tasks and/or exercises.              Learning Progress Summary           Patient Acceptance, E, DU,NR by AR at 8/6/2020 1208    Acceptance, E,D, NR by CG at 8/5/2020 1410    Comment:  does not appear to retain information.  CGRESSRN    Acceptance, E, NL,NR by AR at 8/5/2020 1204    Acceptance, E, DU,NR by AR at 8/4/2020 1453                   Point: Precautions (Done)     Description:   Instruct learner(s) on prescribed precautions during mobility and gait tasks              Learning Progress Summary           Patient Acceptance, E, DU,NR by AR at 8/6/2020 1208    Acceptance, E,D, NR by CG at 8/5/2020 1410    Comment:  does not appear to retain information.  CGRESSRN    Acceptance, E, NL,NR by AR at 8/5/2020 1204    Acceptance, E, DU,NR by AR at 8/4/2020 1453                               User Key     Initials Effective Dates Name Provider Type Discipline     06/16/16 -  Geneva Villela RN Registered Nurse Nurse    AR 07/02/20 -  Raghu Garcia, PT Student PT Student PT              PT Recommendation and Plan  Planned Therapy Interventions (PT Eval): balance training, bed mobility training, gait training, manual therapy techniques, motor coordination training, patient/family education, stair training, strengthening, transfer training  Outcome Summary/Treatment Plan (PT)  Anticipated Discharge Disposition (PT): (P) skilled nursing facility  Plan of Care Reviewed With:  patient  Progress: improving  Outcome Summary: (P) Pt is more alert than yesterday, is A&O x3, and is better at following commands. Pt remains very weak, requiring mod a x2 to sit EOB, and max a x2 to perform sit to stand and to maintain standing. Pt was able to take a few side steps to chair but again required max a x2 for this. Pt still requires skilled PT to improve strength. D/c is still anticipated to SNF.     Time Calculation:   PT Charges     Row Name 08/06/20 1210             Time Calculation    Start Time  1036  -PC (r) AR (t) PC (c)      Stop Time  1056  -PC (r) AR (t) PC (c)      Time Calculation (min)  20 min  -PC (r) AR (t)      PT Received On  08/06/20  -PC (r) AR (t) PC (c)      PT - Next Appointment  08/07/20  -PC (r) AR (t) PC (c)      PT Goal Re-Cert Due Date  08/11/20  -PC (r) AR (t) PC (c)        User Key  (r) = Recorded By, (t) = Taken By, (c) = Cosigned By    Initials Name Provider Type    PC Christine Siegel PT Physical Therapist    Raghu Rajan, PT Student PT Student        Therapy Charges for Today     Code Description Service Date Service Provider Modifiers Qty    24925018011  PT THERAPEUTIC ACT EA 15 MIN 8/5/2020 Raghu Garcia, PT Student GP 1    45472291355 HC PT THER SUPP EA 15 MIN 8/5/2020 Raghu Garcia, PT Student GP 1    65463952902 HC PT THERAPEUTIC ACT EA 15 MIN 8/6/2020 Raghu Garcia PT Student GP 1    31087278950 HC PT THER SUPP EA 15 MIN 8/6/2020 Raghu Garcia PT Student GP 1          PT G-Codes  Outcome Measure Options: AM-PAC 6 Clicks Basic Mobility (PT)  AM-PAC 6 Clicks Score (PT): 10    JOSHUA Lawton  8/6/2020

## 2020-08-06 NOTE — PROGRESS NOTES
"[facility]  Cardiology Progress Note    Patient Identification:  Name: Jihan Teresa  Age: 86 y.o.  Sex: female  :  1933  MRN: [id number]                 Follow Up / Chief Complaint: Atrial fibrillation with ICH    Interval History: More alert today.  Sitting in chair.    Subjective: Denies chest pain, shortness of breath, and palpitation.     Objective:    Past Medical History:  Past Medical History:   Diagnosis Date   • Anemia    • Aortic valve stenosis     S/p AVR in    • Asthma    • Atelectasis    • CHF (congestive heart failure) (CMS/HCC)    • Congenital heart disease    • Diabetes mellitus (CMS/HCC)    • Esophageal reflux    • Essential hypertension    • HLD (hyperlipidemia)    • Hypotension    • LVH (left ventricular hypertrophy)    • Pleural effusion    • Pulmonary hypertension (CMS/HCC)      Past Surgical History:  Past Surgical History:   Procedure Laterality Date   • AORTIC VALVE REPAIR/REPLACEMENT  2009    Jerry Colmenares MD   • CATARACT EXTRACTION     • KNEE ARTHROPLASTY          Social History:   Social History     Tobacco Use   • Smoking status: Never Smoker   • Smokeless tobacco: Never Used   Substance Use Topics   • Alcohol use: Yes     Comment: ocasional      Family History:  Family History   Problem Relation Age of Onset   • Dementia Sister    • Stroke Brother           Allergies:  Allergies   Allergen Reactions   • Promethazine Other (See Comments)     Pt becomes \"out of it\" when on this medication  Pt becomes \"out of it\" when on this medication     Scheduled Meds:    cefdinir 300 mg Q12H   isosorbide mononitrate 60 mg Daily   levothyroxine 75 mcg QAM   metoprolol tartrate 25 mg Q12H           INTAKE AND OUTPUT:    Intake/Output Summary (Last 24 hours) at 2020 1434  Last data filed at 2020 1300  Gross per 24 hour   Intake 1270 ml   Output 750 ml   Net 520 ml       ROS  Constitutional: Awake and alert, no fever. No nosebleeds  Abdomen           no " "abdominal pain   Cardiac              no chest pain  Pulmonary          no shortness of breath      /57   Pulse 80   Temp 97.2 °F (36.2 °C) (Oral)   Resp 16   Ht 152.4 cm (60\")   Wt 64.7 kg (142 lb 10.2 oz)   SpO2 94%   BMI 27.86 kg/m²   General appearance: No acute changes   Neck: Trachea midline; NECK, supple, no thyromegaly or lymphadenopathy   Lungs: Normal size and shape, normal breath sounds, equal distribution of air, no rales and rhonchi   CV: S1-S2 regular, no murmurs, no rub, no gallop   Abdomen: Soft, non-tender; no masses , no abnormal abdominal sounds   Extremities: No deformity , normal color , no peripheral edema   Skin: Normal temperature, turgor and texture; no rash, ulcers             Cardiographics  Telemetry:   A. Fib      Atrial fibrillation      Echocardiogram:   ECG:          Echocardiogram:   2017  Interpretation Summary      · Calculated EF = 46.9%.  · Left ventricular systolic function is low normal.  · Left amd right atrial cavity size is severely dilated.  · Right ventricular cavity is severely dilated.  · calcification of the aortic valve  · Severe mitral valve regurgitation is present  · Mitral annular calcification is present. Posterior leaflet very calcified and immobile.  · Severe tricuspid valve regurgitation is present.  · Estimated right ventricular systolic pressure from tricuspid regurgitation is markedly elevated (>55 mmHg). Calculated right ventricular systolic pressure from tricuspid regurgitation is 66.7 mmHg.         Imaging  Chest X-ray:   Study Result      EXAMINATION: SINGLE VIEW CHEST RADIOGRAPH     HISTORY: 86-year-old female with history of generalized weakness.     FINDINGS: An upright AP portable chest radiograph was obtained.  Comparison is made to a chest radiograph dated 06/10/2017. The lungs are  normal in volume and are clear of consolidation. Stable mild scattered  interstitial prominence is seen throughout both lungs. Stable soft  tissue fullness " in the right hilum is most consistent with a prominent  right hilar vessel is noted. There are stable cardiomegaly. Midline  sternal wires are noted. Atheromatous consultation seen within the  aortic arch.     IMPRESSION:  There is stable mild bilateral interstitial scarring and  cardiomegaly with findings suggestive of pulmonary hypertension. No  evidence for superimposed acute process is appreciated.     This report was finalized on 8/3/2020 10:55 AM by Dr. Estuardo Wagner M.D.         CT     Study Result      CT SCAN OF THE HEAD WITHOUT CONTRAST     CLINICAL HISTORY: Generalized weakness and confusion.     TECHNIQUE: CT scan of the head was obtained with 3 mm axial images. No  intravenous contrast was administered.     COMPARISON: Comparison is made to previous CT scan of head dated  06/13/2017.     FINDINGS: There is increased density within the dependent aspects of  both lateral ventricles compatible with intraventricular hemorrhage.  There are foci of increased density noted within the inferior aspect of  the interhemispheric fissure worrisome for subarachnoid hemorrhage. The  etiology of these areas of hemorrhage is uncertain, although a vascular  etiology such as an aneurysm should be excluded. I recommend further  evaluation with an MRA or CTA examination, as well as an MRI of the  brain.     There are multiple chronic infarcts identified within both cerebellar  hemispheres, left greater than right. The largest of these chronic  infarcts measures up to approximately 1.4 x 0.4 cm in greatest axial  dimensions. Old lacunar disease is seen within the left thalamus. A  chronic lacune identified within the anterior aspect of the left  thalamus measuring up to 4-5 mm in diameter. Hypodense areas are  identified within the lentiform nuclei bilaterally that are likely  representative of a combination of enlarged perivascular spaces and old  lacunar disease. Severe changes of chronic small vessel  ischemic  phenomena are identified.     Otherwise, the ventricles, sulci, and cisterns are age appropriate.     IMPRESSION:     There is intraventricular hemorrhage identified within the dependent  aspects of both lateral ventricles. Additionally, there is increased  density seen within the inferior aspect of the interhemispheric fissure  suggestive of subarachnoid hemorrhage. The precise etiology of this  hemorrhage is uncertain on the basis of this head CT. However, a  vascular etiology should be excluded. Further evaluation is recommended  with a CTA or MRA study, as well as an MRI of the brain.     These findings and recommendations were discussed with Debbie Wilson on 08/03/2020 at approximately 8:36 AM.     Radiation dose reduction techniques were utilized, including automated  exposure control and exposure modulation based on body size.              Lab Review   Results from last 7 days   Lab Units 08/04/20  0934 08/03/20  0804   TROPONIN T ng/mL <0.010 <0.010     Results from last 7 days   Lab Units 08/04/20  0820   MAGNESIUM mg/dL 2.5*     Results from last 7 days   Lab Units 08/05/20  0853   SODIUM mmol/L 141   POTASSIUM mmol/L 3.6   BUN mg/dL 7*   CREATININE mg/dL 0.69   CALCIUM mg/dL 9.0        Results from last 7 days   Lab Units 08/05/20  0853 08/04/20  0934 08/03/20  0804   WBC 10*3/mm3 10.77 12.24* 9.11   HEMOGLOBIN g/dL 13.4 14.1 12.7   HEMATOCRIT % 39.5 44.5 38.5   PLATELETS 10*3/mm3 210 268 244     Results from last 7 days   Lab Units 08/04/20  0934 08/03/20  2342 08/03/20  1541 08/03/20  0804   INR  1.24* 1.21* 1.26* 2.43*   APTT seconds  --   --   --  32.7     The following medical decision was discussed in detail with Dr. Mcmahan    Assessment:  1. Intracerebral hemorrhage  2.  Chronic atrial fibrillation: Coumadin on hold due to bleeding.  3.  Bioprosthetic aortic valve replaced in 2009  5. coagulopathy  6.  UTI  7.  Diabetes mellitus  8. Hypothyroidism  9. CAD  10.  "Hypertension    Plan:  Blood pressure and heart rate stable.  See MRI report as above.  Atrial fibrillation on monitor.  Neurology do not feel that Plavix is needed in the future.  Once okay with all please resume Coumadin for atrial fibrillation. Spoke to daughter regarding watchman's procedure, continuing coumadin, or asa. She said she will discuss with family.         )8/6/2020  KAREN Graham  Final decision of long-term anticoagulation is pending at this point  Bogdan Mcmahan MD      [facility] Dragon/Transcription:   \"Dictated utilizing Dragon dictation\".     "

## 2020-08-06 NOTE — PROGRESS NOTES
DOS: 2020  NAME: Jihan Teresa   : 1933  PCP: Yaron Ca Jr., MD    Chief Complaint   Patient presents with   • Weakness - Generalized        Stroke    Subjective: Pt seen in follow up, however the problem is new to me.  More alert and awake today.  Remains confused.  Oriented to self and was able to correctly name time on wall clock.  Disoriented to date, month, and place.  Was attempting to feed herself breakfast as I entered the room.  She denied any new weakness, numbness, speech or visual disturbances, or headaches.  No family at bedside.    Objective:  Vital signs:      Vitals:    20 0808 20 0854 20 0855 20 0900   BP:  163/82 163/82 149/68   Pulse:  81 80 80   Resp:       Temp: 97.4 °F (36.3 °C)      TempSrc: Oral      SpO2:   94% 96%   Weight:       Height:           Current Facility-Administered Medications:   •  acetaminophen (TYLENOL) tablet 650 mg, 650 mg, Oral, Q6H PRN, Yvette Evans MD, 650 mg at 20 1537  •  cefdinir (OMNICEF) capsule 300 mg, 300 mg, Oral, Q12H, Yvette Evans MD, 300 mg at 20 0854  •  hydrALAZINE (APRESOLINE) injection 10 mg, 10 mg, Intravenous, Q4H PRN, Yvette Evans MD, 10 mg at 20 0324  •  isosorbide mononitrate (IMDUR) 24 hr tablet 60 mg, 60 mg, Oral, Daily, Yvette Evans MD, 60 mg at 20 0854  •  levothyroxine (SYNTHROID, LEVOTHROID) tablet 75 mcg, 75 mcg, Oral, QAM, Yvette Evans MD, 75 mcg at 20 0707  •  metoprolol tartrate (LOPRESSOR) tablet 25 mg, 25 mg, Oral, Q12H, Yvette Evans MD, 25 mg at 20 0854  •  niCARdipine (CARDENE) 25 mg in 250 mL NS (0.1 mg/mL) infusion kit, 5-15 mg/hr, Intravenous, Titrated, Sayied, Tausif, MD, Stopped at 20 1600  •  [COMPLETED] Insert peripheral IV, , , Once **AND** sodium chloride 0.9 % flush 10 mL, 10 mL, Intravenous, PRN, Yvette Evans MD    PRN meds  •  acetaminophen  •  hydrALAZINE  •  [COMPLETED] Insert peripheral IV **AND** sodium chloride    No  current facility-administered medications on file prior to encounter.      Current Outpatient Medications on File Prior to Encounter   Medication Sig   • isosorbide mononitrate (IMDUR) 60 MG 24 hr tablet TAKE 1 TABLET BY MOUTH EVERY DAY   • levothyroxine (SYNTHROID, LEVOTHROID) 150 MCG tablet TAKE 1 TABLET BY MOUTH EVERY DAY (Patient taking differently: Take 75 mcg by mouth Daily.)   • warfarin (COUMADIN) 3 MG tablet Take 3 mg daily by mouth Tuesday through Sunday.  Do not take Monday (Patient taking differently: Take 3 mg daily by mouth Tuesday Wednesday Friday Saturday and Sunday.  Monday and Thursday patient to take 1.5 mg)   • acetaminophen (TYLENOL) 500 MG tablet Take  by mouth As Needed.   • ascorbic acid (VITAMIN C) 250 MG chewable tablet chewable tablet Chew 500 mg Daily.   • atorvastatin (LIPITOR) 40 MG tablet TAKE 1 TABLET BY MOUTH EVERY DAY (Patient taking differently: Take 40 mg by mouth Daily.)   • Calcium Citrate-Vitamin D 250-200 MG-UNIT tablet Take  by mouth 2 (Two) Times a Day.   • Clobetasol Propionate Emulsion 0.05 % topical foam APPLY SPARINGLY TO SCALP TWICE DAILY X 2 WEEKS. THEN AS NEEDED FOR FLARES   • clopidogrel (PLAVIX) 75 MG tablet TAKE 1 TABLET BY MOUTH EVERY DAY (Patient taking differently: Take 75 mg by mouth Daily.)   • furosemide (LASIX) 40 MG tablet TAKE 1 TABLET BY MOUTH THREE TIMES A DAY (Patient taking differently: Take 40 mg by mouth 3 (Three) Times a Day.)   • hydrocortisone (ANUSOL-HC) 2.5 % rectal cream Insert  into the rectum 2 (Two) Times a Day.   • ketoconazole (NIZORAL) 2 % shampoo WASH SCALP AND 2-3 TIMES A WEEK. LEAVE ON FOR 2-3 MINUTES THEN RINSE.   • lisinopril (PRINIVIL,ZESTRIL) 10 MG tablet TAKE 1 TABLET BY MOUTH EVERY DAY (Patient taking differently: Take 10 mg by mouth Daily.)   • metoprolol tartrate (LOPRESSOR) 50 MG tablet TAKE 1 TABLET BY MOUTH 2 (TWO) TIMES DAILY (Patient taking differently: Take 50 mg by mouth 2 (Two) Times a Day.)   • Multiple Vitamin  (MULTIVITAMIN) tablet Take 1 tablet by mouth Daily.   • omeprazole (priLOSEC) 20 MG capsule Take 20 mg by mouth Daily.   • Phenylephrine-Witch Hazel (PREPARATION H) 0.25-50 % gel Insert 1 g into the rectum 4 (Four) Times a Day As Needed (rectal burning).   • potassium chloride (MICRO-K) 10 MEQ CR capsule TAKE 2 CAPSULES BY MOUTH 2 (TWO) TIMES A DAY. (Patient taking differently: Take 20 mEq by mouth 2 (Two) Times a Day.)       General appearance: Pleasant elderly female, NAD, alert and cooperative, well groomed  HEENT: Normocephalic, atraumatic,  right pupil 3 mm, left pupil 2 mm both reactive to light, no masses or tenderness  COR: RRR  Resp: Even and unlabored  Extremities: No obvious edema  Skin: warm, dry    Neurological:   MS: oriented to self and correctly identified time on wall clock, recent/remote memory impaired, normal attention/concentration, language intact but delayed responses, no neglect  CN: visual acuity grossly normal, visual fields full, EOMI, facial sensation equal, no facial droop, hearing symmetric [place] IRA, palate elevates symmetrically, shoulder shrug equal, tongue midline  Motor: 5/5 in upper extremities, 2/5 RLE, 1/5 LLE  Sensory: light touch sensation intact in all 4 ext.  Coordination: Normal finger to nose test  Gait and station: [facility] 2-3 person assist  Rapid alternating movements: normal finger to thumb tap    Laboratory results:  Lab Results   Component Value Date    TSH 2.290 01/09/2020     Lab Results   Component Value Date    HGBA1C 6.8 (H) 01/09/2020     Lab Results   Component Value Date    GDJIUXMJ02 1,337 (H) 01/09/2020     Lab Results   Component Value Date    CHOL 109 06/13/2017    CHOL 96 02/10/2017    CHLPL 108 01/09/2020    CHLPL 214 (H) 01/06/2016     Lab Results   Component Value Date    TRIG 114 01/09/2020    TRIG 36 06/13/2017    TRIG 81 02/10/2017     Lab Results   Component Value Date    HDL 36 (L) 01/09/2020    HDL 51 06/13/2017    HDL 42 02/10/2017     Lab Results      Component Value Date    LDL 49 01/09/2020    LDL 51 06/13/2017    LDL 38 02/10/2017     Lab Results   Component Value Date    WBC 10.77 08/05/2020    HGB 13.4 08/05/2020    HCT 39.5 08/05/2020    MCV 86.4 08/05/2020     08/05/2020     Lab Results   Component Value Date    GLUCOSE 118 (H) 08/05/2020    BUN 7 (L) 08/05/2020    CREATININE 0.69 08/05/2020    EGFRIFNONA 81 08/05/2020    EGFRIFAFRI 60 01/09/2020    BCR 10.1 08/05/2020    K 3.6 08/05/2020    CO2 23.2 08/05/2020    CALCIUM 9.0 08/05/2020    PROTENTOTREF 6.3 01/09/2020    ALBUMIN 3.30 (L) 08/03/2020    LABIL2 2.0 01/09/2020    AST 23 08/03/2020    ALT 31 08/03/2020     Lab Results   Component Value Date    PTT 32.7 08/03/2020     Lab Results   Component Value Date    INR 1.24 (H) 08/04/2020    INR 1.21 (H) 08/03/2020    INR 1.26 (H) 08/03/2020    PROTIME 15.4 (H) 08/04/2020    PROTIME 15.2 (H) 08/03/2020    PROTIME 15.6 (H) 08/03/2020     Brief Urine Lab Results  (Last result in the past 365 days)      Color   Clarity   Blood   Leuk Est   Nitrite   Protein   CREAT   Urine HCG        08/03/20 0804 Yellow Cloudy Negative Large (3+) Negative Negative               Review and interpretation of imaging:  Ct Head Without Contrast    Result Date: 8/6/2020   Stable findings when compared to the previous head CT dated 08/04/2020. A small amount of intraventricular hemorrhage, as discussed in detail above, is stable. A small amount of hemorrhage is again identified within the anterior hemispheric fissure just anterior to the third ventricle with adjacent areas of hemorrhage within the anterior and medial portions of the frontal lobes, as best seen on previous MR imaging of 08/03/2020. These findings also demonstrate no interval change.  Chronic findings are again appreciated including severe changes of chronic small vessel ischemic phenomena, old lacunar disease within the left thalamus, and multiple chronic infarcts within the cerebellar hemispheres.   Radiation dose reduction techniques were utilized, including automated exposure control and exposure modulation based on body size.  This report was finalized on 8/6/2020 9:20 AM by Dr. Abram Branch M.D.      Ct Head Without Contrast    Result Date: 8/4/2020   There is intraventricular hemorrhage identified within the dependent aspects of both lateral ventricles. Additionally, there is increased density seen within the inferior aspect of the interhemispheric fissure suggestive of subarachnoid hemorrhage. Small foci of encephalomalacia are identified within the medial portions of both inferior frontal lobes adjacent to the interhemispheric subarachnoid hemorrhage. The precise etiology of this hemorrhage is uncertain on the basis of this head CT. However, a vascular etiology should be excluded. Further evaluation is recommended with a CTA or MRA study, as well as an MRI of the brain.  These findings and recommendations were discussed with Debbie Wilson on 08/03/2020 at approximately 8:36 AM.  Radiation dose reduction techniques were utilized, including automated exposure control and exposure modulation based on body size.  This report was finalized on 8/4/2020 10:52 AM by Dr. Abram Branch M.D.      Xr Chest 1 View    Result Date: 8/3/2020  There is stable mild bilateral interstitial scarring and cardiomegaly with findings suggestive of pulmonary hypertension. No evidence for superimposed acute process is appreciated.  This report was finalized on 8/3/2020 10:55 AM by Dr. Estuardo Wagner M.D.      Results for orders placed during the hospital encounter of 06/10/17   Adult Transthoracic Echo Complete With Contrast    Narrative · Calculated EF = 46.9%.  · Left ventricular systolic function is low normal.  · Left amd right atrial cavity size is severely dilated.  · Right ventricular cavity is severely dilated.  · calcification of the aortic valve  · Severe mitral valve regurgitation is present  · Mitral annular  calcification is present. Posterior leaflet very   calcified and immobile.  · Severe tricuspid valve regurgitation is present.  · Estimated right ventricular systolic pressure from tricuspid   regurgitation is markedly elevated (>55 mmHg). Calculated right   ventricular systolic pressure from tricuspid regurgitation is 66.7 mmHg.              Impression/Assessment:  This is a 86-year-old female with past medical history of CHF, diabetes, hypertension, hyperlipidemia, AV stenosis status post AVR on Plavix PTA, atrial fibrillation on warfarin PTA who presented to the hospital on 8/3/2020 with complaints of confusion and was found to have an intraventricular hemorrhage.  She underwent cerebral angiogram that did not reveal any evidence of aneurysm, AVM, or dural AV fistula.  Did show multifocal narrowing of the cortical vessels most significant within the bilateral A2 and A3 segments.  Etiology of bleed is unclear.  We were consulted secondary to the patient having persistent lethargy.  INR on arrival 2.43.  /90, HR 63.  EKG with atrial fibrillation.  .  Temp 97.0.  She was also found to have a urinary tract infection has been treated with Rocephin.    Diagnosis: Intracerebral hemorrhage, urinary tract infection, history of atrial fibrillation on anticoagulation with warfarin, history of aortic valve stenosis on antiplatelet therapy with Plavix, likely underlying vascular dementia    Work up to date:  8/3 CTH WO: Intraventricular hemorrhage within the both lateral ventricles, increased density within the interhemispheric fissure inferiorly suggestive of a subarachnoid hemorrhage, multiple old bilateral infarcts with lacunar disease.  Severe small vessel disease.  Prominent diffuse atrophy.  8/4 CTA H/N: No evidence of aneurysm involving the anterior communicating artery, moderate stenosis at the origin of the right A2, known areas of intraventricular hemorrhage unchanged.  8/6 CTH WO: Stable, no change in  intraventricular hemorrhage.  Again showing severe small vessel disease, diffuse atrophy, and multiple old bilateral cerebellar and left thalamic infarcts.  8/3 MRI Brain W/WO: Several small areas of globular intraparenchymal hemorrhage extending into the anterior third ventricle adjacent to the anterior communicating artery, tiny amount of blood layering in the occipital horns.  Prominent diffuse atrophy, severe small vessel disease.  8/3 MRA H/N: No aneurysm identified.  8/4 arteriogram: No evidence of aneurysm, AVM, or dural AV fistula.  Multifocal segmental narrowing of the cortical vessels, most significant in the bilateral A2 and A3 segments that most likely represents atherosclerotic disease.  Labs: CRP 1.6/1.00, U/A 4+ bacteria/3+ leuks, urine cx + E. Coli, respiratory panel negative, ESR 10    Plan:   Mental status improved today.  She was much more alert and awake, still remains with some confusion but likely has underlying vascular dementia and close to baseline.  MRI did show multiple areas of chronic micro bleeds which suggest the possibility of amyloid.  Given the history of patient's atrial fibrillation, multifocal stenosis most prominent within the right LIOR, we recommend continuing anticoagulation for chronic atrial fibrillation but would not continue antiplatelet therapy.  Plans for repeat CT head on 8/10 and if stable restart warfarin with no bridge.  CURT/SCDs  PT/OT/ST  Therapies as written. CCP for discharge planning. Call RRT for any acute neurological changes. We will see her again on Monday after CT. Please call us back with questions.    Case discussed with patient, RN, and Dr. Pappas, and he agrees with plan above.   KAREN Granger

## 2020-08-06 NOTE — PROGRESS NOTES
Continued Stay Note  [facility]     Patient Name: Jihan Teresa  MRN: [id number]  Today's Date: 8/6/2020    Admit Date: 8/3/2020    Discharge Plan     Row Name 08/06/20 1040       Plan    Plan  Home with daughter Dione    Plan Comments  Call to poli Parham to discuss/ verify DC plans  Pt states to speak to daughter for plans.  Message left        Discharge Codes    No documentation.             Mera Boles RN

## 2020-08-06 NOTE — PROGRESS NOTES
"[facility] PULMONARY CARE         Dr Crawford Sayied   LOS: 3 days   Patient Care Team:  Yaron Ca Jr., MD as PCP - General (Family Medicine)  Yaron Ca Jr., MD as PCP - Claims Attributed    Chief Complaint: Intracerebral hemorrhage A. fib coagulopathy UTI    Interval History: More awake and following commands today.  No issues reported.    REVIEW OF SYSTEMS:   CARDIOVASCULAR: No chest pain, chest pressure or chest discomfort. No palpitations or edema.   RESPIRATORY: No shortness of breath, cough or sputum.   GASTROINTESTINAL: No anorexia, nausea, vomiting or diarrhea. No abdominal pain or blood.   HEMATOLOGIC: No bleeding or bruising.     Ventilator/Non-Invasive Ventilation Settings (From admission, onward)    None            Vital Signs  Temp:  [97.3 °F (36.3 °C)-98.5 °F (36.9 °C)] 97.3 °F (36.3 °C)  Heart Rate:  [70-85] 75  Resp:  [16-20] 16  BP: (132-163)/() 136/90    Intake/Output Summary (Last 24 hours) at 8/6/2020 1257  Last data filed at 8/6/2020 0930  Gross per 24 hour   Intake 1280 ml   Output 750 ml   Net 530 ml     Flowsheet Rows      First Filed Value   Admission Height  152.4 cm (60\") Documented at 08/03/2020 1133   Admission Weight  68 kg (149 lb 14.4 oz) Documented at 08/03/2020 0857          Physical Exam:  Patient is examined using the personal protective equipment as per guidelines from infection control for this particular patient as enacted.  Hand hygiene was performed before and after patient interaction.   General Appearance:    Alert, cooperative, in no acute distress.  Following simple commands  Neck midline trachea no thyromegaly   Lungs:     Clear to auscultation,respirations regular, even and                  unlabored    Heart:    Regular rhythm and normal rate, normal S1 and S2, no            murmur, no gallop, no rub, no click   Chest Wall:    No abnormalities observed   Abdomen:     Normal bowel sounds, no masses, no organomegaly, soft        non-tender, non-distended, " no guarding, no rebound                tenderness   Extremities:   Moves all extremities well, no edema, no cyanosis, no             redness  CNS no focal neurological deficits normal sensory exam  Skin no rashes no nodules  Musculoskeletal no cyanosis no clubbing normal range of motion     Results Review:        Results from last 7 days   Lab Units 08/05/20  0853 08/04/20  0934 08/03/20  0804   SODIUM mmol/L 141 141 134*   POTASSIUM mmol/L 3.6 3.7 3.8   CHLORIDE mmol/L 106 104 99   CO2 mmol/L 23.2 26.7 29.0   BUN mg/dL 7* 8 9   CREATININE mg/dL 0.69 0.73 0.81   GLUCOSE mg/dL 118* 134* 126*   CALCIUM mg/dL 9.0 9.8 9.1     Results from last 7 days   Lab Units 08/04/20  0934 08/03/20  0804   TROPONIN T ng/mL <0.010 <0.010     Results from last 7 days   Lab Units 08/05/20  0853 08/04/20  0934 08/03/20  0804   WBC 10*3/mm3 10.77 12.24* 9.11   HEMOGLOBIN g/dL 13.4 14.1 12.7   HEMATOCRIT % 39.5 44.5 38.5   PLATELETS 10*3/mm3 210 268 244     Results from last 7 days   Lab Units 08/04/20  0934 08/03/20  2342 08/03/20  1541 08/03/20  0804   INR  1.24* 1.21* 1.26* 2.43*   APTT seconds  --   --   --  32.7         Results from last 7 days   Lab Units 08/04/20  0820   MAGNESIUM mg/dL 2.5*               I reviewed the patient's new clinical results.  I personally viewed and interpreted the patient's CXR        Medication Review:     cefdinir 300 mg Oral Q12H   isosorbide mononitrate 60 mg Oral Daily   levothyroxine 75 mcg Oral QAM   metoprolol tartrate 25 mg Oral Q12H            ASSESSMENT:   Intracerebral hemorrhage  Coagulopathy  A. fib  UTI  Diabetes mellitus  Hypothyroidism    PLAN:  Neuro status remained stable.  Angiogram results reviewed  Blood pressure management keep systolic less than 140  A. fib currently on rate control.  Cardiology currently following.  Currently holding anticoagulation.  Restart anticoagulation as per neurosurgery, neurology and cardiology's recommendations  Oral antibiotics for E. coli in the  urine  Blood glucose management per ICU protocol  Diet advanced.  PT OT  Will likely need rehab at discharge    Yvette Evans MD  08/06/20  12:57

## 2020-08-06 NOTE — PLAN OF CARE
Problem: Patient Care Overview  Goal: Plan of Care Review  Outcome: Ongoing (interventions implemented as appropriate)   Patient to chair with PT for approximately 3 hours. Max assist of 2 for back to bed. Omnicef changed to Keflex per family request as patient typically tolerates this well. I&O cath for urine specimen as patient was unable to void on the bedpan. No complaints of pain. Patient very confused as to time/place/situation. Patient hallucinating people in her room; per family this (hallucination and confusion) happens when she has a UTI. Patient telemetry overflow and waiting for a room to become available. Diet advanced to regular; patient requires assistance and queuing secondary to confusion.      Problem: Patient Care Overview  Goal: Individualization and Mutuality  Outcome: Ongoing (interventions implemented as appropriate)     Problem: Fall Risk (Adult)  Goal: Identify Related Risk Factors and Signs and Symptoms  Outcome: Ongoing (interventions implemented as appropriate)     Problem: Fall Risk (Adult)  Goal: Absence of Fall  Outcome: Ongoing (interventions implemented as appropriate)     Problem: Skin Injury Risk (Adult)  Goal: Identify Related Risk Factors and Signs and Symptoms  Outcome: Ongoing (interventions implemented as appropriate)     Problem: Skin Injury Risk (Adult)  Goal: Skin Health and Integrity  Outcome: Ongoing (interventions implemented as appropriate)     Problem: Stroke (Hemorrhagic) (Adult)  Goal: Signs and Symptoms of Listed Potential Problems Will be Absent, Minimized or Managed (Stroke)  Outcome: Ongoing (interventions implemented as appropriate)

## 2020-08-06 NOTE — PLAN OF CARE
Problem: Patient Care Overview  Goal: Plan of Care Review  Flowsheets (Taken 8/6/2020 1203)  Outcome Summary: Pt is more alert than yesterday, is A&O x3, and is better at following commands. Pt remains very weak, requiring mod a x2 to sit EOB, and max a x2 to perform sit to stand and to maintain standing. Pt was able to take a few side steps to chair but again required max a x2 for this. Pt still requires skilled PT to improve strength. D/c is still anticipated to SNF. (Pended)  Note:   Patient was wearing a face mask during this therapy encounter. Therapist used appropriate personal protective equipment including eye protection, mask, and gloves.  Mask used was standard procedure mask. Appropriate PPE was worn during the entire therapy session. Hand hygiene was completed before and after therapy session. Patient is not in enhanced droplet precautions.

## 2020-08-06 NOTE — PROGRESS NOTES
"NEUROSURGERY PROGRESS NOTE     LOS: 3 days   Patient Care Team:  Yaron Ca Jr., MD as PCP - General (Family Medicine)  Yaron Ca Jr., MD as PCP - Claims Attributed    Chief Complaint:  Confusion persists    Subjective     Interval History:     Much more alert today.  Denies headache.  Had held transfer yesterday due to decreased alertness. Likely related to anesthesia from cerebral angiogram.  Evaluated by neurology yesterday.  Appreciate Dr. Malone's input.  Neurology does not feel any further work-up is needed guarding the intracerebral/intraventricular hemorrhage.  The patient has A. fib and therefore neurology recommends return to anticoagulation with Coumadin only when cleared by neurosurgery.  Neurology does not feel that Plavix is warranted.      History taken from: patient Patient unable to give history due to patient confusion. Able to answer simple questions.     Objective      Vital Signs  Temp:  [97.4 °F (36.3 °C)-98.5 °F (36.9 °C)] 97.4 °F (36.3 °C)  Heart Rate:  [70-85] 80  Resp:  [16-20] 16  BP: (132-163)/() 149/68  Body mass index is 27.86 kg/m².      Physical Exam    Smiling, makes eye contact. PERRL. Face symmetric.  Confused to situation but oriented to name and place. Year \"2022 something.\"   Followed commands x 4 extremities.    Results Review:     I reviewed the patient's new clinical results.  I reviewed the patient's new imaging results and agree with the interpretation.  Discussed with Dr. De La Cruz    Labs:    Lab Results (last 24 hours)     Procedure Component Value Units Date/Time    POC Glucose Once [[id number]]  (Normal) Collected:  08/05/20 1217    Specimen:  Blood Updated:  08/05/20 1254     Glucose 122 mg/dL     POC Glucose Once [[id number]]  (Normal) Collected:  08/05/20 1530    Specimen:  Blood Updated:  08/05/20 1532     Glucose 125 mg/dL     POC Glucose Once [[id number]]  (Normal) Collected:  08/06/20 0807    Specimen:  Blood Updated:  08/06/20 0809     Glucose 122 " mg/dL           Imaging:    CT SCAN OF THE HEAD WITHOUT CONTRAST     CLINICAL HISTORY: Follow-up [facility].     COMPARISON: Comparison is made to previous CT scan of head dated  08/04/2020.     FINDINGS:     No interval change in small dependent intraventricular hemorrhage within the posterior lateral ventricles.Tiny amount of intraventricular hemorrhage also noted in right fourth ventricle. No interval change in small acute interhemispheric hemorrhage just anterior to the  third ventricle or the small areas of subacute bilateral anterior and inferior frontal lobe hemorrhage.  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        Current Medications:   Scheduled Meds:  cefdinir 300 mg Oral Q12H   isosorbide mononitrate 60 mg Oral Daily   levothyroxine 75 mcg Oral QAM   metoprolol tartrate 25 mg Oral Q12H     Continuous Infusions:  niCARdipine 5-15 mg/hr Last Rate: Stopped (08/03/20 1600)       Assessment/Plan       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)    ICH (intracerebral hemorrhage) (CMS/HCC)    Hx of Atrial Fibrillation; coumadin on hold    Plan:           Okay for transfer to ICU from neurosurgical standpoint.     Mobilize, will likely need rehab.     Per Dr. De La Cruz, continue to hold Coumadin for now     Will follow      KAREN Velasco  08/06/20  10:08

## 2020-08-06 NOTE — PROGRESS NOTES
NEUROSURGERY PROGRESS NOTE    LOS 1 day  Patient Care Team:  Yaron Ca Jr., MD as PCP - General (Family Medicine)  Yaron Ca Jr., MD as PCP - Claims Attributed    I believe my previous note from yesterday, 8/4/20 was somehow inadvertently deleted.     Chief Complaint:  Confusion    Subjective     Interval History:     Denies headache, weakness, nausea or vomiting.      History taken from: patient chart    Objective      Vital Signs    98.0, 77, 19, 139/62    Physical Exam    Awake, oriented to name only.   Converses well, some confusion.  PERRL. Unable to participate with EOM exam  Face symmetric. Tongue midline.   No drift. Follows commands x 4 extremities.    Results Review:     I reviewed the patient's new clinical results.  I reviewed the patient's new imaging results and agree with the interpretation.  Discussed with Dr. De La Cruz and Dr. Bunch    Labs:       8/4/2020 09:34   Glucose 134 (H)   Sodium 141   Potassium 3.7   CO2 26.7   Chloride 104   Anion Gap 10.3   Creatinine 0.73   BUN 8   BUN/Creatinine Ratio 11.0   Calcium 9.8   eGFR Non African Am 76   Protime 15.4 (H)   INR 1.24 (H)   WBC 12.24 (H)   RBC 5.09   Hemoglobin 14.1   Hematocrit 44.5   RDW 15.8 (H)   MCV 87.4   MCH 27.7   MCHC 31.7   MPV 8.6   Platelets 268   RDW-SD 50.3       Imaging:    MR SCAN OF THE BRAIN WITHOUT AND WITH INTRAVENOUS CONTRAST 8/3/2020 1832     HISTORY: Generalized weakness and confusion. Has abnormal CT scan showing localized hemorrhage anterior to the third ventricle and tiny amount of hemorrhage layering in the occipital horns.     FINDINGS: Prominent diffuse atrophy and extensive chronic small vessel ischemic change. Several small areas of globular intraparenchymal hemorrhage extending anterior to the third ventricle  and immediately adjacent to the anterior communicating artery. Tiny amount of blood layering in the occipital horns; unchanged from previous CT scan. No evidence of acute infarct. No abnormal  enhancement or mass effect.     MR ANGIOGRAPHY OF THE BRAIN WITHOUT CONTRAST     FINDINGS: MR angiography of the brain was performed without contrast and again demonstrates the areas of globular hemorrhage in the region of the anterior communicating artery. No aneurysm is currently identified in this region, perhaps related to thrombus within the aneurysm. No aneurysm is seen in this region on an earlier CT angiography of the brain dated 06/13/2017. The remainder of the intracranial MR angiography is unremarkable.        Current Medications:   Scheduled Meds:  cefdinir 300 mg Oral Q12H   famotidine 20 mg Intravenous Once   isosorbide mononitrate 60 mg Oral Daily   levothyroxine 75 mcg Oral QAM   metoprolol tartrate 25 mg Oral Q12H   sodium chloride 10 mL Intravenous Q12H       Assessment/Plan       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)    ICH (intracerebral hemorrhage) (CMS/HCC)      Plan:  I discussed the MRI/MRA results with Dr. De La Cruz by telephone.  Given the odd appearance of this small hemorrhage and its close proximity to the anterior communicating artery, I will consult with Dr. Bunch, our neurovascular neurosurgeon and have him weigh in on this case to determine whether a four-vessel cerebral angiogram is needed.      Eulalia Leal, KAREN  08/05/20  20:03

## 2020-08-07 NOTE — PROGRESS NOTES
"[facility] PULMONARY CARE         Dr Crawford Sayied   LOS: 4 days   Patient Care Team:  Yaron Ca Jr., MD as PCP - General (Family Medicine)  Yaron Ca Jr., MD as PCP - Claims Attributed    Chief Complaint: Intracerebral hemorrhage A. fib coagulopathy UTI    Interval History: More awake and following commands today.  No issues reported.  No overnight issues reported    REVIEW OF SYSTEMS:   CARDIOVASCULAR: No chest pain, chest pressure or chest discomfort. No palpitations or edema.   RESPIRATORY: No shortness of breath, cough or sputum.   GASTROINTESTINAL: No anorexia, nausea, vomiting or diarrhea. No abdominal pain or blood.   HEMATOLOGIC: No bleeding or bruising.     Ventilator/Non-Invasive Ventilation Settings (From admission, onward)    None            Vital Signs  Temp:  [97.2 °F (36.2 °C)-98.4 °F (36.9 °C)] 98.4 °F (36.9 °C)  Heart Rate:  [69-81] 77  Resp:  [16] 16  BP: (132-165)/() 138/77    Intake/Output Summary (Last 24 hours) at 8/7/2020 1446  Last data filed at 8/7/2020 0630  Gross per 24 hour   Intake 240 ml   Output 250 ml   Net -10 ml     Flowsheet Rows      First Filed Value   Admission Height  152.4 cm (60\") Documented at 08/03/2020 1133   Admission Weight  68 kg (149 lb 14.4 oz) Documented at 08/03/2020 0857          Physical Exam:  Patient is examined using the personal protective equipment as per guidelines from infection control for this particular patient as enacted.  Hand hygiene was performed before and after patient interaction.   General Appearance:    Alert, cooperative, in no acute distress.  Following simple commands  Neck midline trachea no thyromegaly   Lungs:     Clear to auscultation,respirations regular, even and                  unlabored    Heart:    Regular rhythm and normal rate, normal S1 and S2, no            murmur, no gallop, no rub, no click   Chest Wall:    No abnormalities observed   Abdomen:     Normal bowel sounds, no masses, no organomegaly, soft        " non-tender, non-distended, no guarding, no rebound                tenderness   Extremities:   Moves all extremities well, no edema, no cyanosis, no             redness  CNS no focal neurological deficits normal sensory exam  Skin no rashes no nodules  Musculoskeletal no cyanosis no clubbing normal range of motion     Results Review:        Results from last 7 days   Lab Units 08/07/20  0656 08/05/20  0853 08/04/20  0934   SODIUM mmol/L 140 141 141   POTASSIUM mmol/L 3.1* 3.6 3.7   CHLORIDE mmol/L 106 106 104   CO2 mmol/L 23.5 23.2 26.7   BUN mg/dL 7* 7* 8   CREATININE mg/dL 0.64 0.69 0.73   GLUCOSE mg/dL 111* 118* 134*   CALCIUM mg/dL 8.8 9.0 9.8     Results from last 7 days   Lab Units 08/04/20  0934 08/03/20  0804   TROPONIN T ng/mL <0.010 <0.010     Results from last 7 days   Lab Units 08/07/20  0656 08/05/20  0853 08/04/20  0934   WBC 10*3/mm3 10.15 10.77 12.24*   HEMOGLOBIN g/dL 12.2 13.4 14.1   HEMATOCRIT % 37.5 39.5 44.5   PLATELETS 10*3/mm3 189 210 268     Results from last 7 days   Lab Units 08/04/20  0934 08/03/20  2342 08/03/20  1541 08/03/20  0804   INR  1.24* 1.21* 1.26* 2.43*   APTT seconds  --   --   --  32.7         Results from last 7 days   Lab Units 08/04/20  0820   MAGNESIUM mg/dL 2.5*               I reviewed the patient's new clinical results.  I personally viewed and interpreted the patient's CXR        Medication Review:     cephalexin 500 mg Oral Q8H   isosorbide mononitrate 60 mg Oral Daily   levothyroxine 75 mcg Oral QAM   metoprolol tartrate 25 mg Oral Q12H            ASSESSMENT:   Intracerebral hemorrhage  Coagulopathy  A. fib  UTI  Diabetes mellitus  Hypothyroidism    PLAN:  Neuro status remained stable.  Cerebral angiogram results reviewed  Blood pressure management keep systolic less than 140  A. fib currently on rate control.  Cardiology currently following.  Currently holding anticoagulation.  Restart anticoagulation as per neurosurgery, neurology and cardiology's  recommendations  Oral antibiotics for E. coli in the urine.  Repeat UA no evidence of infection  Blood glucose management per ICU protocol  Diet advanced.  PT OT  Will likely need rehab at discharge    Yvette Evans MD  08/07/20  14:46

## 2020-08-07 NOTE — PROGRESS NOTES
Continued Stay Note  [facility]     Patient Name: Jihan Teresa  MRN: [id number]  Today's Date: 8/7/2020    Admit Date: 8/3/2020    Discharge Plan     Row Name 08/07/20 1613       Plan    Plan  Plan is skilled bed at [facility] upon DC.      Plan Comments  [facility]/W [facility], they cannot accept.  [facility] - pt is approved at [facility] and bed is available over the weekend if pt is ready for DC.  Packet placed on chart.         Discharge Codes    No documentation.             Janie Smith RN

## 2020-08-07 NOTE — THERAPY TREATMENT NOTE
Patient Name: Jihan Teresa  : 1933    MRN: [id number]                              Today's Date: 2020       Admit Date: 8/3/2020    Visit Dx:     ICD-10-CM ICD-9-CM   1. Nontraumatic intracerebral hemorrhage, unspecified cerebral location, unspecified laterality (CMS/HCC) I61.9 431   2. Acute UTI N39.0 599.0   3. 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) I61.0 431     Patient Active Problem List   Diagnosis   • Type 2 diabetes mellitus (CMS/HCC)   • Aortic valve replaced, equine valve   • Cerebral infarction (CMS/HCC)   • A-fib (CMS/[facility])   • GERD without esophagitis   • Sleep apnea in adult   • Cognitive dysfunction   • Hypothyroidism (acquired)   • History of recurrent UTIs   • Mild reactive airways disease   • Essential hypertension   • Pulmonary hypertension (CMS/HCC)   • B12 deficiency   • Non-rheumatic mitral regurgitation   • Non-rheumatic tricuspid valve insufficiency   • Chronic anemia   • Grade III hemorrhoids   • ICH (intracerebral hemorrhage) (CMS/[facility])   • 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)     Past Medical History:   Diagnosis Date   • Anemia    • Aortic valve stenosis     S/p AVR in    • Asthma    • Atelectasis    • CHF (congestive heart failure) (CMS/[facility])    • Congenital heart disease    • Diabetes mellitus (CMS/HCC)    • Esophageal reflux    • Essential hypertension    • HLD (hyperlipidemia)    • Hypotension    • LVH (left ventricular hypertrophy)    • Pleural effusion    • Pulmonary hypertension (CMS/HCC)      Past Surgical History:   Procedure Laterality Date   • AORTIC VALVE REPAIR/REPLACEMENT  2009    Jerry Colmenares MD   • CATARACT EXTRACTION     • KNEE ARTHROPLASTY       General Information     Row Name 20 1424          PT Evaluation Time/Intention    Document Type  therapy note (daily note)  (Pended)   -AR     Mode of Treatment  individual therapy;physical therapy  (Pended)   -AR     Row Name  08/07/20 1424          General Information    Existing Precautions/Restrictions  fall  (Pended)   -AR     Barriers to Rehab  cognitive status  (Pended)   -AR     Row Name 08/07/20 1424          Cognitive Assessment/Intervention- PT/OT    Orientation Status (Cognition)  oriented x 3;disoriented to;time  (Pended)   -AR       User Key  (r) = Recorded By, (t) = Taken By, (c) = Cosigned By    Initials Name Provider Type    Raghu Rajan, PT Student PT Student        Mobility     Row Name 08/07/20 1425          Bed Mobility Assessment/Treatment    Bed Mobility Assessment/Treatment  supine-sit  (Pended)   -AR     Supine-Sit [place] (Bed Mobility)  maximum assist (25% patient effort);2 person assist  (Pended)   -AR     Assistive Device (Bed Mobility)  head of bed elevated;draw sheet  (Pended)   -AR     Row Name 08/07/20 1425          Bed-Chair Transfer    Bed-Chair [place] (Transfers)  2 person assist;dependent (less than 25% patient effort)  (Pended)   -AR     Row Name 08/07/20 1425          Sit-Stand Transfer    Sit-Stand [place] (Transfers)  2 person assist;dependent (less than 25% patient effort)  (Pended)   -AR     Row Name 08/07/20 1425          Gait/Stairs Assessment/Training    Distance in Feet (Gait)  pivot to chair  (Pended)   -AR       User Key  (r) = Recorded By, (t) = Taken By, (c) = Cosigned By    Initials Name Provider Type    Raghu Rajan, PT Student PT Student        Obj/Interventions     Row Name 08/07/20 1426          Static Sitting Balance    Level of [place] (Unsupported Sitting, Static Balance)  moderate assist, 50 to 74% patient effort;1 person assist  (Pended)   -AR     Sitting Position (Unsupported Sitting, Static Balance)  sitting on edge of bed  (Pended)   -AR     Time Able to Maintain Position (Unsupported Sitting, Static Balance)  2 to 3 minutes  (Pended)   -AR     Row Name 08/07/20 1426          Static Standing Balance    Level of [place] (Supported Standing, Static  Balance)  dependent, less than 25% patient effort;2 person assist  (Pended)   -AR     Time Able to Maintain Position (Supported Standing, Static Balance)  less than 15 seconds  (Pended)   -AR       User Key  (r) = Recorded By, (t) = Taken By, (c) = Cosigned By    Initials Name Provider Type    Raghu Rajan, PT Student PT Student        Goals/Plan    No documentation.       Clinical Impression     Row Name 08/07/20 1427          Pain Scale: Numbers Pre/Post-Treatment    Pain Scale: Numbers, Pretreatment  0/10 - no pain  (Pended)   -AR     Pain Scale: Numbers, Post-Treatment  0/10 - no pain  (Pended)   -AR     Row Name 08/07/20 1427          Plan of Care Review    Plan of Care Reviewed With  patient  (Pended)   -AR     Progress  improving  (Pended)   -AR     Outcome Summary  Pt remains pleasantly confused and A&O x3, disoriented to time. She reports no pain today. She still requires max a x2 to perform supine to sit transfer. Her sitting balance remains poor, and she remains a dependent bed to chair transfer. Pt was better able to follow commands today, but functionally she has not shown much progress as she remains extremely weak. Discharge still expected to SNF.  (Pended)   -AR     Row Name 08/07/20 1427          Positioning and Restraints    Pre-Treatment Position  in bed  (Pended)   -AR     Post Treatment Position  chair  (Pended)   -AR     In Chair  reclined;call light within reach;encouraged to call for assist;exit alarm on;notified nsg  (Pended)   -AR       User Key  (r) = Recorded By, (t) = Taken By, (c) = Cosigned By    Initials Name Provider Type    Raghu Rajan, PT Student PT Student        Outcome Measures     Row Name 08/07/20 1432          How much help from another person do you currently need...    Turning from your back to your side while in flat bed without using bedrails?  2  (Pended)   -AR     Moving from lying on back to sitting on the side of a flat bed without bedrails?  2  (Pended)   -AR      Moving to and from a bed to a chair (including a wheelchair)?  1  (Pended)   -AR     Standing up from a chair using your arms (e.g., wheelchair, bedside chair)?  1  (Pended)   -AR     Climbing 3-5 steps with a railing?  1  (Pended)   -AR     To walk in hospital room?  1  (Pended)   -AR     AM-PAC 6 Clicks Score (PT)  8  (Pended)   -AR     Row Name 08/07/20 1432          Functional Assessment    Outcome Measure Options  AM-PAC 6 Clicks Basic Mobility (PT)  (Pended)   -AR       User Key  (r) = Recorded By, (t) = Taken By, (c) = Cosigned By    Initials Name Provider Type    Raghu Rajan, PT Student PT Student        Physical Therapy Education                 Title: PT OT SLP Therapies (Done)     Topic: Physical Therapy (Done)     Point: Mobility training (Done)     Description:   Instruct learner(s) on safety and technique for assisting patient out of bed, chair or wheelchair.  Instruct in the proper use of assistive devices, such as walker, crutches, cane or brace.              Patient Friendly Description:   It's important to get you on your feet again, but we need to do so in a way that is safe for you. Falling has serious consequences, and your personal safety is the most important thing of all.        When it's time to get out of bed, one of us or a family member will sit next to you on the bed to give you support.     If your doctor or nurse tells you to use a walker, crutches, a cane, or a brace, be sure you use it every time you get out of bed, even if you think you don't need it.    Learning Progress Summary           Patient Acceptance, E, DU,NR by AR at 8/7/2020 1432    Acceptance, E, DU,NR by AR at 8/6/2020 1208    Acceptance, E,D, NR by CG at 8/5/2020 1410    Comment:  does not appear to retain information.  CGRESSRN    Acceptance, E, NL,NR by AR at 8/5/2020 1204    Acceptance, E, DU,NR by AR at 8/4/2020 1453                   Point: Home exercise program (Done)     Description:   Instruct learner(s) on  appropriate technique for monitoring, assisting and/or progressing patient with therapeutic exercises and activities.              Learning Progress Summary           Patient Acceptance, E, DU,NR by AR at 8/7/2020 1432    Acceptance, E, DU,NR by AR at 8/6/2020 1208    Acceptance, E,D, NR by CG at 8/5/2020 1410    Comment:  does not appear to retain information.  CGRESSRN    Acceptance, E, NL,NR by AR at 8/5/2020 1204    Acceptance, E, DU,NR by AR at 8/4/2020 1453                   Point: Body mechanics (Done)     Description:   Instruct learner(s) on proper positioning and spine alignment for patient and/or caregiver during mobility tasks and/or exercises.              Learning Progress Summary           Patient Acceptance, E, DU,NR by AR at 8/7/2020 1432    Acceptance, E, DU,NR by AR at 8/6/2020 1208    Acceptance, E,D, NR by CG at 8/5/2020 1410    Comment:  does not appear to retain information.  CGRESSRN    Acceptance, E, NL,NR by AR at 8/5/2020 1204    Acceptance, E, DU,NR by AR at 8/4/2020 1453                   Point: Precautions (Done)     Description:   Instruct learner(s) on prescribed precautions during mobility and gait tasks              Learning Progress Summary           Patient Acceptance, E, DU,NR by AR at 8/7/2020 1432    Acceptance, E, DU,NR by AR at 8/6/2020 1208    Acceptance, E,D, NR by CG at 8/5/2020 1410    Comment:  does not appear to retain information.  CGRESSRN    Acceptance, E, NL,NR by AR at 8/5/2020 1204    Acceptance, E, DU,NR by AR at 8/4/2020 1453                               User Key     Initials Effective Dates Name Provider Type Discipline     06/16/16 -  Gen[place] Villela RN Registered Nurse Nurse    AR 07/02/20 -  Raghu Garcia, PT Student PT Student PT              PT Recommendation and Plan  Planned Therapy Interventions (PT Eval): balance training, bed mobility training, gait training, manual therapy techniques, motor coordination training, patient/family education, stair  training, strengthening, transfer training  Outcome Summary/Treatment Plan (PT)  Anticipated Discharge Disposition (PT): (P) skilled nursing facility  Plan of Care Reviewed With: (P) patient  Progress: (P) improving  Outcome Summary: (P) Pt remains pleasantly confused and A&O x3, disoriented to time. She reports no pain today. She still requires max a x2 to perform supine to sit transfer. Her sitting balance remains poor, and she remains a dependent bed to chair transfer. Pt was better able to follow commands today, but functionally she has not shown much progress as she remains extremely weak. Discharge still expected to SNF.     Time Calculation:   PT Charges     Row Name 08/07/20 1434             Time Calculation    Start Time  1407  (Pended)   -AR      Stop Time  1421  (Pended)   -AR      Time Calculation (min)  14 min  (Pended)   -AR      PT Received On  08/07/20  (Pended)   -AR      PT - Next Appointment  08/08/20  (Pended)   -AR      PT Goal Re-Cert Due Date  08/11/20  (Pended)   -AR        User Key  (r) = Recorded By, (t) = Taken By, (c) = Cosigned By    Initials Name Provider Type    Raghu Rajan, PT Student PT Student        Therapy Charges for Today     Code Description Service Date Service Provider Modifiers Qty    48465406443  PT THERAPEUTIC ACT EA 15 MIN 8/6/2020 Raghu Garcia, PT Student GP 1    31478246124 HC PT THER SUPP EA 15 MIN 8/6/2020 Raghu Garcia, PT Student GP 1    [id number]  PT THERAPEUTIC ACT EA 15 MIN 8/7/2020 Raghu Garcia, PT Student GP 1    56585440460 HC PT THER SUPP EA 15 MIN 8/7/2020 Raghu Garcia, PT Student GP 1          PT G-Codes  Outcome Measure Options: (P) AM-PAC 6 Clicks Basic Mobility (PT)  AM-PAC 6 Clicks Score (PT): (P) 8    Raghu Garcia PT Student  8/7/2020

## 2020-08-07 NOTE — PROGRESS NOTES
"[facility]  Cardiology Progress Note    Patient Identification:  Name: Jihan Teresa  Age: 86 y.o.  Sex: female  :  1933  MRN: [id number]                 Follow Up / Chief Complaint: Atrial fibrillation with ICH    Interval History: No significant change    Subjective: No chest pains     Objective:    Past Medical History:  Past Medical History:   Diagnosis Date   • Anemia    • Aortic valve stenosis     S/p AVR in    • Asthma    • Atelectasis    • CHF (congestive heart failure) (CMS/HCC)    • Congenital heart disease    • Diabetes mellitus (CMS/HCC)    • Esophageal reflux    • Essential hypertension    • HLD (hyperlipidemia)    • Hypotension    • LVH (left ventricular hypertrophy)    • Pleural effusion    • Pulmonary hypertension (CMS/HCC)      Past Surgical History:  Past Surgical History:   Procedure Laterality Date   • AORTIC VALVE REPAIR/REPLACEMENT  2009    Jerry Colmenares MD   • CATARACT EXTRACTION     • KNEE ARTHROPLASTY          Social History:   Social History     Tobacco Use   • Smoking status: Never Smoker   • Smokeless tobacco: Never Used   Substance Use Topics   • Alcohol use: Yes     Comment: ocasional      Family History:  Family History   Problem Relation Age of Onset   • Dementia Sister    • Stroke Brother           Allergies:  Allergies   Allergen Reactions   • Promethazine Other (See Comments)     Pt becomes \"out of it\" when on this medication  Pt becomes \"out of it\" when on this medication     Scheduled Meds:    cephalexin 500 mg Q8H   isosorbide mononitrate 60 mg Daily   levothyroxine 75 mcg QAM   metoprolol tartrate 25 mg Q12H           INTAKE AND OUTPUT:    Intake/Output Summary (Last 24 hours) at 2020 1614  Last data filed at 2020 0630  Gross per 24 hour   Intake 240 ml   Output 250 ml   Net -10 ml     /64 (BP Location: Left arm, Patient Position: Sitting)   Pulse 69   Temp 98 °F (36.7 °C) (Oral)   Resp 16   Ht 152.4 cm (60\")   Wt 64.7 kg " (142 lb 10.2 oz)   SpO2 94%   BMI 27.86 kg/m²   General appearance: No acute changes   Neck: Trachea midline; NECK, supple, no thyromegaly or lymphadenopathy   Lungs: Normal size and shape, normal breath sounds, equal distribution of air, no rales and rhonchi   CV: S1-S2 regular, no murmurs, no rub, no gallop   Abdomen: Soft, non-tender; no masses , no abnormal abdominal sounds   Extremities: No deformity , normal color , no peripheral edema   Skin: Normal temperature, turgor and texture; no rash, ulcers                 Cardiographics  Telemetry:       A. Fib      Atrial fibrillation      Echocardiogram:   ECG:          Echocardiogram:   2017  Interpretation Summary      · Calculated EF = 46.9%.  · Left ventricular systolic function is low normal.  · Left amd right atrial cavity size is severely dilated.  · Right ventricular cavity is severely dilated.  · calcification of the aortic valve  · Severe mitral valve regurgitation is present  · Mitral annular calcification is present. Posterior leaflet very calcified and immobile.  · Severe tricuspid valve regurgitation is present.  · Estimated right ventricular systolic pressure from tricuspid regurgitation is markedly elevated (>55 mmHg). Calculated right ventricular systolic pressure from tricuspid regurgitation is 66.7 mmHg.         Imaging  Chest X-ray:   Study Result      EXAMINATION: SINGLE VIEW CHEST RADIOGRAPH     HISTORY: 86-year-old female with history of generalized weakness.     FINDINGS: An upright AP portable chest radiograph was obtained.  Comparison is made to a chest radiograph dated 06/10/2017. The lungs are  normal in volume and are clear of consolidation. Stable mild scattered  interstitial prominence is seen throughout both lungs. Stable soft  tissue fullness in the right hilum is most consistent with a prominent  right hilar vessel is noted. There are stable cardiomegaly. Midline  sternal wires are noted. Atheromatous consultation seen within  the  aortic arch.     IMPRESSION:  There is stable mild bilateral interstitial scarring and  cardiomegaly with findings suggestive of pulmonary hypertension. No  evidence for superimposed acute process is appreciated.     This report was finalized on 8/3/2020 10:55 AM by Dr. Estuardo Wagner M.D.         CT     Study Result      CT SCAN OF THE HEAD WITHOUT CONTRAST     CLINICAL HISTORY: Generalized weakness and confusion.     TECHNIQUE: CT scan of the head was obtained with 3 mm axial images. No  intravenous contrast was administered.     COMPARISON: Comparison is made to previous CT scan of head dated  06/13/2017.     FINDINGS: There is increased density within the dependent aspects of  both lateral ventricles compatible with intraventricular hemorrhage.  There are foci of increased density noted within the inferior aspect of  the interhemispheric fissure worrisome for subarachnoid hemorrhage. The  etiology of these areas of hemorrhage is uncertain, although a vascular  etiology such as an aneurysm should be excluded. I recommend further  evaluation with an MRA or CTA examination, as well as an MRI of the  brain.     There are multiple chronic infarcts identified within both cerebellar  hemispheres, left greater than right. The largest of these chronic  infarcts measures up to approximately 1.4 x 0.4 cm in greatest axial  dimensions. Old lacunar disease is seen within the left thalamus. A  chronic lacune identified within the anterior aspect of the left  thalamus measuring up to 4-5 mm in diameter. Hypodense areas are  identified within the lentiform nuclei bilaterally that are likely  representative of a combination of enlarged perivascular spaces and old  lacunar disease. Severe changes of chronic small vessel ischemic  phenomena are identified.     Otherwise, the ventricles, sulci, and cisterns are age appropriate.     IMPRESSION:     There is intraventricular hemorrhage identified within the dependent  aspects  "of both lateral ventricles. Additionally, there is increased  density seen within the inferior aspect of the interhemispheric fissure  suggestive of subarachnoid hemorrhage. The precise etiology of this  hemorrhage is uncertain on the basis of this head CT. However, a  vascular etiology should be excluded. Further evaluation is recommended  with a CTA or MRA study, as well as an MRI of the brain.     These findings and recommendations were discussed with Debbie Wilson on 08/03/2020 at approximately 8:36 AM.     Radiation dose reduction techniques were utilized, including automated  exposure control and exposure modulation based on body size.              Lab Review   Results from last 7 days   Lab Units 08/04/20  0934 08/03/20  0804   TROPONIN T ng/mL <0.010 <0.010     Results from last 7 days   Lab Units 08/04/20  0820   MAGNESIUM mg/dL 2.5*     Results from last 7 days   Lab Units 08/07/20  0656   SODIUM mmol/L 140   POTASSIUM mmol/L 3.1*   BUN mg/dL 7*   CREATININE mg/dL 0.64   CALCIUM mg/dL 8.8        Results from last 7 days   Lab Units 08/07/20  0656 08/05/20  0853 08/04/20  0934   WBC 10*3/mm3 10.15 10.77 12.24*   HEMOGLOBIN g/dL 12.2 13.4 14.1   HEMATOCRIT % 37.5 39.5 44.5   PLATELETS 10*3/mm3 189 210 268     Results from last 7 days   Lab Units 08/04/20  0934 08/03/20  2342 08/03/20  1541 08/03/20  0804   INR  1.24* 1.21* 1.26* 2.43*   APTT seconds  --   --   --  32.7     The following medical decision was discussed in detail with Dr. Mcmahan    Assessment:  1. Intracerebral hemorrhage  2.  Chronic atrial fibrillation: Coumadin on hold due to bleeding.  3.  Bioprosthetic aortic valve replaced in 2009  5. coagulopathy  6.  UTI  7.  Diabetes mellitus  8. Hypothyroidism  9. CAD  10. Hypertension    Plan:  Anticoagulation on hold will be restarted once we get the clearance and okay from family         )8/7/2020  Bogdan Mcmahan MD       EMR Dragon/Transcription:   \"Dictated utilizing Dragon " "dictation\".     "

## 2020-08-07 NOTE — PROGRESS NOTES
Continued Stay Note  [facility]     Patient Name: Jihan Teresa  MRN: [id number]  Today's Date: 8/7/2020    Admit Date: 8/3/2020    Discharge Plan     Row Name 08/07/20 1026       Plan    Plan  SNF for sub acute rehab    Plan Comments  Spoke to daughter Dione  She would like referrals to [facility] and [address] choice  CCP following    Row Name 08/07/20 0942       Plan    Plan Comments  Attempted to contact pt's dtr Dione.  Her home # is disconnected and am unable to leave message on her cell phone.  CCP asked pt if Ok to call her other dtrs and she said yes.  Call to her dtrJacqueline ( [phone]) who states she will text her other sisters (Dione and Bharti who live in [place])        Discharge Codes    No documentation.             Mera Boles RN

## 2020-08-07 NOTE — DISCHARGE PLACEMENT REQUEST
"Jihan Teresa N (86 y.o. Female)     Date of Birth Social Security Number Address Home Phone MRN    09/09/1933  [address] [id number] [phone] [id number]    Restorationist Marital Status          [place]        Admission Date Admission Type Admitting Provider Attending Provider Department, Room/Bed    8/3/20 Emergency Yvette Evans MD Sayied, Tausif, MD [address], [address]    Discharge Date Discharge Disposition Discharge Destination                       Attending Provider:  Yvette Evans MD    Allergies:  Promethazine    Isolation:  None   Infection:  None   Code Status:  CPR    Ht:  152.4 cm (60\")   Wt:  64.7 kg (142 lb 10.2 oz)    Admission Cmt:  None   Principal Problem:  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) [I61.0] More...                 Active Insurance as of 8/3/2020     Primary Coverage     Payor Plan Insurance Group Employer/Plan Group    MEDICARE MEDICARE A ONLY      Payor Plan Address Payor Plan Phone Number Payor Plan Fax Number Effective Dates    [address] [phone]  9/1/1998 - None Entered    [place] [id number]       Subscriber Name Subscriber Birth Date Member ID       JIHAN TERESA N 9/9/1933 [id number]                 Emergency Contacts      (Rel.) Home Phone Work Phone Mobile Phone    Dione Teresa (Daughter) [phone] -- [phone]    Lo Moralez (Daughter) [phone] -- [phone]    Bharti Deleon (Daughter) [phone] -- --              "

## 2020-08-07 NOTE — PLAN OF CARE
Problem: Patient Care Overview  Goal: Plan of Care Review  Flowsheets (Taken 8/7/2020 0940)  Outcome Summary: Pt remains pleasantly confused and A&O x3, disoriented to time. She reports no pain today. She still requires max a x2 to perform supine to sit transfer. Her sitting balance remains poor, and she remains a dependent bed to chair transfer. Pt was better able to follow commands today, but functionally she has not shown much progress as she remains extremely weak. Discharge still expected to SNF. (Pended)  Note:   Patient was wearing a face mask during this therapy encounter. Therapist used appropriate personal protective equipment including eye protection, mask, and gloves.  Mask used was standard procedure mask. Appropriate PPE was worn during the entire therapy session. Hand hygiene was completed before and after therapy session. Patient is not in enhanced droplet precautions.

## 2020-08-07 NOTE — PLAN OF CARE
Problem: Patient Care Overview  Goal: Plan of Care Review  Outcome: Ongoing (interventions implemented as appropriate)  Flowsheets (Taken 8/7/2020 0009)  Progress: improving  Plan of Care Reviewed With: patient  Outcome Summary: Pt was transfered from ICU, alert & oriented x2, vss, no distress noticed or reported, Nihss was two, I will continue to monitor.  Goal: Individualization and Mutuality  Outcome: Ongoing (interventions implemented as appropriate)  Goal: Discharge Needs Assessment  Outcome: Ongoing (interventions implemented as appropriate)  Goal: Interprofessional Rounds/Family Conf  Outcome: Ongoing (interventions implemented as appropriate)     Problem: Fall Risk (Adult)  Goal: Identify Related Risk Factors and Signs and Symptoms  Outcome: Ongoing (interventions implemented as appropriate)  Goal: Absence of Fall  Outcome: Ongoing (interventions implemented as appropriate)     Problem: Skin Injury Risk (Adult)  Goal: Identify Related Risk Factors and Signs and Symptoms  Outcome: Ongoing (interventions implemented as appropriate)  Goal: Skin Health and Integrity  Outcome: Ongoing (interventions implemented as appropriate)     Problem: Stroke (Hemorrhagic) (Adult)  Goal: Signs and Symptoms of Listed Potential Problems Will be Absent, Minimized or Managed (Stroke)  Outcome: Ongoing (interventions implemented as appropriate)

## 2020-08-07 NOTE — PROGRESS NOTES
Continued Stay Note  [facility]     Patient Name: Jihan Teresa  MRN: [id number]  Today's Date: 8/7/2020    Admit Date: 8/3/2020    Discharge Plan     Row Name 08/07/20 0942       Plan    Plan Comments  Attempted to contact pt's dtr Dione.  Her home # is disconnected and am unable to leave message on her cell phone.  CCP asked pt if Ok to call her other dtrs and she said yes.  Call to her dtrLo ( [phone]) who states she will text her other sisters (Dione and Bharti who live in [place])        Discharge Codes    No documentation.             Janie Smith RN

## 2020-08-07 NOTE — PROGRESS NOTES
"NEUROSURGERY PROGRESS NOTE     LOS: 4 days   Patient Care Team:  Yaron Ca Jr., MD as PCP - General (Family Medicine)  Yaron Ca Jr., MD as PCP - Claims Attributed    Chief Complaint:  F/U visit for ICH    Subjective     Interval History:     Now out of ICU. Feels \"alright.\" Denies headache.     History taken from: patient chart    Objective      Vital Signs  Temp:  [97.2 °F (36.2 °C)-98.1 °F (36.7 °C)] 97.5 °F (36.4 °C)  Heart Rate:  [69-81] 76  Resp:  [16] 16  BP: (132-165)/() 133/63  Body mass index is 27.86 kg/m².      Physical Exam    AA&O x 3. Knows name, place and date of birth.   Follows commands x 4 extremities.   No drift noted.   L wrist and R groin pucture sites look okay. No hematoma or swelling.     Results Review:     I reviewed the patient's new clinical results.       Labs:    Lab Results (last 24 hours)     Procedure Component Value Units Date/Time    POC Glucose Once [[id number]]  (Normal) Collected:  08/06/20 1556    Specimen:  Blood Updated:  08/06/20 1557     Glucose 126 mg/dL     Urinalysis With Culture If Indicated - Urine, Random Void [[id number]]  (Normal) Collected:  08/06/20 1624    Specimen:  Urine, Random Void Updated:  08/06/20 1703     Color, UA Yellow     Appearance, UA Clear     pH, UA 6.5     Specific Gravity, UA <=1.005     Glucose, UA Negative     Ketones, UA Negative     Bilirubin, UA Negative     Blood, UA Negative     Protein, UA Negative     Leuk Esterase, UA Negative     Nitrite, UA Negative     Urobilinogen, UA 0.2 E.U./dL    Narrative:       Urine microscopic not indicated.    Basic Metabolic Panel [[id number]]  (Abnormal) Collected:  08/07/20 0656    Specimen:  Blood Updated:  08/07/20 0759     Glucose 111 mg/dL      BUN 7 mg/dL      Creatinine 0.64 mg/dL      Sodium 140 mmol/L      Potassium 3.1 mmol/L      Chloride 106 mmol/L      CO2 23.5 mmol/L      Calcium 8.8 mg/dL      eGFR Non African Amer 88 mL/min/1.73      BUN/Creatinine Ratio 10.9     Anion Gap 10.5 " mmol/L     Narrative:       GFR Normal >60  Chronic Kidney Disease <60  Kidney Failure <15      CBC (No Diff) [[id number]]  (Abnormal) Collected:  08/07/20 0656    Specimen:  Blood Updated:  08/07/20 0748     WBC 10.15 10*3/mm3      RBC 4.31 10*6/mm3      Hemoglobin 12.2 g/dL      Hematocrit 37.5 %      MCV 87.0 fL      MCH 28.3 pg      MCHC 32.5 g/dL      RDW 16.4 %      RDW-SD 52.3 fl      MPV 9.2 fL      Platelets 189 10*3/mm3           Current Medications:   Scheduled Meds:  cephalexin 500 mg Oral Q8H   isosorbide mononitrate 60 mg Oral Daily   levothyroxine 75 mcg Oral QAM   metoprolol tartrate 25 mg Oral Q12H     Assessment/Plan       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 De La Cruz   We will check back on Monday.      Eulalia Leal, KAREN  08/07/20  13:12

## 2020-08-07 NOTE — PLAN OF CARE
Problem: Patient Care Overview  Goal: Plan of Care Review  Outcome: Ongoing (interventions implemented as appropriate)  Flowsheets  Taken 8/7/2020 1702 by Shahrzad Palma RN  Plan of Care Reviewed With: patient;daughter  Taken 8/7/2020 1427 by Raghu Garcia PT Student  Outcome Summary: Pt remains pleasantly confused and A&O x3, disoriented to time. She reports no pain today. She still requires max a x2 to perform supine to sit transfer. Her sitting balance remains poor, and she remains a dependent bed to chair transfer. Pt was better able to follow commands today, but functionally she has not shown much progress as she remains extremely weak. Discharge still expected to SNF.  Note:   Pt is pleasantly confused and A and O x 3; disoriented to time.  Pt earlier today had no complaints of pain nor soa.  This afternoon patient c/o headache above right eye.  Tylenol given per orders.  When rechecked on patient if pain decrease patient appeared asleep in chair.  Dr made aware of headache; CT scan ordered for head.  Patient's Vitals are stable and within the range the doctor asked.  Pt is resting in chair with daughter at side.  Will continue to monitor.   Goal: Individualization and Mutuality  Outcome: Ongoing (interventions implemented as appropriate)  Goal: Discharge Needs Assessment  Outcome: Ongoing (interventions implemented as appropriate)  Flowsheets  Taken 8/4/2020 1417 by Mera Boles RN  Concerns to be Addressed: discharge planning  Taken 8/7/2020 1702 by Shahrzad Palma, RN  Patient/Family Anticipates Transition to: inpatient rehabilitation facility  Goal: Interprofessional Rounds/Family Conf  Outcome: Ongoing (interventions implemented as appropriate)  Flowsheets (Taken 8/7/2020 1702)  Participants: ; physician; nursing     Problem: Fall Risk (Adult)  Goal: Identify Related Risk Factors and Signs and Symptoms  Outcome: Ongoing (interventions implemented as appropriate)  Flowsheets (Taken  8/7/2020 1702)  Related Risk Factors (Fall Risk): age-related changes; fatigue/slow reaction; gait/mobility problems; neuro disease/injury; environment unfamiliar  Goal: Absence of Fall  Outcome: Ongoing (interventions implemented as appropriate)  Flowsheets (Taken 8/7/2020 1702)  Absence of Fall: making progress toward outcome     Problem: Skin Injury Risk (Adult)  Goal: Identify Related Risk Factors and Signs and Symptoms  Outcome: Ongoing (interventions implemented as appropriate)  Flowsheets (Taken 8/7/2020 1702)  Related Risk Factors (Skin Injury Risk): advanced age; fluid intake inadequate; hospitalization prolonged; mobility impaired; cognitive impairment; critical care admission  Goal: Skin Health and Integrity  Outcome: Ongoing (interventions implemented as appropriate)  Flowsheets (Taken 8/7/2020 1702)  Skin Health and Integrity: making progress toward outcome     Problem: Stroke (Hemorrhagic) (Adult)  Goal: Signs and Symptoms of Listed Potential Problems Will be Absent, Minimized or Managed (Stroke)  Outcome: Ongoing (interventions implemented as appropriate)  Flowsheets (Taken 8/7/2020 1702)  Problems Assessed (Stroke (Hemorrhagic)): all

## 2020-08-07 NOTE — NURSING NOTE
Pt complaining of headache above right eye.  Gave tylenol for headache when rechecked on patient she was appears to be asleep in chair; no face grimacing noted.  Patient's family asked for RN; stated that patient states has a headache. Explained that tylenol was given and that patient has been asleep since then.  Vitals taken to be sure of /64; 98.0 temp; 69 hr; 16 resp; 94 o2.  .  Call doctor to make them aware of this headache since patient had denied pain earlier today.    1656 per Dr Malone to repeat CT tonight will continue to monitor.

## 2020-08-08 NOTE — THERAPY TREATMENT NOTE
Patient Name: Jihan Teresa  : 1933    MRN: [id number]                              Today's Date: 2020       Admit Date: 8/3/2020    Visit Dx:     ICD-10-CM ICD-9-CM   1. Nontraumatic intracerebral hemorrhage, unspecified cerebral location, unspecified laterality (CMS/HCC) I61.9 431   2. Acute UTI N39.0 599.0   3. 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) I61.0 431     Patient Active Problem List   Diagnosis   • Type 2 diabetes mellitus (CMS/HCC)   • Aortic valve replaced, equine valve   • Cerebral infarction (CMS/HCC)   • A-fib (CMS/HCC)   • GERD without esophagitis   • Sleep apnea in adult   • Cognitive dysfunction   • Hypothyroidism (acquired)   • History of recurrent UTIs   • Mild reactive airways disease   • Essential hypertension   • Pulmonary hypertension (CMS/HCC)   • B12 deficiency   • Non-rheumatic mitral regurgitation   • Non-rheumatic tricuspid valve insufficiency   • Chronic anemia   • Grade III hemorrhoids   • ICH (intracerebral hemorrhage) (CMS/HCC)   • 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)     Past Medical History:   Diagnosis Date   • Anemia    • Aortic valve stenosis     S/p AVR in    • Asthma    • Atelectasis    • CHF (congestive heart failure) (CMS/[facility])    • Congenital heart disease    • Diabetes mellitus (CMS/HCC)    • Esophageal reflux    • Essential hypertension    • HLD (hyperlipidemia)    • Hypotension    • LVH (left ventricular hypertrophy)    • Pleural effusion    • Pulmonary hypertension (CMS/HCC)      Past Surgical History:   Procedure Laterality Date   • AORTIC VALVE REPAIR/REPLACEMENT  2009    Jerry Colmenares MD   • CATARACT EXTRACTION     • KNEE ARTHROPLASTY       General Information     Row Name 20 1533          PT Evaluation Time/Intention    Document Type  therapy note (daily note)  -MW     Mode of Treatment  individual therapy;physical therapy  -MW     Row Name 20 1533           General Information    Patient Profile Reviewed?  yes  -MW     Existing Precautions/Restrictions  fall  -     Row Name 08/08/20 1533          Cognitive Assessment/Intervention- PT/OT    Orientation Status (Cognition)  oriented x 3  -     Row Name 08/08/20 1533          Safety Issues, Functional Mobility    Impairments Affecting Function (Mobility)  balance;cognition;coordination;endurance/activity tolerance;strength;postural/trunk control  -     Comment, Safety Issues/Impairments (Mobility)  Gait belt used for safety.  -       User Key  (r) = Recorded By, (t) = Taken By, (c) = Cosigned By    Initials Name Provider Type    Pauline Alcantara PT Physical Therapist        Mobility     Row Name 08/08/20 1534          Bed Mobility Assessment/Treatment    Bed Mobility Assessment/Treatment  supine-sit-supine  -MW     Supine-Sit-Supine [place] (Bed Mobility)  maximum assist (25% patient effort);2 person assist  -     Assistive Device (Bed Mobility)  head of bed elevated;draw sheet;bed rails  -     Row Name 08/08/20 1534          Transfer Assessment/Treatment    Comment (Transfers)  STS x 3 but unable to stand completely upright, trunk flexed.   -     Row Name 08/08/20 1534          Sit-Stand Transfer    Sit-Stand [place] (Transfers)  2 person assist;maximum assist (25% patient effort)  -     Assistive Device (Sit-Stand Transfers)  walker, front-wheeled  -     Row Name 08/08/20 1534          Gait/Stairs Assessment/Training    [place] Level (Gait)  not tested;unable to assess  -     Comment (Gait/Stairs)  Unable to take steps, partially able to shuffle her RLE forward at RW but then asked to sit.  -       User Key  (r) = Recorded By, (t) = Taken By, (c) = Cosigned By    Initials Name Provider Type    Pauline Alcantara PT Physical Therapist        Obj/Interventions     Row Name 08/08/20 1536          Therapeutic Exercise    Comment (Therapeutic Exercise)  BLE LAQ x 10 sitting EOB  -MW      [facility] Name 08/08/20 1536          Static Sitting Balance    Level of [place] (Unsupported Sitting, Static Balance)  moderate assist, 50 to 74% patient effort  -MW     Sitting Position (Unsupported Sitting, Static Balance)  sitting on edge of bed  -MW     Time Able to Maintain Position (Unsupported Sitting, Static Balance)  more than 5 minutes  -MW     [facility] Name 08/08/20 1536          Static Standing Balance    Level of [place] (Supported Standing, Static Balance)  maximal assist, 25 to 49% patient effort;2 person assist  -MW     Time Able to Maintain Position (Supported Standing, Static Balance)  less than 15 seconds  -MW     Assistive Device Utilized (Supported Standing, Static Balance)  walker, rolling  -MW       User Key  (r) = Recorded By, (t) = Taken By, (c) = Cosigned By    Initials Name Provider Type    Pauline Alcantara, PT Physical Therapist        Goals/Plan    No documentation.       Clinical Impression     Row Name 08/08/20 1536          Pain Assessment    Additional Documentation  Pain Scale: Numbers Pre/Post-Treatment (Group)  -MW     Row Name 08/08/20 1536          Pain Scale: Numbers Pre/Post-Treatment    Pain Scale: Numbers, Pretreatment  0/10 - no pain  -MW     Pain Scale: Numbers, Post-Treatment  0/10 - no pain  -MW     Row Name 08/08/20 1536          Plan of Care Review    Plan of Care Reviewed With  patient;daughter  -MW     Progress  no change  -     Outcome Summary  Pt seen for PT tx ith daughter in room. She continues to present with increased difficulty with bed mobility and transfers due to LOB. She performed STS x 3 reps with Max A x 2 at RW but unable to stand fully upright. Unable to take any steps today due to fatigue and impaired balance and strength.   -[facility] Name 08/08/20 1536          Vital Signs    Pre Systolic BP Rehab  169  -MW     Pre Treatment Diastolic BP  88  -MW     Pretreatment Heart Rate (beats/min)  67  -MW     Pre SpO2 (%)  94  -MW     O2 Delivery Pre  Treatment  room air  -MW     O2 Delivery Intra Treatment  room air  -MW     Post SpO2 (%)  93  -MW     O2 Delivery Post Treatment  room air  -MW     Pre Patient Position  Supine  -MW     Intra Patient Position  Standing  -MW     Post Patient Position  Supine  -MW     Row Name 08/08/20 1536          Positioning and Restraints    Pre-Treatment Position  in bed  -MW     Post Treatment Position  bed  -MW     In Bed  notified nsg;supine;call light within reach;encouraged to call for assist;exit alarm on;with family/caregiver  -MW       User Key  (r) = Recorded By, (t) = Taken By, (c) = Cosigned By    Initials Name Provider Type    Pauline Alcantara PT Physical Therapist        Outcome Measures     Row Name 08/08/20 1541          How much help from another person do you currently need...    Turning from your back to your side while in flat bed without using bedrails?  2  -MW     Moving from lying on back to sitting on the side of a flat bed without bedrails?  2  -MW     Moving to and from a bed to a chair (including a wheelchair)?  1  -MW     Standing up from a chair using your arms (e.g., wheelchair, bedside chair)?  2  -MW     Climbing 3-5 steps with a railing?  1  -MW     To walk in hospital room?  1  -MW     AM-PAC 6 Clicks Score (PT)  9  -MW     Row Name 08/08/20 1541          Functional Assessment    Outcome Measure Options  AM-PAC 6 Clicks Basic Mobility (PT)  -MW       User Key  (r) = Recorded By, (t) = Taken By, (c) = Cosigned By    Initials Name Provider Type    Pauline Alcantara PT Physical Therapist        Physical Therapy Education                 Title: PT OT SLP Therapies (Done)     Topic: Physical Therapy (Done)     Point: Mobility training (Done)     Description:   Instruct learner(s) on safety and technique for assisting patient out of bed, chair or wheelchair.  Instruct in the proper use of assistive devices, such as walker, crutches, cane or brace.              Patient Friendly Description:   It's  important to get you on your feet again, but we need to do so in a way that is safe for you. Falling has serious consequences, and your personal safety is the most important thing of all.        When it's time to get out of bed, one of us or a family member will sit next to you on the bed to give you support.     If your doctor or nurse tells you to use a walker, crutches, a cane, or a brace, be sure you use it every time you get out of bed, even if you think you don't need it.    Learning Progress Summary           Patient Acceptance, E, VU,NR by MW at 8/8/2020 1542    Acceptance, E, DU,NR by AR at 8/7/2020 1432    Acceptance, E, DU,NR by AR at 8/6/2020 1208    Acceptance, E,D, NR by CG at 8/5/2020 1410    Comment:  does not appear to retain information.  CGRESSRN    Acceptance, E, NL,NR by AR at 8/5/2020 1204    Acceptance, E, DU,NR by AR at 8/4/2020 1453   Family Acceptance, E, VU,NR by MW at 8/8/2020 1542                   Point: Home exercise program (Done)     Description:   Instruct learner(s) on appropriate technique for monitoring, assisting and/or progressing patient with therapeutic exercises and activities.              Learning Progress Summary           Patient Acceptance, E, VU,NR by MW at 8/8/2020 1542    Acceptance, E, DU,NR by AR at 8/7/2020 1432    Acceptance, E, DU,NR by AR at 8/6/2020 1208    Acceptance, E,D, NR by CG at 8/5/2020 1410    Comment:  does not appear to retain information.  CGRESSRN    Acceptance, E, NL,NR by AR at 8/5/2020 1204    Acceptance, E, DU,NR by AR at 8/4/2020 1453   Family Acceptance, E, VU,NR by MW at 8/8/2020 1542                   Point: Body mechanics (Done)     Description:   Instruct learner(s) on proper positioning and spine alignment for patient and/or caregiver during mobility tasks and/or exercises.              Learning Progress Summary           Patient Acceptance, E, VU,NR by MW at 8/8/2020 1542    Acceptance, E, DU,NR by AR at 8/7/2020 1432    Acceptance, E,  DU,NR by AR at 8/6/2020 1208    Acceptance, E,D, NR by  at 8/5/2020 1410    Comment:  does not appear to retain information.  CGRESSRN    Acceptance, E, NL,NR by AR at 8/5/2020 1204    Acceptance, E, DU,NR by AR at 8/4/2020 1453   Family Acceptance, E, VU,NR by  at 8/8/2020 1542                   Point: Precautions (Done)     Description:   Instruct learner(s) on prescribed precautions during mobility and gait tasks              Learning Progress Summary           Patient Acceptance, E, VU,NR by  at 8/8/2020 1542    Acceptance, E, DU,NR by AR at 8/7/2020 1432    Acceptance, E, DU,NR by AR at 8/6/2020 1208    Acceptance, E,D, NR by  at 8/5/2020 1410    Comment:  does not appear to retain information.  CGRESSRN    Acceptance, E, NL,NR by AR at 8/5/2020 1204    Acceptance, E, DU,NR by AR at 8/4/2020 1453   Family Acceptance, E, VU,NR by  at 8/8/2020 1542                               User Key     Initials Effective Dates Name Provider Type Discipline     06/16/16 -  Geneva Villela, RN Registered Nurse Nurse     09/17/19 -  Pauline Harris, PT Physical Therapist PT    AR 07/02/20 -  Raghu Garcia, PT Student PT Student PT              PT Recommendation and Plan     Outcome Summary/Treatment Plan (PT)  Anticipated Discharge Disposition (PT): skilled nursing facility  Plan of Care Reviewed With: patient, daughter  Progress: no change  Outcome Summary: Pt seen for PT tx ith daughter in room. She continues to present with increased difficulty with bed mobility and transfers due to LOB. She performed STS x 3 reps with Max A x 2 at RW but unable to stand fully upright. Unable to take any steps today due to fatigue and impaired balance and strength.      Time Calculation:   PT Charges     Row Name 08/08/20 1543             Time Calculation    Start Time  --  -MW      Stop Time  --  -MW      Time Calculation (min)  --  -MW         Time Calculation- PT    Total Timed Code Minutes- PT  --  -MW        User Key  (r) =  Recorded By, (t) = Taken By, (c) = Cosigned By    Initials Name Provider Type     Pauline Harris, PT Physical Therapist        Therapy Charges for Today     Code Description Service Date Service Provider Modifiers Qty    [id number]  PT THER SUPP EA 15 MIN 8/8/2020 Pauline Harris, PT GP 1    [id number] HC PT THERAPEUTIC ACT EA 15 MIN 8/8/2020 Pauline Harris, PT GP 1          PT G-Codes  Outcome Measure Options: AM-PAC 6 Clicks Basic Mobility (PT)  AM-PAC 6 Clicks Score (PT): 9    Pauline aHrris PT  8/8/2020

## 2020-08-08 NOTE — PROGRESS NOTES
"DOS: 2020  NAME: Jihan Teresa   : 1933  PCP: Yaron Ca Jr., MD  Chief Complaint   Patient presents with   • Weakness - Generalized       Chief complaint: Intracerebral hemorrhage  Subjective: Family had some concerns for lethargy and headache yesterday.  This seems to have improved.  Repeat CT scan pending    Objective:  Vital signs: /88 (BP Location: Left arm, Patient Position: Lying)   Pulse 76   Temp 98 °F (36.7 °C) (Oral)   Resp 16   Ht 152.4 cm (60\")   Wt 64.7 kg (142 lb 10.2 oz)   SpO2 94%   BMI 27.86 kg/m²    Gen: NAD, vitals reviewed  MS: oriented x2, recent/remote memory impaired, normal attention/concentration, language intact, no neglect.  CN: visual acuity grossly normal, right pupil 3 mm, left pupil 1 mm,, EOMI, no facial droop, no dysarthria  Motor: 5/5 throughout upper and lower extremities, normal tone  Sensory: intact to light touch all 4 ext.    ROS:  + Weakness, headache  No fevers, chills      Laboratory results:  Lab Results   Component Value Date    GLUCOSE 94 2020    CALCIUM 9.0 2020     2020    K 4.1 2020    CO2 21.7 (L) 2020     (H) 2020    BUN 8 2020    CREATININE 0.70 2020    EGFRIFAFRI 60 2020    EGFRIFNONA 79 2020    BCR 11.4 2020    ANIONGAP 8.3 2020     Lab Results   Component Value Date    WBC 10.19 2020    HGB 11.6 (L) 2020    HCT 33.9 (L) 2020    MCV 82.9 2020     2020     Lab Results   Component Value Date    LDL 49 2020    LDL 51 2017    LDL 38 02/10/2017     @[email or web address]@     Review of labs: Hemoglobin 11.6    Review and interpretation of imaging: Repeat CT scan ordered    Workup to date: Spontaneous inferior frontal intracerebral hemorrhage with intraventricular hemorrhage, suspected related to age, atherosclerosis and anticoagulation    MRI 8/3 area of globular hemorrhage in the region of the anterior communicating " artery, no aneurysm identified  Angiogram 8/4 no aneurysm or AVM seen  Repeat CT 8/8: Pending    Diagnoses:  Intracerebral hemorrhage, cortical, spontaneous, nontraumatic  Intraventricular hemorrhage    Comment: Neurologically seems improved.  Repeat CT scan today based on family concerns of headache, lethargy.  If this is stable I would recommend starting her warfarin back with no bridge on Monday.  She could potentially go to rehab over the weekend depending on the results of the CT and how she does today.    Plan:  Follow-up repeat CT scan

## 2020-08-08 NOTE — PROGRESS NOTES
"[facility] PULMONARY CARE         Dr Crawford Sayied   LOS: 5 days   Patient Care Team:  Yaron Ca Jr., MD as PCP - General (Family Medicine)  Yaron Ca Jr., MD as PCP - Claims Attributed    Chief Complaint: Intracerebral hemorrhage A. fib coagulopathy UTI    Interval History: More awake and following commands today.  Earlier had a headache and CT head was ordered per neurology    REVIEW OF SYSTEMS:   CARDIOVASCULAR: No chest pain, chest pressure or chest discomfort. No palpitations or edema.   RESPIRATORY: No shortness of breath, cough or sputum.   GASTROINTESTINAL: No anorexia, nausea, vomiting or diarrhea. No abdominal pain or blood.   HEMATOLOGIC: No bleeding or bruising.     Ventilator/Non-Invasive Ventilation Settings (From admission, onward)    None            Vital Signs  Temp:  [97.7 °F (36.5 °C)-98.3 °F (36.8 °C)] 98 °F (36.7 °C)  Heart Rate:  [68-77] 76  Resp:  [16] 16  BP: (126-169)/(64-88) 169/88    Intake/Output Summary (Last 24 hours) at 8/8/2020 1320  Last data filed at 8/7/2020 1754  Gross per 24 hour   Intake 120 ml   Output 100 ml   Net 20 ml     Flowsheet Rows      First Filed Value   Admission Height  152.4 cm (60\") Documented at 08/03/2020 1133   Admission Weight  68 kg (149 lb 14.4 oz) Documented at 08/03/2020 0857          Physical Exam:  Patient is examined using the personal protective equipment as per guidelines from infection control for this particular patient as enacted.  Hand hygiene was performed before and after patient interaction.   General Appearance:    Alert, cooperative, in no acute distress.  Following simple commands  Neck midline trachea no thyromegaly   Lungs:     Clear to auscultation,respirations regular, even and                  unlabored    Heart:    Regular rhythm and normal rate, normal S1 and S2, no            murmur, no gallop, no rub, no click   Chest Wall:    No abnormalities observed   Abdomen:     Normal bowel sounds, no masses, no organomegaly, soft  "       non-tender, non-distended, no guarding, no rebound                tenderness   Extremities:   Moves all extremities well, no edema, no cyanosis, no             redness  CNS no focal neurological deficits normal sensory exam  Skin no rashes no nodules  Musculoskeletal no cyanosis no clubbing normal range of motion     Results Review:        Results from last 7 days   Lab Units 08/08/20  0418 08/07/20  2204 08/07/20  0656 08/05/20  0853   SODIUM mmol/L 142  --  140 141   POTASSIUM mmol/L 4.1 4.9 3.1* 3.6   CHLORIDE mmol/L 112*  --  106 106   CO2 mmol/L 21.7*  --  23.5 23.2   BUN mg/dL 8  --  7* 7*   CREATININE mg/dL 0.70  --  0.64 0.69   GLUCOSE mg/dL 94  --  111* 118*   CALCIUM mg/dL 9.0  --  8.8 9.0     Results from last 7 days   Lab Units 08/04/20  0934 08/03/20  0804   TROPONIN T ng/mL <0.010 <0.010     Results from last 7 days   Lab Units 08/08/20  0418 08/07/20  0656 08/05/20  0853   WBC 10*3/mm3 10.19 10.15 10.77   HEMOGLOBIN g/dL 11.6* 12.2 13.4   HEMATOCRIT % 33.9* 37.5 39.5   PLATELETS 10*3/mm3 179 189 210     Results from last 7 days   Lab Units 08/04/20  0934 08/03/20  2342 08/03/20  1541 08/03/20  0804   INR  1.24* 1.21* 1.26* 2.43*   APTT seconds  --   --   --  32.7         Results from last 7 days   Lab Units 08/04/20  0820   MAGNESIUM mg/dL 2.5*               I reviewed the patient's new clinical results.  I personally viewed and interpreted the patient's CXR        Medication Review:     cephalexin 500 mg Oral Q8H   isosorbide mononitrate 60 mg Oral Daily   levothyroxine 75 mcg Oral QAM   metoprolol tartrate 25 mg Oral Q12H            ASSESSMENT:   Intracerebral hemorrhage  Coagulopathy reversed  A. fib  UTI E. coli  Diabetes mellitus  Hypothyroidism    PLAN:  Neuro status remained stable and improving.  Cerebral angiogram results reviewed  Blood pressure management keep systolic less than 140  A. fib currently on rate control.  Cardiology currently following.  Currently holding anticoagulation.   Noted clearance to start Coumadin on Monday.  No bridge required.  Oral antibiotics for E. coli in the urine.  Repeat UA no evidence of infection  Blood glucose management per ICU protocol  Diet advanced.  PT OT  Will likely need rehab at discharge  Discussed with daughter    Yvette Evans MD  08/08/20  13:20

## 2020-08-08 NOTE — PLAN OF CARE
Problem: Patient Care Overview  Goal: Plan of Care Review  Outcome: Ongoing (interventions implemented as appropriate)  Flowsheets (Taken 8/8/2020 7422)  Progress: no change  Plan of Care Reviewed With: patient;daughter  Outcome Summary: Pt seen for PT tx, daughter in room. She continues to present with increased difficulty with bed mobility and transfers due to LOB. She performed STS x 3 reps with Max A x 2 at RW but unable to stand fully upright. Unable to take any steps today due to fatigue and impaired balance and strength.   ..Patient was wearing a face mask during this therapy encounter. Therapist used appropriate personal protective equipment including eye protection, mask, and gloves.  Mask used was standard procedure mask. Appropriate PPE was worn during the entire therapy session. Hand hygiene was completed before and after therapy session. Patient is not in enhanced droplet precautions.

## 2020-08-08 NOTE — PROGRESS NOTES
LOS: 5 days   Patient Care Team:  Yaron Ca Jr., MD as PCP - General (Family Medicine)  Yaron Ca Jr., MD as PCP - Claims Attributed  Chief Complaint: Confusion on admission    Subjective     Denies headache    Interval History:     History taken from: patient chart    Objective      Awake and alert  Speech clear  Moves all extremities well, no drift  Face symmetric    Vital Signs  Temp:  [97.7 °F (36.5 °C)-98.3 °F (36.8 °C)] 98 °F (36.7 °C)  Heart Rate:  [68-77] 76  Resp:  [16] 16  BP: (126-169)/(64-88) 169/88       Results Review:     I reviewed the patient's new clinical results.  I reviewed the patient's new imaging results and agree with the interpretation.    .  Results from last 7 days   Lab Units 08/08/20  0418 08/07/20  0656 08/05/20  0853   WBC 10*3/mm3 10.19 10.15 10.77   HEMOGLOBIN g/dL 11.6* 12.2 13.4   HEMATOCRIT % 33.9* 37.5 39.5   PLATELETS 10*3/mm3 179 189 210     .  Results from last 7 days   Lab Units 08/08/20  0418 08/07/20  2204 08/07/20  0656 08/05/20  0853  08/03/20  0804   SODIUM mmol/L 142  --  140 141   < > 134*   POTASSIUM mmol/L 4.1 4.9 3.1* 3.6   < > 3.8   CHLORIDE mmol/L 112*  --  106 106   < > 99   CO2 mmol/L 21.7*  --  23.5 23.2   < > 29.0   BUN mg/dL 8  --  7* 7*   < > 9   CREATININE mg/dL 0.70  --  0.64 0.69   < > 0.81   CALCIUM mg/dL 9.0  --  8.8 9.0   < > 9.1   BILIRUBIN mg/dL  --   --   --   --   --  0.8   ALK PHOS U/L  --   --   --   --   --  46   ALT (SGPT) U/L  --   --   --   --   --  31   AST (SGOT) U/L  --   --   --   --   --  23   GLUCOSE mg/dL 94  --  111* 118*   < > 126*    < > = values in this interval not displayed.     CT head  The CT scan was performed through the brain without contrast and  compared to multiple recent exams including the CT scan performed 2 days  ago on 08/06/2020. There is prominent diffuse atrophy and chronic small  vessel ischemic change. Again noted is a small area of somewhat globular  intracranial hemorrhage anterior to the third  ventricle that is  unchanged. There is a tiny amount of blood layering in the occipital  horns that is also unchanged. There is no mass effect. There is no  evidence of hydrocephalus. No new abnormality is seen    Assessment/Plan       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)    ICH (intracerebral hemorrhage) (CMS/HCC)      CT remains stable.  5 days status post hemorrhage.  Continue to hold Coumadin.  Will check back Monday with decision on when to resume anticoagulation.      Suzanne Fuentes, APRN  08/08/20  14:13

## 2020-08-08 NOTE — PROGRESS NOTES
"[facility]  Cardiology Progress Note    Patient Identification:  Name: Jihan Teresa  Age: 86 y.o.  Sex: female  :  1933  MRN: [id number]                 Follow Up / Chief Complaint: Atrial fibrillation with ICH    Interval History: No significant change    Subjective: No chest pains     Objective:    Past Medical History:  Past Medical History:   Diagnosis Date   • Anemia    • Aortic valve stenosis     S/p AVR in    • Asthma    • Atelectasis    • CHF (congestive heart failure) (CMS/HCC)    • Congenital heart disease    • Diabetes mellitus (CMS/HCC)    • Esophageal reflux    • Essential hypertension    • HLD (hyperlipidemia)    • Hypotension    • LVH (left ventricular hypertrophy)    • Pleural effusion    • Pulmonary hypertension (CMS/HCC)      Past Surgical History:  Past Surgical History:   Procedure Laterality Date   • AORTIC VALVE REPAIR/REPLACEMENT  2009    Jerry Colmenares MD   • CATARACT EXTRACTION     • KNEE ARTHROPLASTY          Social History:   Social History     Tobacco Use   • Smoking status: Never Smoker   • Smokeless tobacco: Never Used   Substance Use Topics   • Alcohol use: Yes     Comment: ocasional      Family History:  Family History   Problem Relation Age of Onset   • Dementia Sister    • Stroke Brother           Allergies:  Allergies   Allergen Reactions   • Promethazine Other (See Comments)     Pt becomes \"out of it\" when on this medication  Pt becomes \"out of it\" when on this medication     Scheduled Meds:    cephalexin 500 mg Q8H   isosorbide mononitrate 60 mg Daily   levothyroxine 75 mcg QAM   metoprolol tartrate 25 mg Q12H           INTAKE AND OUTPUT:    Intake/Output Summary (Last 24 hours) at 2020 1358  Last data filed at 2020 1754  Gross per 24 hour   Intake 120 ml   Output 100 ml   Net 20 ml     /88 (BP Location: Left arm, Patient Position: Lying)   Pulse 76   Temp 98 °F (36.7 °C) (Oral)   Resp 16   Ht 152.4 cm (60\")   Wt 64.7 kg " (142 lb 10.2 oz)   SpO2 94%   BMI 27.86 kg/m²   General appearance: No acute changes   Neck: Trachea midline; NECK, supple, no thyromegaly or lymphadenopathy   Lungs: Normal size and shape, normal breath sounds, equal distribution of air, no rales and rhonchi   CV: S1-S2 regular, no murmurs, no rub, no gallop   Abdomen: Soft, non-tender; no masses , no abnormal abdominal sounds   Extremities: No deformity , normal color , no peripheral edema   Skin: Normal temperature, turgor and texture; no rash, ulcers                 Cardiographics  Telemetry:       A. Fib      Atrial fibrillation      Echocardiogram:   ECG:          Echocardiogram:   2017  Interpretation Summary      · Calculated EF = 46.9%.  · Left ventricular systolic function is low normal.  · Left amd right atrial cavity size is severely dilated.  · Right ventricular cavity is severely dilated.  · calcification of the aortic valve  · Severe mitral valve regurgitation is present  · Mitral annular calcification is present. Posterior leaflet very calcified and immobile.  · Severe tricuspid valve regurgitation is present.  · Estimated right ventricular systolic pressure from tricuspid regurgitation is markedly elevated (>55 mmHg). Calculated right ventricular systolic pressure from tricuspid regurgitation is 66.7 mmHg.         Imaging  Chest X-ray:   Study Result      EXAMINATION: SINGLE VIEW CHEST RADIOGRAPH     HISTORY: 86-year-old female with history of generalized weakness.     FINDINGS: An upright AP portable chest radiograph was obtained.  Comparison is made to a chest radiograph dated 06/10/2017. The lungs are  normal in volume and are clear of consolidation. Stable mild scattered  interstitial prominence is seen throughout both lungs. Stable soft  tissue fullness in the right hilum is most consistent with a prominent  right hilar vessel is noted. There are stable cardiomegaly. Midline  sternal wires are noted. Atheromatous consultation seen within  the  aortic arch.     IMPRESSION:  There is stable mild bilateral interstitial scarring and  cardiomegaly with findings suggestive of pulmonary hypertension. No  evidence for superimposed acute process is appreciated.     This report was finalized on 8/3/2020 10:55 AM by Dr. Estuardo Wagner M.D.         CT     Study Result      CT SCAN OF THE HEAD WITHOUT CONTRAST     CLINICAL HISTORY: Generalized weakness and confusion.     TECHNIQUE: CT scan of the head was obtained with 3 mm axial images. No  intravenous contrast was administered.     COMPARISON: Comparison is made to previous CT scan of head dated  06/13/2017.     FINDINGS: There is increased density within the dependent aspects of  both lateral ventricles compatible with intraventricular hemorrhage.  There are foci of increased density noted within the inferior aspect of  the interhemispheric fissure worrisome for subarachnoid hemorrhage. The  etiology of these areas of hemorrhage is uncertain, although a vascular  etiology such as an aneurysm should be excluded. I recommend further  evaluation with an MRA or CTA examination, as well as an MRI of the  brain.     There are multiple chronic infarcts identified within both cerebellar  hemispheres, left greater than right. The largest of these chronic  infarcts measures up to approximately 1.4 x 0.4 cm in greatest axial  dimensions. Old lacunar disease is seen within the left thalamus. A  chronic lacune identified within the anterior aspect of the left  thalamus measuring up to 4-5 mm in diameter. Hypodense areas are  identified within the lentiform nuclei bilaterally that are likely  representative of a combination of enlarged perivascular spaces and old  lacunar disease. Severe changes of chronic small vessel ischemic  phenomena are identified.     Otherwise, the ventricles, sulci, and cisterns are age appropriate.     IMPRESSION:     There is intraventricular hemorrhage identified within the dependent  aspects  of both lateral ventricles. Additionally, there is increased  density seen within the inferior aspect of the interhemispheric fissure  suggestive of subarachnoid hemorrhage. The precise etiology of this  hemorrhage is uncertain on the basis of this head CT. However, a  vascular etiology should be excluded. Further evaluation is recommended  with a CTA or MRA study, as well as an MRI of the brain.     These findings and recommendations were discussed with Debbie Wilson on 08/03/2020 at approximately 8:36 AM.     Radiation dose reduction techniques were utilized, including automated  exposure control and exposure modulation based on body size.              Lab Review   Results from last 7 days   Lab Units 08/04/20  0934 08/03/20  0804   TROPONIN T ng/mL <0.010 <0.010     Results from last 7 days   Lab Units 08/04/20  0820   MAGNESIUM mg/dL 2.5*     Results from last 7 days   Lab Units 08/08/20  0418   SODIUM mmol/L 142   POTASSIUM mmol/L 4.1   BUN mg/dL 8   CREATININE mg/dL 0.70   CALCIUM mg/dL 9.0        Results from last 7 days   Lab Units 08/08/20  0418 08/07/20  0656 08/05/20  0853   WBC 10*3/mm3 10.19 10.15 10.77   HEMOGLOBIN g/dL 11.6* 12.2 13.4   HEMATOCRIT % 33.9* 37.5 39.5   PLATELETS 10*3/mm3 179 189 210     Results from last 7 days   Lab Units 08/04/20  0934 08/03/20  2342 08/03/20  1541 08/03/20  0804   INR  1.24* 1.21* 1.26* 2.43*   APTT seconds  --   --   --  32.7     The following medical decision was discussed in detail with Dr. Mcmahan    Assessment:  1. Intracerebral hemorrhage  2.  Chronic atrial fibrillation: Coumadin on hold due to bleeding.  3.  Bioprosthetic aortic valve replaced in 2009  5. coagulopathy  6.  UTI  7.  Diabetes mellitus  8. Hypothyroidism  9. CAD  10. Hypertension    Plan:  Had long discussion with the patient's family in details pros and cons of anticoagulations has been explained    Family will make the final decision      )8/8/2020  Bogdan Mcmahan MD  "      EMR Dragon/Transcription:   \"Dictated utilizing Dragon dictation\".     "

## 2020-08-09 NOTE — PROGRESS NOTES
"[facility] PULMONARY CARE         Dr Crawford Sayied   LOS: 6 days   Patient Care Team:  Yaron Ca Jr., MD as PCP - General (Family Medicine)  Yaron Ca Jr., MD as PCP - Claims Attributed    Chief Complaint: Intracerebral hemorrhage A. fib coagulopathy UTI    Interval History: Resting comfortably no overnight issues.  Denies any headache shortness of breath    REVIEW OF SYSTEMS:   CARDIOVASCULAR: No chest pain, chest pressure or chest discomfort. No palpitations or edema.   RESPIRATORY: No shortness of breath, cough or sputum.   GASTROINTESTINAL: No anorexia, nausea, vomiting or diarrhea. No abdominal pain or blood.   HEMATOLOGIC: No bleeding or bruising.     Ventilator/Non-Invasive Ventilation Settings (From admission, onward)    None            Vital Signs  Temp:  [97.8 °F (36.6 °C)-98.4 °F (36.9 °C)] 98 °F (36.7 °C)  Heart Rate:  [62-71] 67  Resp:  [16-17] 16  BP: (126-149)/(54-77) 134/68    Intake/Output Summary (Last 24 hours) at 8/9/2020 1415  Last data filed at 8/8/2020 1758  Gross per 24 hour   Intake 120 ml   Output --   Net 120 ml     Flowsheet Rows      First Filed Value   Admission Height  152.4 cm (60\") Documented at 08/03/2020 1133   Admission Weight  68 kg (149 lb 14.4 oz) Documented at 08/03/2020 0857          Physical Exam:  Patient is examined using the personal protective equipment as per guidelines from infection control for this particular patient as enacted.  Hand hygiene was performed before and after patient interaction.   General Appearance:    Alert, cooperative, in no acute distress.  Following simple commands  Neck midline trachea no thyromegaly   Lungs:     Clear to auscultation,respirations regular, even and                  unlabored    Heart:    Regular rhythm and normal rate, normal S1 and S2, no            murmur, no gallop, no rub, no click   Chest Wall:    No abnormalities observed   Abdomen:     Normal bowel sounds, no masses, no organomegaly, soft        non-tender, " non-distended, no guarding, no rebound                tenderness   Extremities:   Moves all extremities well, no edema, no cyanosis, no             redness  CNS no focal neurological deficits normal sensory exam  Skin no rashes no nodules  Musculoskeletal no cyanosis no clubbing normal range of motion     Results Review:        Results from last 7 days   Lab Units 08/09/20  0652 08/08/20  0418 08/07/20  2204 08/07/20  0656   SODIUM mmol/L 139 142  --  140   POTASSIUM mmol/L 3.8 4.1 4.9 3.1*   CHLORIDE mmol/L 110* 112*  --  106   CO2 mmol/L 22.2 21.7*  --  23.5   BUN mg/dL 9 8  --  7*   CREATININE mg/dL 0.71 0.70  --  0.64   GLUCOSE mg/dL 111* 94  --  111*   CALCIUM mg/dL 8.6 9.0  --  8.8     Results from last 7 days   Lab Units 08/04/20  0934 08/03/20  0804   TROPONIN T ng/mL <0.010 <0.010     Results from last 7 days   Lab Units 08/09/20  0652 08/08/20  0418 08/07/20  0656   WBC 10*3/mm3 9.30 10.19 10.15   HEMOGLOBIN g/dL 11.1* 11.6* 12.2   HEMATOCRIT % 34.3 33.9* 37.5   PLATELETS 10*3/mm3 161 179 189     Results from last 7 days   Lab Units 08/04/20  0934 08/03/20  2342 08/03/20  1541 08/03/20  0804   INR  1.24* 1.21* 1.26* 2.43*   APTT seconds  --   --   --  32.7         Results from last 7 days   Lab Units 08/04/20  0820   MAGNESIUM mg/dL 2.5*               I reviewed the patient's new clinical results.  I personally viewed and interpreted the patient's CXR        Medication Review:     cephalexin 500 mg Oral Q8H   isosorbide mononitrate 60 mg Oral Daily   levothyroxine 75 mcg Oral QAM   metoprolol tartrate 25 mg Oral Q12H            ASSESSMENT:   Intracerebral hemorrhage  Coagulopathy reversed  A. fib  UTI E. coli  Diabetes mellitus  Hypothyroidism    PLAN:  Neuro status remained stable and improving.  Cerebral angiogram results reviewed.  CT head repeat showed stable ICH  Blood pressure management keep systolic less than 140  A. fib currently on rate control.  Cardiology currently following.  Currently holding  anticoagulation.  Noted clearance to start Coumadin on Monday.  No bridge required.  Oral antibiotics for E. coli in the urine.  Repeat UA no evidence of infection  Blood glucose management per ICU protocol  Diet advanced.  PT OT  Will likely need rehab at discharge  Discussed with granddaughter  Likely discharge in the morning after starting Coumadin.    Yvette Eavns MD  08/09/20  14:15

## 2020-08-09 NOTE — ANESTHESIA POSTPROCEDURE EVALUATION
"Patient: Jihan Teresa    Procedure Summary     Date:  08/04/20 Room / Location:  [facility] OR 19 INV /  [facility] OR 18/19    Anesthesia Start:  1817 Anesthesia Stop:  2207    Procedure:  CEREBRAL ANGIOGRAM (N/A ) Diagnosis:       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)      (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) [I61.0])    Surgeon:  Ar Bunch MD Provider:  Adalid Martin MD    Anesthesia Type:  MAC ASA Status:  3 - Emergent          Anesthesia Type: MAC    Vitals  Vitals Value Taken Time   /54 8/8/2020  7:36 PM   Temp 36.9 °C (98.4 °F) 8/8/2020  7:36 PM   Pulse 69 8/8/2020  8:54 PM   Resp 16 8/8/2020  7:36 PM   SpO2 92 % 8/8/2020  8:54 PM   Vitals shown include unvalidated device data.        Post Anesthesia Care and Evaluation      Comments: Patient discharged before being evaluated by an Anesthesiologist. No apparent complications per the record.  This case was not medically directed. I am completing this chart for medical records purposes; I personally have no medical involvement with this patient.    /54 (BP Location: Left arm, Patient Position: Lying)   Pulse 62   Temp 36.9 °C (98.4 °F) (Oral)   Resp 16   Ht 152.4 cm (60\")   Wt 64.7 kg (142 lb 10.2 oz)   SpO2 94%   BMI 27.86 kg/m²           "

## 2020-08-09 NOTE — PROGRESS NOTES
Reviewed follow up CT head, ICH stable. From a neurology perspective warfarin can be restarted with no bridge tomorrow if family decides to resume. We will sign off for now.

## 2020-08-09 NOTE — PLAN OF CARE
Problem: Patient Care Overview  Goal: Plan of Care Review  Outcome: Ongoing (interventions implemented as appropriate)   Patient continues to desat with sleep down to 85% may need oxygen set up at d/c.   VSS, NIH the stable. Plan to d/c to [facility] tomorrow after restarting coumadin per MD notes. Tolerating diet although needs encouragement at times. Still with some cognitive disconnect when asked questions. Slow to respond sometimes with a blank stare and often forgets you are speaking to her even though you and she are the only people in the room.  No c/o pain.

## 2020-08-10 NOTE — PROGRESS NOTES
"[facility]  Cardiology Progress Note    Patient Identification:  Name: Jihan Teresa  Age: 86 y.o.  Sex: female  :  1933  MRN: [id number]                 Follow Up / Chief Complaint: Atrial fibrillation with ICH    Interval History: No significant change.  Spoke to daughter and they want to talk to Dr. Barton before proceeding with anticoagulation.    Subjective: Denies chest pain and shortness of breath  Objective:    Past Medical History:  Past Medical History:   Diagnosis Date   • Anemia    • Aortic valve stenosis     S/p AVR in    • Asthma    • Atelectasis    • CHF (congestive heart failure) (CMS/HCC)    • Congenital heart disease    • Diabetes mellitus (CMS/HCC)    • Esophageal reflux    • Essential hypertension    • HLD (hyperlipidemia)    • Hypotension    • LVH (left ventricular hypertrophy)    • Pleural effusion    • Pulmonary hypertension (CMS/HCC)      Past Surgical History:  Past Surgical History:   Procedure Laterality Date   • AORTIC VALVE REPAIR/REPLACEMENT  2009    Jerry Colmenares MD   • CATARACT EXTRACTION     • KNEE ARTHROPLASTY          Social History:   Social History     Tobacco Use   • Smoking status: Never Smoker   • Smokeless tobacco: Never Used   Substance Use Topics   • Alcohol use: Yes     Comment: ocasional      Family History:  Family History   Problem Relation Age of Onset   • Dementia Sister    • Stroke Brother           Allergies:  Allergies   Allergen Reactions   • Promethazine Other (See Comments)     Pt becomes \"out of it\" when on this medication  Pt becomes \"out of it\" when on this medication     Scheduled Meds:    cephalexin 500 mg Q8H   isosorbide mononitrate 60 mg Daily   levothyroxine 75 mcg QAM   metoprolol tartrate 25 mg Q12H           ROS  Constitutional: Awake and alert, no fever. No nosebleeds  Abdomen           no abdominal pain   Cardiac              no chest pain  Pulmonary          no shortness of breath      /71 (BP Location: " "Left arm, Patient Position: Lying)   Pulse 61   Temp 98.5 °F (36.9 °C) (Oral)   Resp 16   Ht 152.4 cm (60\")   Wt 64.7 kg (142 lb 10.2 oz)   SpO2 93%   BMI 27.86 kg/m²   General appearance: No acute changes   Neck: Trachea midline; NECK, supple, no thyromegaly or lymphadenopathy   Lungs: Normal size and shape, normal breath sounds, equal distribution of air, no rales and rhonchi   CV: S1-S2 regular, no murmurs, no rub, no gallop   Abdomen: Soft, non-tender; no masses , no abnormal abdominal sounds   Extremities: No deformity , normal color , no peripheral edema   Skin: Normal temperature, turgor and texture; no rash, ulcers                 Cardiographics  Telemetry:           A. Fib      Atrial fibrillation      Echocardiogram:   ECG:          Echocardiogram:   2017  Interpretation Summary      · Calculated EF = 46.9%.  · Left ventricular systolic function is low normal.  · Left amd right atrial cavity size is severely dilated.  · Right ventricular cavity is severely dilated.  · calcification of the aortic valve  · Severe mitral valve regurgitation is present  · Mitral annular calcification is present. Posterior leaflet very calcified and immobile.  · Severe tricuspid valve regurgitation is present.  · Estimated right ventricular systolic pressure from tricuspid regurgitation is markedly elevated (>55 mmHg). Calculated right ventricular systolic pressure from tricuspid regurgitation is 66.7 mmHg.         Imaging  Chest X-ray:   Study Result      EXAMINATION: SINGLE VIEW CHEST RADIOGRAPH     HISTORY: 86-year-old female with history of generalized weakness.     FINDINGS: An upright AP portable chest radiograph was obtained.  Comparison is made to a chest radiograph dated 06/10/2017. The lungs are  normal in volume and are clear of consolidation. Stable mild scattered  interstitial prominence is seen throughout both lungs. Stable soft  tissue fullness in the right hilum is most consistent with a prominent  right " hilar vessel is noted. There are stable cardiomegaly. Midline  sternal wires are noted. Atheromatous consultation seen within the  aortic arch.     IMPRESSION:  There is stable mild bilateral interstitial scarring and  cardiomegaly with findings suggestive of pulmonary hypertension. No  evidence for superimposed acute process is appreciated.     This report was finalized on 8/3/2020 10:55 AM by Dr. Estuardo Wagner M.D.         CT     Study Result      CT SCAN OF THE HEAD WITHOUT CONTRAST     CLINICAL HISTORY: Generalized weakness and confusion.     TECHNIQUE: CT scan of the head was obtained with 3 mm axial images. No  intravenous contrast was administered.     COMPARISON: Comparison is made to previous CT scan of head dated  06/13/2017.     FINDINGS: There is increased density within the dependent aspects of  both lateral ventricles compatible with intraventricular hemorrhage.  There are foci of increased density noted within the inferior aspect of  the interhemispheric fissure worrisome for subarachnoid hemorrhage. The  etiology of these areas of hemorrhage is uncertain, although a vascular  etiology such as an aneurysm should be excluded. I recommend further  evaluation with an MRA or CTA examination, as well as an MRI of the  brain.     There are multiple chronic infarcts identified within both cerebellar  hemispheres, left greater than right. The largest of these chronic  infarcts measures up to approximately 1.4 x 0.4 cm in greatest axial  dimensions. Old lacunar disease is seen within the left thalamus. A  chronic lacune identified within the anterior aspect of the left  thalamus measuring up to 4-5 mm in diameter. Hypodense areas are  identified within the lentiform nuclei bilaterally that are likely  representative of a combination of enlarged perivascular spaces and old  lacunar disease. Severe changes of chronic small vessel ischemic  phenomena are identified.     Otherwise, the ventricles, sulci, and  cisterns are age appropriate.     IMPRESSION:     There is intraventricular hemorrhage identified within the dependent  aspects of both lateral ventricles. Additionally, there is increased  density seen within the inferior aspect of the interhemispheric fissure  suggestive of subarachnoid hemorrhage. The precise etiology of this  hemorrhage is uncertain on the basis of this head CT. However, a  vascular etiology should be excluded. Further evaluation is recommended  with a CTA or MRA study, as well as an MRI of the brain.     These findings and recommendations were discussed with Debbie Wilson on 08/03/2020 at approximately 8:36 AM.     Radiation dose reduction techniques were utilized, including automated  exposure control and exposure modulation based on body size.              Lab Review   Results from last 7 days   Lab Units 08/04/20  0934   TROPONIN T ng/mL <0.010     Results from last 7 days   Lab Units 08/04/20  0820   MAGNESIUM mg/dL 2.5*     Results from last 7 days   Lab Units 08/10/20  0333   SODIUM mmol/L 137   POTASSIUM mmol/L 3.8   BUN mg/dL 8   CREATININE mg/dL 0.71   CALCIUM mg/dL 8.7        Results from last 7 days   Lab Units 08/10/20  0333 08/09/20  0652 08/08/20  0418   WBC 10*3/mm3 9.10 9.30 10.19   HEMOGLOBIN g/dL 11.2* 11.1* 11.6*   HEMATOCRIT % 34.4 34.3 33.9*   PLATELETS 10*3/mm3 147 161 179     Results from last 7 days   Lab Units 08/04/20  0934 08/03/20  2342 08/03/20  1541   INR  1.24* 1.21* 1.26*     The following medical decision was discussed in detail with Dr. Mcmahan    Assessment:  1. Intracerebral hemorrhage  2.  Chronic atrial fibrillation: Coumadin on hold due to bleeding.  3.  Bioprosthetic aortic valve replaced in 2009  5. coagulopathy  6.  UTI  7.  Diabetes mellitus  8. Hypothyroidism  9. CAD  10. Hypertension    Plan:  MD had a long discussion with family yesterday regarding pros and cons of anticoagulation.  They want to discuss anticoagulation with her primary  "cardiologist, Dr. Barton.  They will let us know when they have made their decision.   Per neuro it is okay for her to start anti-coagulation on Coumadin today, once family has made decision.     Will add norvasc for blood pressure.    )8/10/2020  KAREN Graham     Patient personally interviewed and above subjective findings personally confirmed during a face to face contact with patient today  All findings of physical examination confirmed  All pertinent and performed labs, cardiac procedures ,  radiographs of the last 24 hours personally reviewed  Impression and plans discussed/elaborated and implemented jointly as described above     Bogdan Mcmahan MD            EMR Dragon/Transcription:   \"Dictated utilizing Dragon dictation\".     "

## 2020-08-10 NOTE — CONSULTS
Patient Identification:  NAME:  Jihan Teresa  Age:  86 y.o.   Sex:  female   :  1933   MRN:  [id number]   This is a follow-up neurology note since she has been seen in initial consultation by neurology yesterday.    Chief complaint: Left hemisphere nontraumatic intraparenchymal hemorrhage.    History of present illness: He is awake alert and appears to be actively hallucinating her daughter notes that she does this some when she gets urinary tract infections.  She is not a drinker of alcohol and does not appear to be withdrawing from any medications that she was taking at home.  PPE was worn at all times by the patient by me I washed up before I put the gloves gowns masks goggles on disposed of them afterwards washed up appropriately afterwards no aerosols were used I was not within 6 feet over for longer than 5 minutes during my exam past medical history:asdf  Past Medical History:   Diagnosis Date   • Anemia    • Aortic valve stenosis     S/p AVR in    • Asthma    • Atelectasis    • CHF (congestive heart failure) (CMS/HCC)    • Congenital heart disease    • Diabetes mellitus (CMS/HCC)    • Esophageal reflux    • Essential hypertension    • HLD (hyperlipidemia)    • Hypotension    • LVH (left ventricular hypertrophy)    • Pleural effusion    • Pulmonary hypertension (CMS/HCC)        Past surgical history:  Past Surgical History:   Procedure Laterality Date   • AORTIC VALVE REPAIR/REPLACEMENT  2009    Jerry Colmenares MD   • CATARACT EXTRACTION     • KNEE ARTHROPLASTY         Allergies:  Promethazine    Home medications:  Facility-Administered Medications Prior to Admission   Medication Dose Route Frequency Provider Last Rate Last Dose   • cyanocobalamin injection 1,000 mcg  1,000 mcg Intramuscular Q28 Days Gustavo Jackman Jr., MD   1,000 mcg at 19 1640     Medications Prior to Admission   Medication Sig Dispense Refill Last Dose   • isosorbide mononitrate (IMDUR) 60 MG 24 hr tablet TAKE  1 TABLET BY MOUTH EVERY DAY 90 tablet 1    • levothyroxine (SYNTHROID, LEVOTHROID) 150 MCG tablet TAKE 1 TABLET BY MOUTH EVERY DAY (Patient taking differently: Take 75 mcg by mouth Daily.) 30 tablet 2 Patient Taking Differently at Unknown time   • warfarin (COUMADIN) 3 MG tablet Take 3 mg daily by mouth Tuesday through Sunday.  Do not take Monday (Patient taking differently: Take 3 mg daily by mouth Tuesday Wednesday Friday Saturday and Sunday.  Monday and Thursday patient to take 1.5 mg) 90 tablet 1 Patient Taking Differently at Unknown time   • acetaminophen (TYLENOL) 500 MG tablet Take  by mouth As Needed.   Taking   • ascorbic acid (VITAMIN C) 250 MG chewable tablet chewable tablet Chew 500 mg Daily.   Taking   • atorvastatin (LIPITOR) 40 MG tablet TAKE 1 TABLET BY MOUTH EVERY DAY (Patient taking differently: Take 40 mg by mouth Daily.) 30 tablet 5    • Calcium Citrate-Vitamin D 250-200 MG-UNIT tablet Take  by mouth 2 (Two) Times a Day.   Taking   • Clobetasol Propionate Emulsion 0.05 % topical foam APPLY SPARINGLY TO SCALP TWICE DAILY X 2 WEEKS. THEN AS NEEDED FOR FLARES  1 Taking   • clopidogrel (PLAVIX) 75 MG tablet TAKE 1 TABLET BY MOUTH EVERY DAY (Patient taking differently: Take 75 mg by mouth Daily.) 30 tablet 2    • furosemide (LASIX) 40 MG tablet TAKE 1 TABLET BY MOUTH THREE TIMES A DAY (Patient taking differently: Take 40 mg by mouth 3 (Three) Times a Day.) 90 tablet 1    • hydrocortisone (ANUSOL-HC) 2.5 % rectal cream Insert  into the rectum 2 (Two) Times a Day. 28.35 g 2    • ketoconazole (NIZORAL) 2 % shampoo WASH SCALP AND 2-3 TIMES A WEEK. LEAVE ON FOR 2-3 MINUTES THEN RINSE.  5 Taking   • metoprolol tartrate (LOPRESSOR) 50 MG tablet TAKE 1 TABLET BY MOUTH 2 (TWO) TIMES DAILY (Patient taking differently: Take 50 mg by mouth 2 (Two) Times a Day.) 180 tablet 2 8/4/2020 at 1100   • Multiple Vitamin (MULTIVITAMIN) tablet Take 1 tablet by mouth Daily.   Taking   • omeprazole (priLOSEC) 20 MG capsule  Take 20 mg by mouth Daily.   Taking   • Phenylephrine-Witch Hazel (PREPARATION H) 0.25-50 % gel Insert 1 g into the rectum 4 (Four) Times a Day As Needed (rectal burning). 25 g 2    • potassium chloride (MICRO-K) 10 MEQ CR capsule TAKE 2 CAPSULES BY MOUTH 2 (TWO) TIMES A DAY. (Patient taking differently: Take 20 mEq by mouth 2 (Two) Times a Day.) 120 capsule 5         Hospital medications:    cephalexin 500 mg Oral Q8H   isosorbide mononitrate 60 mg Oral Daily   levothyroxine 75 mcg Oral QAM   metoprolol tartrate 25 mg Oral Q12H   OLANZapine 2.5 mg Oral Daily Before Supper        •  acetaminophen  •  hydrALAZINE  •  potassium chloride **OR** potassium chloride **OR** potassium chloride  •  [COMPLETED] Insert peripheral IV **AND** sodium chloride    Family history:  Family History   Problem Relation Age of Onset   • Dementia Sister    • Stroke Brother        Social history:  Social History     Tobacco Use   • Smoking status: Never Smoker   • Smokeless tobacco: Never Used   Substance Use Topics   • Alcohol use: Yes     Comment: ocasional   • Drug use: No       Review of systems:    She denies hair eyes ears nose throat skin bone joint  lymphatic complaints she has no headache and denies any focal paresthesias or paralysis    Objective:  Vitals Ranges:   Temp:  [97.9 °F (36.6 °C)-98.5 °F (36.9 °C)] 98.5 °F (36.9 °C)  Heart Rate:  [61-81] 61  Resp:  [16-18] 16  BP: (133-205)/(64-94) 141/71      Physical Exam:  Awake alert and actively hallucinating looking at something up on the ceiling she is able to comprehend name and repeat normal cranial nerves II through VII tongue is midline good  strength and toe wiggle reflexes trace throughout symmetrical toes downgoing bilaterally    Results review:   I reviewed the patient's new clinical results.    Data review:  Lab Results (last 24 hours)     Procedure Component Value Units Date/Time    Urinalysis With Culture If Indicated - Urine, Random Void [[id number]]  (Normal)  Collected:  08/10/20 1539    Specimen:  Urine, Random Void Updated:  08/10/20 1602     Color, UA Yellow     Appearance, UA Clear     pH, UA 5.5     Specific Gravity, UA 1.014     Glucose, UA Negative     Ketones, UA Negative     Bilirubin, UA Negative     Blood, UA Negative     Protein, UA Negative     Leuk Esterase, UA Negative     Nitrite, UA Negative     Urobilinogen, UA 0.2 E.U./dL    Narrative:       Urine microscopic not indicated.    Basic Metabolic Panel [[id number]]  (Abnormal) Collected:  08/10/20 0333    Specimen:  Blood Updated:  08/10/20 0424     Glucose 113 mg/dL      BUN 8 mg/dL      Creatinine 0.71 mg/dL      Sodium 137 mmol/L      Potassium 3.8 mmol/L      Chloride 106 mmol/L      CO2 20.9 mmol/L      Calcium 8.7 mg/dL      eGFR Non African Amer 78 mL/min/1.73      BUN/Creatinine Ratio 11.3     Anion Gap 10.1 mmol/L     Narrative:       GFR Normal >60  Chronic Kidney Disease <60  Kidney Failure <15      CBC (No Diff) [[id number]]  (Abnormal) Collected:  08/10/20 0333    Specimen:  Blood Updated:  08/10/20 0358     WBC 9.10 10*3/mm3      RBC 3.93 10*6/mm3      Hemoglobin 11.2 g/dL      Hematocrit 34.4 %      MCV 87.5 fL      MCH 28.5 pg      MCHC 32.6 g/dL      RDW 16.1 %      RDW-SD 52.0 fl      MPV 9.0 fL      Platelets 147 10*3/mm3            Imaging:  Imaging Results (Last 24 Hours)     ** No results found for the last 24 hours. **             Assessment and Plan:       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)    ICH (intracerebral hemorrhage) (CMS/HCC)  This is a neurology follow-up note not an initial consult as the patient has been seen initially by neurology during this admission yesterday.  The patient does have acute psychosis and visual hallucinations and I will add a ultra tiny dose of Zyprexa 2.5 mg p.o. q. supper which is very QT interval friendly benefits outweigh any risks I will reassess her tomorrow      Shane Mota MD  08/10/20  16:07

## 2020-08-10 NOTE — PLAN OF CARE
Problem: Patient Care Overview  Goal: Plan of Care Review  Outcome: Ongoing (interventions implemented as appropriate)  Flowsheets  Taken 8/10/2020 1532  Progress: no change  Taken 8/10/2020 1525  Plan of Care Reviewed With: patient;daughter  Outcome Summary: Pt on BSC upon arrival. Pt req max A of 2 for sit/stand from BSC. Pt stood for 45-60sec while nsg staff cleaned pt and then took 3-4 tiny steps to EOB. Pt had another BM while sitting EOB. Pt stood again from EOB with max A of 2 to be cleaned. Max A for sit/supine bed mobility. Cont PT for ther ex, gt/transfer training, bed mobility, and activity tolerance. Possible d/c tomorrow.  Patient was not wearing a face mask during this therapy encounter. Therapist used appropriate personal protective equipment including eye protection, mask, and gloves.  Mask used was standard procedure mask. Appropriate PPE was worn during the entire therapy session. Hand hygiene was completed before and after therapy session. Patient is not in enhanced droplet precautions.

## 2020-08-10 NOTE — THERAPY TREATMENT NOTE
Patient Name: Jihan Teresa  : 1933    MRN: [id number]                              Today's Date: 8/10/2020       Admit Date: 8/3/2020    Visit Dx:     ICD-10-CM ICD-9-CM   1. Nontraumatic intracerebral hemorrhage, unspecified cerebral location, unspecified laterality (CMS/HCC) I61.9 431   2. Acute UTI N39.0 599.0   3. 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) I61.0 431     Patient Active Problem List   Diagnosis   • Type 2 diabetes mellitus (CMS/HCC)   • Aortic valve replaced, equine valve   • Cerebral infarction (CMS/HCC)   • A-fib (CMS/[facility])   • GERD without esophagitis   • Sleep apnea in adult   • Cognitive dysfunction   • Hypothyroidism (acquired)   • History of recurrent UTIs   • Mild reactive airways disease   • Essential hypertension   • Pulmonary hypertension (CMS/HCC)   • B12 deficiency   • Non-rheumatic mitral regurgitation   • Non-rheumatic tricuspid valve insufficiency   • Chronic anemia   • Grade III hemorrhoids   • ICH (intracerebral hemorrhage) (CMS/[facility])   • 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)     Past Medical History:   Diagnosis Date   • Anemia    • Aortic valve stenosis     S/p AVR in    • Asthma    • Atelectasis    • CHF (congestive heart failure) (CMS/[facility])    • Congenital heart disease    • Diabetes mellitus (CMS/HCC)    • Esophageal reflux    • Essential hypertension    • HLD (hyperlipidemia)    • Hypotension    • LVH (left ventricular hypertrophy)    • Pleural effusion    • Pulmonary hypertension (CMS/HCC)      Past Surgical History:   Procedure Laterality Date   • AORTIC VALVE REPAIR/REPLACEMENT  2009    Jerry Colmenares MD   • CATARACT EXTRACTION     • KNEE ARTHROPLASTY       General Information     Row Name 08/10/20 1516          PT Evaluation Time/Intention    Document Type  therapy note (daily note)  -DJ     Mode of Treatment  individual therapy;physical therapy  -DJ     Row Name 08/10/20 1516           General Information    Patient Profile Reviewed?  yes  -DJ     Existing Precautions/Restrictions  fall  -DJ     Row Name 08/10/20 1516          Cognitive Assessment/Intervention- PT/OT    Orientation Status (Cognition)  verbal cues/prompts needed for orientation  -DJ     Cognitive Assessment/Intervention Comment  Pleasantly confused   -DJ     Row Name 08/10/20 1516          Safety Issues, Functional Mobility    Comment, Safety Issues/Impairments (Mobility)  gt belt, grippy socks  -DJ       User Key  (r) = Recorded By, (t) = Taken By, (c) = Cosigned By    Initials Name Provider Type    Florence Gold, PT Physical Therapist        Mobility     Row Name 08/10/20 1518          Bed Mobility Assessment/Treatment    Sit-Supine [place] (Bed Mobility)  verbal cues;2 person assist;maximum assist (25% patient effort)  -DJ     Assistive Device (Bed Mobility)  draw sheet  -DJ     Row Name 08/10/20 1518          Transfer Assessment/Treatment    Comment (Transfers)  sit/stand from BSC x1 and EOB x1  -DJ     Row Name 08/10/20 1518          Bed-Chair Transfer    Bed-Chair Yates (Transfers)  maximum assist (25% patient effort);2 person assist  -DJ     Assistive Device (Bed-Chair Transfers)  walker, front-wheeled  -DJ     Row Name 08/10/20 1518          Sit-Stand Transfer    Sit-Stand Yates (Transfers)  2 person assist;maximum assist (25% patient effort)  -DJ     Assistive Device (Sit-Stand Transfers)  walker, front-wheeled  -DJ     Row Name 08/10/20 1518          Gait/Stairs Assessment/Training    Gait/Stairs Assessment/Training  gait/ambulation assistive device;distance ambulated;gait pattern  -DJ     Assistive Device (Gait)  walker, front-wheeled  -DJ     Distance in Feet (Gait)  Pt able to take 2-3 small steps from BSC to EOB  -DJ     Yates Level (Stairs)  not tested  -DJ     Comment (Gait/Stairs)  Pt able to take only 2-3 steps from BSC to EOB with max A of 2 and vc  -DJ       User Key  (r) = Recorded  By, (t) = Taken By, (c) = Cosigned By    Initials Name Provider Type    Florence Gold PT Physical Therapist        Obj/Interventions     Row Name 08/10/20 1521          Therapeutic Exercise    Comment (Therapeutic Exercise)  No HEP today - pt soiled and nsg staff cleaning pt after transfer back to bed  -DJ     Row Name 08/10/20 1521          Static Sitting Balance    Level of [place] (Unsupported Sitting, Static Balance)  minimal assist, 75% patient effort  -DJ     Sitting Position (Unsupported Sitting, Static Balance)  sitting on edge of bed  -DJ     Time Able to Maintain Position (Unsupported Sitting, Static Balance)  45 to 60 seconds  -DJ     Row Name 08/10/20 1521          Static Standing Balance    Level of [place] (Supported Standing, Static Balance)  maximal assist, 25 to 49% patient effort;2 person assist  -DJ     Assistive Device Utilized (Supported Standing, Static Balance)  walker, rolling  -DJ     Row Name 08/10/20 1521          Dynamic Standing Balance    Level of [place], Reaches Outside Midline (Standing, Dynamic Balance)  maximal assist, 25 to 49% patient effort;2 person assist  -DJ     Time Able to Maintain Position, Reaches Outside Midline (Standing, Dynamic Balance)  45 to 60 seconds  -DJ     Assistive Device Utilized (Supported Standing, Dynamic Balance)  walker, rolling  -DJ       User Key  (r) = Recorded By, (t) = Taken By, (c) = Cosigned By    Initials Name Provider Type    Florence Gold, JOSHUA Physical Therapist        Goals/Plan     Row Name 08/10/20 1529          Bed Mobility Goal 1 (PT)    Time Frame (Bed Mobility Goal 1, PT)  1 week  -DJ     Progress/Outcomes (Bed Mobility Goal 1, PT)  progress slower than expected;goal ongoing;goal revised this date  -     Row Name 08/10/20 1529          Transfer Goal 1 (PT)    Time Frame (Transfer Goal 1, PT)  1 week  -DJ     Progress/Outcome (Transfer Goal 1, PT)  progress slower than expected;goal revised this date  -     Row Name  08/10/20 1529          Gait Training Goal 1 (PT)    Progress/Outcome (Gait Training Goal 1, PT)  progress slower than expected;goal revised this date  -DJ       User Key  (r) = Recorded By, (t) = Taken By, (c) = Cosigned By    Initials Name Provider Type    Florence Gold, PT Physical Therapist        Clinical Impression     Row Name 08/10/20 1525          Pain Assessment    Additional Documentation  Pain Scale: Numbers Pre/Post-Treatment (Group)  -DJ     Row Name 08/10/20 1525          Pain Scale: Numbers Pre/Post-Treatment    Pain Scale: Numbers, Pretreatment  0/10 - no pain  -DJ     Row Name 08/10/20 1525          Plan of Care Review    Plan of Care Reviewed With  patient;daughter  -DJ     Progress  no change  -DJ     Outcome Summary  Pt on BS upon arrival. Pt req max A of 2 for sit/stand from BSC. Pt stood for 45-60sec whild nsg staff cleaned pt and then took 3-4 tiny steps to EOB. Pt had another BM while sitting EOB. Pt stood again from EOB iwth max A of 2 to be cleaned. Max A for sit/supine bed mobility. Cont PT for ther ex, gt/transfer training, bed mobility, and activity tolerance. Possible d/c tomorrow  -DJ     Row Name 08/10/20 1525          Physical Therapy Clinical Impression    Criteria for Skilled Interventions Met (PT Clinical Impression)  yes;treatment indicated  -DJ     Rehab Potential (PT Clinical Summary)  fair, will monitor progress closely  -DJ     Row Name 08/10/20 1525          Vital Signs    O2 Delivery Pre Treatment  room air  -DJ     O2 Delivery Intra Treatment  room air  -DJ     O2 Delivery Post Treatment  room air  -DJ     Pre Patient Position  Sitting  -DJ     Intra Patient Position  Standing  -DJ     Post Patient Position  Supine  -DJ     Row Name 08/10/20 1525          Positioning and Restraints    Pre-Treatment Position  bedside commode  -DJ     Post Treatment Position  bed  -DJ     In Bed  notified nsg;supine;call light within reach;encouraged to call for assist;with nsg  -DJ        User Key  (r) = Recorded By, (t) = Taken By, (c) = Cosigned By    Initials Name Provider Type    Florence Gold PT Physical Therapist        Outcome Measures     Row Name 08/10/20 9850          How much help from another person do you currently need...    Turning from your back to your side while in flat bed without using bedrails?  2  -DJ     Moving from lying on back to sitting on the side of a flat bed without bedrails?  2  -DJ     Moving to and from a bed to a chair (including a wheelchair)?  1  -DJ     Standing up from a chair using your arms (e.g., wheelchair, bedside chair)?  2  -DJ     Climbing 3-5 steps with a railing?  1  -DJ     To walk in hospital room?  1  -DJ     AM-PAC 6 Clicks Score (PT)  9  -DJ     Row Name 08/10/20 8780          Functional Assessment    Outcome Measure Options  AM-PAC 6 Clicks Basic Mobility (PT)  -DJ       User Key  (r) = Recorded By, (t) = Taken By, (c) = Cosigned By    Initials Name Provider Type    Florence Gold PT Physical Therapist        Physical Therapy Education                 Title: PT OT SLP Therapies (In Progress)     Topic: Physical Therapy (In Progress)     Point: Mobility training (In Progress)     Description:   Instruct learner(s) on safety and technique for assisting patient out of bed, chair or wheelchair.  Instruct in the proper use of assistive devices, such as walker, crutches, cane or brace.              Patient Friendly Description:   It's important to get you on your feet again, but we need to do so in a way that is safe for you. Falling has serious consequences, and your personal safety is the most important thing of all.        When it's time to get out of bed, one of us or a family member will sit next to you on the bed to give you support.     If your doctor or nurse tells you to use a walker, crutches, a cane, or a brace, be sure you use it every time you get out of bed, even if you think you don't need it.    Learning Progress Summary            Patient Acceptance, E, NR by DJ at 8/10/2020 1530    Acceptance, E, VU,NR by MW at 8/8/2020 1542    Acceptance, E, DU,NR by AR at 8/7/2020 1432    Acceptance, E, DU,NR by AR at 8/6/2020 1208    Acceptance, E,D, NR by CG at 8/5/2020 1410    Comment:  does not appear to retain information.  CGRESSRN    Acceptance, E, NL,NR by AR at 8/5/2020 1204    Acceptance, E, DU,NR by AR at 8/4/2020 1453   Family Acceptance, E, VU,NR by MW at 8/8/2020 1542                   Point: Home exercise program (Done)     Description:   Instruct learner(s) on appropriate technique for monitoring, assisting and/or progressing patient with therapeutic exercises and activities.              Learning Progress Summary           Patient Acceptance, E, VU,NR by MW at 8/8/2020 1542    Acceptance, E, DU,NR by AR at 8/7/2020 1432    Acceptance, E, DU,NR by AR at 8/6/2020 1208    Acceptance, E,D, NR by CG at 8/5/2020 1410    Comment:  does not appear to retain information.  CGRESSRN    Acceptance, E, NL,NR by AR at 8/5/2020 1204    Acceptance, E, DU,NR by AR at 8/4/2020 1453   Family Acceptance, E, VU,NR by MW at 8/8/2020 1542                   Point: Body mechanics (In Progress)     Description:   Instruct learner(s) on proper positioning and spine alignment for patient and/or caregiver during mobility tasks and/or exercises.              Learning Progress Summary           Patient Acceptance, E, NR by ELVA at 8/10/2020 1530    Acceptance, E, VU,NR by MW at 8/8/2020 1542    Acceptance, E, DU,NR by AR at 8/7/2020 1432    Acceptance, E, DU,NR by AR at 8/6/2020 1208    Acceptance, E,D, NR by CG at 8/5/2020 1410    Comment:  does not appear to retain information.  CGRESSRN    Acceptance, E, NL,NR by AR at 8/5/2020 1204    Acceptance, E, DU,NR by AR at 8/4/2020 1453   Family Acceptance, E, VU,NR by MW at 8/8/2020 1542                   Point: Precautions (In Progress)     Description:   Instruct learner(s) on prescribed precautions during mobility and  gait tasks              Learning Progress Summary           Patient Acceptance, E, NR by DJ at 8/10/2020 1530    Acceptance, E, VU,NR by MW at 8/8/2020 1542    Acceptance, E, DU,NR by AR at 8/7/2020 1432    Acceptance, E, DU,NR by AR at 8/6/2020 1208    Acceptance, E,D, NR by CG at 8/5/2020 1410    Comment:  does not appear to retain information.  CGRESSRN    Acceptance, E, NL,NR by AR at 8/5/2020 1204    Acceptance, E, DU,NR by AR at 8/4/2020 1453   Family Acceptance, E, VU,NR by MW at 8/8/2020 1542                               User Key     Initials Effective Dates Name Provider Type Discipline    CG 06/16/16 -  Geneva Villela RN Registered Nurse Nurse     09/17/19 -  Pauline Harris, PT Physical Therapist PT    DJ 10/25/19 -  Florence Wallace, PT Physical Therapist PT    AR 07/02/20 -  Raghu Garcia, PT Student PT Student PT              PT Recommendation and Plan     Outcome Summary/Treatment Plan (PT)  Anticipated Discharge Disposition (PT): skilled nursing facility  Plan of Care Reviewed With: patient, daughter  Progress: no change  Outcome Summary: Pt on BSC upon arrival. Pt req max A of 2 for sit/stand from BSC. Pt stood for 45-60sec whild [facility] staff cleaned pt and then took 3-4 tiny steps to EOB. Pt had another BM while sitting EOB. Pt stood again from EOB iwth max A of 2 to be cleaned. Max A for sit/supine bed mobility. Cont PT for ther ex, gt/transfer training, bed mobility, and activity tolerance. Possible d/c tomorrow     Time Calculation:   PT Charges     Row Name 08/10/20 1533             Time Calculation    Start Time  1431  -DJ      Stop Time  1451  -DJ      Time Calculation (min)  20 min  -DJ      PT Non-Billable Time (min)  10 min  -DJ      PT Received On  08/10/20  -DJ      PT - Next Appointment  08/11/20  -DJ      PT Goal Re-Cert Due Date  08/17/20  -DJ        User Key  (r) = Recorded By, (t) = Taken By, (c) = Cosigned By    Initials Name Provider Type    DJ Florence Wallace, PT Physical Therapist         Therapy Charges for Today     Code Description Service Date Service Provider Modifiers Qty    50055899079 HC PT THER PROC EA 15 MIN 8/10/2020 Florence Wallace, PT GP 1    85622062699 HC PT THER SUPP EA 15 MIN 8/10/2020 Florence Wallace, PT GP 1          PT G-Codes  Outcome Measure Options: AM-PAC 6 Clicks Basic Mobility (PT)  AM-PAC 6 Clicks Score (PT): 9    Florence Wallace, PT  8/10/2020

## 2020-08-10 NOTE — PLAN OF CARE
Problem: Patient Care Overview  Goal: Plan of Care Review  Outcome: Ongoing (interventions implemented as appropriate)  Flowsheets (Taken 8/10/2020 0018)  Progress: improving  Plan of Care Reviewed With: patient; daughter  Note:   NIH assessments d/c'd. Disoriented to time/place/situation. O2 2 liters at bedtime, room air during day. Follows commands with repetition needed at times. Rested well tonight. Pure wick in place. Q2 turn. Medications whole without difficulty. Assist with meals. Buttocks dry/flaky, applied barrier cream with care tonight. Denies pain. VSS. In afib. Restarting coumadin today without bridges per MD notes. Anticipating d/c to [facility] today. Continuing to monitor.

## 2020-08-10 NOTE — PROGRESS NOTES
NEUROSURGERY PROGRESS NOTE     LOS: 7 days   Patient Care Team:  Yaron Ca Jr., MD as PCP - General (Family Medicine)  Yaron Ca Jr., MD as PCP - Claims Attributed    Chief Complaint:  Follow up visit for ICH    Subjective     Interval History:     Very pleasant; still confused. Denies headache.     History taken from: patient chart    Objective      Vital Signs  Temp:  [97.9 °F (36.6 °C)-98.5 °F (36.9 °C)] 98.5 °F (36.9 °C)  Heart Rate:  [63-81] 76  Resp:  [16-18] 16  BP: (133-205)/(64-94) 171/70  Body mass index is 27.86 kg/m².      Physical Exam    AA&O x 3. Knows name, place and date of birth.   PERRL. EOM's intact. Face symmetric.  Follows commands x 4 extremities.   Mild L drift, mild L dysmetria due to old shoulder injury      Results Review:     I reviewed the patient's new clinical results.    Labs:    Lab Results (last 24 hours)     Procedure Component Value Units Date/Time    Basic Metabolic Panel [[id number]]  (Abnormal) Collected:  08/10/20 0333    Specimen:  Blood Updated:  08/10/20 0424     Glucose 113 mg/dL      BUN 8 mg/dL      Creatinine 0.71 mg/dL      Sodium 137 mmol/L      Potassium 3.8 mmol/L      Chloride 106 mmol/L      CO2 20.9 mmol/L      Calcium 8.7 mg/dL      eGFR Non African Amer 78 mL/min/1.73      BUN/Creatinine Ratio 11.3     Anion Gap 10.1 mmol/L     Narrative:       GFR Normal >60  Chronic Kidney Disease <60  Kidney Failure <15      CBC (No Diff) [[id number]]  (Abnormal) Collected:  08/10/20 0333    Specimen:  Blood Updated:  08/10/20 0358     WBC 9.10 10*3/mm3      RBC 3.93 10*6/mm3      Hemoglobin 11.2 g/dL      Hematocrit 34.4 %      MCV 87.5 fL      MCH 28.5 pg      MCHC 32.6 g/dL      RDW 16.1 %      RDW-SD 52.0 fl      MPV 9.0 fL      Platelets 147 10*3/mm3           Imaging:    CT head on 8/8/20 was stable.     Current Medications:   Scheduled Meds:  cephalexin 500 mg Oral Q8H   isosorbide mononitrate 60 mg Oral Daily   levothyroxine 75 mcg Oral QAM   metoprolol tartrate  25 mg Oral Q12H       Assessment/Plan       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)    ICH (intracerebral hemorrhage) (CMS/HCC)      Plan:         Per Dr. De La Cruz, patient is cleared for coumadin from [facility] standpoint with no loading dose.      We will see patient in office 8/18/2020 with repeat head CT. Patient will need to get head CT first and then come to office after for appointment.       We will see as needed from here. Please call [facility] for any questions or concerns.       Eulalia Leal, KAREN  08/10/20  12:54

## 2020-08-11 NOTE — PROGRESS NOTES
"[facility] PULMONARY CARE         Dr Crawford Sayied   LOS: 8 days   Patient Care Team:  Yaron Ca Jr., MD as PCP - General (Family Medicine)  Yaron Ca Jr., MD as PCP - Claims Attributed    Chief Complaint: Intracerebral hemorrhage A. fib coagulopathy UTI    Interval History: Confused this morning.  She received some Zyprexa last night.  No distress no labored breathing.    REVIEW OF SYSTEMS:   CARDIOVASCULAR: No chest pain, chest pressure or chest discomfort. No palpitations or edema.   RESPIRATORY: No shortness of breath, cough or sputum.   GASTROINTESTINAL: No anorexia, nausea, vomiting or diarrhea. No abdominal pain or blood.   HEMATOLOGIC: No bleeding or bruising.     Ventilator/Non-Invasive Ventilation Settings (From admission, onward)    None            Vital Signs  Temp:  [97.1 °F (36.2 °C)-98.3 °F (36.8 °C)] 98.3 °F (36.8 °C)  Heart Rate:  [62-72] 62  Resp:  [16] 16  BP: (159-195)/(71-98) 195/98    Intake/Output Summary (Last 24 hours) at 8/11/2020 1337  Last data filed at 8/11/2020 0535  Gross per 24 hour   Intake --   Output 950 ml   Net -950 ml     Flowsheet Rows      First Filed Value   Admission Height  152.4 cm (60\") Documented at 08/03/2020 1133   Admission Weight  68 kg (149 lb 14.4 oz) Documented at 08/03/2020 0857          Physical Exam:  Patient is examined using the personal protective equipment as per guidelines from infection control for this particular patient as enacted.  Hand hygiene was performed before and after patient interaction.   General Appearance:    Alert, cooperative, in no acute distress.  Following simple commands  Neck midline trachea no thyromegaly   Lungs:     Clear to auscultation,respirations regular, even and                  unlabored    Heart:    Regular rhythm and normal rate, normal S1 and S2, no            murmur, no gallop, no rub, no click   Chest Wall:    No abnormalities observed   Abdomen:     Normal bowel sounds, no masses, no organomegaly, soft      "   non-tender, non-distended, no guarding, no rebound                tenderness   Extremities:   Moves all extremities well, no edema, no cyanosis, no             redness  CNS no focal neurological deficits normal sensory exam  Skin no rashes no nodules  Musculoskeletal no cyanosis no clubbing normal range of motion     Results Review:        Results from last 7 days   Lab Units 08/11/20  0611 08/10/20  0333 08/09/20  0652   SODIUM mmol/L 140 137 139   POTASSIUM mmol/L 3.9 3.8 3.8   CHLORIDE mmol/L 108* 106 110*   CO2 mmol/L 23.3 20.9* 22.2   BUN mg/dL 7* 8 9   CREATININE mg/dL 0.63 0.71 0.71   GLUCOSE mg/dL 108* 113* 111*   CALCIUM mg/dL 9.2 8.7 8.6         Results from last 7 days   Lab Units 08/11/20  0611 08/10/20  0333 08/09/20  0652   WBC 10*3/mm3 7.42 9.10 9.30   HEMOGLOBIN g/dL 12.6 11.2* 11.1*   HEMATOCRIT % 37.9 34.4 34.3   PLATELETS 10*3/mm3 168 147 161                           I reviewed the patient's new clinical results.  I personally viewed and interpreted the patient's CXR        Medication Review:     cephalexin 500 mg Oral Q8H   isosorbide mononitrate 60 mg Oral Daily   levothyroxine 75 mcg Oral QAM   metoprolol tartrate 25 mg Oral Q12H            ASSESSMENT:   Intracerebral hemorrhage  Coagulopathy reversed  A. fib  UTI E. coli  Diabetes mellitus  Hypothyroidism    PLAN:  Input from neurology noted.  Little sleepy this morning with Zyprexa.  Neurology currently following and adjusting medications.  Neuro checks remained stable.  Cerebral angiogram results reviewed.  Last CT head repeat showed stable ICH  Blood pressure management keep systolic less than 140  A. fib currently on rate control.  Cardiology currently following.  Currently holding anticoagulation.  Once cleared by neurology will restart Coumadin again..  Oral antibiotics for E. coli in the urine.  Repeat UA no evidence of infection.  Due to daughter's concern we will repeat urine again  Blood glucose management per ICU protocol  Diet  advanced.  PT OT  Discussed with granddaughter today  Likely discharge in the morning once cleared by neurology and restart Coumadin today.    Yvette Evans MD  08/11/20  13:37

## 2020-08-11 NOTE — PLAN OF CARE
Problem: Patient Care Overview  Goal: Plan of Care Review  Outcome: Ongoing (interventions implemented as appropriate)  Flowsheets (Taken 8/11/2020 3816)  Progress: no change  Plan of Care Reviewed With: patient  Note:   Started zyprexa yesterday, pt was a bit difficult to arouse after medication was given but did become more alert after a few minutes. Had to have 1 dose of PRN hydralazine last night for SBP, effective in bringing pressure down some. Denies pain all shift. Remains in afib. Continuing to monitor.

## 2020-08-11 NOTE — PROGRESS NOTES
Patient Identification:  NAME:  Jihan Tersea  Age:  86 y.o.   Sex:  female   :  1933   MRN:  [id number]       Chief complaint: Nontraumatic intraparenchymal left hemisphere hemorrhage, metabolic encephalopathy,    History of present illness: She is very lethargic today she got the Zyprexa last night.  No seizures no focal abnormality her granddaughter is here today      Past medical history:  Past Medical History:   Diagnosis Date   • Anemia    • Aortic valve stenosis     S/p AVR in    • Asthma    • Atelectasis    • CHF (congestive heart failure) (CMS/HCC)    • Congenital heart disease    • Diabetes mellitus (CMS/HCC)    • Esophageal reflux    • Essential hypertension    • HLD (hyperlipidemia)    • Hypotension    • LVH (left ventricular hypertrophy)    • Pleural effusion    • Pulmonary hypertension (CMS/HCC)        Allergies:  Promethazine    Home medications:  Facility-Administered Medications Prior to Admission   Medication Dose Route Frequency Provider Last Rate Last Dose   • cyanocobalamin injection 1,000 mcg  1,000 mcg Intramuscular Q28 Days Gustavo Jackman Jr., MD   1,000 mcg at 19 1640     Medications Prior to Admission   Medication Sig Dispense Refill Last Dose   • isosorbide mononitrate (IMDUR) 60 MG 24 hr tablet TAKE 1 TABLET BY MOUTH EVERY DAY 90 tablet 1    • levothyroxine (SYNTHROID, LEVOTHROID) 150 MCG tablet TAKE 1 TABLET BY MOUTH EVERY DAY (Patient taking differently: Take 75 mcg by mouth Daily.) 30 tablet 2 Patient Taking Differently at Unknown time   • warfarin (COUMADIN) 3 MG tablet Take 3 mg daily by mouth Tuesday through .  Do not take Monday (Patient taking differently: Take 3 mg daily by mouth  and .  Monday and Thursday patient to take 1.5 mg) 90 tablet 1 Patient Taking Differently at Unknown time   • acetaminophen (TYLENOL) 500 MG tablet Take  by mouth As Needed.   Taking   • ascorbic acid (VITAMIN C) 250 MG chewable tablet  chewable tablet Chew 500 mg Daily.   Taking   • atorvastatin (LIPITOR) 40 MG tablet TAKE 1 TABLET BY MOUTH EVERY DAY (Patient taking differently: Take 40 mg by mouth Daily.) 30 tablet 5    • Calcium Citrate-Vitamin D 250-200 MG-UNIT tablet Take  by mouth 2 (Two) Times a Day.   Taking   • Clobetasol Propionate Emulsion 0.05 % topical foam APPLY SPARINGLY TO SCALP TWICE DAILY X 2 WEEKS. THEN AS NEEDED FOR FLARES  1 Taking   • clopidogrel (PLAVIX) 75 MG tablet TAKE 1 TABLET BY MOUTH EVERY DAY (Patient taking differently: Take 75 mg by mouth Daily.) 30 tablet 2    • furosemide (LASIX) 40 MG tablet TAKE 1 TABLET BY MOUTH THREE TIMES A DAY (Patient taking differently: Take 40 mg by mouth 3 (Three) Times a Day.) 90 tablet 1    • hydrocortisone (ANUSOL-HC) 2.5 % rectal cream Insert  into the rectum 2 (Two) Times a Day. 28.35 g 2    • ketoconazole (NIZORAL) 2 % shampoo WASH SCALP AND 2-3 TIMES A WEEK. LEAVE ON FOR 2-3 MINUTES THEN RINSE.  5 Taking   • metoprolol tartrate (LOPRESSOR) 50 MG tablet TAKE 1 TABLET BY MOUTH 2 (TWO) TIMES DAILY (Patient taking differently: Take 50 mg by mouth 2 (Two) Times a Day.) 180 tablet 2 8/4/2020 at 1100   • Multiple Vitamin (MULTIVITAMIN) tablet Take 1 tablet by mouth Daily.   Taking   • omeprazole (priLOSEC) 20 MG capsule Take 20 mg by mouth Daily.   Taking   • Phenylephrine-Witch Hazel (PREPARATION H) 0.25-50 % gel Insert 1 g into the rectum 4 (Four) Times a Day As Needed (rectal burning). 25 g 2    • potassium chloride (MICRO-K) 10 MEQ CR capsule TAKE 2 CAPSULES BY MOUTH 2 (TWO) TIMES A DAY. (Patient taking differently: Take 20 mEq by mouth 2 (Two) Times a Day.) 120 capsule 5         Hospital medications:    cephalexin 500 mg Oral Q8H   isosorbide mononitrate 60 mg Oral Daily   levothyroxine 75 mcg Oral QAM   metoprolol tartrate 25 mg Oral Q12H        •  acetaminophen  •  hydrALAZINE  •  potassium chloride **OR** potassium chloride **OR** potassium chloride  •  [COMPLETED] Insert  peripheral IV **AND** sodium chloride      Objective:  Vitals Ranges:   Temp:  [97.1 °F (36.2 °C)-97.8 °F (36.6 °C)] 97.8 °F (36.6 °C)  Heart Rate:  [61-72] 72  Resp:  [16] 16  BP: (141-178)/(71-87) 159/78      Physical Exam:  Patient is very lethargic I eventually awaken her she definitely tracks me blinks to threat pupils 2 and half constricting to 2.  Equal tone in the upper and lower extremities reflexes trace throughout symmetrical toes downgoing bilaterally she would not speak to me    Results review:   I reviewed the patient's new clinical results.    Data review:  Lab Results (last 24 hours)     Procedure Component Value Units Date/Time    Basic Metabolic Panel [[id number]]  (Abnormal) Collected:  08/11/20 0611    Specimen:  Blood Updated:  08/11/20 0701     Glucose 108 mg/dL      BUN 7 mg/dL      Creatinine 0.63 mg/dL      Sodium 140 mmol/L      Potassium 3.9 mmol/L      Chloride 108 mmol/L      CO2 23.3 mmol/L      Calcium 9.2 mg/dL      eGFR Non African Amer 90 mL/min/1.73      BUN/Creatinine Ratio 11.1     Anion Gap 8.7 mmol/L     Narrative:       GFR Normal >60  Chronic Kidney Disease <60  Kidney Failure <15      CBC (No Diff) [[id number]]  (Abnormal) Collected:  08/11/20 0611    Specimen:  Blood Updated:  08/11/20 0635     WBC 7.42 10*3/mm3      RBC 4.37 10*6/mm3      Hemoglobin 12.6 g/dL      Hematocrit 37.9 %      MCV 86.7 fL      MCH 28.8 pg      MCHC 33.2 g/dL      RDW 16.7 %      RDW-SD 52.4 fl      MPV 9.7 fL      Platelets 168 10*3/mm3     Urinalysis With Culture If Indicated - Urine, Random Void [[id number]]  (Normal) Collected:  08/10/20 1539    Specimen:  Urine, Random Void Updated:  08/10/20 1602     Color, UA Yellow     Appearance, UA Clear     pH, UA 5.5     Specific Gravity, UA 1.014     Glucose, UA Negative     Ketones, UA Negative     Bilirubin, UA Negative     Blood, UA Negative     Protein, UA Negative     Leuk Esterase, UA Negative     Nitrite, UA Negative     Urobilinogen, UA 0.2  E.U./dL    Narrative:       Urine microscopic not indicated.           Imaging:  Imaging Results (Last 24 Hours)     ** No results found for the last 24 hours. **             Assessment and Plan:       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)    ICH (intracerebral hemorrhage) (CMS/HCC)    She is very lethargic today but I get her to awaken she just will not speak to me at this point.  There is still significant metabolic encephalopathy and at this point I would discontinue the Zyprexa.  FYI, it is hard to believe that her current lethargy is related to this ultra tiny dose of Zyprexa she had last night.    I do not believe she is having seizures and there is no progression of her intracranial hemorrhage.  Note that aneurysm/AVM has been ruled out.      Shane Mota MD  08/11/20  11:09

## 2020-08-11 NOTE — PROGRESS NOTES
"[facility]  Cardiology Progress Note    Patient Identification:  Name: Jihan Teresa  Age: 86 y.o.  Sex: female  :  1933  MRN: [id number]                 Follow Up / Chief Complaint: Atrial fibrillation with ICH    Interval History: No significant change.  Coumadin restarted today.   Subjective: She denies chest pain, and shortness of breath    Objective:    Past Medical History:  Past Medical History:   Diagnosis Date   • Anemia    • Aortic valve stenosis     S/p AVR in    • Asthma    • Atelectasis    • CHF (congestive heart failure) (CMS/HCC)    • Congenital heart disease    • Diabetes mellitus (CMS/HCC)    • Esophageal reflux    • Essential hypertension    • HLD (hyperlipidemia)    • Hypotension    • LVH (left ventricular hypertrophy)    • Pleural effusion    • Pulmonary hypertension (CMS/HCC)      Past Surgical History:  Past Surgical History:   Procedure Laterality Date   • AORTIC VALVE REPAIR/REPLACEMENT  2009    Jerry Colmenares MD   • CATARACT EXTRACTION     • KNEE ARTHROPLASTY          Social History:   Social History     Tobacco Use   • Smoking status: Never Smoker   • Smokeless tobacco: Never Used   Substance Use Topics   • Alcohol use: Yes     Comment: ocasional      Family History:  Family History   Problem Relation Age of Onset   • Dementia Sister    • Stroke Brother           Allergies:  Allergies   Allergen Reactions   • Promethazine Other (See Comments)     Pt becomes \"out of it\" when on this medication  Pt becomes \"out of it\" when on this medication     Scheduled Meds:    cephalexin 500 mg Q8H   furosemide 20 mg Daily   isosorbide mononitrate 60 mg Daily   levothyroxine 75 mcg QAM   metoprolol tartrate 25 mg Q12H   [START ON 2020] warfarin 1.5 mg Once per day on u   warfarin 3 mg Once per day on Sun  Sat           ROS  Constitutional: Awake and alert, no fever.  No nosebleeds   Abdomen           no abdominal pain   Cardiac              no " "chest pain  Pulmonary          no shortness of breath      /80   Pulse 62   Temp 98.3 °F (36.8 °C) (Oral)   Resp 16   Ht 152.4 cm (60\")   Wt 67.3 kg (148 lb 5.9 oz)   SpO2 96%   BMI 28.98 kg/m²        Physical Exam:  General:  Appears in no acute distress, resting in bed  Eyes: EOM normal no conjunctival drainage  HEENT:  No JVD. Thyroid not visibly enlarged. No mucosal pallor or cyanosis  Respiratory: Respirations regular and unlabored at rest. BBS with good air entry in all fields. No crackles, rubs or wheezes auscultated  Cardiovascular: S1S2 irrregularly irregular rhythm. No murmur, rub or gallop. No carotid bruits. DP/PT pulses +2    . No pretibial pitting edema  Gastrointestinal: Abdomen soft, flat, non tender. Bowel sounds present. No hepatosplenomegaly. No ascites  Musculoskeletal: OLIVAS x4. No abnormal movements  Extremities: No digital clubbing or cyanosis  Skin:   Skin warm and dry to touch. No rashes    Neuro: AAO x3 CN II-XII grossly intact  Psych: Mood and affect normal, pleasant and cooperative               Cardiographics  Telemetry:               A. Fib      Atrial fibrillation      Echocardiogram:   ECG:          Echocardiogram:   2017  Interpretation Summary      · Calculated EF = 46.9%.  · Left ventricular systolic function is low normal.  · Left amd right atrial cavity size is severely dilated.  · Right ventricular cavity is severely dilated.  · calcification of the aortic valve  · Severe mitral valve regurgitation is present  · Mitral annular calcification is present. Posterior leaflet very calcified and immobile.  · Severe tricuspid valve regurgitation is present.  · Estimated right ventricular systolic pressure from tricuspid regurgitation is markedly elevated (>55 mmHg). Calculated right ventricular systolic pressure from tricuspid regurgitation is 66.7 mmHg.         Imaging  Chest X-ray:   Study Result      EXAMINATION: SINGLE VIEW CHEST RADIOGRAPH     HISTORY: 86-year-old female " with history of generalized weakness.     FINDINGS: An upright AP portable chest radiograph was obtained.  Comparison is made to a chest radiograph dated 06/10/2017. The lungs are  normal in volume and are clear of consolidation. Stable mild scattered  interstitial prominence is seen throughout both lungs. Stable soft  tissue fullness in the right hilum is most consistent with a prominent  right hilar vessel is noted. There are stable cardiomegaly. Midline  sternal wires are noted. Atheromatous consultation seen within the  aortic arch.     IMPRESSION:  There is stable mild bilateral interstitial scarring and  cardiomegaly with findings suggestive of pulmonary hypertension. No  evidence for superimposed acute process is appreciated.     This report was finalized on 8/3/2020 10:55 AM by Dr. Estuardo Wagner M.D.         CT     Study Result      CT SCAN OF THE HEAD WITHOUT CONTRAST     CLINICAL HISTORY: Generalized weakness and confusion.     TECHNIQUE: CT scan of the head was obtained with 3 mm axial images. No  intravenous contrast was administered.     COMPARISON: Comparison is made to previous CT scan of head dated  06/13/2017.     FINDINGS: There is increased density within the dependent aspects of  both lateral ventricles compatible with intraventricular hemorrhage.  There are foci of increased density noted within the inferior aspect of  the interhemispheric fissure worrisome for subarachnoid hemorrhage. The  etiology of these areas of hemorrhage is uncertain, although a vascular  etiology such as an aneurysm should be excluded. I recommend further  evaluation with an MRA or CTA examination, as well as an MRI of the  brain.     There are multiple chronic infarcts identified within both cerebellar  hemispheres, left greater than right. The largest of these chronic  infarcts measures up to approximately 1.4 x 0.4 cm in greatest axial  dimensions. Old lacunar disease is seen within the left thalamus. A  chronic  lacune identified within the anterior aspect of the left  thalamus measuring up to 4-5 mm in diameter. Hypodense areas are  identified within the lentiform nuclei bilaterally that are likely  representative of a combination of enlarged perivascular spaces and old  lacunar disease. Severe changes of chronic small vessel ischemic  phenomena are identified.     Otherwise, the ventricles, sulci, and cisterns are age appropriate.     IMPRESSION:     There is intraventricular hemorrhage identified within the dependent  aspects of both lateral ventricles. Additionally, there is increased  density seen within the inferior aspect of the interhemispheric fissure  suggestive of subarachnoid hemorrhage. The precise etiology of this  hemorrhage is uncertain on the basis of this head CT. However, a  vascular etiology should be excluded. Further evaluation is recommended  with a CTA or MRA study, as well as an MRI of the brain.     These findings and recommendations were discussed with Debbie Wilson on 08/03/2020 at approximately 8:36 AM.     Radiation dose reduction techniques were utilized, including automated  exposure control and exposure modulation based on body size.              Lab Review           Results from last 7 days   Lab Units 08/11/20  0611   SODIUM mmol/L 140   POTASSIUM mmol/L 3.9   BUN mg/dL 7*   CREATININE mg/dL 0.63   CALCIUM mg/dL 9.2        Results from last 7 days   Lab Units 08/11/20  0611 08/10/20  0333 08/09/20  0652   WBC 10*3/mm3 7.42 9.10 9.30   HEMOGLOBIN g/dL 12.6 11.2* 11.1*   HEMATOCRIT % 37.9 34.4 34.3   PLATELETS 10*3/mm3 168 147 161     Results from last 7 days   Lab Units 08/11/20  1438   INR  1.30*     The following medical decision was discussed in detail with Dr. Mcmahan    Assessment:  1. Intracerebral hemorrhage  2.  Chronic atrial fibrillation: Coumadin on hold due to bleeding.  3.  Bioprosthetic aortic valve replaced in 2009  5. coagulopathy  6.  UTI  7.  Diabetes  "mellitus  8. Hypothyroidism  9. CAD  10. Hypertension    Plan:  Atrial fibrillation on monitor, HR controlled. Blood pressure slightly elevated, added norvasc. She was started on Coumadin, pharmacy dosing.       Will add norvasc for blood pressure.    )8/11/2020  KAREN Graham/Transcription:   \"Dictated utilizing Dragon dictation\".     "

## 2020-08-11 NOTE — THERAPY TREATMENT NOTE
Patient Name: Jihan Teresa  : 1933    MRN: [id number]                              Today's Date: 2020       Admit Date: 8/3/2020    Visit Dx:     ICD-10-CM ICD-9-CM   1. Nontraumatic intracerebral hemorrhage, unspecified cerebral location, unspecified laterality (CMS/HCC) I61.9 431   2. Acute UTI N39.0 599.0   3. 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) I61.0 431     Patient Active Problem List   Diagnosis   • Type 2 diabetes mellitus (CMS/HCC)   • Aortic valve replaced, equine valve   • Cerebral infarction (CMS/HCC)   • A-fib (CMS/[facility]ly [facility])   • GERD without esophagitis   • Sleep apnea in adult   • Cognitive dysfunction   • Hypothyroidism (acquired)   • History of recurrent UTIs   • Mild reactive airways disease   • Essential hypertension   • Pulmonary hypertension (CMS/HCC)   • B12 deficiency   • Non-rheumatic mitral regurgitation   • Non-rheumatic tricuspid valve insufficiency   • Chronic anemia   • Grade III hemorrhoids   • ICH (intracerebral hemorrhage) (CMS/Formerly [facility])   • 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)     Past Medical History:   Diagnosis Date   • Anemia    • Aortic valve stenosis     S/p AVR in    • Asthma    • Atelectasis    • CHF (congestive heart failure) (CMS/Formerly [facility])    • Congenital heart disease    • Diabetes mellitus (CMS/HCC)    • Esophageal reflux    • Essential hypertension    • HLD (hyperlipidemia)    • Hypotension    • LVH (left ventricular hypertrophy)    • Pleural effusion    • Pulmonary hypertension (CMS/HCC)      Past Surgical History:   Procedure Laterality Date   • AORTIC VALVE REPAIR/REPLACEMENT  2009    Jerry Colmenares MD   • CATARACT EXTRACTION     • KNEE ARTHROPLASTY       General Information     Row Name 20 2719          PT Evaluation Time/Intention    Document Type  therapy note (daily note)  (Pended)   -CB     Mode of Treatment  individual therapy;physical therapy  (Pended)   -CB     Row Name  08/11/20 1605          General Information    Patient Profile Reviewed?  yes  (Pended)   -CB     Existing Precautions/Restrictions  fall  (Pended)   -CB     Barriers to Rehab  cognitive status;medically complex  (Pended)   -CB     Row Name 08/11/20 1605          Cognitive Assessment/Intervention- PT/OT    Orientation Status (Cognition)  unable/difficult to assess  (Pended)   -CB     Row Name 08/11/20 1605          Safety Issues, Functional Mobility    Safety Issues Affecting Function (Mobility)  ability to follow commands  (Pended)   -CB     Impairments Affecting Function (Mobility)  balance;cognition;coordination;endurance/activity tolerance;strength;postural/trunk control  (Pended)   -CB       User Key  (r) = Recorded By, (t) = Taken By, (c) = Cosigned By    Initials Name Provider Type    CB Sami Guajardo, PT Student PT Student        Mobility     Row Name 08/11/20 1606          Bed Mobility Assessment/Treatment    Bed Mobility Assessment/Treatment  bed mobility (all) activities  (Pended)   -CB     [place] Level (Bed Mobility)  2 person assist;maximum assist (25% patient effort)  (Pended)   -CB     Assistive Device (Bed Mobility)  draw sheet;bed rails;head of bed elevated  (Pended)   -CB     Comment (Bed Mobility)  Pt very lethargic and goes between sleepy and wakeful states   (Pended)   -CB     Row Name 08/11/20 1606          Bed-Chair Transfer    Bed-Chair [place] (Transfers)  unable to assess  (Pended)   -CB     Row Name 08/11/20 1606          Sit-Stand Transfer    Sit-Stand [place] (Transfers)  unable to assess  (Pended)   -CB     Row Name 08/11/20 1606          Gait/Stairs Assessment/Training    [place] Level (Gait)  unable to assess  (Pended)   -CB       User Key  (r) = Recorded By, (t) = Taken By, (c) = Cosigned By    Initials Name Provider Type    CB Sami Guajardo, PT Student PT Student        Obj/Interventions     Row Name 08/11/20 1612          Therapeutic Exercise     Exercise Type (Therapeutic Exercise)  AROM (active range of motion)  (Pended)   -CB     Position (Therapeutic Exercise)  seated  (Pended)   -CB     Sets/Reps (Therapeutic Exercise)  B LE LAQ, AP x15  (Pended)   -CB     Comment (Therapeutic Exercise)  Pt very resistant to AP, but able to move legs with lots of verbal cueing.  (Pended)   -CB     Row Name 08/11/20 1612          Static Sitting Balance    Level of [place] (Unsupported Sitting, Static Balance)  2 person assist;moderate assist, 50 to 74% patient effort;maximal assist, 25 to 49% patient effort;minimal assist, 75% patient effort  (Pended)   -CB     Sitting Position (Unsupported Sitting, Static Balance)  sitting on edge of bed  (Pended)   -CB     Time Able to Maintain Position (Unsupported Sitting, Static Balance)  more than 5 minutes  (Pended)   -CB     Comment (Unsupported Sitting, Static Balance)  Pt sometimes able to sit up EOB w/o assitance with cueing. Cant hold herself up for more than 30 s   (Pended)   -CB     Row Name 08/11/20 1612          Dynamic Sitting Balance    Level of [place], Reaches Outside Midline (Sitting, Dynamic Balance)  maximal assist, 25 to 49% patient effort  (Pended)   -CB     Sitting Position, Reaches Outside Midline (Sitting, Dynamic Balance)  sitting on edge of bed  (Pended)   -CB     Row Name 08/11/20 1612          Static Standing Balance    Level of [place] (Supported Standing, Static Balance)  unable to perform activity  (Pended)   -CB     Row Name 08/11/20 1612          Dynamic Standing Balance    Level of [place], Reaches Outside Midline (Standing, Dynamic Balance)  unable to perform activity  (Pended)   -CB       User Key  (r) = Recorded By, (t) = Taken By, (c) = Cosigned By    Initials Name Provider Type    CB Sami Guajardo, PT Student PT Student        Goals/Plan    No documentation.       Clinical Impression     Row Name 08/11/20 1617          Pain Assessment    Additional Documentation  Pain  Scale: Numbers Pre/Post-Treatment (Group)  (Pended)   -CB     Row Name 08/11/20 1617          Pain Scale: Numbers Pre/Post-Treatment    Pain Scale: Numbers, Pretreatment  0/10 - no pain  (Pended)   -CB     Pain Scale: Numbers, Post-Treatment  0/10 - no pain  (Pended)   -CB     Row Name 08/11/20 1617          Plan of Care Review    Plan of Care Reviewed With  patient  (Pended)   -CB     Outcome Summary  Pt in a very lethargic state and unable to stay awake during session. Pt min-maxA for static sitting, able to support herself at times for periods less than 30 seconds. Pt resistive to some exercises, able to have some movement in her LE but unable to follow commands. Pt can still benefit from skilled PT for balance, strength, and endurance.   (Pended)   -CB     Row Name 08/11/20 1617          Physical Therapy Clinical Impression    Criteria for Skilled Interventions Met (PT Clinical Impression)  yes;treatment indicated  (Pended)   -CB     Rehab Potential (PT Clinical Summary)  good, to achieve stated therapy goals  (Pended)   -CB     Row Name 08/11/20 1617          Positioning and Restraints    Pre-Treatment Position  in bed  (Pended)   -CB     Post Treatment Position  bed  (Pended)   -CB     In Bed  call light within reach;supine;exit alarm on;encouraged to call for assist;with family/caregiver  (Pended)   -CB       User Key  (r) = Recorded By, (t) = Taken By, (c) = Cosigned By    Initials Name Provider Type    CB Sami Guajardo, PT Student PT Student        Outcome Measures     Row Name 08/11/20 1623          How much help from another person do you currently need...    Turning from your back to your side while in flat bed without using bedrails?  1  (Pended)   -CB     Moving from lying on back to sitting on the side of a flat bed without bedrails?  1  (Pended)   -CB     Moving to and from a bed to a chair (including a wheelchair)?  1  (Pended)   -CB     Standing up from a chair using your arms (e.g.,  wheelchair, bedside chair)?  1  (Pended)   -CB     Climbing 3-5 steps with a railing?  1  (Pended)   -CB     To walk in hospital room?  1  (Pended)   -CB     AM-PAC 6 Clicks Score (PT)  6  (Pended)   -CB     Row Name 08/11/20 1623          Functional Assessment    Outcome Measure Options  AM-PAC 6 Clicks Basic Mobility (PT)  (Pended)   -CB       User Key  (r) = Recorded By, (t) = Taken By, (c) = Cosigned By    Initials Name Provider Type    CB Sami Guajardo, PT Student PT Student        Physical Therapy Education                 Title: PT OT SLP Therapies (Done)     Topic: Physical Therapy (Done)     Point: Mobility training (Done)     Description:   Instruct learner(s) on safety and technique for assisting patient out of bed, chair or wheelchair.  Instruct in the proper use of assistive devices, such as walker, crutches, cane or brace.              Patient Friendly Description:   It's important to get you on your feet again, but we need to do so in a way that is safe for you. Falling has serious consequences, and your personal safety is the most important thing of all.        When it's time to get out of bed, one of us or a family member will sit next to you on the bed to give you support.     If your doctor or nurse tells you to use a walker, crutches, a cane, or a brace, be sure you use it every time you get out of bed, even if you think you don't need it.    Learning Progress Summary           Patient Acceptance, E,TB, VU by CB at 8/11/2020 1623    Acceptance, E, NR by ELVA at 8/10/2020 1530    Acceptance, E, VU,NR by MW at 8/8/2020 1542    Acceptance, E, DU,NR by AR at 8/7/2020 1432    Acceptance, E, DU,NR by AR at 8/6/2020 1208    Acceptance, E,D, NR by CG at 8/5/2020 1410    Comment:  does not appear to retain information.  CGRESSRN    Acceptance, E, NL,NR by AR at 8/5/2020 1204    Acceptance, E, DU,NR by AR at 8/4/2020 1453   Family Acceptance, E, VU,NR by MW at 8/8/2020 1542                   Point: Home  exercise program (Done)     Description:   Instruct learner(s) on appropriate technique for monitoring, assisting and/or progressing patient with therapeutic exercises and activities.              Learning Progress Summary           Patient Acceptance, E,TB, VU by CB at 8/11/2020 1623    Acceptance, E, VU,NR by MW at 8/8/2020 1542    Acceptance, E, DU,NR by AR at 8/7/2020 1432    Acceptance, E, DU,NR by AR at 8/6/2020 1208    Acceptance, E,D, NR by CG at 8/5/2020 1410    Comment:  does not appear to retain information.  CGRESSRN    Acceptance, E, NL,NR by AR at 8/5/2020 1204    Acceptance, E, DU,NR by AR at 8/4/2020 1453   Family Acceptance, E, VU,NR by MW at 8/8/2020 1542                   Point: Body mechanics (Done)     Description:   Instruct learner(s) on proper positioning and spine alignment for patient and/or caregiver during mobility tasks and/or exercises.              Learning Progress Summary           Patient Acceptance, E,TB, VU by CB at 8/11/2020 1623    Acceptance, E, NR by DJ at 8/10/2020 1530    Acceptance, E, VU,NR by MW at 8/8/2020 1542    Acceptance, E, DU,NR by AR at 8/7/2020 1432    Acceptance, E, DU,NR by AR at 8/6/2020 1208    Acceptance, E,D, NR by CG at 8/5/2020 1410    Comment:  does not appear to retain information.  CGRESSRN    Acceptance, E, NL,NR by AR at 8/5/2020 1204    Acceptance, E, DU,NR by AR at 8/4/2020 1453   Family Acceptance, E, VU,NR by MW at 8/8/2020 1542                   Point: Precautions (Done)     Description:   Instruct learner(s) on prescribed precautions during mobility and gait tasks              Learning Progress Summary           Patient Acceptance, E,TB, VU by CB at 8/11/2020 1623    Acceptance, E, NR by DJ at 8/10/2020 1530    Acceptance, E, VU,NR by MW at 8/8/2020 1542    Acceptance, E, DU,NR by AR at 8/7/2020 1432    Acceptance, E, DU,NR by AR at 8/6/2020 1208    Acceptance, E,D, NR by CG at 8/5/2020 1410    Comment:  does not appear to retain information.   CGRESSRN    Acceptance, E, NL,NR by AR at 8/5/2020 1204    Acceptance, E, DU,NR by AR at 8/4/2020 1453   Family Acceptance, E, VU,NR by  at 8/8/2020 1542                               User Key     Initials Effective Dates Name Provider Type Discipline    CG 06/16/16 -  Geneva Villela, RN Registered Nurse Nurse    MW 09/17/19 -  Pauline Harris, PT Physical Therapist PT    DJ 10/25/19 -  Florence Wallace, PT Physical Therapist PT    AR 07/02/20 -  Raghu Garcia, PT Student PT Student PT    CB 07/02/20 -  Sami Guajardo, PT Student PT Student PT              PT Recommendation and Plan     Outcome Summary/Treatment Plan (PT)  Anticipated Discharge Disposition (PT): (P) skilled nursing facility  Plan of Care Reviewed With: (P) patient  Progress: (P) declining  Outcome Summary: (P) Pt in a very lethargic state and unable to stay awake during session. Pt min-maxA for static sitting, able to support herself at times for periods less than 30 seconds. Pt resistive to some exercises, able to have some movement in her LE but unable to follow commands. Pt can still benefit from skilled PT for balance, strength, and endurance.      Time Calculation:   PT Charges     Row Name 08/11/20 1624             Time Calculation    Start Time  1548  (Pended)   -CB      Stop Time  1602  (Pended)   -CB      Time Calculation (min)  14 min  (Pended)   -CB      PT Received On  08/11/20  (Pended)   -CB      PT - Next Appointment  08/12/20  (Pended)   -CB         Time Calculation- PT    Total Timed Code Minutes- PT  12 minute(s)  (Pended)   -CB        User Key  (r) = Recorded By, (t) = Taken By, (c) = Cosigned By    Initials Name Provider Type    CB Sami Guajardo, PT Student PT Student        Therapy Charges for Today     Code Description Service Date Service Provider Modifiers Qty    10799910163 HC PT THER PROC EA 15 MIN 8/11/2020 Sami Guajardo, PT Student GP 1    87902892485 HC PT THER SUPP EA 15 MIN 8/11/2020 Sami Guajardo, PT  Student GP 1          PT G-Codes  Outcome Measure Options: (P) AM-PAC 6 Clicks Basic Mobility (PT)  AM-PAC 6 Clicks Score (PT): (P) 6    Sami Guajardo, PT Student  8/11/2020

## 2020-08-11 NOTE — PLAN OF CARE
Problem: Patient Care Overview  Goal: Plan of Care Review  Flowsheets  Taken 8/11/2020 1624  Progress: declining (Pended)  Taken 8/11/2020 1617  Plan of Care Reviewed With: patient (Pended)  Outcome Summary: Pt in a very lethargic state and unable to stay awake during session. Pt min-maxA for static sitting, able to support herself at times for periods less than 30 seconds. Pt resistive to some exercises, able to have some movement in her LE but unable to follow commands. Pt can still benefit from skilled PT for balance, strength, and endurance.  (Pended)  Note:   Patient was not wearing a face mask during this therapy encounter. Therapist used appropriate personal protective equipment including eye protection, mask, and gloves.  Mask used was standard procedure mask. Appropriate PPE was worn during the entire therapy session. Hand hygiene was completed before and after therapy session. Patient is not in enhanced droplet precautions.

## 2020-08-12 NOTE — PLAN OF CARE
Problem: Patient Care Overview  Goal: Plan of Care Review  Outcome: Ongoing (interventions implemented as appropriate)  Flowsheets (Taken 8/12/2020 0631)  Progress: no change  Plan of Care Reviewed With: patient  Outcome Summary: Pt was lethargic unable to give any oral meds or food called to Dr Jones no new orders given, pt has fourteen runs of V.Tach on the heart monitor, pt was asleep arousable asymptomatic, called to Dr Mcmahan, npo order received, other vital signs were stable, no distress noticed or reported, I will continue to monitor.  Goal: Individualization and Mutuality  Outcome: Ongoing (interventions implemented as appropriate)  Goal: Discharge Needs Assessment  Outcome: Ongoing (interventions implemented as appropriate)  Goal: Interprofessional Rounds/Family Conf  Outcome: Ongoing (interventions implemented as appropriate)     Problem: Fall Risk (Adult)  Goal: Absence of Fall  Outcome: Ongoing (interventions implemented as appropriate)     Problem: Skin Injury Risk (Adult)  Goal: Skin Health and Integrity  Outcome: Ongoing (interventions implemented as appropriate)     Problem: Stroke (Hemorrhagic) (Adult)  Goal: Signs and Symptoms of Listed Potential Problems Will be Absent, Minimized or Managed (Stroke)  Outcome: Ongoing (interventions implemented as appropriate)

## 2020-08-12 NOTE — ANESTHESIA PROCEDURE NOTES
Central Line      Patient reassessed immediately prior to procedure    Patient location during procedure: holding area  Start time: 8/12/2020 7:00 PM  Stop Time:8/12/2020 7:12 PM  Indications: central pressure monitoring and vascular access  Staff  Anesthesiologist: Hema Bhagat MD  Preanesthetic Checklist  Completed: patient identified, site marked, surgical consent, pre-op evaluation, timeout performed, IV checked, risks and benefits discussed and monitors and equipment checked  Central Line Prep  Sterile Tech:cap, gloves, gown, mask and sterile barriers  Prep: chloraprep  Patient monitoring: blood pressure monitoring, continuous pulse oximetry and EKG  Central Line Procedure  Laterality:left  Location:subclavian  Catheter Type:triple lumen  Catheter Size:7 Fr  Guidance:landmark technique and palpation technique  PROCEDURE NOTE/ULTRASOUND INTERPRETATION.  Using ultrasound guidance the potential vascular sites for insertion of the catheter were visualized to determine the patency of the vessel to be used for vascular access.  After selecting the appropriate site for insertion, the needle was visualized under ultrasound being inserted into the internal jugular vein, followed by ultrasound confirmation of wire and catheter placement. There were no abnormalities seen on ultrasound; an image was taken; and the patient tolerated the procedure with no complications.   Assessment  Post procedure:biopatch applied, line sutured and occlusive dressing applied  Assessement:blood return through all ports, free fluid flow and chest x-ray ordered  Complications:no  Patient Tolerance:patient tolerated the procedure well with no apparent complications

## 2020-08-12 NOTE — ANESTHESIA PROCEDURE NOTES
Airway  Urgency: elective    Date/Time: 8/12/2020 7:36 PM  Airway not difficult    General Information and Staff    Patient location during procedure: OR  Anesthesiologist: Hema Bhagat MD    Indications and Patient Condition  Indications for airway management: airway protection    Preoxygenated: yes  Mask difficulty assessment: 0 - not attempted    Final Airway Details  Final airway type: endotracheal airway      Successful airway: ETT  Cuffed: yes   Successful intubation technique: direct laryngoscopy  Facilitating devices/methods: anterior pressure/BURP  Endotracheal tube insertion site: oral  Blade: Forrest  Blade size: 3  ETT size (mm): 7.0  Cormack-Lehane Classification: grade IIa - partial view of glottis  Placement verified by: chest auscultation and capnometry   Measured from: teeth  Number of attempts at approach: 1  Assessment: lips, teeth, and gum same as pre-op and atraumatic intubation

## 2020-08-12 NOTE — ANESTHESIA PREPROCEDURE EVALUATION
Anesthesia Evaluation     Patient summary reviewed and Nursing notes reviewed   no history of anesthetic complications:  NPO Solid Status: Waived due to emergency  NPO Liquid Status: Waived due to emergency           Airway   Mallampati: II  TM distance: >3 FB  Neck ROM: full  No difficulty expected  Dental - normal exam     Pulmonary - normal exam    breath sounds clear to auscultation  (+) sleep apnea,   Cardiovascular   Exercise tolerance: unable to assess    NYHA Classification: I  Patient on routine beta blocker  Rhythm: regular  Rate: normal    (+) hypertension, valvular problems/murmurs MR and TI, CHF , hyperlipidemia,     ROS comment: S/P AVR (tissue) 2009    Neuro/Psych  (+) CVA, dementia,     GI/Hepatic/Renal/Endo    (+)  GERD,  diabetes mellitus type 2, thyroid problem hypothyroidism    Musculoskeletal (-) negative ROS    Abdominal  - normal exam    Abdomen: soft.   Substance History - negative use     OB/GYN negative ob/gyn ROS         Other   blood dyscrasia anemia,                     Anesthesia Plan    ASA 4 - emergent     general   (+Traci)  intravenous induction     Anesthetic plan, all risks, benefits, and alternatives have been provided, discussed and informed consent has been obtained with: patient.  Use of blood products discussed with patient .   Plan discussed with attending and CRNA.

## 2020-08-12 NOTE — PLAN OF CARE
Problem: Patient Care Overview  Goal: Plan of Care Review  Outcome: Ongoing (interventions implemented as appropriate)  Flowsheets (Taken 8/12/2020 9758)  Progress: improving  Plan of Care Reviewed With: patient;daughter  Outcome Summary: Pt tolerating PT and responsive to cues/directions today. Pt req mod A x 2 for s>s,s<s, and STS x 2. Pt able to stand for 45-60 secs although appearing effortful w/ BUE shaking. Pt report new pain in prox R quad w/ activity. Daughter stated she does not remember her having pain in that region in the past. PT will prog as pt sandro.     Patient was wearing a face mask during this therapy encounter. Therapist used appropriate personal protective equipment including eye protection, mask, and gloves.  Mask used was standard procedure mask. Appropriate PPE was worn during the entire therapy session. Hand hygiene was completed before and after therapy session. Patient is not in enhanced droplet precautions.  PT tech: Joe Reina.

## 2020-08-12 NOTE — PROGRESS NOTES
"[facility] PULMONARY CARE         Dr Crawford Sayied   LOS: 9 days   Patient Care Team:  Yaron Ca Jr., MD as PCP - General (Family Medicine)  Yaron Ca Jr., MD as PCP - Claims Attributed    Chief Complaint: Intracerebral hemorrhage A. fib coagulopathy UTI    Interval History: Overnight patient had episodes of 4 beats run of V. tach.  Cardiac work-up ordered per cardiology.    REVIEW OF SYSTEMS:   CARDIOVASCULAR: No chest pain, chest pressure or chest discomfort. No palpitations or edema.   RESPIRATORY: No shortness of breath, cough or sputum.   GASTROINTESTINAL: No anorexia, nausea, vomiting or diarrhea. No abdominal pain or blood.   HEMATOLOGIC: No bleeding or bruising.     Ventilator/Non-Invasive Ventilation Settings (From admission, onward)    None            Vital Signs  Temp:  [97.6 °F (36.4 °C)-98.7 °F (37.1 °C)] 97.6 °F (36.4 °C)  Heart Rate:  [67-75] 75  Resp:  [16] 16  BP: (120-172)/(59-80) 140/65  No intake or output data in the 24 hours ending 08/12/20 1330  Flowsheet Rows      First Filed Value   Admission Height  152.4 cm (60\") Documented at 08/03/2020 1133   Admission Weight  68 kg (149 lb 14.4 oz) Documented at 08/03/2020 0857          Physical Exam:  Patient is examined using the personal protective equipment as per guidelines from infection control for this particular patient as enacted.  Hand hygiene was performed before and after patient interaction.   General Appearance:    Alert, cooperative, in no acute distress.  Following simple commands  Neck midline trachea no thyromegaly   Lungs:     Clear to auscultation,respirations regular, even and                  unlabored    Heart:    Regular rhythm and normal rate, normal S1 and S2, no            murmur, no gallop, no rub, no click   Chest Wall:    No abnormalities observed   Abdomen:     Normal bowel sounds, no masses, no organomegaly, soft        non-tender, non-distended, no guarding, no rebound                tenderness "   Extremities:   Moves all extremities well, no edema, no cyanosis, no             redness  CNS no focal neurological deficits normal sensory exam  Skin no rashes no nodules  Musculoskeletal no cyanosis no clubbing normal range of motion     Results Review:        Results from last 7 days   Lab Units 08/12/20  0427 08/11/20  0611 08/10/20  0333   SODIUM mmol/L 141 140 137   POTASSIUM mmol/L 3.6 3.9 3.8   CHLORIDE mmol/L 108* 108* 106   CO2 mmol/L 24.4 23.3 20.9*   BUN mg/dL 8 7* 8   CREATININE mg/dL 0.68 0.63 0.71   GLUCOSE mg/dL 76 108* 113*   CALCIUM mg/dL 9.2 9.2 8.7         Results from last 7 days   Lab Units 08/12/20  0427 08/11/20  0611 08/10/20  0333   WBC 10*3/mm3 6.73 7.42 9.10   HEMOGLOBIN g/dL 12.6 12.6 11.2*   HEMATOCRIT % 37.2 37.9 34.4   PLATELETS 10*3/mm3 140 168 147     Results from last 7 days   Lab Units 08/12/20  0427 08/11/20  1438   INR  1.24* 1.30*                       I reviewed the patient's new clinical results.  I personally viewed and interpreted the patient's CXR        Medication Review:     amLODIPine 5 mg Oral Q24H   furosemide 20 mg Oral Daily   isosorbide mononitrate 60 mg Oral Daily   levothyroxine 75 mcg Oral QAM   metoprolol tartrate 25 mg Oral Q12H   [START ON 8/13/2020] warfarin 1.5 mg Oral Once per day on Mon Thu   warfarin 3 mg Oral Once per day on Sun Tue Wed Fri Sat         Pharmacy to dose warfarin        ASSESSMENT:   Intracerebral hemorrhage  Coagulopathy reversed  A. fib  UTI E. coli  Diabetes mellitus  Hypothyroidism  V. tach    PLAN:  Input from neurology noted.  More awake alert.  CT head ordered per neurology.  Neurology currently following and adjusting medications.  Neuro checks remained stable.    Last CT head repeat showed stable ICH  Blood pressure management keep systolic less than 140  We restarted anticoagulation yesterday.  Will increase to 5 mg Coumadin daily.  Monitor PT/INR pharmacy assisting.  Cardiac work-up ordered per cardiology for V. tach  Oral  antibiotics for E. coli in the urine.  Repeat UA no evidence of infection.  Some nausea vomiting reported but currently resting well.  Abdominal exam is pretty benign.  We will monitor  PT OT  Discussed with granddaughter today  Discharge once cleared by cardiology and neurology    Yvette Evans MD  08/12/20  13:30

## 2020-08-12 NOTE — NURSING NOTE
Emergent patient arrived from [address] [place] [address]. Right leg cool and mottled. Anesthesia at bedside placing a left subclavian central line and right radial arterial line. VSS.

## 2020-08-12 NOTE — ANESTHESIA PROCEDURE NOTES
Arterial Line      Patient reassessed immediately prior to procedure    Patient location during procedure: holding area  Start time: 8/12/2020 7:11 PM  Stop Time:8/12/2020 7:15 PM       Line placed for hemodynamic monitoring and ABGs/Labs/ISTAT.  Performed By   Anesthesiologist: Tomasz Mon DO  Preanesthetic Checklist  Completed: patient identified, site marked, surgical consent, pre-op evaluation, timeout performed, IV checked, risks and benefits discussed and monitors and equipment checked  Arterial Line Prep   Sterile Tech: gloves, mask and cap  Prep: ChloraPrep  Patient monitoring: blood pressure monitoring, continuous pulse oximetry and EKG  Arterial Line Procedure   Laterality:right  Location:  radial artery  Catheter size: 22 G   Guidance: ultrasound guided  PROCEDURE NOTE/ULTRASOUND INTERPRETATION.  Using ultrasound guidance the potential vascular sites for insertion of the catheter were visualized to determine the patency of the vessel to be used for vascular access.  After selecting the appropriate site for insertion, the needle was visualized under ultrasound being inserted into the radial artery, followed by ultrasound confirmation of wire and catheter placement. There were no abnormalities seen on ultrasound; an image was taken; and the patient tolerated the procedure with no complications.   Number of attempts: 1  Successful placement: yes  Post Assessment   Dressing Type: occlusive dressing applied and secured with tape.   Complications no  Circ/Move/Sens Assessment: normal.   Patient Tolerance: patient tolerated the procedure well with no apparent complications  Additional Notes  Using ultrasound guidance the potential vascular sites for insertion of the catheter were visualized to determine the patency of the vessel to be used for vascular access.  After selecting the appropriate site for insertion, the needle was visualized under ultrasound being inserted into the radial artery,  followed by ultrasound confirmation of wire and catheter placement.  There were no abnormalities seen on ultrasound; an image was taken/ and the patient tolerated the procedure with no complications.

## 2020-08-12 NOTE — PROGRESS NOTES
Pharmacy Consult: Warfarin Dosing/ Monitoring    Jihan Teresa is a 86 y.o. female, estimated creatinine clearance is 43.7 mL/min (by C-G formula based on SCr of 0.68 mg/dL). weighing 69.1 kg (152 lb 5.4 oz).    She has a past medical history of Anemia, Aortic valve stenosis, Asthma, Atelectasis, CHF (congestive heart failure) (CMS/[facility]), Congenital heart disease, Diabetes mellitus (CMS/[facility]), Esophageal reflux, Essential hypertension, HLD (hyperlipidemia), Hypotension, LVH (left ventricular hypertrophy), Pleural effusion, and Pulmonary hypertension (CMS/[facility]).    Social History     Tobacco Use    Smoking status: Never Smoker    Smokeless tobacco: Never Used   Substance Use Topics    Alcohol use: Yes     Comment: ocasional    Drug use: No       Results from last 7 days   Lab Units 08/12/20  0427 08/11/20  1438 08/11/20  0611 08/10/20  0333 08/09/20  0652 08/08/20  0418 08/07/20  0656   INR  1.24* 1.30*  --   --   --   --   --    HEMOGLOBIN g/dL 12.6  --  12.6 11.2* 11.1* 11.6* 12.2   HEMATOCRIT % 37.2  --  37.9 34.4 34.3 33.9* 37.5   PLATELETS 10*3/mm3 140  --  168 147 161 179 189     Results from last 7 days   Lab Units 08/12/20  0427 08/11/20  0611 08/10/20  0333   SODIUM mmol/L 141 140 137   POTASSIUM mmol/L 3.6 3.9 3.8   CHLORIDE mmol/L 108* 108* 106   CO2 mmol/L 24.4 23.3 20.9*   BUN mg/dL 8 7* 8   CREATININE mg/dL 0.68 0.63 0.71   CALCIUM mg/dL 9.2 9.2 8.7   GLUCOSE mg/dL 76 108* 113*     Anticoagulation history: 3mg TuWeFrSaSu, 1.5mg [facility] Anticoagulation:  Consulting provider: Dr. Evans  Start date: 8/11/2020  Indication: AFib  Target INR: 2-3  Expected duration: lifelong   Bridge Therapy: No                Date 8/11 8/12           INR 1.3 1.24           Warfarin dose 3mg 3mg             Potential drug interactions:     Relevant nutrition status: mechanical soft diet    Other: NA    Education complete?/ Date: defer    Assessment/Plan:  Warfarin management complicated by acute intracerebral  hemorrhage.  Cleared per neurosurgery to resume warfarin 8/11 with no loading doses.  Continue patient's home dose of 3mg TuWeFrSaSu, 1.5mg MoTh without load.  Warfarin 3mg due tonight.  Anticipate >5 days to therapeutic range.  Follow-up daily INR.  H/H stable.    Pharmacy will continue to follow until discharge or discontinuation of warfarin.   Teri Moralez, PharmD, MPH, BCPS

## 2020-08-12 NOTE — THERAPY TREATMENT NOTE
Patient Name: Jihan Teresa  : 1933    MRN: [id number]                              Today's Date: 2020       Admit Date: 8/3/2020    Visit Dx:     ICD-10-CM ICD-9-CM   1. Nontraumatic intracerebral hemorrhage, unspecified cerebral location, unspecified laterality (CMS/HCC) I61.9 431   2. Acute UTI N39.0 599.0   3. 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) I61.0 431     Patient Active Problem List   Diagnosis   • Type 2 diabetes mellitus (CMS/HCC)   • Aortic valve replaced, equine valve   • Cerebral infarction (CMS/HCC)   • A-fib (CMS/[facility])   • GERD without esophagitis   • Sleep apnea in adult   • Cognitive dysfunction   • Hypothyroidism (acquired)   • History of recurrent UTIs   • Mild reactive airways disease   • Essential hypertension   • Pulmonary hypertension (CMS/HCC)   • B12 deficiency   • Non-rheumatic mitral regurgitation   • Non-rheumatic tricuspid valve insufficiency   • Chronic anemia   • Grade III hemorrhoids   • ICH (intracerebral hemorrhage) (CMS/[facility])   • 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)     Past Medical History:   Diagnosis Date   • Anemia    • Aortic valve stenosis     S/p AVR in    • Asthma    • Atelectasis    • CHF (congestive heart failure) (CMS/[facility])    • Congenital heart disease    • Diabetes mellitus (CMS/HCC)    • Esophageal reflux    • Essential hypertension    • HLD (hyperlipidemia)    • Hypotension    • LVH (left ventricular hypertrophy)    • Pleural effusion    • Pulmonary hypertension (CMS/HCC)      Past Surgical History:   Procedure Laterality Date   • AORTIC VALVE REPAIR/REPLACEMENT  2009    Jerry Colmenares MD   • CATARACT EXTRACTION     • KNEE ARTHROPLASTY       General Information     Row Name 20 1531          PT Evaluation Time/Intention    Document Type  therapy note (daily note)  -PH     Mode of Treatment  physical therapy  -PH     Row Name 20 1531          Safety Issues,  Functional Mobility    Impairments Affecting Function (Mobility)  balance;coordination;endurance/activity tolerance;strength;postural/trunk control  -PH       User Key  (r) = Recorded By, (t) = Taken By, (c) = Cosigned By    Initials Name Provider Type    Kaelyn Zuluaga PTA Physical Therapy Assistant        Mobility     Row Name 08/12/20 1531          Bed Mobility Assessment/Treatment    Bed Mobility Assessment/Treatment  supine-sit;sit-supine;scooting/bridging  -PH     Scooting/Bridging [place] (Bed Mobility)  maximum assist (25% patient effort);dependent (less than 25% patient effort);2 person assist;verbal cues;nonverbal cues (demo/gesture)  -PH     Supine-Sit [place] (Bed Mobility)  verbal cues;nonverbal cues (demo/gesture);moderate assist (50% patient effort);2 person assist  -PH     Sit-Supine [place] (Bed Mobility)  moderate assist (50% patient effort);2 person assist;verbal cues;nonverbal cues (demo/gesture)  -PH     Assistive Device (Bed Mobility)  bed rails;draw sheet;head of bed elevated  -PH     Comment (Bed Mobility)  Pt responsive to question/directions today.  -PH     Row Name 08/12/20 1531          Transfer Assessment/Treatment    Comment (Transfers)  STS x 2  -PH     Row Name 08/12/20 1531          Sit-Stand Transfer    Sit-Stand [place] (Transfers)  verbal cues;nonverbal cues (demo/gesture);moderate assist (50% patient effort);2 person assist;supervision  -PH     Assistive Device (Sit-Stand Transfers)  walker, front-wheeled  -PH     Row Name 08/12/20 1531          Gait/Stairs Assessment/Training    Comment (Gait/Stairs)  Pt w/ BUE shaking when standing w/ standing being very effortful. Not attempted today.   -PH       User Key  (r) = Recorded By, (t) = Taken By, (c) = Cosigned By    Initials Name Provider Type    Kaelyn Zuluaga PTA Physical Therapy Assistant        Obj/Interventions     Row Name 08/12/20 1534          Therapeutic Exercise    Upper  Extremity Range of Motion (Therapeutic Exercise)  shoulder flexion/extension, bilateral;shoulder abduction/adduction, bilateral;shoulder horizontal abduction/adduction, bilateral;elbow flexion/extension, bilateral  -PH     Lower Extremity (Therapeutic Exercise)  heel slides, bilateral;quad sets, bilateral;SLR (straight leg raise), bilateral;LAQ (long arc quad), bilateral;marching while seated  -PH     Lower Extremity Range of Motion (Therapeutic Exercise)  hip abduction/adduction, bilateral;ankle dorsiflexion/plantar flexion, bilateral  -PH     Exercise Type (Therapeutic Exercise)  AROM (active range of motion);AAROM (active assistive range of motion)  -PH     Position (Therapeutic Exercise)  supine;seated  -PH     Sets/Reps (Therapeutic Exercise)  1/5  -PH     Comment (Therapeutic Exercise)  decr ROM / more assist w/ each addt'l rep  -PH     Row Name 08/12/20 1534          Static Sitting Balance    Level of [place] (Unsupported Sitting, Static Balance)  contact guard assist;minimal assist, 75% patient effort  -PH     Sitting Position (Unsupported Sitting, Static Balance)  sitting on edge of bed  -PH     Time Able to Maintain Position (Unsupported Sitting, Static Balance)  more than 5 minutes  -PH     Comment (Unsupported Sitting, Static Balance)  pt cued for upright posture and alternates bet CGA/min A  -PH     Row Name 08/12/20 1534          Static Standing Balance    Level of [place] (Supported Standing, Static Balance)  contact guard assist;2 person assist  -PH     Time Able to Maintain Position (Supported Standing, Static Balance)  45 to 60 seconds  -PH     Assistive Device Utilized (Supported Standing, Static Balance)  walker, rolling  -PH     Comment (Supported Standing, Static Balance)  x2; BUE shaking/trembling w/ effort  -PH       User Key  (r) = Recorded By, (t) = Taken By, (c) = Cosigned By    Initials Name Provider Type    PH Kaelyn De La O, PTA Physical Therapy Assistant         Goals/Plan    No documentation.       Clinical Impression     Row Name 08/12/20 1536          Pain Assessment    Additional Documentation  Pain Scale: Numbers Pre/Post-Treatment (Group)  -PH     Row Name 08/12/20 1536          Pain Scale: Numbers Pre/Post-Treatment    Pain Scale: Numbers, Pretreatment  0/10 - no pain  -PH     Pain Scale: Numbers, Post-Treatment  0/10 - no pain  -PH     Pre/Post Treatment Pain Comment  Pt grimacing and reporting pain in proximal R quad w/ activity  -PH     Pain Intervention(s)  Ambulation/increased activity;Repositioned  -PH     Row Name 08/12/20 1536          Plan of Care Review    Plan of Care Reviewed With  patient;daughter  -PH     Progress  improving  -PH     Outcome Summary  Pt tolerating PT and responsive to cues/directions today. Pt req mod A x 2 for s>s,s<s, and STS x 2. Pt able to stand for 45-60 secs although appearing effortful w/ BUE shaking. Pt report new pain in prox R quad w/ activity. Daughter stated she does not remember her having pain in that region in the past. PT will prog as pt sandro.   -PH     Row Name 08/12/20 1536          Positioning and Restraints    Pre-Treatment Position  in bed  -PH     Post Treatment Position  bed  -PH     In Bed  fowlers;call light within reach;encouraged to call for assist;exit alarm on;patient within staff view  -PH       User Key  (r) = Recorded By, (t) = Taken By, (c) = Cosigned By    Initials Name Provider Type    PH Kaelyn De La O, REJI Physical Therapy Assistant        Outcome Measures     Row Name 08/12/20 153          How much help from another person do you currently need...    Turning from your back to your side while in flat bed without using bedrails?  2  -PH     Moving from lying on back to sitting on the side of a flat bed without bedrails?  2  -PH     Moving to and from a bed to a chair (including a wheelchair)?  1  -PH     Standing up from a chair using your arms (e.g., wheelchair, bedside chair)?  2  -PH      Climbing 3-5 steps with a railing?  1  -PH     To walk in hospital room?  1  -PH     AM-PAC 6 Clicks Score (PT)  9  -PH     Row Name 08/12/20 1539          Functional Assessment    Outcome Measure Options  AM-PAC 6 Clicks Basic Mobility (PT)  -PH       User Key  (r) = Recorded By, (t) = Taken By, (c) = Cosigned By    Initials Name Provider Type    PH Kaelyn De La O PTA Physical Therapy Assistant        Physical Therapy Education                 Title: PT OT SLP Therapies (Done)     Topic: Physical Therapy (Done)     Point: Mobility training (Done)     Description:   Instruct learner(s) on safety and technique for assisting patient out of bed, chair or wheelchair.  Instruct in the proper use of assistive devices, such as walker, crutches, cane or brace.              Patient Friendly Description:   It's important to get you on your feet again, but we need to do so in a way that is safe for you. Falling has serious consequences, and your personal safety is the most important thing of all.        When it's time to get out of bed, one of us or a family member will sit next to you on the bed to give you support.     If your doctor or nurse tells you to use a walker, crutches, a cane, or a brace, be sure you use it every time you get out of bed, even if you think you don't need it.    Learning Progress Summary           Patient Acceptance, E,D, VU,NR by PH at 8/12/2020 1540    Acceptance, E,TB, VU by CB at 8/11/2020 1623    Acceptance, E, NR by DJ at 8/10/2020 1530    Acceptance, E, VU,NR by MW at 8/8/2020 1542    Acceptance, E, DU,NR by AR at 8/7/2020 1432    Acceptance, E, DU,NR by AR at 8/6/2020 1208    Acceptance, E,D, NR by CG at 8/5/2020 1410    Comment:  does not appear to retain information.  CGRESSRN    Acceptance, E, NL,NR by AR at 8/5/2020 1204    Acceptance, E, DU,NR by AR at 8/4/2020 1453   Family Acceptance, E, VU,NR by MW at 8/8/2020 1542                   Point: Home exercise program (Done)      Description:   Instruct learner(s) on appropriate technique for monitoring, assisting and/or progressing patient with therapeutic exercises and activities.              Learning Progress Summary           Patient Acceptance, E,D, VU,NR by PH at 8/12/2020 1540    Acceptance, E,TB, VU by CB at 8/11/2020 1623    Acceptance, E, VU,NR by MW at 8/8/2020 1542    Acceptance, E, DU,NR by AR at 8/7/2020 1432    Acceptance, E, DU,NR by AR at 8/6/2020 1208    Acceptance, E,D, NR by CG at 8/5/2020 1410    Comment:  does not appear to retain information.  CGRESSRN    Acceptance, E, NL,NR by AR at 8/5/2020 1204    Acceptance, E, DU,NR by AR at 8/4/2020 1453   Family Acceptance, E, VU,NR by MW at 8/8/2020 1542                   Point: Body mechanics (Done)     Description:   Instruct learner(s) on proper positioning and spine alignment for patient and/or caregiver during mobility tasks and/or exercises.              Learning Progress Summary           Patient Acceptance, E,D, VU,NR by PH at 8/12/2020 1540    Acceptance, E,TB, VU by CB at 8/11/2020 1623    Acceptance, E, NR by DJ at 8/10/2020 1530    Acceptance, E, VU,NR by MW at 8/8/2020 1542    Acceptance, E, DU,NR by AR at 8/7/2020 1432    Acceptance, E, DU,NR by AR at 8/6/2020 1208    Acceptance, E,D, NR by CG at 8/5/2020 1410    Comment:  does not appear to retain information.  CGRESSRN    Acceptance, E, NL,NR by AR at 8/5/2020 1204    Acceptance, E, DU,NR by AR at 8/4/2020 1453   Family Acceptance, E, VU,NR by MW at 8/8/2020 1542                   Point: Precautions (Done)     Description:   Instruct learner(s) on prescribed precautions during mobility and gait tasks              Learning Progress Summary           Patient Acceptance, E,D, VU,NR by PH at 8/12/2020 1540    Acceptance, E,TB, VU by CB at 8/11/2020 1623    Acceptance, E, NR by ELVA at 8/10/2020 1530    Acceptance, E, VU,NR by MW at 8/8/2020 1542    Acceptance, CHANDA ROJAS NR by AR at 8/7/2020 1432    Acceptance, CHANDA ROJASNR  by AR at 8/6/2020 1208    Acceptance, E,D, NR by  at 8/5/2020 1410    Comment:  does not appear to retain information.  CGRESSRN    Acceptance, E, NL,NR by AR at 8/5/2020 1204    Acceptance, E, DU,NR by AR at 8/4/2020 1453   Family Acceptance, E, VU,NR by  at 8/8/2020 1542                               User Key     Initials Effective Dates Name Provider Type Discipline     06/16/16 -  Geneva Villela RN Registered Nurse Nurse    PH 08/20/19 -  Kaelyn De La O PTA Physical Therapy Assistant PT    MW 09/17/19 -  Pauline Harris, PT Physical Therapist PT    DJ 10/25/19 -  Florence Wallace, PT Physical Therapist PT    AR 07/02/20 -  Raghu Garcia, PT Student PT Student PT    CB 07/02/20 -  Sami Guajardo, PT Student PT Student PT              PT Recommendation and Plan     Outcome Summary/Treatment Plan (PT)  Anticipated Discharge Disposition (PT): skilled nursing facility  Plan of Care Reviewed With: patient, daughter  Progress: improving  Outcome Summary: Pt tolerating PT and responsive to cues/directions today. Pt req mod A x 2 for s>s,s<s, and STS x 2. Pt able to stand for 45-60 secs although appearing effortful w/ BUE shaking. Pt report new pain in prox R quad w/ activity. Daughter stated she does not remember her having pain in that region in the past. PT will prog as pt sandro.      Time Calculation:   PT Charges     Row Name 08/12/20 1541 08/12/20 1028          Time Calculation    Start Time  1435  -PH  --     Stop Time  1458  -PH  --     Time Calculation (min)  23 min  -PH  --     PT Received On  08/12/20  -PH  --     PT - Next Appointment  08/13/20  -PH  08/13/20  -AE       User Key  (r) = Recorded By, (t) = Taken By, (c) = Cosigned By    Initials Name Provider Type    PH Kaelyn De La O PTA Physical Therapy Assistant    AE Cathleen Richardson, PT Physical Therapist        Therapy Charges for Today     Code Description Service Date Service Provider Modifiers Qty    54009432681 HC PT THER PROC EA  15 MIN 8/12/2020 Kaelyn De La O PTA GP 2    98823980826 HC PT THER SUPP EA 15 MIN 8/12/2020 Kaelyn De La O PTA GP 1          PT G-Codes  Outcome Measure Options: AM-PAC 6 Clicks Basic Mobility (PT)  AM-PAC 6 Clicks Score (PT): 9    Kaelyn De La O PTA  8/12/2020

## 2020-08-12 NOTE — CONSULTS
Patient Name: Jihan Teresa Account #: [id number]    MRN: [id number] Admission Date: 8/3/2020      Consulting Service: Vascular Surgery Date of Evaluation: August 12, 2020    Requesting Provider: Nathan MCDONNELL    CHIEF COMPLAINT: Cold painful right leg  HPI: Jihan Teresa is a 86 y.o. female whose past medical record I have reviewed and summarize below in addition to the findings from today's exam.   The patient is being seen for a consultation and evaluation/management of acute arterial occlusion of the right lower extremity.  The patient has a history of chronic atrial fibrillation, aortic valve replacement, who routinely was on Coumadin and Plavix but developed an intracerebral bleed.  She underwent cerebral angiogram on August 4, 2020.  There was no evidence of aneurysm, AV malformation or dural AV fistula.  Her anticoagulation was held for a week.  She was then started back on Coumadin at a very low dose of 1.5 mg/day.  While undergoing physical therapy she had acute onset of pain in the right lower extremity first in the thigh and then distally.  Then became cold and numb feeling.  Venous scan was performed and this was normal but incidental finding was made of acute arterial occlusion of the superficial femoral, profundofemoral, popliteal and tibial vessels.  We were asked to see her for this.    Prior to this no history of claudication of the lower extremities.    PAST MEDICAL HISTORY:   Past Medical History:   Diagnosis Date   • Anemia    • Aortic valve stenosis     S/p AVR in 09'   • Asthma    • Atelectasis    • CHF (congestive heart failure) (CMS/HCC)    • Congenital heart disease    • Diabetes mellitus (CMS/HCC)    • Esophageal reflux    • Essential hypertension    • HLD (hyperlipidemia)    • Hypotension    • LVH (left ventricular hypertrophy)    • Pleural effusion    • Pulmonary hypertension (CMS/HCC)       PAST SURGICAL HISTORY:   Past Surgical History:   Procedure Laterality Date   • AORTIC VALVE  REPAIR/REPLACEMENT  11/14/2009    Jerry Colmenares MD   • CATARACT EXTRACTION     • KNEE ARTHROPLASTY        FAMILY HISTORY:   Family History   Problem Relation Age of Onset   • Dementia Sister    • Stroke Brother       SOCIAL HISTORY:   Social History     Tobacco Use   • Smoking status: Never Smoker   • Smokeless tobacco: Never Used   Substance Use Topics   • Alcohol use: Yes     Comment: ocasional   • Drug use: No      MEDICATIONS:   No current facility-administered medications on file prior to encounter.      Current Outpatient Medications on File Prior to Encounter   Medication Sig Dispense Refill   • isosorbide mononitrate (IMDUR) 60 MG 24 hr tablet TAKE 1 TABLET BY MOUTH EVERY DAY 90 tablet 1   • levothyroxine (SYNTHROID, LEVOTHROID) 150 MCG tablet TAKE 1 TABLET BY MOUTH EVERY DAY (Patient taking differently: Take 75 mcg by mouth Daily.) 30 tablet 2   • warfarin (COUMADIN) 3 MG tablet Take 3 mg daily by mouth Tuesday through Sunday.  Do not take Monday (Patient taking differently: Take 3 mg daily by mouth Tuesday Wednesday Friday Saturday and Sunday.  Monday and Thursday patient to take 1.5 mg) 90 tablet 1   • acetaminophen (TYLENOL) 500 MG tablet Take  by mouth As Needed.     • ascorbic acid (VITAMIN C) 250 MG chewable tablet chewable tablet Chew 500 mg Daily.     • atorvastatin (LIPITOR) 40 MG tablet TAKE 1 TABLET BY MOUTH EVERY DAY (Patient taking differently: Take 40 mg by mouth Daily.) 30 tablet 5   • Calcium Citrate-Vitamin D 250-200 MG-UNIT tablet Take  by mouth 2 (Two) Times a Day.     • Clobetasol Propionate Emulsion 0.05 % topical foam APPLY SPARINGLY TO SCALP TWICE DAILY X 2 WEEKS. THEN AS NEEDED FOR FLARES  1   • clopidogrel (PLAVIX) 75 MG tablet TAKE 1 TABLET BY MOUTH EVERY DAY (Patient taking differently: Take 75 mg by mouth Daily.) 30 tablet 2   • furosemide (LASIX) 40 MG tablet TAKE 1 TABLET BY MOUTH THREE TIMES A DAY (Patient taking differently: Take 40 mg by mouth 3 (Three) Times a Day.) 90  tablet 1   • hydrocortisone (ANUSOL-HC) 2.5 % rectal cream Insert  into the rectum 2 (Two) Times a Day. 28.35 g 2   • ketoconazole (NIZORAL) 2 % shampoo WASH SCALP AND 2-3 TIMES A WEEK. LEAVE ON FOR 2-3 MINUTES THEN RINSE.  5   • metoprolol tartrate (LOPRESSOR) 50 MG tablet TAKE 1 TABLET BY MOUTH 2 (TWO) TIMES DAILY (Patient taking differently: Take 50 mg by mouth 2 (Two) Times a Day.) 180 tablet 2   • Multiple Vitamin (MULTIVITAMIN) tablet Take 1 tablet by mouth Daily.     • omeprazole (priLOSEC) 20 MG capsule Take 20 mg by mouth Daily.     • Phenylephrine-Witch Hazel (PREPARATION H) 0.25-50 % gel Insert 1 g into the rectum 4 (Four) Times a Day As Needed (rectal burning). 25 g 2   • potassium chloride (MICRO-K) 10 MEQ CR capsule TAKE 2 CAPSULES BY MOUTH 2 (TWO) TIMES A DAY. (Patient taking differently: Take 20 mEq by mouth 2 (Two) Times a Day.) 120 capsule 5             ALLERGIES: Promethazine   COMPLETE REVIEW OF SYSTEMS:     Review of Systems: 10 system review of system has been performed primarily obtained from the chart and daughter.  Pertinent positives are described above.  Remainder review of systems have been reviewed and are negative.      Vital Signs  Temp:  [97.4 °F (36.3 °C)-98.7 °F (37.1 °C)] 97.4 °F (36.3 °C)  Heart Rate:  [67-99] 99  Resp:  [16] 16  BP: (113-172)/(55-76) 113/55    Physical Exam: Well-developed well-nourished female in no acute distress awake, alert, and oriented x3   HEENT negative sclera nonicteric   neck supple range of motion no JVD  Heart: Irregularly irregular with no murmur appreciated  Chest: Equal bilateral expansion and symmetrical.  Clear to auscultation.  Unlabored breathing.  Abdomen: Soft and benign with no masses palpated.  No hepatosplenomegaly.  Neuro: Grossly intact without focal deficit.  Cranial nerves II through XII intact  Extremities the right leg is cooler than the left.  No obtainable Doppler signals at the ankle.  There is motor and sensory function.  There  is a waterhammer pulse in the right groin.  Palpable pulses throughout both upper extremities and the left lower extremity.      LABS:      Results Review:       I reviewed the patient's new clinical results.  Results from last 7 days   Lab Units 08/12/20  0427 08/11/20  0611 08/10/20  0333 08/09/20  0652 08/08/20  0418 08/07/20  0656   WBC 10*3/mm3 6.73 7.42 9.10 9.30 10.19 10.15   HEMOGLOBIN g/dL 12.6 12.6 11.2* 11.1* 11.6* 12.2   PLATELETS 10*3/mm3 140 168 147 161 179 189     Results from last 7 days   Lab Units 08/12/20 0427 08/11/20  0611 08/10/20  0333 08/09/20  0652 08/08/20 0418 08/07/20  2204 08/07/20  0656   SODIUM mmol/L 141 140 137 139 142  --  140   POTASSIUM mmol/L 3.6 3.9 3.8 3.8 4.1 4.9 3.1*   CHLORIDE mmol/L 108* 108* 106 110* 112*  --  106   CO2 mmol/L 24.4 23.3 20.9* 22.2 21.7*  --  23.5   BUN mg/dL 8 7* 8 9 8  --  7*   CREATININE mg/dL 0.68 0.63 0.71 0.71 0.70  --  0.64   GLUCOSE mg/dL 76 108* 113* 111* 94  --  111*   Estimated Creatinine Clearance: 43.7 mL/min (by C-G formula based on SCr of 0.68 mg/dL).  Results from last 7 days   Lab Units 08/12/20 0427 08/11/20  0611 08/10/20  0333 08/09/20  0652 08/08/20  0418 08/07/20  0656   CALCIUM mg/dL 9.2 9.2 8.7 8.6 9.0 8.8     Results from last 7 days   Lab Units 08/12/20 0427 08/11/20  1438   PROTIME Seconds 15.4* 16.0*   INR  1.24* 1.30*       The following radiologic or non-invasive studies have been reviewed by me: Noninvasive venous study from [facility] Problems    Diagnosis  POA   • **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) [I61.0]  Unknown   • ICH (intracerebral hemorrhage) (CMS/HCC) [I61.9]  Yes      Resolved Hospital Problems   No resolved problems to display.        Problem Points:  3:  Patient has a new problem, with no additional work-up planned (max of 1)  Total problem points:3    Data Points:  2:  I have personally and independently review of image, tracing, or specimen  2:  I have  reviewed and summation of old records and/or discussed the patients care with another health care provider  Total data points:4 or more    Risk Points:  High:  Any illness that poses threat to life or body funciton    MDM Prob point Data point Risk   SF 1 1 Minimal   Low 2 2 Low   Mod 3 3 Moderate   High 4 4 High     Code MDM History Exam Time   27142 SF/Low Detailed Detailed 30   04079 Mod Comprehensive Comprehensive 50   21438 High Comprehensive Comprehensive 70     Detailed history:  4 elements HPI or status of 3 chronic problems; 2-9 system ROS  Comprehensive:  4 elements HPI or status of 3 chronic problems;  10 system ROS    Detailed Exam:  12 findings from any organ system  Comprehensive Exam:  2 findings from each of 9 systems.     [place]       ASSESSMENT/PLAN: 86 y.o. female with acute arterial occlusion of the right lower extremity most likely related to her fibrillation and in which she had Coumadin discontinued because of intracerebral bleeding.  She was started back on Coumadin and at very low doses, no loading dose, yesterday.  We will start her on low-dose heparin at 500 units an hour with no bolus and not following protocol and proceed with femoral thromboembolectomy.  This was discussed in detail with the patient and her daughter.  They understand that this is a difficult situation because of her recent bleed but also needing to prevent further cardiac thrombus.  They understand and agree with the plan.  She will go to the operating room tonight for the thromboembolectomy.     Winsome Valentin Jr., MD   20

## 2020-08-12 NOTE — PROGRESS NOTES
Patient Identification:  NAME:  Jihan Teresa  Age:  86 y.o.   Sex:  female   :  1933   MRN:  [id number]       Chief complaint: Intracranial hemorrhage metabolic encephalopathy    History of present illness: She is much more awake and alert today but has nausea and is currently vomiting does move all extremities well no seizure activity context is a patient with A. fib and had a 4 beat run of V. tach last night.  She is getting the complete cardiology work-up at this point.  Note that she was on Plavix and Coumadin at home for the A. fib and cardiomyopathy    Past medical history:  Past Medical History:   Diagnosis Date   • Anemia    • Aortic valve stenosis     S/p AVR in    • Asthma    • Atelectasis    • CHF (congestive heart failure) (CMS/HCC)    • Congenital heart disease    • Diabetes mellitus (CMS/HCC)    • Esophageal reflux    • Essential hypertension    • HLD (hyperlipidemia)    • Hypotension    • LVH (left ventricular hypertrophy)    • Pleural effusion    • Pulmonary hypertension (CMS/HCC)        Allergies:  Promethazine    Home medications:  Facility-Administered Medications Prior to Admission   Medication Dose Route Frequency Provider Last Rate Last Dose   • cyanocobalamin injection 1,000 mcg  1,000 mcg Intramuscular Q28 Days Gustavo Jackman Jr., MD   1,000 mcg at 19 1640     Medications Prior to Admission   Medication Sig Dispense Refill Last Dose   • isosorbide mononitrate (IMDUR) 60 MG 24 hr tablet TAKE 1 TABLET BY MOUTH EVERY DAY 90 tablet 1    • levothyroxine (SYNTHROID, LEVOTHROID) 150 MCG tablet TAKE 1 TABLET BY MOUTH EVERY DAY (Patient taking differently: Take 75 mcg by mouth Daily.) 30 tablet 2 Patient Taking Differently at Unknown time   • warfarin (COUMADIN) 3 MG tablet Take 3 mg daily by mouth Tuesday through .  Do not take Monday (Patient taking differently: Take 3 mg daily by mouth  and .  Monday and Thursday patient to take  1.5 mg) 90 tablet 1 Patient Taking Differently at Unknown time   • acetaminophen (TYLENOL) 500 MG tablet Take  by mouth As Needed.   Taking   • ascorbic acid (VITAMIN C) 250 MG chewable tablet chewable tablet Chew 500 mg Daily.   Taking   • atorvastatin (LIPITOR) 40 MG tablet TAKE 1 TABLET BY MOUTH EVERY DAY (Patient taking differently: Take 40 mg by mouth Daily.) 30 tablet 5    • Calcium Citrate-Vitamin D 250-200 MG-UNIT tablet Take  by mouth 2 (Two) Times a Day.   Taking   • Clobetasol Propionate Emulsion 0.05 % topical foam APPLY SPARINGLY TO SCALP TWICE DAILY X 2 WEEKS. THEN AS NEEDED FOR FLARES  1 Taking   • clopidogrel (PLAVIX) 75 MG tablet TAKE 1 TABLET BY MOUTH EVERY DAY (Patient taking differently: Take 75 mg by mouth Daily.) 30 tablet 2    • furosemide (LASIX) 40 MG tablet TAKE 1 TABLET BY MOUTH THREE TIMES A DAY (Patient taking differently: Take 40 mg by mouth 3 (Three) Times a Day.) 90 tablet 1    • hydrocortisone (ANUSOL-HC) 2.5 % rectal cream Insert  into the rectum 2 (Two) Times a Day. 28.35 g 2    • ketoconazole (NIZORAL) 2 % shampoo WASH SCALP AND 2-3 TIMES A WEEK. LEAVE ON FOR 2-3 MINUTES THEN RINSE.  5 Taking   • metoprolol tartrate (LOPRESSOR) 50 MG tablet TAKE 1 TABLET BY MOUTH 2 (TWO) TIMES DAILY (Patient taking differently: Take 50 mg by mouth 2 (Two) Times a Day.) 180 tablet 2 8/4/2020 at 1100   • Multiple Vitamin (MULTIVITAMIN) tablet Take 1 tablet by mouth Daily.   Taking   • omeprazole (priLOSEC) 20 MG capsule Take 20 mg by mouth Daily.   Taking   • Phenylephrine-Witch Hazel (PREPARATION H) 0.25-50 % gel Insert 1 g into the rectum 4 (Four) Times a Day As Needed (rectal burning). 25 g 2    • potassium chloride (MICRO-K) 10 MEQ CR capsule TAKE 2 CAPSULES BY MOUTH 2 (TWO) TIMES A DAY. (Patient taking differently: Take 20 mEq by mouth 2 (Two) Times a Day.) 120 capsule 5         Hospital medications:    amLODIPine 5 mg Oral Q24H   furosemide 20 mg Oral Daily   isosorbide mononitrate 60 mg Oral  Daily   levothyroxine 75 mcg Oral QAM   metoprolol tartrate 25 mg Oral Q12H   [START ON 8/13/2020] warfarin 1.5 mg Oral Once per day on Mon Thu   warfarin 3 mg Oral Once per day on Sun Tue Wed Fri Sat       Pharmacy to dose warfarin      hydrALAZINE  •  Pharmacy to dose warfarin  •  potassium chloride **OR** potassium chloride **OR** potassium chloride  •  [COMPLETED] Insert peripheral IV **AND** sodium chloride      Objective:  Vitals Ranges:   Temp:  [97.6 °F (36.4 °C)-98.7 °F (37.1 °C)] 97.6 °F (36.4 °C)  Heart Rate:  [62-75] 75  Resp:  [16] 16  BP: (120-195)/(59-98) 166/71      Physical Exam:  Awake much more alert than yesterday but a lot of nausea and actively vomiting normal cranial nerves II through VII moves all 4 extremities equally reflexes trace throughout symmetrical toes downgoing bilaterally    Results review:   I reviewed the patient's new clinical results.    Data review:  Lab Results (last 24 hours)     Procedure Component Value Units Date/Time    Basic Metabolic Panel [[id number]]  (Abnormal) Collected:  08/12/20 0427    Specimen:  Blood Updated:  08/12/20 0615     Glucose 76 mg/dL      BUN 8 mg/dL      Creatinine 0.68 mg/dL      Sodium 141 mmol/L      Potassium 3.6 mmol/L      Chloride 108 mmol/L      CO2 24.4 mmol/L      Calcium 9.2 mg/dL      eGFR Non African Amer 82 mL/min/1.73      BUN/Creatinine Ratio 11.8     Anion Gap 8.6 mmol/L     Narrative:       GFR Normal >60  Chronic Kidney Disease <60  Kidney Failure <15      Protime-INR [[id number]]  (Abnormal) Collected:  08/12/20 0427    Specimen:  Blood from Arm, Left Updated:  08/12/20 0559     Protime 15.4 Seconds      INR 1.24    CBC (No Diff) [[id number]]  (Abnormal) Collected:  08/12/20 0427    Specimen:  Blood Updated:  08/12/20 0539     WBC 6.73 10*3/mm3      RBC 4.44 10*6/mm3      Hemoglobin 12.6 g/dL      Hematocrit 37.2 %      MCV 83.8 fL      MCH 28.4 pg      MCHC 33.9 g/dL      RDW 16.2 %      RDW-SD 48.6 fl      MPV 9.4 fL       Platelets 140 10*3/mm3     Protime-INR [[id number]]  (Abnormal) Collected:  08/11/20 1438    Specimen:  Blood Updated:  08/11/20 1534     Protime 16.0 Seconds      INR 1.30           Imaging:  Imaging Results (Last 24 Hours)     ** No results found for the last 24 hours. **             Assessment and Plan:       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)    ICH (intracerebral hemorrhage) (CMS/HCC)    Much more awake and alert today but has nausea and currently is vomiting.  Neurologically she is stable I do not see any focal neurologic deficit or evidence that she is having seizures.  I have ordered additional diagnostic testing in the form of a plain CT tomorrow to follow with the intracranial hemorrhage thanks      Shane Mota MD  08/12/20  12:30

## 2020-08-13 NOTE — PLAN OF CARE
Transfer from PACU 2220, VSS, alert to self, handoff NIH 6, no c/o pain, ART R radial CDI, incision to R groin CDI at 2240 check, oozing blood slightly at 0420 check, tefla dressing applied and pressure held, area marked. Hep gtt at set rate 5cc/hr, 3 lumen subclavian, dressing blooding at leaking at transfer, dressing changed. Oozing under dressing, gauze and pressure applied, reinforced. BLE pulses doppler. ctm    Problem: Patient Care Overview  Goal: Plan of Care Review  Outcome: Ongoing (interventions implemented as appropriate)  Flowsheets  Taken 8/13/2020 0501  Progress: no change  Taken 8/13/2020 0420  Plan of Care Reviewed With: patient;daughter  Goal: Individualization and Mutuality  Outcome: Ongoing (interventions implemented as appropriate)  Goal: Discharge Needs Assessment  Outcome: Ongoing (interventions implemented as appropriate)  Goal: Interprofessional Rounds/Family Conf  Outcome: Ongoing (interventions implemented as appropriate)     Problem: Fall Risk (Adult)  Goal: Absence of Fall  Outcome: Ongoing (interventions implemented as appropriate)     Problem: Skin Injury Risk (Adult)  Goal: Skin Health and Integrity  Outcome: Ongoing (interventions implemented as appropriate)     Problem: Stroke (Hemorrhagic) (Adult)  Goal: Signs and Symptoms of Listed Potential Problems Will be Absent, Minimized or Managed (Stroke)  Outcome: Ongoing (interventions implemented as appropriate)

## 2020-08-13 NOTE — PROGRESS NOTES
Continued Stay Note  [facility]     Patient Name: Jihan Teresa  MRN: [id number]  Today's Date: 8/13/2020    Admit Date: 8/3/2020    Discharge Plan     Row Name 08/13/20 1344       Plan    Plan  [facility]    Patient/Family in Agreement with Plan  yes    Plan Comments  Met with pt and daughter at bedside who confirm plan for [facility] at discharge.  Confirmed with [facility] that [facility] will have bed.  Pt will need ambulance for transport.  GINGER davidson RN        Discharge Codes    No documentation.       Expected Discharge Date and Time     Expected Discharge Date Expected Discharge Time    Aug 11, 2020             Ashley Davidson, RN

## 2020-08-13 NOTE — PLAN OF CARE
Problem: Patient Care Overview  Goal: Plan of Care Review  Outcome: Ongoing (interventions implemented as appropriate)  Flowsheets (Taken 8/13/2020 7324)  Progress: improving  Outcome Summary: Patient confused but pleasant.  VSS.  Patient worked with physical therapy  No complaints.  Pulse palpated with no problems.  Will continue to montior closelty

## 2020-08-13 NOTE — THERAPY RE-EVALUATION
Patient Name: Jihan Teresa  : 1933    MRN: [id number]                              Today's Date: 2020       Admit Date: 8/3/2020    Visit Dx:     ICD-10-CM ICD-9-CM   1. Nontraumatic intracerebral hemorrhage, unspecified cerebral location, unspecified laterality (CMS/HCC) I61.9 431   2. Acute UTI N39.0 599.0   3. 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) I61.0 431     Patient Active Problem List   Diagnosis   • Type 2 diabetes mellitus (CMS/HCC)   • Aortic valve replaced, equine valve   • Cerebral infarction (CMS/HCC)   • A-fib (CMS/[facility])   • GERD without esophagitis   • Sleep apnea in adult   • Cognitive dysfunction   • Hypothyroidism (acquired)   • History of recurrent UTIs   • Mild reactive airways disease   • Essential hypertension   • Pulmonary hypertension (CMS/HCC)   • B12 deficiency   • Non-rheumatic mitral regurgitation   • Non-rheumatic tricuspid valve insufficiency   • Chronic anemia   • Grade III hemorrhoids   • ICH (intracerebral hemorrhage) (CMS/[facility])   • 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)     Past Medical History:   Diagnosis Date   • Anemia    • Aortic valve stenosis     S/p AVR in    • Asthma    • Atelectasis    • CHF (congestive heart failure) (CMS/[facility])    • Congenital heart disease    • Diabetes mellitus (CMS/HCC)    • Esophageal reflux    • Essential hypertension    • HLD (hyperlipidemia)    • Hypotension    • LVH (left ventricular hypertrophy)    • Pleural effusion    • Pulmonary hypertension (CMS/HCC)      Past Surgical History:   Procedure Laterality Date   • AORTIC VALVE REPAIR/REPLACEMENT  2009    Jerry Colmenares MD   • CATARACT EXTRACTION     • KNEE ARTHROPLASTY       General Information     Row Name 20 0912          PT Evaluation Time/Intention    Document Type  re-evaluation  -CH (r) CB (t) CH (c)     Mode of Treatment  physical therapy;individual therapy  -CH (r) CB (t) CH (c)     Row  Name 08/13/20 0912          General Information    Patient Profile Reviewed?  yes  -CH (r) CB (t) CH (c)     Prior Level of Function  independent:;ADL's;min assist:;all household mobility  -CH (r) CB (t) CH (c)     Existing Precautions/Restrictions  fall  -CH (r) CB (t) CH (c)     Row Name 08/13/20 0912          Relationship/Environment    Lives With  child(david), adult  -CH (r) CB (t) CH (c)     Row Name 08/13/20 0912          Resource/Environmental Concerns    Current Living Arrangements  home/apartment/condo  -CH (r) CB (t) CH (c)     Row Name 08/13/20 0912          Cognitive Assessment/Intervention- PT/OT    Orientation Status (Cognition)  oriented to;person;situation  -CH (r) CB (t) CH (c)     Row Name 08/13/20 0912          Safety Issues, Functional Mobility    Impairments Affecting Function (Mobility)  balance;coordination;endurance/activity tolerance;strength;postural/trunk control  -CH (r) CB (t) CH (c)       User Key  (r) = Recorded By, (t) = Taken By, (c) = Cosigned By    Initials Name Provider Type    CH Zenobia Sanches, PT Physical Therapist    CB Sami Guajardo, PT Student PT Student        Mobility     Row Name 08/13/20 0913          Bed Mobility Assessment/Treatment    Bed Mobility Assessment/Treatment  bed mobility (all) activities  -CH (r) CB (t) CH (c)     [place] Level (Bed Mobility)  moderate assist (50% patient effort)  -CH (r) CB (t) CH (c)     Assistive Device (Bed Mobility)  draw sheet;head of bed elevated  -CH (r) CB (t) CH (c)     Row Name 08/13/20 0913          Transfer Assessment/Treatment    Comment (Transfers)  STS x2; Pt not able to completely extend knees and needed constant vc to stand tall  -CH (r) CB (t) CH (c)     Row Name 08/13/20 0913          Sit-Stand Transfer    Sit-Stand [place] (Transfers)  verbal cues;moderate assist (50% patient effort);2 person assist  -CH (r) CB (t) CH (c)     Assistive Device (Sit-Stand Transfers)  walker, front-wheeled  -CH (r) CB (t)  CH (c)       User Key  (r) = Recorded By, (t) = Taken By, (c) = Cosigned By    Initials Name Provider Type    CH Zenobia Sanches, PT Physical Therapist    Sami Hawkins, JOSHUA Student PT Student        Obj/Interventions     Row Name 08/13/20 0915          General ROM    GENERAL ROM COMMENTS  B LE WFL  -CH (r) CB (t) CH (c)     Row Name 08/13/20 0915          MMT (Manual Muscle Testing)    General MMT Comments  R extensors 3/5, L extensors 4/5  -CH (r) CB (t) CH (c)     Row Name 08/13/20 0915          Therapeutic Exercise    Exercise Type (Therapeutic Exercise)  AAROM (active assistive range of motion);AROM (active range of motion)  -CH (r) CB (t) CH (c)     Position (Therapeutic Exercise)  seated  -CH (r) CB (t) CH (c)     Sets/Reps (Therapeutic Exercise)  B LE AP, LAQ x10  -CH (r) CB (t) CH (c)     Comment (Therapeutic Exercise)  AP AROM, LAQ AAROM   -CH (r) CB (t) CH (c)     Row Name 08/13/20 0915          Static Sitting Balance    Level of [place] (Unsupported Sitting, Static Balance)  moderate assist, 50 to 74% patient effort  -CH (r) CB (t) CH (c)     Sitting Position (Unsupported Sitting, Static Balance)  sitting on edge of bed  -CH (r) CB (t) CH (c)     Time Able to Maintain Position (Unsupported Sitting, Static Balance)  more than 5 minutes  -CH (r) CB (t) CH (c)     Comment (Unsupported Sitting, Static Balance)  Pt constant cues to sit forward, strong posterior lean   -CH (r) CB (t) CH (c)     Row Name 08/13/20 0915          Dynamic Sitting Balance    Level of [place], Reaches Outside Midline (Sitting, Dynamic Balance)  moderate assist, 50 to 74% patient effort  -CH (r) CB (t) CH (c)     Sitting Position, Reaches Outside Midline (Sitting, Dynamic Balance)  sitting on edge of bed  -CH (r) CB (t) CH (c)     Row Name 08/13/20 0915          Static Standing Balance    Level of [place] (Supported Standing, Static Balance)  moderate assist, 50 to 74% patient effort;2 person assist  -CH (r)  CB (t) CH (c)     Time Able to Maintain Position (Supported Standing, Static Balance)  1 to 2 minutes  -CH (r) CB (t) CH (c)     Assistive Device Utilized (Supported Standing, Static Balance)  walker, rolling  -CH (r) CB (t) CH (c)     Row Name 08/13/20 0915          Dynamic Standing Balance    Level of [place], Reaches Outside Midline (Standing, Dynamic Balance)  unable to perform activity  -CH (r) CB (t) CH (c)       User Key  (r) = Recorded By, (t) = Taken By, (c) = Cosigned By    Initials Name Provider Type    CH Zenobia Sanches, PT Physical Therapist    CB Sami Guajardo, PT Student PT Student        Goals/Plan     Row Name 08/13/20 0920          Bed Mobility Goal 1 (PT)    Activity/Assistive Device (Bed Mobility Goal 1, PT)  bed mobility activities, all  -CH (r) CB (t) CH (c)     [place] Level/Cues Needed (Bed Mobility Goal 1, PT)  minimum assist (75% or more patient effort)  -CH (r) CB (t) CH (c)     Time Frame (Bed Mobility Goal 1, PT)  short term goal (STG);1 week  -CH (r) CB (t) CH (c)     Row Name 08/13/20 0920          Transfer Goal 1 (PT)    Activity/Assistive Device (Transfer Goal 1, PT)  transfers, all  -CH (r) CB (t) CH (c)     [place] Level/Cues Needed (Transfer Goal 1, PT)  contact guard assist  -CH (r) CB (t) CH (c)     Time Frame (Transfer Goal 1, PT)  short term goal (STG);1 week  -CH (r) CB (t) CH (c)     Row Name 08/13/20 0920          Gait Training Goal 1 (PT)    Activity/Assistive Device (Gait Training Goal 1, PT)  gait (walking locomotion);assistive device use;walker, rolling  -CH (r) CB (t) CH (c)     [place] Level (Gait Training Goal 1, PT)  contact guard assist  -CH (r) CB (t) CH (c)     Distance (Gait Goal 1, PT)  100 ft  -CH (r) CB (t) CH (c)     Time Frame (Gait Training Goal 1, PT)  short term goal (STG);1 week  -CH (r) CB (t) CH (c)       User Key  (r) = Recorded By, (t) = Taken By, (c) = Cosigned By    Initials Name Provider Type    Zenobia Hammond,  PT Physical Therapist    Sami Hawkins, PT Student PT Student        Clinical Impression     Row Name 08/13/20 0917          Pain Assessment    Additional Documentation  Pain Scale: Numbers Pre/Post-Treatment (Group)  -CH (r) CB (t) CH (c)     Row Name 08/13/20 0917          Pain Scale: Numbers Pre/Post-Treatment    Pain Scale: Numbers, Pretreatment  0/10 - no pain  -CH (r) CB (t) CH (c)     Pain Scale: Numbers, Post-Treatment  0/10 - no pain  -CH (r) CB (t) CH (c)     Row Name 08/13/20 0917          Plan of Care Review    Plan of Care Reviewed With  patient;daughter  -CH (r) CB (t) CH (c)     Progress  improving  -CH (r) CB (t) CH (c)     Outcome Summary  Pt re-eval post right femoral thromboembolectomy. Pt required modA for bed mobility and modx2 for STS. Pt able to stand about 1 minute on both attempts. Pt can still beenfit from skilled PT for strengt, balnce, and endurance.   -CH (r) CB (t) CH (c)     Row Name 08/13/20 0917          Physical Therapy Clinical Impression    Patient/Family Goals Statement (PT Clinical Impression)  PLOF  -CH (r) CB (t) CH (c)     Criteria for Skilled Interventions Met (PT Clinical Impression)  yes;treatment indicated  -CH (r) CB (t) CH (c)     Rehab Potential (PT Clinical Summary)  good, to achieve stated therapy goals  -CH (r) CB (t) CH (c)     Row Name 08/13/20 0917          Positioning and Restraints    Pre-Treatment Position  in bed  -CH (r) CB (t) CH (c)     Post Treatment Position  bed  -CH (r) CB (t) CH (c)     In Bed  call light within reach;encouraged to call for assist;supine;exit alarm on;with family/caregiver  -CH (r) CB (t) CH (c)       User Key  (r) = Recorded By, (t) = Taken By, (c) = Cosigned By    Initials Name Provider Type    Zenobia Hammond, PT Physical Therapist    Sami Hawkins, PT Student PT Student        Outcome Measures     Row Name 08/13/20 0921          How much help from another person do you currently need...    Turning from your  back to your side while in flat bed without using bedrails?  2  -CH (r) CB (t) CH (c)     Moving from lying on back to sitting on the side of a flat bed without bedrails?  2  -CH (r) CB (t) CH (c)     Moving to and from a bed to a chair (including a wheelchair)?  1  -CH (r) CB (t) CH (c)     Standing up from a chair using your arms (e.g., wheelchair, bedside chair)?  2  -CH (r) CB (t) CH (c)     Climbing 3-5 steps with a railing?  1  -CH (r) CB (t) CH (c)     To walk in hospital room?  1  -CH (r) CB (t) CH (c)     AM-PAC 6 Clicks Score (PT)  9  -CH (r) CB (t)     Row Name 08/13/20 0921          Functional Assessment    Outcome Measure Options  AM-PAC 6 Clicks Basic Mobility (PT)  -CH (r) CB (t) CH (c)       User Key  (r) = Recorded By, (t) = Taken By, (c) = Cosigned By    Initials Name Provider Type    CH Zenobia Sanches, PT Physical Therapist    CB Sami Guajardo, PT Student PT Student        Physical Therapy Education                 Title: PT OT SLP Therapies (Done)     Topic: Physical Therapy (Done)     Point: Mobility training (Done)     Description:   Instruct learner(s) on safety and technique for assisting patient out of bed, chair or wheelchair.  Instruct in the proper use of assistive devices, such as walker, crutches, cane or brace.              Patient Friendly Description:   It's important to get you on your feet again, but we need to do so in a way that is safe for you. Falling has serious consequences, and your personal safety is the most important thing of all.        When it's time to get out of bed, one of us or a family member will sit next to you on the bed to give you support.     If your doctor or nurse tells you to use a walker, crutches, a cane, or a brace, be sure you use it every time you get out of bed, even if you think you don't need it.    Learning Progress Summary           Patient Acceptance, E,TB, VU by CB at 8/13/2020 0922    Acceptance, E,D, VU,NR by PH at 8/12/2020 3912     Acceptance, E,TB, VU by CB at 8/11/2020 1623    Acceptance, E, NR by DJ at 8/10/2020 1530    Acceptance, E, VU,NR by MW at 8/8/2020 1542    Acceptance, E, DU,NR by AR at 8/7/2020 1432    Acceptance, E, DU,NR by AR at 8/6/2020 1208    Acceptance, E,D, NR by CG at 8/5/2020 1410    Comment:  does not appear to retain information.  CGRESSRN    Acceptance, E, NL,NR by AR at 8/5/2020 1204    Acceptance, E, DU,NR by AR at 8/4/2020 1453   Family Acceptance, E, VU,NR by MW at 8/8/2020 1542                   Point: Home exercise program (Done)     Description:   Instruct learner(s) on appropriate technique for monitoring, assisting and/or progressing patient with therapeutic exercises and activities.              Learning Progress Summary           Patient Acceptance, E,TB, VU by CB at 8/13/2020 0922    Acceptance, E,D, VU,NR by PH at 8/12/2020 1540    Acceptance, E,TB, VU by CB at 8/11/2020 1623    Acceptance, E, VU,NR by MW at 8/8/2020 1542    Acceptance, E, DU,NR by AR at 8/7/2020 1432    Acceptance, E, DU,NR by AR at 8/6/2020 1208    Acceptance, E,D, NR by CG at 8/5/2020 1410    Comment:  does not appear to retain information.  CGRESSRN    Acceptance, E, NL,NR by AR at 8/5/2020 1204    Acceptance, E, DU,NR by AR at 8/4/2020 1453   Family Acceptance, E, VU,NR by MW at 8/8/2020 1542                   Point: Body mechanics (Done)     Description:   Instruct learner(s) on proper positioning and spine alignment for patient and/or caregiver during mobility tasks and/or exercises.              Learning Progress Summary           Patient Acceptance, E,TB, VU by CB at 8/13/2020 0922    Acceptance, E,D, VU,NR by PH at 8/12/2020 1540    Acceptance, E,TB, VU by CB at 8/11/2020 1623    Acceptance, E, NR by ELVA at 8/10/2020 1530    Acceptance, E, VU,NR by MW at 8/8/2020 1542    Acceptance, CHANDA ROJAS,NR by AR at 8/7/2020 1432    Acceptance, ECHADNA,NR by AR at 8/6/2020 1208    Acceptance, ENEREYDA, NR by CG at 8/5/2020 1410    Comment:  does not  appear to retain information.  CGRESSRN    Acceptance, E, NL,NR by AR at 8/5/2020 1204    Acceptance, E, DU,NR by AR at 8/4/2020 1453   Family Acceptance, E, VU,NR by  at 8/8/2020 1542                   Point: Precautions (Done)     Description:   Instruct learner(s) on prescribed precautions during mobility and gait tasks              Learning Progress Summary           Patient Acceptance, E,TB, VU by CB at 8/13/2020 0922    Acceptance, E,D, VU,NR by PH at 8/12/2020 1540    Acceptance, E,TB, VU by CB at 8/11/2020 1623    Acceptance, E, NR by DJ at 8/10/2020 1530    Acceptance, E, VU,NR by MW at 8/8/2020 1542    Acceptance, E, DU,NR by AR at 8/7/2020 1432    Acceptance, E, DU,NR by AR at 8/6/2020 1208    Acceptance, E,D, NR by  at 8/5/2020 1410    Comment:  does not appear to retain information.  CGRESSRN    Acceptance, E, NL,NR by AR at 8/5/2020 1204    Acceptance, E, DU,NR by AR at 8/4/2020 1453   Family Acceptance, E, VU,NR by  at 8/8/2020 1542                               User Key     Initials Effective Dates Name Provider Type Discipline     06/16/16 -  Geneva Villela, RN Registered Nurse Nurse    PH 08/20/19 -  Kaelyn De La O, PTA Physical Therapy Assistant PT    MW 09/17/19 -  Pauline Harris, PT Physical Therapist PT    DJ 10/25/19 -  Florence Wallace, PT Physical Therapist PT    AR 07/02/20 -  Raghu Garcia, PT Student PT Student PT    CB 07/02/20 -  Sami Guajardo, PT Student PT Student PT              PT Recommendation and Plan  Planned Therapy Interventions (PT Eval): balance training, bed mobility training, gait training, home exercise program, patient/family education, transfer training, strengthening, postural re-education  Outcome Summary/Treatment Plan (PT)  Anticipated Discharge Disposition (PT): skilled nursing facility  Plan of Care Reviewed With: patient, daughter  Progress: improving  Outcome Summary: Pt re-eval post right femoral thromboembolectomy. Pt required modA for bed  mobility and modx2 for STS. Pt able to stand about 1 minute on both attempts. Pt can still beenfit from skilled PT for strengt, balnce, and endurance.      Time Calculation:   PT Charges     Row Name 08/13/20 0922             Time Calculation    Start Time  0843  -CH (r) CB (t) CH (c)      Stop Time  0900  -CH (r) CB (t) CH (c)      Time Calculation (min)  17 min  -CH (r) CB (t)      PT Received On  08/13/20  -CH (r) CB (t) CH (c)      PT - Next Appointment  08/14/20  -CH (r) CB (t) CH (c)      PT Goal Re-Cert Due Date  08/20/20  -CH (r) CB (t) CH (c)         Time Calculation- PT    Total Timed Code Minutes- PT  8 minute(s)  -CH (r) CB (t) CH (c)        User Key  (r) = Recorded By, (t) = Taken By, (c) = Cosigned By    Initials Name Provider Type     Zenobia Sanches, PT Physical Therapist    CB Sami Guajardo, PT Student PT Student        Therapy Charges for Today     Code Description Service Date Service Provider Modifiers Qty    10939082136 HC PT RE-EVAL ESTABLISHED PLAN 2 8/13/2020 Sami Guajardo, PT Student GP 1    00828010961 HC PT THER PROC EA 15 MIN 8/13/2020 Sami Guajardo, PT Student GP 1    [id number]  PT THER SUPP EA 15 MIN 8/13/2020 Sami Guajardo, PT Student GP 1          PT G-Codes  Outcome Measure Options: AM-PAC 6 Clicks Basic Mobility (PT)  AM-PAC 6 Clicks Score (PT): 9    JOSHUA Higuera  8/13/2020

## 2020-08-13 NOTE — PROGRESS NOTES
"[facility] PULMONARY CARE         Dr Crawford Sayied   LOS: 10 days   Patient Care Team:  Yaron Ca Jr., MD as PCP - General (Family Medicine)  Yaron Ca Jr., MD as PCP - Claims Attributed    Chief Complaint: Intracerebral hemorrhage A. fib coagulopathy UTI    Interval History: Events noted chart reviewed.  Yesterday afternoon had sudden onset of severe pain with PT on the right leg.  Dopplers with right arterial acute thrombus.  Patient now with status post right femoral thromboembolectomy.  Currently on heparin drip and right leg pain much improved.  Resting comfortably denies any complaints.  No active bleeding was reported.  No headache or double vision was reported.    REVIEW OF SYSTEMS:   CARDIOVASCULAR: No chest pain, chest pressure or chest discomfort. No palpitations or edema.   RESPIRATORY: No shortness of breath, cough or sputum.   GASTROINTESTINAL: No anorexia, nausea, vomiting or diarrhea. No abdominal pain or blood.   HEMATOLOGIC: No bleeding or bruising.     Ventilator/Non-Invasive Ventilation Settings (From admission, onward)    None            Vital Signs  Temp:  [97.4 °F (36.3 °C)-98.1 °F (36.7 °C)] 98.1 °F (36.7 °C)  Heart Rate:  [] 74  Resp:  [16-18] 18  BP: (113-210)/(55-84) 159/74  Arterial Line BP: (141-169)/(49-74) 154/51    Intake/Output Summary (Last 24 hours) at 8/13/2020 1220  Last data filed at 8/13/2020 1145  Gross per 24 hour   Intake 595 ml   Output 950 ml   Net -355 ml     Flowsheet Rows      First Filed Value   Admission Height  152.4 cm (60\") Documented at 08/03/2020 1133   Admission Weight  68 kg (149 lb 14.4 oz) Documented at 08/03/2020 0857          Physical Exam:  Patient is examined using the personal protective equipment as per guidelines from infection control for this particular patient as enacted.  Hand hygiene was performed before and after patient interaction.   General Appearance:    Alert, cooperative, in no acute distress.  Following simple " commands  Neck midline trachea no thyromegaly   Lungs:     Clear to auscultation,respirations regular, even and                  unlabored    Heart:    Regular rhythm and normal rate, normal S1 and S2, no            murmur, no gallop, no rub, no click   Chest Wall:    No abnormalities observed   Abdomen:     Normal bowel sounds, no masses, no organomegaly, soft        non-tender, non-distended, no guarding, no rebound                tenderness   Extremities:  No cyanosis.  Good perfusion of the extremities.  CNS no focal neurological deficits normal sensory exam  Skin no rashes no nodules  Musculoskeletal no cyanosis no clubbing normal range of motion     Results Review:        Results from last 7 days   Lab Units 08/13/20  0636 08/12/20  0427 08/11/20  0611   SODIUM mmol/L 140 141 140   POTASSIUM mmol/L 3.8 3.6 3.9   CHLORIDE mmol/L 104 108* 108*   CO2 mmol/L 24.1 24.4 23.3   BUN mg/dL 14 8 7*   CREATININE mg/dL 0.83 0.68 0.63   GLUCOSE mg/dL 105* 76 108*   CALCIUM mg/dL 8.7 9.2 9.2         Results from last 7 days   Lab Units 08/13/20  0636 08/12/20  0427 08/11/20  0611   WBC 10*3/mm3 11.76* 6.73 7.42   HEMOGLOBIN g/dL 10.8* 12.6 12.6   HEMATOCRIT % 32.3* 37.2 37.9   PLATELETS 10*3/mm3 153 140 168     Results from last 7 days   Lab Units 08/13/20  0636 08/12/20  0427 08/11/20  1438   INR  1.32* 1.24* 1.30*                       I reviewed the patient's new clinical results.  I personally viewed and interpreted the patient's CXR        Medication Review:     [START ON 8/14/2020] amLODIPine 10 mg Oral Q24H   furosemide 20 mg Oral Daily   isosorbide mononitrate 60 mg Oral Daily   levothyroxine 75 mcg Oral QAM   metoprolol tartrate 25 mg Oral Q12H   warfarin 1.5 mg Oral Once per day on Mon Thu   warfarin 3 mg Oral Once per day on Sun Tue Wed Fri Sat         heparin (porcine) 500 Units/hr Last Rate: 500 Units/hr (08/12/20 2041)   lactated ringers 9 mL/hr Last Rate: 9 mL/hr (08/12/20 1923)   Pharmacy to dose warfarin          ASSESSMENT:   Acute arterial occlusion of right lower extremity  Status post open right femoral thromboembolectomy  Intracerebral hemorrhage on admission  Coagulopathy on admission  A. fib on anticoagulation  UTI E. coli  Diabetes mellitus  Hypothyroidism  V. tach    PLAN:  Status post open right femoral thromboembolectomy.  Currently on heparin drip per vascular surgery.  Pharmacy is dosing Coumadin.  Continue to monitor PT/INR goal between 2-3.  Neurological status remains stable.  Multiple CT head including the last one has remained stable.  Neurology currently following and adjusting medications for bouts of delirium.  Neuro checks remained stable.      Blood pressure management keep systolic less than 140   Cardiac work-up ordered per cardiology for V. tach  Completed antibiotics for E. coli in the urine.  Repeat UA no evidence of infection.  PT OT  Tolerating p.o. diet well  Likely will need rehab at discharge    Yvette Evans MD  08/13/20  12:20

## 2020-08-13 NOTE — PLAN OF CARE
Problem: Patient Care Overview  Goal: Plan of Care Review  Outcome: Ongoing (interventions implemented as appropriate)  Note:   Patient was doing well most of the day today, and was much more alert than yesterday. She had no complaints during the day until late afternoon when she developed pain in her right thigh and foot. Right leg was blotchy appearing compared to left, with decreased pulses. Gave Tylenol for pain and doppler was ordered. Vascular consulted after receiving result of doppler. Patient left for OR at 6:30 for a femoral thromboembolectomy. Report given to Preop RN.

## 2020-08-13 NOTE — PROGRESS NOTES
"Adult Nutrition  Assessment/PES    Patient Name:  Jihan Teresa  YOB: 1933  MRN: [id number]  Admit Date:  8/3/2020    Assessment Date:  8/13/2020  Nutrition assessment.   Reason for Assessment     Row Name 08/13/20 1143          Reason for Assessment    Reason For Assessment  per organizational policy length of stay     Diagnosis   Nontraumatic subcortical hemorrhage of left cerebral hemisphere, Intracerebral hemorrhage, Afib, DM, hypothyroid         Nutrition/Diet History     Row Name 08/13/20 1144          Nutrition/Diet History    Typical Food/Fluid Intake  post right femoral thromboembolectomy; alert to self; total feed-[facility] soft/CC diet. ordered boost glucose control BID         Anthropometrics     Row Name 08/13/20 1145          Anthropometrics    Height  152.4 cm (60\")        Admit Weight    Admit Weight  69.1 kg (152 lb 5.4 oz)        Ideal Body Weight (IBW)    Ideal Body Weight (IBW) (kg)  45.86     % of Ideal Body Weight Assessment  -- 150%        Body Mass Index (BMI)    BMI Assessment  BMI 25-29.9: overweight BMI-29.7         Labs/Tests/Procedures/Meds     Row Name 08/13/20 1145          Labs/Procedures/Meds    Lab Results Reviewed  reviewed, pertinent        Diagnostic Tests/Procedures    Diagnostic Test/Procedure Reviewed  reviewed, pertinent        Medications    Pertinent Medications Reviewed  reviewed, pertinent         Physical Findings     Row Name 08/13/20 1145          Physical Findings    Overall Physical Appearance  -- B=18     Skin  surgical incision         Estimated/Assessed Needs     Row Name 08/13/20 1145          Calculation Measurements    Height  152.4 cm (60\")         Nutrition Prescription Ordered     Row Name 08/13/20 1146          Nutrition Prescription PO    Current PO Diet  Dysphagia     Dysphagia Level  4  Mechanical soft no mixed consistencies     Fluid Consistency  Thin     Common Modifiers  Consistent Carbohydrate     "             Problem/Interventions:  Problem 1     Row Name 08/13/20 1146          Nutrition Diagnoses Problem 1    Problem 1  Inadequate Nutrient Intake     Etiology (related to)  MNT for Treatment/Condition     Signs/Symptoms (evidenced by)  Report of Mnimal PO Intake               Intervention Goal     Row Name 08/13/20 1146          Intervention Goal    General  Maintain nutrition;Meet nutritional needs for age/condition;Reduce/improve symptoms     PO  Tolerate PO     Weight  Maintain weight         Nutrition Intervention     Row Name 08/13/20 1146          Nutrition Intervention    RD/Tech Action  Follow Tx progress;Care plan reviewd;Recommend/ordered;Supplement provided     Recommended/Ordered  Supplement         Nutrition Prescription     Row Name 08/13/20 1146          Nutrition Prescription PO    PO Prescription  Begin/change supplement     Supplement  Boost Glucose Control     Supplement Frequency  2 times a day     New PO Prescription Ordered?  Yes         Education/Evaluation     Row Name 08/13/20 1146          Education    Education  Will Instruct as appropriate        Monitor/Evaluation    Monitor  Per protocol           Electronically signed by:  Jen Chang RD  08/13/20 11:47

## 2020-08-13 NOTE — PROGRESS NOTES
"[facility]  Cardiology Progress Note    Patient Identification:  Name: Jihan Teresa  Age: 86 y.o.  Sex: female  :  1933  MRN: [id number]                 Follow Up / Chief Complaint: Atrial fibrillation with ICH    Interval History: Yesterday evening she had an open right femoral thromboembolectomy per Dr. Winsome Valentin.         Subjective: Confused, daughter at bedside.    Objective:    Past Medical History:  Past Medical History:   Diagnosis Date   • Anemia    • Aortic valve stenosis     S/p AVR in    • Asthma    • Atelectasis    • CHF (congestive heart failure) (CMS/HCC)    • Congenital heart disease    • Diabetes mellitus (CMS/HCC)    • Esophageal reflux    • Essential hypertension    • HLD (hyperlipidemia)    • Hypotension    • LVH (left ventricular hypertrophy)    • Pleural effusion    • Pulmonary hypertension (CMS/HCC)      Past Surgical History:  Past Surgical History:   Procedure Laterality Date   • AORTIC VALVE REPAIR/REPLACEMENT  2009    Jerry Colmenares MD   • CATARACT EXTRACTION     • FEMORAL THROMBECTOMY/EMBOLECTOMY Right 2020    Procedure: FEMORAL THROMBECTOMY/EMBOLECTOMY;  Surgeon: Winsome Valentin Jr., MD;  Location: [facility];  Service: Vascular;  Laterality: Right;   • KNEE ARTHROPLASTY          Social History:   Social History     Tobacco Use   • Smoking status: Never Smoker   • Smokeless tobacco: Never Used   Substance Use Topics   • Alcohol use: Yes     Comment: ocasional      Family History:  Family History   Problem Relation Age of Onset   • Dementia Sister    • Stroke Brother           Allergies:  Allergies   Allergen Reactions   • Promethazine Other (See Comments)     Pt becomes \"out of it\" when on this medication  Pt becomes \"out of it\" when on this medication     Scheduled Meds:    amLODIPine 5 mg Q24H   furosemide 20 mg Daily   isosorbide mononitrate 60 mg Daily   levothyroxine 75 mcg QAM   metoprolol tartrate 25 mg Q12H   warfarin 1.5 mg " "Once per day on Mon Thu   warfarin 3 mg Once per day on Sun Tue Wed Fri Sat     ROS  Constitutional: Awake and alert, no fever. No nosebleeds  Abdomen           no abdominal pain   Cardiac              no chest pain  Pulmonary          no shortness of breath      /71 (BP Location: Left arm, Patient Position: Lying)   Pulse 74   Temp 98.3 °F (36.8 °C) (Oral)   Resp 22   Ht 152.4 cm (60\")   Wt 69.1 kg (152 lb 5.4 oz)   SpO2 95%   BMI 29.75 kg/m²   General appearance: No acute changes   Neck: Trachea midline; NECK, supple, no thyromegaly or lymphadenopathy   Lungs: Normal size and shape, normal breath sounds, equal distribution of air, no rales and rhonchi   CV: S1-S2 regular, no murmurs, no rub, no gallop   Abdomen: Soft, non-tender; no masses , no abnormal abdominal sounds   Extremities: No deformity , normal color , no peripheral edema   Skin: Normal temperature, turgor and texture; no rash, ulcers             /74 (BP Location: Left arm, Patient Position: Lying)   Pulse 74   Temp 98.1 °F (36.7 °C) (Oral)   Resp 18   Ht 152.4 cm (60\")   Wt 69.1 kg (152 lb 5.4 oz)   SpO2 95%   BMI 29.75 kg/m²              Cardiographics  Telemetry:                   A. Fib      Atrial fibrillation      Echocardiogram:   ECG:          Echocardiogram:   2017  Interpretation Summary      · Calculated EF = 46.9%.  · Left ventricular systolic function is low normal.  · Left amd right atrial cavity size is severely dilated.  · Right ventricular cavity is severely dilated.  · calcification of the aortic valve  · Severe mitral valve regurgitation is present  · Mitral annular calcification is present. Posterior leaflet very calcified and immobile.  · Severe tricuspid valve regurgitation is present.  · Estimated right ventricular systolic pressure from tricuspid regurgitation is markedly elevated (>55 mmHg). Calculated right ventricular systolic pressure from tricuspid regurgitation is 66.7 mmHg.         Imaging  Chest " X-ray:   Study Result      EXAMINATION: SINGLE VIEW CHEST RADIOGRAPH     HISTORY: 86-year-old female with history of generalized weakness.     FINDINGS: An upright AP portable chest radiograph was obtained.  Comparison is made to a chest radiograph dated 06/10/2017. The lungs are  normal in volume and are clear of consolidation. Stable mild scattered  interstitial prominence is seen throughout both lungs. Stable soft  tissue fullness in the right hilum is most consistent with a prominent  right hilar vessel is noted. There are stable cardiomegaly. Midline  sternal wires are noted. Atheromatous consultation seen within the  aortic arch.     IMPRESSION:  There is stable mild bilateral interstitial scarring and  cardiomegaly with findings suggestive of pulmonary hypertension. No  evidence for superimposed acute process is appreciated.     This report was finalized on 8/3/2020 10:55 AM by Dr. Estuardo Wagner M.D.         CT     Study Result      CT SCAN OF THE HEAD WITHOUT CONTRAST     CLINICAL HISTORY: Generalized weakness and confusion.     TECHNIQUE: CT scan of the head was obtained with 3 mm axial images. No  intravenous contrast was administered.     COMPARISON: Comparison is made to previous CT scan of head dated  06/13/2017.     FINDINGS: There is increased density within the dependent aspects of  both lateral ventricles compatible with intraventricular hemorrhage.  There are foci of increased density noted within the inferior aspect of  the interhemispheric fissure worrisome for subarachnoid hemorrhage. The  etiology of these areas of hemorrhage is uncertain, although a vascular  etiology such as an aneurysm should be excluded. I recommend further  evaluation with an MRA or CTA examination, as well as an MRI of the  brain.     There are multiple chronic infarcts identified within both cerebellar  hemispheres, left greater than right. The largest of these chronic  infarcts measures up to approximately 1.4 x 0.4  cm in greatest axial  dimensions. Old lacunar disease is seen within the left thalamus. A  chronic lacune identified within the anterior aspect of the left  thalamus measuring up to 4-5 mm in diameter. Hypodense areas are  identified within the lentiform nuclei bilaterally that are likely  representative of a combination of enlarged perivascular spaces and old  lacunar disease. Severe changes of chronic small vessel ischemic  phenomena are identified.     Otherwise, the ventricles, sulci, and cisterns are age appropriate.     IMPRESSION:     There is intraventricular hemorrhage identified within the dependent  aspects of both lateral ventricles. Additionally, there is increased  density seen within the inferior aspect of the interhemispheric fissure  suggestive of subarachnoid hemorrhage. The precise etiology of this  hemorrhage is uncertain on the basis of this head CT. However, a  vascular etiology should be excluded. Further evaluation is recommended  with a CTA or MRA study, as well as an MRI of the brain.     These findings and recommendations were discussed with Debbie Wilson on 08/03/2020 at approximately 8:36 AM.     Radiation dose reduction techniques were utilized, including automated  exposure control and exposure modulation based on body size.              Lab Review           Results from last 7 days   Lab Units 08/13/20  0636   SODIUM mmol/L 140   POTASSIUM mmol/L 3.8   BUN mg/dL 14   CREATININE mg/dL 0.83   CALCIUM mg/dL 8.7        Results from last 7 days   Lab Units 08/13/20  0636 08/12/20  0427 08/11/20  0611   WBC 10*3/mm3 11.76* 6.73 7.42   HEMOGLOBIN g/dL 10.8* 12.6 12.6   HEMATOCRIT % 32.3* 37.2 37.9   PLATELETS 10*3/mm3 153 140 168     Results from last 7 days   Lab Units 08/13/20  0636 08/12/20  0427 08/11/20  1438   INR  1.32* 1.24* 1.30*     The following medical decision was discussed in detail with Dr. Mcmahan    Assessment:  1. Intracerebral hemorrhage  2.  Chronic atrial  "fibrillation: Coumadin on hold due to bleeding.  3.  Bioprosthetic aortic valve replaced in 2009  5. coagulopathy  6.  UTI  7.  Diabetes mellitus  8. Hypothyroidism  9. CAD  10. Hypertension    Plan:  Blood pressure slightly elevated, will increase Norvasc.  No runs of V. tach last night.  Stress test was a normal myocardial perfusion study, however a very small lateral wall ischemia cannot be ruled out.  Discussed results with daughter.  It was explained to the patient that stress testing carries 85% specificity/sensitivity, and does not rule out future cardiac event. No further work up needed at this time. Yesterday evening she had an open right femoral thromboembolectomy by Dr. Winsome Valentin.  Now on heparin drip with coumadin.  INR 1.32 today.           )8/13/2020  KAREN Graham       Patient personally interviewed and above subjective findings personally confirmed during a face to face contact with patient today  All findings of physical examination confirmed  All pertinent and performed labs, cardiac procedures ,  radiographs of the last 24 hours personally reviewed  Impression and plans discussed/elaborated and implemented jointly as described above     MD DAVE Murphy/Transcription:   \"Dictated utilizing Dragon dictation\".     "

## 2020-08-13 NOTE — PROGRESS NOTES
Patient Identification:  NAME:  Jihan Teresa  Age:  86 y.o.   Sex:  female   :  1933   MRN:  [id number]       Chief complaint: Subarachnoid hemorrhage/intraventricular blood, metabolic encephalopathy    History of present illness: Repeat CT by my independent eyeball review looks much better she is tolerating Coumadin and the event last night is noted requiring an emergency thrombectomy that went well.  The benefits of the Coumadin outweigh any risks.  She is more alert today not nauseated but did not sleep at all last night according to family members.      Past medical history:  Past Medical History:   Diagnosis Date   • Anemia    • Aortic valve stenosis     S/p AVR in    • Asthma    • Atelectasis    • CHF (congestive heart failure) (CMS/HCC)    • Congenital heart disease    • Diabetes mellitus (CMS/HCC)    • Esophageal reflux    • Essential hypertension    • HLD (hyperlipidemia)    • Hypotension    • LVH (left ventricular hypertrophy)    • Pleural effusion    • Pulmonary hypertension (CMS/HCC)        Allergies:  Promethazine    Home medications:  Facility-Administered Medications Prior to Admission   Medication Dose Route Frequency Provider Last Rate Last Dose   • cyanocobalamin injection 1,000 mcg  1,000 mcg Intramuscular Q28 Days Gustavo Jackman Jr., MD   1,000 mcg at 19 1640     Medications Prior to Admission   Medication Sig Dispense Refill Last Dose   • isosorbide mononitrate (IMDUR) 60 MG 24 hr tablet TAKE 1 TABLET BY MOUTH EVERY DAY 90 tablet 1    • levothyroxine (SYNTHROID, LEVOTHROID) 150 MCG tablet TAKE 1 TABLET BY MOUTH EVERY DAY (Patient taking differently: Take 75 mcg by mouth Daily.) 30 tablet 2 Patient Taking Differently at Unknown time   • warfarin (COUMADIN) 3 MG tablet Take 3 mg daily by mouth Tuesday through .  Do not take Monday (Patient taking differently: Take 3 mg daily by mouth  and .  Monday and Thursday patient to take  1.5 mg) 90 tablet 1 Patient Taking Differently at Unknown time   • acetaminophen (TYLENOL) 500 MG tablet Take  by mouth As Needed.   Taking   • ascorbic acid (VITAMIN C) 250 MG chewable tablet chewable tablet Chew 500 mg Daily.   Taking   • atorvastatin (LIPITOR) 40 MG tablet TAKE 1 TABLET BY MOUTH EVERY DAY (Patient taking differently: Take 40 mg by mouth Daily.) 30 tablet 5    • Calcium Citrate-Vitamin D 250-200 MG-UNIT tablet Take  by mouth 2 (Two) Times a Day.   Taking   • Clobetasol Propionate Emulsion 0.05 % topical foam APPLY SPARINGLY TO SCALP TWICE DAILY X 2 WEEKS. THEN AS NEEDED FOR FLARES  1 Taking   • clopidogrel (PLAVIX) 75 MG tablet TAKE 1 TABLET BY MOUTH EVERY DAY (Patient taking differently: Take 75 mg by mouth Daily.) 30 tablet 2    • furosemide (LASIX) 40 MG tablet TAKE 1 TABLET BY MOUTH THREE TIMES A DAY (Patient taking differently: Take 40 mg by mouth 3 (Three) Times a Day.) 90 tablet 1    • hydrocortisone (ANUSOL-HC) 2.5 % rectal cream Insert  into the rectum 2 (Two) Times a Day. 28.35 g 2    • ketoconazole (NIZORAL) 2 % shampoo WASH SCALP AND 2-3 TIMES A WEEK. LEAVE ON FOR 2-3 MINUTES THEN RINSE.  5 Taking   • metoprolol tartrate (LOPRESSOR) 50 MG tablet TAKE 1 TABLET BY MOUTH 2 (TWO) TIMES DAILY (Patient taking differently: Take 50 mg by mouth 2 (Two) Times a Day.) 180 tablet 2 8/4/2020 at 1100   • Multiple Vitamin (MULTIVITAMIN) tablet Take 1 tablet by mouth Daily.   Taking   • omeprazole (priLOSEC) 20 MG capsule Take 20 mg by mouth Daily.   Taking   • Phenylephrine-Witch Hazel (PREPARATION H) 0.25-50 % gel Insert 1 g into the rectum 4 (Four) Times a Day As Needed (rectal burning). 25 g 2    • potassium chloride (MICRO-K) 10 MEQ CR capsule TAKE 2 CAPSULES BY MOUTH 2 (TWO) TIMES A DAY. (Patient taking differently: Take 20 mEq by mouth 2 (Two) Times a Day.) 120 capsule 5         Hospital medications:    [START ON 8/14/2020] amLODIPine 10 mg Oral Q24H   furosemide 20 mg Oral Daily   isosorbide  mononitrate 60 mg Oral Daily   levothyroxine 75 mcg Oral QAM   metoprolol tartrate 25 mg Oral Q12H   warfarin 1.5 mg Oral Once per day on Mon Thu   warfarin 3 mg Oral Once per day on Sun Tue Wed Fri Sat       heparin (porcine) 500 Units/hr Last Rate: 500 Units/hr (08/12/20 2041)   lactated ringers 9 mL/hr Last Rate: 9 mL/hr (08/12/20 1923)   Pharmacy to dose warfarin       haloperidol lactate  •  hydrALAZINE  •  HYDROcodone-acetaminophen  •  Pharmacy to dose warfarin  •  potassium chloride **OR** potassium chloride **OR** potassium chloride  •  [COMPLETED] Insert peripheral IV **AND** sodium chloride      Objective:  Vitals Ranges:   Temp:  [97.4 °F (36.3 °C)-98.1 °F (36.7 °C)] 98.1 °F (36.7 °C)  Heart Rate:  [] 74  Resp:  [16-18] 18  BP: (113-210)/(55-84) 159/74  Arterial Line BP: (141-169)/(49-74) 154/51      Physical Exam:  Awake alert hard of hearing seems less confused but still has affect of some confusion normal cranial nerves II through VII good motor in all 4 extremities normal dorsi and plantar flexion in the feet coordination slow in the upper extremities reflexes trace throughout symmetrical toes downgoing bilaterally    Results review:   I reviewed the patient's new clinical results.    Data review:  Lab Results (last 24 hours)     Procedure Component Value Units Date/Time    Basic Metabolic Panel [[id number]]  (Abnormal) Collected:  08/13/20 0636    Specimen:  Blood Updated:  08/13/20 0812     Glucose 105 mg/dL      BUN 14 mg/dL      Creatinine 0.83 mg/dL      Sodium 140 mmol/L      Potassium 3.8 mmol/L      Chloride 104 mmol/L      CO2 24.1 mmol/L      Calcium 8.7 mg/dL      eGFR Non African Amer 65 mL/min/1.73      BUN/Creatinine Ratio 16.9     Anion Gap 11.9 mmol/L     Narrative:       GFR Normal >60  Chronic Kidney Disease <60  Kidney Failure <15      CBC (No Diff) [[id number]]  (Abnormal) Collected:  08/13/20 0636    Specimen:  Blood Updated:  08/13/20 0752     WBC 11.76 10*3/mm3      RBC 3.80  10*6/mm3      Hemoglobin 10.8 g/dL      Hematocrit 32.3 %      MCV 85.0 fL      MCH 28.4 pg      MCHC 33.4 g/dL      RDW 16.2 %      RDW-SD 49.3 fl      MPV 9.6 fL      Platelets 153 10*3/mm3     Protime-INR [[id number]]  (Abnormal) Collected:  08/13/20 0636    Specimen:  Blood Updated:  08/13/20 0748     Protime 16.2 Seconds      INR 1.32    POC Glucose Once [[id number]]  (Abnormal) Collected:  08/12/20 2111    Specimen:  Blood Updated:  08/12/20 2112     Glucose 141 mg/dL     POC Glucose Once [[id number]]  (Normal) Collected:  08/12/20 1856    Specimen:  Blood Updated:  08/12/20 1858     Glucose 129 mg/dL            Imaging:  Imaging Results (Last 24 Hours)     Procedure Component Value Units Date/Time    CT Head Without Contrast [[id number]] Collected:  08/13/20 0733     Updated:  08/13/20 0733    Narrative:       CT HEAD WITHOUT CONTRAST     HISTORY: Syncope, fainting. Intracranial hemorrhage.     FINDINGS: There is moderate atrophy. Remote infarcts involving the left  cerebral hemisphere are present, similar to the examination of  08/03/2020. Confluent areas of decreased attenuation involving the white  matter of cerebral hemispheres are noted bilaterally consistent with  severe small vessel ischemic disease. There is a trace amount of blood  present within the occipital horns which is less prominent as compared  to 08/03/2020, but similar to the examination of 08/08/2020. A more  focal area of hemorrhage involving the third ventricle is noted  anteriorly, consistent with a smaller area of hemorrhage, also  unchanged. No new area of hemorrhage is identified. No focal area of  decreased attenuation to suggest a new area of acute infarction is  appreciated.       Impression:       1. Stable appearance of a small volume intraventricular blood within the  occipital horns and involving the anterior aspect of the third  ventricle. There is no evidence of new hemorrhage, hydrocephalus or of  abnormal extra-axial  fluid.  2. Atrophy, extensive small vessel ischemic disease, remote left  cerebellar infarcts, as well as a lacunar infarct involving the thalamus  on the left. There is no evidence of acute infarction.     A noncontrasted CT examination of brain was performed and compared to  the CT examination of 08/08/2020.            Radiation dose reduction techniques were utilized, including automated  exposure control and exposure modulation based on body size.          XR Chest 1 View [[id number]] Collected:  08/12/20 2108     Updated:  08/12/20 2113    Narrative:       PORTABLE CHEST RADIOGRAPH     HISTORY: Central line placement     COMPARISON: 08/03/2020     FINDINGS:  Cardiomegaly is present. Mediastinum appears more prominent on the  current exam, although some of the appearance may be related to portable  technique and patient positioning. There is vascular congestion.  Left-sided catheter is seen which appears to terminate within the left  innominate vein. No pneumothorax is identified. Increasing consolidation  is noted at the left lung base, and there are bilateral pleural  effusions, which are new when compared to prior study. No pneumothorax  is seen.       Impression:          1. Left-sided catheter terminates within the left innominate vein.  2. Cardiomegaly vascular congestion.  3. Left basilar consolidation, new when compared to prior. Correlation  with any evidence of infection is recommended.  4. Small bilateral pleural effusions.     This report was finalized on 8/12/2020 9:10 PM by Dr. Guadalupe Carney M.D.                Assessment and Plan:       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)    ICH (intracerebral hemorrhage) (CMS/HCC)    I reviewed the CT from today and it shows a significant improvement with a tiny amount of ventricular blood no intraparenchymal blood noted.  Certainly it is okay that she is on the Coumadin particularly noted that she had a embolism with  thrombus last night in the right lower extremity and had thrombectomy.  The benefits of the Coumadin outweigh any risks neurologically.  Her metabolic encephalopathy appears to be better much more awake and alert no nausea or vomiting at this time but she was up all night tonight I will write for Haldol at an ultra tiny dose that can be used for insomnia.  I am aware of her QT interval and would note that the Haldol is the absolute friendly as QT interval drug and it will be given at a tiny PRN dose only.  If she continues to not sleep at all at night her mental status is absolutely going to worsen and I would like to avoid that.  Benefits outweigh any risks with the use of Haldol at this tiny dose as needed insomnia      Shane Mota MD  08/13/20  12:48

## 2020-08-13 NOTE — PROGRESS NOTES
The patient is hours status post right femoral thromboembolectomy for cardiac embolus.  She had anticoagulation discontinued because of an intracerebral hemorrhage.  They were just starting to resume Coumadin when she had this event.  She has been on heparin 500 units/h with no bolus and not following protocol.  Coumadin restarted at 1.5 mg last evening.    She indicates that her foot/leg pain has totally resolved.    PT/INR pending.    Right groin dressing dry and soft without hematoma.  Strong Doppler signals noted at the ankle in both dorsalis pedis and posterior tibial arteries.  Calf is soft and nontender.  No edema noted.    Patient doing well following her revascularization.  We will continue heparin drip, low-dose, without bolus or following protocol.  Continue Coumadin dosing.  We will increase activity and restart physical therapy.  Art line and Guerrero catheter out.

## 2020-08-13 NOTE — OP NOTE
Operative Note  Date of Admission:  8/3/2020  OR Date: 8/12/2020    Pre-op Diagnosis:   Acute arterial occlusion right lower extremity secondary to cardiac embolus    Post-op Diagnosis:     Same    Procedure:   1) open right femoral thromboembolectomy    Surgeon: Winsome Valentin Jr, MD    Assistant: None    Anesthesia: General    Staff:   Cell Saver : Kirk Butts RN  Circulator: Pushpa Urias RN  Scrub Person: Tatum Dariuspetra FREITAS    Estimated Blood Loss: 100-250 cc    Specimens: Thrombus, not sent  Order Name Source Comment Collection Info Order Time   TYPE AND SCREEN Arm, Right  Collected By: Maryam Yung RN 8/12/2020  7:17 PM     Is patient on Daratumumab or [id number]-irfc?   No            Complications: None apparent    Findings: Appeared to be cardiac thrombus with portions of organized thrombus in portions of fresh thrombus.  Patient had strong Doppler dorsalis pedis and posterior tibial signals at the end of the procedure.  The foot was warm and well-perfused.    Indications:  As in preop diagnosis           Procedure: The patient was placed on the operating table in supine position.  After satisfactory general endotracheal anesthesia was achieved the patient was prepped from the umbilicus to the toes circumferentially on the right and draped in usual sterile fashion.  A tangential incision was made in the right groin and the electrocautery was used to dissect through the subcutaneous tissue and femoral sheath.  The common femoral, superficial femoral, profundofemoral arteries were all isolated and vessel loops were passed around the structure.  The patient was on a heparin drip at 500 units an hour and an additional 3000 units bolus of heparin was given.  After 5 minutes circulation time vascular control was obtained of these vessels and a transverse arteriotomy was made in the common femoral artery over the origin of the profundofemoral artery.  A 3 Matt catheter was then  passed into the profundofemoral artery with retrieval of only a minimal amount of thrombus.  There was satisfactory backbleeding.  Multiple passes were made down the superficial femoral artery to about 60 cm.  There was a large partially organized thrombus retrieved.  Several more passes were made with no further removal.  There was satisfactory backbleeding.  This vessel was flushed with heparinized saline solution.  A 4 Matt catheter was placed proximally with no retrievable thrombus.  There was strong prograde arterial flow.  The vessels were clamped and the arteriotomy closed with a running 5-0 Prolene suture.  Following this there were normal triphasic Doppler signals in the common femoral artery, superficial femoral artery proximally, and profundofemoral artery proximally.  There is strong biphasic Doppler signals noted at the ankle in both the dorsalis pedis and posterior tibial arteries.  The foot was warm and pink with good capillary refill.  Hemostasis was checked and noted be satisfactory.  The wound was copiously irrigated with antibiotic solution and dried.  The femoral sheath and deep subcutaneous tissue was closed with a running 2-0 Vicryl suture and for the mid subcutaneous tissue as well.  The superficial subcutaneous tissue was closed with a running 3-0 Vicryl suture and the skin was closed with running 4-0 Vicryl in a subcuticular fashion.  Dermabond was placed over the incision.  Sponge, needle, and instrument counts are reported as correct.  The patient was in extubated and transported to recovery room in satisfactory condition.        Radiographic Findings:  1) none performed      Active Hospital Problems    Diagnosis  POA   • **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) [I61.0]  Unknown   • ICH (intracerebral hemorrhage) (CMS/HCC) [I61.9]  Yes      Resolved Hospital Problems   No resolved problems to display.      Winsome Valentin Jr., MD     Date:  8/12/2020  Time: 20:45

## 2020-08-13 NOTE — ANESTHESIA POSTPROCEDURE EVALUATION
"Patient: Jihan Teresa    Procedure Summary     Date:  08/12/20 Room / Location:  [facility] OR  / [facility] MAIN OR    Anesthesia Start:  1928 Anesthesia Stop:  2041    Procedure:  FEMORAL THROMBECTOMY/EMBOLECTOMY (Right Thigh) Diagnosis:      Surgeon:  Winsome Valentin Jr., MD Provider:  Hema Bhagat MD    Anesthesia Type:  general ASA Status:  4 - Emergent          Anesthesia Type: general    Vitals  Vitals Value Taken Time   /59 8/12/2020  9:25 PM   Temp 36.5 °C (97.7 °F) 8/12/2020  8:41 PM   Pulse 90 8/12/2020  9:25 PM   Resp 16 8/12/2020  9:25 PM   SpO2 96 % 8/12/2020  9:25 PM           Post Anesthesia Care and Evaluation    Patient location during evaluation: bedside  Patient participation: complete - patient participated  Level of consciousness: awake and alert  Pain management: adequate  Airway patency: patent  Anesthetic complications: No anesthetic complications  PONV Status: NA  Cardiovascular status: acceptable and hemodynamically stable  Respiratory status: acceptable  Hydration status: acceptable    Comments: /59 (BP Location: Left arm, Patient Position: Lying)   Pulse 90   Temp 36.5 °C (97.7 °F) (Oral)   Resp 16   Ht 152.4 cm (60\")   Wt 69.1 kg (152 lb 5.4 oz)   SpO2 96%   BMI 29.75 kg/m²         "

## 2020-08-14 NOTE — NURSING NOTE
IV Team called to help with removal of central line due to excessive oozing.  Two sutures removed completely, line removed per protocol and pressure held for 2 min. No oozing noted. Thrombin patch placed over insertion site and then covered with gauze pressure dressing and then transparent dressing placed.  RN at bedside and states she will monitor site for continued bleeding/oozing.

## 2020-08-14 NOTE — PLAN OF CARE
Problem: Patient Care Overview  Goal: Plan of Care Review  Outcome: Ongoing (interventions implemented as appropriate)  Flowsheets (Taken 8/14/2020 8638)  Progress: improving  Plan of Care Reviewed With: patient  Outcome Summary: Monitor pain,labs,and vitals. Central line dx changed due to oozing/IV nurse paged to assess site. Heparin gtt continues. Groin site monitored for bleeding. No s/s or c/o pain noted. Family is at bedside. Will continue to monitor.

## 2020-08-14 NOTE — NURSING NOTE
Noted bleeding at central line site. Put pressure and notified Dr. Valentin. Order received from Dr. Valentin to removed line and put pressure if there is bleeding. Called IV nurse to remove central line.

## 2020-08-14 NOTE — PROGRESS NOTES
Pharmacy Consult: Warfarin Dosing/ Monitoring    Jihan Teresa is a 86 y.o. female, estimated creatinine clearance is 42.7 mL/min (by C-G formula based on SCr of 0.82 mg/dL). weighing 69.1 kg (152 lb 5.4 oz).    She has a past medical history of Anemia, Aortic valve stenosis, Asthma, Atelectasis, CHF (congestive heart failure) (CMS/[facility]), Congenital heart disease, Diabetes mellitus (CMS/[facility]), Esophageal reflux, Essential hypertension, HLD (hyperlipidemia), Hypotension, LVH (left ventricular hypertrophy), Pleural effusion, and Pulmonary hypertension (CMS/[facility]).    Social History     Tobacco Use    Smoking status: Never Smoker    Smokeless tobacco: Never Used   Substance Use Topics    Alcohol use: Yes     Comment: ocasional    Drug use: No       Results from last 7 days   Lab Units 08/14/20  0420 08/14/20  0013 08/13/20  0636 08/12/20  0427 08/11/20  1438 08/11/20  0611 08/10/20  0333 08/09/20  0652 08/08/20  0418   INR  1.40* 1.37* 1.32* 1.24* 1.30*  --   --   --   --    APTT seconds 63.5* 68.7*  --   --   --   --   --   --   --    HEMOGLOBIN g/dL 9.2*  --  10.8* 12.6  --  12.6 11.2* 11.1* 11.6*   HEMATOCRIT % 28.2*  --  32.3* 37.2  --  37.9 34.4 34.3 33.9*   PLATELETS 10*3/mm3 122*  --  153 140  --  168 147 161 179     Results from last 7 days   Lab Units 08/14/20  0420 08/13/20  0636 08/12/20  0427   SODIUM mmol/L 138 140 141   POTASSIUM mmol/L 3.5 3.8 3.6   CHLORIDE mmol/L 103 104 108*   CO2 mmol/L 25.6 24.1 24.4   BUN mg/dL 10 14 8   CREATININE mg/dL 0.82 0.83 0.68   CALCIUM mg/dL 8.7 8.7 9.2   GLUCOSE mg/dL 115* 105* 76     Anticoagulation history: 3mg TuWeFrSaSu, 1.5mg [facility] Anticoagulation:  Consulting provider: Dr. Evans  Start date: 8/11/2020  Indication: AFib,  R arterial occlusion d/t cardiac embolis 8/12 Right femoral thromboembolectomy   Target INR: 2-3  Expected duration: lifelong   Bridge Therapy: No                Date 8/11 8/12 8/13 8/14          INR 1.3 1.24 1.32 1.4         Warfarin  dose Not able to take po In OR not given  1.5   3            Potential drug interactions:   Levothyroxin pta med   8/3 vit K 10 mg     Relevant nutrition status: mechanical soft diet, poor intake 0-25% diet     Other: NA    Education complete?/ Date: defer    Assessment/Plan:  Warfarin management complicated by acute intracerebral hemorrhage and vit k   Cleared per neurosurgery to resume warfarin 8/11 with no loading doses.    Missed warfarin on 8/11 and 8/12 see above  Hemoglobin trending downwards w/ some oozing from the central line site noted in chart, central line removed 8/14.       Continue patient's home dose warfarin 3mg TuWeFrSaSu, 1.5mg MoTh without load.  Anticipate >5 days to therapeutic range.    Follow-up daily INR.        Pharmacy will continue to follow until discharge or discontinuation of warfarin.     Pushpa Montanez, PharmD, BCPS

## 2020-08-14 NOTE — PROGRESS NOTES
"[facility] PULMONARY CARE         Dr Crawford Sayied   LOS: 11 days   Patient Care Team:  Yaron Ca Jr., MD as PCP - General (Family Medicine)  Yaron Ca Jr., MD as PCP - Claims Attributed    Chief Complaint: Intracerebral hemorrhage A. fib coagulopathy UTI    Interval History: Sleepy received some pain medications earlier.  Earlier had episode of right leg pain.  Also reported to have significant bleed after removing the left subclavian.    REVIEW OF SYSTEMS:   CARDIOVASCULAR: No chest pain, chest pressure or chest discomfort. No palpitations or edema.   RESPIRATORY: No shortness of breath, cough or sputum.   GASTROINTESTINAL: No anorexia, nausea, vomiting or diarrhea. No abdominal pain or blood.   HEMATOLOGIC: No bleeding or bruising.     Ventilator/Non-Invasive Ventilation Settings (From admission, onward)    None            Vital Signs  Temp:  [97.4 °F (36.3 °C)-98.2 °F (36.8 °C)] 97.8 °F (36.6 °C)  Heart Rate:  [69-82] 82  Resp:  [16-20] 16  BP: (115-144)/(53-84) 124/84    Intake/Output Summary (Last 24 hours) at 8/14/2020 1555  Last data filed at 8/14/2020 0433  Gross per 24 hour   Intake 180 ml   Output 300 ml   Net -120 ml     Flowsheet Rows      First Filed Value   Admission Height  152.4 cm (60\") Documented at 08/03/2020 1133   Admission Weight  68 kg (149 lb 14.4 oz) Documented at 08/03/2020 0857          Physical Exam:  Patient is examined using the personal protective equipment as per guidelines from infection control for this particular patient as enacted.  Hand hygiene was performed before and after patient interaction.   General Appearance:    Alert, cooperative, in no acute distress.  Sleepy today  Neck midline trachea no thyromegaly   Lungs:     Clear to auscultation,respirations regular, even and                  unlabored    Heart:    Regular rhythm and normal rate, normal S1 and S2, no            murmur, no gallop, no rub, no click   Chest Wall:    No abnormalities observed   Abdomen:  "    Normal bowel sounds, no masses, no organomegaly, soft        non-tender, non-distended, no guarding, no rebound                tenderness   Extremities:  No cyanosis.  Good perfusion of the extremities.  CNS no focal neurological deficits normal sensory exam  Skin no rashes no nodules  Musculoskeletal no cyanosis no clubbing normal range of motion     Results Review:        Results from last 7 days   Lab Units 08/14/20  0420 08/13/20  0636 08/12/20  0427   SODIUM mmol/L 138 140 141   POTASSIUM mmol/L 3.5 3.8 3.6   CHLORIDE mmol/L 103 104 108*   CO2 mmol/L 25.6 24.1 24.4   BUN mg/dL 10 14 8   CREATININE mg/dL 0.82 0.83 0.68   GLUCOSE mg/dL 115* 105* 76   CALCIUM mg/dL 8.7 8.7 9.2         Results from last 7 days   Lab Units 08/14/20  0420 08/13/20  0636 08/12/20  0427   WBC 10*3/mm3 9.18 11.76* 6.73   HEMOGLOBIN g/dL 9.2* 10.8* 12.6   HEMATOCRIT % 28.2* 32.3* 37.2   PLATELETS 10*3/mm3 122* 153 140     Results from last 7 days   Lab Units 08/14/20  0420 08/14/20  0013 08/13/20  0636   INR  1.40* 1.37* 1.32*   APTT seconds 63.5* 68.7*  --                        I reviewed the patient's new clinical results.  I personally viewed and interpreted the patient's CXR        Medication Review:     amLODIPine 10 mg Oral Q24H   furosemide 20 mg Oral Daily   isosorbide mononitrate 60 mg Oral Daily   levothyroxine 75 mcg Oral QAM   metoprolol tartrate 25 mg Oral Q12H   warfarin 1.5 mg Oral Once per day on Mon Thu   warfarin 3 mg Oral Once per day on Sun Tue Wed Fri Sat         heparin (porcine) 500 Units/hr Last Rate: 500 Units/hr (08/12/20 2041)   lactated ringers 9 mL/hr Last Rate: 9 mL/hr (08/12/20 1923)   Pharmacy to dose warfarin         ASSESSMENT:   Acute arterial occlusion of right lower extremity  Status post open right femoral thromboembolectomy  Intracerebral hemorrhage on admission  Coagulopathy on admission  A. fib on anticoagulation  UTI E. coli  Diabetes mellitus  Hypothyroidism  V. tach    PLAN:  Status post  open right femoral thromboembolectomy.  Currently on heparin drip per vascular surgery.  Pharmacy is dosing Coumadin.  Continue to monitor PT/INR goal between 2-3.  Drop in hemoglobin noted.  We will continue to monitor closely with anticoagulation.  Neurological status remains stable.  Multiple CT head including the last one has remained stable.  Neurology currently following and adjusting medications for bouts of delirium.  Neuro checks remained stable.      Blood pressure management keep systolic less than 140   Cardiac work-up ordered per cardiology for V. tach  Completed antibiotics for E. coli in the urine.  Repeat UA no evidence of infection.  PT OT  Tolerating p.o. diet well  Discussed with daughter  Likely will need rehab at discharge    Yvette Evans MD  08/14/20  15:55

## 2020-08-14 NOTE — NURSING NOTE
Central line continuing to ooze blood. Dressing changed at 0004. Reassessed dressing with lab draws and dressing was saturated. Contacted IV nurse-pressure applied. Paged physician-new orders to place peripheral IV and to discontinue use of central line once peripheral IV is placed and to apply pressure dressing. Will continue to monitor.

## 2020-08-14 NOTE — PROGRESS NOTES
Signed             The patient is 2 days status post right femoral thromboembolectomy for cardiac embolus.    Initially, she had anticoagulation discontinued because of an intracerebral hemorrhage.  They were just starting to resume Coumadin when she had this event.  She has been on heparin 500 units/h with no bolus and not following protocol.  Coumadin restarted.     She indicates that her foot/leg pain has totally resolved.     PT/INR 16.9 and 1.4 today.    Right groin incision is healing satisfactorily.  Mild ecchymosis but no hematoma.  Doppler pedal pulses noted.  Right foot warm and well-perfused and calf is soft.    Continues to do satisfactorily from my standpoint.  INR slowly increasing.  Continue low-dose heparin until INR is adequate.  Can ambulate and do any necessary physical therapy from my standpoint.

## 2020-08-14 NOTE — PLAN OF CARE
Problem: Patient Care Overview  Goal: Plan of Care Review  Outcome: Ongoing (interventions implemented as appropriate)  Flowsheets (Taken 8/14/2020 1643)  Progress: improving  Plan of Care Reviewed With: patient  Outcome Summary: VSS, pt. was c/o pain in right leg, PRN percoccet given, ilateral DP and PD doppler signal positive, central line catheter removed per MD order, no bleeding noted after removing central line, skin care, q2 turn, heparin gtt continued, will continue to monitor pt.     Problem: Fall Risk (Adult)  Goal: Absence of Fall  Outcome: Ongoing (interventions implemented as appropriate)  Flowsheets (Taken 8/14/2020 1643)  Absence of Fall: making progress toward outcome     Problem: Skin Injury Risk (Adult)  Goal: Skin Health and Integrity  Outcome: Ongoing (interventions implemented as appropriate)  Flowsheets (Taken 8/14/2020 1643)  Skin Health and Integrity: making progress toward outcome     Problem: Stroke (Hemorrhagic) (Adult)  Goal: Signs and Symptoms of Listed Potential Problems Will be Absent, Minimized or Managed (Stroke)  Outcome: Ongoing (interventions implemented as appropriate)  Flowsheets (Taken 8/14/2020 1643)  Problems Assessed (Stroke (Hemorrhagic)): all  Problems Present (Stroke (Hemorrhagic)): situational response; cognitive impairment; eating/swallowing impairment

## 2020-08-14 NOTE — THERAPY TREATMENT NOTE
Patient Name: Jihan Teresa  : 1933    MRN: [id number]                              Today's Date: 2020       Admit Date: 8/3/2020    Visit Dx:     ICD-10-CM ICD-9-CM   1. Nontraumatic intracerebral hemorrhage, unspecified cerebral location, unspecified laterality (CMS/HCC) I61.9 431   2. Acute UTI N39.0 599.0   3. 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) I61.0 431     Patient Active Problem List   Diagnosis   • Type 2 diabetes mellitus (CMS/HCC)   • Aortic valve replaced, equine valve   • Cerebral infarction (CMS/HCC)   • A-fib (CMS/[facility])   • GERD without esophagitis   • Sleep apnea in adult   • Cognitive dysfunction   • Hypothyroidism (acquired)   • History of recurrent UTIs   • Mild reactive airways disease   • Essential hypertension   • Pulmonary hypertension (CMS/[facility])   • B12 deficiency   • Non-rheumatic mitral regurgitation   • Non-rheumatic tricuspid valve insufficiency   • Chronic anemia   • Grade III hemorrhoids   • ICH (intracerebral hemorrhage) (CMS/[facility])   • 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)     Past Medical History:   Diagnosis Date   • Anemia    • Aortic valve stenosis     S/p AVR in    • Asthma    • Atelectasis    • CHF (congestive heart failure) (CMS/[facility])    • Congenital heart disease    • Diabetes mellitus (CMS/[facility])    • Esophageal reflux    • Essential hypertension    • HLD (hyperlipidemia)    • Hypotension    • LVH (left ventricular hypertrophy)    • Pleural effusion    • Pulmonary hypertension (CMS/HCC)      Past Surgical History:   Procedure Laterality Date   • AORTIC VALVE REPAIR/REPLACEMENT  2009    Jerry Colmenares MD   • CATARACT EXTRACTION     • CEREBRAL ANGIOGRAM N/A 2020    Procedure: CEREBRAL ANGIOGRAM;  Surgeon: Ar Bunch MD;  Location: [facility] OR ;  Service: Neurosurgery;  Laterality: N/A;   • FEMORAL THROMBECTOMY/EMBOLECTOMY Right 2020    Procedure: FEMORAL  THROMBECTOMY/EMBOLECTOMY;  Surgeon: Winsome Valentin Jr., MD;  Location: [facility] MAIN OR;  Service: Vascular;  Laterality: Right;   • KNEE ARTHROPLASTY       General Information     Row Name 08/14/20 1459          PT Evaluation Time/Intention    Document Type  therapy note (daily note)  -     Mode of Treatment  physical therapy  -     Row Name 08/14/20 1454          General Information    Existing Precautions/Restrictions  fall;oxygen therapy device and L/min  -     Row Name 08/14/20 1456          Cognitive Assessment/Intervention- PT/OT    Orientation Status (Cognition)  oriented to;person  -     Cognitive Assessment/Intervention Comment  pt just took pain meds and was very lethargic  -       User Key  (r) = Recorded By, (t) = Taken By, (c) = Cosigned By    Initials Name Provider Type     Keyanna Wellington PTA Physical Therapy Assistant        Mobility     Row Name 08/14/20 1503          Bed Mobility Assessment/Treatment    Bed Mobility Assessment/Treatment  supine-sit;sit-supine  -     Supine-Sit [place] (Bed Mobility)  maximum assist (25% patient effort);dependent (less than 25% patient effort);2 person assist  -SM     Sit-Supine [place] (Bed Mobility)  maximum assist (25% patient effort);dependent (less than 25% patient effort);2 person assist  -     Assistive Device (Bed Mobility)  draw sheet;head of bed elevated  -     Comment (Bed Mobility)  pt barely staying awake and keeping eyes open  -       User Key  (r) = Recorded By, (t) = Taken By, (c) = Cosigned By    Initials Name Provider Type     Keyanna Wellington PTA Physical Therapy Assistant        Obj/Interventions     Row Name 08/14/20 1504          Therapeutic Exercise    Lower Extremity (Therapeutic Exercise)  LAQ (long arc quad), bilateral  -     Lower Extremity Range of Motion (Therapeutic Exercise)  ankle dorsiflexion/plantar flexion, bilateral  -     Exercise Type (Therapeutic Exercise)  AROM (active range  of motion)  -     Position (Therapeutic Exercise)  seated  -SM     Sets/Reps (Therapeutic Exercise)  5 reps  -SM     Row Name 08/14/20 1507          Dynamic Sitting Balance    Level of [place], Reaches Outside Midline (Sitting, Dynamic Balance)  maximal assist, 25 to 49% patient effort  -SM     Sitting Position, Reaches Outside Midline (Sitting, Dynamic Balance)  sitting on edge of bed  -SM     Comment, Reaches Outside Midline (Sitting, Dynamic Balance)  posterior leaning throughout  -SM       User Key  (r) = Recorded By, (t) = Taken By, (c) = Cosigned By    Initials Name Provider Type    Keyanna Hutson PTA Physical Therapy Assistant        Goals/Plan    No documentation.       Clinical Impression     Row Name 08/14/20 1504          Pain Scale: Numbers Pre/Post-Treatment    Pre/Post Treatment Pain Comment  daughter reported that pt was complaining of pain in R LE, though pt did not complain or grimace during session  -SM     Row Name 08/14/20 1500          Positioning and Restraints    Pre-Treatment Position  in bed  -SM     Post Treatment Position  bed  -SM     In Bed  supine;call light within reach;encouraged to call for assist;exit alarm on;with family/caregiver  -       User Key  (r) = Recorded By, (t) = Taken By, (c) = Cosigned By    Initials Name Provider Type    Keyanna Hutson PTA Physical Therapy Assistant        Outcome Measures     Row Name 08/14/20 1508          How much help from another person do you currently need...    Turning from your back to your side while in flat bed without using bedrails?  2  -SM     Moving from lying on back to sitting on the side of a flat bed without bedrails?  2  -SM     Moving to and from a bed to a chair (including a wheelchair)?  1  -SM     Standing up from a chair using your arms (e.g., wheelchair, bedside chair)?  1  -SM     Climbing 3-5 steps with a railing?  1  -SM     To walk in hospital room?  1  -SM     AM-PAC 6 Clicks Score (PT)  8  -SM      Row Name 08/14/20 1509          Functional Assessment    Outcome Measure Options  AM-PAC 6 Clicks Basic Mobility (PT)  -       User Key  (r) = Recorded By, (t) = Taken By, (c) = Cosigned By    Initials Name Provider Type    Keyanna Hutson, REJI Physical Therapy Assistant        Physical Therapy Education                 Title: PT OT SLP Therapies (Done)     Topic: Physical Therapy (Done)     Point: Mobility training (Done)     Description:   Instruct learner(s) on safety and technique for assisting patient out of bed, chair or wheelchair.  Instruct in the proper use of assistive devices, such as walker, crutches, cane or brace.              Patient Friendly Description:   It's important to get you on your feet again, but we need to do so in a way that is safe for you. Falling has serious consequences, and your personal safety is the most important thing of all.        When it's time to get out of bed, one of us or a family member will sit next to you on the bed to give you support.     If your doctor or nurse tells you to use a walker, crutches, a cane, or a brace, be sure you use it every time you get out of bed, even if you think you don't need it.    Learning Progress Summary           Patient Acceptance, E,TB,D, VU,NR by SM at 8/14/2020 1510    Acceptance, E,TB, VU by CB at 8/13/2020 0922    Acceptance, E,D, VU,NR by PH at 8/12/2020 1540    Acceptance, E,TB, VU by CB at 8/11/2020 1623    Acceptance, E, NR by DJ at 8/10/2020 1530    Acceptance, E, VU,NR by MW at 8/8/2020 1542    Acceptance, E, DU,NR by AR at 8/7/2020 1432    Acceptance, E, DU,NR by AR at 8/6/2020 1208    Acceptance, E,D, NR by CG at 8/5/2020 1410    Comment:  does not appear to retain information.  CGRESSRN    Acceptance, E, NL,NR by AR at 8/5/2020 1204    Acceptance, E, DU,NR by AR at 8/4/2020 1453   Family Acceptance, E, VU,NR by MW at 8/8/2020 1542                   Point: Home exercise program (Done)     Description:   Instruct  learner(s) on appropriate technique for monitoring, assisting and/or progressing patient with therapeutic exercises and activities.              Learning Progress Summary           Patient Acceptance, E,TB,D, VU,NR by SM at 8/14/2020 1510    Acceptance, E,TB, VU by CB at 8/13/2020 0922    Acceptance, E,D, VU,NR by PH at 8/12/2020 1540    Acceptance, E,TB, VU by CB at 8/11/2020 1623    Acceptance, E, VU,NR by MW at 8/8/2020 1542    Acceptance, E, DU,NR by AR at 8/7/2020 1432    Acceptance, E, DU,NR by AR at 8/6/2020 1208    Acceptance, E,D, NR by CG at 8/5/2020 1410    Comment:  does not appear to retain information.  CGRESSRN    Acceptance, E, NL,NR by AR at 8/5/2020 1204    Acceptance, E, DU,NR by AR at 8/4/2020 1453   Family Acceptance, E, VU,NR by MW at 8/8/2020 1542                   Point: Body mechanics (Done)     Description:   Instruct learner(s) on proper positioning and spine alignment for patient and/or caregiver during mobility tasks and/or exercises.              Learning Progress Summary           Patient Acceptance, E,TB,D, VU,NR by SM at 8/14/2020 1510    Acceptance, E,TB, VU by CB at 8/13/2020 0922    Acceptance, E,D, VU,NR by PH at 8/12/2020 1540    Acceptance, E,TB, VU by CB at 8/11/2020 1623    Acceptance, E, NR by  at 8/10/2020 1530    Acceptance, E, VU,NR by MW at 8/8/2020 1542    Acceptance, E, DU,NR by AR at 8/7/2020 1432    Acceptance, E, DU,NR by AR at 8/6/2020 1208    Acceptance, E,D, NR by CG at 8/5/2020 1410    Comment:  does not appear to retain information.  CGRESSRN    Acceptance, E, NL,NR by AR at 8/5/2020 1204    Acceptance, E, DU,NR by AR at 8/4/2020 1453   Family Acceptance, E, VU,NR by MW at 8/8/2020 1542                   Point: Precautions (Done)     Description:   Instruct learner(s) on prescribed precautions during mobility and gait tasks              Learning Progress Summary           Patient Acceptance, E,TB,D, VU,NR by  at 8/14/2020 1510    Acceptance, E,TB, VU by CB  at 8/13/2020 0922    Acceptance, E,D, VU,NR by PH at 8/12/2020 1540    Acceptance, E,TB, VU by CB at 8/11/2020 1623    Acceptance, E, NR by DJ at 8/10/2020 1530    Acceptance, E, VU,NR by MW at 8/8/2020 1542    Acceptance, E, DU,NR by AR at 8/7/2020 1432    Acceptance, E, DU,NR by AR at 8/6/2020 1208    Acceptance, E,D, NR by CG at 8/5/2020 1410    Comment:  does not appear to retain information.  CGRESSRN    Acceptance, E, NL,NR by AR at 8/5/2020 1204    Acceptance, E, DU,NR by AR at 8/4/2020 1453   Family Acceptance, E, VU,NR by MW at 8/8/2020 1542                               User Key     Initials Effective Dates Name Provider Type Discipline     06/16/16 -  Geneva Villela RN Registered Nurse Nurse     03/07/18 -  Keyanna Wellington, PTA Physical Therapy Assistant PT    PH 08/20/19 -  Kaelyn De La O, PTA Physical Therapy Assistant PT    MW 09/17/19 -  Pauline Harris, PT Physical Therapist PT    DJ 10/25/19 -  Florence Wallace, PT Physical Therapist PT    AR 07/02/20 -  Raghu Garcia, PT Student PT Student PT    CB 07/02/20 -  Sami Guajardo, PT Student PT Student PT              PT Recommendation and Plan     Outcome Summary/Treatment Plan (PT)  Anticipated Discharge Disposition (PT): skilled nursing facility  Plan of Care Reviewed With: patient  Progress: no change  Outcome Summary: Pt tolerated treatmetn fair this date, though very lethargic d/t recently taking pain meds. Required more assist w/ bed mobility and to maintain static sitting balance. Pt had severe posterior lean, requiring max A throughout to remain sitting on EOB. performed only a few seated exercises w/ max cues and some assist d/t lethargy. Patient was not wearing a face mask during this therapy encounter. Therapist used appropriate personal protective equipment including eye protection, mask, and gloves.  Mask used was standard procedure mask. Appropriate PPE was worn during the entire therapy session. Hand hygiene was completed  before and after therapy session. Patient is not in enhanced droplet precautions.     Time Calculation:   PT Charges     Row Name 08/14/20 1513             Time Calculation    Start Time  1400  -      Stop Time  1418  -      Time Calculation (min)  18 min  -      PT Received On  08/14/20  -      PT - Next Appointment  08/15/20  -        User Key  (r) = Recorded By, (t) = Taken By, (c) = Cosigned By    Initials Name Provider Type     Keyanna Wellington PTA Physical Therapy Assistant        Therapy Charges for Today     Code Description Service Date Service Provider Modifiers Qty    35241000074 HC PT THER PROC EA 15 MIN 8/14/2020 Keyanna Wellington PTA GP 1    50120305503 HC PT THER SUPP EA 15 MIN 8/14/2020 Keyanna Wellington PTA GP 1          PT G-Codes  Outcome Measure Options: AM-PAC 6 Clicks Basic Mobility (PT)  AM-PAC 6 Clicks Score (PT): 8    Keyanna Wellington PTA  8/14/2020

## 2020-08-14 NOTE — PROGRESS NOTES
Patient Identification:  NAME:  Jihan Teresa  Age:  86 y.o.   Sex:  female   :  1933   MRN:  [id number]       Chief complaint: Intracranial hemorrhage, metabolic encephalopathy    History of present illness: Was awake and alert complained of some right leg pain which indicates her mental status is much better since she is able to feel pain, got some Percocet since now pretty drowsy.      Past medical history:  Past Medical History:   Diagnosis Date   • Anemia    • Aortic valve stenosis     S/p AVR in    • Asthma    • Atelectasis    • CHF (congestive heart failure) (CMS/HCC)    • Congenital heart disease    • Diabetes mellitus (CMS/HCC)    • Esophageal reflux    • Essential hypertension    • HLD (hyperlipidemia)    • Hypotension    • LVH (left ventricular hypertrophy)    • Pleural effusion    • Pulmonary hypertension (CMS/HCC)        Allergies:  Promethazine    Home medications:  Facility-Administered Medications Prior to Admission   Medication Dose Route Frequency Provider Last Rate Last Dose   • cyanocobalamin injection 1,000 mcg  1,000 mcg Intramuscular Q28 Days GasperGustavo velasco Jr., MD   1,000 mcg at 19 1640     Medications Prior to Admission   Medication Sig Dispense Refill Last Dose   • isosorbide mononitrate (IMDUR) 60 MG 24 hr tablet TAKE 1 TABLET BY MOUTH EVERY DAY 90 tablet 1    • levothyroxine (SYNTHROID, LEVOTHROID) 150 MCG tablet TAKE 1 TABLET BY MOUTH EVERY DAY (Patient taking differently: Take 75 mcg by mouth Daily.) 30 tablet 2 Patient Taking Differently at Unknown time   • warfarin (COUMADIN) 3 MG tablet Take 3 mg daily by mouth Tuesday through .  Do not take Monday (Patient taking differently: Take 3 mg daily by mouth  and .  Monday and Thursday patient to take 1.5 mg) 90 tablet 1 Patient Taking Differently at Unknown time   • acetaminophen (TYLENOL) 500 MG tablet Take  by mouth As Needed.   Taking   • ascorbic acid (VITAMIN C) 250  MG chewable tablet chewable tablet Chew 500 mg Daily.   Taking   • atorvastatin (LIPITOR) 40 MG tablet TAKE 1 TABLET BY MOUTH EVERY DAY (Patient taking differently: Take 40 mg by mouth Daily.) 30 tablet 5    • Calcium Citrate-Vitamin D 250-200 MG-UNIT tablet Take  by mouth 2 (Two) Times a Day.   Taking   • Clobetasol Propionate Emulsion 0.05 % topical foam APPLY SPARINGLY TO SCALP TWICE DAILY X 2 WEEKS. THEN AS NEEDED FOR FLARES  1 Taking   • clopidogrel (PLAVIX) 75 MG tablet TAKE 1 TABLET BY MOUTH EVERY DAY (Patient taking differently: Take 75 mg by mouth Daily.) 30 tablet 2    • furosemide (LASIX) 40 MG tablet TAKE 1 TABLET BY MOUTH THREE TIMES A DAY (Patient taking differently: Take 40 mg by mouth 3 (Three) Times a Day.) 90 tablet 1    • hydrocortisone (ANUSOL-HC) 2.5 % rectal cream Insert  into the rectum 2 (Two) Times a Day. 28.35 g 2    • ketoconazole (NIZORAL) 2 % shampoo WASH SCALP AND 2-3 TIMES A WEEK. LEAVE ON FOR 2-3 MINUTES THEN RINSE.  5 Taking   • metoprolol tartrate (LOPRESSOR) 50 MG tablet TAKE 1 TABLET BY MOUTH 2 (TWO) TIMES DAILY (Patient taking differently: Take 50 mg by mouth 2 (Two) Times a Day.) 180 tablet 2 8/4/2020 at 1100   • Multiple Vitamin (MULTIVITAMIN) tablet Take 1 tablet by mouth Daily.   Taking   • omeprazole (priLOSEC) 20 MG capsule Take 20 mg by mouth Daily.   Taking   • Phenylephrine-Witch Hazel (PREPARATION H) 0.25-50 % gel Insert 1 g into the rectum 4 (Four) Times a Day As Needed (rectal burning). 25 g 2    • potassium chloride (MICRO-K) 10 MEQ CR capsule TAKE 2 CAPSULES BY MOUTH 2 (TWO) TIMES A DAY. (Patient taking differently: Take 20 mEq by mouth 2 (Two) Times a Day.) 120 capsule 5         Hospital medications:    amLODIPine 10 mg Oral Q24H   furosemide 20 mg Oral Daily   isosorbide mononitrate 60 mg Oral Daily   levothyroxine 75 mcg Oral QAM   metoprolol tartrate 25 mg Oral Q12H   warfarin 1.5 mg Oral Once per day on Mon Thu   warfarin 3 mg Oral Once per day on Sun Tue Wed  Fri Sat       heparin (porcine) 500 Units/hr Last Rate: 500 Units/hr (08/12/20 2041)   lactated ringers 9 mL/hr Last Rate: 9 mL/hr (08/12/20 1923)   Pharmacy to dose warfarin       haloperidol lactate  •  hydrALAZINE  •  HYDROcodone-acetaminophen  •  Pharmacy to dose warfarin  •  potassium chloride **OR** potassium chloride **OR** potassium chloride  •  [COMPLETED] Insert peripheral IV **AND** sodium chloride  •  sodium chloride      Objective:  Vitals Ranges:   Temp:  [97.4 °F (36.3 °C)-98.2 °F (36.8 °C)] 97.8 °F (36.6 °C)  Heart Rate:  [69-82] 82  Resp:  [16-20] 16  BP: (115-144)/(53-84) 124/84      Physical Exam:  Lethargic awakens says hello Doc!  Then drifts back off to sleep. normal cranial nerves II through VII tongue is midline she would not  for me reflexes trace throughout symmetrical toes downgoing    Results review:   I reviewed the patient's new clinical results.    Data review:  Lab Results (last 24 hours)     Procedure Component Value Units Date/Time    aPTT [[id number]]  (Abnormal) Collected:  08/14/20 0420    Specimen:  Blood Updated:  08/14/20 0508     PTT 63.5 seconds     Protime-INR [[id number]]  (Abnormal) Collected:  08/14/20 0420    Specimen:  Blood Updated:  08/14/20 0508     Protime 16.9 Seconds      INR 1.40    Basic Metabolic Panel [[id number]]  (Abnormal) Collected:  08/14/20 0420    Specimen:  Blood Updated:  08/14/20 0505     Glucose 115 mg/dL      BUN 10 mg/dL      Creatinine 0.82 mg/dL      Sodium 138 mmol/L      Potassium 3.5 mmol/L      Chloride 103 mmol/L      CO2 25.6 mmol/L      Calcium 8.7 mg/dL      eGFR Non African Amer 66 mL/min/1.73      BUN/Creatinine Ratio 12.2     Anion Gap 9.4 mmol/L     Narrative:       GFR Normal >60  Chronic Kidney Disease <60  Kidney Failure <15      CBC (No Diff) [[id number]]  (Abnormal) Collected:  08/14/20 0420    Specimen:  Blood Updated:  08/14/20 0439     WBC 9.18 10*3/mm3      RBC 3.24 10*6/mm3      Hemoglobin 9.2 g/dL      Hematocrit 28.2 %        MCV 87.0 fL      MCH 28.4 pg      MCHC 32.6 g/dL      RDW 16.4 %      RDW-SD 51.6 fl      MPV 9.6 fL      Platelets 122 10*3/mm3     Protime-INR [[id number]]  (Abnormal) Collected:  08/14/20 0013    Specimen:  Blood from Hand, Right Updated:  08/14/20 0253     Protime 16.6 Seconds      INR 1.37    aPTT [[id number]]  (Abnormal) Collected:  08/14/20 0013    Specimen:  Blood from Hand, Right Updated:  08/14/20 0036     PTT 68.7 seconds            Imaging:  Imaging Results (Last 24 Hours)     Procedure Component Value Units Date/Time    CT Head Without Contrast [[id number]] Collected:  08/13/20 0733     Updated:  08/13/20 1727    Narrative:       CT HEAD WITHOUT CONTRAST     HISTORY: Syncope, fainting. Intracranial hemorrhage.     FINDINGS: There is moderate atrophy. Remote infarcts involving the left  cerebral hemisphere are present, similar to the examination of  08/03/2020. Confluent areas of decreased attenuation involving the white  matter of cerebral hemispheres are noted bilaterally consistent with  severe small vessel ischemic disease. There is a trace amount of blood  present within the occipital horns which is less prominent as compared  to 08/03/2020, but similar to the examination of 08/08/2020. A more  focal area of hemorrhage involving the third ventricle is noted  anteriorly, consistent with a smaller area of hemorrhage, also  unchanged. No new area of hemorrhage is identified. No focal area of  decreased attenuation to suggest a new area of acute infarction is  appreciated.       Impression:       1. Stable appearance of a small volume intraventricular blood within the  occipital horns and involving the anterior aspect of the third  ventricle. There is no evidence of new hemorrhage, hydrocephalus or of  abnormal extra-axial fluid.  2. Atrophy, extensive small vessel ischemic disease, remote left  cerebellar infarcts, as well as a lacunar infarct involving the thalamus  on the left. There is no evidence  of acute infarction.     A noncontrasted CT examination of brain was performed and compared to  the CT examination of 08/08/2020.            Radiation dose reduction techniques were utilized, including automated  exposure control and exposure modulation based on body size.     This report was finalized on 8/13/2020 5:24 PM by Dr. Tim Khoury M.D.            I had PPE mask on gloves goggles washed out before putting on disposed of it afterwards washed up afterwards was not near her within 6 feet for more than 10 minutes no aerosols were used    Assessment and Plan:       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)    ICH (intracerebral hemorrhage) (CMS/HCC)    Her mental status was much better today and she was able to experience pain which is consistent with much improved metabolic encephalopathy, she was given Percocet now is pretty sleepy but still with a non-lateralized exam and speaks to me appropriately.  He is tolerating the Coumadin and I would not recommend further neurologic work-up at this time certainly there is some risk of intracranial rebleed on the Coumadin but it is absolutely medical necessity that she be on anticoagulation particularly given the recent thrombus requiring thrombectomy in the right lower extremity.  She would continue to embolize if she were not on anticoagulation.  I will sign off and follow-up PRN reconsult      Shane Mota MD  08/14/20  14:51

## 2020-08-14 NOTE — PLAN OF CARE
Problem: Patient Care Overview  Goal: Plan of Care Review  Outcome: Ongoing (interventions implemented as appropriate)  Flowsheets (Taken 8/14/2020 1510)  Progress: no change  Plan of Care Reviewed With: patient  Outcome Summary: Pt tolerated treatmetn fair this date, though very lethargic d/t recently taking pain meds. Required more assist w/ bed mobility and to maintain static sitting balance. Pt had severe posterior lean, requiring max A throughout to remain sitting on EOB. performed only a few seated exercises w/ max cues and some assist d/t lethargy. Patient was not wearing a face mask during this therapy encounter. Therapist used appropriate personal protective equipment including eye protection, mask, and gloves.  Mask used was standard procedure mask. Appropriate PPE was worn during the entire therapy session. Hand hygiene was completed before and after therapy session. Patient is not in enhanced droplet precautions. Also present: JOSHUA Basilio tech.

## 2020-08-14 NOTE — PROGRESS NOTES
"[facility]  Cardiology Progress Note    Patient Identification:  Name: Jihan Teresa  Age: 86 y.o.  Sex: female  :  1933  MRN: [id number]                 Follow Up / Chief Complaint: Atrial fibrillation with ICH    Interval History: 20 She had an open right femoral thromboembolectomy per Dr. Winsome Valentin.         Subjective:    Denies any chest pains or tightness in the chest  Objective:    Past Medical History:  Past Medical History:   Diagnosis Date   • Anemia    • Aortic valve stenosis     S/p AVR in    • Asthma    • Atelectasis    • CHF (congestive heart failure) (CMS/HCC)    • Congenital heart disease    • Diabetes mellitus (CMS/HCC)    • Esophageal reflux    • Essential hypertension    • HLD (hyperlipidemia)    • Hypotension    • LVH (left ventricular hypertrophy)    • Pleural effusion    • Pulmonary hypertension (CMS/HCC)      Past Surgical History:  Past Surgical History:   Procedure Laterality Date   • AORTIC VALVE REPAIR/REPLACEMENT  2009    Jerry Colmenares MD   • CATARACT EXTRACTION     • CEREBRAL ANGIOGRAM N/A 2020    Procedure: CEREBRAL ANGIOGRAM;  Surgeon: Ar Bunch MD;  Location: [facility] ;  Service: Neurosurgery;  Laterality: N/A;   • FEMORAL THROMBECTOMY/EMBOLECTOMY Right 2020    Procedure: FEMORAL THROMBECTOMY/EMBOLECTOMY;  Surgeon: Winsome Valentin Jr., MD;  Location: [facility];  Service: Vascular;  Laterality: Right;   • KNEE ARTHROPLASTY          Social History:   Social History     Tobacco Use   • Smoking status: Never Smoker   • Smokeless tobacco: Never Used   Substance Use Topics   • Alcohol use: Yes     Comment: ocasional      Family History:  Family History   Problem Relation Age of Onset   • Dementia Sister    • Stroke Brother           Allergies:  Allergies   Allergen Reactions   • Promethazine Other (See Comments)     Pt becomes \"out of it\" when on this medication  Pt becomes \"out of it\" when on this " "medication     Scheduled Meds:    amLODIPine 10 mg Q24H   furosemide 20 mg Daily   isosorbide mononitrate 60 mg Daily   levothyroxine 75 mcg QAM   metoprolol tartrate 25 mg Q12H   warfarin 1.5 mg Once per day on Mon Thu   warfarin 3 mg Once per day on Sun Tue Wed Fri Sat     ROS  Constitutional: Awake and alert, no fever. No nosebleeds  Abdomen           no abdominal pain   Cardiac              no chest pain  Pulmonary          no shortness of breath      /84 (BP Location: Left arm, Patient Position: Lying)   Pulse 82   Temp 97.8 °F (36.6 °C) (Oral)   Resp 16   Ht 152.4 cm (60\")   Wt 69.1 kg (152 lb 5.4 oz)   SpO2 93%   BMI 29.75 kg/m²   General appearance: No acute changes   Neck: Trachea midline; NECK, supple, no thyromegaly or lymphadenopathy   Lungs: Normal size and shape, normal breath sounds, equal distribution of air, no rales and rhonchi   CV: S1-S2 regular, no murmurs, no rub, no gallop   Abdomen: Soft, non-tender; no masses , no abnormal abdominal sounds   Extremities: No deformity , normal color , no peripheral edema   Skin: Normal temperature, turgor and texture; no rash, ulcers                Cardiographics  Telemetry:                       A. Fib      Atrial fibrillation      Echocardiogram:   ECG:          Echocardiogram:   2017  Interpretation Summary      · Calculated EF = 46.9%.  · Left ventricular systolic function is low normal.  · Left amd right atrial cavity size is severely dilated.  · Right ventricular cavity is severely dilated.  · calcification of the aortic valve  · Severe mitral valve regurgitation is present  · Mitral annular calcification is present. Posterior leaflet very calcified and immobile.  · Severe tricuspid valve regurgitation is present.  · Estimated right ventricular systolic pressure from tricuspid regurgitation is markedly elevated (>55 mmHg). Calculated right ventricular systolic pressure from tricuspid regurgitation is 66.7 mmHg.         Imaging  Chest X-ray: "   Study Result      EXAMINATION: SINGLE VIEW CHEST RADIOGRAPH     HISTORY: 86-year-old female with history of generalized weakness.     FINDINGS: An upright AP portable chest radiograph was obtained.  Comparison is made to a chest radiograph dated 06/10/2017. The lungs are  normal in volume and are clear of consolidation. Stable mild scattered  interstitial prominence is seen throughout both lungs. Stable soft  tissue fullness in the right hilum is most consistent with a prominent  right hilar vessel is noted. There are stable cardiomegaly. Midline  sternal wires are noted. Atheromatous consultation seen within the  aortic arch.     IMPRESSION:  There is stable mild bilateral interstitial scarring and  cardiomegaly with findings suggestive of pulmonary hypertension. No  evidence for superimposed acute process is appreciated.     This report was finalized on 8/3/2020 10:55 AM by Dr. Estuardo Wagner M.D.         CT     Study Result      CT SCAN OF THE HEAD WITHOUT CONTRAST     CLINICAL HISTORY: Generalized weakness and confusion.     TECHNIQUE: CT scan of the head was obtained with 3 mm axial images. No  intravenous contrast was administered.     COMPARISON: Comparison is made to previous CT scan of head dated  06/13/2017.     FINDINGS: There is increased density within the dependent aspects of  both lateral ventricles compatible with intraventricular hemorrhage.  There are foci of increased density noted within the inferior aspect of  the interhemispheric fissure worrisome for subarachnoid hemorrhage. The  etiology of these areas of hemorrhage is uncertain, although a vascular  etiology such as an aneurysm should be excluded. I recommend further  evaluation with an MRA or CTA examination, as well as an MRI of the  brain.     There are multiple chronic infarcts identified within both cerebellar  hemispheres, left greater than right. The largest of these chronic  infarcts measures up to approximately 1.4 x 0.4 cm in  greatest axial  dimensions. Old lacunar disease is seen within the left thalamus. A  chronic lacune identified within the anterior aspect of the left  thalamus measuring up to 4-5 mm in diameter. Hypodense areas are  identified within the lentiform nuclei bilaterally that are likely  representative of a combination of enlarged perivascular spaces and old  lacunar disease. Severe changes of chronic small vessel ischemic  phenomena are identified.     Otherwise, the ventricles, sulci, and cisterns are age appropriate.     IMPRESSION:     There is intraventricular hemorrhage identified within the dependent  aspects of both lateral ventricles. Additionally, there is increased  density seen within the inferior aspect of the interhemispheric fissure  suggestive of subarachnoid hemorrhage. The precise etiology of this  hemorrhage is uncertain on the basis of this head CT. However, a  vascular etiology should be excluded. Further evaluation is recommended  with a CTA or MRA study, as well as an MRI of the brain.     These findings and recommendations were discussed with Debbie Wilson on 08/03/2020 at approximately 8:36 AM.     Radiation dose reduction techniques were utilized, including automated  exposure control and exposure modulation based on body size.              Lab Review           Results from last 7 days   Lab Units 08/14/20  0420   SODIUM mmol/L 138   POTASSIUM mmol/L 3.5   BUN mg/dL 10   CREATININE mg/dL 0.82   CALCIUM mg/dL 8.7        Results from last 7 days   Lab Units 08/14/20  0420 08/13/20  0636 08/12/20  0427   WBC 10*3/mm3 9.18 11.76* 6.73   HEMOGLOBIN g/dL 9.2* 10.8* 12.6   HEMATOCRIT % 28.2* 32.3* 37.2   PLATELETS 10*3/mm3 122* 153 140     Results from last 7 days   Lab Units 08/14/20  0420 08/14/20  0013 08/13/20  0636   INR  1.40* 1.37* 1.32*   APTT seconds 63.5* 68.7*  --      The following medical decision was discussed in detail with Dr. Mcmahan    Assessment:  1. Intracerebral  "hemorrhage  2.  Chronic atrial fibrillation: Coumadin on hold due to bleeding.  3.  Bioprosthetic aortic valve replaced in 2009  5. coagulopathy  6.  UTI  7.  Diabetes mellitus  8. Hypothyroidism  9. CAD  10. Hypertension    Plan:  Atrial fibrillation controlled rate had multiple discussions with the family, patient is on anticoagulation  Cardiovascular remained stable  Blood pressure stable          )8/14/2020  Bogdan Mcmahan MD          [facility]/Transcription:   \"Dictated utilizing Dragon dictation\".     "

## 2020-08-15 NOTE — PROGRESS NOTES
"CC: Afib    Interval History:   No real complaints today.    Vital Signs  Temp:  [97.4 °F (36.3 °C)-98.9 °F (37.2 °C)] 98.9 °F (37.2 °C)  Heart Rate:  [68-82] 80  Resp:  [16] 16  BP: (112-193)/(53-84) 193/82    Intake/Output Summary (Last 24 hours) at 8/15/2020 0940  Last data filed at 8/15/2020 0608  Gross per 24 hour   Intake 480 ml   Output 700 ml   Net -220 ml     Flowsheet Rows      First Filed Value   Admission Height  152.4 cm (60\") Documented at 08/03/2020 1133   Admission Weight  68 kg (149 lb 14.4 oz) Documented at 08/03/2020 0857          PHYSICAL EXAM:  General: No acute distress  Resp:NL Rate, unlabored, clear  CV:NL rate and irregular rhythm, NL PMI, Nl S1 and S2, 3/6 systolic murmur, no gallop, no rub, No JVD. Normal pedal pulses  ABD:Nl sounds, no masses or tenderness, nondistended, no guarding or rebound  Neuro: alert,cooperative and oriented  Extr: No edema or cyanosis, moves all extremities      Results Review:    Results from last 7 days   Lab Units 08/15/20  0339   SODIUM mmol/L 139   POTASSIUM mmol/L 3.3*   CHLORIDE mmol/L 102   CO2 mmol/L 24.4   BUN mg/dL 12   CREATININE mg/dL 0.68   GLUCOSE mg/dL 92   CALCIUM mg/dL 8.5*         Results from last 7 days   Lab Units 08/15/20  0339   WBC 10*3/mm3 9.03   HEMOGLOBIN g/dL 9.0*   HEMATOCRIT % 26.4*   PLATELETS 10*3/mm3 110*     Results from last 7 days   Lab Units 08/15/20  0339 08/14/20  0420 08/14/20  0013   INR  1.44* 1.40* 1.37*   APTT seconds 68.2* 63.5* 68.7*                 I reviewed the patient's new clinical results.  I personally viewed and interpreted the patient's EKG/Telemetry data        Medication Review:   Meds reviewed      heparin (porcine) 500 Units/hr Last Rate: 500 Units/hr (08/14/20 2239)   lactated ringers 9 mL/hr Last Rate: 9 mL/hr (08/12/20 1923)   Pharmacy to dose warfarin         Assessment/Plan  1. Intracerebral hemorrhage.  Stable on IV heparin currently  2.  Chronic atrial fibrillation: Embolus to the femoral artery " status post embolectomy.  On IV heparin now transitioning back to warfarin.  Rate controlled.  3.  Bioprosthetic aortic valve replaced in 2009  5. coagulopathy  6.  UTI  7.  Diabetes mellitus  8. Hypothyroidism  9. CAD.  Perfusion stress test this admission small area of lateral ischemia.  10. Hypertension prescription for this medication before discharge.        Alen Bishop MD  08/15/20  09:40

## 2020-08-15 NOTE — PLAN OF CARE
Problem: Patient Care Overview  Goal: Plan of Care Review  Outcome: Ongoing (interventions implemented as appropriate)  Flowsheets (Taken 8/15/2020 0386)  Progress: improving  Plan of Care Reviewed With: patient  Outcome Summary: Monitor pain,labs,and vitals. Pain controlled with PRN medications per orders. Heparin drip. Skin care. Turn q 2 hours. Family at bedside. Will continue to monitor.  Goal: Individualization and Mutuality  Outcome: Ongoing (interventions implemented as appropriate)  Goal: Discharge Needs Assessment  Outcome: Ongoing (interventions implemented as appropriate)  Goal: Interprofessional Rounds/Family Conf  Outcome: Ongoing (interventions implemented as appropriate)

## 2020-08-15 NOTE — PROGRESS NOTES
[facility] INPATIENT PROGRESS NOTE         [address]    8/15/2020      PATIENT IDENTIFICATION:  Name: Jihan Teresa ADMIT: 8/3/2020   : 1933  PCP: Yaron Ca Jr., MD    MRN: [id number] LOS: 12 days   AGE/SEX: 86 y.o. female  ROOM: [facility]                     LOS 12    Reason for visit: Follow-up arterial occlusion requiring thromboembolectomy intracranial hemorrhage on admission      SUBJECTIVE:      Sleeping at time of visit.  Discussed with nursing staff and family at bedside.  Working on rehab placement.  On heparin drip per vascular orders.    I am seeing the patient for the first time today.  All patient problems are new to me.      Objective   OBJECTIVE:    Vital Sign Min/Max for last 24 hours  Temp  Min: 97.4 °F (36.3 °C)  Max: 98.9 °F (37.2 °C)   BP  Min: 112/65  Max: 193/82   Pulse  Min: 68  Max: 82   Resp  Min: 16  Max: 16   SpO2  Min: 93 %  Max: 99 %   No data recorded   No data recorded                         Body mass index is 29.75 kg/m².    Intake/Output Summary (Last 24 hours) at 8/15/2020 1035  Last data filed at 8/15/2020 0608  Gross per 24 hour   Intake 480 ml   Output 700 ml   Net -220 ml         Exam:  GEN:  No distress, appears stated age  EYES:   PERRL, anicteric sclera  ENT:    External ears/nose normal, OP clear  NECK:  No adenopathy, midline trachea  LUNGS: Normal chest on inspection, palpation and auscultation  CV:  Normal S1S2, without murmur  ABD:  Non tender, non distended, no hepatosplenomegaly, +BS  EXT:  No edema, cyanosis or clubbing    Scheduled meds:    amLODIPine 10 mg Oral Q24H   furosemide 20 mg Oral Daily   isosorbide mononitrate 60 mg Oral Daily   levothyroxine 75 mcg Oral QAM   metoprolol tartrate 25 mg Oral Q12H   warfarin 1.5 mg Oral Once per day on Mon Thu   warfarin 3 mg Oral Once per day on Sun Tue Wed Fri Sat     IV meds:                        heparin (porcine) 500 Units/hr Last Rate: 500 Units/hr (200)   lactated ringers 9  mL/hr Last Rate: 9 mL/hr (08/12/20 1923)   Pharmacy to dose warfarin       Data Review:  Results from last 7 days   Lab Units 08/15/20  0339 08/14/20 0420 08/13/20 0636 08/12/20 0427 08/11/20  0611   SODIUM mmol/L 139 138 140 141 140   POTASSIUM mmol/L 3.3* 3.5 3.8 3.6 3.9   CHLORIDE mmol/L 102 103 104 108* 108*   CO2 mmol/L 24.4 25.6 24.1 24.4 23.3   BUN mg/dL 12 10 14 8 7*   CREATININE mg/dL 0.68 0.82 0.83 0.68 0.63   GLUCOSE mg/dL 92 115* 105* 76 108*   CALCIUM mg/dL 8.5* 8.7 8.7 9.2 9.2         Estimated Creatinine Clearance: 43.7 mL/min (by C-G formula based on SCr of 0.68 mg/dL).  Results from last 7 days   Lab Units 08/15/20  0339 08/14/20 0420 08/13/20 0636 08/12/20 0427 08/11/20  0611   WBC 10*3/mm3 9.03 9.18 11.76* 6.73 7.42   HEMOGLOBIN g/dL 9.0* 9.2* 10.8* 12.6 12.6   PLATELETS 10*3/mm3 110* 122* 153 140 168     Results from last 7 days   Lab Units 08/15/20  0339 08/14/20 0420 08/14/20  0013 08/13/20 0636 08/12/20 0427   INR  1.44* 1.40* 1.37* 1.32* 1.24*                     Most recent CT head 8/13/2020 reviewed    Most recent chest x-ray 8/12/2020 reviewed      Microbiology reviewed    )        Active Hospital Problems    Diagnosis  POA   • **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) [I61.0]  Unknown   • ICH (intracerebral hemorrhage) (CMS/HCC) [I61.9]  Yes      Resolved Hospital Problems   No resolved problems to display.       Assessment   ASSESSMENT:  Acute arterial occlusion of right lower extremity  Status post open right femoral thromboembolectomy  Intracerebral hemorrhage on admission  Coagulopathy on admission  A. fib on anticoagulation  UTI E. coli  Diabetes mellitus  Hypothyroidism  V. tach    PLAN:  Status post open right femoral thromboembolectomy:  Continue heparin drip per vascular surgery's recommendations.  Pharmacy dosing Coumadin as well.  Goal INR 2-3.  Control blood pressure.  Completed antibiotics for E. coli UTI.  PT/OT.  Discussed with  patient's daughter at the bedside.  Questions asked for their satisfaction.    Working on rehab placement.    Joaquin Stewart MD  Pulmonary and Critical Care Medicine  [facility], [facility]  8/15/2020    10:35

## 2020-08-15 NOTE — PROGRESS NOTES
Pharmacy Consult: Warfarin Dosing/ Monitoring    Jihan Teresa is a 86 y.o. female, estimated creatinine clearance is 43.7 mL/min (by C-G formula based on SCr of 0.68 mg/dL). weighing 69.1 kg (152 lb 5.4 oz).    She has a past medical history of Anemia, Aortic valve stenosis, Asthma, Atelectasis, CHF (congestive heart failure) (CMS/[facility]), Congenital heart disease, Diabetes mellitus (CMS/[facility]), Esophageal reflux, Essential hypertension, HLD (hyperlipidemia), Hypotension, LVH (left ventricular hypertrophy), Pleural effusion, and Pulmonary hypertension (CMS/[facility]).    Social History     Tobacco Use    Smoking status: Never Smoker    Smokeless tobacco: Never Used   Substance Use Topics    Alcohol use: Yes     Comment: ocasional    Drug use: No       Results from last 7 days   Lab Units 08/15/20  0339 08/14/20  0420 08/14/20  0013 08/13/20  0636 08/12/20  0427 08/11/20  1438 08/11/20  0611 08/10/20  0333 08/09/20  0652   INR  1.44* 1.40* 1.37* 1.32* 1.24* 1.30*  --   --   --    APTT seconds 68.2* 63.5* 68.7*  --   --   --   --   --   --    HEMOGLOBIN g/dL 9.0* 9.2*  --  10.8* 12.6  --  12.6 11.2* 11.1*   HEMATOCRIT % 26.4* 28.2*  --  32.3* 37.2  --  37.9 34.4 34.3   PLATELETS 10*3/mm3 110* 122*  --  153 140  --  168 147 161     Results from last 7 days   Lab Units 08/15/20  0339 08/14/20  0420 08/13/20  0636   SODIUM mmol/L 139 138 140   POTASSIUM mmol/L 3.3* 3.5 3.8   CHLORIDE mmol/L 102 103 104   CO2 mmol/L 24.4 25.6 24.1   BUN mg/dL 12 10 14   CREATININE mg/dL 0.68 0.82 0.83   CALCIUM mg/dL 8.5* 8.7 8.7   GLUCOSE mg/dL 92 115* 105*     Anticoagulation history: 3mg TuWeFrSaSu, 1.5mg [facility] Anticoagulation:  Consulting provider: Dr. Evans  Start date: 8/11/2020  Indication: AFib,  R arterial occlusion d/t cardiac embolis 8/12 Right femoral thromboembolectomy   Target INR: 2-3  Expected duration: lifelong   Bridge Therapy: No                Date 8/11 8/12 8/13 8/14  8/15        INR 1.3 1.24 1.32 1.4 1.44         Warfarin dose Not able to take po In OR not given  1.5   3  3 mg          Potential drug interactions:   Levothyroxin pta med   8/3 vit K 10 mg     Relevant nutrition status: mechanical soft diet, poor intake 0-25% diet     Other: NA    Education complete?/ Date: defer    Assessment/Plan:  Warfarin management complicated by acute intracerebral hemorrhage and vit k   Cleared per neurosurgery to resume warfarin 8/11 with no loading doses.    Missed warfarin on 8/11 and 8/12 see above  Hemoglobin trending downwards w/ some oozing from the central line site noted in chart, central line removed 8/14.   Currently on heparin 500 units/hour with no bolus and not following protocol.    Continue patient's home dose warfarin 3mg TuWeFrSaSu, 1.5mg MoTh without load.  Anticipate >5 days to therapeutic range.    Follow-up daily INR.        Pharmacy will continue to follow until discharge or discontinuation of warfarin.     Tangela Dalton, AmilcarD

## 2020-08-15 NOTE — PROGRESS NOTES
LOS: 12 days   Patient Care Team:  Yaron Ca Jr., MD as PCP - General (Family Medicine)  Yaron Ca Jr., MD as PCP - Claims Attributed    Chief Complaint: Right femoral embolectomy postop day 3    Subjective     86 y.o. female status post right femoral embolectomy from cardiac source embolus.  Awaiting anticoagulants or discharge home.  Right groin hematoma is mild to moderate and stable.  No evidence of infection.  Plan 3 to 4-week follow-up with ABIs.    Review of Systems  Review of Systems - Negative except sleepy and did not really respond much.      Objective     Vital Signs  Temp:  [97.4 °F (36.3 °C)-98.9 °F (37.2 °C)] 98.9 °F (37.2 °C)  Heart Rate:  [68-82] 80  Resp:  [16] 16  BP: (112-193)/(53-84) 193/82    Physical Exam  General: No acute distress.  Poorly responsive.  Daughter at bedside.  HEENT: No jugular venous distension, trachea is midline  CV: RRR, S1S2  Resp: Clear unlabored breathing on both sides  Abd: Abdomen is soft, nontender, nondistended  Extremities: Viable with good signals to the right foot.  Right groin hematoma and incision stable without evidence of infection or drainage    Results Review:       Recent Results (from the past 24 hour(s))   Basic Metabolic Panel    Collection Time: 08/15/20  3:39 AM   Result Value Ref Range    Glucose 92 65 - 99 mg/dL    BUN 12 8 - 23 mg/dL    Creatinine 0.68 0.57 - 1.00 mg/dL    Sodium 139 136 - 145 mmol/L    Potassium 3.3 (L) 3.5 - 5.2 mmol/L    Chloride 102 98 - 107 mmol/L    CO2 24.4 22.0 - 29.0 mmol/L    Calcium 8.5 (L) 8.6 - 10.5 mg/dL    eGFR Non African Amer 82 >60 mL/min/1.73    BUN/Creatinine Ratio 17.6 7.0 - 25.0    Anion Gap 12.6 5.0 - 15.0 mmol/L   CBC (No Diff)    Collection Time: 08/15/20  3:39 AM   Result Value Ref Range    WBC 9.03 3.40 - 10.80 10*3/mm3    RBC 3.19 (L) 3.77 - 5.28 10*6/mm3    Hemoglobin 9.0 (L) 12.0 - 15.9 g/dL    Hematocrit 26.4 (L) 34.0 - 46.6 %    MCV 82.8 79.0 - 97.0 fL    MCH 28.2 26.6 - 33.0 pg    MCHC 34.1  31.5 - 35.7 g/dL    RDW 16.0 (H) 12.3 - 15.4 %    RDW-SD 48.3 37.0 - 54.0 fl    MPV 9.6 6.0 - 12.0 fL    Platelets 110 (L) 140 - 450 10*3/mm3   Protime-INR    Collection Time: 08/15/20  3:39 AM   Result Value Ref Range    Protime 17.3 (H) 11.7 - 14.2 Seconds    INR 1.44 (H) 0.90 - 1.10   aPTT    Collection Time: 08/15/20  3:39 AM   Result Value Ref Range    PTT 68.2 (H) 22.7 - 35.4 seconds   ]      Assessment/Plan             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)    ICH (intracerebral hemorrhage) (CMS/HCC)      Assessment & Plan  86 y.o. female doing well status post femoral embolectomy.  Expected hematomas the patient is currently on heparin.  Anticoagulants being managed by admitting service.  Follow-up in 1 month for ABIs.  We will sign off thank you      Guido Valdovinos MD  08/15/20  09:35

## 2020-08-15 NOTE — THERAPY TREATMENT NOTE
Patient Name: Jihan Teresa  : 1933    MRN: [id number]                              Today's Date: 8/15/2020       Admit Date: 8/3/2020    Visit Dx:     ICD-10-CM ICD-9-CM   1. Nontraumatic intracerebral hemorrhage, unspecified cerebral location, unspecified laterality (CMS/HCC) I61.9 431   2. Acute UTI N39.0 599.0   3. 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) I61.0 431     Patient Active Problem List   Diagnosis   • Type 2 diabetes mellitus (CMS/HCC)   • Aortic valve replaced, equine valve   • Cerebral infarction (CMS/HCC)   • A-fib (CMS/[facility])   • GERD without esophagitis   • Sleep apnea in adult   • Cognitive dysfunction   • Hypothyroidism (acquired)   • History of recurrent UTIs   • Mild reactive airways disease   • Essential hypertension   • Pulmonary hypertension (CMS/[facility])   • B12 deficiency   • Non-rheumatic mitral regurgitation   • Non-rheumatic tricuspid valve insufficiency   • Chronic anemia   • Grade III hemorrhoids   • ICH (intracerebral hemorrhage) (CMS/[facility])   • Nontraumatic subcortical hemorrhage of left cerebral hemisphere (CMS/[facility])     Past Medical History:   Diagnosis Date   • Anemia    • Aortic valve stenosis     S/p AVR in    • Asthma    • Atelectasis    • CHF (congestive heart failure) (CMS/[facility])    • Congenital heart disease    • Diabetes mellitus (CMS/[facility])    • Esophageal reflux    • Essential hypertension    • HLD (hyperlipidemia)    • Hypotension    • LVH (left ventricular hypertrophy)    • Pleural effusion    • Pulmonary hypertension (CMS/HCC)      Past Surgical History:   Procedure Laterality Date   • AORTIC VALVE REPAIR/REPLACEMENT  2009    Jerry Colmenares MD   • CATARACT EXTRACTION     • CEREBRAL ANGIOGRAM N/A 2020    Procedure: CEREBRAL ANGIOGRAM;  Surgeon: Ar Bunch MD;  Location: [facility] OR ;  Service: Neurosurgery;  Laterality: N/A;   • FEMORAL THROMBECTOMY/EMBOLECTOMY Right 2020    Procedure: FEMORAL  THROMBECTOMY/EMBOLECTOMY;  Surgeon: Winsome Valentin Jr., MD;  Location: [facility] MAIN OR;  Service: Vascular;  Laterality: Right;   • KNEE ARTHROPLASTY       General Information     Row Name 08/15/20 1431          PT Evaluation Time/Intention    Document Type  therapy note (daily note)  -     Mode of Treatment  physical therapy  -     Row Name 08/15/20 1431          General Information    Existing Precautions/Restrictions  fall;oxygen therapy device and L/min  -     Row Name 08/15/20 1431          Cognitive Assessment/Intervention- PT/OT    Orientation Status (Cognition)  oriented to;person  -     Cognitive Assessment/Intervention Comment  pt still very lethargic from medication yesterday  -       User Key  (r) = Recorded By, (t) = Taken By, (c) = Cosigned By    Initials Name Provider Type     Keyanna Wellington PTA Physical Therapy Assistant        Mobility     Row Name 08/15/20 1432          Bed Mobility Assessment/Treatment    Bed Mobility Assessment/Treatment  supine-sit;sit-supine  -     Supine-Sit [place] (Bed Mobility)  maximum assist (25% patient effort);2 person assist  -     Sit-Supine [place] (Bed Mobility)  maximum assist (25% patient effort);2 person assist  -     Assistive Device (Bed Mobility)  draw sheet;head of bed elevated  -     Row Name 08/15/20 1432          Transfer Assessment/Treatment    Comment (Transfers)  stood 3x  -     Row Name 08/15/20 1432          Sit-Stand Transfer    Sit-Stand [place] (Transfers)  maximum assist (25% patient effort);2 person assist  -     Assistive Device (Sit-Stand Transfers)  -- HHA  -       User Key  (r) = Recorded By, (t) = Taken By, (c) = Cosigned By    Initials Name Provider Type     Keyanna Wellington PTA Physical Therapy Assistant        Obj/Interventions     Row Name 08/15/20 1434          Therapeutic Exercise    Lower Extremity (Therapeutic Exercise)  LAQ (long arc quad), bilateral  -     Lower Extremity  Range of Motion (Therapeutic Exercise)  ankle dorsiflexion/plantar flexion, bilateral  -     Exercise Type (Therapeutic Exercise)  AROM (active range of motion)  -     Position (Therapeutic Exercise)  seated;supine  -     Sets/Reps (Therapeutic Exercise)  5-10 reps  -[facility] Name 08/15/20 1434          Static Sitting Balance    Level of [place] (Unsupported Sitting, Static Balance)  moderate assist, 50 to 74% patient effort;maximal assist, 25 to 49% patient effort  -     Sitting Position (Unsupported Sitting, Static Balance)  sitting on edge of bed  -     Time Able to Maintain Position (Unsupported Sitting, Static Balance)  more than 5 minutes  -     Comment (Unsupported Sitting, Static Balance)  posterior leaning; several cues to lean forward, though pt very tired and weak  -       User Key  (r) = Recorded By, (t) = Taken By, (c) = Cosigned By    Initials Name Provider Type    Keyanna Hutson, REJI Physical Therapy Assistant        Goals/Plan    No documentation.       Clinical Impression     Row Name 08/15/20 1434          Pain Assessment    Additional Documentation  Pain Scale: FACES Pre/Post-Treatment (Group)  -SM     Row Name 08/15/20 1431          Pain Scale: Numbers Pre/Post-Treatment    Pain Location - Side  Right  -     Pain Location - Orientation  lower  -     Pain Location  extremity  -     Pain Intervention(s)  Repositioned;Ambulation/increased activity;Rest  -SM     Row Name 08/15/20 1433          Pain Scale: FACES Pre/Post-Treatment    Pain: FACES Scale, Pretreatment  0-->no hurt  -     Pain: FACES Scale, Post-Treatment  4-->hurts little more  -     Pre/Post Treatment Pain Comment  pt grimaced w/ bed mobility  -SM     Row Name 08/15/20 9056          Positioning and Restraints    Pre-Treatment Position  in bed  -     Post Treatment Position  bed  -SM     In Bed  supine;call light within reach;encouraged to call for assist;exit alarm on;with family/caregiver;with  nsg  -       User Key  (r) = Recorded By, (t) = Taken By, (c) = Cosigned By    Initials Name Provider Type    Keyanna Hutson PTA Physical Therapy Assistant        Outcome Measures     Row Name 08/15/20 1438          How much help from another person do you currently need...    Turning from your back to your side while in flat bed without using bedrails?  2  -SM     Moving from lying on back to sitting on the side of a flat bed without bedrails?  2  -SM     Moving to and from a bed to a chair (including a wheelchair)?  2  -SM     Standing up from a chair using your arms (e.g., wheelchair, bedside chair)?  2  -SM     Climbing 3-5 steps with a railing?  1  -SM     To walk in hospital room?  1  -SM     AM-PAC 6 Clicks Score (PT)  10  -SM     Row Name 08/15/20 1438          Functional Assessment    Outcome Measure Options  AM-PAC 6 Clicks Basic Mobility (PT)  -       User Key  (r) = Recorded By, (t) = Taken By, (c) = Cosigned By    Initials Name Provider Type    Keyanna Hutson PTA Physical Therapy Assistant        Physical Therapy Education                 Title: PT OT SLP Therapies (In Progress)     Topic: Physical Therapy (In Progress)     Point: Mobility training (In Progress)     Description:   Instruct learner(s) on safety and technique for assisting patient out of bed, chair or wheelchair.  Instruct in the proper use of assistive devices, such as walker, crutches, cane or brace.              Patient Friendly Description:   It's important to get you on your feet again, but we need to do so in a way that is safe for you. Falling has serious consequences, and your personal safety is the most important thing of all.        When it's time to get out of bed, one of us or a family member will sit next to you on the bed to give you support.     If your doctor or nurse tells you to use a walker, crutches, a cane, or a brace, be sure you use it every time you get out of bed, even if you think you don't  need it.    Learning Progress Summary           Patient Acceptance, E,D, NR by SM at 8/15/2020 1439    Acceptance, E,TB,D, VU,NR by SM at 8/14/2020 1510    Acceptance, E,TB, VU by CB at 8/13/2020 0922    Acceptance, E,D, VU,NR by PH at 8/12/2020 1540    Acceptance, E,TB, VU by CB at 8/11/2020 1623    Acceptance, E, NR by  at 8/10/2020 1530    Acceptance, E, VU,NR by MW at 8/8/2020 1542    Acceptance, E, DU,NR by AR at 8/7/2020 1432    Acceptance, E, DU,NR by AR at 8/6/2020 1208    Acceptance, E,D, NR by CG at 8/5/2020 1410    Comment:  does not appear to retain information.  CGRESSRN    Acceptance, E, NL,NR by AR at 8/5/2020 1204    Acceptance, E, DU,NR by AR at 8/4/2020 1453   Family Acceptance, E, VU,NR by MW at 8/8/2020 1542                   Point: Home exercise program (In Progress)     Description:   Instruct learner(s) on appropriate technique for monitoring, assisting and/or progressing patient with therapeutic exercises and activities.              Learning Progress Summary           Patient Acceptance, E,D, NR by SM at 8/15/2020 1439    Acceptance, E,TB,D, VU,NR by SM at 8/14/2020 1510    Acceptance, E,TB, VU by CB at 8/13/2020 0922    Acceptance, E,D, VU,NR by PH at 8/12/2020 1540    Acceptance, E,TB, VU by CB at 8/11/2020 1623    Acceptance, E, VU,NR by MW at 8/8/2020 1542    Acceptance, E, DU,NR by AR at 8/7/2020 1432    Acceptance, E, DU,NR by AR at 8/6/2020 1208    Acceptance, E,D, NR by CG at 8/5/2020 1410    Comment:  does not appear to retain information.  CGRESSRN    Acceptance, E, NL,NR by AR at 8/5/2020 1204    Acceptance, E, DU,NR by AR at 8/4/2020 1453   Family Acceptance, E, VU,NR by MW at 8/8/2020 1542                   Point: Body mechanics (In Progress)     Description:   Instruct learner(s) on proper positioning and spine alignment for patient and/or caregiver during mobility tasks and/or exercises.              Learning Progress Summary           Patient Acceptance, E,D, NR by DOROTHY at  8/15/2020 1439    Acceptance, E,TB,D, VU,NR by SM at 8/14/2020 1510    Acceptance, E,TB, VU by CB at 8/13/2020 0922    Acceptance, E,D, VU,NR by PH at 8/12/2020 1540    Acceptance, E,TB, VU by CB at 8/11/2020 1623    Acceptance, E, NR by DJ at 8/10/2020 1530    Acceptance, E, VU,NR by WENDY at 8/8/2020 1542    Acceptance, E, DU,NR by AR at 8/7/2020 1432    Acceptance, E, DU,NR by AR at 8/6/2020 1208    Acceptance, E,D, NR by CG at 8/5/2020 1410    Comment:  does not appear to retain information.  CGRESSRN    Acceptance, E, NL,NR by AR at 8/5/2020 1204    Acceptance, E, DU,NR by AR at 8/4/2020 1453   Family Acceptance, E, VU,NR by WENDY at 8/8/2020 1542                   Point: Precautions (In Progress)     Description:   Instruct learner(s) on prescribed precautions during mobility and gait tasks              Learning Progress Summary           Patient Acceptance, E,D, NR by SM at 8/15/2020 1439    Acceptance, E,TB,D, VU,NR by SM at 8/14/2020 1510    Acceptance, E,TB, VU by CB at 8/13/2020 0922    Acceptance, E,D, VU,NR by PH at 8/12/2020 1540    Acceptance, E,TB, VU by CB at 8/11/2020 1623    Acceptance, E, NR by ELVA at 8/10/2020 1530    Acceptance, E, VU,NR by MW at 8/8/2020 1542    Acceptance, E, DU,NR by AR at 8/7/2020 1432    Acceptance, E, DU,NR by AR at 8/6/2020 1208    Acceptance, E,D, NR by CG at 8/5/2020 1410    Comment:  does not appear to retain information.  CGRESSRN    Acceptance, E, NL,NR by AR at 8/5/2020 1204    Acceptance, E, DU,NR by AR at 8/4/2020 1453   Family Acceptance, E, VU,NR by WENDY at 8/8/2020 1542                               User Key     Initials Effective Dates Name Provider Type Discipline    CG 06/16/16 -  Geneva Villela, RN Registered Nurse Nurse     03/07/18 -  Keyanna Wellington, PTA Physical Therapy Assistant PT    PH 08/20/19 -  Kaelyn De La O, PTA Physical Therapy Assistant PT    MW 09/17/19 -  Pauline Harris, PT Physical Therapist PT    DJ 10/25/19 -  Florence Wallace, JOSHUA  Physical Therapist PT    AR 07/02/20 -  Raghu Garcia, PT Student PT Student PT    CB 07/02/20 -  Sami Guajardo, PT Student PT Student PT              PT Recommendation and Plan     Outcome Summary/Treatment Plan (PT)  Anticipated Discharge Disposition (PT): skilled nursing facility  Plan of Care Reviewed With: patient  Progress: improving  Outcome Summary: Pt tolerated treatment fair this date. Pt was a little more awake today, though still very tired and weak. Required mod-max A x2 for bed mobility, then max A x2 to stand 3x. While sitting EOB, pt still demonstrating posterior lean, requiring constant assist to maintain balance. Discussed a few bed exercises that daughter could attempt w/ pt later today if more awake. Patient was not wearing a face mask during this therapy encounter. Therapist used appropriate personal protective equipment including eye protection, mask, and gloves.  Mask used was standard procedure mask. Appropriate PPE was worn during the entire therapy session. Hand hygiene was completed before and after therapy session. Patient is not in enhanced droplet precautions. Also present: [place] PT tech.     Time Calculation:   PT Charges     Row Name 08/15/20 1443             Time Calculation    Start Time  1325  -      Stop Time  1350  -      Time Calculation (min)  25 min  -      PT Received On  08/15/20  -      PT - Next Appointment  08/17/20  -        User Key  (r) = Recorded By, (t) = Taken By, (c) = Cosigned By    Initials Name Provider Type     Keyanna Wellington PTA Physical Therapy Assistant        Therapy Charges for Today     Code Description Service Date Service Provider Modifiers Qty    83924407549 HC PT THER PROC EA 15 MIN 8/14/2020 Keyanna Wellington, REJI GP 1    75103063542 HC PT THER SUPP EA 15 MIN 8/14/2020 Keyanna Wellington PTA GP 1    00334385765 HC PT THER PROC EA 15 MIN 8/15/2020 Keyanna Wellington PTA GP 1    30622285755 HC PT THER SUPP EA 15 MIN 8/15/2020  Keyanna Wellington, REJI GP 2    17073371154 HC PT THERAPEUTIC ACT EA 15 MIN 8/15/2020 Keyanna Wellington, REJI GP 1          PT G-Codes  Outcome Measure Options: AM-PAC 6 Clicks Basic Mobility (PT)  AM-PAC 6 Clicks Score (PT): 10    Keyanna Wellington PTA  8/15/2020

## 2020-08-15 NOTE — PLAN OF CARE
Problem: Patient Care Overview  Goal: Plan of Care Review  Outcome: Ongoing (interventions implemented as appropriate)  Flowsheets (Taken 8/15/2020 3600)  Progress: improving  Plan of Care Reviewed With: patient  Outcome Summary: Patient given pain medication yesterday and is very sleepy and hard to awaken for oral intake.  VSS.  Daughter remains at bedside.  Patient complains of soreness when she sits up with physical therapy.  Patient without complaints of pain.  Will continue to monitor closely for any changes and continue to monitor patients arousablility.

## 2020-08-16 NOTE — PROGRESS NOTES
[facility] INPATIENT PROGRESS NOTE         [address]    2020      PATIENT IDENTIFICATION:  Name: Jihan Teresa ADMIT: 8/3/2020   : 1933  PCP: Yaron Ca Jr., MD    MRN: [id number] LOS: 13 days   AGE/SEX: 86 y.o. female  ROOM: [facility]                     LOS 13    Reason for visit: Follow-up arterial occlusion requiring thromboembolectomy intracranial hemorrhage on admission      SUBJECTIVE:        Awake and alert.  Slept well last night.  Discussed with family at bedside.  Plan to go to [facility] when medically ready.  On heparin drip.    Objective   OBJECTIVE:    Vital Sign Min/Max for last 24 hours  Temp  Min: 97.8 °F (36.6 °C)  Max: 99.5 °F (37.5 °C)   BP  Min: 119/59  Max: 155/74   Pulse  Min: 85  Max: 89   Resp  Min: 16  Max: 18   SpO2  Min: 97 %  Max: 99 %   No data recorded   No data recorded                         Body mass index is 29.75 kg/m².    Intake/Output Summary (Last 24 hours) at 2020 1058  Last data filed at 2020 0546  Gross per 24 hour   Intake 180 ml   Output 650 ml   Net -470 ml         Exam:  GEN:  No distress, appears stated age  EYES:   PERRL, anicteric sclera  ENT:    External ears/nose normal, OP clear  NECK:  No adenopathy, midline trachea  LUNGS: Normal chest on inspection, palpation and auscultation  CV:  Normal S1S2, without murmur  ABD:  Non tender, non distended, no hepatosplenomegaly, +BS  EXT:  No edema, cyanosis or clubbing    Scheduled meds:      amLODIPine 10 mg Oral Q24H   furosemide 20 mg Oral Daily   isosorbide mononitrate 60 mg Oral Daily   levothyroxine 75 mcg Oral QAM   metoprolol tartrate 25 mg Oral Q12H   warfarin 1.5 mg Oral Once per day on u   warfarin 3 mg Oral Once per day on Sun Tue Wed Fri Sat     IV meds:                          heparin (porcine) 500 Units/hr Last Rate: 500 Units/hr (20)   lactated ringers 9 mL/hr Last Rate: 9 mL/hr (20)   Pharmacy to dose warfarin       Data  Review:  Results from last 7 days   Lab Units 08/16/20  0616 08/15/20  0339 08/14/20  0420 08/13/20  0636 08/12/20  0427   SODIUM mmol/L 139 139 138 140 141   POTASSIUM mmol/L 4.0 3.3* 3.5 3.8 3.6   CHLORIDE mmol/L 104 102 103 104 108*   CO2 mmol/L 28.5 24.4 25.6 24.1 24.4   BUN mg/dL 11 12 10 14 8   CREATININE mg/dL 0.70 0.68 0.82 0.83 0.68   GLUCOSE mg/dL 146* 92 115* 105* 76   CALCIUM mg/dL 8.6 8.5* 8.7 8.7 9.2         Estimated Creatinine Clearance: 43.7 mL/min (by C-G formula based on SCr of 0.7 mg/dL).  Results from last 7 days   Lab Units 08/16/20  0616 08/15/20  0339 08/14/20  0420 08/13/20  0636 08/12/20  0427   WBC 10*3/mm3 12.19* 9.03 9.18 11.76* 6.73   HEMOGLOBIN g/dL 8.7* 9.0* 9.2* 10.8* 12.6   PLATELETS 10*3/mm3 149 110* 122* 153 140     Results from last 7 days   Lab Units 08/16/20  0616 08/15/20  0339 08/14/20  0420 08/14/20  0013 08/13/20  0636   INR  1.89* 1.44* 1.40* 1.37* 1.32*                     Most recent CT head 8/13/2020 reviewed    Most recent chest x-ray 8/12/2020 reviewed      Microbiology reviewed    )        Active Hospital Problems    Diagnosis  POA   • **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) [I61.0]  Unknown   • ICH (intracerebral hemorrhage) (CMS/HCC) [I61.9]  Yes      Resolved Hospital Problems   No resolved problems to display.       Assessment   ASSESSMENT:  Acute arterial occlusion of right lower extremity  Status post open right femoral thromboembolectomy  Intracerebral hemorrhage on admission  Coagulopathy on admission  A. fib on anticoagulation  UTI E. coli  Diabetes mellitus  Hypothyroidism  V. tach    PLAN:  Status post open right femoral thromboembolectomy:  Continue heparin drip per vascular surgery's recommendations.  Pharmacy dosing Coumadin as well.  Goal INR 2-3.  Follow-up with vascular surgery in 1 month for ABIs.  Control blood pressure.  Completed antibiotics for E. coli UTI.  PT/OT.  Discussed with patient's daughter at the  bedside.  Questions asked for their satisfaction.    Working on rehab placement.    Joaquin Stewart MD  Pulmonary and Critical Care Medicine  [facility]  8/16/2020    10:58

## 2020-08-16 NOTE — PLAN OF CARE
Problem: Patient Care Overview  Goal: Plan of Care Review  Outcome: Ongoing (interventions implemented as appropriate)  Flowsheets (Taken 8/16/2020 8451)  Progress: improving  Plan of Care Reviewed With: patient; daughter  Outcome Summary: Patient with INR 1.89.  Heparin remains infusing.  Patient sleeping during day.  Turned during shift.  Daughter remains at bedside.  Oxygen at 2l/nc.  Will continue to monitor closely for any change.

## 2020-08-16 NOTE — PROGRESS NOTES
"CC: Afib    Interval History:   No new complaints today.    Vital Signs  Temp:  [97.8 °F (36.6 °C)-99.5 °F (37.5 °C)] 99.5 °F (37.5 °C)  Heart Rate:  [85-89] 85  Resp:  [16-18] 16  BP: (119-155)/(57-74) 119/59    Intake/Output Summary (Last 24 hours) at 8/16/2020 1045  Last data filed at 8/16/2020 0546  Gross per 24 hour   Intake 180 ml   Output 650 ml   Net -470 ml     Flowsheet Rows      First Filed Value   Admission Height  152.4 cm (60\") Documented at 08/03/2020 1133   Admission Weight  68 kg (149 lb 14.4 oz) Documented at 08/03/2020 0857          PHYSICAL EXAM:  General: No acute distress  Resp:NL Rate, unlabored, clear  CV:NL rate and irregular rhythm, NL PMI, Nl S1 and S2, n 3/6 systolic o Murmur, no gallop, no rub, No JVD. Normal pedal pulses  ABD:Nl sounds, no masses or tenderness, nondistended, no guarding or rebound  Neuro: alert,cooperative and oriented  Extr: No edema or cyanosis, moves all extremities      Results Review:    Results from last 7 days   Lab Units 08/16/20  0616   SODIUM mmol/L 139   POTASSIUM mmol/L 4.0   CHLORIDE mmol/L 104   CO2 mmol/L 28.5   BUN mg/dL 11   CREATININE mg/dL 0.70   GLUCOSE mg/dL 146*   CALCIUM mg/dL 8.6         Results from last 7 days   Lab Units 08/16/20  0616   WBC 10*3/mm3 12.19*   HEMOGLOBIN g/dL 8.7*   HEMATOCRIT % 25.6*   PLATELETS 10*3/mm3 149     Results from last 7 days   Lab Units 08/16/20  0616 08/15/20  0339 08/14/20  0420   INR  1.89* 1.44* 1.40*   APTT seconds 67.5* 68.2* 63.5*                 I reviewed the patient's new clinical results.  I personally viewed and interpreted the patient's EKG/Telemetry data        Medication Review:   Meds reviewed      heparin (porcine) 500 Units/hr Last Rate: 500 Units/hr (08/14/20 2239)   lactated ringers 9 mL/hr Last Rate: 9 mL/hr (08/12/20 1923)   Pharmacy to dose warfarin         Assessment/Plan  1. Intracerebral hemorrhage.  Stable on IV heparin currently  2.  Chronic atrial fibrillation: Embolus to the femoral " artery status post embolectomy.  On IV heparin now transitioning back to warfarin.  Rate controlled.  3.  Bioprosthetic aortic valve replaced in 2009  5. coagulopathy  6.  UTI  7.  Diabetes mellitus  8. Hypothyroidism  9. CAD.  Perfusion stress test this admission small area of lateral ischemia.  10. Hypertension blood pressure goal.        Alen Bishop MD  08/16/20  10:45

## 2020-08-16 NOTE — PROGRESS NOTES
Pharmacy Consult: Warfarin Dosing/ Monitoring    Jihan Teresa is a 86 y.o. female, estimated creatinine clearance is 43.7 mL/min (by C-G formula based on SCr of 0.7 mg/dL). weighing 69.1 kg (152 lb 5.4 oz).    She has a past medical history of Anemia, Aortic valve stenosis, Asthma, Atelectasis, CHF (congestive heart failure) (CMS/[facility]), Congenital heart disease, Diabetes mellitus (CMS/[facility]), Esophageal reflux, Essential hypertension, HLD (hyperlipidemia), Hypotension, LVH (left ventricular hypertrophy), Pleural effusion, and Pulmonary hypertension (CMS/[facility]).    Social History     Tobacco Use    Smoking status: Never Smoker    Smokeless tobacco: Never Used   Substance Use Topics    Alcohol use: Yes     Comment: ocasional    Drug use: No       Results from last 7 days   Lab Units 08/16/20  0616 08/15/20  0339 08/14/20  0420 08/14/20  0013 08/13/20  0636 08/12/20  0427 08/11/20  1438 08/11/20  0611 08/10/20  0333   INR  1.89* 1.44* 1.40* 1.37* 1.32* 1.24* 1.30*  --   --    APTT seconds 67.5* 68.2* 63.5* 68.7*  --   --   --   --   --    HEMOGLOBIN g/dL 8.7* 9.0* 9.2*  --  10.8* 12.6  --  12.6 11.2*   HEMATOCRIT % 25.6* 26.4* 28.2*  --  32.3* 37.2  --  37.9 34.4   PLATELETS 10*3/mm3 149 110* 122*  --  153 140  --  168 147     Results from last 7 days   Lab Units 08/16/20  0616 08/15/20  0339 08/14/20  0420   SODIUM mmol/L 139 139 138   POTASSIUM mmol/L 4.0 3.3* 3.5   CHLORIDE mmol/L 104 102 103   CO2 mmol/L 28.5 24.4 25.6   BUN mg/dL 11 12 10   CREATININE mg/dL 0.70 0.68 0.82   CALCIUM mg/dL 8.6 8.5* 8.7   GLUCOSE mg/dL 146* 92 115*     Anticoagulation history: 3mg TuWeFrSaSu, 1.5mg [facility] Anticoagulation:  Consulting provider: Dr. Evans  Start date: 8/11/2020  Indication: AFib,  R arterial occlusion d/t cardiac embolis 8/12 Right femoral thromboembolectomy   Target INR: 2-3  Expected duration: lifelong   Bridge Therapy: No                Date 8/11 8/12 8/13 8/14  8/15 8/16       INR 1.3 1.24 1.32 1.4 1.44  1.89       Warfarin dose Not able to take po In OR not given  1.5   3  3 mg 3 mg         Potential drug interactions:   Levothyroxin pta med   8/3 vit K 10 mg     Relevant nutrition status: mechanical soft diet, poor intake 0-50% diet, nutrition supplements - Glucerna shake with breakfast and dinner added    Other: NA    Education complete?/ Date: defer    Assessment/Plan:  Warfarin management complicated by acute intracerebral hemorrhage and vit k   Cleared per neurosurgery to resume warfarin 8/11 with no loading doses.    Missed warfarin on 8/11 and 8/12 see above  Hemoglobin trending downwards w/ some oozing from the central line site noted in chart, central line removed 8/14.   Currently on heparin 500 units/hour with no bolus and not following protocol.  H/H slightly decreased to 8.7 today    Subtherapeutic today - continue patient's home dose warfarin 3mg TuWeFrSaSu, 1.5mg MoTh without load.  Anticipate >5 days to therapeutic range.    Follow-up daily INR.        Pharmacy will continue to follow until discharge or discontinuation of warfarin.     Tangela Dalton, AmilcarD

## 2020-08-16 NOTE — PLAN OF CARE
Problem: Patient Care Overview  Goal: Plan of Care Review  Outcome: Ongoing (interventions implemented as appropriate)  Flowsheets (Taken 8/16/2020 0324)  Progress: improving  Plan of Care Reviewed With: patient; family  Outcome Summary: Monitor pain,labs,and vitals. No c/o pain on current shift. Potassium replacement r/t a K+ level of 3.3/O2 2L NC. Heparin gtt continues/Family at bedside. Patient PO intake is increasing. Will continue to monitor.  Goal: Individualization and Mutuality  Outcome: Ongoing (interventions implemented as appropriate)  Goal: Discharge Needs Assessment  Outcome: Ongoing (interventions implemented as appropriate)  Goal: Interprofessional Rounds/Family Conf  Outcome: Ongoing (interventions implemented as appropriate)

## 2020-08-17 NOTE — PROGRESS NOTES
"[facility]  Cardiology Progress Note    Patient Identification:  Name: Jihan Teresa  Age: 86 y.o.  Sex: female  :  1933  MRN: [id number]                 Follow Up / Chief Complaint: Atrial fibrillation with ICH    Interval History: 20 She had an open right femoral thromboembolectomy per Dr. Winsome Valentin.  Hgb decreased today.     Subjective: Denies chest pain palpitations     Objective:    Past Medical History:  Past Medical History:   Diagnosis Date   • Anemia    • Aortic valve stenosis     S/p AVR in    • Asthma    • Atelectasis    • CHF (congestive heart failure) (CMS/HCC)    • Congenital heart disease    • Diabetes mellitus (CMS/HCC)    • Esophageal reflux    • Essential hypertension    • HLD (hyperlipidemia)    • Hypotension    • LVH (left ventricular hypertrophy)    • Pleural effusion    • Pulmonary hypertension (CMS/HCC)      Past Surgical History:  Past Surgical History:   Procedure Laterality Date   • AORTIC VALVE REPAIR/REPLACEMENT  2009    Jerry Colmenares MD   • CATARACT EXTRACTION     • CEREBRAL ANGIOGRAM N/A 2020    Procedure: CEREBRAL ANGIOGRAM;  Surgeon: Ar Bunch MD;  Location: [facility] ;  Service: Neurosurgery;  Laterality: N/A;   • FEMORAL THROMBECTOMY/EMBOLECTOMY Right 2020    Procedure: FEMORAL THROMBECTOMY/EMBOLECTOMY;  Surgeon: Winsome Valentin Jr., MD;  Location: [facility];  Service: Vascular;  Laterality: Right;   • KNEE ARTHROPLASTY          Social History:   Social History     Tobacco Use   • Smoking status: Never Smoker   • Smokeless tobacco: Never Used   Substance Use Topics   • Alcohol use: Yes     Comment: ocasional      Family History:  Family History   Problem Relation Age of Onset   • Dementia Sister    • Stroke Brother           Allergies:  Allergies   Allergen Reactions   • Promethazine Other (See Comments)     Pt becomes \"out of it\" when on this medication  Pt becomes \"out of it\" when on this " "medication     Scheduled Meds:    amLODIPine 10 mg Q24H   furosemide 20 mg Daily   isosorbide mononitrate 60 mg Daily   levothyroxine 75 mcg QAM   metoprolol tartrate 25 mg Q12H   warfarin 1.5 mg Once per day on Mon Thu   warfarin 3 mg Once per day on Sun Tue Wed Fri Sat     ROS  Constitutional: Awake and alert, no fever. No nosebleeds  Abdomen           no abdominal pain   Cardiac              no chest pain  Pulmonary          no shortness of breath      /50 (BP Location: Right arm, Patient Position: Lying)   Pulse 71   Temp 98 °F (36.7 °C) (Oral)   Resp 16   Ht 152.4 cm (60\")   Wt 69.1 kg (152 lb 5.4 oz)   SpO2 93%   BMI 29.75 kg/m²   General appearance: No acute changes   Neck: Trachea midline; NECK, supple, no thyromegaly or lymphadenopathy   Lungs: Normal size and shape, normal breath sounds, equal distribution of air, no rales and rhonchi   CV: S1-S2 regular, no murmurs, no rub, no gallop   Abdomen: Soft, non-tender; no masses , no abnormal abdominal sounds   Extremities: No deformity , normal color , no peripheral edema   Skin: Normal temperature, turgor and texture; no rash, ulcers               Cardiographics  Telemetry:   Atrial fib                            A. Fib      Atrial fibrillation      Echocardiogram:   ECG:          Echocardiogram:   2017  Interpretation Summary      · Calculated EF = 46.9%.  · Left ventricular systolic function is low normal.  · Left amd right atrial cavity size is severely dilated.  · Right ventricular cavity is severely dilated.  · calcification of the aortic valve  · Severe mitral valve regurgitation is present  · Mitral annular calcification is present. Posterior leaflet very calcified and immobile.  · Severe tricuspid valve regurgitation is present.  · Estimated right ventricular systolic pressure from tricuspid regurgitation is markedly elevated (>55 mmHg). Calculated right ventricular systolic pressure from tricuspid regurgitation is 66.7 mmHg. "         Imaging  Chest X-ray:   Study Result      EXAMINATION: SINGLE VIEW CHEST RADIOGRAPH     HISTORY: 86-year-old female with history of generalized weakness.     FINDINGS: An upright AP portable chest radiograph was obtained.  Comparison is made to a chest radiograph dated 06/10/2017. The lungs are  normal in volume and are clear of consolidation. Stable mild scattered  interstitial prominence is seen throughout both lungs. Stable soft  tissue fullness in the right hilum is most consistent with a prominent  right hilar vessel is noted. There are stable cardiomegaly. Midline  sternal wires are noted. Atheromatous consultation seen within the  aortic arch.     IMPRESSION:  There is stable mild bilateral interstitial scarring and  cardiomegaly with findings suggestive of pulmonary hypertension. No  evidence for superimposed acute process is appreciated.     This report was finalized on 8/3/2020 10:55 AM by Dr. Estuardo Wagner M.D.         CT     Study Result      CT SCAN OF THE HEAD WITHOUT CONTRAST     CLINICAL HISTORY: Generalized weakness and confusion.     TECHNIQUE: CT scan of the head was obtained with 3 mm axial images. No  intravenous contrast was administered.     COMPARISON: Comparison is made to previous CT scan of head dated  06/13/2017.     FINDINGS: There is increased density within the dependent aspects of  both lateral ventricles compatible with intraventricular hemorrhage.  There are foci of increased density noted within the inferior aspect of  the interhemispheric fissure worrisome for subarachnoid hemorrhage. The  etiology of these areas of hemorrhage is uncertain, although a vascular  etiology such as an aneurysm should be excluded. I recommend further  evaluation with an MRA or CTA examination, as well as an MRI of the  brain.     There are multiple chronic infarcts identified within both cerebellar  hemispheres, left greater than right. The largest of these chronic  infarcts measures up to  approximately 1.4 x 0.4 cm in greatest axial  dimensions. Old lacunar disease is seen within the left thalamus. A  chronic lacune identified within the anterior aspect of the left  thalamus measuring up to 4-5 mm in diameter. Hypodense areas are  identified within the lentiform nuclei bilaterally that are likely  representative of a combination of enlarged perivascular spaces and old  lacunar disease. Severe changes of chronic small vessel ischemic  phenomena are identified.     Otherwise, the ventricles, sulci, and cisterns are age appropriate.     IMPRESSION:     There is intraventricular hemorrhage identified within the dependent  aspects of both lateral ventricles. Additionally, there is increased  density seen within the inferior aspect of the interhemispheric fissure  suggestive of subarachnoid hemorrhage. The precise etiology of this  hemorrhage is uncertain on the basis of this head CT. However, a  vascular etiology should be excluded. Further evaluation is recommended  with a CTA or MRA study, as well as an MRI of the brain.     These findings and recommendations were discussed with Debbie Wilson on 08/03/2020 at approximately 8:36 AM.     Radiation dose reduction techniques were utilized, including automated  exposure control and exposure modulation based on body size.              Lab Review           Results from last 7 days   Lab Units 08/17/20  0714   SODIUM mmol/L 138   POTASSIUM mmol/L 3.8   BUN mg/dL 16   CREATININE mg/dL 0.83   CALCIUM mg/dL 8.4*        Results from last 7 days   Lab Units 08/17/20  1501 08/17/20  0714 08/16/20  0616 08/15/20  0339   WBC 10*3/mm3  --  10.07 12.19* 9.03   HEMOGLOBIN g/dL 8.0* 7.5* 8.7* 9.0*   HEMATOCRIT % 24.5* 22.8* 25.6* 26.4*   PLATELETS 10*3/mm3  --  149 149 110*     Results from last 7 days   Lab Units 08/17/20  0714 08/16/20  0616 08/15/20  0339   INR  2.34* 1.89* 1.44*   APTT seconds 73.8* 67.5* 68.2*     The following medical decision was discussed  "in detail with Dr. Mcmahan    Assessment:  1. Intracerebral hemorrhage  2.  Chronic atrial fibrillation: Coumadin on hold due low hgb   3.  Bioprosthetic aortic valve replaced in 2009  5. coagulopathy  6.  UTI  7.  Diabetes mellitus  8. Hypothyroidism  9. CAD  10. Hypertension    Plan:  Blood pressure stable.  Atrial fibrillation with controlled HR. Coumadin being held today due to decreased hemoglobin.  INR stable 2.34.  Discussed watchman's with daughter.        )8/17/2020  KAREN Graham      Patient personally interviewed and above subjective findings personally confirmed during a face to face contact with patient today  All findings of physical examination confirmed  All pertinent and performed labs, cardiac procedures ,  radiographs of the last 24 hours personally reviewed  Impression and plans discussed/elaborated and implemented jointly as described above     Bogdan Mcmahan MD              EMR Dragon/Transcription:   \"Dictated utilizing Dragon dictation\".     "

## 2020-08-17 NOTE — PROGRESS NOTES
LOS: 14 days   Patient Care Team:  Yaron Ca Jr., MD as PCP - General (Family Medicine)  Yaron Ca Jr., MD as PCP - Claims Attributed    Chief Complaint: Follow-up ICH/confusion    Subjective     Resting in bed, daughter at bedside    Interval History:     History taken from: chart RN    Objective      Drowsy  Arouses to voice  Speech clear  EOM intact  Face symmetric  Follows commands    Vital Signs  Temp:  [97.7 °F (36.5 °C)-98.9 °F (37.2 °C)] 98.3 °F (36.8 °C)  Heart Rate:  [69-98] 71  Resp:  [16-18] 16  BP: ()/(39-63) 120/54       Results Review:     I reviewed the patient's new clinical results.  I reviewed the patient's new imaging results and agree with the interpretation.  .  Results from last 7 days   Lab Units 08/17/20  0714 08/16/20  0616 08/15/20  0339   WBC 10*3/mm3 10.07 12.19* 9.03   HEMOGLOBIN g/dL 7.5* 8.7* 9.0*   HEMATOCRIT % 22.8* 25.6* 26.4*   PLATELETS 10*3/mm3 149 149 110*     .  Results from last 7 days   Lab Units 08/17/20  0714 08/16/20  0616 08/15/20  0339   SODIUM mmol/L 138 139 139   POTASSIUM mmol/L 3.8 4.0 3.3*   CHLORIDE mmol/L 102 104 102   CO2 mmol/L 27.6 28.5 24.4   BUN mg/dL 16 11 12   CREATININE mg/dL 0.83 0.70 0.68   CALCIUM mg/dL 8.4* 8.6 8.5*   GLUCOSE mg/dL 132* 146* 92     Results from last 7 days   Lab Units 08/17/20  0714   INR  2.34*   APTT seconds 73.8*         Assessment/Plan       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)    ICH (intracerebral hemorrhage) (CMS/HCC)      Follow-up CT head today after resuming Coumadin 1 week ago is stable.  Discussed with Dr. De La Cruz.  Neurosurgery will sign off and remain available for questions.      Suzanne Fuentes, KAREN  08/17/20  11:04

## 2020-08-17 NOTE — PROGRESS NOTES
Pharmacy Consult: Warfarin Dosing/ Monitoring    Jihan Teresa is a 86 y.o. female, estimated creatinine clearance is 42.2 mL/min (by C-G formula based on SCr of 0.83 mg/dL). weighing 69.1 kg (152 lb 5.4 oz).    She has a past medical history of Anemia, Aortic valve stenosis, Asthma, Atelectasis, CHF (congestive heart failure) (CMS/[facility]), Congenital heart disease, Diabetes mellitus (CMS/[facility]), Esophageal reflux, Essential hypertension, HLD (hyperlipidemia), Hypotension, LVH (left ventricular hypertrophy), Pleural effusion, and Pulmonary hypertension (CMS/[facility]).    Social History     Tobacco Use    Smoking status: Never Smoker    Smokeless tobacco: Never Used   Substance Use Topics    Alcohol use: Yes     Comment: ocasional    Drug use: No       Results from last 7 days   Lab Units 08/17/20  0714 08/16/20  0616 08/15/20  0339 08/14/20  0420 08/14/20  0013 08/13/20  0636 08/12/20  0427  08/11/20  0611   INR  2.34* 1.89* 1.44* 1.40* 1.37* 1.32* 1.24*   < >  --    APTT seconds 73.8* 67.5* 68.2* 63.5* 68.7*  --   --   --   --    HEMOGLOBIN g/dL 7.5* 8.7* 9.0* 9.2*  --  10.8* 12.6  --  12.6   HEMATOCRIT % 22.8* 25.6* 26.4* 28.2*  --  32.3* 37.2  --  37.9   PLATELETS 10*3/mm3 149 149 110* 122*  --  153 140  --  168    < > = values in this interval not displayed.     Results from last 7 days   Lab Units 08/17/20  0714 08/16/20  0616 08/15/20  0339   SODIUM mmol/L 138 139 139   POTASSIUM mmol/L 3.8 4.0 3.3*   CHLORIDE mmol/L 102 104 102   CO2 mmol/L 27.6 28.5 24.4   BUN mg/dL 16 11 12   CREATININE mg/dL 0.83 0.70 0.68   CALCIUM mg/dL 8.4* 8.6 8.5*   GLUCOSE mg/dL 132* 146* 92     Anticoagulation history: 3mg TuWeFrSaSu, 1.5mg MoTh    Hospital Anticoagulation:  Consulting provider: Dr. Evans  Start date: 8/11/2020  Indication: AFib,  R arterial occlusion d/t cardiac embolis 8/12 Right femoral thromboembolectomy   Target INR: 2-3  Expected duration: lifelong   Bridge Therapy: No                Date 8/11 8/12 8/13 8/14  8/15  8/16 8/17      INR 1.3 1.24 1.32 1.4 1.44 1.89 2.34      Warfarin dose Not able to take po In OR not given  1.5   3  3 mg 3 mg HOLD         Potential drug interactions:   Levothyroxin pta med   8/3 vit K 10 mg     Relevant nutrition status: mechanical soft diet, poor intake 0-50% diet, nutrition supplements - Glucerna shake with breakfast and dinner added    Other: NA    Education complete?/ Date: defer    Assessment/Plan:  Warfarin management complicated by acute intracerebral hemorrhage and vit k   Cleared per neurosurgery to resume warfarin 8/11 with no loading doses.    Missed warfarin on 8/11 and 8/12 see above  Hemoglobin trending downwards  Poor PO intake  Heparin stopped today     INR has trended up rapidly over 2 day by more than 0.3 each day, based on this I will HOLD today's dose.     HOLD warfarin today.  Then continue patient's home dose warfarin 3mg TuWeFrSaSu, 1.5mg MoTh without load  Follow-up daily INR.        Pharmacy will continue to follow until discharge or discontinuation of warfarin.

## 2020-08-17 NOTE — THERAPY TREATMENT NOTE
Patient Name: Jihan Teresa  : 1933    MRN: [id number]                              Today's Date: 2020       Admit Date: 8/3/2020    Visit Dx:     ICD-10-CM ICD-9-CM   1. Nontraumatic intracerebral hemorrhage, unspecified cerebral location, unspecified laterality (CMS/HCC) I61.9 431   2. Acute UTI N39.0 599.0   3. 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) I61.0 431     Patient Active Problem List   Diagnosis   • Type 2 diabetes mellitus (CMS/HCC)   • Aortic valve replaced, equine valve   • Cerebral infarction (CMS/HCC)   • A-fib (CMS/[facility])   • GERD without esophagitis   • Sleep apnea in adult   • Cognitive dysfunction   • Hypothyroidism (acquired)   • History of recurrent UTIs   • Mild reactive airways disease   • Essential hypertension   • Pulmonary hypertension (CMS/[facility])   • B12 deficiency   • Non-rheumatic mitral regurgitation   • Non-rheumatic tricuspid valve insufficiency   • Chronic anemia   • Grade III hemorrhoids   • ICH (intracerebral hemorrhage) (CMS/[facility])   • Nontraumatic subcortical hemorrhage of left cerebral hemisphere (CMS/[facility])     Past Medical History:   Diagnosis Date   • Anemia    • Aortic valve stenosis     S/p AVR in    • Asthma    • Atelectasis    • CHF (congestive heart failure) (CMS/[facility]ly [facility])    • Congenital heart disease    • Diabetes mellitus (CMS/[facility])    • Esophageal reflux    • Essential hypertension    • HLD (hyperlipidemia)    • Hypotension    • LVH (left ventricular hypertrophy)    • Pleural effusion    • Pulmonary hypertension (CMS/HCC)      Past Surgical History:   Procedure Laterality Date   • AORTIC VALVE REPAIR/REPLACEMENT  2009    Jerry Colmenares MD   • CATARACT EXTRACTION     • CEREBRAL ANGIOGRAM N/A 2020    Procedure: CEREBRAL ANGIOGRAM;  Surgeon: Ar Bunch MD;  [facility] OR ;  Service: Neurosurgery;  Laterality: N/A;   • FEMORAL THROMBECTOMY/EMBOLECTOMY Right 2020    Procedure: FEMORAL  THROMBECTOMY/EMBOLECTOMY;  Surgeon: Winsome Valentin Jr., MD;  Location: [facility] MAIN OR;  Service: Vascular;  Laterality: Right;   • KNEE ARTHROPLASTY       General Information     [facility] Name 08/17/20 1022          PT Evaluation Time/Intention    Document Type  therapy note (daily note)  -     Mode of Treatment  physical therapy  -Critical access hospital Name 08/17/20 1022          General Information    Existing Precautions/Restrictions  fall;oxygen therapy device and L/min  -Critical access hospital Name 08/17/20 1022          Cognitive Assessment/Intervention- PT/OT    Orientation Status (Cognition)  oriented to;person  -     Cognitive Assessment/Intervention Comment  pt still very lethargic from meds  -Critical access hospital Name 08/17/20 1022          Safety Issues, Functional Mobility    Safety Issues Affecting Function (Mobility)  ability to follow commands  -     Impairments Affecting Function (Mobility)  balance;coordination;endurance/activity tolerance;strength;postural/trunk control  -       User Key  (r) = Recorded By, (t) = Taken By, (c) = Cosigned By    Initials Name Provider Type     Julieta Wheat PTA Physical Therapy Assistant        Mobility     [facility] Name 08/17/20 1023          Bed Mobility Assessment/Treatment    Supine-Sit [place] (Bed Mobility)  maximum assist (25% patient effort);2 person assist;verbal cues;nonverbal cues (demo/gesture)  -     Sit-Supine [place] (Bed Mobility)  maximum assist (25% patient effort);2 person assist;verbal cues;nonverbal cues (demo/gesture)  -     Assistive Device (Bed Mobility)  bed rails;head of bed elevated  -     Comment (Bed Mobility)  pt barely staying awake and keeping eyes open  -Critical access hospital Name 08/17/20 1023          Transfer Assessment/Treatment    Comment (Transfers)  stood once  -EH     Row Name 08/17/20 1023          Sit-Stand Transfer    Sit-Stand [place] (Transfers)  moderate assist (50% patient effort);2 person assist;verbal cues  -     Assistive Device  (Sit-Stand Transfers)  -- No AD  -     Row Name 08/17/20 1023          Gait/Stairs Assessment/Training    [place] Level (Gait)  unable to assess pt barely staying awake.   -       User Key  (r) = Recorded By, (t) = Taken By, (c) = Cosigned By    Initials Name Provider Type     Julieta Wheat PTA Physical Therapy Assistant        Obj/Interventions     Row Name 08/17/20 1024          Static Sitting Balance    Level of [place] (Unsupported Sitting, Static Balance)  contact guard assist;minimal assist, 75% patient effort  -     Sitting Position (Unsupported Sitting, Static Balance)  sitting on edge of bed  -     Time Able to Maintain Position (Unsupported Sitting, Static Balance)  4 to 5 minutes  -     Comment (Unsupported Sitting, Static Balance)  pt leaning posteriorly, cues for pt to lean forward. Pt very drowsy this morning  -Critical access hospital Name 08/17/20 1024          Static Standing Balance    Level of [place] (Supported Standing, Static Balance)  moderate assist, 50 to 74% patient effort;2 person assist  -     Time Able to Maintain Position (Supported Standing, Static Balance)  1 to 2 minutes  -     Assistive Device Utilized (Supported Standing, Static Balance)  -- HHAX2  -     Comment (Supported Standing, Static Balance)  pt leaning to left side and leaning forward. verbal cues to correct.   -       User Key  (r) = Recorded By, (t) = Taken By, (c) = Cosigned By    Initials Name Provider Type     Julieta Wheat PTA Physical Therapy Assistant        Goals/Plan    No documentation.       Clinical Impression     Row Name 08/17/20 1026          Plan of Care Review    Plan of Care Reviewed With  patient  -     Progress  improving  -     Outcome Summary  Pt drowsy throughout treatment and barely able to stay awak and keep eyes open. Pt required Max assit of 2 with bed mobility with verbal/tactile cues. Pt able to sit on EOB with Elayne. Pt leaning posteriorly and required cues to  correct posture. Pt stood once for a minute with ModAX2. Pt leaning to right side and forward and cues for pt to try to stand up straight. Will continue to progress pt as tolerated.   -     Row Name 08/17/20 1026          Positioning and Restraints    Pre-Treatment Position  in bed  -EH     Post Treatment Position  bed  -EH     In Bed  fowlers;call light within reach;encouraged to call for assist;exit alarm on;with family/caregiver  -       User Key  (r) = Recorded By, (t) = Taken By, (c) = Cosigned By    Initials Name Provider Type    Julieta Dias PTA Physical Therapy Assistant        Outcome Measures     Row Name 08/17/20 1033          How much help from another person do you currently need...    Turning from your back to your side while in flat bed without using bedrails?  2  -EH     Moving from lying on back to sitting on the side of a flat bed without bedrails?  2  -EH     Moving to and from a bed to a chair (including a wheelchair)?  2  -EH     Standing up from a chair using your arms (e.g., wheelchair, bedside chair)?  2  -EH     Climbing 3-5 steps with a railing?  1  -EH     To walk in hospital room?  1  -EH     AM-PAC 6 Clicks Score (PT)  10  -EH       User Key  (r) = Recorded By, (t) = Taken By, (c) = Cosigned By    Initials Name Provider Type    Julieta Dias PTA Physical Therapy Assistant        Physical Therapy Education                 Title: PT OT SLP Therapies (In Progress)     Topic: Physical Therapy (In Progress)     Point: Mobility training (In Progress)     Description:   Instruct learner(s) on safety and technique for assisting patient out of bed, chair or wheelchair.  Instruct in the proper use of assistive devices, such as walker, crutches, cane or brace.              Patient Friendly Description:   It's important to get you on your feet again, but we need to do so in a way that is safe for you. Falling has serious consequences, and your personal safety is the most important thing  of all.        When it's time to get out of bed, one of us or a family member will sit next to you on the bed to give you support.     If your doctor or nurse tells you to use a walker, crutches, a cane, or a brace, be sure you use it every time you get out of bed, even if you think you don't need it.    Learning Progress Summary           Patient Acceptance, E,D, NR by  at 8/17/2020 1034    Acceptance, E,D, NR by SM at 8/15/2020 1439    Acceptance, E,TB,D, VU,NR by SM at 8/14/2020 1510    Acceptance, E,TB, VU by CB at 8/13/2020 0922    Acceptance, E,D, VU,NR by PH at 8/12/2020 1540    Acceptance, E,TB, VU by CB at 8/11/2020 1623    Acceptance, E, NR by DJ at 8/10/2020 1530    Acceptance, E, VU,NR by MW at 8/8/2020 1542    Acceptance, E, DU,NR by AR at 8/7/2020 1432    Acceptance, E, DU,NR by AR at 8/6/2020 1208    Acceptance, E,D, NR by CG at 8/5/2020 1410    Comment:  does not appear to retain information.  CGRESSRN    Acceptance, E, NL,NR by AR at 8/5/2020 1204    Acceptance, E, DU,NR by AR at 8/4/2020 1453   Family Acceptance, E, VU,NR by MW at 8/8/2020 1542                   Point: Home exercise program (In Progress)     Description:   Instruct learner(s) on appropriate technique for monitoring, assisting and/or progressing patient with therapeutic exercises and activities.              Learning Progress Summary           Patient Acceptance, E,D, NR by  at 8/17/2020 1034    Acceptance, E,D, NR by  at 8/15/2020 1439    Acceptance, E,TB,D, VU,NR by SM at 8/14/2020 1510    Acceptance, E,TB, VU by CB at 8/13/2020 0922    Acceptance, E,D, VU,NR by PH at 8/12/2020 1540    Acceptance, E,TB, VU by CB at 8/11/2020 1623    Acceptance, E, VU,NR by MW at 8/8/2020 1542    Acceptance, E, DU,NR by AR at 8/7/2020 1432    Acceptance, E, DU,NR by AR at 8/6/2020 1208    Acceptance, E,D, NR by CG at 8/5/2020 1410    Comment:  does not appear to retain information.  CGRESSRN    Acceptance, E, NL,NR by AR at 8/5/2020 1204     Acceptance, E, DU,NR by AR at 8/4/2020 1453   Family Acceptance, E, VU,NR by MW at 8/8/2020 1542                   Point: Body mechanics (In Progress)     Description:   Instruct learner(s) on proper positioning and spine alignment for patient and/or caregiver during mobility tasks and/or exercises.              Learning Progress Summary           Patient Acceptance, E,D, NR by  at 8/17/2020 1034    Acceptance, E,D, NR by SM at 8/15/2020 1439    Acceptance, E,TB,D, VU,NR by SM at 8/14/2020 1510    Acceptance, E,TB, VU by CB at 8/13/2020 0922    Acceptance, E,D, VU,NR by PH at 8/12/2020 1540    Acceptance, E,TB, VU by LAKE at 8/11/2020 1623    Acceptance, E, NR by ELVA at 8/10/2020 1530    Acceptance, E, VU,NR by WENDY at 8/8/2020 1542    Acceptance, E, DU,NR by AR at 8/7/2020 1432    Acceptance, E, DU,NR by AR at 8/6/2020 1208    Acceptance, E,D, NR by CG at 8/5/2020 1410    Comment:  does not appear to retain information.  CGRESSRN    Acceptance, E, NL,NR by AR at 8/5/2020 1204    Acceptance, E, DU,NR by AR at 8/4/2020 1453   Family Acceptance, E, VU,NR by WENDY at 8/8/2020 1542                   Point: Precautions (In Progress)     Description:   Instruct learner(s) on prescribed precautions during mobility and gait tasks              Learning Progress Summary           Patient Acceptance, E,D, NR by  at 8/17/2020 1034    Acceptance, E,D, NR by  at 8/15/2020 1439    Acceptance, E,TB,D, VU,NR by SM at 8/14/2020 1510    Acceptance, E,TB, VU by CB at 8/13/2020 0922    Acceptance, E,D, VU,NR by PH at 8/12/2020 1540    Acceptance, E,TB, VU by CB at 8/11/2020 1623    Acceptance, E, NR by DJ at 8/10/2020 1530    Acceptance, E, VU,NR by WENDY at 8/8/2020 1542    Acceptance, E, DU,NR by AR at 8/7/2020 1432    Acceptance, E, DU,NR by AR at 8/6/2020 1208    Acceptance, E,D, NR by CG at 8/5/2020 1410    Comment:  does not appear to retain information.  CGRESSRN    Acceptance, E, NL,NR by AR at 8/5/2020 1204    Acceptance, E, DU,NR  by AR at 8/4/2020 1453   Family Acceptance, E, VU,NR by  at 8/8/2020 1542                               User Key     Initials Effective Dates Name Provider Type Discipline     06/16/16 -  Geneva Villela, RN Registered Nurse Nurse     03/07/18 -  Keyanna Wellington PTA Physical Therapy Assistant PT     08/19/18 -  Julieta Wheat PTA Physical Therapy Assistant PT    PH 08/20/19 -  Kaelyn De La O PTA Physical Therapy Assistant PT    MW 09/17/19 -  Pauline Harris, PT Physical Therapist PT    DJ 10/25/19 -  Florence Wallace, PT Physical Therapist PT    AR 07/02/20 -  Raghu Garcia, PT Student PT Student PT    CB 07/02/20 -  Sami Guajardo, PT Student PT Student PT              PT Recommendation and Plan     Plan of Care Reviewed With: patient  Progress: improving  Outcome Summary: Pt drowsy throughout treatment and barely able to stay awak and keep eyes open. Pt required Max assit of 2 with bed mobility with verbal/tactile cues. Pt able to sit on EOB with Elayne. Pt leaning posteriorly and required cues to correct posture. Pt stood once for a minute with ModAX2. Pt leaning to right side and forward and cues for pt to try to stand up straight. Will continue to progress pt as tolerated.      Time Calculation:   PT Charges     Row Name 08/17/20 1022             Time Calculation    Start Time  0908  -      Stop Time  0924  -      Time Calculation (min)  16 min  -      PT Received On  08/17/20  -      PT - Next Appointment  08/18/20  -         Time Calculation- PT    Total Timed Code Minutes- PT  16 minute(s)  -        User Key  (r) = Recorded By, (t) = Taken By, (c) = Cosigned By    Initials Name Provider Type     Julieta Wheat PTA Physical Therapy Assistant        Therapy Charges for Today     Code Description Service Date Service Provider Modifiers Qty    94102519874 HC PT THER SUPP EA 15 MIN 8/17/2020 Julieta Wheat PTA GP 1    53115581941 HC PT THER PROC EA 15 MIN 8/17/2020 Julieta Wheat PTA  GP 1          PT G-Codes  Outcome Measure Options: AM-PAC 6 Clicks Basic Mobility (PT)  AM-PAC 6 Clicks Score (PT): 10    Julieta Wheat, PTA  8/17/2020

## 2020-08-17 NOTE — PLAN OF CARE
Problem: Patient Care Overview  Goal: Plan of Care Review  Outcome: Ongoing (interventions implemented as appropriate)  Flowsheets (Taken 8/17/2020 1026)  Progress: improving  Plan of Care Reviewed With: patient  Outcome Summary: Pt drowsy throughout treatment and barely able to stay awak and keep eyes open. Pt required Max assit of 2 with bed mobility with verbal/tactile cues. Pt able to sit on EOB with Elayne. Pt leaning posteriorly and required cues to correct posture. Pt stood once for a minute with ModAX2. Pt leaning to right side and forward and cues for pt to try to stand up straight. Will continue to progress pt as tolerated.    ..Patient was not wearing a face mask during this therapy encounter. Therapist used appropriate personal protective equipment including eye protection, mask, and gloves.  Mask used was standard procedure mask. Appropriate PPE was worn during the entire therapy session. Hand hygiene was completed before and after therapy session. Patient is not in enhanced droplet precautions. PT tech present: Chetna

## 2020-08-17 NOTE — PROGRESS NOTES
[facility] INPATIENT PROGRESS NOTE         [address]    2020      PATIENT IDENTIFICATION:  Name: Jihan Teresa ADMIT: 8/3/2020   : 1933  PCP: Yaron Ca Jr., MD    MRN: [id number] LOS: 14 days   AGE/SEX: 86 y.o. female  ROOM: [facility]                     LOS 14    Reason for visit: Follow-up arterial occlusion requiring thromboembolectomy intracranial hemorrhage on admission      SUBJECTIVE:      Sleeping at time of visit.  Discussed with family at bedside.  Noted episode of borderline low blood pressure last night but this has improved.  Awaiting therapeutic INR and rehab placement.    Objective   OBJECTIVE:    Vital Sign Min/Max for last 24 hours  Temp  Min: 97.7 °F (36.5 °C)  Max: 98.9 °F (37.2 °C)   BP  Min: 99/39  Max: 137/63   Pulse  Min: 69  Max: 98   Resp  Min: 16  Max: 18   SpO2  Min: 91 %  Max: 97 %   No data recorded   No data recorded                         Body mass index is 29.75 kg/m².    Intake/Output Summary (Last 24 hours) at 2020 1021  Last data filed at 2020 0500  Gross per 24 hour   Intake 580 ml   Output 300 ml   Net 280 ml         Exam:  GEN:  No distress, appears stated age  EYES:   PERRL, anicteric sclera  ENT:    External ears/nose normal, OP clear  NECK:  No adenopathy, midline trachea  LUNGS: Normal chest on inspection, palpation and auscultation  CV:  Normal S1S2, without murmur  ABD:  Non tender, non distended, no hepatosplenomegaly, +BS  EXT:  No edema, cyanosis or clubbing    Scheduled meds:      amLODIPine 10 mg Oral Q24H   furosemide 20 mg Oral Daily   isosorbide mononitrate 60 mg Oral Daily   levothyroxine 75 mcg Oral QAM   metoprolol tartrate 25 mg Oral Q12H   warfarin 1.5 mg Oral Once per day on u   warfarin 3 mg Oral Once per day on Sun Tue Wed Fri Sat     IV meds:                          heparin (porcine) 500 Units/hr Last Rate: 500 Units/hr (20)   lactated ringers 9 mL/hr Last Rate: 9 mL/hr (20)      Pharmacy to dose warfarin       Data Review:  Results from last 7 days   Lab Units 08/17/20  0714 08/16/20  0616 08/15/20  0339 08/14/20 0420 08/13/20  0636   SODIUM mmol/L 138 139 139 138 140   POTASSIUM mmol/L 3.8 4.0 3.3* 3.5 3.8   CHLORIDE mmol/L 102 104 102 103 104   CO2 mmol/L 27.6 28.5 24.4 25.6 24.1   BUN mg/dL 16 11 12 10 14   CREATININE mg/dL 0.83 0.70 0.68 0.82 0.83   GLUCOSE mg/dL 132* 146* 92 115* 105*   CALCIUM mg/dL 8.4* 8.6 8.5* 8.7 8.7         Estimated Creatinine Clearance: 42.2 mL/min (by C-G formula based on SCr of 0.83 mg/dL).  Results from last 7 days   Lab Units 08/17/20  0714 08/16/20  0616 08/15/20  0339 08/14/20  0420 08/13/20  0636   WBC 10*3/mm3 10.07 12.19* 9.03 9.18 11.76*   HEMOGLOBIN g/dL 7.5* 8.7* 9.0* 9.2* 10.8*   PLATELETS 10*3/mm3 149 149 110* 122* 153     Results from last 7 days   Lab Units 08/17/20  0714 08/16/20  0616 08/15/20  0339 08/14/20  0420 08/14/20  0013   INR  2.34* 1.89* 1.44* 1.40* 1.37*                     Most recent CT head 8/13/2020 reviewed    Most recent chest x-ray 8/12/2020 reviewed      Microbiology reviewed    )        Active Hospital Problems    Diagnosis  POA   • **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) [I61.0]  Unknown   • ICH (intracerebral hemorrhage) (CMS/HCC) [I61.9]  Yes      Resolved Hospital Problems   No resolved problems to display.       Assessment   ASSESSMENT:  Acute arterial occlusion of right lower extremity  Status post open right femoral thromboembolectomy  Intracerebral hemorrhage on admission  Coagulopathy on admission  A. fib on anticoagulation  UTI E. coli  Diabetes mellitus  Hypothyroidism  V. tach    PLAN:  Status post open right femoral thromboembolectomy:  Continue heparin drip per vascular surgery's recommendations.  Pharmacy dosing Coumadin as well.  Goal INR 2-3.  Can discontinue heparin now that INR greater than 2.  Follow-up with vascular surgery in 1 month for ABIs.  Control blood  pressure.  Completed antibiotics for E. coli UTI.  PT/OT.  Discussed with patient's daughter at the bedside.  Questions asked for their satisfaction.    Working on rehab placement.  [place] [facility] [place] [facility]    Joaquin Stewart MD  Pulmonary and Critical Care Medicine  [facility], [facility]  8/17/2020    10:21

## 2020-08-17 NOTE — PLAN OF CARE
Problem: Patient Care Overview  Goal: Plan of Care Review  Outcome: Ongoing (interventions implemented as appropriate)  Flowsheets (Taken 8/17/2020 0419)  Progress: improving  Plan of Care Reviewed With: patient  Outcome Summary: VSS, Heparin gtt infusing awaiting therapeutic INR. Pt drowsy but awoke to eat dinner late.  2L O2 - weaning. repositioning. Will continue to monitor.

## 2020-08-17 NOTE — SIGNIFICANT NOTE
Pt found to be drowsy with frequent stimulus to stay awake and a significant slow verbal response. Pt would make eye contact and follow direction but would not verbally answer questions. INR therapeutic,drop hgb, with hx of bleed hep stopped, pt transported to ct attending notified, awaiting return call

## 2020-08-18 NOTE — PROGRESS NOTES
"[facility]  Cardiology Progress Note    Patient Identification:  Name: Jihan Teresa  Age: 86 y.o.  Sex: female  :  1933  MRN: [id number]                 Follow Up / Chief Complaint: Atrial fibrillation with ICH    Interval History: 20 She had an open right femoral thromboembolectomy per Dr. Winsome Valentin.  Hgb 7.3 today, defer to IM.     Subjective:     Objective:    Past Medical History:  Past Medical History:   Diagnosis Date   • Anemia    • Aortic valve stenosis     S/p AVR in    • Asthma    • Atelectasis    • CHF (congestive heart failure) (CMS/HCC)    • Congenital heart disease    • Diabetes mellitus (CMS/HCC)    • Esophageal reflux    • Essential hypertension    • HLD (hyperlipidemia)    • Hypotension    • LVH (left ventricular hypertrophy)    • Pleural effusion    • Pulmonary hypertension (CMS/HCC)      Past Surgical History:  Past Surgical History:   Procedure Laterality Date   • AORTIC VALVE REPAIR/REPLACEMENT  2009    Jerry Colmenares MD   • CATARACT EXTRACTION     • CEREBRAL ANGIOGRAM N/A 2020    Procedure: CEREBRAL ANGIOGRAM;  Surgeon: Ar Bunch MD;  Location: [facility] ;  Service: Neurosurgery;  Laterality: N/A;   • FEMORAL THROMBECTOMY/EMBOLECTOMY Right 2020    Procedure: FEMORAL THROMBECTOMY/EMBOLECTOMY;  Surgeon: Winsome Valentin Jr., MD;  Location: [facility];  Service: Vascular;  Laterality: Right;   • KNEE ARTHROPLASTY          Social History:   Social History     Tobacco Use   • Smoking status: Never Smoker   • Smokeless tobacco: Never Used   Substance Use Topics   • Alcohol use: Yes     Comment: ocasional      Family History:  Family History   Problem Relation Age of Onset   • Dementia Sister    • Stroke Brother           Allergies:  Allergies   Allergen Reactions   • Promethazine Other (See Comments)     Pt becomes \"out of it\" when on this medication  Pt becomes \"out of it\" when on this medication     Scheduled " "Meds:    amLODIPine 10 mg Q24H   furosemide 20 mg Daily   isosorbide mononitrate 60 mg Daily   levothyroxine 75 mcg QAM   metoprolol tartrate 25 mg Q12H   warfarin 1.5 mg Once per day on Mon Thu   warfarin 3 mg Once per day on Sun Tue Wed Fri Sat     ROS  Constitutional: Awake and alert, no fever. No nosebleeds  Abdomen           no abdominal pain   Cardiac              no chest pain  Pulmonary          no shortness of breath      /76 (BP Location: Right arm, Patient Position: Lying)   Pulse 74   Temp 97.5 °F (36.4 °C) (Temporal)   Resp 16   Ht 152.4 cm (60\")   Wt 69.1 kg (152 lb 5.4 oz)   SpO2 91%   BMI 29.75 kg/m²   General appearance: No acute changes   Neck: Trachea midline; NECK, supple, no thyromegaly or lymphadenopathy   Lungs: Normal size and shape, normal breath sounds, equal distribution of air, no rales and rhonchi   CV: S1-S2 regular, no murmurs, no rub, no gallop   Abdomen: Soft, non-tender; no masses , no abnormal abdominal sounds   Extremities: No deformity , normal color , no peripheral edema   Skin: Normal temperature, turgor and texture; no rash, ulcers                    Cardiographics  Telemetry:       Atrial fib                            A. Fib      Atrial fibrillation      Echocardiogram:   ECG:          Echocardiogram:   2017  Interpretation Summary      · Calculated EF = 46.9%.  · Left ventricular systolic function is low normal.  · Left amd right atrial cavity size is severely dilated.  · Right ventricular cavity is severely dilated.  · calcification of the aortic valve  · Severe mitral valve regurgitation is present  · Mitral annular calcification is present. Posterior leaflet very calcified and immobile.  · Severe tricuspid valve regurgitation is present.  · Estimated right ventricular systolic pressure from tricuspid regurgitation is markedly elevated (>55 mmHg). Calculated right ventricular systolic pressure from tricuspid regurgitation is 66.7 mmHg.         Imaging  Chest " X-ray:   Study Result      EXAMINATION: SINGLE VIEW CHEST RADIOGRAPH     HISTORY: 86-year-old female with history of generalized weakness.     FINDINGS: An upright AP portable chest radiograph was obtained.  Comparison is made to a chest radiograph dated 06/10/2017. The lungs are  normal in volume and are clear of consolidation. Stable mild scattered  interstitial prominence is seen throughout both lungs. Stable soft  tissue fullness in the right hilum is most consistent with a prominent  right hilar vessel is noted. There are stable cardiomegaly. Midline  sternal wires are noted. Atheromatous consultation seen within the  aortic arch.     IMPRESSION:  There is stable mild bilateral interstitial scarring and  cardiomegaly with findings suggestive of pulmonary hypertension. No  evidence for superimposed acute process is appreciated.     This report was finalized on 8/3/2020 10:55 AM by Dr. Estuardo Wagner M.D.         CT     Study Result      CT SCAN OF THE HEAD WITHOUT CONTRAST     CLINICAL HISTORY: Generalized weakness and confusion.     TECHNIQUE: CT scan of the head was obtained with 3 mm axial images. No  intravenous contrast was administered.     COMPARISON: Comparison is made to previous CT scan of head dated  06/13/2017.     FINDINGS: There is increased density within the dependent aspects of  both lateral ventricles compatible with intraventricular hemorrhage.  There are foci of increased density noted within the inferior aspect of  the interhemispheric fissure worrisome for subarachnoid hemorrhage. The  etiology of these areas of hemorrhage is uncertain, although a vascular  etiology such as an aneurysm should be excluded. I recommend further  evaluation with an MRA or CTA examination, as well as an MRI of the  brain.     There are multiple chronic infarcts identified within both cerebellar  hemispheres, left greater than right. The largest of these chronic  infarcts measures up to approximately 1.4 x 0.4  cm in greatest axial  dimensions. Old lacunar disease is seen within the left thalamus. A  chronic lacune identified within the anterior aspect of the left  thalamus measuring up to 4-5 mm in diameter. Hypodense areas are  identified within the lentiform nuclei bilaterally that are likely  representative of a combination of enlarged perivascular spaces and old  lacunar disease. Severe changes of chronic small vessel ischemic  phenomena are identified.     Otherwise, the ventricles, sulci, and cisterns are age appropriate.     IMPRESSION:     There is intraventricular hemorrhage identified within the dependent  aspects of both lateral ventricles. Additionally, there is increased  density seen within the inferior aspect of the interhemispheric fissure  suggestive of subarachnoid hemorrhage. The precise etiology of this  hemorrhage is uncertain on the basis of this head CT. However, a  vascular etiology should be excluded. Further evaluation is recommended  with a CTA or MRA study, as well as an MRI of the brain.     These findings and recommendations were discussed with Debbie Wilson on 08/03/2020 at approximately 8:36 AM.     Radiation dose reduction techniques were utilized, including automated  exposure control and exposure modulation based on body size.              Lab Review           Results from last 7 days   Lab Units 08/18/20  0659   SODIUM mmol/L 136   POTASSIUM mmol/L 3.8   BUN mg/dL 19   CREATININE mg/dL 0.76   CALCIUM mg/dL 8.5*        Results from last 7 days   Lab Units 08/18/20  0659 08/17/20  1501 08/17/20  0714 08/16/20  0616   WBC 10*3/mm3 8.33  --  10.07 12.19*   HEMOGLOBIN g/dL 7.3* 8.0* 7.5* 8.7*   HEMATOCRIT % 21.9* 24.5* 22.8* 25.6*   PLATELETS 10*3/mm3 178  --  149 149     Results from last 7 days   Lab Units 08/18/20  0659 08/17/20  0714 08/16/20  0616   INR  2.27* 2.34* 1.89*   APTT seconds 43.9* 73.8* 67.5*     The following medical decision was discussed in detail with   "Marj    Assessment:  1. Intracerebral hemorrhage  2.  Chronic atrial fibrillation: Coumadin on hold due low hgb   3.  Bioprosthetic aortic valve replaced in 2009  5. coagulopathy  6.  UTI  7.  Diabetes mellitus  8. Hypothyroidism  9. CAD  10. Hypertension    Plan:  VS stable. Atrial fibrillation with controlled HR. Coumadin on hold. Hgb 7.3 today, defer to IM.  INR 2.27 today. Watchman's have been discussed with daughter.       Bogdan Mcmahan MD       )8/18/2020  KAREN Graham/Transcription:   \"Dictated utilizing Dragon dictation\".     "

## 2020-08-18 NOTE — PLAN OF CARE
Notified by registered nurse last night that patient had become more confused and not following commands.  A CT head was ordered without contrast.  The results showed no change from the scan performed earlier in the day on August 17.    Given that the CT is stable, I encouraged the nurse to notify the admitting service for evaluation as the above symptoms are not coming from new or worsening brain hemorrhage. Consider urinalysis to evaluate for UTI. I also recommended a neurology consult for suspected encephalopathy. Nothing further to add from CONSUELO standpoint. The above was discussed with Dr. De La Cruz. No new recommendations received. We will see patient in office in 2 weeks with repeat head CT. Please call if there are any further questions or concerns.

## 2020-08-18 NOTE — PLAN OF CARE
Problem: Patient Care Overview  Goal: Plan of Care Review  Outcome: Ongoing (interventions implemented as appropriate)  Flowsheets (Taken 8/18/2020 3716)  Progress: no change  Plan of Care Reviewed With: patient  Outcome Summary: Pt tolerated treatment fair this date. Unable to do much today d/t transport arriving to take pt off floor for CT. Completed a few supine exercises, then reviewed a few others w/ daughter to attempt later this evening. Patient was not wearing a face mask during this therapy encounter. Therapist used appropriate personal protective equipment including eye protection, mask, and gloves.  Mask used was standard procedure mask. Appropriate PPE was worn during the entire therapy session. Hand hygiene was completed before and after therapy session. Patient is not in enhanced droplet precautions.

## 2020-08-18 NOTE — PROGRESS NOTES
ILEANA Apontecover    Called to evaluate patient by bedside RN given concern for change in mental status.  Ct head stat ordered, neurosurgery called and rec neurology stroke team paged.  ABG reviewed.  Upon entry to room with gentle voice patient arouses, names her daughter at bedside denies distress.  Daughter states patient has been asleep much of day and is actually now a little more alert than earlier in day and just early tonight.  She is protecting her airway clearly at present time.  Continue evaluation per neurosurgery and neurology teams.  Await ct head read.  D/w bedside rn.    Bruno Wilkinson MD  [facility]  08/17/20  11:29 PM

## 2020-08-18 NOTE — TELEPHONE ENCOUNTER
----- Message from Damian De La Cruz MD sent at 8/18/2020 11:57 AM EDT -----  Regarding: RE: f/u  yes  ----- Message -----  From: Teressa Valenzuela MA  Sent: 8/18/2020  10:05 AM EDT  To: Damian De La Cruz MD  Subject: FW: f/u                                          With a CT of the head ?  ----- Message -----  From: Damian De La Cruz MD  Sent: 8/18/2020   9:59 AM EDT  To: Teressa Valenzuela MA  Subject: RE: f/u                                          Just arrange for her to come and see me as a face-to-face visit after she gets out of rehab.  ----- Message -----  From: Teressa Valenzuela MA  Sent: 8/18/2020   9:56 AM EDT  To: Damian De La Cruz MD  Subject: FW: f/u                                          Is this a televisit ?  If going to rehab they do not like them to go in and out of rehab  ----- Message -----  From: Beatrice Randle APRN  Sent: 8/18/2020   8:03 AM EDT  To: Teressa Valenzueal MA  Subject: f/u                                              Patient with SDH, ICH, IVH.  Had MRI/MRI, c-angio, CTA.  All unremarkable.  Needs follow-up in about 2 weeks with Dr. De La Cruz with CT head.  Patient is related to multiple physicians.  Likely going somewhere for rehab.

## 2020-08-18 NOTE — PROGRESS NOTES
Name: Jihan Teresa ADMIT: 8/3/2020   : 1933  PCP: Yaron Ca Jr., MD    MRN: [id number] LOS: 15 days   AGE/SEX: 86 y.o. female  ROOM:  [place] [address]     CC: Postop day #6 status post embolectomy.  Interval History: We were called back by nursing today to see the patient after anemia and CT scan.  Hemoglobin has drifted down slowly from 9.2 on 2020 down to 8.7 on 2020 down to 7.3 today.  No blood hs been given during that time although 2 units were ordered for the 7.3 result today.  Subjective   Review of Systems  Objective     Vital Signs  Temp:  [97.1 °F (36.2 °C)-99.1 °F (37.3 °C)] 98.2 °F (36.8 °C)  Heart Rate:  [65-72] 65  Resp:  [16-22] 16  BP: (107-125)/(44-61) 118/56    I/O this shift:  In: 660 [P.O.:360; Blood:300]  Out: -     Physical Exam  Vascular: Incision is well approximated.  No erythema.  No skin separation.  She does have a hematoma but it soft.  No concern for pseudoaneurysm.  No bruit.    Data Reviewed:  CBC    Results from last 7 days   Lab Units 20  0659 20  1501 20  0714 20  0616 08/15/20  0339 20  0420 20  0636 20  0427   WBC 10*3/mm3 8.33  --  10.07 12.19* 9.03 9.18 11.76* 6.73   HEMOGLOBIN g/dL 7.3* 8.0* 7.5* 8.7* 9.0* 9.2* 10.8* 12.6   PLATELETS 10*3/mm3 178  --  149 149 110* 122* 153 140     BMP   Results from last 7 days   Lab Units 20  0659 20  0714 20  0616 08/15/20  0339 20  0420 20  0636 20  0427   SODIUM mmol/L 136 138 139 139 138 140 141   POTASSIUM mmol/L 3.8 3.8 4.0 3.3* 3.5 3.8 3.6   CHLORIDE mmol/L 102 102 104 102 103 104 108*   CO2 mmol/L 26.0 27.6 28.5 24.4 25.6 24.1 24.4   BUN mg/dL 19 16 11 12 10 14 8   CREATININE mg/dL 0.76 0.83 0.70 0.68 0.82 0.83 0.68   GLUCOSE mg/dL 105* 132* 146* 92 115* 105* 76     Coag   Results from last 7 days   Lab Units 20  0659 20  0714 20  0616 08/15/20  0339 20  0420 20  0013 20  0636   INR  2.27*  2.34* 1.89* 1.44* 1.40* 1.37* 1.32*   APTT seconds 43.9* 73.8* 67.5* 68.2* 63.5* 68.7*  --      Infection     Radiology(recent) Ct Abdomen Pelvis Without Contrast    Result Date: 8/18/2020  1. There is a hematoma which measures at least 9 cm which extends from the right common femoral artery medially into the right medial thigh musculature. There is also a 2.4 cm hematoma along the anterior wall of the femoral artery. There are much smaller hematomas at the subcutaneous tissues of the right groin. A pseudoaneurysm can't be excluded. 2. There is a trace amount of free fluid within the right aspect of the pelvis. There is otherwise no free fluid within the pelvis or abdomen.  Discussed with the patient's nurse by phone.      Ct Head Without Contrast    Result Date: 8/18/2020  Stable appearance of the brain since the earlier study today. Please see full discussion above.  Radiation dose reduction techniques were utilized, including automated exposure control and exposure modulation based on body size.  This report was finalized on 8/18/2020 12:23 AM by Dr. Chay Conklin M.D.      Ct Head Without Contrast    Result Date: 8/17/2020  1. Overall no significant change over the past 4 days since prior head CT 08/13/2020. 2. There is severe small vessel disease in the cerebral white matter, 8 mm old lacunar infarct in the anteromedial left thalamus, small, 3 mm, old lacunar infarcts in the genu of the right internal capsule and posterior left putamen. There are multiple old posterior inferior lateral left cerebellar infarcts in the left PICA territory and several tiny, 3-5 mm, old right cerebellar white matter infarcts in the right PICA territory. 3. Stable tiny foci of subacute intraventricular hemorrhage in the posterior tips of the trigones of the lateral ventricles, unusual rind of hemorrhage in the anterior third ventricle that may extend into the subependymal region, stable tiny, 7 x 5 mm, chronic late subacute to  chronic hematoma cavities in the inferior medial frontal lobe white matter in a bilateral symmetric fashion just inferior to the frontal horns of the lateral ventricles, better demonstrated on prior MRI of the brain 2020.  The etiology of the findings is uncertain. 4. Subtotal opacification of the left mastoid and middle ear cavity. The remainder of the head CT is unremarkable.  Radiation dose reduction techniques were utilized, including automated exposure control and exposure modulation based on body size.  This report was finalized on 2020 4:30 PM by Dr. Jefe Costa M.D.      Mri Brain Without Contrast    Result Date: 2020  1.  5 mm acute infarct involving the left frontal lobe superolaterally. 2.  Areas of residual blood products are identified involving the anterior aspect of the third ventricle and involving the medial and inferior aspects of the frontal lobes bilaterally adjacent to the lateral ventricles. The volume of blood has decreased versus 2012. There is a small amount of blood present within the occipital horns bilaterally, present previously. There is no evidence of new hemorrhage or of hydrocephalus. The etiology for the hemorrhage is uncertain. 3.  Extensive small vessel ischemic disease. 4.  Fluid within the mastoid air cells bilaterally.  The information regarding the infarct was called to the patient's nurse at the time of the dictation. The patient's nurse is to relay the information to the clinical service.        Imaging Reviewed:         Active Hospital Problems    Diagnosis  POA   • **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) [I61.0]  Unknown   • ICH (intracerebral hemorrhage) (CMS/HCC) [I61.9]  Yes      Resolved Hospital Problems   No resolved problems to display.      Assessment/Plan   Billin, Post Op Global    Impression/Plan:  Postop day #6 status post open right femoral embolectomy for acute limb ischemia: Patient's  hemoglobin has drifted over the last few days.  She does have a soft moderate hematoma in the right thigh but not at all concerning.  Does not look like something that would require reoperation/washout.  I would expect this to slowly improve over time.  Okay to continue to anticoagulate from my standpoint especially in the setting of a new stroke on MRI.    Diaz Cardona MD  08/18/20  18:24  Office Number [phone]

## 2020-08-18 NOTE — PROGRESS NOTES
[facility] INPATIENT PROGRESS NOTE         [address]    2020      PATIENT IDENTIFICATION:  Name: Jihan Teresa ADMIT: 8/3/2020   : 1933  PCP: Yaron Ca Jr., MD    MRN: [id number] LOS: 15 days   AGE/SEX: 86 y.o. female  ROOM: [facility]                     LOS 15    Reason for visit: Follow-up arterial occlusion requiring thromboembolectomy intracranial hemorrhage on admission      SUBJECTIVE:      Events noted last night.  Mental status has improved.  She is not on any scheduled sedating medications but lower blood pressure may be contributing to her confusion yesterday and lethargy.  We will give her 2 units of packed red blood cells today.  Discussed with family at bedside.  Still awaiting further input from [facility] about rehab placement.    Objective   OBJECTIVE:    Vital Sign Min/Max for last 24 hours  Temp  Min: 97.1 °F (36.2 °C)  Max: 99.1 °F (37.3 °C)   BP  Min: 107/49  Max: 122/61   Pulse  Min: 67  Max: 72   Resp  Min: 16  Max: 22   SpO2  Min: 90 %  Max: 98 %   No data recorded   No data recorded                         Body mass index is 29.75 kg/m².    Intake/Output Summary (Last 24 hours) at 2020 0916  Last data filed at 2020 0610  Gross per 24 hour   Intake 30 ml   Output 650 ml   Net -620 ml         Exam:  GEN:  No distress, appears stated age  EYES:   PERRL, anicteric sclera  ENT:    External ears/nose normal, OP clear  NECK:  No adenopathy, midline trachea  LUNGS: Normal chest on inspection, palpation and auscultation  CV:  Normal S1S2, without murmur  ABD:  Non tender, non distended, no hepatosplenomegaly, +BS  EXT:  No edema, cyanosis or clubbing    Scheduled meds:      amLODIPine 10 mg Oral Q24H   furosemide 40 mg Intravenous Once   furosemide 20 mg Oral Daily   isosorbide mononitrate 60 mg Oral Daily   levothyroxine 75 mcg Oral QAM   metoprolol tartrate 25 mg Oral Q12H   warfarin 1.5 mg Oral Once per day on Mon Thu   warfarin 3 mg Oral Once per day on Sun  Tue Wed Fri Sat     IV meds:                          lactated ringers 9 mL/hr Last Rate: 9 mL/hr (08/12/20 1923)   Pharmacy to dose warfarin       Data Review:  Results from last 7 days   Lab Units 08/18/20  0659 08/17/20  0714 08/16/20  0616 08/15/20  0339 08/14/20  0420   SODIUM mmol/L 136 138 139 139 138   POTASSIUM mmol/L 3.8 3.8 4.0 3.3* 3.5   CHLORIDE mmol/L 102 102 104 102 103   CO2 mmol/L 26.0 27.6 28.5 24.4 25.6   BUN mg/dL 19 16 11 12 10   CREATININE mg/dL 0.76 0.83 0.70 0.68 0.82   GLUCOSE mg/dL 105* 132* 146* 92 115*   CALCIUM mg/dL 8.5* 8.4* 8.6 8.5* 8.7         Estimated Creatinine Clearance: 43.7 mL/min (by C-G formula based on SCr of 0.76 mg/dL).  Results from last 7 days   Lab Units 08/18/20  0659 08/17/20  1501 08/17/20  0714 08/16/20  0616 08/15/20  0339 08/14/20  0420   WBC 10*3/mm3 8.33  --  10.07 12.19* 9.03 9.18   HEMOGLOBIN g/dL 7.3* 8.0* 7.5* 8.7* 9.0* 9.2*   PLATELETS 10*3/mm3 178  --  149 149 110* 122*     Results from last 7 days   Lab Units 08/18/20  0659 08/17/20  0714 08/16/20  0616 08/15/20  0339 08/14/20  0420   INR  2.27* 2.34* 1.89* 1.44* 1.40*         Results from last 7 days   Lab Units 08/17/20  2310   PH, ARTERIAL pH units 7.465*   PO2 ART mm Hg 86.9   PCO2, ARTERIAL mm Hg 36.6   HCO3 ART mmol/L 26.3             Most recent CT head 8/13/2020 reviewed    Most recent chest x-ray 8/12/2020 reviewed      Microbiology reviewed    )        Active Hospital Problems    Diagnosis  POA   • **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) [I61.0]  Unknown   • ICH (intracerebral hemorrhage) (CMS/HCC) [I61.9]  Yes      Resolved Hospital Problems   No resolved problems to display.       Assessment   ASSESSMENT:  Acute arterial occlusion of right lower extremity  Status post open right femoral thromboembolectomy  Intracerebral hemorrhage on admission  Coagulopathy on admission  A. fib on anticoagulation  UTI E. coli  Diabetes mellitus  Hypothyroidism  V.  Tach  Anemia: Worse    PLAN:    Plan for packed red blood cell transfusion today.  Follow-up with vascular surgery in 1 month for ABIs.  Control blood pressure.  Completed antibiotics for E. coli UTI.  PT/OT.  Discussed with patient's daughter at the bedside.  Questions asked for their satisfaction.    Working on rehab placement.  Awaiting further input from [facility].    Joaquin Stewart MD  Pulmonary and Critical Care Medicine  [facility], [facility]  8/18/2020    09:16

## 2020-08-18 NOTE — NURSING NOTE
RN let CT know of stat CT order.  CT stated they would send for them now.  Will continue to monitor.

## 2020-08-18 NOTE — PROGRESS NOTES
"DOS: 2020  NAME: Jihan Teresa   : 1933  PCP: Yaron Ca Jr., MD  Chief Complaint   Patient presents with   • Weakness - Generalized     Stroke    Subjective: Reconsulted due to worsening lethargy . Daughter is at the bedside and states the patient was lethargic most of yesterday; the patient became less and less responsive, harder to wake as the day went on then seemed to \"perk up\" after CT head was completed last night. Patient denies headache. Also anemic, hgb 7.3 from 12.6 on .     Objective:  Vital signs: /53 (BP Location: Left arm, Patient Position: Lying)   Pulse 67   Temp 97.7 °F (36.5 °C) (Oral)   Resp 16   Ht 152.4 cm (60\")   Wt 69.1 kg (152 lb 5.4 oz)   SpO2 93%   BMI 29.75 kg/m²         Scheduled Meds:  amLODIPine 10 mg Oral Q24H   furosemide 40 mg Intravenous Once   furosemide 20 mg Oral Daily   isosorbide mononitrate 60 mg Oral Daily   levothyroxine 75 mcg Oral QAM   metoprolol tartrate 25 mg Oral Q12H   warfarin 1.5 mg Oral Once per day on u   warfarin 3 mg Oral Once per day on Sun Tue Wed Fri Sat     Continuous Infusions:  lactated ringers 9 mL/hr Last Rate: 9 mL/hr (20)   Pharmacy to dose warfarin       PRN Meds:.haloperidol lactate  •  hydrALAZINE  •  HYDROcodone-acetaminophen  •  Pharmacy to dose warfarin  •  potassium chloride **OR** potassium chloride **OR** potassium chloride  •  [COMPLETED] Insert peripheral IV **AND** sodium chloride  •  sodium chloride    Laboratory results:  Lab Results   Component Value Date    GLUCOSE 105 (H) 2020    CALCIUM 8.5 (L) 2020     2020    K 3.8 2020    CO2 26.0 2020     2020    BUN 19 2020    CREATININE 0.76 2020    EGFRIFAFRI 60 2020    EGFRIFNONA 72 2020    BCR 25.0 2020    ANIONGAP 8.0 2020     Lab Results   Component Value Date    WBC 8.33 2020    HGB 7.3 (L) 2020    HCT 21.9 (L) 2020    MCV 84.6 " 08/18/2020     08/18/2020     Lab Results   Component Value Date    CHOL 109 06/13/2017    CHOL 96 02/10/2017     Lab Results   Component Value Date    HDL 36 (L) 01/09/2020    HDL 51 06/13/2017    HDL 42 02/10/2017     Lab Results   Component Value Date    LDL 49 01/09/2020    LDL 51 06/13/2017    LDL 38 02/10/2017     Lab Results   Component Value Date    TRIG 114 01/09/2020    TRIG 36 06/13/2017    TRIG 81 02/10/2017       Review and interpretation of imaging: CT head images viewed by me from 8/17.  There is advanced small vessel disease and minimal left IVH but no acute findings.  NONCONTRAST HEAD CT 08/17/2020     CLINICAL HISTORY: Follow-up intraventricular hemorrhage. Patient has  history of syncope and weakness.      TECHNIQUE: Spiral CT images were obtained from the base of the skull to  the vertex without intravenous contrast. Images were reformatted and  submitted in 3 mm thick axial CT section with brain algorithm and 2 mm  thick sagittal and coronal reconstructions were performed and submitted  in brain algorithm.     The study is correlated to a prior noncontrast head CT from [facility] on 08/13/2020 and 08/08/2020 and 08/06/2020.     FINDINGS: There is extensive patchy and confluent T2 high signal  throughout the cerebral white matter consistent with moderate-to-severe  small vessel disease. There are multiple old lacunar infarcts including  an 8 mm old lacunar infarct in the anterior superior medial left  thalamus, a 3 mm old lacunar infarct in the genu of the right internal  capsule, and a 3 mm old lacunar infarct in the posterior left putamen.  There are also multiple old bilateral cerebellar infarcts, the largest  of which is a 3 x 2 cm old inferior lateral left cerebellar infarct  adjacent to which are 2 separate 11 x 5 mm old posterior inferior  lateral left cerebellar infarcts, and these are in the left PICA  territory.  There are several tiny, 3-5 mm, old right  cerebellar  infarcts in the right PICA territory. There is a stable, very tiny  amount of subacute intraventricular hemorrhage in the posterior trigones  of the lateral ventricles bilaterally. There is a stable small rind of  hemorrhage along the anterior aspect of the third ventricle.  The  ventricles are normal in size.  There is no midline shift.  No  extra-axial fluid collections are identified and no new areas of  intracranial hemorrhage are seen. Best seen on sagittal image 44 and 40  are small late subacute to chronic intraparenchymal hematoma cavities in  the inferior medial frontal lobe white matter just inferior to the  frontal horns of the lateral ventricles measuring about 7 x 5 mm in  size, better demonstrated on prior MRI of the head 08/03/2020 of  uncertain etiology. Paranasal sinuses are clear.  There is chronic  subtotal opacification of the left mastoid air cells and left middle ear  cavity.  The right mastoid and middle ear cavity are clear.  Calvarium  and skull base are unremarkable.     IMPRESSION:  1. Overall no significant change over the past 4 days since prior head  CT 08/13/2020.  2. There is severe small vessel disease in the cerebral white matter, 8  mm old lacunar infarct in the anteromedial left thalamus, small, 3 mm,  old lacunar infarcts in the genu of the right internal capsule and  posterior left putamen. There are multiple old posterior inferior  lateral left cerebellar infarcts in the left PICA territory and several  tiny, 3-5 mm, old right cerebellar white matter infarcts in the right  PICA territory.  3. Stable tiny foci of subacute intraventricular hemorrhage in the  posterior tips of the trigones of the lateral ventricles, unusual rind  of hemorrhage in the anterior third ventricle that may extend into the  subependymal region, stable tiny, 7 x 5 mm, chronic late subacute to  chronic hematoma cavities in the inferior medial frontal lobe white  matter in a bilateral symmetric  fashion just inferior to the frontal  horns of the lateral ventricles, better demonstrated on prior MRI of the  brain 08/03/2020.  The etiology of the findings is uncertain.  4. Subtotal opacification of the left mastoid and middle ear cavity.   The remainder of the head CT is unremarkable.      Radiation dose reduction techniques were utilized, including automated  exposure control and exposure modulation based on body size.     This report was finalized on 8/17/2020 4:30 PM by Dr. Jefe Costa M.D.     CT OF THE BRAIN WITHOUT CONTRAST 8/17/2020 at 2210 hours     HISTORY: Decreased alertness.     Axial images were obtained through the brain without intravenous  contrast and compared to the earlier study today from 1011 hours.     There is moderate diffuse atrophy. There is decreased attenuation of the  periventricular white matter bilaterally consistent with small vessel  white matter ischemic disease.     Old lacunar infarction in the left thalamus is seen. 1 or 2 right basal  ganglia old lacunar infarctions are seen.     Again seen is a tiny amount of high attenuation along the anterior  aspect of the third ventricle and minimal high attenuation in the  posterior left lateral ventricle. These findings appear relatively  stable from the earlier study today.     No new areas of hemorrhage are seen.     No midline shift is seen. There is no evidence of acute infarction.     IMPRESSION:  Stable appearance of the brain since the earlier study  today. Please see full discussion above.     Radiation dose reduction techniques were utilized, including automated  exposure control and exposure modulation based on body size.     This report was finalized on 8/18/2020 12:23 AM by Dr. Chay Conklin M.D.  MR SCAN OF THE BRAIN WITHOUT AND WITH INTRAVENOUS CONTRAST     HISTORY: Generalized weakness and confusion. Has abnormal CT scan  showing localized hemorrhage anterior to the third ventricle and tiny  amount of hemorrhage  layering in the occipital horns.     TECHNIQUE: The MR scan was performed with sagittal and axial and coronal  images and includes T1 images without and with intravenous contrast.      FINDINGS: There is very prominent diffuse atrophy and extensive chronic  small vessel ischemic change. There are several small areas of globular  intraparenchymal hemorrhage extending anterior to the third ventricle  and immediately adjacent to the anterior communicating artery and these  have an aggregate size of 9 x 16 mm. There is also a tiny amount of  blood layering in the occipital horns as noted on the CT scan. There is  no evidence of acute infarct on the diffusion sequence. There is no  abnormal enhancement or mass effect.     CONCLUSION: Prominent diffuse atrophy and extensive chronic small vessel  ischemic change. Small areas of globular hemorrhage that appear to be  intraparenchymal and extending anterior to the third ventricle and in  the region of the anterior communicating artery. The location raises the  suspicion of hemorrhage related to an aneurysm in this region but not  visualized on the MRA performed today. There is an associated tiny  amount of blood layering in the occipital horns.     MR ANGIOGRAPHY OF THE BRAIN WITHOUT CONTRAST     FINDINGS: MR angiography of the brain was performed without contrast and  again demonstrates the areas of globular hemorrhage in the region of the  anterior communicating artery. No aneurysm is currently identified in  this region, perhaps related to thrombus within the aneurysm. No  aneurysm is seen in this region on an earlier CT angiography of the  brain dated 06/13/2017. The remainder of the intracranial MR angiography  is unremarkable.     This report was finalized on 8/3/2020 7:07 PM by Dr. Dionte Lockhart M.D.     CT ANGIOGRAPHY OF THE HEAD AND NECK WITHOUT AND WITH INTRAVENOUS  CONTRAST AND 3-D RECONSTRUCTIONS     HISTORY: Recent intraparenchymal and subarachnoid hemorrhage  and tiny  amount of hemorrhage extending into the ventricles. Evaluate for  aneurysm.     CT angiography of the head and neck was performed without and with  intravenous contrast and 3-D reconstructions and compared to recent MRI  of the brain and MRA of the brain performed yesterday as well as CT  angiography of the head and neck dated 06/13/2017. The following  findings are present:  1. The initial noncontrast CT scan again demonstrates a small area of  somewhat globular intraparenchymal hemorrhage just anterior to the third  ventricle as well as a tiny amount of hemorrhage layering in the  occipital horns. This is unchanged from yesterday. The patient has  underlying prominent diffuse atrophy and chronic small vessel ischemic  change.  2. Evaluation for stenosis is based on NASCET criteria. The vertebral  arteries are patent and symmetric in size. Both supply the basilar  artery. There is no abnormality of the posterior circulation. The left  and right posterior communicating arteries are patent.  3. The cervical carotid arteries demonstrate some mild atheromatous  calcification at each carotid bifurcation without significant stenosis.  4. The intracranial CT angiography shows moderate calcification of the  carotid siphons without significant stenosis. There is no evidence of  aneurysm involving the anterior communicating artery. There is moderate  stenosis at the origin of the right A2 segment which is new since 2017.  This is located anterior to the small area of localized hemorrhage and  may relate to spasm. Again, no aneurysm is identified in this region or  in the remainder of the CT angiography evaluation.                       Radiation dose reduction techniques were utilized, including automated  exposure control and exposure modulation based on body size.     This report was finalized on 8/4/2020 7:29 PM by Dr. Dionte Lockhart M.D.       Impression:  This patient is an 86-year-old female with HTN, HLD,  DM, pulmonary hypertension, VHD status post AVR, CHF, atrial fibrillation on warfarin and Plavix, and previous stroke with residual left-sided weakness who was originally admitted 8/3 with confusion was found to have IVH.  Angiogram was completed and was negative and her hemorrhage was felt spontaneous related to her warfarin.  She had an acute right lower extremity arterial occlusion felt secondary to cardiac embolus and underwent open right femoral thromboembolectomy 8/12.  She was initially off of AC but her warfarin was restarted and recently she has been therapeutic. She has had persistent lethargy and fluctuating encephalopathy.  She had UTI that was treated.  Neurology was reconsulted due to increase in lethargy 8/17.  Rapid response was called.  CT head 8/17 was stable x2 however does show severe small vessel disease with old lacunar infarcts and stable subacute /ICHIVH., ABGs and BMP were unremarkable however CBC was remarkable for H&H of 7.5 and 22.8.    Per daughter- patient lives with her daughter. At baseline the patient is oriented to self and location but not to month/year. Has forgetfulness.  Uses walker and w/c- decreased mobility felt related to OA and age-related deconditioning.    Diagnoses:  Spontaneous IVH/IPH  Fluctuating encephalopathy- ? Etiology  Acute right lower extremity arterial occlusion status post thromboembolectomy  A. fib-on warfarin  E. 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  No acute infarct or abnormal enhancement.  MRA head 8/3: No aneurysm  CTA head/neck 8/4: Mild atheromatous calcification in bilateral carotid bifurcations without significant stenosis.  Moderate calcifications of bilateral  carotid siphons with out significant stenosis.  No aneurysm.  Moderate stenosis at the origin of the right A2 which is new since 2017.  Cerebral angiogram 8/4: No cause of IVH/IPH seen, see full report  Chest x-ray 8/12: Cardiomegaly vascular congestion.  Left basilar consolidation.  Labs: Repeat UA negative on 8/10.  CRP 1.0, sed rate 10, respiratory panel negative including negative for COVID-19 on 8/3.    Plan:  Getting 2 unite PRBCs today, CT Abd/pelvis pending to eval source of anemia  Metabolic w/u: check UA, B12, TSH/Free T4, ammonia  Check MRI brain WO to eval for CT occult stroke related to afib/period off of AC  Check EEG  If above work up negative worsening AMS/lethargy 8/17 felt at least in part related to anemia  Neurochecks  BP goal <160/110  PT/OT/ST  D/W KAREN Chin. Patient is her aunt. D/W Dr Pappas. Will follow.

## 2020-08-18 NOTE — TELEPHONE ENCOUNTER
Appt made for CT of head for 9.1.20, she needs to arrive at hospital at 1:15 pm and come to office afterwards for visit with Dr DOYLE    Nursing staff on [address] informed

## 2020-08-18 NOTE — PLAN OF CARE
Problem: Patient Care Overview  Goal: Plan of Care Review  Outcome: Ongoing (interventions implemented as appropriate)  Flowsheets (Taken 8/17/2020 5070)  Progress: improving  Plan of Care Reviewed With: patient  Outcome Summary: vss, hgb dropped to 7.5, redraw was 8, attending stated he didnt want to infuse unless <7, no signs of bleeding, ct stable, pt drowsy and slow to verbally respond this am, improvement as the day progressed, turn q2hrs, inr therapeutic hep discontinued, monitor h&h  plan for discharge to [facility]

## 2020-08-18 NOTE — PROGRESS NOTES
Continued Stay Note  [facility]     Patient Name: Jihan Teresa  MRN: [id number]  Today's Date: 8/18/2020    Admit Date: 8/3/2020    Discharge Plan     Row Name 08/18/20 1251       Plan    Plan  [facility]    Patient/Family in Agreement with Plan  yes    Plan Comments  Met with pt and daughter at bedside who confirm plan for [facility] at discharge.  Packet in CCP office.  GINGER Pelayo RN        Discharge Codes    No documentation.       Expected Discharge Date and Time     Expected Discharge Date Expected Discharge Time    Aug 11, 2020             Ashley Pelayo RN

## 2020-08-18 NOTE — NURSING NOTE
WOCN: Pressure Injury DTI bilateral buttock. Low air loss mattress ordered.  Recommend silicone border      08/18/20 0900   Wound 08/17/20 2107 Bilateral posterior gluteal Pressure Injury   Date first assessed/Time first assessed: 08/17/20 2107   Present on Hospital Admission: No  Side: Bilateral  Orientation: posterior  Location: gluteal  Primary Wound Type: Pressure Injury  Stage, Pressure Injury: deep tissue injury   Dressing Appearance open to air   Base maroon/purple   Periwound intact;pink;blanchable   Wound Width (cm)   (rt buttock .5x.5cm lt 1cmx.5cm)   Drainage Amount   (silicone border dressing)

## 2020-08-18 NOTE — NURSING NOTE
Call received from MRI regarding patients results, call placed to neurology, awaiting return phone call.

## 2020-08-18 NOTE — PLAN OF CARE
Problem: Patient Care Overview  Goal: Plan of Care Review  Outcome: Ongoing (interventions implemented as appropriate)  Flowsheets (Taken 8/18/2020 0524)  Progress: declining  Plan of Care Reviewed With: patient  Outcome Summary: vitals stable.  pt did not appear to be in any pain.  turn q 2 hours.  jovani care as needed.  pure wick catheter in place.  skin breakdown noted.  wound consult placed.  NIHSS noted.  MDs and APRN notified.  neurology consulted again.  ABG completed.  CT completed.  will continue to monitor.

## 2020-08-18 NOTE — NURSING NOTE
Call placed to lizette Granados, an placed to obtain UA. Patient is incontinent of urine.  Call placed to in/out cath patient to obtain urine specimen awaiting return phone call.

## 2020-08-18 NOTE — THERAPY TREATMENT NOTE
Patient Name: Jihan Teresa  : 1933    MRN: [id number]                              Today's Date: 2020       Admit Date: 8/3/2020    Visit Dx:     ICD-10-CM ICD-9-CM   1. Nontraumatic intracerebral hemorrhage, unspecified cerebral location, unspecified laterality (CMS/HCC) I61.9 431   2. Acute UTI N39.0 599.0   3. 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) I61.0 431     Patient Active Problem List   Diagnosis   • Type 2 diabetes mellitus (CMS/HCC)   • Aortic valve replaced, equine valve   • Cerebral infarction (CMS/HCC)   • A-fib (CMS/[facility])   • GERD without esophagitis   • Sleep apnea in adult   • Cognitive dysfunction   • Hypothyroidism (acquired)   • History of recurrent UTIs   • Mild reactive airways disease   • Essential hypertension   • Pulmonary hypertension (CMS/[facility])   • B12 deficiency   • Non-rheumatic mitral regurgitation   • Non-rheumatic tricuspid valve insufficiency   • Chronic anemia   • Grade III hemorrhoids   • ICH (intracerebral hemorrhage) (CMS/[facility])   • 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)     Past Medical History:   Diagnosis Date   • Anemia    • Aortic valve stenosis     S/p AVR in    • Asthma    • Atelectasis    • CHF (congestive heart failure) (CMS/[facility])    • Congenital heart disease    • Diabetes mellitus (CMS/[facility])    • Esophageal reflux    • Essential hypertension    • HLD (hyperlipidemia)    • Hypotension    • LVH (left ventricular hypertrophy)    • Pleural effusion    • Pulmonary hypertension (CMS/HCC)      Past Surgical History:   Procedure Laterality Date   • AORTIC VALVE REPAIR/REPLACEMENT  2009    Jerry Colmenares MD   • CATARACT EXTRACTION     • CEREBRAL ANGIOGRAM N/A 2020    Procedure: CEREBRAL ANGIOGRAM;  Surgeon: Ar Bunch MD;  Location: [facility] OR ;  Service: Neurosurgery;  Laterality: N/A;   • FEMORAL THROMBECTOMY/EMBOLECTOMY Right 2020    Procedure: FEMORAL  THROMBECTOMY/EMBOLECTOMY;  Surgeon: Winsome Valentin Jr., MD;  Location: [facility] MAIN OR;  Service: Vascular;  Laterality: Right;   • KNEE ARTHROPLASTY       General Information     Row Name 08/18/20 1534          PT Evaluation Time/Intention    Document Type  therapy note (daily note)  -     Mode of Treatment  physical therapy  -     Row Name 08/18/20 1534          General Information    Existing Precautions/Restrictions  fall;oxygen therapy device and L/min  -     Row Name 08/18/20 1534          Cognitive Assessment/Intervention- PT/OT    Orientation Status (Cognition)  oriented to;person  -       User Key  (r) = Recorded By, (t) = Taken By, (c) = Cosigned By    Initials Name Provider Type    Keyanna Hutson PTA Physical Therapy Assistant        Mobility     Row Name 08/18/20 1535          Bed Mobility Assessment/Treatment    Comment (Bed Mobility)  bed exercises only; transport showed up to take pt to CT before attempting to get pt OOB  -SM       User Key  (r) = Recorded By, (t) = Taken By, (c) = Cosigned By    Initials Name Provider Type    Keyanna Hutson PTA Physical Therapy Assistant        Obj/Interventions     Row Name 08/18/20 1536          Therapeutic Exercise    Lower Extremity (Therapeutic Exercise)  heel slides, bilateral;quad sets, bilateral  -SM     Lower Extremity Range of Motion (Therapeutic Exercise)  ankle dorsiflexion/plantar flexion, bilateral  -SM     Position (Therapeutic Exercise)  supine  -SM     Sets/Reps (Therapeutic Exercise)  5-10 reps  -SM     Comment (Therapeutic Exercise)  reviewed other exercises w/ pt's daughter to attempt later this evening  -SM       User Key  (r) = Recorded By, (t) = Taken By, (c) = Cosigned By    Initials Name Provider Type    Keyanna Hutson PTA Physical Therapy Assistant        Goals/Plan    No documentation.       Clinical Impression     Row Name 08/18/20 1536          Pain Assessment    Additional Documentation  Pain  Scale: Numbers Pre/Post-Treatment (Group)  -SM     Row Name 08/18/20 1536          Pain Scale: Numbers Pre/Post-Treatment    Pain Scale: Numbers, Pretreatment  0/10 - no pain  -     Pain Scale: Numbers, Post-Treatment  0/10 - no pain  -SM     Row Name 08/18/20 1536          Positioning and Restraints    Pre-Treatment Position  in bed  -SM     Post Treatment Position  bed  -SM     In Bed  supine;with nsg;with family/caregiver on transport bed  -       User Key  (r) = Recorded By, (t) = Taken By, (c) = Cosigned By    Initials Name Provider Type    Keyanna Hutson PTA Physical Therapy Assistant        Outcome Measures     Row Name 08/18/20 1538          How much help from another person do you currently need...    Turning from your back to your side while in flat bed without using bedrails?  2  -SM     Moving from lying on back to sitting on the side of a flat bed without bedrails?  2  -SM     Moving to and from a bed to a chair (including a wheelchair)?  2  -SM     Standing up from a chair using your arms (e.g., wheelchair, bedside chair)?  2  -SM     Climbing 3-5 steps with a railing?  1  -SM     To walk in hospital room?  1  -SM     AM-PAC 6 Clicks Score (PT)  10  -SM     [facility] Name 08/18/20 1538          Functional Assessment    Outcome Measure Options  AM-PAC 6 Clicks Basic Mobility (PT)  -       User Key  (r) = Recorded By, (t) = Taken By, (c) = Cosigned By    Initials Name Provider Type    Keyanna Hutson PTA Physical Therapy Assistant        Physical Therapy Education                 Title: PT OT SLP Therapies (In Progress)     Topic: Physical Therapy (In Progress)     Point: Mobility training (In Progress)     Description:   Instruct learner(s) on safety and technique for assisting patient out of bed, chair or wheelchair.  Instruct in the proper use of assistive devices, such as walker, crutches, cane or brace.              Patient Friendly Description:   It's important to get you on your  feet again, but we need to do so in a way that is safe for you. Falling has serious consequences, and your personal safety is the most important thing of all.        When it's time to get out of bed, one of us or a family member will sit next to you on the bed to give you support.     If your doctor or nurse tells you to use a walker, crutches, a cane, or a brace, be sure you use it every time you get out of bed, even if you think you don't need it.    Learning Progress Summary           Patient Acceptance, E,D, NR by  at 8/18/2020 1538    Acceptance, E,D, NR by  at 8/17/2020 1034    Acceptance, E,D, NR by  at 8/15/2020 1439    Acceptance, E,TB,D, VU,NR by  at 8/14/2020 1510    Acceptance, E,TB, VU by CB at 8/13/2020 0922    Acceptance, E,D, VU,NR by PH at 8/12/2020 1540    Acceptance, E,TB, VU by CB at 8/11/2020 1623    Acceptance, E, NR by DJ at 8/10/2020 1530    Acceptance, E, VU,NR by MW at 8/8/2020 1542    Acceptance, E, DU,NR by AR at 8/7/2020 1432    Acceptance, E, DU,NR by AR at 8/6/2020 1208    Acceptance, E,D, NR by CG at 8/5/2020 1410    Comment:  does not appear to retain information.  CGRESSRN    Acceptance, E, NL,NR by AR at 8/5/2020 1204    Acceptance, E, DU,NR by AR at 8/4/2020 1453   Family Acceptance, E, VU,NR by MW at 8/8/2020 1542                   Point: Home exercise program (In Progress)     Description:   Instruct learner(s) on appropriate technique for monitoring, assisting and/or progressing patient with therapeutic exercises and activities.              Learning Progress Summary           Patient Acceptance, E,D, NR by  at 8/18/2020 1538    Acceptance, E,D, NR by  at 8/17/2020 1034    Acceptance, E,D, NR by  at 8/15/2020 1439    Acceptance, E,TB,D, VU,NR by SM at 8/14/2020 1510    Acceptance, E,TB, VU by CB at 8/13/2020 0922    Acceptance, E,D, VU,NR by PH at 8/12/2020 1540    Acceptance, E,TB, VU by CB at 8/11/2020 1623    Acceptance, E, VU,NR by  at 8/8/2020 1542     Acceptance, E, DU,NR by AR at 8/7/2020 1432    Acceptance, E, DU,NR by AR at 8/6/2020 1208    Acceptance, E,D, NR by CG at 8/5/2020 1410    Comment:  does not appear to retain information.  CGRESSRN    Acceptance, E, NL,NR by AR at 8/5/2020 1204    Acceptance, E, DU,NR by AR at 8/4/2020 1453   Family Acceptance, E, VU,NR by MW at 8/8/2020 1542                   Point: Body mechanics (In Progress)     Description:   Instruct learner(s) on proper positioning and spine alignment for patient and/or caregiver during mobility tasks and/or exercises.              Learning Progress Summary           Patient Acceptance, E,D, NR by  at 8/18/2020 1538    Acceptance, E,D, NR by  at 8/17/2020 1034    Acceptance, E,D, NR by SM at 8/15/2020 1439    Acceptance, E,TB,D, VU,NR by SM at 8/14/2020 1510    Acceptance, E,TB, VU by CB at 8/13/2020 0922    Acceptance, E,D, VU,NR by  at 8/12/2020 1540    Acceptance, E,TB, VU by CB at 8/11/2020 1623    Acceptance, E, NR by  at 8/10/2020 1530    Acceptance, E, VU,NR by MW at 8/8/2020 1542    Acceptance, E, DU,NR by AR at 8/7/2020 1432    Acceptance, E, DU,NR by AR at 8/6/2020 1208    Acceptance, E,D, NR by CG at 8/5/2020 1410    Comment:  does not appear to retain information.  CGRESSRN    Acceptance, E, NL,NR by AR at 8/5/2020 1204    Acceptance, E, DU,NR by AR at 8/4/2020 1453   Family Acceptance, E, VU,NR by MW at 8/8/2020 1542                   Point: Precautions (In Progress)     Description:   Instruct learner(s) on prescribed precautions during mobility and gait tasks              Learning Progress Summary           Patient Acceptance, E,D, NR by SM at 8/18/2020 1538    Acceptance, E,D, NR by  at 8/17/2020 1034    Acceptance, E,D, NR by  at 8/15/2020 1439    Acceptance, E,TB,D, VU,NR by SM at 8/14/2020 1510    Acceptance, E,TB, VU by CB at 8/13/2020 0922    Acceptance, E,D, VU,NR by PH at 8/12/2020 1540    Acceptance, E,TB, VU by CB at 8/11/2020 1623    Acceptance, E, NR by  DJ at 8/10/2020 1530    Acceptance, E, VU,NR by MW at 8/8/2020 1542    Acceptance, E, DU,NR by AR at 8/7/2020 1432    Acceptance, E, DU,NR by AR at 8/6/2020 1208    Acceptance, E,D, NR by CG at 8/5/2020 1410    Comment:  does not appear to retain information.  CGRESSRN    Acceptance, E, NL,NR by AR at 8/5/2020 1204    Acceptance, E, DU,NR by AR at 8/4/2020 1453   Family Acceptance, E, VU,NR by MW at 8/8/2020 1542                               User Key     Initials Effective Dates Name Provider Type Discipline     06/16/16 -  Geneva Villela RN Registered Nurse Nurse     03/07/18 -  Keyanna Wellington, PTA Physical Therapy Assistant PT     08/19/18 -  Julieta Wheat, PTA Physical Therapy Assistant PT    PH 08/20/19 -  Kaelyn De La O, PTA Physical Therapy Assistant PT    MW 09/17/19 -  Pauline Harris, PT Physical Therapist PT    DJ 10/25/19 -  Florence Wallace, PT Physical Therapist PT    AR 07/02/20 -  Raghu Garcia, PT Student PT Student PT    CB 07/02/20 -  Sami Guajardo, PT Student PT Student PT              PT Recommendation and Plan     Outcome Summary/Treatment Plan (PT)  Anticipated Discharge Disposition (PT): skilled nursing facility  Plan of Care Reviewed With: patient  Progress: no change  Outcome Summary: Pt tolerated treatment fair this date. Unable to do much today d/t transport arriving to take pt off floor for CT. Completed a few supine exercises, then reviewed a few others w/ daughter to attempt later this evening. Patient was not wearing a face mask during this therapy encounter. Therapist used appropriate personal protective equipment including eye protection, mask, and gloves.  Mask used was standard procedure mask. Appropriate PPE was worn during the entire therapy session. Hand hygiene was completed before and after therapy session. Patient is not in enhanced droplet precautions. Also present: Leda, PT tech.     Time Calculation:   PT Charges     Row Name 08/18/20 1543              Time Calculation    Start Time  1430  -      Stop Time  1445  -      Time Calculation (min)  15 min  -      PT Received On  08/18/20  -      PT - Next Appointment  08/19/20  -        User Key  (r) = Recorded By, (t) = Taken By, (c) = Cosigned By    Initials Name Provider Type     Keyanna Wellington PTA Physical Therapy Assistant        Therapy Charges for Today     Code Description Service Date Service Provider Modifiers Qty    [id number] HC PT THER PROC EA 15 MIN 8/18/2020 Keyanna Wellington PTA  1          PT G-Codes  Outcome Measure Options: AM-PAC 6 Clicks Basic Mobility (PT)  AM-PAC 6 Clicks Score (PT): 10    Keyanna Wellington PTA  8/18/2020

## 2020-08-19 NOTE — PROGRESS NOTES
DOS: 2020  NAME: Jihan Teresa   : 1933  PCP: Yaron Ca Jr., MD    Chief Complaint   Patient presents with   • Weakness - Generalized        Stroke    Subjective: Pt seen in follow up, however the problem is new to me.  Sitting up in bed alert and talkative.  Having visual and auditory hallucinations.  She reports she is caring her older sister who is .  Her daughter and great niece were at the bedside.  Nothing new to report.  Daughter reports that her hallucinations have been going on for at least a week if not more.    Objective:  Vital signs:      Vitals:    20 2300 20 0045 20 0300 20 0700   BP: 122/63 147/89 147/84 148/79   BP Location: Left arm  Left arm Left arm   Patient Position: Lying  Lying Lying   Pulse: 73 84 72 76   Resp: 18 16 16 16   Temp: 98.7 °F (37.1 °C) 98.8 °F (37.1 °C) 98.6 °F (37 °C) 97.9 °F (36.6 °C)   TempSrc: Oral Oral Oral Temporal   SpO2: 94% 90% 94% 92%   Weight:       Height:           Current Facility-Administered Medications:   •  amLODIPine (NORVASC) tablet 10 mg, 10 mg, Oral, Q24H, Reyna Tolbert APRN, 10 mg at 20 1013  •  furosemide (LASIX) tablet 20 mg, 20 mg, Oral, Daily, Winsome Valentin Jr., MD, 20 mg at 20 1012  •  haloperidol lactate (HALDOL) injection 2 mg, 2 mg, Intravenous, Q2H PRN, Shane Mota MD  •  hydrALAZINE (APRESOLINE) injection 10 mg, 10 mg, Intravenous, Q4H PRN, Winsome Valentin Jr., MD, 10 mg at 20 0841  •  isosorbide mononitrate (IMDUR) 24 hr tablet 60 mg, 60 mg, Oral, Daily, Winsome Valentin Jr., MD, 60 mg at 20 1012  •  lactated ringers infusion, 9 mL/hr, Intravenous, Continuous, Winsome Valentin Jr., MD, Last Rate: 9 mL/hr at 20 1923  •  levothyroxine (SYNTHROID, LEVOTHROID) tablet 75 mcg, 75 mcg, Oral, Homero BIANCHI Salem Michael Jr., MD, 75 mcg at 20 1013  •  metoprolol tartrate (LOPRESSOR) tablet 25 mg, 25 mg, Oral, Q12H, Winsome Valentin  Alen Tobias MD, 25 mg at 08/19/20 1013  •  Pharmacy to dose warfarin, , Does not apply, Continuous PRN, Winsome Valentin Jr., MD  •  potassium chloride (MICRO-K) CR capsule 40 mEq, 40 mEq, Oral, PRN, 40 mEq at 08/15/20 2118 **OR** potassium chloride (KLOR-CON) packet 40 mEq, 40 mEq, Oral, PRN, 40 mEq at 08/07/20 1809 **OR** potassium chloride 10 mEq in 100 mL IVPB, 10 mEq, Intravenous, Q1H PRN, Winsome Valentin Jr., MD  •  [COMPLETED] Insert peripheral IV, , , Once **AND** sodium chloride 0.9 % flush 10 mL, 10 mL, Intravenous, PRN, Winsome Valentin Jr., MD, 10 mL at 08/17/20 2112  •  sodium chloride 0.9 % flush 10 mL, 10 mL, Intravenous, PRN, Nimesh Ramirez MD  •  warfarin (COUMADIN) tablet 1.5 mg, 1.5 mg, Oral, Once per day on Mon Thu, Winsome Valentin Jr., MD, Stopped at 08/17/20 1800  •  warfarin (COUMADIN) tablet 3 mg, 3 mg, Oral, Once per day on Sun Tue Wed Fri Sat, Winsome Valentin Jr., MD, 3 mg at 08/18/20 1850    PRN meds  haloperidol lactate  •  hydrALAZINE  •  Pharmacy to dose warfarin  •  potassium chloride **OR** potassium chloride **OR** potassium chloride  •  [COMPLETED] Insert peripheral IV **AND** sodium chloride  •  sodium chloride    No current facility-administered medications on file prior to encounter.      Current Outpatient Medications on File Prior to Encounter   Medication Sig   • isosorbide mononitrate (IMDUR) 60 MG 24 hr tablet TAKE 1 TABLET BY MOUTH EVERY DAY   • levothyroxine (SYNTHROID, LEVOTHROID) 150 MCG tablet TAKE 1 TABLET BY MOUTH EVERY DAY (Patient taking differently: Take 75 mcg by mouth Daily.)   • warfarin (COUMADIN) 3 MG tablet Take 3 mg daily by mouth Tuesday through Sunday.  Do not take Monday (Patient taking differently: Take 3 mg daily by mouth Tuesday Wednesday Friday Saturday and Sunday.  Monday and Thursday patient to take 1.5 mg)   • acetaminophen (TYLENOL) 500 MG tablet Take  by mouth As Needed.   • ascorbic acid (VITAMIN C) 250 MG  chewable tablet chewable tablet Chew 500 mg Daily.   • atorvastatin (LIPITOR) 40 MG tablet TAKE 1 TABLET BY MOUTH EVERY DAY (Patient taking differently: Take 40 mg by mouth Daily.)   • Calcium Citrate-Vitamin D 250-200 MG-UNIT tablet Take  by mouth 2 (Two) Times a Day.   • Clobetasol Propionate Emulsion 0.05 % topical foam APPLY SPARINGLY TO SCALP TWICE DAILY X 2 WEEKS. THEN AS NEEDED FOR FLARES   • clopidogrel (PLAVIX) 75 MG tablet TAKE 1 TABLET BY MOUTH EVERY DAY (Patient taking differently: Take 75 mg by mouth Daily.)   • furosemide (LASIX) 40 MG tablet TAKE 1 TABLET BY MOUTH THREE TIMES A DAY (Patient taking differently: Take 40 mg by mouth 3 (Three) Times a Day.)   • hydrocortisone (ANUSOL-HC) 2.5 % rectal cream Insert  into the rectum 2 (Two) Times a Day.   • ketoconazole (NIZORAL) 2 % shampoo WASH SCALP AND 2-3 TIMES A WEEK. LEAVE ON FOR 2-3 MINUTES THEN RINSE.   • metoprolol tartrate (LOPRESSOR) 50 MG tablet TAKE 1 TABLET BY MOUTH 2 (TWO) TIMES DAILY (Patient taking differently: Take 50 mg by mouth 2 (Two) Times a Day.)   • Multiple Vitamin (MULTIVITAMIN) tablet Take 1 tablet by mouth Daily.   • omeprazole (priLOSEC) 20 MG capsule Take 20 mg by mouth Daily.   • Phenylephrine-Witch Hazel (PREPARATION H) 0.25-50 % gel Insert 1 g into the rectum 4 (Four) Times a Day As Needed (rectal burning).   • potassium chloride (MICRO-K) 10 MEQ CR capsule TAKE 2 CAPSULES BY MOUTH 2 (TWO) TIMES A DAY. (Patient taking differently: Take 20 mEq by mouth 2 (Two) Times a Day.)       General appearance: Elderly female, pleasantly confused, NAD, alert and cooperative, well groomed  HEENT: Normocephalic, atraumatic, PERRL, no masses or tenderness  COR: RRR  Resp: Even and unlabored  Extremities: No obvious edema  Skin: warm, dry    Neurological:   MS: oriented to self only, recent/remote memory severely impaired,  decreased attention/concentration, language intact but delayed responses with speech, no neglect  CN: visual fields  full, PERRL, EOMI, facial sensation equal, no facial droop, Rosebud, palate elevates symmetrically, shoulder shrug equal, tongue midline  Motor: 5/5 in upper extremities, 1/5 in BLE  Reflexes: 1+ in all 4 ext.  Sensory: light touch sensation intact in all 4 ext.  Coordination: Normal finger to nose test bilaterally  Gait and station: CAMELIA patient extremely weak in her lower extremities  Rapid alternating movements: normal finger to thumb tap    Laboratory results:  Lab Results   Component Value Date    TSH 3.800 08/18/2020     Lab Results   Component Value Date    HGBA1C 6.8 (H) 01/09/2020     Lab Results   Component Value Date    JRQFEUTA41 1,344 (H) 08/18/2020     Lab Results   Component Value Date    CHOL 109 06/13/2017    CHOL 96 02/10/2017    CHLPL 108 01/09/2020    CHLPL 214 (H) 01/06/2016     Lab Results   Component Value Date    TRIG 114 01/09/2020    TRIG 36 06/13/2017    TRIG 81 02/10/2017     Lab Results   Component Value Date    HDL 36 (L) 01/09/2020    HDL 51 06/13/2017    HDL 42 02/10/2017     Lab Results   Component Value Date    LDL 49 01/09/2020    LDL 51 06/13/2017    LDL 38 02/10/2017     Lab Results   Component Value Date    WBC 10.10 08/19/2020    HGB 10.3 (L) 08/19/2020    HCT 32.1 (L) 08/19/2020    MCV 87.9 08/19/2020     08/19/2020     Lab Results   Component Value Date    GLUCOSE 128 (H) 08/19/2020    BUN 21 08/19/2020    CREATININE 0.79 08/19/2020    EGFRIFNONA 69 08/19/2020    EGFRIFAFRI 60 01/09/2020    BCR 26.6 (H) 08/19/2020    K 3.6 08/19/2020    CO2 27.2 08/19/2020    CALCIUM 8.6 08/19/2020    PROTENTOTREF 6.3 01/09/2020    ALBUMIN 3.30 (L) 08/03/2020    LABIL2 2.0 01/09/2020    AST 23 08/03/2020    ALT 31 08/03/2020     Lab Results   Component Value Date    PTT 42.7 (H) 08/19/2020     Lab Results   Component Value Date    INR 2.52 (H) 08/19/2020    INR 2.27 (H) 08/18/2020    INR 2.34 (H) 08/17/2020    PROTIME 26.3 (H) 08/19/2020    PROTIME 24.3 (H) 08/18/2020    PROTIME 24.9 (H)  08/17/2020     Brief Urine Lab Results  (Last result in the past 365 days)      Color   Clarity   Blood   Leuk Est   Nitrite   Protein   CREAT   Urine HCG        08/18/20 1414 Yellow Clear Negative Negative Negative Negative               Review and interpretation of imaging:  Ct Abdomen Pelvis Without Contrast    Result Date: 8/18/2020  1. There is a hematoma which measures at least 9 cm which extends from the right common femoral artery medially into the right medial thigh musculature. There is also a 2.4 cm hematoma along the anterior wall of the femoral artery. There are much smaller hematomas at the subcutaneous tissues of the right groin. A pseudoaneurysm can't be excluded. 2. There is a trace amount of free fluid within the right aspect of the pelvis. There is otherwise no free fluid within the pelvis or abdomen.  Discussed with the patient's nurse by phone.      Ct Head Without Contrast    Result Date: 8/18/2020  Stable appearance of the brain since the earlier study today. Please see full discussion above.  Radiation dose reduction techniques were utilized, including automated exposure control and exposure modulation based on body size.  This report was finalized on 8/18/2020 12:23 AM by Dr. Chay Conklin M.D.      Ct Head Without Contrast    Result Date: 8/17/2020  1. Overall no significant change over the past 4 days since prior head CT 08/13/2020. 2. There is severe small vessel disease in the cerebral white matter, 8 mm old lacunar infarct in the anteromedial left thalamus, small, 3 mm, old lacunar infarcts in the genu of the right internal capsule and posterior left putamen. There are multiple old posterior inferior lateral left cerebellar infarcts in the left PICA territory and several tiny, 3-5 mm, old right cerebellar white matter infarcts in the right PICA territory. 3. Stable tiny foci of subacute intraventricular hemorrhage in the posterior tips of the trigones of the lateral ventricles,  unusual rind of hemorrhage in the anterior third ventricle that may extend into the subependymal region, stable tiny, 7 x 5 mm, chronic late subacute to chronic hematoma cavities in the inferior medial frontal lobe white matter in a bilateral symmetric fashion just inferior to the frontal horns of the lateral ventricles, better demonstrated on prior MRI of the brain 08/03/2020.  The etiology of the findings is uncertain. 4. Subtotal opacification of the left mastoid and middle ear cavity. The remainder of the head CT is unremarkable.  Radiation dose reduction techniques were utilized, including automated exposure control and exposure modulation based on body size.  This report was finalized on 8/17/2020 4:30 PM by Dr. Jefe Costa M.D.      Ct Head Without Contrast    Result Date: 8/13/2020  1. Stable appearance of a small volume intraventricular blood within the occipital horns and involving the anterior aspect of the third ventricle. There is no evidence of new hemorrhage, hydrocephalus or of abnormal extra-axial fluid. 2. Atrophy, extensive small vessel ischemic disease, remote left cerebellar infarcts, as well as a lacunar infarct involving the thalamus on the left. There is no evidence of acute infarction.  A noncontrasted CT examination of brain was performed and compared to the CT examination of 08/08/2020.    Radiation dose reduction techniques were utilized, including automated exposure control and exposure modulation based on body size.  This report was finalized on 8/13/2020 5:24 PM by Dr. Tim Khoury M.D.      Mri Brain Without Contrast    Result Date: 8/19/2020  1.  5 mm acute infarct involving the left frontal lobe superolaterally. 2.  Areas of residual blood products are identified involving the anterior aspect of the third ventricle and involving the medial and inferior aspects of the frontal lobes bilaterally adjacent to the lateral ventricles. The volume of blood has decreased versus 08/03/2012.  There is a small amount of blood present within the occipital horns bilaterally, present previously. There is no evidence of new hemorrhage or of hydrocephalus. The etiology for the hemorrhage is uncertain. 3.  Extensive small vessel ischemic disease. 4.  Fluid within the mastoid air cells bilaterally.  The information regarding the infarct was called to the patient's nurse at the time of the dictation. The patient's nurse is to relay the information to the clinical service.  This report was finalized on 8/19/2020 8:30 AM by Dr. Tim Khoury M.D.      Xr Chest 1 View    Result Date: 8/12/2020   1. Left-sided catheter terminates within the left innominate vein. 2. Cardiomegaly vascular congestion. 3. Left basilar consolidation, new when compared to prior. Correlation with any evidence of infection is recommended. 4. Small bilateral pleural effusions.  This report was finalized on 8/12/2020 9:10 PM by Dr. Guadalupe Carney M.D.      Results for orders placed during the hospital encounter of 06/10/17   Adult Transthoracic Echo Complete With Contrast    Narrative · Calculated EF = 46.9%.  · Left ventricular systolic function is low normal.  · Left amd right atrial cavity size is severely dilated.  · Right ventricular cavity is severely dilated.  · calcification of the aortic valve  · Severe mitral valve regurgitation is present  · Mitral annular calcification is present. Posterior leaflet very   calcified and immobile.  · Severe tricuspid valve regurgitation is present.  · Estimated right ventricular systolic pressure from tricuspid   regurgitation is markedly elevated (>55 mmHg). Calculated right   ventricular systolic pressure from tricuspid regurgitation is 66.7 mmHg.            Impression/Assessment:  This is a 86-year-old female with past medical history of hyper tension, hyperlipidemia, diabetes, Mark hypertension, VHD status post AVR, CHF, atrial fibrillation on warfarin and Plavix, previous stroke with  residual left-sided weakness who was originally admitted on 8/3 with confusion and was found to have an IVH.  Diagnostic cerebral angiogram was completed this did not show any evidence of aneurysm, AVM, or dural AV fistula but did show multifocal narrowing of the cortical vessels most significant in the bilateral A2 and A3 segments.  Her MRI also revealed multiple areas of chronic micro bleeds that were suggestive of possible amyloid.  Ultimately the hemorrhage was felt to be related to a spontaneous bleed secondary to her warfarin use.  We felt that given patient's history of atrial fibrillation multifocal stenosis most prominently in the right LIOR that she should continue anticoagulation but not continue her Plavix.  She had repeat CT head on 8/10 that was stable and we recommended restarting her anticoagulation with no drainage.  At some point she developed an acute right lower extremity arterial occlusion felt to be secondary to a cardiac embolus and underwent an open right femoral thromboembolectomy on 8/12.  Her warfarin was restarted and she had remained therapeutic with her INR.      She has had continued waxing and waning of her mental status with fluctuating encephalopathy.  She was initially early on in her admission found to have a UTI and this was treated.  A RRT was called on 8/17 due to increasing her lethargy.  We were reconsulted and she obtained a stat CT head that revealed stable subacute IVH, severe small vessel disease, and old lacunar infarcts.  CBC revealed a H&H of 7.5/22.8.  There was some suspicion of vascular dementia as well as the daughter had reported the patient was only oriented to self and location and had forgetfulness with decreased mobility at baseline.    Diagnosis: Acute left frontal lobe infarct, spontaneous IVH/IPH, fluctuating encephalopathy, anemia, acute right lower extremity arterial occlusion status post thromboembolectomy, atrial fibrillation, UTI status post treatment,  moderate cognitive impairment with extensive small vessel disease suspect vascular dementia    Work-up to date  8/3 CTH WO: Intraventricular hemorrhage within the both lateral ventricles, increased density within the interhemispheric fissure inferiorly suggestive of a subarachnoid hemorrhage, multiple old bilateral infarcts with lacunar disease.  Severe small vessel disease.  Prominent diffuse atrophy.  8/4 CTA H/N: No evidence of aneurysm involving the anterior communicating artery, moderate stenosis at the origin of the right A2, known areas of intraventricular hemorrhage unchanged.  8/6 CTH WO: Stable, no change in intraventricular hemorrhage.  Again showing severe small vessel disease, diffuse atrophy, and multiple old bilateral cerebellar and left thalamic infarcts.  8/3 MRI Brain W/WO: Several small areas of globular intraparenchymal hemorrhage extending into the anterior third ventricle adjacent to the anterior communicating artery, tiny amount of blood layering in the occipital horns.  Prominent diffuse atrophy, severe small vessel disease.  8/3 MRA H/N: No aneurysm identified.  8/4 arteriogram: No evidence of aneurysm, AVM, or dural AV fistula.  Multifocal segmental narrowing of the cortical vessels, most significant in the bilateral A2 and A3 segments that most likely represents atherosclerotic disease.  8/19 EEG: No potential epileptogenic activity, seizure activity, or focal slowing present.  8/18 Repeat MRI brain WO: Acute left frontal lobe infarct, no evidence of new hemorrhage or hydrocephalus, again showing small vessel disease.  Labs: Vitamin B12 1044, TSH 3.800, free T4 1.15, INR 2.27/2.52, ammonia 11, repeat U/A negative on 8/18, CRP 1.0, ESR 10, respiratory panel negative including negative for COVID-19 on 8/3    Plan:   MRI revealed a small acute left frontal lobe infarct, no new hemorrhage.  Not really convinced that new stroke is the cause of her waxing and waning of her lethargy, today she  has been having visual and auditory hallucinations. Daughter reports this is not new and has been going on for at least a week or more.   Hgb improved today (10.3), B12, TSH, Ammonia U/A all unremarkable. INR therapeutic today as well. I would not add anything to her anticoagulant therapy and risks of switching A/C to NOAC and subsequently suffering new bleed or worsening old one greatly outweigh the benefits for secondary stroke prevention with new agent.  EEG unremarkable.  Continue with Evidence-based delirium precautions include:     1. Frequent reorientation, reminding patient not questioning patient   2. Shades up during day/down at night   3. TV off except for soft music only   4. Familiar objects at bedside   5. Encourage friends/family to spend the night   6. Minimize anticholingeric medications   7. Minimize polypharmacy   8. Minimize restraints, includes lines and/or foleys and/or feeding tubes as able   9. Minimize overnight checks/vitals to encourage restful consistent sleep patterns   10. Out of bed during day as much as possible   11. Do not enforce visual/auditory hallucinations are real, redirect attention.    Neurochecks per stroke protocol  BP goal <140/90  Stroke Education  CURT/SCDs  PT/OT/ST  Therapies as written. CCP for discharge planning. Call RRT for any acute neurological changes. We will follow PRN.    Case discussed with patient, daughter, niece, RN, and Dr. Pappas, and he agrees with plan above.   KAREN Granger

## 2020-08-19 NOTE — THERAPY TREATMENT NOTE
Patient Name: Jihan Teresa  : 1933    MRN: [id number]                              Today's Date: 2020       Admit Date: 8/3/2020    Visit Dx:     ICD-10-CM ICD-9-CM   1. Nontraumatic intracerebral hemorrhage, unspecified cerebral location, unspecified laterality (CMS/HCC) I61.9 431   2. Acute UTI N39.0 599.0   3. 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) I61.0 431     Patient Active Problem List   Diagnosis   • Type 2 diabetes mellitus (CMS/HCC)   • Aortic valve replaced, equine valve   • Cerebral infarction (CMS/HCC)   • A-fib (CMS/[facility])   • GERD without esophagitis   • Sleep apnea in adult   • Cognitive dysfunction   • Hypothyroidism (acquired)   • History of recurrent UTIs   • Mild reactive airways disease   • Essential hypertension   • Pulmonary hypertension (CMS/[facility])   • B12 deficiency   • Non-rheumatic mitral regurgitation   • Non-rheumatic tricuspid valve insufficiency   • Chronic anemia   • Grade III hemorrhoids   • ICH (intracerebral hemorrhage) (CMS/[facility])   • 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)     Past Medical History:   Diagnosis Date   • Anemia    • Aortic valve stenosis     S/p AVR in    • Asthma    • Atelectasis    • CHF (congestive heart failure) (CMS/[facility])    • Congenital heart disease    • Diabetes mellitus (CMS/[facility])    • Esophageal reflux    • Essential hypertension    • HLD (hyperlipidemia)    • Hypotension    • LVH (left ventricular hypertrophy)    • Pleural effusion    • Pulmonary hypertension (CMS/HCC)      Past Surgical History:   Procedure Laterality Date   • AORTIC VALVE REPAIR/REPLACEMENT  2009    Jerry Colmenares MD   • CATARACT EXTRACTION     • CEREBRAL ANGIOGRAM N/A 2020    Procedure: CEREBRAL ANGIOGRAM;  Surgeon: Ar Bunch MD;  Location: [facility] OR ;  Service: Neurosurgery;  Laterality: N/A;   • FEMORAL THROMBECTOMY/EMBOLECTOMY Right 2020    Procedure: FEMORAL  THROMBECTOMY/EMBOLECTOMY;  Surgeon: Winsome Valentin Jr., MD;  Location: [facility] MAIN OR;  Service: Vascular;  Laterality: Right;   • KNEE ARTHROPLASTY       General Information     Row Name 08/19/20 1605          PT Evaluation Time/Intention    Document Type  therapy note (daily note)  -     Mode of Treatment  physical therapy  -     Row Name 08/19/20 1605          General Information    Existing Precautions/Restrictions  fall;oxygen therapy device and L/min  -     Barriers to Rehab  cognitive status  -     Row Name 08/19/20 1605          Cognitive Assessment/Intervention- PT/OT    Orientation Status (Cognition)  oriented to;person  -       User Key  (r) = Recorded By, (t) = Taken By, (c) = Cosigned By    Initials Name Provider Type     Keyanna Wellington PTA Physical Therapy Assistant        Mobility     Row Name 08/19/20 1606          Bed Mobility Assessment/Treatment    Bed Mobility Assessment/Treatment  supine-sit;sit-supine  -     Supine-Sit [place] (Bed Mobility)  maximum assist (25% patient effort);2 person assist  -     Sit-Supine [place] (Bed Mobility)  maximum assist (25% patient effort);2 person assist  -     Assistive Device (Bed Mobility)  bed rails;head of bed elevated  -     Row Name 08/19/20 1606          Transfer Assessment/Treatment    Comment (Transfers)  attempted standing, though pt very weak and seemed to resist d/t cognitive status  -     Row Name 08/19/20 1606          Sit-Stand Transfer    Sit-Stand [place] (Transfers)  maximum assist (25% patient effort);dependent (less than 25% patient effort);2 person assist  -     Assistive Device (Sit-Stand Transfers)  -- HHA  -       User Key  (r) = Recorded By, (t) = Taken By, (c) = Cosigned By    Initials Name Provider Type     Keyanna Wellington PTA Physical Therapy Assistant        Obj/Interventions     Row Name 08/19/20 1620          Therapeutic Exercise    Lower Extremity (Therapeutic Exercise)   LAQ (long arc quad), bilateral;marching while seated  -     Lower Extremity Range of Motion (Therapeutic Exercise)  ankle dorsiflexion/plantar flexion, bilateral  -     Exercise Type (Therapeutic Exercise)  AROM (active range of motion)  -     Position (Therapeutic Exercise)  seated  -     Sets/Reps (Therapeutic Exercise)  5 reps  -     Comment (Therapeutic Exercise)  pt responded to physical commands much faster today  -[facility] Name 08/19/20 1620          Static Sitting Balance    Level of [place] (Unsupported Sitting, Static Balance)  moderate assist, 50 to 74% patient effort  -     Sitting Position (Unsupported Sitting, Static Balance)  sitting on edge of bed  -     Time Able to Maintain Position (Unsupported Sitting, Static Balance)  more than 5 minutes  -     Comment (Unsupported Sitting, Static Balance)  posterior lean  -       User Key  (r) = Recorded By, (t) = Taken By, (c) = Cosigned By    Initials Name Provider Type    Keyanna Hutson PTA Physical Therapy Assistant        Goals/Plan    No documentation.       Clinical Impression     [facility] Name 08/19/20 1625          Pain Assessment    Additional Documentation  Pain Scale: Numbers Pre/Post-Treatment (Group)  -SM     Row Name 08/19/20 1625          Pain Scale: Numbers Pre/Post-Treatment    Pain Scale: Numbers, Pretreatment  0/10 - no pain  -     Pain Scale: Numbers, Post-Treatment  0/10 - no pain  -SM     Row Name 08/19/20 1623          Positioning and Restraints    Pre-Treatment Position  in bed  -     Post Treatment Position  bed  -SM     In Bed  supine;call light within reach;encouraged to call for assist;exit alarm on;with family/caregiver;notified nsg  -       User Key  (r) = Recorded By, (t) = Taken By, (c) = Cosigned By    Initials Name Provider Type    Keyanna Hutson PTA Physical Therapy Assistant        Outcome Measures     Row Name 08/19/20 2209          How much help from another person do you  currently need...    Turning from your back to your side while in flat bed without using bedrails?  2  -SM     Moving from lying on back to sitting on the side of a flat bed without bedrails?  2  -SM     Moving to and from a bed to a chair (including a wheelchair)?  1  -SM     Standing up from a chair using your arms (e.g., wheelchair, bedside chair)?  2  -SM     Climbing 3-5 steps with a railing?  1  -SM     To walk in hospital room?  1  -SM     AM-PAC 6 Clicks Score (PT)  9  -SM     Row Name 08/19/20 1626          Functional Assessment    Outcome Measure Options  AM-PAC 6 Clicks Basic Mobility (PT)  -       User Key  (r) = Recorded By, (t) = Taken By, (c) = Cosigned By    Initials Name Provider Type    Keyanna Hutson, REJI Physical Therapy Assistant        Physical Therapy Education                 Title: PT OT SLP Therapies (In Progress)     Topic: Physical Therapy (In Progress)     Point: Mobility training (In Progress)     Description:   Instruct learner(s) on safety and technique for assisting patient out of bed, chair or wheelchair.  Instruct in the proper use of assistive devices, such as walker, crutches, cane or brace.              Patient Friendly Description:   It's important to get you on your feet again, but we need to do so in a way that is safe for you. Falling has serious consequences, and your personal safety is the most important thing of all.        When it's time to get out of bed, one of us or a family member will sit next to you on the bed to give you support.     If your doctor or nurse tells you to use a walker, crutches, a cane, or a brace, be sure you use it every time you get out of bed, even if you think you don't need it.    Learning Progress Summary           Patient Acceptance, E,D, NR,NL by  at 8/19/2020 1626    Acceptance, E,D, NR by  at 8/18/2020 1538    Acceptance, E,D, NR by  at 8/17/2020 1034    Acceptance, E,D, NR by  at 8/15/2020 1439    Acceptance, E,TB,D,  VU,NR by SM at 8/14/2020 1510    Acceptance, E,TB, VU by CB at 8/13/2020 0922    Acceptance, E,D, VU,NR by PH at 8/12/2020 1540    Acceptance, E,TB, VU by CB at 8/11/2020 1623    Acceptance, E, NR by  at 8/10/2020 1530    Acceptance, E, VU,NR by MW at 8/8/2020 1542    Acceptance, E, DU,NR by AR at 8/7/2020 1432    Acceptance, E, DU,NR by AR at 8/6/2020 1208    Acceptance, E,D, NR by CG at 8/5/2020 1410    Comment:  does not appear to retain information.  CGRESSRN    Acceptance, E, NL,NR by AR at 8/5/2020 1204    Acceptance, E, DU,NR by AR at 8/4/2020 1453   Family Acceptance, E, VU,NR by MW at 8/8/2020 1542                   Point: Home exercise program (In Progress)     Description:   Instruct learner(s) on appropriate technique for monitoring, assisting and/or progressing patient with therapeutic exercises and activities.              Learning Progress Summary           Patient Acceptance, E,D, NR,NL by SM at 8/19/2020 1626    Acceptance, E,D, NR by SM at 8/18/2020 1538    Acceptance, E,D, NR by  at 8/17/2020 1034    Acceptance, E,D, NR by SM at 8/15/2020 1439    Acceptance, E,TB,D, VU,NR by SM at 8/14/2020 1510    Acceptance, E,TB, VU by CB at 8/13/2020 0922    Acceptance, E,D, VU,NR by PH at 8/12/2020 1540    Acceptance, E,TB, VU by CB at 8/11/2020 1623    Acceptance, E, VU,NR by MW at 8/8/2020 1542    Acceptance, E, DU,NR by AR at 8/7/2020 1432    Acceptance, E, DU,NR by AR at 8/6/2020 1208    Acceptance, E,D, NR by CG at 8/5/2020 1410    Comment:  does not appear to retain information.  CGRESSRN    Acceptance, E, NL,NR by AR at 8/5/2020 1204    Acceptance, E, DU,NR by AR at 8/4/2020 1453   Family Acceptance, E, VU,NR by WENDY at 8/8/2020 1542                   Point: Body mechanics (In Progress)     Description:   Instruct learner(s) on proper positioning and spine alignment for patient and/or caregiver during mobility tasks and/or exercises.              Learning Progress Summary           Patient  Acceptance, E,D, NR,NL by SM at 8/19/2020 1626    Acceptance, E,D, NR by  at 8/18/2020 1538    Acceptance, E,D, NR by  at 8/17/2020 1034    Acceptance, E,D, NR by SM at 8/15/2020 1439    Acceptance, E,TB,D, VU,NR by SM at 8/14/2020 1510    Acceptance, E,TB, VU by CB at 8/13/2020 0922    Acceptance, E,D, VU,NR by PH at 8/12/2020 1540    Acceptance, E,TB, VU by CB at 8/11/2020 1623    Acceptance, E, NR by ELVA at 8/10/2020 1530    Acceptance, E, VU,NR by WENDY at 8/8/2020 1542    Acceptance, E, DU,NR by AR at 8/7/2020 1432    Acceptance, E, DU,NR by AR at 8/6/2020 1208    Acceptance, E,D, NR by CG at 8/5/2020 1410    Comment:  does not appear to retain information.  CGRESSRN    Acceptance, E, NL,NR by AR at 8/5/2020 1204    Acceptance, E, DU,NR by AR at 8/4/2020 1453   Family Acceptance, E, VU,NR by WENDY at 8/8/2020 1542                   Point: Precautions (In Progress)     Description:   Instruct learner(s) on prescribed precautions during mobility and gait tasks              Learning Progress Summary           Patient Acceptance, E,D, NR,NL by  at 8/19/2020 1626    Acceptance, E,D, NR by  at 8/18/2020 1538    Acceptance, E,D, NR by  at 8/17/2020 1034    Acceptance, E,D, NR by  at 8/15/2020 1439    Acceptance, E,TB,D, VU,NR by SM at 8/14/2020 1510    Acceptance, E,TB, VU by CB at 8/13/2020 0922    Acceptance, E,D, VU,NR by PH at 8/12/2020 1540    Acceptance, E,TB, VU by CB at 8/11/2020 1623    Acceptance, E, NR by ELVA at 8/10/2020 1530    Acceptance, E, VU,NR by WENDY at 8/8/2020 1542    Acceptance, E, DU,NR by AR at 8/7/2020 1432    Acceptance, E, DU,NR by AR at 8/6/2020 1208    Acceptance, E,D, NR by CG at 8/5/2020 1410    Comment:  does not appear to retain information.  CGRESSRN    Acceptance, E, NL,NR by AR at 8/5/2020 1204    Acceptance, E, DU,NR by AR at 8/4/2020 1453   Family Acceptance, E, VU,NR by MW at 8/8/2020 1542                               User Key     Initials Effective Dates Name Provider Type  "Discipline    CG 06/16/16 -  Geneva Villela, RN Registered Nurse Nurse     03/07/18 -  Keyanna Wellington, PTA Physical Therapy Assistant PT    EH 08/19/18 -  Julieta Wheat, PTA Physical Therapy Assistant PT    PH 08/20/19 -  Kaelyn De La O, PTA Physical Therapy Assistant PT    MW 09/17/19 -  Pauline Harris, PT Physical Therapist PT    DJ 10/25/19 -  Florence Wallace, PT Physical Therapist PT    AR 07/02/20 -  Raghu Garcia, PT Student PT Student PT    CB 07/02/20 -  Sami Guajardo, PT Student PT Student PT              PT Recommendation and Plan     Outcome Summary/Treatment Plan (PT)  Anticipated Discharge Disposition (PT): skilled nursing facility  Plan of Care Reviewed With: patient  Progress: improving  Outcome Summary: Pt tolerated treatment fair this date. More awake today, though still very weak. Required max A x2 for bed mobility, then unable to fully stand d/t weakness/cognitive status. Pt seemed to not understand what the task was, therefore resisted assist. Pt however did respond to physical commands much faster today, performing a few seated LE exercises when asked. Pt even smiled and responded \"I'm feeling fine\" when asked. Daughter present throughout. Patient was intermittently wearing a face mask during this therapy encounter. Therapist used appropriate personal protective equipment including eye protection, mask, and gloves.  Mask used was standard procedure mask. Appropriate PPE was worn during the entire therapy session. Hand hygiene was completed before and after therapy session. Patient is not in enhanced droplet precautions.     Time Calculation:   PT Charges     Row Name 08/19/20 1632             Time Calculation    Start Time  1433  -      Stop Time  1500  -      Time Calculation (min)  27 min  -SM      PT Received On  08/19/20  -      PT - Next Appointment  08/20/20  -        User Key  (r) = Recorded By, (t) = Taken By, (c) = Cosigned By    Initials Name Provider Type     " Keyanna Wellington, REJI Physical Therapy Assistant        Therapy Charges for Today     Code Description Service Date Service Provider Modifiers Qty    57516056831 HC PT THER PROC EA 15 MIN 8/18/2020 Keyanna Wellington, REJI GP 1    49479545736 HC PT THER PROC EA 15 MIN 8/19/2020 Keyanna Wellington, REJI GP 2    75148202259 HC PT THER SUPP EA 15 MIN 8/19/2020 Keyanna Wellington, REJI GP 2          PT G-Codes  Outcome Measure Options: AM-PAC 6 Clicks Basic Mobility (PT)  AM-PAC 6 Clicks Score (PT): 9    Keyanna Wellington PTA  8/19/2020

## 2020-08-19 NOTE — PLAN OF CARE
Problem: Patient Care Overview  Goal: Plan of Care Review  Outcome: Ongoing (interventions implemented as appropriate)  Flowsheets (Taken 8/19/2020 1950)  Progress: improving  Plan of Care Reviewed With: patient  Outcome Summary: VSS. patient denies pain, no S/S of discomfort or distress, continues with confusion and hallucination, neuro is aware, EEG complete today, continue q2hr turns. will continue to montior  Goal: Discharge Needs Assessment  Outcome: Ongoing (interventions implemented as appropriate)  Goal: Interprofessional Rounds/Family Conf  Outcome: Ongoing (interventions implemented as appropriate)     Problem: Fall Risk (Adult)  Goal: Absence of Fall  Outcome: Ongoing (interventions implemented as appropriate)  Flowsheets (Taken 8/19/2020 1950)  Absence of Fall: making progress toward outcome     Problem: Skin Injury Risk (Adult)  Goal: Skin Health and Integrity  Outcome: Ongoing (interventions implemented as appropriate)  Flowsheets (Taken 8/19/2020 1950)  Skin Health and Integrity: making progress toward outcome     Problem: Stroke (Hemorrhagic) (Adult)  Goal: Signs and Symptoms of Listed Potential Problems Will be Absent, Minimized or Managed (Stroke)  Outcome: Ongoing (interventions implemented as appropriate)  Flowsheets (Taken 8/19/2020 1950)  Problems Assessed (Stroke (Hemorrhagic)): all  Problems Present (Stroke (Hemorrhagic)): cognitive impairment     Problem: Mobility, Physical Impaired (Adult)  Goal: Identify Related Risk Factors and Signs and Symptoms  Outcome: Ongoing (interventions implemented as appropriate)  Flowsheets (Taken 8/19/2020 1950)  Signs and Symptoms (Physical Mobility Impaired): limited ability to perform gross/fine motor skills; decreased balance  Goal: Enhanced Functional Ability  Outcome: Ongoing (interventions implemented as appropriate)  Flowsheets (Taken 8/19/2020 1950)  Enhanced Functional Ability: making progress toward outcome     Problem: Infection, Risk/Actual  (Adult)  Goal: Identify Related Risk Factors and Signs and Symptoms  Outcome: Ongoing (interventions implemented as appropriate)  Goal: Infection Prevention/Resolution  Outcome: Ongoing (interventions implemented as appropriate)  Flowsheets (Taken 8/19/2020 1950)  Infection Prevention/Resolution: making progress toward outcome     Problem: Tissue Perfusion, Ineffective Peripheral (Adult)  Goal: Identify Related Risk Factors and Signs and Symptoms  Outcome: Ongoing (interventions implemented as appropriate)

## 2020-08-19 NOTE — PROGRESS NOTES
Adult Nutrition  Assessment/PES    Patient Name:  Jihan Teresa  YOB: 1933  MRN: [id number]  Admit Date:  8/3/2020    Assessment Date:  8/19/2020  Nutrition follow up.  Spoke with patient's daughter via room phone-appetite is slowly improving, average 50-75% of meals. Nutritional supplement boost glucose control ordered BID-drinking at least 1/day. Will continue to follow.  Reason for Assessment     Row Name 08/19/20 1355          Reason for Assessment    Reason For Assessment  follow-up protocol             Labs/Tests/Procedures/Meds     Row Name 08/19/20 1359          Labs/Procedures/Meds    Lab Results Reviewed  reviewed, pertinent        Diagnostic Tests/Procedures    Diagnostic Test/Procedure Reviewed  reviewed, pertinent        Medications    Pertinent Medications Reviewed  reviewed, pertinent         Physical Findings     Row Name 08/19/20 1359          Physical Findings    Skin  surgical incision           Nutrition Prescription Ordered     Row Name 08/19/20 1359          Nutrition Prescription PO    Current PO Diet  Dysphagia     Dysphagia Level  4  Mechanical soft no mixed consistencies     Fluid Consistency  Thin     Supplement  Boost Glucose Control (Glucerna Shake)     Supplement Frequency  2 times a day     Common Modifiers  Consistent Carbohydrate         Evaluation of Received Nutrient/Fluid Intake     Row Name 08/19/20 1400          PO Evaluation    % PO Intake  50-75% of meals          Problem/Interventions:    Intervention Goal     Row Name 08/19/20 1400          Intervention Goal    General  Maintain nutrition;Reduce/improve symptoms;Meet nutritional needs for age/condition     PO  Tolerate PO;Maintain intake     Weight  Maintain weight         Nutrition Intervention     Row Name 08/19/20 1400          Nutrition Intervention    RD/Tech Action  Follow Tx progress;Care plan reviewd;Interview for preference;Encourage intake           Education/Evaluation     Row Name 08/19/20 1400           Education    Education  Will Instruct as appropriate        Monitor/Evaluation    Monitor  Per protocol;PO intake;I&O;Supplement intake;Pertinent labs;Weight;Skin status;Symptoms           Electronically signed by:  Jen Chang RD  08/19/20 14:00

## 2020-08-19 NOTE — PROGRESS NOTES
Hemoglobin today is 10.3 with transfusions.    INR 2.5.  Right groin incision satisfactory without signs of infection.  Small hematoma.  No evidence of pseudoaneurysm.  Right foot well perfused.      Continue observation.  Continue anticoagulation for A. Fib, right lower extremity and stroke.

## 2020-08-19 NOTE — PLAN OF CARE
"  Problem: Patient Care Overview  Goal: Plan of Care Review  Outcome: Ongoing (interventions implemented as appropriate)  Flowsheets (Taken 8/19/2020 5173)  Progress: improving  Plan of Care Reviewed With: patient  Outcome Summary: Pt tolerated treatment fair this date. More awake today, though still very weak. Required max A x2 for bed mobility, then unable to fully stand d/t weakness/cognitive status. Pt seemed to not understand what the task was, therefore resisted assist. Pt however did respond to physical commands much faster today, performing a few seated LE exercises when asked. Pt even smiled and responded \"I'm feeling fine\" when asked. Daughter present throughout. Patient was intermittently wearing a face mask during this therapy encounter. Therapist used appropriate personal protective equipment including eye protection, mask, and gloves.  Mask used was standard procedure mask. Appropriate PPE was worn during the entire therapy session. Hand hygiene was completed before and after therapy session. Patient is not in enhanced droplet precautions. Also present: Mila PT tech.     "

## 2020-08-19 NOTE — PROGRESS NOTES
Pharmacy Consult: Warfarin Dosing/ Monitoring    Jihan Teresa is a 86 y.o. female, estimated creatinine clearance is 43.7 mL/min (by C-G formula based on SCr of 0.79 mg/dL). weighing 69.1 kg (152 lb 5.4 oz).    She has a past medical history of Anemia, Aortic valve stenosis, Asthma, Atelectasis, CHF (congestive heart failure) (CMS/[facility]), Congenital heart disease, Diabetes mellitus (CMS/[facility]), Esophageal reflux, Essential hypertension, HLD (hyperlipidemia), Hypotension, LVH (left ventricular hypertrophy), Pleural effusion, and Pulmonary hypertension (CMS/[facility]).    Social History     Tobacco Use    Smoking status: Never Smoker    Smokeless tobacco: Never Used   Substance Use Topics    Alcohol use: Yes     Comment: ocasional    Drug use: No       Results from last 7 days   Lab Units 08/19/20  0544 08/18/20  0659 08/17/20  1501 08/17/20  0714 08/16/20  0616 08/15/20  0339 08/14/20  0420 08/14/20  0013 08/13/20  0636   INR  2.52* 2.27*  --  2.34* 1.89* 1.44* 1.40* 1.37* 1.32*   APTT seconds 42.7* 43.9*  --  73.8* 67.5* 68.2* 63.5* 68.7*  --    HEMOGLOBIN g/dL 10.3* 7.3* 8.0* 7.5* 8.7* 9.0* 9.2*  --  10.8*   HEMATOCRIT % 32.1* 21.9* 24.5* 22.8* 25.6* 26.4* 28.2*  --  32.3*   PLATELETS 10*3/mm3 222 178  --  149 149 110* 122*  --  153     Results from last 7 days   Lab Units 08/19/20  0544 08/18/20  0659 08/17/20  0714   SODIUM mmol/L 139 136 138   POTASSIUM mmol/L 3.6 3.8 3.8   CHLORIDE mmol/L 103 102 102   CO2 mmol/L 27.2 26.0 27.6   BUN mg/dL 21 19 16   CREATININE mg/dL 0.79 0.76 0.83   CALCIUM mg/dL 8.6 8.5* 8.4*   GLUCOSE mg/dL 128* 105* 132*     Anticoagulation history: 3mg TuWeFrSaSu, 1.5mg MoTh    Hospital Anticoagulation:  Consulting provider: Dr. Evans  Start date: 8/11/2020  Indication: AFib,  R arterial occlusion d/t cardiac embolis 8/12 Right femoral thromboembolectomy   Target INR: 2-3  Expected duration: lifelong   Bridge Therapy: No                Date 8/11 8/12 8/13 8/14  8/15 8/16 8/17 8/18 8.19    INR  1.3 1.24 1.32 1.4 1.44 1.89 2.34 2.27 2.52    Warfarin dose Not able to take po In OR not given  1.5   3  3 mg 3 mg HOLD  3 mg        Potential drug interactions:   Levothyroxin pta med   8/3 vit K 10 mg     Relevant nutrition status: mechanical soft diet, poor intake 0-50% diet, nutrition supplements - Glucerna shake with breakfast and dinner added    Other: Cleared per neurosurgery to resume warfarin 8/11 with no loading doses  8/18 There was a small R thigh hematoma on CT but was okd to continue anticoagulation per vascular  8/18 MRI revealed a small acute left frontal lobe infarct    Education complete?/ Date: defer    Assessment/Plan:  Warfarin management complicated by acute intracerebral hemorrhage and vit k, heparin is stopped, PO intake is poor.   Home weekly dose: 18 mg/wk warfarin 3mg tab -- 1.5mg MoTh and 3 mg all other days  Over the past 6d she received 13.5 mg warfarin and INR is trending up today    Decrease warfarin 1.5 mg MWF and 3 mg all other days   Follow-up daily INR.      Pharmacy will continue to follow until discharge or discontinuation of warfarin.

## 2020-08-19 NOTE — PROGRESS NOTES
[facility] INPATIENT PROGRESS NOTE         [address]    2020      PATIENT IDENTIFICATION:  Name: Jihan Teresa ADMIT: 8/3/2020   : 1933  PCP: Yaron Ca Jr., MD    MRN: [id number] LOS: 16 days   AGE/SEX: 86 y.o. female  ROOM: [facility]                     LOS 16    Reason for visit: Follow-up arterial occlusion requiring thromboembolectomy intracranial hemorrhage on admission      SUBJECTIVE:      Events noted with some increased confusion and garbled voice yesterday.  Discussed with family at bedside.  Status post 2 units packed red blood cells with very good response and anemia.  Hemoglobin up to 10.3 today from 7.3 yesterday.  MRI yesterday shows new acute small stroke.  Neurology following.  Vascular input again appreciated.  Small hematoma not a concern.    Objective   OBJECTIVE:    Vital Sign Min/Max for last 24 hours  Temp  Min: 97.4 °F (36.3 °C)  Max: 98.8 °F (37.1 °C)   BP  Min: 118/56  Max: 148/79   Pulse  Min: 65  Max: 84   Resp  Min: 16  Max: 18   SpO2  Min: 90 %  Max: 94 %   No data recorded   Weight  Min: 69.1 kg (152 lb 5.4 oz)  Max: 69.1 kg (152 lb 5.4 oz)                         Body mass index is 29.75 kg/m².    Intake/Output Summary (Last 24 hours) at 2020 0958  Last data filed at 2020 0045  Gross per 24 hour   Intake 960 ml   Output 1150 ml   Net -190 ml         Exam:  GEN:  No distress, appears stated age  EYES:   PERRL, anicteric sclera  ENT:    External ears/nose normal, OP clear  NECK:  No adenopathy, midline trachea  LUNGS: Normal chest on inspection, palpation and auscultation  CV:  Normal S1S2, without murmur  ABD:  Non tender, non distended, no hepatosplenomegaly, +BS  EXT:  No edema, cyanosis or clubbing    Scheduled meds:      amLODIPine 10 mg Oral Q24H   furosemide 20 mg Oral Daily   isosorbide mononitrate 60 mg Oral Daily   levothyroxine 75 mcg Oral QAM   metoprolol tartrate 25 mg Oral Q12H   warfarin 1.5 mg Oral Once per day on Mon Thu      warfarin 3 mg Oral Once per day on Sun Tue Wed Fri Sat     IV meds:                          lactated ringers 9 mL/hr Last Rate: 9 mL/hr (08/12/20 1923)   Pharmacy to dose warfarin       Data Review:  Results from last 7 days   Lab Units 08/19/20  0544 08/18/20  0659 08/17/20  0714 08/16/20  0616 08/15/20  0339   SODIUM mmol/L 139 136 138 139 139   POTASSIUM mmol/L 3.6 3.8 3.8 4.0 3.3*   CHLORIDE mmol/L 103 102 102 104 102   CO2 mmol/L 27.2 26.0 27.6 28.5 24.4   BUN mg/dL 21 19 16 11 12   CREATININE mg/dL 0.79 0.76 0.83 0.70 0.68   GLUCOSE mg/dL 128* 105* 132* 146* 92   CALCIUM mg/dL 8.6 8.5* 8.4* 8.6 8.5*         Estimated Creatinine Clearance: 43.7 mL/min (by C-G formula based on SCr of 0.79 mg/dL).  Results from last 7 days   Lab Units 08/19/20  0544 08/18/20  0659 08/17/20  1501 08/17/20  0714 08/16/20  0616 08/15/20  0339   WBC 10*3/mm3 10.10 8.33  --  10.07 12.19* 9.03   HEMOGLOBIN g/dL 10.3* 7.3* 8.0* 7.5* 8.7* 9.0*   PLATELETS 10*3/mm3 222 178  --  149 149 110*     Results from last 7 days   Lab Units 08/19/20  0544 08/18/20  0659 08/17/20  0714 08/16/20  0616 08/15/20  0339   INR  2.52* 2.27* 2.34* 1.89* 1.44*         Results from last 7 days   Lab Units 08/17/20  2310   PH, ARTERIAL pH units 7.465*   PO2 ART mm Hg 86.9   PCO2, ARTERIAL mm Hg 36.6   HCO3 ART mmol/L 26.3             Most recent CT head 8/13/2020 reviewed    Most recent chest x-ray 8/12/2020 reviewed      Microbiology reviewed    )        Active Hospital Problems    Diagnosis  POA   • **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) [I61.0]  Unknown   • ICH (intracerebral hemorrhage) (CMS/HCC) [I61.9]  Yes      Resolved Hospital Problems   No resolved problems to display.       Assessment   ASSESSMENT:  Acute arterial occlusion of right lower extremity  Status post open right femoral thromboembolectomy  Intracerebral hemorrhage on admission  Coagulopathy on admission  A. fib on anticoagulation  UTI E.  coli  Diabetes mellitus  Hypothyroidism  V. Tach  Anemia: Improved post transfusion  Acute stroke    PLAN:    Good response to packed red blood cells yesterday.  Attention to patient by vascular surgery appreciated.  Small hematoma.  No intervention required.  Follow-up with vascular surgery in 1 month for ABIs.  Noted MRI findings of new acute stroke.  Neurology following.  Control blood pressure.  Completed antibiotics for E. coli UTI.  PT/OT.  Discussed with patient's daughter at the bedside.  Questions asked for their satisfaction.    Plan for discharge to [facility] rehab when medically ready.    Joaquin Stewart MD  Pulmonary and Critical Care Medicine  [facility], [facility]  8/19/2020    09:58

## 2020-08-19 NOTE — PROGRESS NOTES
"[facility]  Cardiology Progress Note    Patient Identification:  Name: Jihan Teresa  Age: 86 y.o.  Sex: female  :  1933  MRN: [id number]                 Follow Up / Chief Complaint: Atrial fibrillation with ICH    Interval History: 20 She had an open right femoral thromboembolectomy per Dr. Winsome Valentin.  Hgb 7.3 today, defer to IM.     Subjective:     Objective:    Past Medical History:  Past Medical History:   Diagnosis Date   • Anemia    • Aortic valve stenosis     S/p AVR in    • Asthma    • Atelectasis    • CHF (congestive heart failure) (CMS/HCC)    • Congenital heart disease    • Diabetes mellitus (CMS/HCC)    • Esophageal reflux    • Essential hypertension    • HLD (hyperlipidemia)    • Hypotension    • LVH (left ventricular hypertrophy)    • Pleural effusion    • Pulmonary hypertension (CMS/HCC)      Past Surgical History:  Past Surgical History:   Procedure Laterality Date   • AORTIC VALVE REPAIR/REPLACEMENT  2009    Jerry Colmenares MD   • CATARACT EXTRACTION     • CEREBRAL ANGIOGRAM N/A 2020    Procedure: CEREBRAL ANGIOGRAM;  Surgeon: Ar Bunch MD;  Location: [facility] ;  Service: Neurosurgery;  Laterality: N/A;   • FEMORAL THROMBECTOMY/EMBOLECTOMY Right 2020    Procedure: FEMORAL THROMBECTOMY/EMBOLECTOMY;  Surgeon: Winsome Valentin Jr., MD;  Location: [facility];  Service: Vascular;  Laterality: Right;   • KNEE ARTHROPLASTY          Social History:   Social History     Tobacco Use   • Smoking status: Never Smoker   • Smokeless tobacco: Never Used   Substance Use Topics   • Alcohol use: Yes     Comment: ocasional      Family History:  Family History   Problem Relation Age of Onset   • Dementia Sister    • Stroke Brother           Allergies:  Allergies   Allergen Reactions   • Promethazine Other (See Comments)     Pt becomes \"out of it\" when on this medication  Pt becomes \"out of it\" when on this medication     Scheduled " "Meds:    amLODIPine 10 mg Q24H   furosemide 20 mg Daily   isosorbide mononitrate 60 mg Daily   levothyroxine 75 mcg QAM   metoprolol tartrate 25 mg Q12H   nystatin 5 mL 4x Daily   warfarin 1.5 mg Once per day on Mon Thu   warfarin 3 mg Once per day on Sun Tue Wed Fri Sat     ROS  Constitutional: Awake and alert, no fever. No nosebleeds  Abdomen           no abdominal pain   Cardiac              no chest pain  Pulmonary          no shortness of breath      /76 (BP Location: Right arm, Patient Position: Lying)   Pulse 74   Temp 97.5 °F (36.4 °C) (Temporal)   Resp 16   Ht 152.4 cm (60\")   Wt 69.1 kg (152 lb 5.4 oz)   SpO2 91%   BMI 29.75 kg/m²   General appearance: No acute changes   Neck: Trachea midline; NECK, supple, no thyromegaly or lymphadenopathy   Lungs: Normal size and shape, normal breath sounds, equal distribution of air, no rales and rhonchi   CV: S1-S2 regular, no murmurs, no rub, no gallop   Abdomen: Soft, non-tender; no masses , no abnormal abdominal sounds   Extremities: No deformity , normal color , no peripheral edema   Skin: Normal temperature, turgor and texture; no rash, ulcers                  Cardiographics  Telemetry:       Atrial fib                            A. Fib      Atrial fibrillation      Echocardiogram:   ECG:          Echocardiogram:   2017  Interpretation Summary      · Calculated EF = 46.9%.  · Left ventricular systolic function is low normal.  · Left amd right atrial cavity size is severely dilated.  · Right ventricular cavity is severely dilated.  · calcification of the aortic valve  · Severe mitral valve regurgitation is present  · Mitral annular calcification is present. Posterior leaflet very calcified and immobile.  · Severe tricuspid valve regurgitation is present.  · Estimated right ventricular systolic pressure from tricuspid regurgitation is markedly elevated (>55 mmHg). Calculated right ventricular systolic pressure from tricuspid regurgitation is 66.7 mmHg. "         Imaging  Chest X-ray:   Study Result      EXAMINATION: SINGLE VIEW CHEST RADIOGRAPH     HISTORY: 86-year-old female with history of generalized weakness.     FINDINGS: An upright AP portable chest radiograph was obtained.  Comparison is made to a chest radiograph dated 06/10/2017. The lungs are  normal in volume and are clear of consolidation. Stable mild scattered  interstitial prominence is seen throughout both lungs. Stable soft  tissue fullness in the right hilum is most consistent with a prominent  right hilar vessel is noted. There are stable cardiomegaly. Midline  sternal wires are noted. Atheromatous consultation seen within the  aortic arch.     IMPRESSION:  There is stable mild bilateral interstitial scarring and  cardiomegaly with findings suggestive of pulmonary hypertension. No  evidence for superimposed acute process is appreciated.     This report was finalized on 8/3/2020 10:55 AM by Dr. Estuardo Wagner M.D.         CT     Study Result      CT SCAN OF THE HEAD WITHOUT CONTRAST     CLINICAL HISTORY: Generalized weakness and confusion.     TECHNIQUE: CT scan of the head was obtained with 3 mm axial images. No  intravenous contrast was administered.     COMPARISON: Comparison is made to previous CT scan of head dated  06/13/2017.     FINDINGS: There is increased density within the dependent aspects of  both lateral ventricles compatible with intraventricular hemorrhage.  There are foci of increased density noted within the inferior aspect of  the interhemispheric fissure worrisome for subarachnoid hemorrhage. The  etiology of these areas of hemorrhage is uncertain, although a vascular  etiology such as an aneurysm should be excluded. I recommend further  evaluation with an MRA or CTA examination, as well as an MRI of the  brain.     There are multiple chronic infarcts identified within both cerebellar  hemispheres, left greater than right. The largest of these chronic  infarcts measures up to  approximately 1.4 x 0.4 cm in greatest axial  dimensions. Old lacunar disease is seen within the left thalamus. A  chronic lacune identified within the anterior aspect of the left  thalamus measuring up to 4-5 mm in diameter. Hypodense areas are  identified within the lentiform nuclei bilaterally that are likely  representative of a combination of enlarged perivascular spaces and old  lacunar disease. Severe changes of chronic small vessel ischemic  phenomena are identified.     Otherwise, the ventricles, sulci, and cisterns are age appropriate.     IMPRESSION:     There is intraventricular hemorrhage identified within the dependent  aspects of both lateral ventricles. Additionally, there is increased  density seen within the inferior aspect of the interhemispheric fissure  suggestive of subarachnoid hemorrhage. The precise etiology of this  hemorrhage is uncertain on the basis of this head CT. However, a  vascular etiology should be excluded. Further evaluation is recommended  with a CTA or MRA study, as well as an MRI of the brain.     These findings and recommendations were discussed with Debbie Wilson on 08/03/2020 at approximately 8:36 AM.     Radiation dose reduction techniques were utilized, including automated  exposure control and exposure modulation based on body size.       Study Result     MRI EXAMINATION OF BRAIN WITHOUT CONTRAST     HISTORY:  Confusion/delirium, altered mental status.     A noncontrasted MRI examination of the brain was performed. The lack of  contrast reduces the sensitivity of the study.     FINDINGS: The diffusion sequence demonstrates an area of increased  signal intensity involving the cortex of the left frontal lobe  superolaterally measuring approximately 5 mm in size.     There is no evidence of hydrocephalus. Remote infarcts involving the  left cerebellar hemisphere are noted inferiorly, present on the prior  MRI examination. There is extensive small vessel ischemic  disease  appreciated with confluent areas of increased signal intensity involving  the white matter of the cerebral hemispheres bilaterally.     The axial T2 FLAIR sequence demonstrates ovoid areas of T2  hyperintensity which corresponds to areas of increased signal intensity  on the T1 sequence. This corresponds to the area of hemorrhage noted on  the CT examination. There are smaller in size as compared to the areas  of hemorrhage noted on the MRI examination of 08/03/2020. On the right,  the area of T2 FLAIR hyperintensity measures approximately 7 mm in AP  dimension and 3 mm in transverse dimension. On the MRI examination of  08/03/2020 this measured approximately 9 mm in AP dimension and 6 mm in  transverse dimension. On the left the area of increased signal intensity  measures 5 x 2.5 mm in the transverse planes, previously 7.3 x 4.2 mm.     The gradient echo T2 sequence demonstrates signal loss related to the  areas of hemorrhage involving the lateral ventricle consistent with  hemosiderin deposition. There is also a small amount of blood present  within the occipital horns bilaterally, present previously.     There is increased signal intensity present within the mastoid air cells  bilaterally which is new versus the prior MRI examination of 08/03/2020.     IMPRESSION:  1.  5 mm acute infarct involving the left frontal lobe superolaterally.  2.  Areas of residual blood products are identified involving the  anterior aspect of the third ventricle and involving the medial and  inferior aspects of the frontal lobes bilaterally adjacent to the  lateral ventricles. The volume of blood has decreased versus 08/03/2012.  There is a small amount of blood present within the occipital horns  bilaterally, present previously. There is no evidence of new hemorrhage  or of hydrocephalus. The etiology for the hemorrhage is uncertain.  3.  Extensive small vessel ischemic disease.  4.  Fluid within the mastoid air cells  "bilaterally.     The information regarding the infarct was called to the patient's nurse  at the time of the dictation. The patient's nurse is to relay the  information to the clinical service.     This report was finalized on 8/19/2020 8:30 AM by Dr. Tim Khoury M.D.               Lab Review           Results from last 7 days   Lab Units 08/19/20  0544   SODIUM mmol/L 139   POTASSIUM mmol/L 3.6   BUN mg/dL 21   CREATININE mg/dL 0.79   CALCIUM mg/dL 8.6        Results from last 7 days   Lab Units 08/19/20  0544 08/18/20  0659 08/17/20  1501 08/17/20  0714   WBC 10*3/mm3 10.10 8.33  --  10.07   HEMOGLOBIN g/dL 10.3* 7.3* 8.0* 7.5*   HEMATOCRIT % 32.1* 21.9* 24.5* 22.8*   PLATELETS 10*3/mm3 222 178  --  149     Results from last 7 days   Lab Units 08/19/20  0544 08/18/20  0659 08/17/20  0714   INR  2.52* 2.27* 2.34*   APTT seconds 42.7* 43.9* 73.8*     The following medical decision was discussed in detail with Dr. Mcmahan    Assessment:  1. Intracerebral hemorrhage  2.  Chronic atrial fibrillation: Coumadin on hold due low hgb   3.  Bioprosthetic aortic valve replaced in 2009  5. coagulopathy  6.  UTI  7.  Diabetes mellitus  8. Hypothyroidism  9. CAD  10. Hypertension  11. Acute stroke; 5 mm acute infarct involving the left frontal lobe superolaterally    Plan:  VS stable.  A. fib on monitor with controlled heart rate.  Hemoglobin today 10.3 defer to IM.  Coumadin has been resumed and she will get 1.5 mg today.  INR 2.52.  Watchman's has been discussed with the daughter.  Continue amlodipine, Lasix, isosorbide, metoprolol tartrate, and Coumadin.       Bogdan Mcmahan MD             )8/19/2020  KAREN Graham/Transcription:   \"Dictated utilizing Dragon dictation\".     "

## 2020-08-20 NOTE — THERAPY EVALUATION
Acute Care - Speech Language Pathology   Swallow Initial Evaluation [facility]     Patient Name: Jihan Teresa  : 1933  MRN: [id number]  Today's Date: 2020               Admit Date: 8/3/2020    Visit Dx:     ICD-10-CM ICD-9-CM   1. Nontraumatic intracerebral hemorrhage, unspecified cerebral location, unspecified laterality (CMS/[facility]) I61.9 431   2. Acute UTI N39.0 599.0   3. 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) I61.0 431     Patient Active Problem List   Diagnosis   • Type 2 diabetes mellitus (CMS/HCC)   • Aortic valve replaced, equine valve   • Cerebral infarction (CMS/[facility])   • A-fib (CMS/[facility])   • GERD without esophagitis   • Sleep apnea in adult   • Cognitive dysfunction   • Hypothyroidism (acquired)   • History of recurrent UTIs   • Mild reactive airways disease   • Essential hypertension   • Pulmonary hypertension (CMS/[facility])   • B12 deficiency   • Non-rheumatic mitral regurgitation   • Non-rheumatic tricuspid valve insufficiency   • Chronic anemia   • Grade III hemorrhoids   • ICH (intracerebral hemorrhage) (CMS/[facility])   • 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)     Past Medical History:   Diagnosis Date   • Anemia    • Aortic valve stenosis     S/p AVR in    • Asthma    • Atelectasis    • CHF (congestive heart failure) (CMS/[facility])    • Congenital heart disease    • Diabetes mellitus (CMS/[facility])    • Esophageal reflux    • Essential hypertension    • HLD (hyperlipidemia)    • Hypotension    • LVH (left ventricular hypertrophy)    • Pleural effusion    • Pulmonary hypertension (CMS/[facility])      Past Surgical History:   Procedure Laterality Date   • AORTIC VALVE REPAIR/REPLACEMENT  2009    Jerry Colmenares MD   • CATARACT EXTRACTION     • CEREBRAL ANGIOGRAM N/A 2020    Procedure: CEREBRAL ANGIOGRAM;  Surgeon: Ar Bunch MD;  Location: [facility] OR ;  Service: Neurosurgery;  Laterality: N/A;   • FEMORAL  THROMBECTOMY/EMBOLECTOMY Right 8/12/2020    Procedure: FEMORAL THROMBECTOMY/EMBOLECTOMY;  Surgeon: Winsome Valentin Jr., MD;  Location: [facility] OR;  Service: Vascular;  Laterality: Right;   • KNEE ARTHROPLASTY          SWALLOW EVALUATION (last 72 hours)      SLP Adult Swallow Evaluation     Row Name 08/20/20 0815                   Rehab Evaluation    Document Type  evaluation  -OC        Subjective Information  no complaints  -OC        Patient Observations  decreased LOC  -OC        Patient/Family Observations  Daughter present reports no difficulty swallowing, increased mastication time. Notes increased intake recently  -OC        Patient Effort  poor  -OC        Symptoms Noted During/After Treatment  none  -OC           General Information    Patient Profile Reviewed  yes  -OC        Pertinent History Of Current Problem  Nontraumatic subcortical hemorrhage of left cerebral hemisphere. ST consult of difficulty with meds.   -OC        Current Method of Nutrition  mechanical soft, no mixed consistencies;thin liquids  -OC        Precautions/Limitations, Vision  other (see comments) eyes closed throughout eval  -OC        Precautions/Limitations, Hearing  hearing impairment, bilaterally  -OC        Prior Level of Function-Communication  other (see comments) unknown  -OC        Prior Level of Function-Swallowing  no diet consistency restrictions  -OC        Plans/Goals Discussed with  patient and family;other (see comments) daughter present  -OC        Barriers to Rehab  medically complex  -OC        Patient's Goals for Discharge  patient could not state  -OC        Family Goals for Discharge  family did not state  -OC           Pain Scale: Numbers Pre/Post-Treatment    Pain Scale: Numbers, Pretreatment  0/10 - no pain  -OC        Pain Scale: Numbers, Post-Treatment  0/10 - no pain  -OC           Oral Motor and Function    Oral Lesions or Structural Abnormalities and/or variants  unable to assess  -OC            Oral Musculature and Cranial Nerve Assessment    Oral Motor General Assessment  unable to assess  -OC        Vocal Impairment, Detail. Cranial Nerve X (Vagus)  other (see comments) mild dysarthria  -OC           Clinical Swallow Eval    Clinical Swallow Evaluation Summary  Patient required max cues from SLP and daughter to accept trials of thin liquids. Patient initiall accepted trials X2, then refused further trials. Patient unable to voice post swallow. SLP unable to determine  safest PO diet.   -OC           Clinical Impression    SLP Swallowing Diagnosis  unable to assess  -OC        Functional Impact  risk of aspiration/pneumonia  -OC        Rehab Potential/Prognosis, Swallowing  adequate, monitor progress closely;re-evaluate goals as necessary  -OC        Swallow Criteria for Skilled Therapeutic Interventions Met  demonstrates skilled criteria  -OC           Recommendations    Therapy Frequency (Swallow)  PRN  -OC        Predicted Duration Therapy Intervention (Days)  until discharge  -OC        SLP Diet Recommendation  mechanical soft with no mixed consistencies;thin liquids;other (see comments) NPO with concern for aspiration- ST to follow for re-eval  -OC        Recommended Diagnostics  reassess via clinical swallow evaluation  -OC        Recommended Precautions and Strategies  upright posture during/after eating;small bites of food and sips of liquid;alternate between small bites of food and sips of liquid;other (see comments) 1:1 assist, alert for all PO  -OC        SLP Rec. for Method of Medication Administration  meds crushed;meds whole;with pudding or applesauce;as tolerated  -OC        Monitor for Signs of Aspiration  yes;notify SLP if any concerns  -OC        Anticipated Dischage Disposition (SLP)  unknown  -OC           Swallow Goals (SLP)    Oral Nutrition/Hydration Goal Selection (SLP)  oral nutrition/hydration, SLP goal 1  -OC           Oral Nutrition/Hydration Goal 1 (SLP)    Oral  Nutrition/Hydration Goal 1, SLP  Tolerate least restrictive diet with no overt s/s aspiration.   -OC        Time Frame (Oral Nutrition/Hydration Goal 1, SLP)  by discharge  -OC          User Key  (r) = Recorded By, (t) = Taken By, (c) = Cosigned By    Initials Name Effective Dates    OC Guadalupe, CHERRY Kent,[facility]-SLP 06/08/18 -           EDUCATION  The patient has been educated in the following areas:   Dysphagia (Swallowing Impairment).    SLP Recommendation and Plan  SLP Swallowing Diagnosis: unable to assess  SLP Diet Recommendation: mechanical soft with no mixed consistencies, thin liquids, other (see comments)(NPO with concern for aspiration- ST to follow for re-eval)  Recommended Precautions and Strategies: upright posture during/after eating, small bites of food and sips of liquid, alternate between small bites of food and sips of liquid, other (see comments)(1:1 assist, alert for all PO)  SLP Rec. for Method of Medication Administration: meds crushed, meds whole, with pudding or applesauce, as tolerated     Monitor for Signs of Aspiration: yes, notify SLP if any concerns  Recommended Diagnostics: reassess via clinical swallow evaluation  Swallow Criteria for Skilled Therapeutic Interventions Met: demonstrates skilled criteria  Anticipated Dischage Disposition (SLP): unknown  Rehab Potential/Prognosis, Swallowing: adequate, monitor progress closely, re-evaluate goals as necessary  Therapy Frequency (Swallow): PRN  Predicted Duration Therapy Intervention (Days): until discharge       Plan of Care Reviewed With: patient, daughter  Outcome Summary: Limited clinical swallow evaluation completed. Patient agreeable to limited trials of thin liquids. Patient unable to follow directions for oral motor commands. Patient refused all other trials of PO. SLP unable to recommend safest PO diet at this time. Recommend continue with current diet, regular and thins with meds whole with thins or puree.- and make NPO with concern  for aspiration. SLP to return for re-eval as patient agreeable to increased trials.    SLP GOALS     Row Name 08/20/20 0815             Oral Nutrition/Hydration Goal 1 (SLP)    Oral Nutrition/Hydration Goal 1, SLP  Tolerate least restrictive diet with no overt s/s aspiration.   -OC      Time Frame (Oral Nutrition/Hydration Goal 1, SLP)  by discharge  -OC        User Key  (r) = Recorded By, (t) = Taken By, (c) = Cosigned By    Initials Name Provider Type    Yoli Thomas MA,ALEKSANDRA-SLP Speech and Language Pathologist           SLP Outcome Measures (last 72 hours)      SLP Outcome Measures     Row Name 08/20/20 0815             SLP Outcome Measures    Outcome Measure Used?  Adult NOMS  -OC         Adult FCM Scores    FCM Chosen  Swallowing  -OC      Swallowing FCM Score  4  -OC        User Key  (r) = Recorded By, (t) = Taken By, (c) = Cosigned By    Initials Name Effective Dates    Yoli Thomas MA, CCC-SLP 06/08/18 -            Time Calculation:   Time Calculation- SLP     Row Name 08/20/20 1419             Time Calculation- SLP    SLP Start Time  0815  -OC      SLP Received On  08/20/20  -OC        User Key  (r) = Recorded By, (t) = Taken By, (c) = Cosigned By    Initials Name Provider Type    Yoli Thomas MA,CCC-SLP Speech and Language Pathologist          Therapy Charges for Today     Code Description Service Date Service Provider Modifiers Qty    [id number]  ST EVAL ORAL PHARYNG SWALLOW 3 8/20/2020 Yoli Butcher MA, CCC-SLP GN 1               Yoli Butcher MA, CCC-SLP  8/20/2020

## 2020-08-20 NOTE — PROGRESS NOTES
Stopped by to check on patient daughter and RN in the room discussing code status. Patient was asleep and resting comfortably. Daughter reports she went to sleep around 4 a.m. No further new symptoms. Intermittent visual and auditory hallucinations continued but she is easily redirected. She was having some troubles with swallowing her pills therefore ST was asked to come see her however she was too sleepy this morning to perform assessment. We will follow up as needed. Please call us back with any questions. Continue with plans as stated in my previous note on 8/19.    KAREN Granger

## 2020-08-20 NOTE — PLAN OF CARE
"  Problem: Patient Care Overview  Goal: Plan of Care Review  Outcome: Ongoing (interventions implemented as appropriate)  Flowsheets (Taken 8/20/2020 1330)  Progress: no change  Plan of Care Reviewed With: patient  Outcome Summary: Pt tolerated treatment poorly this date. Pt initially was sleeping, though woke up enough to agree and stated \"ok\" when asked if she wanted to sit up. Eyes remained closed most of the session, though she opened them wide when returned to supine, but shortly after fell back asleep. Required max-dep A x2 for bed mobility, then attempted a few exercises while seated EOB w/ AAROM/PROM. Daughter was present and stated pt just hasn't slept well today. Patient was not wearing a face mask during this therapy encounter. Therapist used appropriate personal protective equipment including eye protection, mask, and gloves.  Mask used was standard procedure mask. Appropriate PPE was worn during the entire therapy session. Hand hygiene was completed before and after therapy session. Patient is not in enhanced droplet precautions. Also present: JOSHUA Zaragoza tech.     "

## 2020-08-20 NOTE — PLAN OF CARE
Patient lethargic during shift.  Very difficult to wake up, takes increased stimuli.  Discussed with daughter the CODE status.  Explained the meaning of what would happen during a code.  Wants to discuss with provider and change status.  Patient sleeping most of the day.  Daughter remains at bedside.

## 2020-08-20 NOTE — NURSING NOTE
Patient very lethargic.  Difficult to wake patient.  Had a long discussion with daughter concerning Full Code status and what this means related chest compressions, shock, intubation.  Daughter states she was not aware of what would happen if patient took a turn for the worse.  Wants to discuss with providers for possible change in code status.

## 2020-08-20 NOTE — THERAPY TREATMENT NOTE
Patient Name: Jihan Teresa  : 1933    MRN: [id number]                              Today's Date: 2020       Admit Date: 8/3/2020    Visit Dx:     ICD-10-CM ICD-9-CM   1. Nontraumatic intracerebral hemorrhage, unspecified cerebral location, unspecified laterality (CMS/HCC) I61.9 431   2. Acute UTI N39.0 599.0   3. 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) I61.0 431     Patient Active Problem List   Diagnosis   • Type 2 diabetes mellitus (CMS/HCC)   • Aortic valve replaced, equine valve   • Cerebral infarction (CMS/HCC)   • A-fib (CMS/[facility])   • GERD without esophagitis   • Sleep apnea in adult   • Cognitive dysfunction   • Hypothyroidism (acquired)   • History of recurrent UTIs   • Mild reactive airways disease   • Essential hypertension   • Pulmonary hypertension (CMS/[facility])   • B12 deficiency   • Non-rheumatic mitral regurgitation   • Non-rheumatic tricuspid valve insufficiency   • Chronic anemia   • Grade III hemorrhoids   • ICH (intracerebral hemorrhage) (CMS/[facility])   • Nontraumatic subcortical hemorrhage of left cerebral hemisphere (CMS/[facility])     Past Medical History:   Diagnosis Date   • Anemia    • Aortic valve stenosis     S/p AVR in    • Asthma    • Atelectasis    • CHF (congestive heart failure) (CMS/[facility])    • Congenital heart disease    • Diabetes mellitus (CMS/[facility])    • Esophageal reflux    • Essential hypertension    • HLD (hyperlipidemia)    • Hypotension    • LVH (left ventricular hypertrophy)    • Pleural effusion    • Pulmonary hypertension (CMS/HCC)      Past Surgical History:   Procedure Laterality Date   • AORTIC VALVE REPAIR/REPLACEMENT  2009    Jerry Colmenares MD   • CATARACT EXTRACTION     • CEREBRAL ANGIOGRAM N/A 2020    Procedure: CEREBRAL ANGIOGRAM;  Surgeon: Ar Bunch MD;  Location: [facility] OR ;  Service: Neurosurgery;  Laterality: N/A;   • FEMORAL THROMBECTOMY/EMBOLECTOMY Right 2020    Procedure: FEMORAL  "THROMBECTOMY/EMBOLECTOMY;  Surgeon: Winsome Valentin Jr., MD;  Location: [facility] OR;  Service: Vascular;  Laterality: Right;   • KNEE ARTHROPLASTY       General Information     Row Name 08/20/20 1326          PT Evaluation Time/Intention    Document Type  therapy note (daily note)  -     Mode of Treatment  physical therapy  -     Row Name 08/20/20 1326          General Information    Existing Precautions/Restrictions  fall;oxygen therapy device and L/min  -     Barriers to Rehab  cognitive status  -     Row Name 08/20/20 1326          Cognitive Assessment/Intervention- PT/OT    Orientation Status (Cognition)  unable/difficult to assess  -     Cognitive Assessment/Intervention Comment  pt very lethargic  -       User Key  (r) = Recorded By, (t) = Taken By, (c) = Cosigned By    Initials Name Provider Type    Keyanna Hutson PTA Physical Therapy Assistant        Mobility     Row Name 08/20/20 1327          Bed Mobility Assessment/Treatment    Bed Mobility Assessment/Treatment  supine-sit;sit-supine  -     Supine-Sit Amelia (Bed Mobility)  maximum assist (25% patient effort);dependent (less than 25% patient effort);2 person assist  -     Sit-Supine Amelia (Bed Mobility)  dependent (less than 25% patient effort);2 person assist  -SM     Assistive Device (Bed Mobility)  draw sheet;head of bed elevated  -     Comment (Bed Mobility)  pt very lethargic; though initially responded \"ok\" when asked if she wanted to sit up  -       User Key  (r) = Recorded By, (t) = Taken By, (c) = Cosigned By    Initials Name Provider Type     Keyanna Wellington PTA Physical Therapy Assistant        Obj/Interventions     Row Name 08/20/20 1328          Therapeutic Exercise    Upper Extremity Range of Motion (Therapeutic Exercise)  shoulder flexion/extension, bilateral  -     Lower Extremity (Therapeutic Exercise)  LAQ (long arc quad), bilateral  -SM     Lower Extremity Range of Motion " (Therapeutic Exercise)  ankle dorsiflexion/plantar flexion, bilateral  -     Exercise Type (Therapeutic Exercise)  AAROM (active assistive range of motion);PROM (passive range of motion)  -     Position (Therapeutic Exercise)  seated  -SM     Sets/Reps (Therapeutic Exercise)  5 reps  -SM     Row Name 08/20/20 1328          Static Sitting Balance    Level of [place] (Unsupported Sitting, Static Balance)  maximal assist, 25 to 49% patient effort  -     Sitting Position (Unsupported Sitting, Static Balance)  sitting on edge of bed  -     Time Able to Maintain Position (Unsupported Sitting, Static Balance)  more than 5 minutes  -     Comment (Unsupported Sitting, Static Balance)  pt barely able to assist today d/t being so lethargic  -       User Key  (r) = Recorded By, (t) = Taken By, (c) = Cosigned By    Initials Name Provider Type    Keyanna Hutson PTA Physical Therapy Assistant        Goals/Plan    No documentation.       Clinical Impression     Row Name 08/20/20 1322          Pain Assessment    Additional Documentation  Pain Scale: FACES Pre/Post-Treatment (Group)  -SM     Row Name 08/20/20 1329          Pain Scale: FACES Pre/Post-Treatment    Pain: FACES Scale, Pretreatment  0-->no hurt  -SM     Pain: FACES Scale, Post-Treatment  0-->no hurt  -SM     Row Name 08/20/20 1320          Positioning and Restraints    Pre-Treatment Position  in bed  -SM     Post Treatment Position  bed  -SM     In Bed  supine;call light within reach;encouraged to call for assist;exit alarm on;with family/caregiver;notified nsg  -       User Key  (r) = Recorded By, (t) = Taken By, (c) = Cosigned By    Initials Name Provider Type    Keyanna Hutson PTA Physical Therapy Assistant        Outcome Measures     Row Name 08/20/20 1328          How much help from another person do you currently need...    Turning from your back to your side while in flat bed without using bedrails?  2  -SM     Moving from lying  on back to sitting on the side of a flat bed without bedrails?  2  -SM     Moving to and from a bed to a chair (including a wheelchair)?  1  -SM     Standing up from a chair using your arms (e.g., wheelchair, bedside chair)?  1  -SM     Climbing 3-5 steps with a railing?  1  -SM     To walk in hospital room?  1  -SM     AM-PAC 6 Clicks Score (PT)  8  -     Row Name 08/20/20 1329          Functional Assessment    Outcome Measure Options  AM-PAC 6 Clicks Basic Mobility (PT)  -       User Key  (r) = Recorded By, (t) = Taken By, (c) = Cosigned By    Initials Name Provider Type    Keyanna Hutson, REJI Physical Therapy Assistant        Physical Therapy Education                 Title: PT OT SLP Therapies (In Progress)     Topic: Physical Therapy (In Progress)     Point: Mobility training (In Progress)     Description:   Instruct learner(s) on safety and technique for assisting patient out of bed, chair or wheelchair.  Instruct in the proper use of assistive devices, such as walker, crutches, cane or brace.              Patient Friendly Description:   It's important to get you on your feet again, but we need to do so in a way that is safe for you. Falling has serious consequences, and your personal safety is the most important thing of all.        When it's time to get out of bed, one of us or a family member will sit next to you on the bed to give you support.     If your doctor or nurse tells you to use a walker, crutches, a cane, or a brace, be sure you use it every time you get out of bed, even if you think you don't need it.    Learning Progress Summary           Patient Acceptance, E,D, NR,NL by  at 8/20/2020 1330    Acceptance, E,D, NR,NL by  at 8/19/2020 1626    Acceptance, E,D, NR by  at 8/18/2020 1538    Acceptance, E,D, NR by  at 8/17/2020 1034    Acceptance, E,D, NR by  at 8/15/2020 1439    Acceptance, E,TB,D, VU,NR by  at 8/14/2020 1510    Acceptance, E,TB, VU by  at 8/13/2020 0922     Acceptance, E,D, VU,NR by PH at 8/12/2020 1540    Acceptance, E,TB, VU by CB at 8/11/2020 1623    Acceptance, E, NR by  at 8/10/2020 1530    Acceptance, E, VU,NR by MW at 8/8/2020 1542    Acceptance, E, DU,NR by AR at 8/7/2020 1432    Acceptance, E, DU,NR by AR at 8/6/2020 1208    Acceptance, E,D, NR by CG at 8/5/2020 1410    Comment:  does not appear to retain information.  CGRESSRN    Acceptance, E, NL,NR by AR at 8/5/2020 1204    Acceptance, E, DU,NR by AR at 8/4/2020 1453   Family Acceptance, E, VU,NR by MW at 8/8/2020 1542                   Point: Home exercise program (In Progress)     Description:   Instruct learner(s) on appropriate technique for monitoring, assisting and/or progressing patient with therapeutic exercises and activities.              Learning Progress Summary           Patient Acceptance, E,D, NR,NL by SM at 8/20/2020 1330    Acceptance, E,D, NR,NL by SM at 8/19/2020 1626    Acceptance, E,D, NR by SM at 8/18/2020 1538    Acceptance, E,D, NR by  at 8/17/2020 1034    Acceptance, E,D, NR by SM at 8/15/2020 1439    Acceptance, E,TB,D, VU,NR by SM at 8/14/2020 1510    Acceptance, E,TB, VU by CB at 8/13/2020 0922    Acceptance, E,D, VU,NR by PH at 8/12/2020 1540    Acceptance, E,TB, VU by CB at 8/11/2020 1623    Acceptance, E, VU,NR by MW at 8/8/2020 1542    Acceptance, E, DU,NR by AR at 8/7/2020 1432    Acceptance, E, DU,NR by AR at 8/6/2020 1208    Acceptance, E,D, NR by CG at 8/5/2020 1410    Comment:  does not appear to retain information.  CGRESSRN    Acceptance, E, NL,NR by AR at 8/5/2020 1204    Acceptance, E, DU,NR by AR at 8/4/2020 1453   Family Acceptance, E, VU,NR by  at 8/8/2020 1542                   Point: Body mechanics (In Progress)     Description:   Instruct learner(s) on proper positioning and spine alignment for patient and/or caregiver during mobility tasks and/or exercises.              Learning Progress Summary           Patient Acceptance, E,D, NR,NL by  at 8/20/2020  1330    Acceptance, E,D, NR,NL by SM at 8/19/2020 1626    Acceptance, E,D, NR by  at 8/18/2020 1538    Acceptance, E,D, NR by  at 8/17/2020 1034    Acceptance, E,D, NR by SM at 8/15/2020 1439    Acceptance, E,TB,D, VU,NR by SM at 8/14/2020 1510    Acceptance, E,TB, VU by CB at 8/13/2020 0922    Acceptance, E,D, VU,NR by PH at 8/12/2020 1540    Acceptance, E,TB, VU by CB at 8/11/2020 1623    Acceptance, E, NR by ELVA at 8/10/2020 1530    Acceptance, E, VU,NR by WENDY at 8/8/2020 1542    Acceptance, E, DU,NR by AR at 8/7/2020 1432    Acceptance, E, DU,NR by AR at 8/6/2020 1208    Acceptance, E,D, NR by CG at 8/5/2020 1410    Comment:  does not appear to retain information.  CGRESSRN    Acceptance, E, NL,NR by AR at 8/5/2020 1204    Acceptance, E, DU,NR by AR at 8/4/2020 1453   Family Acceptance, E, VU,NR by WENDY at 8/8/2020 1542                   Point: Precautions (In Progress)     Description:   Instruct learner(s) on prescribed precautions during mobility and gait tasks              Learning Progress Summary           Patient Acceptance, E,D, NR,NL by  at 8/20/2020 1330    Acceptance, E,D, NR,NL by  at 8/19/2020 1626    Acceptance, E,D, NR by  at 8/18/2020 1538    Acceptance, E,D, NR by  at 8/17/2020 1034    Acceptance, E,D, NR by  at 8/15/2020 1439    Acceptance, E,TB,D, VU,NR by  at 8/14/2020 1510    Acceptance, E,TB, VU by CB at 8/13/2020 0922    Acceptance, E,D, VU,NR by PH at 8/12/2020 1540    Acceptance, E,TB, VU by CB at 8/11/2020 1623    Acceptance, E, NR by ELVA at 8/10/2020 1530    Acceptance, E, VU,NR by WENDY at 8/8/2020 1542    Acceptance, E, DU,NR by AR at 8/7/2020 1432    Acceptance, E, DU,NR by AR at 8/6/2020 1208    Acceptance, E,D, NR by CG at 8/5/2020 1410    Comment:  does not appear to retain information.  CGRESSRN    Acceptance, E, NL,NR by AR at 8/5/2020 1204    Acceptance, E, DU,NR by AR at 8/4/2020 1453   Family Acceptance, E, VU,NR by MW at 8/8/2020 1542                                "User Key     Initials Effective Dates Name Provider Type Discipline     06/16/16 -  Geneva Villela, RN Registered Nurse Nurse     03/07/18 -  Keyanna Wellington, PTA Physical Therapy Assistant PT    EH 08/19/18 -  Julieta Wheat, PTA Physical Therapy Assistant PT    PH 08/20/19 -  Kaelyn De La O, PTA Physical Therapy Assistant PT    MW 09/17/19 -  Pauline Harris, PT Physical Therapist PT    DJ 10/25/19 -  Florence Wallace, PT Physical Therapist PT    AR 07/02/20 -  Raghu Garcia, PT Student PT Student PT    CB 07/02/20 -  Sami Guajardo, PT Student PT Student PT              PT Recommendation and Plan     Outcome Summary/Treatment Plan (PT)  Anticipated Discharge Disposition (PT): skilled nursing facility  Plan of Care Reviewed With: patient  Progress: no change  Outcome Summary: Pt tolerated treatment poorly this date. Pt initially was sleeping, though woke up enough to agree and stated \"ok\" when asked if she wanted to sit up. Eye remained closed most of the session, though she opened them wide when returned to supine, but shortly after fell back asleep. Required max-dep A x2 for bed mobility, then attempted a few exercises while seated EOB w/ AAROM/PROM. Daughter was present and stated pt just hasn't slept well today. Patient was not wearing a face mask during this therapy encounter. Therapist used appropriate personal protective equipment including eye protection, mask, and gloves.  Mask used was standard procedure mask. Appropriate PPE was worn during the entire therapy session. Hand hygiene was completed before and after therapy session. Patient is not in enhanced droplet precautions.     Time Calculation:   PT Charges     Row Name 08/20/20 0706             Time Calculation    Start Time  1130  -SM      Stop Time  1148  -SM      Time Calculation (min)  18 min  -SM      PT Received On  08/20/20  -      PT - Next Appointment  08/21/20  -        User Key  (r) = Recorded By, (t) = Taken By, (c) = " Cosigned By    Initials Name Provider Type     Keyanna Wellington, REJI Physical Therapy Assistant        Therapy Charges for Today     Code Description Service Date Service Provider Modifiers Qty    63562874662 HC PT THER PROC EA 15 MIN 8/19/2020 Keyanna Wellington, REJI GP 2    79113808128 HC PT THER SUPP EA 15 MIN 8/19/2020 Keyanna Wellington, REJI GP 2    22626338095 HC PT THER PROC EA 15 MIN 8/20/2020 Keyanna Wellington, REJI GP 1    25171141661 HC PT THER SUPP EA 15 MIN 8/20/2020 Keyanna Wellington, REJI GP 1          PT G-Codes  Outcome Measure Options: AM-PAC 6 Clicks Basic Mobility (PT)  AM-PAC 6 Clicks Score (PT): 8    Keyanna Wellington PTA  8/20/2020

## 2020-08-20 NOTE — PLAN OF CARE
Problem: Patient Care Overview  Goal: Plan of Care Review  Outcome: Ongoing (interventions implemented as appropriate)  Flowsheets (Taken 8/20/2020 1014)  Plan of Care Reviewed With: patient; daughter  Outcome Summary: Limited clinical swallow evaluation completed. Patient agreeable to limited trials of thin liquids. Patient unable to follow directions for oral motor commands. Patient refused all other trials of PO. SLP unable to recommend safest PO diet at this time. Recommend continue with current diet, mechanical soft no mixed and thins with meds whole with puree. Recommend upright, alert, slow rate, Alternate liquids/solids, check for pocketing and make NPO with concern for aspiration. SLP to return for re-eval as patient agreeable to increased trials.     Patient was not wearing a face mask during this therapy encounter. Therapist used appropriate personal protective equipment including mask, eye protection and gloves.  Mask used was standard procedure mask. Appropriate PPE was worn during the entire therapy session. Hand hygiene was completed before and after therapy session. Patient is not in enhanced droplet precautions.

## 2020-08-20 NOTE — PROGRESS NOTES
[facility] INPATIENT PROGRESS NOTE         [address]    2020      PATIENT IDENTIFICATION:  Name: Jihan Teresa ADMIT: 8/3/2020   : 1933  PCP: Yaron Ca Jr., MD    MRN: [id number] LOS: 17 days   AGE/SEX: 86 y.o. female  ROOM: [facility]                     LOS 17    Reason for visit: Follow-up arterial occlusion requiring thromboembolectomy intracranial hemorrhage on admission      SUBJECTIVE:      Little more alert this morning but still sleepy.  Still with some garbled speech and this likely is related to her recent stroke.  Discussed with family at bedside.  No chest pain complaints, shortness of breath or nausea.  Discussed with patient's nurse at bedside.    Objective   OBJECTIVE:    Vital Sign Min/Max for last 24 hours  Temp  Min: 97.5 °F (36.4 °C)  Max: 98.4 °F (36.9 °C)   BP  Min: 119/83  Max: 145/76   Pulse  Min: 72  Max: 76   Resp  Min: 16  Max: 16   SpO2  Min: 91 %  Max: 95 %   No data recorded   No data recorded                         Body mass index is 29.75 kg/m².    Intake/Output Summary (Last 24 hours) at 2020 1014  Last data filed at 2020 0300  Gross per 24 hour   Intake 200 ml   Output 1000 ml   Net -800 ml         Exam:  GEN:  No distress, appears stated age  EYES:   PERRL, anicteric sclera  ENT:    External ears/nose normal, OP clear  NECK:  No adenopathy, midline trachea  LUNGS: Normal chest on inspection, palpation and auscultation  CV:  Normal S1S2, without murmur  ABD:  Non tender, non distended, no hepatosplenomegaly, +BS  EXT:  No edema, cyanosis or clubbing    Scheduled meds:      amLODIPine 10 mg Oral Q24H   furosemide 20 mg Oral Daily   isosorbide mononitrate 60 mg Oral Daily   levothyroxine 75 mcg Oral QAM   metoprolol tartrate 25 mg Oral Q12H   nystatin 5 mL Swish & Spit 4x Daily   warfarin (COUMADIN) (dosing per levels)  Does not apply Daily     IV meds:                          lactated ringers 9 mL/hr Last Rate: 9 mL/hr (20)      Pharmacy to dose warfarin       Data Review:  Results from last 7 days   Lab Units 08/20/20  0526 08/19/20  0544 08/18/20  0659 08/17/20  0714 08/16/20  0616   SODIUM mmol/L 141 139 136 138 139   POTASSIUM mmol/L 3.7 3.6 3.8 3.8 4.0   CHLORIDE mmol/L 105 103 102 102 104   CO2 mmol/L 27.4 27.2 26.0 27.6 28.5   BUN mg/dL 23 21 19 16 11   CREATININE mg/dL 0.82 0.79 0.76 0.83 0.70   GLUCOSE mg/dL 120* 128* 105* 132* 146*   CALCIUM mg/dL 8.8 8.6 8.5* 8.4* 8.6         Estimated Creatinine Clearance: 42.7 mL/min (by C-G formula based on SCr of 0.82 mg/dL).  Results from last 7 days   Lab Units 08/20/20  0526 08/19/20  0544 08/18/20  0659 08/17/20  1501 08/17/20  0714 08/16/20  0616   WBC 10*3/mm3 10.05 10.10 8.33  --  10.07 12.19*   HEMOGLOBIN g/dL 10.2* 10.3* 7.3* 8.0* 7.5* 8.7*   PLATELETS 10*3/mm3 247 222 178  --  149 149     Results from last 7 days   Lab Units 08/20/20  0526 08/19/20  0544 08/18/20  0659 08/17/20  0714 08/16/20  0616   INR  2.80* 2.52* 2.27* 2.34* 1.89*         Results from last 7 days   Lab Units 08/17/20  2310   PH, ARTERIAL pH units 7.465*   PO2 ART mm Hg 86.9   PCO2, ARTERIAL mm Hg 36.6   HCO3 ART mmol/L 26.3             Most recent CT head 8/13/2020 reviewed    Most recent chest x-ray 8/12/2020 reviewed      EEG 8/19/2020 reviewed      Microbiology reviewed    )        Active Hospital Problems    Diagnosis  POA   • **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) [I61.0]  Unknown   • ICH (intracerebral hemorrhage) (CMS/HCC) [I61.9]  Yes      Resolved Hospital Problems   No resolved problems to display.       Assessment   ASSESSMENT:  Acute arterial occlusion of right lower extremity  Status post open right femoral thromboembolectomy  Intracerebral hemorrhage on admission  Coagulopathy on admission  A. fib on anticoagulation  UTI E. coli  Diabetes mellitus  Hypothyroidism  V. Tach  Anemia: Improved post transfusion  Acute stroke    PLAN:      Hemoglobin stable status  post transfusion.  No need for surgical intervention for small hematoma.  Follow-up with vascular surgery in 1 month for ABIs.  Noted MRI findings of new acute stroke.  Neurology following.  Control blood pressure.  Completed antibiotics for E. coli UTI.  PT/OT.  Discussed with patient's daughter at the bedside.  Questions asked for their satisfaction.      Plan for discharge to [facility] rehab when medically ready.  Mental status is precluding transfer essentially at this point.    Joaquin Stewart MD  Pulmonary and Critical Care Medicine  [facility]  8/20/2020    10:14

## 2020-08-20 NOTE — PROGRESS NOTES
"[facility]  Cardiology Progress Note    Patient Identification:  Name: Jihan Teresa  Age: 86 y.o.  Sex: female  :  1933  MRN: [id number]                 Follow Up / Chief Complaint: Atrial fibrillation with ICH    Interval History: 20 She had an open right femoral thromboembolectomy per Dr. Winsome Valentin.  Hgb 10.2 today, defer to IM.     Subjective:   Sleeping, daughter states she has been sleeping most of the day.    Objective:    Past Medical History:  Past Medical History:   Diagnosis Date   • Anemia    • Aortic valve stenosis     S/p AVR in    • Asthma    • Atelectasis    • CHF (congestive heart failure) (CMS/HCC)    • Congenital heart disease    • Diabetes mellitus (CMS/HCC)    • Esophageal reflux    • Essential hypertension    • HLD (hyperlipidemia)    • Hypotension    • LVH (left ventricular hypertrophy)    • Pleural effusion    • Pulmonary hypertension (CMS/HCC)      Past Surgical History:  Past Surgical History:   Procedure Laterality Date   • AORTIC VALVE REPAIR/REPLACEMENT  2009    Jerry Colmenares MD   • CATARACT EXTRACTION     • CEREBRAL ANGIOGRAM N/A 2020    Procedure: CEREBRAL ANGIOGRAM;  Surgeon: Ar Bunch MD;  Location: [facility] OR ;  Service: Neurosurgery;  Laterality: N/A;   • FEMORAL THROMBECTOMY/EMBOLECTOMY Right 2020    Procedure: FEMORAL THROMBECTOMY/EMBOLECTOMY;  Surgeon: Winsome Valentin Jr., MD;  Location: [facility];  Service: Vascular;  Laterality: Right;   • KNEE ARTHROPLASTY          Social History:   Social History     Tobacco Use   • Smoking status: Never Smoker   • Smokeless tobacco: Never Used   Substance Use Topics   • Alcohol use: Yes     Comment: ocasional      Family History:  Family History   Problem Relation Age of Onset   • Dementia Sister    • Stroke Brother           Allergies:  Allergies   Allergen Reactions   • Promethazine Other (See Comments)     Pt becomes \"out of it\" when on this " "medication  Pt becomes \"out of it\" when on this medication     Scheduled Meds:    amLODIPine 10 mg Q24H   furosemide 20 mg Daily   isosorbide mononitrate 60 mg Daily   levothyroxine 75 mcg QAM   metoprolol tartrate 25 mg Q12H   nystatin 5 mL 4x Daily   warfarin (COUMADIN) (dosing per levels)  Daily     ROS  Constitutional: Awake and alert, no fever. No nosebleeds  Abdomen           no abdominal pain   Cardiac              no chest pain  Pulmonary          no shortness of breath      /63 (BP Location: Left arm, Patient Position: Lying)   Pulse 72   Temp 97.8 °F (36.6 °C) (Oral)   Resp 20   Ht 152.4 cm (60\")   Wt 69.1 kg (152 lb 5.4 oz)   SpO2 90%   BMI 29.75 kg/m²   General appearance: No acute changes   Neck: Trachea midline; NECK, supple, no thyromegaly or lymphadenopathy   Lungs: Normal size and shape, normal breath sounds, equal distribution of air, no rales and rhonchi   CV: S1-S2 regular, no murmurs, no rub, no gallop   Abdomen: Soft, non-tender; no masses , no abnormal abdominal sounds   Extremities: No deformity , normal color , no peripheral edema   Skin: Normal temperature, turgor and texture; no rash, ulcers              Cardiographics  Telemetry:       Atrial fib                            A. Fib      Atrial fibrillation      Echocardiogram:   ECG:          Echocardiogram:   2017  Interpretation Summary      · Calculated EF = 46.9%.  · Left ventricular systolic function is low normal.  · Left amd right atrial cavity size is severely dilated.  · Right ventricular cavity is severely dilated.  · calcification of the aortic valve  · Severe mitral valve regurgitation is present  · Mitral annular calcification is present. Posterior leaflet very calcified and immobile.  · Severe tricuspid valve regurgitation is present.  · Estimated right ventricular systolic pressure from tricuspid regurgitation is markedly elevated (>55 mmHg). Calculated right ventricular systolic pressure from tricuspid " regurgitation is 66.7 mmHg.         Imaging  Chest X-ray:   Study Result      EXAMINATION: SINGLE VIEW CHEST RADIOGRAPH     HISTORY: 86-year-old female with history of generalized weakness.     FINDINGS: An upright AP portable chest radiograph was obtained.  Comparison is made to a chest radiograph dated 06/10/2017. The lungs are  normal in volume and are clear of consolidation. Stable mild scattered  interstitial prominence is seen throughout both lungs. Stable soft  tissue fullness in the right hilum is most consistent with a prominent  right hilar vessel is noted. There are stable cardiomegaly. Midline  sternal wires are noted. Atheromatous consultation seen within the  aortic arch.     IMPRESSION:  There is stable mild bilateral interstitial scarring and  cardiomegaly with findings suggestive of pulmonary hypertension. No  evidence for superimposed acute process is appreciated.     This report was finalized on 8/3/2020 10:55 AM by Dr. Estuardo Wagner M.D.         CT     Study Result      CT SCAN OF THE HEAD WITHOUT CONTRAST     CLINICAL HISTORY: Generalized weakness and confusion.     TECHNIQUE: CT scan of the head was obtained with 3 mm axial images. No  intravenous contrast was administered.     COMPARISON: Comparison is made to previous CT scan of head dated  06/13/2017.     FINDINGS: There is increased density within the dependent aspects of  both lateral ventricles compatible with intraventricular hemorrhage.  There are foci of increased density noted within the inferior aspect of  the interhemispheric fissure worrisome for subarachnoid hemorrhage. The  etiology of these areas of hemorrhage is uncertain, although a vascular  etiology such as an aneurysm should be excluded. I recommend further  evaluation with an MRA or CTA examination, as well as an MRI of the  brain.     There are multiple chronic infarcts identified within both cerebellar  hemispheres, left greater than right. The largest of these  chronic  infarcts measures up to approximately 1.4 x 0.4 cm in greatest axial  dimensions. Old lacunar disease is seen within the left thalamus. A  chronic lacune identified within the anterior aspect of the left  thalamus measuring up to 4-5 mm in diameter. Hypodense areas are  identified within the lentiform nuclei bilaterally that are likely  representative of a combination of enlarged perivascular spaces and old  lacunar disease. Severe changes of chronic small vessel ischemic  phenomena are identified.     Otherwise, the ventricles, sulci, and cisterns are age appropriate.     IMPRESSION:     There is intraventricular hemorrhage identified within the dependent  aspects of both lateral ventricles. Additionally, there is increased  density seen within the inferior aspect of the interhemispheric fissure  suggestive of subarachnoid hemorrhage. The precise etiology of this  hemorrhage is uncertain on the basis of this head CT. However, a  vascular etiology should be excluded. Further evaluation is recommended  with a CTA or MRA study, as well as an MRI of the brain.     These findings and recommendations were discussed with Debbie Wilson on 08/03/2020 at approximately 8:36 AM.     Radiation dose reduction techniques were utilized, including automated  exposure control and exposure modulation based on body size.       Study Result     MRI EXAMINATION OF BRAIN WITHOUT CONTRAST     HISTORY:  Confusion/delirium, altered mental status.     A noncontrasted MRI examination of the brain was performed. The lack of  contrast reduces the sensitivity of the study.     FINDINGS: The diffusion sequence demonstrates an area of increased  signal intensity involving the cortex of the left frontal lobe  superolaterally measuring approximately 5 mm in size.     There is no evidence of hydrocephalus. Remote infarcts involving the  left cerebellar hemisphere are noted inferiorly, present on the prior  MRI examination. There is  extensive small vessel ischemic disease  appreciated with confluent areas of increased signal intensity involving  the white matter of the cerebral hemispheres bilaterally.     The axial T2 FLAIR sequence demonstrates ovoid areas of T2  hyperintensity which corresponds to areas of increased signal intensity  on the T1 sequence. This corresponds to the area of hemorrhage noted on  the CT examination. There are smaller in size as compared to the areas  of hemorrhage noted on the MRI examination of 08/03/2020. On the right,  the area of T2 FLAIR hyperintensity measures approximately 7 mm in AP  dimension and 3 mm in transverse dimension. On the MRI examination of  08/03/2020 this measured approximately 9 mm in AP dimension and 6 mm in  transverse dimension. On the left the area of increased signal intensity  measures 5 x 2.5 mm in the transverse planes, previously 7.3 x 4.2 mm.     The gradient echo T2 sequence demonstrates signal loss related to the  areas of hemorrhage involving the lateral ventricle consistent with  hemosiderin deposition. There is also a small amount of blood present  within the occipital horns bilaterally, present previously.     There is increased signal intensity present within the mastoid air cells  bilaterally which is new versus the prior MRI examination of 08/03/2020.     IMPRESSION:  1.  5 mm acute infarct involving the left frontal lobe superolaterally.  2.  Areas of residual blood products are identified involving the  anterior aspect of the third ventricle and involving the medial and  inferior aspects of the frontal lobes bilaterally adjacent to the  lateral ventricles. The volume of blood has decreased versus 08/03/2012.  There is a small amount of blood present within the occipital horns  bilaterally, present previously. There is no evidence of new hemorrhage  or of hydrocephalus. The etiology for the hemorrhage is uncertain.  3.  Extensive small vessel ischemic disease.  4.  Fluid  "within the mastoid air cells bilaterally.     The information regarding the infarct was called to the patient's nurse  at the time of the dictation. The patient's nurse is to relay the  information to the clinical service.     This report was finalized on 8/19/2020 8:30 AM by Dr. Tim Khoury M.D.               Lab Review           Results from last 7 days   Lab Units 08/20/20  0526   SODIUM mmol/L 141   POTASSIUM mmol/L 3.7   BUN mg/dL 23   CREATININE mg/dL 0.82   CALCIUM mg/dL 8.8        Results from last 7 days   Lab Units 08/20/20  0526 08/19/20  0544 08/18/20  0659   WBC 10*3/mm3 10.05 10.10 8.33   HEMOGLOBIN g/dL 10.2* 10.3* 7.3*   HEMATOCRIT % 30.2* 32.1* 21.9*   PLATELETS 10*3/mm3 247 222 178     Results from last 7 days   Lab Units 08/20/20  0526 08/19/20  0544 08/18/20  0659   INR  2.80* 2.52* 2.27*   APTT seconds 43.1* 42.7* 43.9*     The following medical decision was discussed in detail with Dr. Mcmahan    Assessment:  1. Intracerebral hemorrhage  2.  Chronic atrial fibrillation: Coumadin on hold due low hgb   3.  Bioprosthetic aortic valve replaced in 2009  5. coagulopathy  6.  UTI  7.  Diabetes mellitus  8. Hypothyroidism  9. CAD  10. Hypertension  11. Acute stroke; 5 mm acute infarct involving the left frontal lobe superolaterally    Plan:  VS stable.  Atrial fibrillation on monitor with a controlled rate.  Hgb 10.2, defer to IM.  Watchman's procedure has been discussed with the daughter.  INR goal 2-3.  Today INR 2.80.  Currently remains on Coumadin.  Continue amlodipine, Lasix, isosorbide, and metoprolol tartrate.    Cardiovascular continues to remain stable anticoagulation still is an issue INR is 2.8    MD DAVE Murphy/Transcription:   \"Dictated utilizing Dragon dictation\".     "

## 2020-08-20 NOTE — PROGRESS NOTES
Pharmacy Consult: Warfarin Dosing/ Monitoring    Jihan Teresa is a 86 y.o. female, estimated creatinine clearance is 42.7 mL/min (by C-G formula based on SCr of 0.82 mg/dL). weighing 69.1 kg (152 lb 5.4 oz).    She has a past medical history of Anemia, Aortic valve stenosis, Asthma, Atelectasis, CHF (congestive heart failure) (CMS/[facility]), Congenital heart disease, Diabetes mellitus (CMS/[facility]), Esophageal reflux, Essential hypertension, HLD (hyperlipidemia), Hypotension, LVH (left ventricular hypertrophy), Pleural effusion, and Pulmonary hypertension (CMS/[facility]).    Social History     Tobacco Use    Smoking status: Never Smoker    Smokeless tobacco: Never Used   Substance Use Topics    Alcohol use: Yes     Comment: ocasional    Drug use: No       Results from last 7 days   Lab Units 08/20/20  0526 08/19/20  0544 08/18/20  0659 08/17/20  1501 08/17/20  0714 08/16/20  0616 08/15/20  0339 08/14/20  0420   INR  2.80* 2.52* 2.27*  --  2.34* 1.89* 1.44* 1.40*   APTT seconds 43.1* 42.7* 43.9*  --  73.8* 67.5* 68.2* 63.5*   HEMOGLOBIN g/dL 10.2* 10.3* 7.3* 8.0* 7.5* 8.7* 9.0* 9.2*   HEMATOCRIT % 30.2* 32.1* 21.9* 24.5* 22.8* 25.6* 26.4* 28.2*   PLATELETS 10*3/mm3 247 222 178  --  149 149 110* 122*     Results from last 7 days   Lab Units 08/20/20  0526 08/19/20  0544 08/18/20  0659   SODIUM mmol/L 141 139 136   POTASSIUM mmol/L 3.7 3.6 3.8   CHLORIDE mmol/L 105 103 102   CO2 mmol/L 27.4 27.2 26.0   BUN mg/dL 23 21 19   CREATININE mg/dL 0.82 0.79 0.76   CALCIUM mg/dL 8.8 8.6 8.5*   GLUCOSE mg/dL 120* 128* 105*     Anticoagulation history: 3mg TuWeFrSaSu, 1.5mg [facility] Anticoagulation:  Consulting provider: Dr. Evans  Start date: 8/11/2020  Indication: AFib,  R arterial occlusion d/t cardiac embolis 8/12 Right femoral thromboembolectomy   Target INR: 2-3  Expected duration: lifelong   Bridge Therapy: completed heparin bridge                 Date 8/11 8/12 8/13 8/14  8/15 8/16 8/17 8/18 8/19 8/20   INR 1.3 1.24 1.32  1.4 1.44 1.89 2.34 2.27 2.52 2.8    Warfarin dose Not able to take po In OR not given  1.5 mg   3 mg 3 mg 3 mg HOLD  3 mg  1.5 mg  HOLD     Date 8/21            INR 2.8             Warfarin dose HOLD                Potential drug interactions:   Levothyroxin pta med   8/3 vit K 10 mg     Relevant nutrition status: mechanical soft diet, intake improving % diet, nutrition supplements - Glucerna shake with breakfast and dinner added    Other: Cleared per neurosurgery to resume warfarin 8/11 with no loading doses  8/18 There was a small R thigh hematoma on CT but was okd to continue anticoagulation per vascular  8/18 MRI revealed a small acute left frontal lobe infarct    Education complete?/ Date: defer    Assessment/Plan:  Warfarin management complicated by acute intracerebral hemorrhage and 8/3 vit k, heparin is stopped  Home weekly dose: 18 mg/wk warfarin 3mg tab -- 1.5mg MoTh and 3 mg all other days  PO intake has improved 50% lunch and 75% dinner yesterday  Despite hold 8/17 and lower than home dose 8/19 INR has trended up by 0.3 again. HOLD today, pending result tomorrow may need to resume at lower than home dose.     HOLD warfarin today   Follow-up daily INR.      Pharmacy will continue to follow until discharge or discontinuation of warfarin.

## 2020-08-21 NOTE — PROGRESS NOTES
[facility] INPATIENT PROGRESS NOTE         [address]    2020      PATIENT IDENTIFICATION:  Name: Jihan Teresa ADMIT: 8/3/2020   : 1933  PCP: Yaron Ca Jr., MD    MRN: [id number] LOS: 18 days   AGE/SEX: 86 y.o. female  ROOM: [facility]                     LOS 18    Reason for visit: Follow-up arterial occlusion requiring thromboembolectomy intracranial hemorrhage on admission      SUBJECTIVE:      Still moderately confused.  Not taking a whole lot of oral intake.  Discussed with family and nursing staff at bedside.  Discussed CODE STATUS in detail with family as well.  Encouraged changing to DNR.  The daughter will talk to her sister and get back with us with a final decision.  Currently still full code.    Objective   OBJECTIVE:    Vital Sign Min/Max for last 24 hours  Temp  Min: 97.8 °F (36.6 °C)  Max: 99.2 °F (37.3 °C)   BP  Min: 120/46  Max: 141/63   Pulse  Min: 70  Max: 92   Resp  Min: 20  Max: 20   SpO2  Min: 85 %  Max: 95 %   No data recorded   No data recorded                         Body mass index is 29.75 kg/m².    Intake/Output Summary (Last 24 hours) at 2020 0925  Last data filed at 2020 0300  Gross per 24 hour   Intake --   Output 1200 ml   Net -1200 ml         Exam:  GEN:  No distress, appears stated age  EYES:   PERRL, anicteric sclera  ENT:    External ears/nose normal, OP clear  NECK:  No adenopathy, midline trachea  LUNGS: Normal chest on inspection, palpation and auscultation  CV:  Normal S1S2, without murmur  ABD:  Non tender, non distended, no hepatosplenomegaly, +BS  EXT:  No edema, cyanosis or clubbing    Scheduled meds:      amLODIPine 10 mg Oral Q24H   furosemide 20 mg Oral Daily   isosorbide mononitrate 60 mg Oral Daily   levothyroxine 75 mcg Oral QAM   metoprolol tartrate 25 mg Oral Q12H   nystatin 5 mL Swish & Spit 4x Daily   warfarin (COUMADIN) (dosing per levels)  Does not apply Daily     IV meds:                          lactated ringers 9  mL/hr Last Rate: 9 mL/hr (08/12/20 1923)   Pharmacy to dose warfarin       Data Review:  Results from last 7 days   Lab Units 08/21/20  0713 08/20/20  0526 08/19/20  0544 08/18/20  0659 08/17/20  0714   SODIUM mmol/L 143 141 139 136 138   POTASSIUM mmol/L 3.3* 3.7 3.6 3.8 3.8   CHLORIDE mmol/L 105 105 103 102 102   CO2 mmol/L 29.9* 27.4 27.2 26.0 27.6   BUN mg/dL 16 23 21 19 16   CREATININE mg/dL 0.66 0.82 0.79 0.76 0.83   GLUCOSE mg/dL 105* 120* 128* 105* 132*   CALCIUM mg/dL 9.0 8.8 8.6 8.5* 8.4*         Estimated Creatinine Clearance: 43.7 mL/min (by C-G formula based on SCr of 0.66 mg/dL).  Results from last 7 days   Lab Units 08/21/20  0713 08/20/20  0526 08/19/20  0544 08/18/20  0659 08/17/20  1501 08/17/20  0714   WBC 10*3/mm3 8.42 10.05 10.10 8.33  --  10.07   HEMOGLOBIN g/dL 10.4* 10.2* 10.3* 7.3* 8.0* 7.5*   PLATELETS 10*3/mm3 265 247 222 178  --  149     Results from last 7 days   Lab Units 08/21/20  0713 08/20/20  0526 08/19/20  0544 08/18/20  0659 08/17/20  0714   INR  2.86* 2.80* 2.52* 2.27* 2.34*         Results from last 7 days   Lab Units 08/17/20  2310   PH, ARTERIAL pH units 7.465*   PO2 ART mm Hg 86.9   PCO2, ARTERIAL mm Hg 36.6   HCO3 ART mmol/L 26.3             Most recent CT head 8/13/2020 reviewed    Most recent chest x-ray 8/12/2020 reviewed      EEG 8/19/2020 reviewed      Microbiology reviewed    )        Active Hospital Problems    Diagnosis  POA   • **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) [I61.0]  Unknown   • ICH (intracerebral hemorrhage) (CMS/HCC) [I61.9]  Yes      Resolved Hospital Problems   No resolved problems to display.       Assessment   ASSESSMENT:  Acute arterial occlusion of right lower extremity  Status post open right femoral thromboembolectomy  Intracerebral hemorrhage on admission  Coagulopathy on admission  A. fib on anticoagulation  UTI E. coli  Diabetes mellitus  Hypothyroidism  V. Tach  Anemia: Improved post transfusion  Acute  stroke    PLAN:      Hemoglobin stable status post transfusion.  No need for surgical intervention for small hematoma.  Follow-up with vascular surgery in 1 month for ABIs.  Noted MRI findings of new acute stroke.  Neurology following.  Control blood pressure.  Completed antibiotics for E. coli UTI.  PT/OT.  Discussed with patient's daughter at the bedside.  Not sure that her mental status really is getting get any better than this.  Time will tell.  Questions asked for their satisfaction.  Discussed CODE STATUS and encouraged him to change her to DNR.  They will discuss with the family and get back to us.      Plan for discharge to [facility] rehab when medically ready.  Mental status is precluding transfer essentially at this point.    Joaquin Stewart MD  Pulmonary and Critical Care Medicine  [facility], [facility]  8/21/2020    09:25

## 2020-08-21 NOTE — PLAN OF CARE
Problem: Patient Care Overview  Goal: Plan of Care Review  Outcome: Ongoing (interventions implemented as appropriate)  Flowsheets (Taken 8/21/2020 0532)  Progress: improving  Plan of Care Reviewed With: patient  Outcome Summary: Patient did well with physical therapy.  More alert this afternoon.  Up to side of bed with PT.  Oral intake improved.  Medication given.  Oxygen at 3l/nc.  VSS.  NIHSS down from 18 to 7.  Will continue to monitor closely for any changes.

## 2020-08-21 NOTE — PLAN OF CARE
Problem: Patient Care Overview  Goal: Plan of Care Review  Flowsheets (Taken 8/21/2020 6983)  Plan of Care Reviewed With: patient; daughter  Outcome Summary: Pt seen for re-evaluation of swallowing this date during trials of thin liquids via straw, puree, and mechanical soft solids. Pt exhibited mildly prolonged mastication and bolus formation of mechanical soft solids but functional without residue and delayed initiation of swallow noted with solids. Pt exhibited delayed throat clear inconsistently with trials of thin liquids via straw (noted with consecutive drinks). Pt exhibited no overt s/s of penetration/aspiration with puree or mechanical soft. Recommend to continue current diet of mechanical soft/no mixed and thin liquids via 1 drink at a time with use of small bites/drinks and fully upright posture. Meds whole in puree.

## 2020-08-21 NOTE — PROGRESS NOTES
"[facility]  Cardiology Progress Note    Patient Identification:  Name: Jihan Teresa  Age: 86 y.o.  Sex: female  :  1933  MRN: [id number]                 Follow Up / Chief Complaint: Atrial fibrillation with ICH    Interval History: 20 She had an open right femoral thromboembolectomy per Dr. Winsome Valentin.  Hgb 10.4 today, defer to IM.     Subjective:        Objective:    Past Medical History:  Past Medical History:   Diagnosis Date   • Anemia    • Aortic valve stenosis     S/p AVR in    • Asthma    • Atelectasis    • CHF (congestive heart failure) (CMS/HCC)    • Congenital heart disease    • Diabetes mellitus (CMS/HCC)    • Esophageal reflux    • Essential hypertension    • HLD (hyperlipidemia)    • Hypotension    • LVH (left ventricular hypertrophy)    • Pleural effusion    • Pulmonary hypertension (CMS/HCC)      Past Surgical History:  Past Surgical History:   Procedure Laterality Date   • AORTIC VALVE REPAIR/REPLACEMENT  2009    Jerry Colmenares MD   • CATARACT EXTRACTION     • CEREBRAL ANGIOGRAM N/A 2020    Procedure: CEREBRAL ANGIOGRAM;  Surgeon: Ar Bunch MD;  Location: [facility] ;  Service: Neurosurgery;  Laterality: N/A;   • FEMORAL THROMBECTOMY/EMBOLECTOMY Right 2020    Procedure: FEMORAL THROMBECTOMY/EMBOLECTOMY;  Surgeon: Winsome Valentin Jr., MD;  Location: [facility];  Service: Vascular;  Laterality: Right;   • KNEE ARTHROPLASTY          Social History:   Social History     Tobacco Use   • Smoking status: Never Smoker   • Smokeless tobacco: Never Used   Substance Use Topics   • Alcohol use: Yes     Comment: ocasional      Family History:  Family History   Problem Relation Age of Onset   • Dementia Sister    • Stroke Brother           Allergies:  Allergies   Allergen Reactions   • Promethazine Other (See Comments)     Pt becomes \"out of it\" when on this medication  Pt becomes \"out of it\" when on this medication     Scheduled " "Meds:    amLODIPine 10 mg Q24H   furosemide 20 mg Daily   isosorbide mononitrate 60 mg Daily   levothyroxine 75 mcg QAM   metoprolol tartrate 25 mg Q12H   nystatin 5 mL 4x Daily   warfarin (COUMADIN) (dosing per levels)  Daily     ROS  Constitutional: Awake and alert, no fever. No nosebleeds  Abdomen           no abdominal pain   Cardiac              no chest pain  Pulmonary          no shortness of breath      /54 (BP Location: Left arm, Patient Position: Lying)   Pulse 64   Temp 97.1 °F (36.2 °C) (Skin)   Resp 16   Ht 152.4 cm (60\")   Wt 69.1 kg (152 lb 5.4 oz)   SpO2 96%   BMI 29.75 kg/m²   General appearance: No acute changes   Neck: Trachea midline; NECK, supple, no thyromegaly or lymphadenopathy   Lungs: Normal size and shape, normal breath sounds, equal distribution of air, no rales and rhonchi   CV: S1-S2 regular, no murmurs, no rub, no gallop   Abdomen: Soft, non-tender; no masses , no abnormal abdominal sounds   Extremities: No deformity , normal color , no peripheral edema   Skin: Normal temperature, turgor and texture; no rash, ulcers                    Cardiographics  Telemetry:           Atrial fib                            A. Fib      Atrial fibrillation         ECG:          Echocardiogram:   2017  Interpretation Summary      · Calculated EF = 46.9%.  · Left ventricular systolic function is low normal.  · Left amd right atrial cavity size is severely dilated.  · Right ventricular cavity is severely dilated.  · calcification of the aortic valve  · Severe mitral valve regurgitation is present  · Mitral annular calcification is present. Posterior leaflet very calcified and immobile.  · Severe tricuspid valve regurgitation is present.  · Estimated right ventricular systolic pressure from tricuspid regurgitation is markedly elevated (>55 mmHg). Calculated right ventricular systolic pressure from tricuspid regurgitation is 66.7 mmHg.         Imaging  Chest X-ray:   Study Result "      EXAMINATION: SINGLE VIEW CHEST RADIOGRAPH     HISTORY: 86-year-old female with history of generalized weakness.     FINDINGS: An upright AP portable chest radiograph was obtained.  Comparison is made to a chest radiograph dated 06/10/2017. The lungs are  normal in volume and are clear of consolidation. Stable mild scattered  interstitial prominence is seen throughout both lungs. Stable soft  tissue fullness in the right hilum is most consistent with a prominent  right hilar vessel is noted. There are stable cardiomegaly. Midline  sternal wires are noted. Atheromatous consultation seen within the  aortic arch.     IMPRESSION:  There is stable mild bilateral interstitial scarring and  cardiomegaly with findings suggestive of pulmonary hypertension. No  evidence for superimposed acute process is appreciated.     This report was finalized on 8/3/2020 10:55 AM by Dr. Estuardo Wagner M.D.         CT     Study Result      CT SCAN OF THE HEAD WITHOUT CONTRAST     CLINICAL HISTORY: Generalized weakness and confusion.     TECHNIQUE: CT scan of the head was obtained with 3 mm axial images. No  intravenous contrast was administered.     COMPARISON: Comparison is made to previous CT scan of head dated  06/13/2017.     FINDINGS: There is increased density within the dependent aspects of  both lateral ventricles compatible with intraventricular hemorrhage.  There are foci of increased density noted within the inferior aspect of  the interhemispheric fissure worrisome for subarachnoid hemorrhage. The  etiology of these areas of hemorrhage is uncertain, although a vascular  etiology such as an aneurysm should be excluded. I recommend further  evaluation with an MRA or CTA examination, as well as an MRI of the  brain.     There are multiple chronic infarcts identified within both cerebellar  hemispheres, left greater than right. The largest of these chronic  infarcts measures up to approximately 1.4 x 0.4 cm in greatest  axial  dimensions. Old lacunar disease is seen within the left thalamus. A  chronic lacune identified within the anterior aspect of the left  thalamus measuring up to 4-5 mm in diameter. Hypodense areas are  identified within the lentiform nuclei bilaterally that are likely  representative of a combination of enlarged perivascular spaces and old  lacunar disease. Severe changes of chronic small vessel ischemic  phenomena are identified.     Otherwise, the ventricles, sulci, and cisterns are age appropriate.     IMPRESSION:     There is intraventricular hemorrhage identified within the dependent  aspects of both lateral ventricles. Additionally, there is increased  density seen within the inferior aspect of the interhemispheric fissure  suggestive of subarachnoid hemorrhage. The precise etiology of this  hemorrhage is uncertain on the basis of this head CT. However, a  vascular etiology should be excluded. Further evaluation is recommended  with a CTA or MRA study, as well as an MRI of the brain.     These findings and recommendations were discussed with Debbie Wilson on 08/03/2020 at approximately 8:36 AM.     Radiation dose reduction techniques were utilized, including automated  exposure control and exposure modulation based on body size.       Study Result     MRI EXAMINATION OF BRAIN WITHOUT CONTRAST     HISTORY:  Confusion/delirium, altered mental status.     A noncontrasted MRI examination of the brain was performed. The lack of  contrast reduces the sensitivity of the study.     FINDINGS: The diffusion sequence demonstrates an area of increased  signal intensity involving the cortex of the left frontal lobe  superolaterally measuring approximately 5 mm in size.     There is no evidence of hydrocephalus. Remote infarcts involving the  left cerebellar hemisphere are noted inferiorly, present on the prior  MRI examination. There is extensive small vessel ischemic disease  appreciated with confluent areas  of increased signal intensity involving  the white matter of the cerebral hemispheres bilaterally.     The axial T2 FLAIR sequence demonstrates ovoid areas of T2  hyperintensity which corresponds to areas of increased signal intensity  on the T1 sequence. This corresponds to the area of hemorrhage noted on  the CT examination. There are smaller in size as compared to the areas  of hemorrhage noted on the MRI examination of 08/03/2020. On the right,  the area of T2 FLAIR hyperintensity measures approximately 7 mm in AP  dimension and 3 mm in transverse dimension. On the MRI examination of  08/03/2020 this measured approximately 9 mm in AP dimension and 6 mm in  transverse dimension. On the left the area of increased signal intensity  measures 5 x 2.5 mm in the transverse planes, previously 7.3 x 4.2 mm.     The gradient echo T2 sequence demonstrates signal loss related to the  areas of hemorrhage involving the lateral ventricle consistent with  hemosiderin deposition. There is also a small amount of blood present  within the occipital horns bilaterally, present previously.     There is increased signal intensity present within the mastoid air cells  bilaterally which is new versus the prior MRI examination of 08/03/2020.     IMPRESSION:  1.  5 mm acute infarct involving the left frontal lobe superolaterally.  2.  Areas of residual blood products are identified involving the  anterior aspect of the third ventricle and involving the medial and  inferior aspects of the frontal lobes bilaterally adjacent to the  lateral ventricles. The volume of blood has decreased versus 08/03/2012.  There is a small amount of blood present within the occipital horns  bilaterally, present previously. There is no evidence of new hemorrhage  or of hydrocephalus. The etiology for the hemorrhage is uncertain.  3.  Extensive small vessel ischemic disease.  4.  Fluid within the mastoid air cells bilaterally.     The information regarding the  "infarct was called to the patient's nurse  at the time of the dictation. The patient's nurse is to relay the  information to the clinical service.     This report was finalized on 8/19/2020 8:30 AM by Dr. Tim Khoury M.D.               Lab Review           Results from last 7 days   Lab Units 08/21/20  0713   SODIUM mmol/L 143   POTASSIUM mmol/L 3.3*   BUN mg/dL 16   CREATININE mg/dL 0.66   CALCIUM mg/dL 9.0        Results from last 7 days   Lab Units 08/21/20  0713 08/20/20  0526 08/19/20  0544   WBC 10*3/mm3 8.42 10.05 10.10   HEMOGLOBIN g/dL 10.4* 10.2* 10.3*   HEMATOCRIT % 31.1* 30.2* 32.1*   PLATELETS 10*3/mm3 265 247 222     Results from last 7 days   Lab Units 08/21/20  0713 08/20/20  0526 08/19/20  0544   INR  2.86* 2.80* 2.52*   APTT seconds 41.0* 43.1* 42.7*     The following medical decision was discussed in detail with Dr. Mcmahan    Assessment:  1. Intracerebral hemorrhage  2.  Chronic atrial fibrillation: Coumadin on hold due low hgb   3.  Bioprosthetic aortic valve replaced in 2009  5. coagulopathy  6.  UTI  7.  Diabetes mellitus  8. Hypothyroidism  9. CAD  10. Hypertension  11. Acute stroke; 5 mm acute infarct involving the left frontal lobe superolaterally    Plan:    Atrial fibrillation rate is under control    INR is 2.86    More awake and alert today    Blood pressure remained stable    Continue anticoagulation    Bogdan Mcmahan MD                [facility] Dragon/Transcription:   \"Dictated utilizing Dragon dictation\".     "

## 2020-08-21 NOTE — PROGRESS NOTES
Pharmacy Consult: Warfarin Dosing/ Monitoring    Jihan Teresa is a 86 y.o. female, estimated creatinine clearance is 43.7 mL/min (by C-G formula based on SCr of 0.66 mg/dL). weighing 69.1 kg (152 lb 5.4 oz).    She has a past medical history of Anemia, Aortic valve stenosis, Asthma, Atelectasis, CHF (congestive heart failure) (CMS/[facility]), Congenital heart disease, Diabetes mellitus (CMS/[facility]), Esophageal reflux, Essential hypertension, HLD (hyperlipidemia), Hypotension, LVH (left ventricular hypertrophy), Pleural effusion, and Pulmonary hypertension (CMS/[facility]).    Social History     Tobacco Use    Smoking status: Never Smoker    Smokeless tobacco: Never Used   Substance Use Topics    Alcohol use: Yes     Comment: ocasional    Drug use: No       Results from last 7 days   Lab Units 08/21/20  0713 08/20/20  0526 08/19/20  0544 08/18/20  0659 08/17/20  1501 08/17/20  0714 08/16/20  0616 08/15/20  0339   INR  2.86* 2.80* 2.52* 2.27*  --  2.34* 1.89* 1.44*   APTT seconds 41.0* 43.1* 42.7* 43.9*  --  73.8* 67.5* 68.2*   HEMOGLOBIN g/dL 10.4* 10.2* 10.3* 7.3* 8.0* 7.5* 8.7* 9.0*   HEMATOCRIT % 31.1* 30.2* 32.1* 21.9* 24.5* 22.8* 25.6* 26.4*   PLATELETS 10*3/mm3 265 247 222 178  --  149 149 110*     Results from last 7 days   Lab Units 08/21/20  0713 08/20/20  0526 08/19/20  0544   SODIUM mmol/L 143 141 139   POTASSIUM mmol/L 3.3* 3.7 3.6   CHLORIDE mmol/L 105 105 103   CO2 mmol/L 29.9* 27.4 27.2   BUN mg/dL 16 23 21   CREATININE mg/dL 0.66 0.82 0.79   CALCIUM mg/dL 9.0 8.8 8.6   GLUCOSE mg/dL 105* 120* 128*     Anticoagulation history: 3mg TuWeFrSaSu, 1.5mg [facility] Anticoagulation:  Consulting provider: Dr. Evans  Start date: 8/11/2020  Indication: AFib,  R arterial occlusion d/t cardiac embolis 8/12 Right femoral thromboembolectomy   Target INR: 2-3  Expected duration: lifelong   Bridge Therapy: completed heparin bridge                 Date 8/11 8/12 8/13 8/14  8/15 8/16 8/17 8/18 8/19 8/20   INR 1.3 1.24 1.32  1.4 1.44 1.89 2.34 2.27 2.52 2.8    Warfarin dose Not able to take po In OR not given  1.5 mg   3 mg 3 mg 3 mg HOLD  3 mg  1.5 mg  HOLD     Date 8/21            INR 2.86             Warfarin dose HOLD                Potential drug interactions:   Levothyroxin pta med   8/3 vit K 10 mg     Relevant nutrition status: mechanical soft diet, intake improving % diet, nutrition supplements - Glucerna shake with breakfast and dinner added    Other: Cleared per neurosurgery to resume warfarin 8/11 with no loading doses  8/18 There was a small R thigh hematoma on CT but was okd to continue anticoagulation per vascular  8/18 MRI revealed a small acute left frontal lobe infarct    Education complete?/ Date: defer    Assessment/Plan:  Warfarin management complicated by acute intracerebral hemorrhage and 8/3 vit k, heparin is stopped  Home weekly dose: 18 mg/wk warfarin 3mg tab -- 1.5mg MoTh and 3 mg all other days  PO intake has improved 50% lunch and 75% dinner yesterday  Despite hold 8/17 and lower than home dose 8/19 INR has trended up by 0.3 again. 8/20 dose held and INR still 2.83 today. Will hold again today.    HOLD warfarin today   Follow-up daily INR.      Pharmacy will continue to follow until discharge or discontinuation of warfarin.     Sintia Wadsworth, Pharm.D., [facility]  8/21/2020  14:13

## 2020-08-21 NOTE — DISCHARGE PLACEMENT REQUEST
"Jihan Teresa N (86 y.o. Female)     Date of Birth Social Security Number Address Home Phone MRN    09/09/1933  [address] [facility] [address] [phone] [id number]    Religious Marital Status          Congregational        Admission Date Admission Type Admitting Provider Attending Provider Department, Room/Bed    8/3/20 Emergency Yvette Evans MD Sayied, Tausif, MD [address], [address]    Discharge Date Discharge Disposition Discharge Destination                       Attending Provider:  Yvette Evans MD    Allergies:  Promethazine    Isolation:  None   Infection:  None   Code Status:  CPR    Ht:  152.4 cm (60\")   Wt:  69.1 kg (152 lb 5.4 oz)    Admission Cmt:  None   Principal Problem:  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) [I61.0] More...                 Active Insurance as of 8/3/2020     Primary Coverage     Payor Plan Insurance Group Employer/Plan Group    MEDICARE MEDICARE A ONLY      Payor Plan Address Payor Plan Phone Number Payor Plan Fax Number Effective Dates    [address] [phone]  9/1/1998 - None Entered    [facility] [id number]       Subscriber Name Subscriber Birth Date Member ID       JIHAN TERESA N 9/9/1933 [id number]           Secondary Coverage     Payor Plan Insurance Group Employer/Plan Group    [place]      Payor Plan Address Payor Plan Phone Number Payor Plan Fax Number Effective Dates    [address] [phone]  8/3/2020 - None Entered    [place] [address]       Subscriber Name Subscriber Birth Date Member ID       JIHAN TERESA N 9/9/1933 [id number]                 Emergency Contacts      (Rel.) Home Phone Work Phone Mobile Phone    Dione Teresa (Daughter) -- -- [phone]    Bharti Deleon (Daughter) [phone] -- --              "

## 2020-08-21 NOTE — PLAN OF CARE
Problem: Patient Care Overview  Goal: Plan of Care Review  Outcome: Ongoing (interventions implemented as appropriate)  Flowsheets (Taken 8/21/2020 1606)  Progress: improving  Plan of Care Reviewed With: patient  Outcome Summary: Pt tolerated treatment well this date. Much more awake and somewhat oriented. Pt was able to respond to all queswtions and commands throughout, w/ only a slight delay at times. Required mod-max A x2 for bed mobility, then stood 3x total, though required max A x2 by last stand d/t fatigue. Pt participates much better when already awake vs being woken up from sleep. NERY Gonzalez was present for some of the session, and pt's daughter was present throughout. Encouraged daughter again to keep trying bed exercises w/ pt as she is awake. Patient was not wearing a face mask during this therapy encounter. Therapist used appropriate personal protective equipment including eye protection, mask, and gloves.  Mask used was standard procedure mask. Appropriate PPE was worn during the entire therapy session. Hand hygiene was completed before and after therapy session. Patient is not in enhanced droplet precautions. Also present: JOSHUA Basilio tech.

## 2020-08-21 NOTE — THERAPY TREATMENT NOTE
Patient Name: Jihan Teresa  : 1933    MRN: [id number]                              Today's Date: 2020       Admit Date: 8/3/2020    Visit Dx:     ICD-10-CM ICD-9-CM   1. Nontraumatic intracerebral hemorrhage, unspecified cerebral location, unspecified laterality (CMS/HCC) I61.9 431   2. Acute UTI N39.0 599.0   3. 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) I61.0 431     Patient Active Problem List   Diagnosis   • Type 2 diabetes mellitus (CMS/HCC)   • Aortic valve replaced, equine valve   • Cerebral infarction (CMS/HCC)   • A-fib (CMS/[facility])   • GERD without esophagitis   • Sleep apnea in adult   • Cognitive dysfunction   • Hypothyroidism (acquired)   • History of recurrent UTIs   • Mild reactive airways disease   • Essential hypertension   • Pulmonary hypertension (CMS/[facility])   • B12 deficiency   • Non-rheumatic mitral regurgitation   • Non-rheumatic tricuspid valve insufficiency   • Chronic anemia   • Grade III hemorrhoids   • ICH (intracerebral hemorrhage) (CMS/[facility])   • Nontraumatic subcortical hemorrhage of left cerebral hemisphere (CMS/[facility])     Past Medical History:   Diagnosis Date   • Anemia    • Aortic valve stenosis     S/p AVR in    • Asthma    • Atelectasis    • CHF (congestive heart failure) (CMS/[facility])    • Congenital heart disease    • Diabetes mellitus (CMS/[facility])    • Esophageal reflux    • Essential hypertension    • HLD (hyperlipidemia)    • Hypotension    • LVH (left ventricular hypertrophy)    • Pleural effusion    • Pulmonary hypertension (CMS/HCC)      Past Surgical History:   Procedure Laterality Date   • AORTIC VALVE REPAIR/REPLACEMENT  2009    Jerry Colmenares MD   • CATARACT EXTRACTION     • CEREBRAL ANGIOGRAM N/A 2020    Procedure: CEREBRAL ANGIOGRAM;  Surgeon: Ar Bunch MD;  Location: [facility] OR ;  Service: Neurosurgery;  Laterality: N/A;   • FEMORAL THROMBECTOMY/EMBOLECTOMY Right 2020    Procedure: FEMORAL  THROMBECTOMY/EMBOLECTOMY;  Surgeon: Winsome Valentin Jr., MD;  Location: [facility] MAIN OR;  Service: Vascular;  Laterality: Right;   • KNEE ARTHROPLASTY       General Information     Row Name 08/21/20 1558          PT Evaluation Time/Intention    Document Type  therapy note (daily note)  -     Mode of Treatment  physical therapy  -     Row Name 08/21/20 1558          General Information    Existing Precautions/Restrictions  fall;oxygen therapy device and L/min  -     Barriers to Rehab  cognitive status  -     Row Name 08/21/20 1558          Cognitive Assessment/Intervention- PT/OT    Orientation Status (Cognition)  oriented to;person;place  -     Cognitive Assessment/Intervention Comment  much more awake today  -       User Key  (r) = Recorded By, (t) = Taken By, (c) = Cosigned By    Initials Name Provider Type     Keyanna Wellington PTA Physical Therapy Assistant        Mobility     Row Name 08/21/20 1600          Bed Mobility Assessment/Treatment    Bed Mobility Assessment/Treatment  supine-sit;sit-supine  -     Supine-Sit [place] (Bed Mobility)  moderate assist (50% patient effort);maximum assist (25% patient effort);2 person assist  -     Sit-Supine [place] (Bed Mobility)  maximum assist (25% patient effort);2 person assist  -     Assistive Device (Bed Mobility)  bed rails;head of bed elevated  -     Row Name 08/21/20 1600          Transfer Assessment/Treatment    Comment (Transfers)  stood 3x; cues to avoid leaning backwards  -     Row Name 08/21/20 1600          Sit-Stand Transfer    Sit-Stand [place] (Transfers)  moderate assist (50% patient effort);maximum assist (25% patient effort);2 person assist  -     Assistive Device (Sit-Stand Transfers)  walker, front-wheeled  -       User Key  (r) = Recorded By, (t) = Taken By, (c) = Cosigned By    Initials Name Provider Type    Keyanna Hutson PTA Physical Therapy Assistant        Obj/Interventions     Row  Name 08/21/20 1601          Therapeutic Exercise    Lower Extremity (Therapeutic Exercise)  LAQ (long arc quad), bilateral;marching while seated  -     Lower Extremity Range of Motion (Therapeutic Exercise)  ankle dorsiflexion/plantar flexion, bilateral  -     Exercise Type (Therapeutic Exercise)  AROM (active range of motion)  -     Position (Therapeutic Exercise)  seated  -     Sets/Reps (Therapeutic Exercise)  5 reps  -     Row Name 08/21/20 1601          Static Sitting Balance    Level of [place] (Unsupported Sitting, Static Balance)  standby assist;contact guard assist;minimal assist, 75% patient effort;moderate assist, 50 to 74% patient effort  -     Time Able to Maintain Position (Unsupported Sitting, Static Balance)  more than 5 minutes  -     Comment (Unsupported Sitting, Static Balance)  occasional cues to lean forward, though pt able to eventually correct w/out physical assist  -       User Key  (r) = Recorded By, (t) = Taken By, (c) = Cosigned By    Initials Name Provider Type    Keyanna Hutson PTA Physical Therapy Assistant        Goals/Plan    No documentation.       Clinical Impression     Row Name 08/21/20 1602          Pain Assessment    Additional Documentation  Pain Scale: Numbers Pre/Post-Treatment (Group)  -     Row Name 08/21/20 1602          Pain Scale: Numbers Pre/Post-Treatment    Pain Scale: Numbers, Pretreatment  0/10 - no pain  -     Pain Scale: Numbers, Post-Treatment  0/10 - no pain  -SM     Row Name 08/21/20 1602          Positioning and Restraints    Pre-Treatment Position  in bed  -     Post Treatment Position  bed  -SM     In Bed  supine;call light within reach;encouraged to call for assist;exit alarm on;with family/caregiver;with nsg  -       User Key  (r) = Recorded By, (t) = Taken By, (c) = Cosigned By    Initials Name Provider Type    Keyanna Hutson PTA Physical Therapy Assistant        Outcome Measures     Row Name 08/21/20 1604           How much help from another person do you currently need...    Turning from your back to your side while in flat bed without using bedrails?  2  -SM     Moving from lying on back to sitting on the side of a flat bed without bedrails?  2  -SM     Moving to and from a bed to a chair (including a wheelchair)?  2  -SM     Standing up from a chair using your arms (e.g., wheelchair, bedside chair)?  2  -SM     Climbing 3-5 steps with a railing?  1  -SM     To walk in hospital room?  1  -SM     AM-PAC 6 Clicks Score (PT)  10  -SM     Row Name 08/21/20 1603          Functional Assessment    Outcome Measure Options  AM-PAC 6 Clicks Basic Mobility (PT)  -       User Key  (r) = Recorded By, (t) = Taken By, (c) = Cosigned By    Initials Name Provider Type    Keyanna Hutson PTA Physical Therapy Assistant        Physical Therapy Education                 Title: PT OT SLP Therapies (In Progress)     Topic: Physical Therapy (In Progress)     Point: Mobility training (In Progress)     Description:   Instruct learner(s) on safety and technique for assisting patient out of bed, chair or wheelchair.  Instruct in the proper use of assistive devices, such as walker, crutches, cane or brace.              Patient Friendly Description:   It's important to get you on your feet again, but we need to do so in a way that is safe for you. Falling has serious consequences, and your personal safety is the most important thing of all.        When it's time to get out of bed, one of us or a family member will sit next to you on the bed to give you support.     If your doctor or nurse tells you to use a walker, crutches, a cane, or a brace, be sure you use it every time you get out of bed, even if you think you don't need it.    Learning Progress Summary           Patient Acceptance, E,D, NR by  at 8/21/2020 1604    Acceptance, E,D, NR,NL by  at 8/20/2020 1330    Acceptance, E,D, NR,NL by  at 8/19/2020 1626    Acceptance, E,D,  NR by SM at 8/18/2020 1538    Acceptance, E,D, NR by  at 8/17/2020 1034    Acceptance, E,D, NR by SM at 8/15/2020 1439    Acceptance, E,TB,D, VU,NR by SM at 8/14/2020 1510    Acceptance, E,TB, VU by CB at 8/13/2020 0922    Acceptance, E,D, VU,NR by PH at 8/12/2020 1540    Acceptance, E,TB, VU by CB at 8/11/2020 1623    Acceptance, E, NR by DJ at 8/10/2020 1530    Acceptance, E, VU,NR by MW at 8/8/2020 1542    Acceptance, E, DU,NR by AR at 8/7/2020 1432    Acceptance, E, DU,NR by AR at 8/6/2020 1208    Acceptance, E,D, NR by CG at 8/5/2020 1410    Comment:  does not appear to retain information.  CGRESSRN    Acceptance, E, NL,NR by AR at 8/5/2020 1204    Acceptance, E, DU,NR by AR at 8/4/2020 1453   Family Acceptance, E, VU,NR by MW at 8/8/2020 1542                   Point: Home exercise program (In Progress)     Description:   Instruct learner(s) on appropriate technique for monitoring, assisting and/or progressing patient with therapeutic exercises and activities.              Learning Progress Summary           Patient Acceptance, E,D, NR by  at 8/21/2020 1604    Acceptance, E,D, NR,NL by  at 8/20/2020 1330    Acceptance, E,D, NR,NL by  at 8/19/2020 1626    Acceptance, E,D, NR by  at 8/18/2020 1538    Acceptance, E,D, NR by  at 8/17/2020 1034    Acceptance, E,D, NR by  at 8/15/2020 1439    Acceptance, E,TB,D, VU,NR by SM at 8/14/2020 1510    Acceptance, E,TB, VU by CB at 8/13/2020 0922    Acceptance, E,D, VU,NR by PH at 8/12/2020 1540    Acceptance, E,TB, VU by CB at 8/11/2020 1623    Acceptance, E, VU,NR by MW at 8/8/2020 1542    Acceptance, E, DU,NR by AR at 8/7/2020 1432    Acceptance, E, DU,NR by AR at 8/6/2020 1208    Acceptance, E,D, NR by CG at 8/5/2020 1410    Comment:  does not appear to retain information.  CGRESSRN    Acceptance, E, NL,NR by AR at 8/5/2020 1204    Acceptance, E, DU,NR by AR at 8/4/2020 1453   Family Acceptance, E, VU,NR by MW at 8/8/2020 1542                   Point: Body  mechanics (In Progress)     Description:   Instruct learner(s) on proper positioning and spine alignment for patient and/or caregiver during mobility tasks and/or exercises.              Learning Progress Summary           Patient Acceptance, E,D, NR by  at 8/21/2020 1604    Acceptance, E,D, NR,NL by  at 8/20/2020 1330    Acceptance, E,D, NR,NL by  at 8/19/2020 1626    Acceptance, E,D, NR by  at 8/18/2020 1538    Acceptance, E,D, NR by  at 8/17/2020 1034    Acceptance, E,D, NR by SM at 8/15/2020 1439    Acceptance, E,TB,D, VU,NR by SM at 8/14/2020 1510    Acceptance, E,TB, VU by CB at 8/13/2020 0922    Acceptance, E,D, VU,NR by  at 8/12/2020 1540    Acceptance, E,TB, VU by CB at 8/11/2020 1623    Acceptance, E, NR by  at 8/10/2020 1530    Acceptance, E, VU,NR by MW at 8/8/2020 1542    Acceptance, E, DU,NR by AR at 8/7/2020 1432    Acceptance, E, DU,NR by AR at 8/6/2020 1208    Acceptance, E,D, NR by  at 8/5/2020 1410    Comment:  does not appear to retain information.  CGRESSRN    Acceptance, E, NL,NR by AR at 8/5/2020 1204    Acceptance, E, DU,NR by AR at 8/4/2020 1453   Family Acceptance, E, VU,NR by MW at 8/8/2020 1542                   Point: Precautions (In Progress)     Description:   Instruct learner(s) on prescribed precautions during mobility and gait tasks              Learning Progress Summary           Patient Acceptance, E,D, NR by  at 8/21/2020 1604    Acceptance, E,D, NR,NL by  at 8/20/2020 1330    Acceptance, E,D, NR,NL by  at 8/19/2020 1626    Acceptance, E,D, NR by SM at 8/18/2020 1538    Acceptance, E,D, NR by  at 8/17/2020 1034    Acceptance, E,D, NR by SM at 8/15/2020 1439    Acceptance, E,TB,D, VU,NR by SM at 8/14/2020 1510    Acceptance, E,TB, VU by CB at 8/13/2020 0922    Acceptance, E,D, VU,NR by PH at 8/12/2020 1540    Acceptance, E,TB, VU by CB at 8/11/2020 1623    Acceptance, E, NR by ELVA at 8/10/2020 1530    Acceptance, E, VU,NR by WENDY at 8/8/2020 1542     Acceptance, E, DU,NR by AR at 8/7/2020 1432    Acceptance, E, DU,NR by AR at 8/6/2020 1208    Acceptance, E,D, NR by  at 8/5/2020 1410    Comment:  does not appear to retain information.  CGRESSRN    Acceptance, E, NL,NR by AR at 8/5/2020 1204    Acceptance, E, DU,NR by AR at 8/4/2020 1453   Family Acceptance, E, VU,NR by  at 8/8/2020 1542                               User Key     Initials Effective Dates Name Provider Type Discipline    CG 06/16/16 -  Geneva Villela RN Registered Nurse Nurse     03/07/18 -  Keyanna Wellington, PTA Physical Therapy Assistant PT    EH 08/19/18 -  Julieta Wheat, PTA Physical Therapy Assistant PT    PH 08/20/19 -  Kaelyn De La O, PTA Physical Therapy Assistant PT    MW 09/17/19 -  Pauline Harris, PT Physical Therapist PT    DJ 10/25/19 -  Florence Wallace, PT Physical Therapist PT    AR 07/02/20 -  Raghu Garcia, PT Student PT Student PT    CB 07/02/20 -  Sami Guajardo, PT Student PT Student PT              PT Recommendation and Plan     Outcome Summary/Treatment Plan (PT)  Anticipated Discharge Disposition (PT): skilled nursing facility  Plan of Care Reviewed With: patient  Progress: improving  Outcome Summary: Pt tolerated treatment well this date. Much more awake and somewhat oriented. Pt was able to respond to all queswtions and commands throughout, w/ only a slight delay at times. Required mod-max A x2 for bed mobility, then stood 3x total, though required max A x2 by last stand d/t fatigue. Pt participates much better when already awake vs being woken up from sleep. RN Lisa was present for some of the session, and pt's daughter was present throughout. Encouraged daughter again to keep trying bed exercises w/ pt as she is awake. Patient was not wearing a face mask during this therapy encounter. Therapist used appropriate personal protective equipment including eye protection, mask, and gloves.  Mask used was standard procedure mask. Appropriate PPE was worn during  the entire therapy session. Hand hygiene was completed before and after therapy session. Patient is not in enhanced droplet precautions. Also present: Mila, PT tech.     Time Calculation:   PT Charges     Row Name 08/21/20 1608             Time Calculation    Start Time  1500  -      Stop Time  1533  -      Time Calculation (min)  33 min  -      PT Received On  08/21/20  -      PT - Next Appointment  08/22/20  -        User Key  (r) = Recorded By, (t) = Taken By, (c) = Cosigned By    Initials Name Provider Type     Keyanna Wellington, REJI Physical Therapy Assistant        Therapy Charges for Today     Code Description Service Date Service Provider Modifiers Qty    51666612232 HC PT THER PROC EA 15 MIN 8/20/2020 Keyanna Wellington, PTA GP 1    60682226242 HC PT THER SUPP EA 15 MIN 8/20/2020 Wellington Keyanna Flakita, PTA GP 1    89644175539 HC PT THER PROC EA 15 MIN 8/21/2020 Wellington Keyanna Flakita, PTA GP 1    20221544313 HC PT THERAPEUTIC ACT EA 15 MIN 8/21/2020 Wellington Keyanna Flakita, PTA GP 1    72987282566 HC PT THER SUPP EA 15 MIN 8/21/2020 [place] Keyanna Flakita, REJI GP 2          PT G-Codes  Outcome Measure Options: AM-PAC 6 Clicks Basic Mobility (PT)  AM-PAC 6 Clicks Score (PT): 10    Keyanna Wellington PTA  8/21/2020

## 2020-08-21 NOTE — THERAPY TREATMENT NOTE
Acute Care - Speech Language Pathology   Swallow Treatment Note [facility]     Patient Name: Jihan Teresa  : 1933  MRN: [id number]  Today's Date: 2020               Admit Date: 8/3/2020    Visit Dx:      ICD-10-CM ICD-9-CM   1. Nontraumatic intracerebral hemorrhage, unspecified cerebral location, unspecified laterality (CMS/HCC) I61.9 431   2. Acute UTI N39.0 599.0   3. 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) I61.0 431     Patient Active Problem List   Diagnosis   • Type 2 diabetes mellitus (CMS/HCC)   • Aortic valve replaced, equine valve   • Cerebral infarction (CMS/HCC)   • A-fib (CMS/HCC)   • GERD without esophagitis   • Sleep apnea in adult   • Cognitive dysfunction   • Hypothyroidism (acquired)   • History of recurrent UTIs   • Mild reactive airways disease   • Essential hypertension   • Pulmonary hypertension (CMS/HCC)   • B12 deficiency   • Non-rheumatic mitral regurgitation   • Non-rheumatic tricuspid valve insufficiency   • Chronic anemia   • Grade III hemorrhoids   • ICH (intracerebral hemorrhage) (CMS/HCC)   • 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)       Therapy Treatment  Rehabilitation Treatment Summary     Row Name 20 1220             Treatment Time/Intention    Discipline  speech language pathologist  -ML      Document Type  therapy note (daily note)  -ML      Subjective Information  no complaints  -ML      Mode of Treatment  speech-language pathology  -ML      Patient/Family Observations  Pt's daughter present at bedside. Pt alert and cooperative  -ML      Care Plan Review  care plan/treatment goals reviewed  -ML      Patient Effort  adequate  -ML      Recorded by [ML] Cindy Hargrove, MS CCC-SLP 20 1244      Row Name                Wound 20 Right anterior groin Incision    Wound - Properties Group Date first assessed: 20 [JR] Time first assessed:  [JR] Side: Right [JR]  Orientation: anterior [JR2] Location: groin [JR] Primary Wound Type: Incision [JR] Recorded by:  [JR] Maryam Pena RN 08/13/20 0427 [JR2] Maryam Pena RN 08/13/20 0429    Row Name                Wound 08/17/20 2107 Bilateral posterior other (see notes) MASD (Moisture associated skin damage)    Wound - Properties Group Date first assessed: 08/17/20 [JRA] Time first assessed: 2107 [JRA] Side: Bilateral [JRA] Orientation: posterior [JRA] Location: other (see notes) [JRA], buttock and jovani rectal area.  Primary Wound Type: MASD [JRA] Recorded by:  [A] Kareem Madison RN 08/17/20 2355    Row Name                Wound 08/17/20 2107 Left anterior;upper chest Puncture    Wound - Properties Group Date first assessed: 08/17/20 [JRA] Time first assessed: 2107 [JRA] Present on Hospital Admission: N [JRA] Side: Left [JRA] Orientation: anterior;upper [JRA] Location: chest [JRA] Primary Wound Type: Puncture [JRA] Additional Comments: s/p central line. [JRA] Recorded by:  [DYLAN] Kareem Madison RN 08/17/20 2746    Row Name                Wound 08/17/20 2107 Bilateral posterior gluteal Pressure Injury    Wound - Properties Group Date first assessed: 08/17/20 [JRA] Time first assessed: 2107 [JRA] Present on Hospital Admission: N [JRA] Side: Bilateral [JRA] Orientation: posterior [JRA] Location: gluteal [JRA] Primary Wound Type: Pressure inj [JRA] Stage, Pressure Injury: deep tissue injury [JRA] Recorded by:  [DYLAN] Kareem Madison RN 08/17/20 0660      User Key  (r) = Recorded By, (t) = Taken By, (c) = Cosigned By    Initials Name Effective Dates Discipline    JRKareem Polk RN 06/23/17 -  Nurse    Maryam Rosado RN 01/17/18 -  Nurse    Cindy Mckeon MS CCC-SLP 10/04/18 -  SLP          Outcome Summary         SLP GOALS     Row Name 08/21/20 1220 08/20/20 0815          Oral Nutrition/Hydration Goal 1 (SLP)    Oral Nutrition/Hydration Goal 1, SLP  Tolerate least restrictive diet with no overt s/s aspiration.   -ML   Tolerate least restrictive diet with no overt s/s aspiration.   -OC     Time Frame (Oral Nutrition/Hydration Goal 1, SLP)  by discharge  -ML  by discharge  -OC     Barriers (Oral Nutrition/Hydration Goal 1, SLP)  Progress: Pt seen for re-evaluation of swallowing this date during trials of thin liquids via straw, puree, and mechanical soft solids. Pt exhibited mildly prolonged mastication and bolus formation of mechanical soft solids but functional without residue and delayed initiation of swallow noted with solids. Pt exhibited delayed throat clear inconsistently with trials of thin liquids via straw (noted with consecutive drinks). Pt exhibited no overt s/s of penetration/aspiration with puree of mechanical soft. Recommend to continue current diet of mechanical soft/no mixed and thin liquids via 1 drink at a time with use of small bites/drinks and fully upright posture. Meds whole in puree.   -ML  --     Progress/Outcomes (Oral Nutrition/Hydration Goal 1, SLP)  goal ongoing  -ML  --       User Key  (r) = Recorded By, (t) = Taken By, (c) = Cosigned By    Initials Name Provider Type    Yoli Thomas MA,[facility]-SLP Speech and Language Pathologist    Cindy Mckeon MS CCC-SLP Speech and Language Pathologist          EDUCATION  The patient has been educated in the following areas:   Dysphagia (Swallowing Impairment).    SLP Recommendation and Plan                        Patient was not wearing a face mask during this therapy encounter. Therapist used appropriate personal protective equipment including mask, eye protection and gloves.  Mask used was standard procedure mask. Appropriate PPE was worn during the entire therapy session. Hand hygiene was completed before and after therapy session. Patient is not in enhanced droplet precautions.            SLP Outcome Measures (last 72 hours)      SLP Outcome Measures     Row Name 08/21/20 1200 08/20/20 0815          SLP Outcome Measures    Outcome Measure Used?   Adult NOMS  -ML  Adult NOMS  -OC        Adult FCM Scores    FCM Chosen  Swallowing  -ML  Swallowing  -OC     Swallowing FCM Score  4  -ML  4  -OC       User Key  (r) = Recorded By, (t) = Taken By, (c) = Cosigned By    Initials Name Effective Dates    OC Yoli Butcher MA,CCC-SLP 06/08/18 -     ML Cindy Hargrove MS CCC-SLP 10/04/18 -              Time Calculation:   Time Calculation- SLP     Row Name 08/21/20 1248             Time Calculation- SLP    SLP Start Time  1220  -ML      SLP Received On  08/21/20  -ML        User Key  (r) = Recorded By, (t) = Taken By, (c) = Cosigned By    Initials Name Provider Type    Cindy Mckeon MS CCC-SLP Speech and Language Pathologist          Therapy Charges for Today     Code Description Service Date Service Provider Modifiers Qty    79094051822  ST TREATMENT SWALLOW 2 8/21/2020 Cindy Hargrove MS CCC-SLP GN 1                 Cindy Hargrove MS CCC-CHINO  8/21/2020

## 2020-08-21 NOTE — PLAN OF CARE
Problem: Patient Care Overview  Goal: Plan of Care Review  Outcome: Ongoing (interventions implemented as appropriate)  Flowsheets (Taken 8/21/2020 1784)  Progress: no change  Plan of Care Reviewed With: patient; daughter  Outcome Summary: pt still lethargic and cannot keep her eyes open but will follow commands. daughter at bedside helping with communication. due to increased lethargy, PO meds held. daughter tried to feed pt but the food will just stay at the front of her mouth. VSS. Afebrile. Will continue to monitor.

## 2020-08-22 NOTE — PROGRESS NOTES
"[facility]    Patient Name: Jihan Teresa  :1933  86 y.o.  LOS: 19  Encounter Provider: KAREN Olivera      Patient Care Team:  Yaron Ca Jr., MD as PCP - General (Family Medicine)  Yaron Ca Jr., MD as PCP - Claims Attributed    Chief Complaint: A. fib with intracranial hemorrhage    Interval History: Heart rate controlled, denies chest pain, shortness of breath.  Patient awake and will answer direct questions today.  Daughter reports decreased appetite.       Objective   Vital Signs  Temp:  [97.1 °F (36.2 °C)-98.9 °F (37.2 °C)] 98.1 °F (36.7 °C)  Heart Rate:  [64-87] 71  Resp:  [16] 16  BP: (120-140)/(51-74) 140/69    Intake/Output Summary (Last 24 hours) at 2020 0835  Last data filed at 2020 0643  Gross per 24 hour   Intake 1370 ml   Output 350 ml   Net 1020 ml     Flowsheet Rows      First Filed Value   Admission Height  152.4 cm (60\") Documented at 2020 1133   Admission Weight  68 kg (149 lb 14.4 oz) Documented at 2020 0857            Physical Exam   Constitutional: She is oriented to person, place, and time. Vital signs are normal. She appears well-developed and well-nourished. She is active.   Eyes: Conjunctivae are normal.   Neck: Carotid bruit is not present.   Cardiovascular: Normal rate. An irregularly irregular rhythm present.   Murmur heard.   Systolic murmur is present with a grade of 2/6 at the upper right sternal border and upper left sternal border.  Pulmonary/Chest: Breath sounds normal.   Abdominal: Normal appearance.   Musculoskeletal:   No cyanosis, clubbing, edema  Normal ROM   Neurological: She is alert and oriented to person, place, and time. GCS eye subscore is 4. GCS verbal subscore is 5. GCS motor subscore is 6.   Skin: Skin is warm, dry and intact.   Psychiatric: She has a normal mood and affect. Her speech is normal and behavior is normal. Judgment and thought content normal. Cognition and memory are normal.         "         Pertinent Test Results:  Results from last 7 days   Lab Units 08/22/20  0552 08/21/20  0713 08/20/20  0526 08/19/20  0544 08/18/20  0659 08/17/20  0714 08/16/20  0616   SODIUM mmol/L 138 143 141 139 136 138 139   POTASSIUM mmol/L 3.3* 3.3* 3.7 3.6 3.8 3.8 4.0   CHLORIDE mmol/L 101 105 105 103 102 102 104   CO2 mmol/L 29.3* 29.9* 27.4 27.2 26.0 27.6 28.5   BUN mg/dL 17 16 23 21 19 16 11   CREATININE mg/dL 0.71 0.66 0.82 0.79 0.76 0.83 0.70   GLUCOSE mg/dL 117* 105* 120* 128* 105* 132* 146*   CALCIUM mg/dL 8.8 9.0 8.8 8.6 8.5* 8.4* 8.6         Results from last 7 days   Lab Units 08/22/20  0552 08/21/20  0713 08/20/20  0526 08/19/20  0544 08/18/20  0659 08/17/20  1501 08/17/20  0714 08/16/20  0616   WBC 10*3/mm3 8.67 8.42 10.05 10.10 8.33  --  10.07 12.19*   HEMOGLOBIN g/dL 10.1* 10.4* 10.2* 10.3* 7.3* 8.0* 7.5* 8.7*   HEMATOCRIT % 31.6* 31.1* 30.2* 32.1* 21.9* 24.5* 22.8* 25.6*   PLATELETS 10*3/mm3 280 265 247 222 178  --  149 149     Results from last 7 days   Lab Units 08/22/20  0552 08/21/20  0713 08/20/20  0526 08/19/20  0544 08/18/20  0659 08/17/20  0714 08/16/20  0616   INR  2.77* 2.86* 2.80* 2.52* 2.27* 2.34* 1.89*   APTT seconds 34.5 41.0* 43.1* 42.7* 43.9* 73.8* 67.5*               Invalid input(s): LDLCALC      Results from last 7 days   Lab Units 08/18/20  0659   TSH uIU/mL 3.800           Medication Review:     amLODIPine 10 mg Oral Q24H   furosemide 20 mg Oral Daily   isosorbide mononitrate 60 mg Oral Daily   levothyroxine 75 mcg Oral QAM   metoprolol tartrate 25 mg Oral Q12H   nystatin 5 mL Swish & Spit 4x Daily   warfarin (COUMADIN) (dosing per levels)  Does not apply Daily          lactated ringers 9 mL/hr Last Rate: 9 mL/hr (08/12/20 1923)   Pharmacy to dose warfarin         Assessment/Plan   1. Intracerebral hemorrhage  2. Chronic atrial fibrillation  3. Bioprosthetic aortic valve  4. Long-term Coumadin therapy  5. UTI  6. Diabetes  7. Hypothyroidism  8. CAD  9. Hypertension  10. Acute  stroke: 5 mm acute infarct involving the left frontal lobe superolaterally    -Rate controlled for atrial fibrillation    -INR 2.77.      -BP currently controlled    -Patient is stable from cardiovascular standpoint.      KAREN Olivera  [facility]  08/22/20  8:35 AM

## 2020-08-22 NOTE — THERAPY TREATMENT NOTE
Patient Name: Jihan Teresa  : 1933    MRN: [id number]                              Today's Date: 2020       Admit Date: 8/3/2020    Visit Dx:     ICD-10-CM ICD-9-CM   1. Nontraumatic intracerebral hemorrhage, unspecified cerebral location, unspecified laterality (CMS/HCC) I61.9 431   2. Acute UTI N39.0 599.0   3. 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) I61.0 431     Patient Active Problem List   Diagnosis   • Type 2 diabetes mellitus (CMS/HCC)   • Aortic valve replaced, equine valve   • Cerebral infarction (CMS/HCC)   • A-fib (CMS/[facility])   • GERD without esophagitis   • Sleep apnea in adult   • Cognitive dysfunction   • Hypothyroidism (acquired)   • History of recurrent UTIs   • Mild reactive airways disease   • Essential hypertension   • Pulmonary hypertension (CMS/[facility])   • B12 deficiency   • Non-rheumatic mitral regurgitation   • Non-rheumatic tricuspid valve insufficiency   • Chronic anemia   • Grade III hemorrhoids   • ICH (intracerebral hemorrhage) (CMS/[facility])   • Nontraumatic subcortical hemorrhage of left cerebral hemisphere (CMS/[facility])     Past Medical History:   Diagnosis Date   • Anemia    • Aortic valve stenosis     S/p AVR in    • Asthma    • Atelectasis    • CHF (congestive heart failure) (CMS/[facility])    • Congenital heart disease    • Diabetes mellitus (CMS/[facility])    • Esophageal reflux    • Essential hypertension    • HLD (hyperlipidemia)    • Hypotension    • LVH (left ventricular hypertrophy)    • Pleural effusion    • Pulmonary hypertension (CMS/HCC)      Past Surgical History:   Procedure Laterality Date   • AORTIC VALVE REPAIR/REPLACEMENT  2009    Jerry Colmenares MD   • CATARACT EXTRACTION     • CEREBRAL ANGIOGRAM N/A 2020    Procedure: CEREBRAL ANGIOGRAM;  Surgeon: Ar Bunch MD;  Location: [facility] OR ;  Service: Neurosurgery;  Laterality: N/A;   • FEMORAL THROMBECTOMY/EMBOLECTOMY Right 2020    Procedure: FEMORAL  THROMBECTOMY/EMBOLECTOMY;  Surgeon: Winsome Valentin Jr., MD;  Location: [facility] MAIN OR;  Service: Vascular;  Laterality: Right;   • KNEE ARTHROPLASTY       General Information     Row Name 08/22/20 1535          PT Evaluation Time/Intention    Document Type  therapy note (daily note)  -DO     Mode of Treatment  physical therapy  -DO     Row Name 08/22/20 1535          General Information    Patient Profile Reviewed?  yes  -DO     Existing Precautions/Restrictions  fall  -DO     Row Name 08/22/20 1535          Cognitive Assessment/Intervention- PT/OT    Orientation Status (Cognition)  oriented to;person  -DO     Row Name 08/22/20 1535          Safety Issues, Functional Mobility    Safety Issues Affecting Function (Mobility)  ability to follow commands  -DO     Impairments Affecting Function (Mobility)  endurance/activity tolerance;postural/trunk control;strength  -DO       User Key  (r) = Recorded By, (t) = Taken By, (c) = Cosigned By    Initials Name Provider Type    DO Olaf Frank PT Physical Therapist        Mobility     Row Name 08/22/20 1536          Bed Mobility Assessment/Treatment    Supine-Sit [place] (Bed Mobility)  maximum assist (25% patient effort);verbal cues;nonverbal cues (demo/gesture)  -DO     Sit-Supine [place] (Bed Mobility)  maximum assist (25% patient effort);verbal cues;nonverbal cues (demo/gesture)  -DO     Assistive Device (Bed Mobility)  bed rails;draw sheet;head of bed elevated  -DO     Row Name 08/22/20 1536          Sit-Stand Transfer    Sit-Stand [place] (Transfers)  maximum assist (25% patient effort);verbal cues;nonverbal cues (demo/gesture)  -DO     Assistive Device (Sit-Stand Transfers)  -- HHAx1  -DO     Row Name 08/22/20 1536          Gait/Stairs Assessment/Training    [place] Level (Gait)  not tested  -DO       User Key  (r) = Recorded By, (t) = Taken By, (c) = Cosigned By    Initials Name Provider Type    DO Olaf Frank PT Physical Therapist         Obj/Interventions     [facility] Name 08/22/20 1537          Therapeutic Exercise    Lower Extremity (Therapeutic Exercise)  -- LAQs and seated marching x10 reps each BLE  -DO     [facility] Name 08/22/20 1537          Static Sitting Balance    Level of [place] (Unsupported Sitting, Static Balance)  moderate assist, 50 to 74% patient effort  -DO     Sitting Position (Unsupported Sitting, Static Balance)  sitting on edge of bed  -DO     Time Able to Maintain Position (Unsupported Sitting, Static Balance)  4 to 5 minutes  -DO     [facility] Name 08/22/20 1537          Static Standing Balance    Level of [place] (Supported Standing, Static Balance)  maximal assist, 25 to 49% patient effort  -DO     Time Able to Maintain Position (Supported Standing, Static Balance)  less than 15 seconds  -DO     Assistive Device Utilized (Supported Standing, Static Balance)  -- HHAx1  -DO       User Key  (r) = Recorded By, (t) = Taken By, (c) = Cosigned By    Initials Name Provider Type    DO Olaf Frank, PT Physical Therapist        Goals/Plan    No documentation.       Clinical Impression     Row Name 08/22/20 1537          Pain Scale: Numbers Pre/Post-Treatment    Pain Scale: Numbers, Pretreatment  0/10 - no pain  -DO     Pain Scale: Numbers, Post-Treatment  0/10 - no pain  -DO     [facility] Name 08/22/20 1537          Plan of Care Review    Plan of Care Reviewed With  patient  -DO     Outcome Summary  Pt able to perform bed mobility and transfers with MaxAx1 today. Stood a total of four times with MaxAx1, using a RWx on two attempts and the other two using HHAx1. She struggles with hand placement on the RWx so elected for HHAx1, especially as she needs blocking at the B knees to assist in achieving full upright position. She is able to clear her buttocks from the bed and stand more upright, however is unable to completely come up to standing and cannot tolerate this position for any more than 10 seconds, so further mobility not appropriate  at this time. She may benefit from continuing skilled PT to improve her independence with functional mobility.   -DO     Row Name 08/22/20 1537          Physical Therapy Clinical Impression    Criteria for Skilled Interventions Met (PT Clinical Impression)  yes  -DO     Rehab Potential (PT Clinical Summary)  good, to achieve stated therapy goals  -DO     Row Name 08/22/20 1537          Positioning and Restraints    Pre-Treatment Position  in bed  -DO     Post Treatment Position  bed  -DO     In Bed  call light within reach;encouraged to call for assist;exit alarm on;with family/caregiver;with nsg  -DO       User Key  (r) = Recorded By, (t) = Taken By, (c) = Cosigned By    Initials Name Provider Type    DO Olaf Frank, PT Physical Therapist        Outcome Measures     [facility] Name 08/22/20 1540          How much help from another person do you currently need...    Turning from your back to your side while in flat bed without using bedrails?  2  -DO     Moving from lying on back to sitting on the side of a flat bed without bedrails?  2  -DO     Moving to and from a bed to a chair (including a wheelchair)?  1  -DO     Standing up from a chair using your arms (e.g., wheelchair, bedside chair)?  1  -DO     Climbing 3-5 steps with a railing?  1  -DO     To walk in hospital room?  1  -DO     AM-PAC 6 Clicks Score (PT)  8  -DO     Row Name 08/22/20 1540          Functional Assessment    Outcome Measure Options  AM-PAC 6 Clicks Basic Mobility (PT)  -DO       User Key  (r) = Recorded By, (t) = Taken By, (c) = Cosigned By    Initials Name Provider Type    DO Olaf Frank, PT Physical Therapist        Physical Therapy Education                 Title: PT OT SLP Therapies (In Progress)     Topic: Physical Therapy (In Progress)     Point: Mobility training (In Progress)     Description:   Instruct learner(s) on safety and technique for assisting patient out of bed, chair or wheelchair.  Instruct in the proper use of assistive  devices, such as walker, crutches, cane or brace.              Patient Friendly Description:   It's important to get you on your feet again, but we need to do so in a way that is safe for you. Falling has serious consequences, and your personal safety is the most important thing of all.        When it's time to get out of bed, one of us or a family member will sit next to you on the bed to give you support.     If your doctor or nurse tells you to use a walker, crutches, a cane, or a brace, be sure you use it every time you get out of bed, even if you think you don't need it.    Learning Progress Summary           Patient Acceptance, E, NR by DO at 8/22/2020 1540    Acceptance, E,D, NR by SM at 8/21/2020 1604    Acceptance, E,D, NR,NL by SM at 8/20/2020 1330    Acceptance, E,D, NR,NL by SM at 8/19/2020 1626    Acceptance, E,D, NR by SM at 8/18/2020 1538    Acceptance, E,D, NR by  at 8/17/2020 1034    Acceptance, E,D, NR by SM at 8/15/2020 1439    Acceptance, E,TB,D, VU,NR by SM at 8/14/2020 1510    Acceptance, E,TB, VU by CB at 8/13/2020 0922    Acceptance, E,D, VU,NR by PH at 8/12/2020 1540    Acceptance, E,TB, VU by CB at 8/11/2020 1623    Acceptance, E, NR by DJ at 8/10/2020 1530    Acceptance, E, VU,NR by MW at 8/8/2020 1542    Acceptance, E, DU,NR by AR at 8/7/2020 1432    Acceptance, E, DU,NR by AR at 8/6/2020 1208    Acceptance, E,D, NR by CG at 8/5/2020 1410    Comment:  does not appear to retain information.  CGRESSRN    Acceptance, E, NL,NR by AR at 8/5/2020 1204    Acceptance, E, DU,NR by AR at 8/4/2020 1453   Family Acceptance, E, NR by DO at 8/22/2020 1540    Acceptance, E, VU,NR by MW at 8/8/2020 1542                   Point: Home exercise program (In Progress)     Description:   Instruct learner(s) on appropriate technique for monitoring, assisting and/or progressing patient with therapeutic exercises and activities.              Learning Progress Summary           Patient Acceptance, E, NR by DO at  8/22/2020 1540    Acceptance, E,D, NR by SM at 8/21/2020 1604    Acceptance, E,D, NR,NL by SM at 8/20/2020 1330    Acceptance, E,D, NR,NL by SM at 8/19/2020 1626    Acceptance, E,D, NR by SM at 8/18/2020 1538    Acceptance, E,D, NR by  at 8/17/2020 1034    Acceptance, E,D, NR by SM at 8/15/2020 1439    Acceptance, E,TB,D, VU,NR by SM at 8/14/2020 1510    Acceptance, E,TB, VU by CB at 8/13/2020 0922    Acceptance, E,D, VU,NR by PH at 8/12/2020 1540    Acceptance, E,TB, VU by CB at 8/11/2020 1623    Acceptance, E, VU,NR by MW at 8/8/2020 1542    Acceptance, E, DU,NR by AR at 8/7/2020 1432    Acceptance, E, DU,NR by AR at 8/6/2020 1208    Acceptance, E,D, NR by CG at 8/5/2020 1410    Comment:  does not appear to retain information.  CGRESSRN    Acceptance, E, NL,NR by AR at 8/5/2020 1204    Acceptance, E, DU,NR by AR at 8/4/2020 1453   Family Acceptance, E, NR by DO at 8/22/2020 1540    Acceptance, E, VU,NR by MW at 8/8/2020 1542                   Point: Body mechanics (In Progress)     Description:   Instruct learner(s) on proper positioning and spine alignment for patient and/or caregiver during mobility tasks and/or exercises.              Learning Progress Summary           Patient Acceptance, E, NR by DO at 8/22/2020 1540    Acceptance, E,D, NR by SM at 8/21/2020 1604    Acceptance, E,D, NR,NL by SM at 8/20/2020 1330    Acceptance, E,D, NR,NL by SM at 8/19/2020 1626    Acceptance, E,D, NR by SM at 8/18/2020 1538    Acceptance, E,D, NR by  at 8/17/2020 1034    Acceptance, E,D, NR by SM at 8/15/2020 1439    Acceptance, E,TB,D, VU,NR by SM at 8/14/2020 1510    Acceptance, E,TB, VU by CB at 8/13/2020 0922    Acceptance, E,D, VU,NR by PH at 8/12/2020 1540    Acceptance, E,TB, VU by CB at 8/11/2020 1623    Acceptance, E, NR by DJ at 8/10/2020 1530    Acceptance, E, VU,NR by MW at 8/8/2020 1542    Acceptance, E, DU,NR by AR at 8/7/2020 1432    Acceptance, E, DU,NR by AR at 8/6/2020 1208    Acceptance, E,D, NR by CG  at 8/5/2020 1410    Comment:  does not appear to retain information.  CGRESSRN    Acceptance, E, NL,NR by AR at 8/5/2020 1204    Acceptance, E, DU,NR by AR at 8/4/2020 1453   Family Acceptance, E, NR by DO at 8/22/2020 1540    Acceptance, E, VU,NR by MW at 8/8/2020 1542                   Point: Precautions (In Progress)     Description:   Instruct learner(s) on prescribed precautions during mobility and gait tasks              Learning Progress Summary           Patient Acceptance, E, NR by DO at 8/22/2020 1540    Acceptance, E,D, NR by  at 8/21/2020 1604    Acceptance, E,D, NR,NL by  at 8/20/2020 1330    Acceptance, E,D, NR,NL by  at 8/19/2020 1626    Acceptance, E,D, NR by  at 8/18/2020 1538    Acceptance, E,D, NR by  at 8/17/2020 1034    Acceptance, E,D, NR by  at 8/15/2020 1439    Acceptance, E,TB,D, VU,NR by SM at 8/14/2020 1510    Acceptance, E,TB, VU by CB at 8/13/2020 0922    Acceptance, E,D, VU,NR by PH at 8/12/2020 1540    Acceptance, E,TB, VU by CB at 8/11/2020 1623    Acceptance, E, NR by DJ at 8/10/2020 1530    Acceptance, E, VU,NR by MW at 8/8/2020 1542    Acceptance, E, DU,NR by AR at 8/7/2020 1432    Acceptance, E, DU,NR by AR at 8/6/2020 1208    Acceptance, E,D, NR by CG at 8/5/2020 1410    Comment:  does not appear to retain information.  CGRESSRN    Acceptance, E, NL,NR by AR at 8/5/2020 1204    Acceptance, E, DU,NR by AR at 8/4/2020 1453   Family Acceptance, E, NR by DO at 8/22/2020 1540    Acceptance, E, VU,NR by MW at 8/8/2020 1542                               User Key     Initials Effective Dates Name Provider Type Discipline     06/16/16 -  Geneva Villela RN Registered Nurse Nurse     03/07/18 -  Keyanna Wellington, PTA Physical Therapy Assistant PT    DO 03/07/18 -  Olaf Frank, PT Physical Therapist PT    EH 08/19/18 -  Julieta Wheat, PTA Physical Therapy Assistant PT    PH 08/20/19 -  Kaelyn De La O, PTA Physical Therapy Assistant PT    MW 09/17/19 -  Kimberly  Pauline, PT Physical Therapist PT    DJ 10/25/19 -  Florence Wallace, PT Physical Therapist PT    AR 07/02/20 -  Raghu Garcia, PT Student PT Student PT    CB 07/02/20 -  Sami Guajardo, PT Student PT Student PT              PT Recommendation and Plan     Plan of Care Reviewed With: patient  Outcome Summary: Pt able to perform bed mobility and transfers with MaxAx1 today. Stood a total of four times with MaxAx1, using a RWx on two attempts and the other two using HHAx1. She struggles with hand placement on the RWx so elected for HHAx1, especially as she needs blocking at the B knees to assist in achieving full upright position. She is able to clear her buttocks from the bed and stand more upright, however is unable to completely come up to standing and cannot tolerate this position for any more than 10 seconds, so further mobility not appropriate at this time. She may benefit from continuing skilled PT to improve her independence with functional mobility.      Time Calculation:   PT Charges     Row Name 08/22/20 1541             Time Calculation    Start Time  1510  -DO      Stop Time  1534  -DO      Time Calculation (min)  24 min  -DO      PT Received On  08/22/20  -DO      PT - Next Appointment  08/24/20  -DO        User Key  (r) = Recorded By, (t) = Taken By, (c) = Cosigned By    Initials Name Provider Type    DO Olaf Frank, JOSHUA Physical Therapist        Therapy Charges for Today     Code Description Service Date Service Provider Modifiers Qty    94597619071 HC PT THER PROC EA 15 MIN 8/22/2020 Olaf Frank, PT GP 2          PT G-Codes  Outcome Measure Options: AM-PAC 6 Clicks Basic Mobility (PT)  AM-PAC 6 Clicks Score (PT): 8    Olaf Frank PT  8/22/2020

## 2020-08-22 NOTE — PROGRESS NOTES
Pharmacy Consult: Warfarin Dosing/ Monitoring    Jihan Teresa is a 86 y.o. female, estimated creatinine clearance is 43.7 mL/min (by C-G formula based on SCr of 0.71 mg/dL). weighing 69.1 kg (152 lb 5.4 oz).     has a past medical history of Anemia, Aortic valve stenosis, Asthma, Atelectasis, CHF (congestive heart failure) (CMS/[facility]), Congenital heart disease, Diabetes mellitus (CMS/[facility]), Esophageal reflux, Essential hypertension, HLD (hyperlipidemia), Hypotension, LVH (left ventricular hypertrophy), Pleural effusion, and Pulmonary hypertension (CMS/[facility]).    Social History     Tobacco Use    Smoking status: Never Smoker    Smokeless tobacco: Never Used   Substance Use Topics    Alcohol use: Yes     Comment: ocasional    Drug use: No       Results from last 7 days   Lab Units 08/22/20  0552 08/21/20  0713 08/20/20  0526 08/19/20  0544 08/18/20  0659 08/17/20  1501 08/17/20  0714 08/16/20  0616   INR  2.77* 2.86* 2.80* 2.52* 2.27*  --  2.34* 1.89*   APTT seconds 34.5 41.0* 43.1* 42.7* 43.9*  --  73.8* 67.5*   HEMOGLOBIN g/dL 10.1* 10.4* 10.2* 10.3* 7.3* 8.0* 7.5* 8.7*   HEMATOCRIT % 31.6* 31.1* 30.2* 32.1* 21.9* 24.5* 22.8* 25.6*   PLATELETS 10*3/mm3 280 265 247 222 178  --  149 149     Results from last 7 days   Lab Units 08/22/20  0552 08/21/20  0713 08/20/20  0526   SODIUM mmol/L 138 143 141   POTASSIUM mmol/L 3.3* 3.3* 3.7   CHLORIDE mmol/L 101 105 105   CO2 mmol/L 29.3* 29.9* 27.4   BUN mg/dL 17 16 23   CREATININE mg/dL 0.71 0.66 0.82   CALCIUM mg/dL 8.8 9.0 8.8   GLUCOSE mg/dL 117* 105* 120*     Anticoagulation history: 3mg [id number], 1.5mg [facility] Anticoagulation:  Consulting provider: Dr. Evans  Start date: 8/11/2020  Indication: AFib,  R arterial occlusion d/t cardiac embolis 8/12 Right femoral thromboembolectomy   Target INR: 2-3  Expected duration: lifelong   Bridge Therapy: completed heparin bridge                 Date 8/11 8/12 8/13 8/14  8/15 8/16 8/17 8/18 8/19 8/20   INR 1.3 1.24 1.32  1.4 1.44 1.89 2.34 2.27 2.52 2.8    Warfarin dose Not able to take po In OR not given  1.5 mg   3 mg 3 mg 3 mg HOLD  3 mg  1.5 mg  HOLD      Date 8/21 8/22                   INR 2.86   2.77                   Warfarin dose HOLD  1mg                         Potential drug interactions:   Levothyroxin pta med   8/3 vit K 10 mg      Relevant nutrition status: mechanical soft diet, intake improving % diet, nutrition supplements - Glucerna shake with breakfast and dinner added     Other: Cleared per neurosurgery to resume warfarin 8/11 with no loading doses  8/18 There was a small R thigh hematoma on CT but was okd to continue anticoagulation per vascular  8/18 MRI revealed a small acute left frontal lobe infarct     Education complete?/ Date: defer     Assessment/Plan:  Warfarin management complicated by acute intracerebral hemorrhage and 8/3 vit k, heparin is stopped  Home weekly dose: 18 mg/wk warfarin 3mg tab -- 1.5mg MoTh and 3 mg all other days  PO intake has improved 75% lunch and 100% dinner yesterday    INR seems to have peaked at 2.86 and is coming down to 2.77 today. Warfarin has been on hold the past two days, so I anticipate INR continues to trend down. Given INR elevations with minimal doses, will give a small 1 mg dose today and see where INR trends tomorrow.    Pharmacy will continue to follow until discharge or discontinuation of warfarin.     Katelyn Green RPH  8/22/2020

## 2020-08-22 NOTE — PLAN OF CARE
Problem: Patient Care Overview  Goal: Plan of Care Review  Flowsheets (Taken 8/22/2020 1355)  Progress: improving  Plan of Care Reviewed With: patient; daughter  Outcome Summary: vss, pt alert, oriented to self, follows commands, significantly [place], 2l o2, no complaints of pain, buttocks purple, specialty bed intact, turn q2hrs, moisture barrier, mepilex intact, worked with physical therapy today, NIH 4, continue to monitor

## 2020-08-22 NOTE — PROGRESS NOTES
[facility]     Mar/chart reviewed  Follow up Acute nontraumatic intracerebral hemorrhage  No headache, no complaints    Vital Sign Min/Max for last 24 hours  Temp  Min: 97.1 °F (36.2 °C)  Max: 98.9 °F (37.2 °C)   BP  Min: 120/57  Max: 140/69   Pulse  Min: 64  Max: 71   Resp  Min: 16  Max: 16   SpO2  Min: 91 %  Max: 96 %   Flow (L/min)  Min: 2  Max: 4   No data recorded     Appears ill , alert, oriented times 1  perrl, eomi, normal sclera no palpable thyroid nodules  mmm, no jvd, trachea midline, neck supple,  chest cta bilaterally, no crackles, no wheezes,   Irrg, irgg +murmur  soft, nt, nd +bs,  no c/c/ e  Skin warm, dry no rashes    Labs: 8/22: reviewed:  Glucose 1117  Bun 17  Cr 0.71  Na 138  k 3.3  Bicarb 29  Wbc 8.67  hgb 10.1  plts 280      ASSESSMENT:  Acute arterial occlusion of right lower extremity  Status post open right femoral thromboembolectomy  Intracerebral hemorrhage on admission  Coagulopathy on admission  A. fib on anticoagulation  UTI E. coli  Diabetes mellitus  Hypothyroidism  V. Tach  Anemia: Improved post transfusion  Acute stroke     PLAN:   it does not appear any further medical intervention needed at this time.  Per Dr. Stewart's note, plan is for [facility] rehab, I dont' see any notes from case management in a while.  Suspect she is ready for discharge. Discussed with family at bedside    Patient is new to me today

## 2020-08-22 NOTE — PLAN OF CARE
Problem: Patient Care Overview  Goal: Plan of Care Review  Outcome: Ongoing (interventions implemented as appropriate)  Flowsheets (Taken 8/22/2020 3840)  Progress: improving  Plan of Care Reviewed With: patient  Outcome Summary: VSS. On 2.5L o2. No complaints of pain. Pt alert and orient to self during shift. Able to sit up on the side of bed with assistx2 for about 30 minutes. Pt ate 100% of her dinner. NIH of 4. Pt has greatly improved today in her alertness according to previous notes, and pt's daughters. Incontinence and skin care. Will continue to monitor.

## 2020-08-22 NOTE — PLAN OF CARE
Problem: Patient Care Overview  Goal: Plan of Care Review  Outcome: Ongoing (interventions implemented as appropriate)  Flowsheets (Taken 8/22/2020 8353)  Plan of Care Reviewed With: patient  Outcome Summary: Pt able to perform bed mobility and transfers with MaxAx1 today. Stood a total of four times with MaxAx1, using a RWx on two attempts and the other two using HHAx1. She struggles with hand placement on the RWx so elected for HHAx1, especially as she needs blocking at the B knees to assist in achieving full upright position. She is able to clear her buttocks from the bed and stand more upright, however is unable to completely come up to standing and cannot tolerate this position for any more than 10 seconds, so further mobility not appropriate at this time. She may benefit from continuing skilled PT to improve her independence with functional mobility. Patient was wearing a face mask during this therapy encounter. Therapist used appropriate personal protective equipment including eye protection, mask, and gloves.  Mask used was standard procedure mask. Appropriate PPE was worn during the entire therapy session. Hand hygiene was completed before and after therapy session. Patient is not in enhanced droplet precautions.

## 2020-08-23 NOTE — PROGRESS NOTES
Pharmacy Consult: Warfarin Dosing/ Monitoring    Jihan Teresa is a 86 y.o. female, estimated creatinine clearance is 43.7 mL/min (by C-G formula based on SCr of 0.57 mg/dL). weighing 69.1 kg (152 lb 5.4 oz).     has a past medical history of Anemia, Aortic valve stenosis, Asthma, Atelectasis, CHF (congestive heart failure) (CMS/[facility]), Congenital heart disease, Diabetes mellitus (CMS/[facility]), Esophageal reflux, Essential hypertension, HLD (hyperlipidemia), Hypotension, LVH (left ventricular hypertrophy), Pleural effusion, and Pulmonary hypertension (CMS/[facility]).    Social History     Tobacco Use    Smoking status: Never Smoker    Smokeless tobacco: Never Used   Substance Use Topics    Alcohol use: Yes     Comment: ocasional    Drug use: No       Results from last 7 days   Lab Units 08/23/20  0702 08/22/20  0552 08/21/20  0713 08/20/20  0526 08/19/20  0544 08/18/20  0659 08/17/20  1501 08/17/20  0714   INR  2.03* 2.77* 2.86* 2.80* 2.52* 2.27*  --  2.34*   APTT seconds 35.2 34.5 41.0* 43.1* 42.7* 43.9*  --  73.8*   HEMOGLOBIN g/dL 10.5* 10.1* 10.4* 10.2* 10.3* 7.3* 8.0* 7.5*   HEMATOCRIT % 31.8* 31.6* 31.1* 30.2* 32.1* 21.9* 24.5* 22.8*   PLATELETS 10*3/mm3 292 280 265 247 222 178  --  149     Results from last 7 days   Lab Units 08/23/20  0702 08/22/20  0552 08/21/20  0713   SODIUM mmol/L 129* 138 143   POTASSIUM mmol/L 3.4* 3.3* 3.3*   CHLORIDE mmol/L 95* 101 105   CO2 mmol/L 27.4 29.3* 29.9*   BUN mg/dL 15 17 16   CREATININE mg/dL 0.57 0.71 0.66   CALCIUM mg/dL 8.8 8.8 9.0   GLUCOSE mg/dL 130* 117* 105*     Anticoagulation history: 3mg TuWeFrSaSu, 1.5mg [facility]     Hospital Anticoagulation:  Consulting provider: Dr. Evans  Start date: 8/11/2020  Indication: AFib,  R arterial occlusion d/t cardiac embolis 8/12 Right femoral thromboembolectomy   Target INR: 2-3  Expected duration: lifelong   Bridge Therapy: completed heparin bridge      Date 8/11 8/12 8/13 8/14  8/15 8/16 8/17 8/18 8/19 8/20   INR 1.3 1.24 1.32 1.4 1.44  1.89 2.34 2.27 2.52 2.8    Warfarin dose Not able to take po In OR not given  1.5 mg   3 mg 3 mg 3 mg HOLD  3 mg  1.5 mg  HOLD      Date 8/21 8/22 8/23                 INR 2.86   2.77  2.03                 Warfarin dose HOLD  1mg  2mg                       Potential drug interactions:   Levothyroxin pta med   8/3 vit K 10 mg      Relevant nutrition status: mechanical soft diet, intake improving % diet, nutrition supplements - Glucerna shake with breakfast and dinner added     Other: Cleared per neurosurgery to resume warfarin 8/11 with no loading doses  8/18 There was a small R thigh hematoma on CT but was okd to continue anticoagulation per vascular  8/18 MRI revealed a small acute left frontal lobe infarct     Education complete?/ Date: defer     Assessment/Plan:  Warfarin management complicated by acute intracerebral hemorrhage and 8/3 vit k, heparin is stopped    PO intake was reduced yesterday with only 25% consumption of breakfast/lunch/dinner.     -INR has dropped to 2.03 today, as expected given held doses. Given large rises in INR on the 3mg dose and reduced PO intake, will initiate a regimen of 2 mg daily (~20% dose reduction from previous home regimen dose).  -INR daily  -Monitor for s/sx of bleeding    Pharmacy will continue to follow until discharge or discontinuation of warfarin.     Katelyn Green RPH  8/23/2020

## 2020-08-23 NOTE — PROGRESS NOTES
"[facility]    Patient Name: Jihan Teresa  :1933  86 y.o.  LOS: 20  Encounter Provider: KAREN Olivera      Patient Care Team:  Yaron Ca Jr., MD as PCP - General (Family Medicine)  Yaron Ca Jr., MD as PCP - Claims Attributed    Chief Complaint: A. fib with intracranial hemorrhage    Interval History: Heart rate controlled.  No complaints of shortness of breath, dyspnea.       Objective   Vital Signs  Temp:  [98.2 °F (36.8 °C)-99.1 °F (37.3 °C)] 98.3 °F (36.8 °C)  Heart Rate:  [67-77] 72  Resp:  [16-17] 16  BP: (122-153)/(58-98) 138/73    Intake/Output Summary (Last 24 hours) at 2020 0916  Last data filed at 2020 0550  Gross per 24 hour   Intake 240 ml   Output 575 ml   Net -335 ml     Flowsheet Rows      First Filed Value   Admission Height  152.4 cm (60\") Documented at 2020 1133   Admission Weight  68 kg (149 lb 14.4 oz) Documented at 2020 0857            Physical Exam   Constitutional: She is oriented to person, place, and time. Vital signs are normal. She appears well-developed and well-nourished. She is active.   Eyes: Conjunctivae are normal.   Neck: Carotid bruit is not present.   Cardiovascular: Normal rate. An irregularly irregular rhythm present.   Murmur heard.   Systolic murmur is present with a grade of 2/6 at the upper right sternal border and upper left sternal border.  Pulmonary/Chest: Breath sounds normal.   Abdominal: Normal appearance.   Musculoskeletal:   No cyanosis, clubbing, edema  Normal ROM   Neurological: She is alert and oriented to person, place, and time. GCS eye subscore is 4. GCS verbal subscore is 5. GCS motor subscore is 6.   Skin: Skin is warm, dry and intact.   Psychiatric: She has a normal mood and affect. Her speech is normal and behavior is normal. Judgment and thought content normal. Cognition and memory are normal.         Pertinent Test Results:  Results from last 7 days   Lab Units 20  0702 " 08/22/20  0552 08/21/20  0713 08/20/20  0526 08/19/20  0544 08/18/20  0659 08/17/20  0714   SODIUM mmol/L 129* 138 143 141 139 136 138   POTASSIUM mmol/L 3.4* 3.3* 3.3* 3.7 3.6 3.8 3.8   CHLORIDE mmol/L 95* 101 105 105 103 102 102   CO2 mmol/L 27.4 29.3* 29.9* 27.4 27.2 26.0 27.6   BUN mg/dL 15 17 16 23 21 19 16   CREATININE mg/dL 0.57 0.71 0.66 0.82 0.79 0.76 0.83   GLUCOSE mg/dL 130* 117* 105* 120* 128* 105* 132*   CALCIUM mg/dL 8.8 8.8 9.0 8.8 8.6 8.5* 8.4*         Results from last 7 days   Lab Units 08/23/20  0702 08/22/20  0552 08/21/20  0713 08/20/20  0526 08/19/20  0544 08/18/20  0659 08/17/20  1501 08/17/20  0714   WBC 10*3/mm3 8.69 8.67 8.42 10.05 10.10 8.33  --  10.07   HEMOGLOBIN g/dL 10.5* 10.1* 10.4* 10.2* 10.3* 7.3* 8.0* 7.5*   HEMATOCRIT % 31.8* 31.6* 31.1* 30.2* 32.1* 21.9* 24.5* 22.8*   PLATELETS 10*3/mm3 292 280 265 247 222 178  --  149     Results from last 7 days   Lab Units 08/23/20  0702 08/22/20  0552 08/21/20  0713 08/20/20  0526 08/19/20  0544 08/18/20  0659 08/17/20  0714   INR  2.03* 2.77* 2.86* 2.80* 2.52* 2.27* 2.34*   APTT seconds 35.2 34.5 41.0* 43.1* 42.7* 43.9* 73.8*               Invalid input(s): LDLCALC      Results from last 7 days   Lab Units 08/18/20  0659   TSH uIU/mL 3.800           Medication Review:     amLODIPine 10 mg Oral Q24H   furosemide 20 mg Oral Daily   isosorbide mononitrate 60 mg Oral Daily   levothyroxine 75 mcg Oral QAM   metoprolol tartrate 25 mg Oral Q12H   nystatin 5 mL Swish & Spit 4x Daily   warfarin (COUMADIN) (dosing per levels)  Does not apply Daily          lactated ringers 9 mL/hr Last Rate: 9 mL/hr (08/12/20 1923)   Pharmacy to dose warfarin         Assessment/Plan   1. Intracerebral hemorrhage  2. Chronic atrial fibrillation  3. Bioprosthetic aortic valve  4. Long-term Coumadin therapy  5. UTI  6. Diabetes  7. Hypothyroidism  8. CAD  9. Hypertension  10. Acute stroke: 5 mm acute infarct involving the left frontal lobe superolaterally    -Rate  controlled for atrial fibrillation.  Anticoagulated with Coumadin therapy.    -INR 2.03    -BP currently controlled    -Patient is stable from cardiovascular standpoint.  Planning to transfer to [facility] for rehab.  Patient okay from cardiovascular standpoint for transfer when arranged.      KAREN Olivera  [facility]  08/23/20  8:35 AM

## 2020-08-23 NOTE — PROGRESS NOTES
[facility]      Mar/chart reviewed  Follow up Acute nontraumatic intracerebral hemorrhage  No headache, no complaints    Vital Sign Min/Max for last 24 hours  Temp  Min: 98 °F (36.7 °C)  Max: 99.1 °F (37.3 °C)   BP  Min: 122/58  Max: 153/77   Pulse  Min: 67  Max: 77   Resp  Min: 16  Max: 17   SpO2  Min: 91 %  Max: 93 %   Flow (L/min)  Min: 2  Max: 2.5   No data recorded     Appears ill , sleepy does arouse briefly  perrl, eomi, normal sclera no palpable thyroid nodules  mmm, no jvd, trachea midline, neck supple,  chest cta bilaterally, no crackles, no wheezes,   Irrg, irgg +murmur  soft, nt, nd +bs,  no c/c/ e  Skin warm, dry no rashes    ASSESSMENT:  Acute arterial occlusion of right lower extremity  Status post open right femoral thromboembolectomy  Intracerebral hemorrhage on admission  Coagulopathy on admission  A. fib on anticoagulation  UTI E. coli  Diabetes mellitus  Hypothyroidism  V. Tach  Anemia: Improved post transfusion  Acute stroke     PLAN:   it does not appear any further medical intervention needed at this time. Plan discharge tomorrow. Discussed with family at bedside, discussed with RN present

## 2020-08-23 NOTE — PLAN OF CARE
Problem: Patient Care Overview  Goal: Plan of Care Review  Outcome: Ongoing (interventions implemented as appropriate)  Flowsheets (Taken 8/23/2020 0055)  Progress: improving  Plan of Care Reviewed With: patient  Outcome Summary: vss, pt very drowsy today, awakes to verbal with stimulus, follows commands but falls back to sleep, mentation wax and wane, neurology aware, family at bedside to discuss code status and plan of care, specialty mattress in place, turn q2hrs, moisture barrier, elevate extremities, plan to discharge to [facility] potentially tomorrow

## 2020-08-23 NOTE — PLAN OF CARE
Problem: Patient Care Overview  Goal: Plan of Care Review  Outcome: Ongoing (interventions implemented as appropriate)  Flowsheets  Taken 8/22/2020 2116  Plan of Care Reviewed With: patient;daughter  Taken 8/23/2020 6082  Outcome Summary: VSS, purewick in place, turn as needed, daughter refused Q 2 hour turns, mepilex on coccyx changed, NIH 3, will continue to monitor

## 2020-08-24 NOTE — PROGRESS NOTES
"[facility]  Cardiology Progress Note    Patient Identification:  Name: Jihan Teresa  Age: 86 y.o.  Sex: female  :  1933  MRN: [id number]                 Follow Up / Chief Complaint: Atrial fibrillation with ICH    Interval History: Heart rate controlled.  Possible rehab today.    Subjective:    Sleeping.  Daughter at bedside.  Her daughter states she has not complained of shortness of breath, or chest pain.    Objective:    Past Medical History:  Past Medical History:   Diagnosis Date   • Anemia    • Aortic valve stenosis     S/p AVR in    • Asthma    • Atelectasis    • CHF (congestive heart failure) (CMS/HCC)    • Congenital heart disease    • Diabetes mellitus (CMS/HCC)    • Esophageal reflux    • Essential hypertension    • HLD (hyperlipidemia)    • Hypotension    • LVH (left ventricular hypertrophy)    • Pleural effusion    • Pulmonary hypertension (CMS/HCC)      Past Surgical History:  Past Surgical History:   Procedure Laterality Date   • AORTIC VALVE REPAIR/REPLACEMENT  2009    Jerry Colmenares MD   • CATARACT EXTRACTION     • CEREBRAL ANGIOGRAM N/A 2020    Procedure: CEREBRAL ANGIOGRAM;  Surgeon: Ar Bunch MD;  Location: [facility] ;  Service: Neurosurgery;  Laterality: N/A;   • FEMORAL THROMBECTOMY/EMBOLECTOMY Right 2020    Procedure: FEMORAL THROMBECTOMY/EMBOLECTOMY;  Surgeon: Winsome Valentin Jr., MD;  Location: [facility];  Service: Vascular;  Laterality: Right;   • KNEE ARTHROPLASTY          Social History:   Social History     Tobacco Use   • Smoking status: Never Smoker   • Smokeless tobacco: Never Used   Substance Use Topics   • Alcohol use: Yes     Comment: ocasional      Family History:  Family History   Problem Relation Age of Onset   • Dementia Sister    • Stroke Brother           Allergies:  Allergies   Allergen Reactions   • Promethazine Other (See Comments)     Pt becomes \"out of it\" when on this medication  Pt becomes " "\"out of it\" when on this medication     Scheduled Meds:    amLODIPine 10 mg Q24H   furosemide 20 mg Daily   isosorbide mononitrate 60 mg Daily   levothyroxine 75 mcg QAM   metoprolol tartrate 25 mg Q12H   nystatin 5 mL 4x Daily   warfarin 2 mg Daily         ROS her daughter states she has not complained of shortness of breath or chest pain.    Constitutional: Sleeping, no fever.  No nosebleeds   Abdomen              Cardiac               Pulmonary               /60 (BP Location: Left arm, Patient Position: Lying)   Pulse 55   Temp 97.7 °F (36.5 °C) (Oral)   Resp 16   Ht 152.4 cm (60\")   Wt 69.1 kg (152 lb 5.4 oz)   SpO2 92%   BMI 29.75 kg/m²          Physical Exam:  General:  Appears in no acute distress, resting in bed with daughter at bedside.  Eyes: Eyes closed  HEENT:  No JVD. Thyroid not visibly enlarged. No mucosal pallor or cyanosis  Respiratory: Respirations regular and unlabored at rest. BBS with good air entry in all fields. No crackles, rubs or wheezes auscultated  Cardiovascular: S1S2 regularly irregular rhythm regular rate and rhythm. +2 murmur, no rub or gallop. No carotid bruits. DP/PT pulses     . No pretibial pitting edema  Gastrointestinal: Abdomen soft, non tender. Bowel sounds present. No hepatosplenomegaly. No ascites  Psych: sleeping                  Cardiographics  Telemetry:   Atrial fibrillation              Atrial fib                            A. Fib      Atrial fibrillation         ECG:   Atrial fibrillation         Echocardiogram:   2017  Interpretation Summary      · Calculated EF = 46.9%.  · Left ventricular systolic function is low normal.  · Left amd right atrial cavity size is severely dilated.  · Right ventricular cavity is severely dilated.  · calcification of the aortic valve  · Severe mitral valve regurgitation is present  · Mitral annular calcification is present. Posterior leaflet very calcified and immobile.  · Severe tricuspid valve regurgitation is " present.  · Estimated right ventricular systolic pressure from tricuspid regurgitation is markedly elevated (>55 mmHg). Calculated right ventricular systolic pressure from tricuspid regurgitation is 66.7 mmHg.         Imaging  Chest X-ray:   Study Result      EXAMINATION: SINGLE VIEW CHEST RADIOGRAPH     HISTORY: 86-year-old female with history of generalized weakness.     FINDINGS: An upright AP portable chest radiograph was obtained.  Comparison is made to a chest radiograph dated 06/10/2017. The lungs are  normal in volume and are clear of consolidation. Stable mild scattered  interstitial prominence is seen throughout both lungs. Stable soft  tissue fullness in the right hilum is most consistent with a prominent  right hilar vessel is noted. There are stable cardiomegaly. Midline  sternal wires are noted. Atheromatous consultation seen within the  aortic arch.     IMPRESSION:  There is stable mild bilateral interstitial scarring and  cardiomegaly with findings suggestive of pulmonary hypertension. No  evidence for superimposed acute process is appreciated.     This report was finalized on 8/3/2020 10:55 AM by Dr. Estuardo Wagner M.D.         CT     Study Result      CT SCAN OF THE HEAD WITHOUT CONTRAST     CLINICAL HISTORY: Generalized weakness and confusion.     TECHNIQUE: CT scan of the head was obtained with 3 mm axial images. No  intravenous contrast was administered.     COMPARISON: Comparison is made to previous CT scan of head dated  06/13/2017.     FINDINGS: There is increased density within the dependent aspects of  both lateral ventricles compatible with intraventricular hemorrhage.  There are foci of increased density noted within the inferior aspect of  the interhemispheric fissure worrisome for subarachnoid hemorrhage. The  etiology of these areas of hemorrhage is uncertain, although a vascular  etiology such as an aneurysm should be excluded. I recommend further  evaluation with an MRA or CTA  examination, as well as an MRI of the  brain.     There are multiple chronic infarcts identified within both cerebellar  hemispheres, left greater than right. The largest of these chronic  infarcts measures up to approximately 1.4 x 0.4 cm in greatest axial  dimensions. Old lacunar disease is seen within the left thalamus. A  chronic lacune identified within the anterior aspect of the left  thalamus measuring up to 4-5 mm in diameter. Hypodense areas are  identified within the lentiform nuclei bilaterally that are likely  representative of a combination of enlarged perivascular spaces and old  lacunar disease. Severe changes of chronic small vessel ischemic  phenomena are identified.     Otherwise, the ventricles, sulci, and cisterns are age appropriate.     IMPRESSION:     There is intraventricular hemorrhage identified within the dependent  aspects of both lateral ventricles. Additionally, there is increased  density seen within the inferior aspect of the interhemispheric fissure  suggestive of subarachnoid hemorrhage. The precise etiology of this  hemorrhage is uncertain on the basis of this head CT. However, a  vascular etiology should be excluded. Further evaluation is recommended  with a CTA or MRA study, as well as an MRI of the brain.     These findings and recommendations were discussed with Debbie Wilson on 08/03/2020 at approximately 8:36 AM.     Radiation dose reduction techniques were utilized, including automated  exposure control and exposure modulation based on body size.       Study Result     MRI EXAMINATION OF BRAIN WITHOUT CONTRAST     HISTORY:  Confusion/delirium, altered mental status.     A noncontrasted MRI examination of the brain was performed. The lack of  contrast reduces the sensitivity of the study.     FINDINGS: The diffusion sequence demonstrates an area of increased  signal intensity involving the cortex of the left frontal lobe  superolaterally measuring approximately 5 mm  in size.     There is no evidence of hydrocephalus. Remote infarcts involving the  left cerebellar hemisphere are noted inferiorly, present on the prior  MRI examination. There is extensive small vessel ischemic disease  appreciated with confluent areas of increased signal intensity involving  the white matter of the cerebral hemispheres bilaterally.     The axial T2 FLAIR sequence demonstrates ovoid areas of T2  hyperintensity which corresponds to areas of increased signal intensity  on the T1 sequence. This corresponds to the area of hemorrhage noted on  the CT examination. There are smaller in size as compared to the areas  of hemorrhage noted on the MRI examination of 08/03/2020. On the right,  the area of T2 FLAIR hyperintensity measures approximately 7 mm in AP  dimension and 3 mm in transverse dimension. On the MRI examination of  08/03/2020 this measured approximately 9 mm in AP dimension and 6 mm in  transverse dimension. On the left the area of increased signal intensity  measures 5 x 2.5 mm in the transverse planes, previously 7.3 x 4.2 mm.     The gradient echo T2 sequence demonstrates signal loss related to the  areas of hemorrhage involving the lateral ventricle consistent with  hemosiderin deposition. There is also a small amount of blood present  within the occipital horns bilaterally, present previously.     There is increased signal intensity present within the mastoid air cells  bilaterally which is new versus the prior MRI examination of 08/03/2020.     IMPRESSION:  1.  5 mm acute infarct involving the left frontal lobe superolaterally.  2.  Areas of residual blood products are identified involving the  anterior aspect of the third ventricle and involving the medial and  inferior aspects of the frontal lobes bilaterally adjacent to the  lateral ventricles. The volume of blood has decreased versus 08/03/2012.  There is a small amount of blood present within the occipital horns  bilaterally,  "present previously. There is no evidence of new hemorrhage  or of hydrocephalus. The etiology for the hemorrhage is uncertain.  3.  Extensive small vessel ischemic disease.  4.  Fluid within the mastoid air cells bilaterally.     The information regarding the infarct was called to the patient's nurse  at the time of the dictation. The patient's nurse is to relay the  information to the clinical service.     This report was finalized on 8/19/2020 8:30 AM by Dr. Tim Khoury M.D.               Lab Review           Results from last 7 days   Lab Units 08/24/20  0624   SODIUM mmol/L 136   POTASSIUM mmol/L 3.4*   BUN mg/dL 15   CREATININE mg/dL 0.60   CALCIUM mg/dL 8.8        Results from last 7 days   Lab Units 08/24/20  0624 08/23/20  0702 08/22/20  0552   WBC 10*3/mm3 7.16 8.69 8.67   HEMOGLOBIN g/dL 10.0* 10.5* 10.1*   HEMATOCRIT % 31.6* 31.8* 31.6*   PLATELETS 10*3/mm3 262 292 280     Results from last 7 days   Lab Units 08/24/20  0624 08/23/20  0702 08/22/20  0552   INR  1.77* 2.03* 2.77*   APTT seconds 33.4 35.2 34.5     The following medical decision was discussed in detail with Dr. Mcmahan    Assessment:  1. Intracerebral hemorrhage  2.  Chronic atrial fibrillation:    3.  Bioprosthetic aortic valve replaced in 2009  4.  Long term anticoagulation with coumadin  5 .  UTI  6.  Diabetes mellitus  7. Hypothyroidism  8. CAD  9. Hypertension  10. Acute stroke; 5 mm acute infarct involving the left frontal lobe superolaterally    Plan:  Blood pressure is stable and heart rate is controlled.  Atrial fibrillation on monitor.  INR today 1.77, pharmacy dosing Coumadin. Atrial fibrillation rate is under control.  Patient is stable from cardiovascular standpoint, okay to transfer to rehab. We will sign off at this time.     KAREN Graham/Transcription:   \"Dictated utilizing Dragon dictation\".     "

## 2020-08-24 NOTE — PROGRESS NOTES
"Physicians Statement of Medical Necessity for  Ambulance Transportation    GENERAL INFORMATION     Name: Jihan Teresa  YOB: 1933  Medicare #: [id number]  Transport Date: 8/24/2020 (Valid for round trips this date, or for scheduled repetitive trips for 60 days from the date signed below.)  Origin: [facility]  Destination: [facility]  Is the Patient's stay covered under Medicare Part A (PPS/DRG?)Yes   Closest appropriate facility? Yes  If this a hosp-hosp transfer? No  Is this a hospice patient? No    MEDICAL NECESSITY QUESTIONAIRE    Ambulance Transportation is medically necessary only if other means of transportation are contraindicated or would be potentially harmful to the patient.  To meet this requirement, the patient must be either \"bed confined\" or suffer from a condition such that transport by means other than an ambulance is contraindicated by the patient's condition.  The following questions must be answered by the healthcare professional signing below for this form to be valid:     1) Describe the MEDICAL CONDITION (physical and/or mental) of this patient AT THE TIME OF AMBULANCE TRANSPORT that requires the patient to be transported in an ambulance, and why transport by other means is contraindicated by the patient's condition:   Past Medical History:   Diagnosis Date   • Anemia    • Aortic valve stenosis     S/p AVR in 09'   • Asthma    • Atelectasis    • CHF (congestive heart failure) (CMS/HCC)    • Congenital heart disease    • Diabetes mellitus (CMS/HCC)    • Esophageal reflux    • Essential hypertension    • HLD (hyperlipidemia)    • Hypotension    • LVH (left ventricular hypertrophy)    • Pleural effusion    • Pulmonary hypertension (CMS/HCC)       Past Surgical History:   Procedure Laterality Date   • AORTIC VALVE REPAIR/REPLACEMENT  11/14/2009    Jerry Colmenares MD   • CATARACT EXTRACTION     • CEREBRAL ANGIOGRAM N/A 8/4/2020    Procedure: CEREBRAL " "ANGIOGRAM;  Surgeon: Ar Bunch MD;  Location: [facility] HYBRID OR 18/19;  Service: Neurosurgery;  Laterality: N/A;   • FEMORAL THROMBECTOMY/EMBOLECTOMY Right 8/12/2020    Procedure: FEMORAL THROMBECTOMY/EMBOLECTOMY;  Surgeon: Winsome Valentin Jr., MD;  Location: [facility] MAIN OR;  Service: Vascular;  Laterality: Right;   • KNEE ARTHROPLASTY        2) Is this patient \"bed confined\" as defined below?Yes    To be \"bed confined\" the patient must satisfy all three of the following criteria:  (1) unable to get up from bed without assistance; AND (2) unable to ambulate;  AND (3) unable to sit in a chair or wheelchair.  3) Can this patient safely be transported by car or wheelchair van (I.e., may safely sit during transport, without an attendant or monitoring?)No   4. In addition to completing questions 1-3 above, please check any of the following conditions that apply*:          *Note: supporting documentation for any boxes checked must be maintained in the patient's medical records Patient is confused, Requires oxygen - unable to self administer and Unable to tolerate seated position for time needed to transport      SIGNATURE OF PHYSICIAN OR OTHER AUTHORIZED HEALTHCARE PROFESSIONAL    I certify that the above information is true and correct based on my evaluation of this patient, and represent that the patient requires transport by ambulance and that other forms of transport are contraindicated.  I understand that this information will be used by the Centers for Medicare and Medicaid Services (CMS) to support the determiniation of medical necessity for ambulance services, and I represent that I have personal knowledge of the patient's condition at the time of transport.       If this box is checked, I also certify that the patient is physically or mentally incapable of signing the ambulance service's claim form and that the institution with which I am affiliated has furnished care, services or assistance to the " patient.  My signature below is made on behalf of the patient pursuant to 42 .36(b)(4). In accordance with 42 .37, the specific reason(s) that the patient is physically or mentally incapable of signing the claim for is as follows:     Signature of Physician or Healthcare Professional  Date/Time:        (For Scheduled repetitive transport, this form is not valid for transports performed more than 60 days after this date).                                                                                                                                            --------------------------------------------------------------------------------------------  Printed Name and Credentials of Physician or Authorized Healthcare Professional     *Form must be signed by patient's attending physician for scheduled, repetitive transports,.  For non-repetitive ambulance transports, if unable to obtain the signature of the attending physician, any of the following may sign (please select below):     Physician  Clinical Nurse Specialist  Registered Nurse     Physician Assistant  Discharge Planner  Licensed Practical Nurse     Nurse Practitioner

## 2020-08-24 NOTE — PROGRESS NOTES
Adult Nutrition  Assessment/PES    Patient Name:  Jihan Teresa  YOB: 1933  MRN: [id number]  Admit Date:  8/3/2020    Assessment Date:  8/24/2020    Comments:  Follow up note. Visited pt in room and spoke with family member. Po intake down, slept most of the day. Taking sips of supplements. Skin issues noted. Possible d/c today.    Reason for Assessment     Row Name 08/24/20 0825          Reason for Assessment    Reason For Assessment  follow-up protocol         Nutrition/Diet History     Row Name 08/24/20 0825          Nutrition/Diet History    Typical Food/Fluid Intake  slept most of the day yesterday per dgt. possible d/c today, drinking a little of supplement           Labs/Tests/Procedures/Meds     Row Name 08/24/20 0825          Labs/Procedures/Meds    Lab Results Reviewed  reviewed     Lab Results Comments  h/h        Diagnostic Tests/Procedures    Diagnostic Test/Procedure Reviewed  reviewed        Medications    Pertinent Medications Reviewed  reviewed     Pertinent Medications Comments  lasix, synthroid, coumadin, IVF         Physical Findings     Row Name 08/24/20 0826          Physical Findings    Skin  surgical incision;pressure injury incision, bilateral posterior gluteal PI, MASD, chest puncture         Estimated/Assessed Needs     Row Name 08/24/20 0827          Calculation Measurements    Weight Used For Calculations  69.1 kg (152 lb 5.4 oz)        Estimated/Assessed Needs    Additional Documentation  Fluid Requirements (Group);Protein Requirements (Group);KCAL/KG (Group)        KCAL/KG    KCAL/KG  30 Kcal/Kg (kcal);25 Kcal/Kg (kcal)     25 Kcal/Kg (kcal)  1727.5     30 Kcal/Kg (kcal)  2073        Protein Requirements    Weight Used For Protein Calculations  69.1 kg (152 lb 5.4 oz)     Est Protein Requirement Amount (gms/kg)  1.2 gm protein     Estimated Protein Requirements (gms/day)  82.92        Fluid Requirements    Estimated Fluid Requirements (mL/day)  1727     RDA Method  (mL)  1727         Nutrition Prescription Ordered     Row Name 08/24/20 0827          Nutrition Prescription PO    Current PO Diet  Dysphagia     Dysphagia Level  4  Mechanical soft no mixed consistencies     Fluid Consistency  Thin     Supplement  Boost Glucose Control (Glucerna Shake)     Common Modifiers  Consistent Carbohydrate         Evaluation of Received Nutrient/Fluid Intake     Row Name 08/24/20 0827          PO Evaluation    % PO Intake  decrease po, 0-25%               Problem/Interventions:          Intervention Goal     Row Name 08/24/20 0829          Intervention Goal    General  Maintain nutrition;Reduce/improve symptoms;Meet nutritional needs for age/condition;Disease management/therapy;Improved nutrition related lab(s)     PO  Tolerate PO;Increase intake;PO intake (%)     PO Intake %  75 %     Weight  Maintain weight         Nutrition Intervention     Row Name 08/24/20 0829          Nutrition Intervention    RD/Tech Action  Care plan reviewd;Follow Tx progress;Encourage intake;Supplement provided;Interview for preference           Education/Evaluation     Row Name 08/24/20 0829          Monitor/Evaluation    Monitor  Per protocol;PO intake;Supplement intake;Pertinent labs;Weight;Skin status           Electronically signed by:  Dianne Red RD  08/24/20 08:31

## 2020-08-24 NOTE — PLAN OF CARE
Problem: Patient Care Overview  Goal: Plan of Care Review  Outcome: Ongoing (interventions implemented as appropriate)  Flowsheets (Taken 8/24/2020 0634)  Progress: no change  Plan of Care Reviewed With: patient  Outcome Summary: VSS, pt still very drowsy, follows commands briefly but falls back to sleep. family at bedside, turn Q2H, possible discharge to [facility] today.

## 2020-08-24 NOTE — THERAPY TREATMENT NOTE
Patient Name: Jihan Teresa  : 1933    MRN: [id number]                              Today's Date: 2020       Admit Date: 8/3/2020    Visit Dx:     ICD-10-CM ICD-9-CM   1. Nontraumatic intracerebral hemorrhage, unspecified cerebral location, unspecified laterality (CMS/HCC) I61.9 431   2. Acute UTI N39.0 599.0   3. 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) I61.0 431     Patient Active Problem List   Diagnosis   • Type 2 diabetes mellitus (CMS/HCC)   • Aortic valve replaced, equine valve   • Cerebral infarction (CMS/HCC)   • A-fib (CMS/[facility])   • GERD without esophagitis   • Sleep apnea in adult   • Cognitive dysfunction   • Hypothyroidism (acquired)   • History of recurrent UTIs   • Mild reactive airways disease   • Essential hypertension   • Pulmonary hypertension (CMS/[facility])   • B12 deficiency   • Non-rheumatic mitral regurgitation   • Non-rheumatic tricuspid valve insufficiency   • Chronic anemia   • Grade III hemorrhoids   • ICH (intracerebral hemorrhage) (CMS/[facility])   • Nontraumatic subcortical hemorrhage of left cerebral hemisphere (CMS/[facility])     Past Medical History:   Diagnosis Date   • Anemia    • Aortic valve stenosis     S/p AVR in    • Asthma    • Atelectasis    • CHF (congestive heart failure) (CMS/[facility])    • Congenital heart disease    • Diabetes mellitus (CMS/[facility])    • Esophageal reflux    • Essential hypertension    • HLD (hyperlipidemia)    • Hypotension    • LVH (left ventricular hypertrophy)    • Pleural effusion    • Pulmonary hypertension (CMS/HCC)      Past Surgical History:   Procedure Laterality Date   • AORTIC VALVE REPAIR/REPLACEMENT  2009    Jerry Colmenares MD   • CATARACT EXTRACTION     • CEREBRAL ANGIOGRAM N/A 2020    Procedure: CEREBRAL ANGIOGRAM;  Surgeon: Ar Bunch MD;  Location: [facility] OR ;  Service: Neurosurgery;  Laterality: N/A;   • FEMORAL THROMBECTOMY/EMBOLECTOMY Right 2020    Procedure: FEMORAL  THROMBECTOMY/EMBOLECTOMY;  Surgeon: Winsome Valentin Jr., MD;  Location: [facility] MAIN OR;  Service: Vascular;  Laterality: Right;   • KNEE ARTHROPLASTY       General Information     Row Name 08/24/20 1310          PT Evaluation Time/Intention    Document Type  therapy note (daily note)  -     Mode of Treatment  physical therapy  -     Row Name 08/24/20 1310          General Information    Existing Precautions/Restrictions  fall;oxygen therapy device and L/min  -     Barriers to Rehab  cognitive status  -     Row Name 08/24/20 1310          Cognitive Assessment/Intervention- PT/OT    Orientation Status (Cognition)  oriented to;person  -       User Key  (r) = Recorded By, (t) = Taken By, (c) = Cosigned By    Initials Name Provider Type     Keyanna Wellington PTA Physical Therapy Assistant        Mobility     Row Name 08/24/20 1311          Bed Mobility Assessment/Treatment    Bed Mobility Assessment/Treatment  supine-sit;sit-supine  -     Supine-Sit [place] (Bed Mobility)  moderate assist (50% patient effort);maximum assist (25% patient effort);2 person assist  -     Sit-Supine [place] (Bed Mobility)  maximum assist (25% patient effort);2 person assist  -     Assistive Device (Bed Mobility)  bed rails;head of bed elevated  -     Row Name 08/24/20 1311          Transfer Assessment/Treatment    Comment (Transfers)  stood 5x  -     Row Name 08/24/20 1311          Sit-Stand Transfer    Sit-Stand [place] (Transfers)  maximum assist (25% patient effort);2 person assist  -     Assistive Device (Sit-Stand Transfers)  walker, front-wheeled  -     Row Name 08/24/20 1311          Gait/Stairs Assessment/Training    Comment (Gait/Stairs)  attempted side steps, only able to slide feet a little  -       User Key  (r) = Recorded By, (t) = Taken By, (c) = Cosigned By    Initials Name Provider Type     Keyanna Wellington PTA Physical Therapy Assistant        Obj/Interventions     [facility]  Name 08/24/20 1312          Therapeutic Exercise    Lower Extremity (Therapeutic Exercise)  LAQ (long arc quad), bilateral;marching while seated  -     Lower Extremity Range of Motion (Therapeutic Exercise)  hip abduction/adduction, bilateral;ankle dorsiflexion/plantar flexion, bilateral  -     Exercise Type (Therapeutic Exercise)  AROM (active range of motion)  -     Position (Therapeutic Exercise)  seated  -     Sets/Reps (Therapeutic Exercise)  10 reps  -       User Key  (r) = Recorded By, (t) = Taken By, (c) = Cosigned By    Initials Name Provider Type    Keyanna Hutson PTA Physical Therapy Assistant        Goals/Plan    No documentation.       Clinical Impression     Row Name 08/24/20 1313          Pain Assessment    Additional Documentation  Pain Scale: Numbers Pre/Post-Treatment (Group)  -     Row Name 08/24/20 1313          Pain Scale: Numbers Pre/Post-Treatment    Pain Scale: Numbers, Pretreatment  0/10 - no pain  -SM     Pain Scale: Numbers, Post-Treatment  0/10 - no pain  -SM     Row Name 08/24/20 1313          Positioning and Restraints    Pre-Treatment Position  in bed  -     Post Treatment Position  bed  -     In Bed  supine;call light within reach;encouraged to call for assist;exit alarm on;with family/caregiver  -       User Key  (r) = Recorded By, (t) = Taken By, (c) = Cosigned By    Initials Name Provider Type    Keyanna Hutson PTA Physical Therapy Assistant        Outcome Measures     Row Name 08/24/20 1313          How much help from another person do you currently need...    Turning from your back to your side while in flat bed without using bedrails?  2  -SM     Moving from lying on back to sitting on the side of a flat bed without bedrails?  2  -SM     Moving to and from a bed to a chair (including a wheelchair)?  2  -SM     Standing up from a chair using your arms (e.g., wheelchair, bedside chair)?  2  -SM     Climbing 3-5 steps with a railing?  1  -SM      To walk in hospital room?  1  -[facility] 6 Clicks Score (PT)  10  -     Row Name 08/24/20 1313          Functional Assessment    Outcome Measure Options  AM-[facility] 6 Clicks Basic Mobility (PT)  -       User Key  (r) = Recorded By, (t) = Taken By, (c) = Cosigned By    Initials Name Provider Type    Keyanna Hutson, REJI Physical Therapy Assistant        Physical Therapy Education                 Title: PT OT SLP Therapies (In Progress)     Topic: Physical Therapy (In Progress)     Point: Mobility training (In Progress)     Description:   Instruct learner(s) on safety and technique for assisting patient out of bed, chair or wheelchair.  Instruct in the proper use of assistive devices, such as walker, crutches, cane or brace.              Patient Friendly Description:   It's important to get you on your feet again, but we need to do so in a way that is safe for you. Falling has serious consequences, and your personal safety is the most important thing of all.        When it's time to get out of bed, one of us or a family member will sit next to you on the bed to give you support.     If your doctor or nurse tells you to use a walker, crutches, a cane, or a brace, be sure you use it every time you get out of bed, even if you think you don't need it.    Learning Progress Summary           Patient Acceptance, E,D, NR by  at 8/24/2020 1314    Acceptance, E, NR by  at 8/22/2020 1540    Acceptance, E,D, NR by  at 8/21/2020 1604    Acceptance, E,D, NR,NL by  at 8/20/2020 1330    Acceptance, E,D, NR,NL by  at 8/19/2020 1626    Acceptance, E,D, NR by  at 8/18/2020 1538    Acceptance, E,D, NR by  at 8/17/2020 1034    Acceptance, E,D, NR by  at 8/15/2020 1439    Acceptance, E,TB,D, VU,NR by  at 8/14/2020 1510    Acceptance, E,TB, VU by CB at 8/13/2020 0922    Acceptance, E,D, VU,NR by  at 8/12/2020 1540    Acceptance, E,TB, VU by CB at 8/11/2020 1623    Acceptance, E, NR by ELVA at 8/10/2020 5176     Acceptance, E, VU,NR by MW at 8/8/2020 1542    Acceptance, E, DU,NR by AR at 8/7/2020 1432    Acceptance, E, DU,NR by AR at 8/6/2020 1208    Acceptance, E,D, NR by CG at 8/5/2020 1410    Comment:  does not appear to retain information.  CGRESSRN    Acceptance, E, NL,NR by AR at 8/5/2020 1204    Acceptance, E, DU,NR by AR at 8/4/2020 1453   Family Acceptance, E, NR by DO at 8/22/2020 1540    Acceptance, E, VU,NR by MW at 8/8/2020 1542                   Point: Home exercise program (In Progress)     Description:   Instruct learner(s) on appropriate technique for monitoring, assisting and/or progressing patient with therapeutic exercises and activities.              Learning Progress Summary           Patient Acceptance, E,D, NR by SM at 8/24/2020 1314    Acceptance, E, NR by DO at 8/22/2020 1540    Acceptance, E,D, NR by SM at 8/21/2020 1604    Acceptance, E,D, NR,NL by SM at 8/20/2020 1330    Acceptance, E,D, NR,NL by SM at 8/19/2020 1626    Acceptance, E,D, NR by SM at 8/18/2020 1538    Acceptance, E,D, NR by  at 8/17/2020 1034    Acceptance, E,D, NR by SM at 8/15/2020 1439    Acceptance, E,TB,D, VU,NR by SM at 8/14/2020 1510    Acceptance, E,TB, VU by CB at 8/13/2020 0922    Acceptance, E,D, VU,NR by  at 8/12/2020 1540    Acceptance, E,TB, VU by CB at 8/11/2020 1623    Acceptance, E, VU,NR by MW at 8/8/2020 1542    Acceptance, E, DU,NR by AR at 8/7/2020 1432    Acceptance, E, DU,NR by AR at 8/6/2020 1208    Acceptance, E,D, NR by CG at 8/5/2020 1410    Comment:  does not appear to retain information.  CGRESSRN    Acceptance, E, NL,NR by AR at 8/5/2020 1204    Acceptance, E, DU,NR by AR at 8/4/2020 1453   Family Acceptance, E, NR by DO at 8/22/2020 1540    Acceptance, E, VU,NR by MW at 8/8/2020 1542                   Point: Body mechanics (In Progress)     Description:   Instruct learner(s) on proper positioning and spine alignment for patient and/or caregiver during mobility tasks and/or exercises.               Learning Progress Summary           Patient Acceptance, E,D, NR by SM at 8/24/2020 1314    Acceptance, E, NR by DO at 8/22/2020 1540    Acceptance, E,D, NR by SM at 8/21/2020 1604    Acceptance, E,D, NR,NL by SM at 8/20/2020 1330    Acceptance, E,D, NR,NL by SM at 8/19/2020 1626    Acceptance, E,D, NR by SM at 8/18/2020 1538    Acceptance, E,D, NR by  at 8/17/2020 1034    Acceptance, E,D, NR by SM at 8/15/2020 1439    Acceptance, E,TB,D, VU,NR by SM at 8/14/2020 1510    Acceptance, E,TB, VU by CB at 8/13/2020 0922    Acceptance, E,D, VU,NR by PH at 8/12/2020 1540    Acceptance, E,TB, VU by CB at 8/11/2020 1623    Acceptance, E, NR by DJ at 8/10/2020 1530    Acceptance, E, VU,NR by MW at 8/8/2020 1542    Acceptance, E, DU,NR by AR at 8/7/2020 1432    Acceptance, E, DU,NR by AR at 8/6/2020 1208    Acceptance, E,D, NR by CG at 8/5/2020 1410    Comment:  does not appear to retain information.  CGRESSRN    Acceptance, E, NL,NR by AR at 8/5/2020 1204    Acceptance, E, DU,NR by AR at 8/4/2020 1453   Family Acceptance, E, NR by DO at 8/22/2020 1540    Acceptance, E, VU,NR by MW at 8/8/2020 1542                   Point: Precautions (In Progress)     Description:   Instruct learner(s) on prescribed precautions during mobility and gait tasks              Learning Progress Summary           Patient Acceptance, E,D, NR by SM at 8/24/2020 1314    Acceptance, E, NR by DO at 8/22/2020 1540    Acceptance, E,D, NR by SM at 8/21/2020 1604    Acceptance, E,D, NR,NL by SM at 8/20/2020 1330    Acceptance, E,D, NR,NL by SM at 8/19/2020 1626    Acceptance, E,D, NR by SM at 8/18/2020 1538    Acceptance, E,D, NR by  at 8/17/2020 1034    Acceptance, E,D, NR by SM at 8/15/2020 1439    Acceptance, E,TB,D, VU,NR by SM at 8/14/2020 1510    Acceptance, E,TB, VU by CB at 8/13/2020 0922    Acceptance, E,D, VU,NR by  at 8/12/2020 1540    Acceptance, E,TB, VU by CB at 8/11/2020 1623    Acceptance, E, NR by  at 8/10/2020 1530    Acceptance, E,  VU,NR by MW at 8/8/2020 1542    Acceptance, E, DU,NR by AR at 8/7/2020 1432    Acceptance, E, DU,NR by AR at 8/6/2020 1208    Acceptance, E,D, NR by CG at 8/5/2020 1410    Comment:  does not appear to retain information.  CGRESSRN    Acceptance, E, NL,NR by AR at 8/5/2020 1204    Acceptance, E, DU,NR by AR at 8/4/2020 1453   Family Acceptance, E, NR by DO at 8/22/2020 1540    Acceptance, E, VU,NR by MW at 8/8/2020 1542                               User Key     Initials Effective Dates Name Provider Type Discipline    CG 06/16/16 -  Geneva Villela RN Registered Nurse Nurse     03/07/18 -  Keyanna Wellington, PTA Physical Therapy Assistant PT    DO 03/07/18 -  Olaf Frank, PT Physical Therapist PT    EH 08/19/18 -  Julieta Wheat, PTA Physical Therapy Assistant PT    PH 08/20/19 -  Kaelyn De La O, PTA Physical Therapy Assistant PT    MW 09/17/19 -  Pauline Harris, PT Physical Therapist PT    DJ 10/25/19 -  Florence Wallace, PT Physical Therapist PT    AR 07/02/20 -  Raghu Garcia, PT Student PT Student PT    CB 07/02/20 -  Sami Guajardo, PT Student PT Student PT              PT Recommendation and Plan     Outcome Summary/Treatment Plan (PT)  Anticipated Discharge Disposition (PT): skilled nursing facility  Plan of Care Reviewed With: patient  Progress: improving  Outcome Summary: Pt tolerated treatment well this date. Awake and participating as much as possible. Required mod-max A x2 for bed mobility, then max A x2 to stand using the walker. Stood a total of 5x, then attempted side steps. Pt was only able to slide feet over just a little, though very weak and knees were flexed. Completed several seated LE exercises, and again encouraged daughter to perform supine exercises during the day. Pt is to d/c to SNF today. Patient was not wearing a face mask during this therapy encounter. Therapist used appropriate personal protective equipment including eye protection, mask, and gloves.  Mask used was standard  procedure mask. Appropriate PPE was worn during the entire therapy session. Hand hygiene was completed before and after therapy session. Patient is not in enhanced droplet precautions.     Time Calculation:   PT Charges     Row Name 08/24/20 1317             Time Calculation    Start Time  1100  -      Stop Time  1127  -      Time Calculation (min)  27 min  -      PT Received On  08/24/20  -      PT - Next Appointment  08/25/20  -        User Key  (r) = Recorded By, (t) = Taken By, (c) = Cosigned By    Initials Name Provider Type     Keyanna Wellington PTA Physical Therapy Assistant        Therapy Charges for Today     Code Description Service Date Service Provider Modifiers Qty    88563139933 HC PT THER PROC EA 15 MIN 8/24/2020 Keyanna Wellington PTA GP 1    42863142021 HC PT THER SUPP EA 15 MIN 8/24/2020 Keyanna Wellington PTA GP 2    92725635369 HC PT THERAPEUTIC ACT EA 15 MIN 8/24/2020 Keyanna Wellington, REJI GP 1          PT G-Codes  Outcome Measure Options: AM-PAC 6 Clicks Basic Mobility (PT)  AM-PAC 6 Clicks Score (PT): 10    Keyanna Wellington PTA  8/24/2020

## 2020-08-24 NOTE — PLAN OF CARE
Problem: Patient Care Overview  Goal: Plan of Care Review  Outcome: Ongoing (interventions implemented as appropriate)  Flowsheets (Taken 8/24/2020 1314)  Progress: improving  Plan of Care Reviewed With: patient  Outcome Summary: Pt tolerated treatment well this date. Awake and participating as much as possible. Required mod-max A x2 for bed mobility, then max A x2 to stand using the walker. Stood a total of 5x, then attempted side steps. Pt was only able to slide feet over just a little, though very weak and knees were flexed. Completed several seated LE exercises, and again encouraged daughter to perform supine exercises during the day. Pt is to d/c to SNF today. Patient was not wearing a face mask during this therapy encounter. Therapist used appropriate personal protective equipment including eye protection, mask, and gloves.  Mask used was standard procedure mask. Appropriate PPE was worn during the entire therapy session. Hand hygiene was completed before and after therapy session. Patient is not in enhanced droplet precautions. Also present: Konrad PT tech.

## 2020-08-24 NOTE — DISCHARGE SUMMARY
[facility]    Admit date: 8/3/2020  Discharge date: 8/24/2020    Admission/discharge diagnosis:  Acute arterial occlusion of right lower extremity  Status post open right femoral thromboembolectomy  Intracerebral hemorrhage on admission  Coagulopathy on admission  A. fib on anticoagulation  UTI E. coli  Diabetes mellitus  Hypothyroidism  V. Tach  Anemia: Improved post transfusion  Acute stroke  Hematoma    HPI: reviewed as per Dr. Evans  86-year-old female history of A. fib on anticoagulation presented to the emergency room with confusion.  There is no history of fall or trauma reported by the daughter.  Patient currently awake but not the best of historian.  She denies any headache double vision or blurred vision at this time.  No nausea vomiting or shortness of breath was reported.  In the emergency room CT head showed IVH with subarachnoid hemorrhage was noted.  Patient on Coumadin and Plavix for A. fib and was noted to have  elevated INR.  She has received vitamin K and Kcentra in the emergency room.    Hospital Course:  Patient had a long hospital course and I was involved in care only the last few days, please see progress notes for details.  She remains a bit somnolent at times and weak.  But medical therapy has been optimized    Consulting physicians;  Dr. Winsome Valentin vascular surgery  Dr. Mota neurology  Dr. Pappas neurology  Dr. Mcmahan Cardiology  Dr. De La Cruz Neurosurgery    Procedures:  Femoral thrombectomy/embolectomy  Diagnostic cerebral angiogram    Activity: as tolerated    Diet: mechanical soft, no mixed consistencies, thin, consistent carbs with bid boost glucose control supplement shakes    Follow up:  With Dr. Ca after d/c from [facility]  Cardiology per their recommendations    Dispo: [facility]    Greater than 30 mins spent in discharging patient.        Discharge Medications      New Medications      Instructions Start Date   amLODIPine 10 MG tablet  Commonly known  as:  NORVASC   10 mg, Oral, Every 24 Hours Scheduled   Start Date:  August 25, 2020     bisacodyl 10 MG suppository  Commonly known as:  DULCOLAX   10 mg, Rectal, Daily PRN         Changes to Medications      Instructions Start Date   furosemide 20 MG tablet  Commonly known as:  LASIX  What changed:    · medication strength  · how much to take  · when to take this   20 mg, Oral, Daily   Start Date:  August 25, 2020     levothyroxine 75 MCG tablet  Commonly known as:  SYNTHROID, LEVOTHROID  What changed:    · medication strength  · how much to take  · when to take this   75 mcg, Oral, Every Morning   Start Date:  August 25, 2020     metoprolol tartrate 25 MG tablet  Commonly known as:  LOPRESSOR  What changed:    · medication strength  · See the new instructions.   25 mg, Oral, Every 12 Hours Scheduled      warfarin 2 MG tablet  Commonly known as:  COUMADIN  What changed:    · medication strength  · additional instructions   afib      PHARMACY TO DOSE WARFARIN  What changed:  You were already taking a medication with the same name, and this prescription was added. Make sure you understand how and when to take each.   Does not apply, Continuous PRN         Continue These Medications      Instructions Start Date   isosorbide mononitrate 60 MG 24 hr tablet  Commonly known as:  IMDUR   60 mg, Oral, Daily   Start Date:  August 25, 2020        Stop These Medications    acetaminophen 500 MG tablet  Commonly known as:  TYLENOL     ascorbic acid 250 MG chewable tablet chewable tablet  Commonly known as:  VITAMIN C     atorvastatin 40 MG tablet  Commonly known as:  LIPITOR     Calcium Citrate-Vitamin D 250-200 MG-UNIT tablet     Clobetasol Propionate Emulsion 0.05 % topical foam     clopidogrel 75 MG tablet  Commonly known as:  PLAVIX     hydrocortisone 2.5 % rectal cream  Commonly known as:  Anusol-HC     ketoconazole 2 % shampoo  Commonly known as:  NIZORAL     lisinopril 10 MG tablet  Commonly known as:  PRINIVIL,ZESTRIL      multivitamin tablet     omeprazole 20 MG capsule  Commonly known as:  priLOSEC     Phenylephrine-[place] 0.25-50 % gel  Commonly known as:  Preparation H     potassium chloride 10 MEQ CR capsule  Commonly known as:  MICRO-K

## 2020-08-25 NOTE — PROGRESS NOTES
Case Management Discharge Note      Final Note: Pt dsicharged to [facility].  Transported by [facility] [place].  GINGER davidson RN         Destination - Selection Complete      Service Provider Request Status Selected Services Address Phone Number Fax Number    [facility] [address] [id number] [phone]      Durable Medical Equipment      No service has been selected for the patient.      Dialysis/Infusion      No service has been selected for the patient.      Home Medical Care      No service has been selected for the patient.      Therapy      No service has been selected for the patient.      Community Resources      No service has been selected for the patient.        Transportation Services  Ambulance: [facility]    Final Discharge Disposition Code: 03 - skilled nursing facility (SNF)

## 2020-08-31 NOTE — TELEPHONE ENCOUNTER
I called and left message on voicemail regarding needing to do prescreen for the appointment and allowed 1 visitor and will also need to wear a mask.

## 2020-09-01 NOTE — TELEPHONE ENCOUNTER
Patient is scheduled for televisit today to go over CT results and Dr DOYLE is to call her daughter Dione who is her POA.  Patient is in nursing home and has dementia    LM for her daughter letting her know that Dr DOYLE will be calling her to go over her CT results, I do not have an exact time as he will be calling from the hospital in between surgery cases

## 2020-09-11 PROBLEM — N17.9 AKI (ACUTE KIDNEY INJURY) (HCC): Status: ACTIVE | Noted: 2020-01-01

## 2020-09-11 PROBLEM — D62 ACUTE BLOOD LOSS ANEMIA: Status: ACTIVE | Noted: 2020-01-01

## 2020-09-11 PROBLEM — R79.1 SUPRATHERAPEUTIC INR: Status: ACTIVE | Noted: 2020-01-01

## 2020-09-11 PROBLEM — E78.5 HLD (HYPERLIPIDEMIA): Status: ACTIVE | Noted: 2020-01-01

## 2020-09-11 PROBLEM — K92.2 ACUTE UPPER GASTROINTESTINAL BLEEDING: Status: ACTIVE | Noted: 2020-01-01

## 2020-09-11 NOTE — ED NOTES
Pt arriving via EMS from [facility] Was recently discharged from this facility post acute CVA. Pt is being sent today for hemoglobin of 6.4, down from a preivous 11.1 a few months ago. Pt's coumadin has been held by nursing staff for the past 3 days and vitamin K was given this afternoon by nursing staff. Staff states that pt has had episodes of hypotension and hypoxia but vitals stabilized before EMS transfer. Oxygen in 98% on room air. Pt is alert to self and family at baseline and cooperative. Pt is in afib which is chronic for her.      Brenna Floyd, RN  09/11/20 7601       Brenna Floyd, NERY  09/11/20 1703       Brenna Floyd RN  09/11/20 1706

## 2020-09-11 NOTE — H&P
Patient Name:  Jihan Teresa  YOB: 1933  MRN:  [id number]  Admit Date:  9/11/2020  Patient Care Team:  Yaron Ca Jr., MD as PCP - General (Family Medicine)  Yaron Ca Jr., MD as PCP - Claims Attributed      Chief Complaint   Patient presents with   • Abnormal Lab       Subjective     Ms. Teresa is a 87 y.o. female with a history of DM2, HTN, hypothyroidism, a fib, CVA on warfarin that presents to [facility] with reported complaints of low hemoglobin and elevated INR. Patient's daughter at bedside present for help with history as patient is hard of hearing. She reports that patient was sent here from nursing home after several day history of difficulty adjusting warfarin resulting in INR of 8.7 and hemoglobin of 5.8. Per daughter, this has been ongoing since about Monday this week, though she just found out about the lab abnormalities today when she was sent here for further evaluation. Patient unable to give history regarding her bowel movements, but per daughter, she had reportedly been having dark stools and was heme positive on exam tonight in ED. Patient denies fever, chest pain, shortness of breath, abdominal pain, urinary complaints, edema. Labs done tonight show slight AMADEO in addition to hgb of 5.8 and INR of 8.7. Patient was given kaycentra in ED and 2 units of PRBC were ordered and initiated. She was also started on protonix drip as she did have heme positive stool on exam tonight. No known history of GI bleeding in the past and denies use of NSAIDS or alcohol.     History of Present Illness    Past Medical History:   Diagnosis Date   • Anemia    • Aortic valve stenosis     S/p AVR in 09'   • Asthma    • Atelectasis    • CHF (congestive heart failure) (CMS/HCC)    • Congenital heart disease    • Diabetes mellitus (CMS/HCC)    • Esophageal reflux    • Essential hypertension    • HLD (hyperlipidemia)    • Hypotension    • LVH (left ventricular hypertrophy)    •  "Pleural effusion    • Pulmonary hypertension (CMS/HCC)      Past Surgical History:   Procedure Laterality Date   • AORTIC VALVE REPAIR/REPLACEMENT  11/14/2009    Jerry Colmenares MD   • CATARACT EXTRACTION     • CEREBRAL ANGIOGRAM N/A 8/4/2020    Procedure: CEREBRAL ANGIOGRAM;  Surgeon: Ar Bunch MD;  Location: [facility] OR 18/19;  Service: Neurosurgery;  Laterality: N/A;   • FEMORAL THROMBECTOMY/EMBOLECTOMY Right 8/12/2020    Procedure: FEMORAL THROMBECTOMY/EMBOLECTOMY;  Surgeon: Winsome Valentin Jr., MD;  Location: [facility] OR;  Service: Vascular;  Laterality: Right;   • KNEE ARTHROPLASTY       Family History   Problem Relation Age of Onset   • Dementia Sister    • Stroke Brother      Social History     Tobacco Use   • Smoking status: Never Smoker   • Smokeless tobacco: Never Used   Substance Use Topics   • Alcohol use: Yes     Comment: ocasional   • Drug use: No     Medications Prior to Admission   Medication Sig Dispense Refill Last Dose   • amLODIPine (NORVASC) 5 MG tablet Take 10 mg by mouth Daily.      • bisacodyl (DULCOLAX) 10 MG suppository Insert 1 suppository into the rectum Daily As Needed for Constipation.   Taking   • fluticasone (FLONASE) 50 MCG/ACT nasal spray 1 spray into the nostril(s) as directed by provider Daily.      • isosorbide mononitrate (IMDUR) 60 MG 24 hr tablet Take 1 tablet by mouth Daily.   Taking   • levothyroxine (SYNTHROID, LEVOTHROID) 75 MCG tablet Take 1 tablet by mouth Every Morning.   Taking   • metoprolol tartrate (LOPRESSOR) 25 MG tablet Take 1 tablet by mouth Every 12 (Twelve) Hours.   Taking   • zinc oxide 20 % ointment Apply  topically to the appropriate area as directed 2 (Two) Times a Day As Needed.      • warfarin (COUMADIN) 2.5 MG tablet Take 2.5 mg by mouth Daily.        Allergies:    Allergies   Allergen Reactions   • Promethazine Other (See Comments)     Pt becomes \"out of it\" when on this medication  Pt becomes \"out of it\" when on this medication "       Review of Systems   Constitutional: Negative.  Negative for chills and fever.   HENT: Negative.  Negative for congestion and sore throat.    Eyes: Negative.  Negative for visual disturbance.   Respiratory: Negative.  Negative for cough and shortness of breath.    Cardiovascular: Negative.  Negative for chest pain.   Gastrointestinal: Negative.  Negative for abdominal pain, nausea and vomiting.   Endocrine: Negative.    Genitourinary: Negative.  Negative for dysuria, frequency and urgency.   Musculoskeletal: Negative.  Negative for arthralgias and myalgias.   Skin: Negative.  Negative for color change and pallor.   Allergic/Immunologic: Negative.    Neurological: Negative.  Negative for dizziness and light-headedness.   Hematological: Negative.    Psychiatric/Behavioral: Negative.  Negative for agitation and behavioral problems.        Objective    Vital Signs  Temp:  [97.5 °F (36.4 °C)-98.6 °F (37 °C)] 97.8 °F (36.6 °C)  Heart Rate:  [65-79] 76  Resp:  [14-18] 16  BP: ()/(40-62) 119/44  SpO2:  [93 %-100 %] 97 %  on  Flow (L/min):  [2] 2;   Device (Oxygen Therapy): nasal cannula  Body mass index is 25.13 kg/m².    Physical Exam   Constitutional: She is oriented to person, place, and time. She appears well-developed and well-nourished. No distress.   HENT:   Head: Normocephalic and atraumatic.   Eyes: EOM are normal.   Neck: Normal range of motion. Neck supple.   Cardiovascular: Normal rate and intact distal pulses. An irregularly irregular rhythm present.   Murmur heard.  Pulmonary/Chest: Effort normal. No respiratory distress.   Abdominal: Soft. Bowel sounds are normal. She exhibits no distension.   Musculoskeletal: Normal range of motion. She exhibits no edema.   Neurological: She is alert and oriented to person, place, and time.   Skin: Skin is warm and dry. She is not diaphoretic. No erythema. There is pallor.   Psychiatric: She has a normal mood and affect. Her speech is normal and behavior is  normal. Cognition and memory are normal.   Nursing note and vitals reviewed.      Results Review:   I reviewed the patient's new clinical results including all labs and xrays.    Lab Results (last 24 hours)     Procedure Component Value Units Date/Time    CBC & Differential [[id number]] Collected:  09/11/20 1740    Specimen:  Blood Updated:  09/11/20 1806    Narrative:       The following orders were created for panel order CBC & Differential.  Procedure                               Abnormality         Status                     ---------                               -----------         ------                     CBC Auto Differential[[id number]]        Abnormal            Final result                 Please view results for these tests on the individual orders.    Comprehensive Metabolic Panel [[id number]]  (Abnormal) Collected:  09/11/20 1740    Specimen:  Blood Updated:  09/11/20 1818     Glucose 115 mg/dL      BUN 56 mg/dL      Creatinine 1.07 mg/dL      Sodium 138 mmol/L      Potassium 3.6 mmol/L      Chloride 104 mmol/L      CO2 25.2 mmol/L      Calcium 8.4 mg/dL      Total Protein 5.0 g/dL      Albumin 3.20 g/dL      ALT (SGPT) 7 U/L      AST (SGOT) 8 U/L      Alkaline Phosphatase 38 U/L      Total Bilirubin 0.3 mg/dL      eGFR Non African Amer 49 mL/min/1.73      Globulin 1.8 gm/dL      A/G Ratio 1.8 g/dL      BUN/Creatinine Ratio 52.3     Anion Gap 8.8 mmol/L     Narrative:       GFR Normal >60  Chronic Kidney Disease <60  Kidney Failure <15      Protime-INR [[id number]]  (Abnormal) Collected:  09/11/20 1740    Specimen:  Blood Updated:  09/11/20 1843     Protime 67.8 Seconds      INR 8.77    aPTT [[id number]]  (Abnormal) Collected:  09/11/20 1740    Specimen:  Blood Updated:  09/11/20 1843     PTT 48.9 seconds     CBC Auto Differential [[id number]]  (Abnormal) Collected:  09/11/20 1740    Specimen:  Blood Updated:  09/11/20 1806     WBC 9.17 10*3/mm3      RBC 2.04 10*6/mm3      Hemoglobin 5.8 g/dL       Hematocrit 17.9 %      MCV 87.7 fL      MCH 28.4 pg      MCHC 32.4 g/dL      RDW 17.3 %      RDW-SD 54.7 fl      MPV 10.1 fL      Platelets 132 10*3/mm3      Neutrophil % 68.1 %      Lymphocyte % 23.3 %      Monocyte % 6.1 %      Eosinophil % 0.7 %      Basophil % 0.7 %      Immature Grans % 1.1 %      Neutrophils, Absolute 6.25 10*3/mm3      Lymphocytes, Absolute 2.14 10*3/mm3      Monocytes, Absolute 0.56 10*3/mm3      Eosinophils, Absolute 0.06 10*3/mm3      Basophils, Absolute 0.06 10*3/mm3      Immature Grans, Absolute 0.10 10*3/mm3      nRBC 0.2 /100 WBC     COVID PRE-OP / PRE-PROCEDURE SCREENING ORDER (NO ISOLATION) - Swab, Nasopharynx [[id number]] Collected:  09/1934    Specimen:  Swab from Nasopharynx Updated:  09/11/20 1944    Narrative:       The following orders were created for panel order COVID PRE-OP / PRE-PROCEDURE SCREENING ORDER (NO ISOLATION) - Swab, Nasopharynx.  Procedure                               Abnormality         Status                     ---------                               -----------         ------                     COVID-19,BIOTAP, NP/OP S...[[id number]]                      In process                   Please view results for these tests on the individual orders.    COVID-19,BIOTAP, NP/OP SWAB IN TRANSPORT MEDIA OR SALINE 24-36 HR TAT - Swab, Nasopharynx [[id number]] Collected:  09/1934    Specimen:  Swab from Nasopharynx Updated:  09/11/20 1944          CT Abdomen Pelvis With Contrast   Final Result        Assessment/Plan      Active Hospital Problems    Diagnosis  POA   • **Acute blood loss anemia [D62]  Yes   • Acute upper gastrointestinal bleeding [K92.2]  Yes   • HLD (hyperlipidemia) [E78.5]  Yes   • AMADEO (acute kidney injury) (CMS/HCC) [N17.9]  Yes   • Pulmonary hypertension (CMS/HCC) [I27.20]  Yes   • Type 2 diabetes mellitus (CMS/HCC) [E11.9]  Yes   • Hypothyroidism (acquired) [E03.9]  Yes   • Essential hypertension [I10]  Yes   • A-fib (CMS/HCC) [I48.91]  Yes     • CHF (congestive heart failure) (CMS/[facility]) [I50.9]  Yes      Resolved Hospital Problems   No resolved problems to display.     ABLA/acute GI bleeding/supratherapeutic INR  -recent known difficulty with warfarin adjustment resulting in INR of 8.7 and consequently hgb of 5.8  -heme positive stool on exam  -GI consult  -monitor H&H q8 hr  -2 units PRBC ordered  -given kaycentra in ED, will monitor INR  -NPO after midnight  -continue protonix gtt    AMADEO  -creatinine 1.07 tonight, up from baseline of 0.6  -IVF overnight  -avoid further nephrotoxins  -recheck labs in AM    DM2  -does not appear to take any diabetic medications, will monitor for now and address as needed    HLD/hypothyroidism/HTN/CHF/afib  -stable, will hold home amlodipine in anticipation for hypotension due to anemia  -may continue other home meds    VTE Ppx  -SCDs    CODE status  -full    I discussed the patients findings and my recommendations with patient.    KAREN Love  [facility]  09/11/20  7:56 PM

## 2020-09-11 NOTE — PROGRESS NOTES
Clinical Pharmacy Services: Medication History    Jihan Teresa is a 87 y.o. female presenting to [facility] for   Chief Complaint   Patient presents with   • Abnormal Lab       She  has a past medical history of Anemia, Aortic valve stenosis, Asthma, Atelectasis, CHF (congestive heart failure) (CMS/HCC), Congenital heart disease, Diabetes mellitus (CMS/HCC), Esophageal reflux, Essential hypertension, HLD (hyperlipidemia), Hypotension, LVH (left ventricular hypertrophy), Pleural effusion, and Pulmonary hypertension (CMS/[facility]).    Allergies as of 09/11/2020 - Reviewed 09/11/2020   Allergen Reaction Noted   • Promethazine Other (See Comments) 03/23/2015       Medication information was obtained from: nursing home medication sheets  Pharmacy and Phone Number:     Prior to Admission Medications     Prescriptions Last Dose Informant Patient Reported? Taking?    amLODIPine (NORVASC) 5 MG tablet  Nursing Home Yes Yes    Take 5 mg by mouth Daily.    bisacodyl (DULCOLAX) 10 MG suppository  Nursing Home No Yes    Insert 1 suppository into the rectum Daily As Needed for Constipation.    fluticasone (FLONASE) 50 MCG/ACT nasal spray  Nursing Home Yes Yes    1 spray into the nostril(s) as directed by provider Daily.    isosorbide mononitrate (IMDUR) 60 MG 24 hr tablet  Nursing Home No Yes    Take 1 tablet by mouth Daily.    levothyroxine (SYNTHROID, LEVOTHROID) 75 MCG tablet  Nursing Home No Yes    Take 1 tablet by mouth Every Morning.    metoprolol tartrate (LOPRESSOR) 25 MG tablet  Nursing Home No Yes    Take 1 tablet by mouth Every 12 (Twelve) Hours.    warfarin (COUMADIN) 2.5 MG tablet   Yes No    Take 2.5 mg by mouth Daily.    zinc oxide 20 % ointment  Nursing Home Yes Yes    Apply  topically to the appropriate area as directed 2 (Two) Times a Day As Needed.            Medication notes: Patient's warfarin is currently on hold due to INR. (9/11/2020)    This medication list is complete to the best of my  knowledge as of 9/11/2020    Please call if questions.    Kirsten Anaya [facility]  Medication History Technician  [phone]    9/11/2020 19:39

## 2020-09-11 NOTE — ED PROVIDER NOTES
EMERGENCY DEPARTMENT ENCOUNTER    Room Number:  41/41  Date of encounter:  9/11/2020  PCP: Yaron Ca Jr., MD  Historian: Patient and daughter      HPI:  Chief Complaint: Low hemoglobin  A complete HPI/ROS/PMH/PSH/SH/FH are unobtainable due to: She is a poor historian    Context: Jihan Teresa is a 87 y.o. female who presents to the ED from [facility] with reports of low hemoglobin.  Evidently, her INR has been high for the past several days and vitamin K was given this afternoon by nursing staff.  Coumadin has been held for the last 3 days as well.  Hemoglobin is reportedly 6.4.  The patient complains of fatigue, but she denies chest pain.  She states she thinks her breathing is okay.  She denies abdominal pain.      The patient was placed in a mask in triage, hand hygiene was performed before and after my interaction with the patient.  I wore a mask, safety glasses and gloves during my entire interaction with the patient.    PAST MEDICAL HISTORY  Active Ambulatory Problems     Diagnosis Date Noted   • Type 2 diabetes mellitus (CMS/[facility]) 02/05/2016   • Aortic valve replaced, equine valve 02/05/2016   • Cerebral infarction (CMS/[facility]) 02/05/2016   • A-fib (CMS/[facility]) 02/05/2016   • GERD without esophagitis 02/05/2016   • Sleep apnea in adult 02/05/2016   • Cognitive dysfunction 02/05/2016   • Hypothyroidism (acquired) 02/05/2016   • History of recurrent UTIs 02/05/2016   • Mild reactive airways disease 02/05/2016   • Essential hypertension 02/05/2016   • Pulmonary hypertension (CMS/[facility]) 12/13/2016   • B12 deficiency 12/13/2016   • Non-rheumatic mitral regurgitation 11/14/2017   • Non-rheumatic tricuspid valve insufficiency 11/14/2017   • Chronic anemia 11/14/2017   • Grade III hemorrhoids 01/09/2020   • ICH (intracerebral hemorrhage) (CMS/[facility]) 08/03/2020   • Nontraumatic subcortical hemorrhage of left cerebral hemisphere (CMS/[facility]) 08/03/2020     Resolved Ambulatory Problems     Diagnosis Date  Noted   • Congestive heart failure (CMS/HCC) 02/05/2016   • Cardiomyopathy (CMS/HCC) 02/05/2016   • Hypertensive pulmonary vascular disease (CMS/HCC) 02/05/2016   • Pneumonia of both lungs due to infectious organism 11/25/2016   • Hypokalemia 11/26/2016   • CKD (chronic kidney disease) stage 3, GFR 30-59 ml/min (CMS/HCC) 11/27/2016   • UTI (urinary tract infection), bacterial 06/10/2017   • Type 2 diabetes mellitus (CMS/HCC) 07/14/2017   • History of recurrent UTIs 04/25/2018   • Cellulitis 04/25/2018     Past Medical History:   Diagnosis Date   • Anemia    • Aortic valve stenosis    • Asthma    • Atelectasis    • CHF (congestive heart failure) (CMS/HCC)    • Congenital heart disease    • Diabetes mellitus (CMS/HCC)    • Esophageal reflux    • HLD (hyperlipidemia)    • Hypotension    • LVH (left ventricular hypertrophy)    • Pleural effusion          PAST SURGICAL HISTORY  Past Surgical History:   Procedure Laterality Date   • AORTIC VALVE REPAIR/REPLACEMENT  11/14/2009    Jerry Colmenares MD   • CATARACT EXTRACTION     • CEREBRAL ANGIOGRAM N/A 8/4/2020    Procedure: CEREBRAL ANGIOGRAM;  Surgeon: Ar Bunch MD;  Location: [facility] 18/19;  Service: Neurosurgery;  Laterality: N/A;   • FEMORAL THROMBECTOMY/EMBOLECTOMY Right 8/12/2020    Procedure: FEMORAL THROMBECTOMY/EMBOLECTOMY;  Surgeon: Winsome Valentin Jr., MD;  Location: [facility];  Service: Vascular;  Laterality: Right;   • KNEE ARTHROPLASTY           FAMILY HISTORY  Family History   Problem Relation Age of Onset   • Dementia Sister    • Stroke Brother          SOCIAL HISTORY  Social History     Socioeconomic History   • Marital status:      Spouse name: Not on file   • Number of children: Not on file   • Years of education: Not on file   • Highest education level: Not on file   Tobacco Use   • Smoking status: Never Smoker   • Smokeless tobacco: Never Used   Substance and Sexual Activity   • Alcohol use: Yes     Comment: ocasional    • Drug use: No         ALLERGIES  Promethazine        REVIEW OF SYSTEMS  Review of Systems   Reason unable to perform ROS: Cognitive dysfunction.   Constitutional: Positive for fatigue.   Respiratory: Negative for shortness of breath.    Cardiovascular: Negative for chest pain.   Gastrointestinal: Negative for abdominal pain.        All systems reviewed and negative except for those discussed in HPI.       PHYSICAL EXAM    I have reviewed the triage vital signs and nursing notes.    ED Triage Vitals   Temp Heart Rate Resp BP SpO2   09/11/20 1703 09/11/20 1703 09/11/20 1703 09/11/20 1703 09/11/20 1703   98.6 °F (37 °C) 74 14 106/49 96 %      Temp src Heart Rate Source Patient Position BP Location FiO2 (%)   09/11/20 1703 09/11/20 1703 09/11/20 1721 -- --   Oral Monitor Lying         Physical Exam   Constitutional: Pt. is oriented to person and is well-developed, well-nourished, and in no distress. No distress.   HENT: Normocephalic and atraumatic,  EOM are normal.   Neck: Normal range of motion. Neck supple. No JVD present. No tracheal deviation present. No thyromegaly present.   Cardiovascular: Normal rate, regular rhythm and normal heart sounds. Exam reveals no gallop and no friction rub.   Systolic murmur heard.  Pulmonary/Chest: Effort normal and breath sounds normal. No stridor. No respiratory distress. No wheezes, no rales.   Abdominal: Soft. Bowel sounds are normal. No distension. There is no tenderness. There is no rebound and no guarding.   Rectal: Heme positive stool  Musculoskeletal: Normal range of motion. No edema, tenderness or deformity.   Neurological: Pt. is alert and oriented to person. Pt. has generalized weakness but no focal deficits.  No cranial nerve deficit..   Skin: Skin is warm and dry. No rash noted. Pt. is not diaphoretic. No erythema.   Psychiatric: She is pleasant and cooperative.  Nursing note and vitals reviewed.        LAB RESULTS  Recent Results (from the past 24 hour(s))    Comprehensive Metabolic Panel    Collection Time: 09/11/20  5:40 PM   Result Value Ref Range    Glucose 115 (H) 65 - 99 mg/dL    BUN 56 (H) 8 - 23 mg/dL    Creatinine 1.07 (H) 0.57 - 1.00 mg/dL    Sodium 138 136 - 145 mmol/L    Potassium 3.6 3.5 - 5.2 mmol/L    Chloride 104 98 - 107 mmol/L    CO2 25.2 22.0 - 29.0 mmol/L    Calcium 8.4 (L) 8.6 - 10.5 mg/dL    Total Protein 5.0 (L) 6.0 - 8.5 g/dL    Albumin 3.20 (L) 3.50 - 5.20 g/dL    ALT (SGPT) 7 1 - 33 U/L    AST (SGOT) 8 1 - 32 U/L    Alkaline Phosphatase 38 (L) 39 - 117 U/L    Total Bilirubin 0.3 0.0 - 1.2 mg/dL    eGFR Non African Amer 49 (L) >60 mL/min/1.73    Globulin 1.8 gm/dL    A/G Ratio 1.8 g/dL    BUN/Creatinine Ratio 52.3 (H) 7.0 - 25.0    Anion Gap 8.8 5.0 - 15.0 mmol/L   Protime-INR    Collection Time: 09/11/20  5:40 PM   Result Value Ref Range    Protime 67.8 (C) 11.7 - 14.2 Seconds    INR 8.77 (C) 0.90 - 1.10   aPTT    Collection Time: 09/11/20  5:40 PM   Result Value Ref Range    PTT 48.9 (H) 22.7 - 35.4 seconds   Type & Screen    Collection Time: 09/11/20  5:40 PM   Result Value Ref Range    ABO Type A     RH type Positive     Antibody Screen Negative     T&S Expiration Date 9/14/2020 11:59:59 PM    CBC Auto Differential    Collection Time: 09/11/20  5:40 PM   Result Value Ref Range    WBC 9.17 3.40 - 10.80 10*3/mm3    RBC 2.04 (L) 3.77 - 5.28 10*6/mm3    Hemoglobin 5.8 (C) 12.0 - 15.9 g/dL    Hematocrit 17.9 (C) 34.0 - 46.6 %    MCV 87.7 79.0 - 97.0 fL    MCH 28.4 26.6 - 33.0 pg    MCHC 32.4 31.5 - 35.7 g/dL    RDW 17.3 (H) 12.3 - 15.4 %    RDW-SD 54.7 (H) 37.0 - 54.0 fl    MPV 10.1 6.0 - 12.0 fL    Platelets 132 (L) 140 - 450 10*3/mm3    Neutrophil % 68.1 42.7 - 76.0 %    Lymphocyte % 23.3 19.6 - 45.3 %    Monocyte % 6.1 5.0 - 12.0 %    Eosinophil % 0.7 0.3 - 6.2 %    Basophil % 0.7 0.0 - 1.5 %    Immature Grans % 1.1 (H) 0.0 - 0.5 %    Neutrophils, Absolute 6.25 1.70 - 7.00 10*3/mm3    Lymphocytes, Absolute 2.14 0.70 - 3.10 10*3/mm3     Monocytes, Absolute 0.56 0.10 - 0.90 10*3/mm3    Eosinophils, Absolute 0.06 0.00 - 0.40 10*3/mm3    Basophils, Absolute 0.06 0.00 - 0.20 10*3/mm3    Immature Grans, Absolute 0.10 (H) 0.00 - 0.05 10*3/mm3    nRBC 0.2 0.0 - 0.2 /100 WBC   Prepare RBC, 2 Units    Collection Time: 09/11/20  7:46 PM   Result Value Ref Range    Product Code X6046R79     Unit Number [id number]     UNIT  ABO A     UNIT  RH POS     Crossmatch Interpretation Compatible     Dispense Status XM     Blood Expiration Date 202010092359     Blood Type Barcode 6200     Product Code J1564O79     Unit Number [id number]     UNIT  ABO A     UNIT  RH POS     Crossmatch Interpretation Compatible     Dispense Status IS     Blood Expiration Date 202010092359     Blood Type Barcode 6200        Ordered the above labs and independently reviewed the results.        RADIOLOGY  No Radiology Exams Resulted Within Past 24 Hours    I ordered the above noted radiological studies. Reviewed by me and discussed with radiologist.  See dictation for official radiology interpretation.      PROCEDURES    Procedures      MEDICATIONS GIVEN IN ER    Medications   pantoprazole (PROTONIX) injection 80 mg (80 mg Intravenous Given 9/11/20 1759)     And   pantoprazole (PROTONIX) 40 mg in 100mL NS IVPB (8 mg/hr Intravenous New Bag 9/11/20 1801)   sodium chloride 0.9 % flush 10 mL (has no administration in time range)   sodium chloride 0.9 % flush 10 mL (has no administration in time range)   acetaminophen (TYLENOL) tablet 650 mg (has no administration in time range)     Or   acetaminophen (TYLENOL) 160 MG/5ML solution 650 mg (has no administration in time range)     Or   acetaminophen (TYLENOL) suppository 650 mg (has no administration in time range)   nitroglycerin (NITROSTAT) SL tablet 0.4 mg (has no administration in time range)   sodium chloride 0.9 % infusion (has no administration in time range)   ondansetron (ZOFRAN) injection 4 mg (has no administration in time  range)   iopamidol (ISOVUE-300) 61 % injection 100 mL (85 mL Intravenous Given by Other 9/11/20 2104)         PROGRESS, DATA ANALYSIS, CONSULTS, AND MEDICAL DECISION MAKING    Any/all labs have been independently reviewed by me.  Any/all radiology studies have been reviewed by me and discussed with radiologist dictating the report.   EKG's independently viewed and interpreted by me.  Discussion below represents my analysis of pertinent findings related to patient's condition, differential diagnosis, treatment plan and final disposition.      ED Course as of Sep 11 2138   Fri Sep 11, 2020   1731 Prior record review: The patient was admitted here from 8/3/2020 through 8/24/2020 secondary to spontaneous intracerebral hemorrhage and coagulopathy.  This was complicated by an acute arterial occlusion of the right lower extremity necessitating open right femoral thromboembolectomy.    [WC]   1827 Elevated   BUN(!): 56 [WC]   1828 Hemoglobin(!!): 5.8 [WC]   1828 Critically low   Hematocrit(!!): 17.9 [WC]   1900 Case discussed with KAREN Haskins (on-call for [facility])-she accepts the patient in behalf of Dr. Pimentel.    [WC]   1901 INR(!!): 8.77 [WC]   1932 Case discussed with Dr. Donovan (gastroenterology)-he agrees to see the patient in consultation.    [WC]      ED Course User Index  [WC] Cuco Sullivan MD       AS OF 21:38 VITALS:    BP - 137/52  HR - 71  TEMP - 98.3 °F (36.8 °C)  02 SATS - 100%        DIAGNOSIS  Final diagnoses:   Acute upper gastrointestinal bleeding   Acute blood loss anemia   Warfarin-induced coagulopathy (CMS/HCC)         DISPOSITION  Admitted-telemetry           Cuco Sullivan MD  09/11/20 1901       Cuco Sullivan MD  09/11/20 1901       Cuco Sullivan MD  09/11/20 1932       Cuco Sullivan MD  09/11/20 2138

## 2020-09-12 NOTE — CONSULTS
[facility]  Initial Inpatient Consult Note    Referring Provider:     Reason for Consultation: Melena    Subjective     History of present illness:    87 y.o. female with no significant GI past medical history, colonoscopy and EGD likely according to family greater than 15 years ago, results unknown, admitted with melena and anemia.  No bright red blood per rectum.  No hematemesis.  No abdominal pain.  No nausea, vomiting, dysphagia or odynophagia.  INR was supratherapeutic has received kcentra.  INR pending this morning.  Hemodynamically stable    Past Medical History:  Past Medical History:   Diagnosis Date   • Anemia    • Aortic valve stenosis     S/p AVR in 09'   • Asthma    • Atelectasis    • CHF (congestive heart failure) (CMS/[facility])    • Congenital heart disease    • Diabetes mellitus (CMS/HCC)    • Esophageal reflux    • Essential hypertension    • HLD (hyperlipidemia)    • Hypotension    • LVH (left ventricular hypertrophy)    • Pleural effusion    • Pulmonary hypertension (CMS/HCC)      Past Surgical History:  Past Surgical History:   Procedure Laterality Date   • AORTIC VALVE REPAIR/REPLACEMENT  11/14/2009    Jerry Colmenares MD   • CATARACT EXTRACTION     • CEREBRAL ANGIOGRAM N/A 8/4/2020    Procedure: CEREBRAL ANGIOGRAM;  Surgeon: Ar Bunch MD;  Location: [facility] OR 18/19;  Service: Neurosurgery;  Laterality: N/A;   • FEMORAL THROMBECTOMY/EMBOLECTOMY Right 8/12/2020    Procedure: FEMORAL THROMBECTOMY/EMBOLECTOMY;  Surgeon: Winsome Valentin Jr., MD;  Location: [facility];  Service: Vascular;  Laterality: Right;   • KNEE ARTHROPLASTY        Social History:   Social History     Tobacco Use   • Smoking status: Never Smoker   • Smokeless tobacco: Never Used   Substance Use Topics   • Alcohol use: Yes     Comment: ocasional      Family History:  Family History   Problem Relation Age of Onset   • Dementia Sister    • Stroke Brother        Home  "Meds:  Medications Prior to Admission   Medication Sig Dispense Refill Last Dose   • amLODIPine (NORVASC) 5 MG tablet Take 10 mg by mouth Daily.      • bisacodyl (DULCOLAX) 10 MG suppository Insert 1 suppository into the rectum Daily As Needed for Constipation.      • fluticasone (FLONASE) 50 MCG/ACT nasal spray 1 spray into the nostril(s) as directed by provider Daily.      • isosorbide mononitrate (IMDUR) 60 MG 24 hr tablet Take 1 tablet by mouth Daily.      • levothyroxine (SYNTHROID, LEVOTHROID) 75 MCG tablet Take 1 tablet by mouth Every Morning.      • metoprolol tartrate (LOPRESSOR) 25 MG tablet Take 1 tablet by mouth Every 12 (Twelve) Hours.      • zinc oxide 20 % ointment Apply  topically to the appropriate area as directed 2 (Two) Times a Day As Needed.        Current Meds:   isosorbide mononitrate, 60 mg, Oral, Daily  levothyroxine, 75 mcg, Oral, QAM  metoprolol tartrate, 25 mg, Oral, Q12H  sodium chloride, 10 mL, Intravenous, Q12H      Allergies:  Allergies   Allergen Reactions   • Promethazine Other (See Comments)     Pt becomes \"out of it\" when on this medication  Pt becomes \"out of it\" when on this medication     Review of Systems  She has weakness fatigue all other systems reviewed and negative     Objective     Vital Signs  Temp:  [97.5 °F (36.4 °C)-98.6 °F (37 °C)] 98 °F (36.7 °C)  Heart Rate:  [51-79] 51  Resp:  [14-18] 16  BP: ()/(40-70) 106/70  Physical Exam:  General Appearance:    Alert, cooperative, in no acute distress   Head:    Normocephalic, without obvious abnormality, atraumatic   Eyes:          conjunctivae and sclerae normal, no   icterus   Throat:   no thrush, oral mucosa moist   Neck:   Supple, no adenopathy   Lungs:     Clear to auscultation bilaterally    Heart:    Regular rhythm and normal rate    Chest Wall:    No abnormalities observed   Abdomen:     Soft, nondistended, nontender; normal bowel sounds   Extremities:   no edema, no redness   Skin:   No bruising or rash "   Psychiatric:  normal mood and insight     Results Review:   I reviewed the patient's new clinical results.    Results from last 7 days   Lab Units 09/12/20  0615 09/12/20  0130 09/11/20  1740   WBC 10*3/mm3 8.52  --  9.17   HEMOGLOBIN g/dL 9.0* 8.6* 5.8*   HEMATOCRIT % 27.7* 25.9* 17.9*   PLATELETS 10*3/mm3 123*  --  132*     Results from last 7 days   Lab Units 09/12/20  0615 09/11/20  1740   SODIUM mmol/L 136 138   POTASSIUM mmol/L 4.1 3.6   CHLORIDE mmol/L 106 104   CO2 mmol/L 21.9* 25.2   BUN mg/dL 46* 56*   CREATININE mg/dL 0.93 1.07*   CALCIUM mg/dL 8.2* 8.4*   BILIRUBIN mg/dL  --  0.3   ALK PHOS U/L  --  38*   ALT (SGPT) U/L  --  7   AST (SGOT) U/L  --  8   GLUCOSE mg/dL 101* 115*     Results from last 7 days   Lab Units 09/11/20  1740   INR  8.77*     No results found for: LIPASE    Radiology:  CT Abdomen Pelvis With Contrast   Final Result          Assessment/Plan   Patient Active Problem List   Diagnosis   • Type 2 diabetes mellitus (CMS/HCC)   • Aortic valve replaced, equine valve   • Cerebral infarction (CMS/HCC)   • A-fib (CMS/HCC)   • GERD without esophagitis   • Sleep apnea in adult   • CHF (congestive heart failure) (CMS/HCC)   • Cognitive dysfunction   • Hypothyroidism (acquired)   • History of recurrent UTIs   • Mild reactive airways disease   • Essential hypertension   • Pulmonary hypertension (CMS/HCC)   • B12 deficiency   • Non-rheumatic mitral regurgitation   • Non-rheumatic tricuspid valve insufficiency   • Chronic anemia   • Grade III hemorrhoids   • ICH (intracerebral hemorrhage) (CMS/HCC)   • 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)   • Acute upper gastrointestinal bleeding   • HLD (hyperlipidemia)   • AMADEO (acute kidney injury) (CMS/HCC)   • Acute blood loss anemia   • Supratherapeutic INR       Assessment:  1. Melena  2. Anemia  3. supraTherapeutic INR    Plan:  · Awaiting INR this morning if it is less than 2.0 plan for EGD  · Follow  hemoglobin  · Transfuse to keep hemoglobin greater than 7.0  · IV Protonix drip ordered by primary team  · Keep n.p.o.      I discussed the patients findings and my recommendations with nursing staff and family and patient.    Romeo Calvillo MD

## 2020-09-12 NOTE — CONSULTS
"Adult Nutrition  Assessment/PES    Patient Name:  Jihan Teresa  YOB: 1933  MRN: [id number]  Admit Date:  9/11/2020    Assessment Date:  9/12/2020    Comments:  Nutrition consult d/t MST score of 3 per nurse admission screen.  Admitted with anemia, GI bleed.  GI evaluating.  Sent from NH.      Patient currently NPO.  Possible weight loss noted per chart weight history.    RD to follow up to interview once diet has been advanced.    RD working remotely due to covid-19 pandemic. Assessment completed based on review of electronic medical record. RD may be reached via secure chat or email.     Reason for Assessment     Row Name 09/12/20 1002          Reason for Assessment    Reason For Assessment  nurse/nurse practitioner consult;identified at risk by screening criteria     Diagnosis  hematological/related complications;cardiac disease;diabetes diagnosis/complications;endocrine conditions;neurologic conditions;pulmonary disease;gastrointestinal disease HTN, DM2, hypothyroid, Afib, CVA, anemia, AV stenosis, asthma, reflux, HLD, pulmonary HTN; adm with anemia, GI bleeding     Identified At Risk by Screening Criteria  MST SCORE 2+;reduced oral intake over the last month         Nutrition/Diet History     Row Name 09/12/20 1004          Nutrition/Diet History    Typical Food/Fluid Intake  currently NPO         Anthropometrics     Row Name 09/12/20 1007 09/12/20 1004       Anthropometrics    Height  154.9 cm (60.98\")  154.9 cm (60.98\")    Weight  --  -- 133# 9/11       Ideal Body Weight (IBW)    Ideal Body Weight (IBW) (kg)  48.11  48.11       Usual Body Weight (UBW)    Weight Loss Time Frame  --  possible weight loss per chart weight history       Body Mass Index (BMI)    BMI Assessment  --  BMI 25-29.9: overweight 25.06        Labs/Tests/Procedures/Meds     Row Name 09/12/20 1005          Labs/Procedures/Meds    Lab Results Reviewed  reviewed, pertinent     Lab Results Comments  Gluc, BUN, Ca, GFR, Hgb, " "Hct, Platelets        Diagnostic Tests/Procedures    Diagnostic Test/Procedure Reviewed  reviewed, pertinent        Medications    Pertinent Medications Reviewed  reviewed, pertinent     Pertinent Medications Comments  synthroid, protonix drip, IVFs         Physical Findings     Row Name 09/12/20 1006          Physical Findings    Overall Physical Appearance  on oxygen therapy;overweight     Skin  -- B=15, intact         Estimated/Assessed Needs     Row Name 09/12/20 1007 09/12/20 1004       Calculation Measurements    Weight Used For Calculations  60.3 kg (132 lb 15 oz)  --    Height  154.9 cm (60.98\")  154.9 cm (60.98\")       Estimated/Assessed Needs       KCAL/KG    KCAL/KG  20 Kcal/Kg (kcal);25 Kcal/Kg (kcal)  --    20 Kcal/Kg (kcal)  1206  --    25 Kcal/Kg (kcal)  1507.5  --       Protein Requirements    Weight Used For Protein Calculations  60.3 kg (132 lb 15 oz)  --    Est Protein Requirement Amount (gms/kg)  1.0 gm protein  --    Estimated Protein Requirements (gms/day)  60.3  --       Fluid Requirements    Fluid Requirements (mL/day)  1508  --        Nutrition Prescription Ordered     Row Name 09/12/20 1008          Nutrition Prescription PO    Current PO Diet  NPO         Problem/Interventions:  Problem 1     Row Name 09/12/20 1008          Nutrition Diagnoses Problem 1    Problem 1  Inadequate Intake/Infusion     Inadequate Intake Type  Oral     Macronutrient  Kcal;Protein     Etiology (related to)  Medical Diagnosis     Gastrointestinal  Gastrointestinal bleed     Signs/Symptoms (evidenced by)  NPO;Report/Observation         Intervention Goal     Row Name 09/12/20 1009          Intervention Goal    PO  Initiate feeding;Establish PO;Tolerate PO;PO intake (%)     PO Intake %  75 %     Weight  No significant weight loss         Nutrition Intervention     Row Name 09/12/20 1009          Nutrition Intervention    RD/Tech Action  Follow Tx progress;Care plan reviewd;Await begin PO         Education/Evaluation  "    Row Name 09/12/20 1009          Education    Education  Will Instruct as appropriate        Monitor/Evaluation    Monitor  Per protocol;I&O;Pertinent labs;Weight;Symptoms           Electronically signed by:  Rocio Amin RD  09/12/20 10:11 EDT

## 2020-09-12 NOTE — PROGRESS NOTES
"DAILY PROGRESS NOTE  [facility]    Patient Identification:  Name: Jihan Teresa  Age: 87 y.o.  Sex: female  :  1933  MRN: [id number]         Primary Care Physician: Yaron Ca Jr., MD    Subjective:  Interval History:She feels better.    Objective:    Scheduled Meds:isosorbide mononitrate, 60 mg, Oral, Daily  levothyroxine, 75 mcg, Oral, QAM  metoprolol tartrate, 25 mg, Oral, Q12H  sodium chloride, 10 mL, Intravenous, Q12H      Continuous Infusions:pantoprazole, 8 mg/hr, Last Rate: 8 mg/hr (20 0821)  sodium chloride, 100 mL/hr, Last Rate: 100 mL/hr (20 0400)        Vital signs in last 24 hours:  Temp:  [97.5 °F (36.4 °C)-98.6 °F (37 °C)] 98 °F (36.7 °C)  Heart Rate:  [51-79] 51  Resp:  [14-18] 16  BP: ()/(40-70) 106/70    Intake/Output:    Intake/Output Summary (Last 24 hours) at 2020 1202  Last data filed at 2020 0700  Gross per 24 hour   Intake 910 ml   Output --   Net 910 ml       Exam:  /70 (BP Location: Right arm, Patient Position: Lying)   Pulse 51   Temp 98 °F (36.7 °C) (Oral)   Resp 16   Ht 154.9 cm (60.98\")   Wt 60.3 kg (133 lb)   SpO2 92%   BMI 25.14 kg/m²     General Appearance:    Alert, cooperative, no distress   Head:    Normocephalic, without obvious abnormality, atraumatic   Eyes:       Throat:   Lips, tongue, gums normal   Neck:   Supple, symmetrical, trachea midline, no JVD   Lungs:     Clear to auscultation bilaterally, respirations unlabored   Chest Wall:    No tenderness or deformity    Heart:    Regular rate and rhythm, S1 and S2 normal, no murmur,no  Rub or gallop   Abdomen:     Soft, nontender, bowel sounds active, no masses, no organomegaly    Extremities:   Extremities normal, atraumatic, no cyanosis or edema   Pulses:      Skin:   Skin is warm and dry,  no rashes or palpable lesions   Neurologic:   no focal deficits noted      Lab Results (last 72 hours)     Procedure Component Value Units Date/Time    Protime-INR " [[id number]]  (Abnormal) Collected: 09/12/20 0829    Specimen: Blood from Hand, Right Updated: 09/12/20 0915     Protime 24.3 Seconds      INR 2.26    Hemoglobin & Hematocrit, Blood [[id number]]  (Abnormal) Collected: 09/12/20 0829    Specimen: Blood Updated: 09/12/20 0903     Hemoglobin 8.2 g/dL      Hematocrit 25.8 %     Basic Metabolic Panel [[id number]]  (Abnormal) Collected: 09/12/20 0615    Specimen: Blood Updated: 09/12/20 0714     Glucose 101 mg/dL      BUN 46 mg/dL      Creatinine 0.93 mg/dL      Sodium 136 mmol/L      Potassium 4.1 mmol/L      Chloride 106 mmol/L      CO2 21.9 mmol/L      Calcium 8.2 mg/dL      eGFR Non African Amer 57 mL/min/1.73      BUN/Creatinine Ratio 49.5     Anion Gap 8.1 mmol/L     Narrative:      GFR Normal >60  Chronic Kidney Disease <60  Kidney Failure <15      CBC (No Diff) [[id number]]  (Abnormal) Collected: 09/12/20 0615    Specimen: Blood Updated: 09/12/20 0706     WBC 8.52 10*3/mm3      RBC 3.22 10*6/mm3      Hemoglobin 9.0 g/dL      Hematocrit 27.7 %      MCV 86.0 fL      MCH 28.0 pg      MCHC 32.5 g/dL      RDW 17.1 %      RDW-SD 52.9 fl      MPV 10.5 fL      Platelets 123 10*3/mm3     Hemoglobin & Hematocrit, Blood [[id number]]  (Abnormal) Collected: 09/12/20 0130    Specimen: Blood Updated: 09/12/20 0658     Hemoglobin 8.6 g/dL      Hematocrit 25.9 %     COVID PRE-OP / PRE-PROCEDURE SCREENING ORDER (NO ISOLATION) - Swab, Nasopharynx [[id number]] Collected: 09/1934    Specimen: Swab from Nasopharynx Updated: 09/11/20 1944    Narrative:      The following orders were created for panel order COVID PRE-OP / PRE-PROCEDURE SCREENING ORDER (NO ISOLATION) - Swab, Nasopharynx.  Procedure                               Abnormality         Status                     ---------                               -----------         ------                     COVID-19,BIOTAP, NP/OP S...[[id number]]                      In process                   Please view results for these tests on the  individual orders.    COVID-19,BIOTAP, NP/OP SWAB IN TRANSPORT MEDIA OR SALINE 24-36 HR TAT - Swab, Nasopharynx [[id number]] Collected: 09/1934    Specimen: Swab from Nasopharynx Updated: 09/11/20 1944    Protime-INR [[id number]]  (Abnormal) Collected: 09/11/20 1740    Specimen: Blood Updated: 09/11/20 1843     Protime 67.8 Seconds      INR 8.77    aPTT [[id number]]  (Abnormal) Collected: 09/11/20 1740    Specimen: Blood Updated: 09/11/20 1843     PTT 48.9 seconds     Comprehensive Metabolic Panel [[id number]]  (Abnormal) Collected: 09/11/20 1740    Specimen: Blood Updated: 09/11/20 1818     Glucose 115 mg/dL      BUN 56 mg/dL      Creatinine 1.07 mg/dL      Sodium 138 mmol/L      Potassium 3.6 mmol/L      Chloride 104 mmol/L      CO2 25.2 mmol/L      Calcium 8.4 mg/dL      Total Protein 5.0 g/dL      Albumin 3.20 g/dL      ALT (SGPT) 7 U/L      AST (SGOT) 8 U/L      Alkaline Phosphatase 38 U/L      Total Bilirubin 0.3 mg/dL      eGFR Non African Amer 49 mL/min/1.73      Globulin 1.8 gm/dL      A/G Ratio 1.8 g/dL      BUN/Creatinine Ratio 52.3     Anion Gap 8.8 mmol/L     Narrative:      GFR Normal >60  Chronic Kidney Disease <60  Kidney Failure <15      CBC & Differential [[id number]]  (Abnormal) Collected: 09/11/20 1740    Specimen: Blood Updated: 09/11/20 1806    Narrative:      The following orders were created for panel order CBC & Differential.  Procedure                               Abnormality         Status                     ---------                               -----------         ------                     CBC Auto Differential[[id number]]        Abnormal            Final result                 Please view results for these tests on the individual orders.    CBC Auto Differential [[id number]]  (Abnormal) Collected: 09/11/20 1740    Specimen: Blood Updated: 09/11/20 1806     WBC 9.17 10*3/mm3      RBC 2.04 10*6/mm3      Hemoglobin 5.8 g/dL      Hematocrit 17.9 %      MCV 87.7 fL      MCH 28.4 pg       MCHC 32.4 g/dL      RDW 17.3 %      RDW-SD 54.7 fl      MPV 10.1 fL      Platelets 132 10*3/mm3      Neutrophil % 68.1 %      Lymphocyte % 23.3 %      Monocyte % 6.1 %      Eosinophil % 0.7 %      Basophil % 0.7 %      Immature Grans % 1.1 %      Neutrophils, Absolute 6.25 10*3/mm3      Lymphocytes, Absolute 2.14 10*3/mm3      Monocytes, Absolute 0.56 10*3/mm3      Eosinophils, Absolute 0.06 10*3/mm3      Basophils, Absolute 0.06 10*3/mm3      Immature Grans, Absolute 0.10 10*3/mm3      nRBC 0.2 /100 WBC         Data Review:  Results from last 7 days   Lab Units 09/12/20  0615 09/11/20  1740   SODIUM mmol/L 136 138   POTASSIUM mmol/L 4.1 3.6   CHLORIDE mmol/L 106 104   CO2 mmol/L 21.9* 25.2   BUN mg/dL 46* 56*   CREATININE mg/dL 0.93 1.07*   GLUCOSE mg/dL 101* 115*   CALCIUM mg/dL 8.2* 8.4*     Results from last 7 days   Lab Units 09/12/20  0829 09/12/20  0615 09/12/20  0130 09/11/20  1740   WBC 10*3/mm3  --  8.52  --  9.17   HEMOGLOBIN g/dL 8.2* 9.0* 8.6* 5.8*   HEMATOCRIT % 25.8* 27.7* 25.9* 17.9*   PLATELETS 10*3/mm3  --  123*  --  132*             Lab Results   Lab Value Date/Time    TROPONINT <0.010 08/04/2020 0934    TROPONINT <0.010 08/03/2020 0804    TROPONINT <0.010 11/25/2016 1836    TROPONINT <0.01 01/06/2016 0235         Results from last 7 days   Lab Units 09/11/20  1740   ALK PHOS U/L 38*   BILIRUBIN mg/dL 0.3   ALT (SGPT) U/L 7   AST (SGOT) U/L 8             No results found for: POCGLU  Results from last 7 days   Lab Units 09/12/20  0829 09/11/20  1740   INR  2.26* 8.77*       Past Medical History:   Diagnosis Date   • Anemia    • Aortic valve stenosis     S/p AVR in 09'   • Asthma    • Atelectasis    • CHF (congestive heart failure) (CMS/HCC)    • Congenital heart disease    • Diabetes mellitus (CMS/HCC)    • Esophageal reflux    • Essential hypertension    • HLD (hyperlipidemia)    • Hypotension    • LVH (left ventricular hypertrophy)    • Pleural effusion    • Pulmonary hypertension (CMS/HCC)             Assessment:  Active Hospital Problems    Diagnosis  POA   • **Acute blood loss anemia [D62]  Yes   • Acute upper gastrointestinal bleeding [K92.2]  Yes   • HLD (hyperlipidemia) [E78.5]  Yes   • AMADEO (acute kidney injury) (CMS/[facility]) [N17.9]  Yes   • Supratherapeutic INR [R79.1]  Yes   • Pulmonary hypertension (CMS/[facility]) [I27.20]  Yes   • Type 2 diabetes mellitus (CMS/[facility]) [E11.9]  Yes   • Hypothyroidism (acquired) [E03.9]  Yes   • Essential hypertension [I10]  Yes   • A-fib (CMS/[facility]) [I48.91]  Yes   • CHF (congestive heart failure) (CMS/[facility]) [I50.9]  Yes      Resolved Hospital Problems   No resolved problems to display.       Plan:  Continue with protonix drip. Follow lab. Transfuse as needed. GI consult noted.     Bandra Yung MD  9/12/2020  12:02 EDT

## 2020-09-12 NOTE — PLAN OF CARE
Goal Outcome Evaluation:  Plan of Care Reviewed With: patient, daughter     VSS, no c/o pain, 2 units of rbc given, protonix gtt and fluids currently infusing, pt rested well during shift. Labs in am. Will continue to monitor.

## 2020-09-12 NOTE — PLAN OF CARE
Problem: Skin Injury Risk (Adult)  Goal: Identify Related Risk Factors and Signs and Symptoms  Outcome: Ongoing, Progressing     Problem: Skin Injury Risk (Adult)  Goal: Skin Health and Integrity  Outcome: Ongoing, Progressing     Problem: Fall Risk (Adult)  Goal: Identify Related Risk Factors and Signs and Symptoms  Outcome: Ongoing, Progressing     Problem: Fall Risk (Adult)  Goal: Absence of Fall  Outcome: Ongoing, Progressing     Problem: Patient Care Overview  Goal: Plan of Care Review  Outcome: Ongoing, Progressing  Flowsheets (Taken 9/12/2020 1628)  Progress: improving  Plan of Care Reviewed With: patient  Outcome Summary: VSS, denies pain, protonix gtt continued, INR 2.26, Hbg 8.2, EGD tomorrow per MD, no BM this shift,NPO after midnight, consent in chart, will continue to monitor pt.     Problem: Skin Injury Risk Increased  Goal: Skin Health and Integrity  Intervention: Optimize Skin Protection  Recent Flowsheet Documentation  Taken 9/12/2020 1416 by Vannessa Bentley RN  Pressure Reduction Techniques:   frequent weight shift encouraged   heels elevated off bed   pressure points protected   weight shift assistance provided  Head of Bed (HOB): HOB at 30-45 degrees  Pressure Reduction Devices: alternating pressure pump (ADD)  Skin Protection:   adhesive use limited   incontinence pads utilized  Taken 9/12/2020 1230 by Vannessa Bentley RN  Head of Bed (HOB): HOB at 30-45 degrees  Taken 9/12/2020 1000 by Vannessa Bentley RN  Head of Bed (HOB): HOB at 20-30 degrees  Taken 9/12/2020 0820 by Vannessa Bentley RN  Pressure Reduction Techniques: (pt. turns herself.)   frequent weight shift encouraged   heels elevated off bed   pressure points protected   weight shift assistance provided  Head of Bed (HOB): HOB at 20-30 degrees  Pressure Reduction Devices: alternating pressure pump (ADD)  Skin Protection:   adhesive use limited   incontinence pads utilized     Problem: Fall Injury Risk  Goal: Absence of Fall and Fall-Related  Injury  Outcome: Ongoing, Progressing     Problem: Fall Injury Risk  Goal: Absence of Fall and Fall-Related Injury  Intervention: Identify and Manage Contributors to Fall Injury Risk  Recent Flowsheet Documentation  Taken 9/12/2020 0820 by Vannessa Bentley RN  Medication Review/Management: medications reviewed  Self-Care Promotion:   BADL personal routines maintained   independence encouraged   BADL personal objects within reach     Problem: Fall Injury Risk  Goal: Absence of Fall and Fall-Related Injury  Intervention: Promote Injury-Free Environment  Recent Flowsheet Documentation  Taken 9/12/2020 1600 by Vannessa Bentley RN  Safety Promotion/Fall Prevention:   activity supervised   assistive device/personal items within reach   clutter free environment maintained   fall prevention program maintained   room organization consistent   safety round/check completed   nonskid shoes/slippers when out of bed  Taken 9/12/2020 1416 by Vannessa Bentley RN  Safety Promotion/Fall Prevention:   activity supervised   assistive device/personal items within reach   clutter free environment maintained   fall prevention program maintained   room organization consistent   safety round/check completed   nonskid shoes/slippers when out of bed  Taken 9/12/2020 1230 by Vannessa Bentley RN  Safety Promotion/Fall Prevention:   activity supervised   assistive device/personal items within reach   clutter free environment maintained   fall prevention program maintained   room organization consistent   safety round/check completed  Taken 9/12/2020 1000 by Vannessa Bentley RN  Safety Promotion/Fall Prevention:   activity supervised   assistive device/personal items within reach   clutter free environment maintained   fall prevention program maintained   room organization consistent   safety round/check completed  Taken 9/12/2020 0820 by Vannessa Bentley RN  Safety Promotion/Fall Prevention:   activity supervised   assistive device/personal items within reach    clutter free environment maintained   fall prevention program maintained   safety round/check completed   Goal Outcome Evaluation:  Plan of Care Reviewed With: patient  Progress: improving  Outcome Summary: VSS, denies pain, protonix gtt continued, INR 2.26, Hbg 8.2, EGD tomorrow per MD, no BM this shift,NPO after midnight, consent in chart, will continue to monitor pt.

## 2020-09-13 PROBLEM — K92.1 MELENA: Status: ACTIVE | Noted: 2020-01-01

## 2020-09-13 PROBLEM — D50.0 BLOOD LOSS ANEMIA: Status: ACTIVE | Noted: 2020-01-01

## 2020-09-13 NOTE — PROGRESS NOTES
Name: Jihan Teresa ADMIT: 2020   : 1933  PCP: Yaron Ca Jr., MD    MRN: [id number] LOS: 2 days   AGE/SEX: 87 y.o. female  ROOM: [facility]     Subjective   Subjective   Patient is slightly agitated.  Seems hard of hearing per family which is fairly new.  No witnessed bleeding    Review of Systems     Objective   Objective   Vital Signs  Temp:  [97.7 °F (36.5 °C)-97.9 °F (36.6 °C)] 97.9 °F (36.6 °C)  Heart Rate:  [52-65] 61  Resp:  [14-16] 16  BP: (121-141)/(58-69) 125/58  SpO2:  [92 %-98 %] 98 %  on  Flow (L/min):  [2] 2;   Device (Oxygen Therapy): room air  Body mass index is 25.14 kg/m².  Physical Exam  Vitals signs reviewed.   Constitutional:       General: She is not in acute distress.  HENT:      Head: Normocephalic and atraumatic.      Mouth/Throat:      Pharynx: No oropharyngeal exudate.   Eyes:      General: No scleral icterus.  Neck:      Musculoskeletal: Neck supple.      Vascular: No JVD.   Cardiovascular:      Rate and Rhythm: Normal rate. Rhythm irregularly irregular.      Heart sounds: Murmur (3-6) present.   Pulmonary:      Effort: Pulmonary effort is normal. No respiratory distress.      Breath sounds: Normal breath sounds.   Abdominal:      General: Bowel sounds are normal. There is no distension.      Palpations: Abdomen is soft.      Tenderness: There is no abdominal tenderness.   Skin:     General: Skin is warm and dry.      Coloration: Skin is not pale.   Neurological:      Mental Status: She is alert.   Psychiatric:      Comments: Mildly agitated.  Pulling at [facility]         Results Review:       I reviewed the patient's new clinical results.  Results from last 7 days   Lab Units 20  0803 20  0047 20  1534 20  0829 20  0615  20  1740   WBC 10*3/mm3 4.44 5.09  --   --  8.52  --  9.17   HEMOGLOBIN g/dL 7.3* 7.1* 8.2* 8.2* 9.0*   < > 5.8*   PLATELETS 10*3/mm3 104* 109*  --   --  123*  --  132*    < > = values in this interval not displayed.          Results from last 7 days   Lab Units 09/13/20  0047 09/12/20  0615 09/11/20  1740   SODIUM mmol/L 138 136 138   POTASSIUM mmol/L 3.5 4.1 3.6   CHLORIDE mmol/L 109* 106 104   CO2 mmol/L 21.8* 21.9* 25.2   BUN mg/dL 29* 46* 56*   CREATININE mg/dL 0.81 0.93 1.07*   GLUCOSE mg/dL 89 101* 115*   Estimated Creatinine Clearance: 40.8 mL/min (by C-G formula based on SCr of 0.81 mg/dL).  Results from last 7 days   Lab Units 09/13/20  0047 09/11/20  1740   ALBUMIN g/dL 3.00* 3.20*   BILIRUBIN mg/dL 0.5 0.3   ALK PHOS U/L 36* 38*   AST (SGOT) U/L 6 8   ALT (SGPT) U/L 6 7     Results from last 7 days   Lab Units 09/13/20 0047 09/12/20  0615 09/11/20  1740   CALCIUM mg/dL 8.3* 8.2* 8.4*   ALBUMIN g/dL 3.00*  --  3.20*       COVID19   Date Value Ref Range Status   08/03/2020 Not Detected Not Detected - Ref. Range Final     SARS-CoV-2 PCR   Date Value Ref Range Status   09/11/2020 Not Detected Not Detected Final     Comment:     Nucleic acid specific to SARS-CoV-2 (COVID-19) virus was not detected inthis sample by the TaqPath (TM) COVID-19 Combo Kit.SARS-CoV-2 (COVID-19) nucleic acid testing performed using [facility] Aptima (R) SARS-CoV-2 Assay or [facility] TaqPath   (TM)COVID-19 Combo Kit as indicated above under Emergency Use Authorization (EUA) from the FDA. Aptima (R) and TaqPath (TM) test performancecharacteristics verified by [facility] in accordance with the FDAs Guidance Document (Policy for Diagnostic   Tests for Coronavirus Disease-2019during the Public Health Emergency) issued on March 16, 2020. The laboratory is regulated under CLIA as qualified to perform high-complexity testingUnless otherwise noted, all testing was performed at [facility],   CLIA No. [id number], [place] State Licensee No. [id number]     No results found for: HGBA1C, POCGLU    XR Chest Post CVA Port  ONE VIEW PORTABLE CHEST AT 10:47 PM     HISTORY: Repositioning of PICC line     FINDINGS: The left-sided PICC line has been repositioned  and now ends in  the SVC and does not extend cephalad. The chest is otherwise unchanged  from the study performed approximately 2 hours ago. The heart remains  enlarged with mild vascular congestion.     This report was finalized on 9/12/2020 11:01 PM by Dr. Dionte Lockhart M.D.     XR Chest 1 View  ONE VIEW PORTABLE CHEST AT 8:35 PM     HISTORY: PICC line placement     FINDINGS: A left-sided PICC line crosses the midline and extends  cephalad into the SVC and extends to the level of the junction of the  SVC and right subclavian vein. It should be pulled back approximately 4  cm.     There is persistent cardiomegaly with some minimal vascular congestion  that is similar to the study of 08/12/2020.     This report was finalized on 9/12/2020 9:05 PM by Dr. Dionte Lockhart M.D.         isosorbide mononitrate, 60 mg, Oral, Daily  levothyroxine, 75 mcg, Oral, QAM  metoprolol tartrate, 25 mg, Oral, Q12H  sodium chloride, 10 mL, Intravenous, Q12H  sodium chloride, 10 mL, Intravenous, Q12H  sodium chloride, 10 mL, Intravenous, Q12H      pantoprazole, 8 mg/hr, Last Rate: 8 mg/hr (09/13/20 1515)  sodium chloride, 100 mL/hr, Last Rate: 100 mL/hr (09/13/20 0708)    Diet Regular; GI Soft  NPO Diet       Assessment/Plan     Active Hospital Problems    Diagnosis  POA   • **Acute blood loss anemia [D62]  Yes   • Acute upper gastrointestinal bleeding [K92.2]  Yes   • HLD (hyperlipidemia) [E78.5]  Yes   • AMADEO (acute kidney injury) (CMS/HCC) [N17.9]  Yes   • Supratherapeutic INR [R79.1]  Yes   • Pulmonary hypertension (CMS/HCC) [I27.20]  Yes   • Type 2 diabetes mellitus (CMS/HCC) [E11.9]  Yes   • Hypothyroidism (acquired) [E03.9]  Yes   • Essential hypertension [I10]  Yes   • A-fib (CMS/HCC) [I48.91]  Yes   • CHF (congestive heart failure) (CMS/HCC) [I50.9]  Yes      Resolved Hospital Problems   No resolved problems to display.       · Acute blood loss anemia due to GI bleed  · EGD when okay with GI  · Hemoglobin has dropped but  stabilized overnight.  Continue to monitor serially and transfuse if less than 7  · Continue Protonix drip  · Discussed with Dr. Velez  · Coumadin toxicity- subtherapeutic now  · AMADEO, mild, improving  · Chronic A. fib-Coumadin on hold obviously for reasons above  · History of bioprosthetic aortic valve replacement  · Mild thrombocytopenia-monitor  · Confusion- suspect this is related to all of her recent hospitalizations.  Probably some component of metabolic encephalopathy.  Monitor for now, limit sedating meds.  Discussed all this with the daughter at bedside who agrees  · Recent hospitalization with right femoral thromboembolectomy and previously with subarachnoid hemorrhage.  · Obviously complicated issue but needs her anticoagulant back whenever deemed safe by GI given recent thrombus.      Guido Khan MD  [facility]  09/13/20  15:52 EDT    I wore protective equipment throughout this patient encounter including a face mask, gloves and protective eyewear.  Hand hygiene was performed before donning protective equipment and after removal when leaving the room.

## 2020-09-13 NOTE — NURSING NOTE
Paged GI office after 00:47 hgb resulted at 7.1 to clarify order for infuse 2U PRBC if hgb <7.0.  S/W Dr. Lau: explained hgb, plan for egd in AM, PICC line placed, IVF stopped for awhile awaiting status, etc.   Dr. Lau confirmed to NOT infuse the 2U unless the hgb drops below 7.0.   Will continue to monitor.

## 2020-09-13 NOTE — PLAN OF CARE
Goal Outcome Evaluation:  Plan of Care Reviewed With: patient, daughter  Progress: no change  Outcome Summary: VSS, PICC placed and IVF/protonix gtt restarted, hgb trending downward again now at 7.1, confirmed w/GI MD on call to only transfuse once pt is <7.0, plan is for pt to have EGD in am. Will continue to monitor.

## 2020-09-13 NOTE — CONSULTS
PICC line retracted 4 cm per recommendation, repeat Xray shows PICC terminates in SVC. PICC is good to use.

## 2020-09-13 NOTE — SIGNIFICANT NOTE
09/12/20 2020   PICC Double Lumen 09/12/20 Left Basilic   Placement Date/Time: 09/12/20 2020   Hand Hygiene Completed: Yes  Size (Fr): 5  Description (optional): dual lumen picc  Length (cm): 42 cm  Orientation: Left  Location: Basilic  Site Prep: Chlorhexidine  All 5 Sterile Barriers Used (Gloves, Gown, Cap...   Site Assessment Clean;Dry;Intact   #1 Lumen Status No blood return;Capped;Flushed;Normal saline locked   #2 Lumen Status Capped;Flushed;No blood return;Normal saline locked   Length christian (cm) 0 cm  (TRIMMED 42CM)   Extremity Circumference (cm) 28 cm   Dressing Type Border Dressing;Securing device;Antimicrobial dressing/disc   Dressing Status Clean;Dry;Intact   Dressing Intervention New dressing   Liquid Adhesive Contraindicated (comment)   Dressing Change Due 09/19/20   LOT# [id number] EXPIRES 2021-04-30    Iv team consulted to place a picc line due to unable to get access. Evaluation completed and placed a dual lumen picc. Pt has a hx of afib so unable to get sherlock tip confirmation, however picc can be seen on sherlock dropping down before going back up via sherlock. Adjusted pt HOB to 90 degrees and tried several flushes attempting to get the picc to continue to to  drop down without success. Left picc as is for chest xray evaluation for recommendations from the radiologist for better placement.

## 2020-09-13 NOTE — PLAN OF CARE
Problem: Skin Injury Risk (Adult)  Goal: Identify Related Risk Factors and Signs and Symptoms  Outcome: Ongoing, Progressing     Problem: Skin Injury Risk (Adult)  Goal: Skin Health and Integrity  Outcome: Ongoing, Progressing     Problem: Fall Risk (Adult)  Goal: Identify Related Risk Factors and Signs and Symptoms  Outcome: Ongoing, Progressing     Problem: Fall Risk (Adult)  Goal: Absence of Fall  Outcome: Ongoing, Progressing   Goal Outcome Evaluation:  Plan of Care Reviewed With: patient  Progress: improving  Outcome Summary: VSS, c/o back pain, tylenol per order, hbg 7.3, EGD tomorrow, NPO after midnight, will continue to monitor pt.

## 2020-09-13 NOTE — PROGRESS NOTES
[facility]  Inpatient Progress Note    Reason for Follow Up: Melena, anemia    Subjective     Interval History:   Hemoglobin is low but stable.  INR is now less than 2.  No overt bleeding.  Patient's daughter is reporting that the patient is wanting to eat.  The patient is quite confused and cannot give me much of a history.    Current Facility-Administered Medications:   •  acetaminophen (TYLENOL) tablet 650 mg, 650 mg, Oral, Q4H PRN, 650 mg at 09/13/20 1033 **OR** acetaminophen (TYLENOL) 160 MG/5ML solution 650 mg, 650 mg, Oral, Q4H PRN **OR** acetaminophen (TYLENOL) suppository 650 mg, 650 mg, Rectal, Q4H PRN, Joy Kemp APRN  •  isosorbide mononitrate (IMDUR) 24 hr tablet 60 mg, 60 mg, Oral, Daily, Joy Kemp APRN, 60 mg at 09/13/20 0802  •  levothyroxine (SYNTHROID, LEVOTHROID) tablet 75 mcg, 75 mcg, Oral, QAM, Joy Kemp APRN  •  metoprolol tartrate (LOPRESSOR) tablet 25 mg, 25 mg, Oral, Q12H, Joy Kemp APRN, 25 mg at 09/13/20 0802  •  nitroglycerin (NITROSTAT) SL tablet 0.4 mg, 0.4 mg, Sublingual, Q5 Min PRN, Joy Kemp APRN  •  ondansetron (ZOFRAN) injection 4 mg, 4 mg, Intravenous, Q6H PRN, Joy Kemp APRN  •  [COMPLETED] pantoprazole (PROTONIX) injection 80 mg, 80 mg, Intravenous, Once, 80 mg at 09/11/20 1759 **AND** pantoprazole (PROTONIX) 40 mg in 100mL NS IVPB, 8 mg/hr, Intravenous, Continuous, Cuco Sullivan MD, Last Rate: 20 mL/hr at 09/13/20 1017, 8 mg/hr at 09/13/20 1017  •  sodium chloride 0.9 % flush 10 mL, 10 mL, Intravenous, Q12H, Joy Kemp APRN, 10 mL at 09/12/20 0821  •  sodium chloride 0.9 % flush 10 mL, 10 mL, Intravenous, PRN, Joy Kemp APRN  •  sodium chloride 0.9 % flush 10 mL, 10 mL, Intravenous, Q12H, Bandar Yung MD  •  sodium chloride 0.9 % flush 10 mL, 10 mL, Intravenous, Q12H, Bandar Yung MD, 10 mL at 09/13/20 0802  •  sodium chloride 0.9 % flush 10 mL, 10 mL, Intravenous, PRN, Beard, Bandar E,  MD  •  sodium chloride 0.9 % flush 20 mL, 20 mL, Intravenous, PRN, Bandar Yung MD  •  sodium chloride 0.9 % infusion, 100 mL/hr, Intravenous, Continuous, Joy Kemp APRN, Last Rate: 100 mL/hr at 09/13/20 0708, 100 mL/hr at 09/13/20 0708  Review of Systems:     Unable to obtain review of systems due to: Patient confusion, dementia        Objective     Vital Signs  Temp:  [97.7 °F (36.5 °C)-97.8 °F (36.6 °C)] 97.7 °F (36.5 °C)  Heart Rate:  [52-65] 65  Resp:  [14-16] 16  BP: (121-141)/(58-69) 141/69  Body mass index is 25.14 kg/m².    Intake/Output Summary (Last 24 hours) at 9/13/2020 1328  Last data filed at 9/13/2020 0900  Gross per 24 hour   Intake 1003 ml   Output 550 ml   Net 453 ml     I/O this shift:  In: 763 [I.V.:763]  Out: -      Physical Exam:   General: patient awake, confused, trying to pull off of her SCDs, difficult to redirect even  with her daughter, trying to get out of bed   Eyes: Normal lids and lashes, no scleral icterus   Neck: supple, normal ROM   Skin: warm and dry, not jaundiced   Cardiovascular: regular rhythm and rate, no murmurs auscultated   Pulm: clear to auscultation bilaterally, regular and unlabored   Abdomen: soft, nontender, nondistended; normal bowel sounds   Rectal: deferred   Extremities: no rash or edema   Psychiatric: Alert to self, confused, agitated,     Results Review:     I reviewed the patient's new clinical results.    Results from last 7 days   Lab Units 09/13/20  0803 09/13/20  0047 09/12/20  1534  09/12/20  0615   WBC 10*3/mm3 4.44 5.09  --   --  8.52   HEMOGLOBIN g/dL 7.3* 7.1* 8.2*   < > 9.0*   HEMATOCRIT % 23.4* 21.4* 24.5*   < > 27.7*   PLATELETS 10*3/mm3 104* 109*  --   --  123*    < > = values in this interval not displayed.     Results from last 7 days   Lab Units 09/13/20  0047 09/12/20  0615 09/11/20  1740   SODIUM mmol/L 138 136 138   POTASSIUM mmol/L 3.5 4.1 3.6   CHLORIDE mmol/L 109* 106 104   CO2 mmol/L 21.8* 21.9* 25.2   BUN mg/dL 29* 46* 56*    CREATININE mg/dL 0.81 0.93 1.07*   CALCIUM mg/dL 8.3* 8.2* 8.4*   BILIRUBIN mg/dL 0.5  --  0.3   ALK PHOS U/L 36*  --  38*   ALT (SGPT) U/L 6  --  7   AST (SGOT) U/L 6  --  8   GLUCOSE mg/dL 89 101* 115*     Results from last 7 days   Lab Units 09/13/20  0047 09/12/20  0829 09/11/20  1740   INR  1.49* 2.26* 8.77*     No results found for: LIPASE    Radiology:  XR Chest Post CVA Port   Final Result      XR Chest 1 View   Final Result      CT Abdomen Pelvis With Contrast   Final Result          Assessment/Plan     Patient Active Problem List   Diagnosis   • Type 2 diabetes mellitus (CMS/HCC)   • Aortic valve replaced, equine valve   • Cerebral infarction (CMS/HCC)   • A-fib (CMS/HCC)   • GERD without esophagitis   • Sleep apnea in adult   • CHF (congestive heart failure) (CMS/[facility])   • Cognitive dysfunction   • Hypothyroidism (acquired)   • History of recurrent UTIs   • Mild reactive airways disease   • Essential hypertension   • Pulmonary hypertension (CMS/HCC)   • B12 deficiency   • Non-rheumatic mitral regurgitation   • Non-rheumatic tricuspid valve insufficiency   • Chronic anemia   • Grade III hemorrhoids   • ICH (intracerebral hemorrhage) (CMS/HCC)   • 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)   • Acute upper gastrointestinal bleeding   • HLD (hyperlipidemia)   • AMADEO (acute kidney injury) (CMS/[facility])   • Acute blood loss anemia   • Supratherapeutic INR       Impression  1.  Melena: No further episodes    2.  Acute blood loss anemia: Low but appears to have stabilized    3.  Supratherapeutic INR: Corrected    4.  Dementia, hard of hearing, likely encephalopathy: She is quite agitated and confused today    Plan  Okay for diet today, n.p.o. at midnight  We will plan for EGD tomorrow.  I discussed with the daughter whether or not she thinks the patient could tolerate bowel prep for a colonoscopy.  She does not appear in any mental state to be able to drink a bowel prep and I think she  would pull out an NG tube if we try to give it to her that way. Dtr agrees  Subsequently we will not pursue colonoscopy at this time.  Will get Cardiology on board given anticoagulation need, hx of CHF, valvular disease, Dr Mcmahan saw her last month      I discussed the patients findings and my recommendations with patient, family and Dr Khan.    Over 25 minutes was spent reviewing the patient's chart and records, in discussion with the patient, in examination of the patient, and in discussion with members of the patient's medical team.    Keyanna Lemus MD

## 2020-09-14 PROBLEM — K20.80 ESOPHAGITIS, LOS ANGELES GRADE C: Status: ACTIVE | Noted: 2020-01-01

## 2020-09-14 PROBLEM — K29.80 DUODENITIS: Status: ACTIVE | Noted: 2020-01-01

## 2020-09-14 PROBLEM — K29.70 GASTRITIS: Status: ACTIVE | Noted: 2020-01-01

## 2020-09-14 NOTE — PLAN OF CARE
Goal Outcome Evaluation:  Plan of Care Reviewed With: patient  Progress: improving  Outcome Summary: VSS, Protonix gtt infusing, NPO after midnight, small formed bowel movement at beginning of shift - x2 assist to BSC. Awaiting labs this AM, EGD today. Daughter at bedside. Will continue to monitor.

## 2020-09-14 NOTE — DISCHARGE PLACEMENT REQUEST
"Jihan Teresa N (87 y.o. Female)     Date of Birth Social Security Number Address Home Phone MRN    09/09/1933  [facility]  [address]  [place] [address] [phone] [id number]    [employer] Marital Status          Denominational        Admission Date Admission Type Admitting Provider Attending Provider Department, Room/Bed    9/11/20 Emergency Guido Pimentel MD [place], Matthew Brett, MD [address], [address]1    Discharge Date Discharge Disposition Discharge Destination                       Attending Provider: Guido Khan MD    Allergies: Promethazine    Isolation: None   Infection: None   Code Status: CPR    Ht: 154.9 cm (60.98\")   Wt: 60.3 kg (133 lb)    Admission Cmt: None   Principal Problem: Acute blood loss anemia [D62]                 Active Insurance as of 9/11/2020     Primary Coverage     Payor Plan Insurance Group Employer/Plan Group    MEDICARE MEDICARE A ONLY      Payor Plan Address Payor Plan Phone Number Payor Plan Fax Number Effective Dates    [address] [phone]  9/1/1998 - None Entered    [facility] [id number]       Subscriber Name Subscriber Birth Date Member ID       JIHAN TERESA N 9/9/1933 [id number]           Secondary Coverage     Payor Plan Insurance Group Employer/Plan Group    [place]      Payor Plan Address Payor Plan Phone Number Payor Plan Fax Number Effective Dates    [address] [phone]  8/1/2020 - None Entered    [place] [address]       Subscriber Name Subscriber Birth Date Member ID       JIHAN TERESA N 9/9/1933 [id number]                 Emergency Contacts      (Rel.) Home Phone Work Phone Mobile Phone    Dione Teresa (Daughter) -- -- [phone]    AdrienneBharti (Daughter) [phone] -- --              "

## 2020-09-14 NOTE — PROGRESS NOTES
Name: Jihan Teresa ADMIT: 2020   : 1933  PCP: Yaron Ca Jr., MD    MRN: [id number] LOS: 3 days   AGE/SEX: 87 y.o. female  ROOM: [facility]     Subjective   Subjective   Patient appears relatively comfortable and in no apparent distress.  Daughter at bedside--discussed EGD findings and plan of care.  No new concerns.    Review of Systems   Reason unable to perform ROS: unable to ascertain ROS 2/2 sedated state.        Objective   Objective   Vital Signs  Temp:  [97.1 °F (36.2 °C)-98.4 °F (36.9 °C)] 97.3 °F (36.3 °C)  Heart Rate:  [55-86] 86  Resp:  [12-20] 16  BP: (116-164)/(58-95) 154/95  SpO2:  [90 %-100 %] 90 %  on  Flow (L/min):  [1-10] 1;   Device (Oxygen Therapy): nasal cannula  Body mass index is 25.14 kg/m².     Physical Exam  Constitutional:       General: She is not in acute distress.     Appearance: She is not diaphoretic.      Comments: Generally weak   HENT:      Head: Normocephalic and atraumatic.   Eyes:      Extraocular Movements: EOM normal.      Pupils: Pupils are equal, round, and reactive to light.   Neck:      Musculoskeletal: Normal range of motion and neck supple.   Cardiovascular:      Heart sounds: Murmur present.   Pulmonary:      Effort: Pulmonary effort is normal. No respiratory distress.      Breath sounds: Normal breath sounds.   Abdominal:      General: Bowel sounds are normal. There is no distension.      Palpations: Abdomen is soft.      Tenderness: There is no abdominal tenderness.   Musculoskeletal:         General: No deformity or edema.   Skin:     General: Skin is warm and dry.      Coloration: Skin is pale.   Neurological:      Comments: Appears sedated, non-focal, follows simple commands (bilateral hand )   Psychiatric:      Comments: Sedated, calm         Results Review:       I reviewed the patient's new clinical results.  Results from last 7 days   Lab Units 20  0531 20  1739 20  0803 20  0047   WBC 10*3/mm3 5.78 4.86 4.44  5.09   HEMOGLOBIN g/dL 7.2* 7.2* 7.3* 7.1*   PLATELETS 10*3/mm3 129* 107* 104* 109*     Results from last 7 days   Lab Units 09/14/20  0531 09/13/20  0047 09/12/20 0615 09/11/20  1740   SODIUM mmol/L 136 138 136 138   POTASSIUM mmol/L 3.4* 3.5 4.1 3.6   CHLORIDE mmol/L 110* 109* 106 104   CO2 mmol/L 20.5* 21.8* 21.9* 25.2   BUN mg/dL 16 29* 46* 56*   CREATININE mg/dL 0.70 0.81 0.93 1.07*   GLUCOSE mg/dL 103* 89 101* 115*   Estimated Creatinine Clearance: 41.3 mL/min (by C-G formula based on SCr of 0.7 mg/dL).  Results from last 7 days   Lab Units 09/13/20 0047 09/11/20  1740   ALBUMIN g/dL 3.00* 3.20*   BILIRUBIN mg/dL 0.5 0.3   ALK PHOS U/L 36* 38*   AST (SGOT) U/L 6 8   ALT (SGPT) U/L 6 7     Results from last 7 days   Lab Units 09/14/20  0531 09/13/20 0047 09/12/20 0615 09/11/20  1740   CALCIUM mg/dL 8.1* 8.3* 8.2* 8.4*   ALBUMIN g/dL  --  3.00*  --  3.20*       COVID19   Date Value Ref Range Status   08/03/2020 Not Detected Not Detected - Ref. Range Final     SARS-CoV-2 PCR   Date Value Ref Range Status   09/11/2020 Not Detected Not Detected Final     Comment:     Nucleic acid specific to SARS-CoV-2 (COVID-19) virus was not detected inthis sample by the TaqPath (TM) COVID-19 Combo Kit.SARS-CoV-2 (COVID-19) nucleic acid testing performed using [employer] Aptima (R) SARS-CoV-2 Assay or [employer] TaqPath   (TM)COVID-19 Combo Kit as indicated above under Emergency Use Authorization (EUA) from the FDA. Aptima (R) and TaqPath (TM) test performancecharacteristics verified by [facility] in accordance with the FDAs Guidance Document (Policy for Diagnostic   Tests for Coronavirus Disease-2019during the Public Health Emergency) issued on March 16, 2020. The laboratory is regulated under CLIA as qualified to perform high-complexity testingUnless otherwise noted, all testing was performed at [facility],   CLIA No. [id number], [place] State Licensee No. [id number]     No results found for: HGBA1C, POCGLU    XR  Chest Post CVA Port  ONE VIEW PORTABLE CHEST AT 10:47 PM     HISTORY: Repositioning of PICC line     FINDINGS: The left-sided PICC line has been repositioned and now ends in  the SVC and does not extend cephalad. The chest is otherwise unchanged  from the study performed approximately 2 hours ago. The heart remains  enlarged with mild vascular congestion.     This report was finalized on 9/12/2020 11:01 PM by Dr. Dionte Lockhart M.D.     XR Chest 1 View  ONE VIEW PORTABLE CHEST AT 8:35 PM     HISTORY: PICC line placement     FINDINGS: A left-sided PICC line crosses the midline and extends  cephalad into the SVC and extends to the level of the junction of the  SVC and right subclavian vein. It should be pulled back approximately 4  cm.     There is persistent cardiomegaly with some minimal vascular congestion  that is similar to the study of 08/12/2020.     This report was finalized on 9/12/2020 9:05 PM by Dr. Dionte Lockhart M.D.         isosorbide mononitrate, 60 mg, Oral, Daily  levothyroxine, 75 mcg, Oral, QAM  metoprolol tartrate, 25 mg, Oral, Q12H  pantoprazole, 40 mg, Oral, BID AC  sodium chloride, 10 mL, Intravenous, Q12H  sodium chloride, 10 mL, Intravenous, Q12H  sodium chloride, 10 mL, Intravenous, Q12H      lactated ringers, 30 mL/hr, Last Rate: Stopped (09/14/20 0943)  sodium chloride, 50 mL/hr, Last Rate: 50 mL/hr (09/13/20 1805)    Diet Regular; GI Soft       Assessment/Plan     Active Hospital Problems    Diagnosis  POA   • **Acute blood loss anemia [D62]  Yes   • Esophagitis, [place] grade C [K20.8]  Yes   • Gastritis [K29.70]  Yes   • Duodenitis [K29.80]  Yes   • Acute upper gastrointestinal bleeding [K92.2]  Yes   • HLD (hyperlipidemia) [E78.5]  Yes   • AMADEO (acute kidney injury) (CMS/HCC) [N17.9]  Yes   • Supratherapeutic INR [R79.1]  Yes   • Melena [K92.1]  Unknown   • Blood loss anemia [D50.0]  Unknown   • Pulmonary hypertension (CMS/HCC) [I27.20]  Yes   • Type 2 diabetes mellitus (CMS/HCC)  [E11.9]  Yes   • Hypothyroidism (acquired) [E03.9]  Yes   • Essential hypertension [I10]  Yes   • A-fib (CMS/HCC) [I48.91]  Yes   • CHF (congestive heart failure) (CMS/HCC) [I50.9]  Yes      Resolved Hospital Problems   No resolved problems to display.       87 y.o. female admitted with Acute blood loss anemia.    · Acute blood loss anemia /acute upper gastrointestinal bleed.  Gastroenterology following, input appreciated--EGD with biopsy 9/14/2020 findings LA grade C esophagitis, gastritis, duodenitis.  Continue to hold anticoagulation.  Monitor H&H, hemoglobin stable for now.  PPI p.o. twice daily.  GI soft diet.  Type and screen, transfuse as necessary for Hgb < 7.0 for now.  · Acute kidney injury /metabolic acidosis, normal anion gap.  Resolved noted creatinine 0.70 decreased from 1.07.  · Diabetes mellitus type 2.  Serum glucose acceptable.  · Atrial fibrillation/CHF/supratherapeutic INR.  BP acceptable, heart rate controlled.  Metoprolol.  INR 1.38 on 9/14/2020.  · Hypothyroidism.  Levothyroxine.    · SCD for DVT prophylaxis.  · CPR  · Discussed with Dr. Khan.   · Anticipate discharge TBD    KAREN Yost  [facility]  09/14/20  15:19 EDT    I wore protective equipment throughout this patient encounter including a face mask, gloves and protective eyewear.  Hand hygiene was performed before donning protective equipment and after removal when leaving the room.

## 2020-09-14 NOTE — CONSULTS
[facility]  Cardiology Consult Note    Patient Identification:  Name: Jihan Teresa  Age: 87 y.o.  Sex: female  :  1933  MRN: [id number]             Requesting Physician:  Keyanna Lemus MD     Reason for Consultation / Chief Complaint: pt with a fib, chf; AC held for GI bleed, EGD     History of Present Illness:     Jihan Teresa is a 87-year-old female, who is current with this provider.  She currently sees cardiologist Dr. Barton with [facility] she has a history which includes asthma, aortic valve stenosis with AVR in , anemia, CHF, hypertension, diabetes mellitus, HLD, pleural effusions, atrial fibrillation, and coagulated with Coumadin, and pulmonary hypertension.  She presented to [facility] via ambulance from [facility] where they reported a low hemoglobin.  Her INR had been high per her daughter for several days.  Coumadin had been held for the last 3 days.  On admit hemoglobin is 6.4.  She has some fatigue and confusion.  Today potassium 3.4, glucose 103, GFR 79, creatinine 0.70, hemoglobin 7.2, and platelets 129.      Stress test in 2017 revealed EF 46.9%, LV systolic is low normal, left and right atrial cavity size is severely dilated, RV C is severely dilated, calcification of the aortic valve, severe mitral valve regurgitation, mitral annular calcification is present.  Posterior leaflet very calcified and immobile.  Severe tricuspid valve regurgitation is present.  Estimated right ventricle systolic pressure from TV regurgitation is markedly elevated greater than 55 mmHg.  Calculated right ventricular systolic pressure from TV regurgitation is 66.7 mmHg.  Stress test on 2020 indicated a normal myocardial perfusion study with no evidence of ischemia.           Comorbid cardiac risk factors: HLD, hypertension, diabetes mellitus, age    Past Medical History:  Past Medical History:   Diagnosis Date   • Anemia    • Aortic valve  "stenosis     S/p AVR in 09'   • Asthma    • Atelectasis    • CHF (congestive heart failure) (CMS/[facility])    • Congenital heart disease    • Diabetes mellitus (CMS/HCC)    • Esophageal reflux    • Essential hypertension    • HLD (hyperlipidemia)    • Hypotension    • LVH (left ventricular hypertrophy)    • Pleural effusion    • Pulmonary hypertension (CMS/[facility])      Past Surgical History:  Past Surgical History:   Procedure Laterality Date   • AORTIC VALVE REPAIR/REPLACEMENT  11/14/2009    Jerry Colmenares MD   • CATARACT EXTRACTION     • CEREBRAL ANGIOGRAM N/A 8/4/2020    Procedure: CEREBRAL ANGIOGRAM;  Surgeon: Ar Bunch MD;  Location: [facility] OR 18/19;  Service: Neurosurgery;  Laterality: N/A;   • FEMORAL THROMBECTOMY/EMBOLECTOMY Right 8/12/2020    Procedure: FEMORAL THROMBECTOMY/EMBOLECTOMY;  Surgeon: Winsome Valentin Jr., MD;  Location: [facility] OR;  Service: Vascular;  Laterality: Right;   • KNEE ARTHROPLASTY        Allergies:  Allergies   Allergen Reactions   • Promethazine Other (See Comments)     Pt becomes \"out of it\" when on this medication  Pt becomes \"out of it\" when on this medication     Home Meds:  Medications Prior to Admission   Medication Sig Dispense Refill Last Dose   • amLODIPine (NORVASC) 5 MG tablet Take 10 mg by mouth Daily.      • bisacodyl (DULCOLAX) 10 MG suppository Insert 1 suppository into the rectum Daily As Needed for Constipation.      • fluticasone (FLONASE) 50 MCG/ACT nasal spray 1 spray into the nostril(s) as directed by provider Daily.      • isosorbide mononitrate (IMDUR) 60 MG 24 hr tablet Take 1 tablet by mouth Daily.      • levothyroxine (SYNTHROID, LEVOTHROID) 75 MCG tablet Take 1 tablet by mouth Every Morning.      • metoprolol tartrate (LOPRESSOR) 25 MG tablet Take 1 tablet by mouth Every 12 (Twelve) Hours.      • zinc oxide 20 % ointment Apply  topically to the appropriate area as directed 2 (Two) Times a Day As Needed.        Current Meds:    " Scheduled    Medication Ordered Dose/Rate, Route, Frequency Last Action   isosorbide mononitrate (IMDUR) 24 hr tablet 60 mg 60 mg, PO, Daily Given, 60 mg at 09/14 1355   levothyroxine (SYNTHROID, LEVOTHROID) tablet 75 mcg 75 mcg, PO, QAM Ordered   metoprolol tartrate (LOPRESSOR) tablet 25 mg 25 mg, PO, Q12H Given, 25 mg at 09/14 1356   pantoprazole (PROTONIX) EC tablet 40 mg 40 mg, PO, BID AC Given, 40 mg at 09/14 1356   sodium chloride 0.9 % flush 10 mL 10 mL, IV, Q12H Given, 10 mL at 09/14 1358   sodium chloride 0.9 % flush 10 mL 10 mL, IV, Q12H Given, 10 mL at 09/14 1357   sodium chloride 0.9 % flush 10 mL 10 mL, IV, Q12H Given, 10 mL at 09/14 1358   Continuous    Medication Ordered Dose/Rate, Route, Frequency Last Action   lactated ringers infusion 30 mL/hr, IV, Continuous Stopped, 09/14 0943   sodium chloride 0.9 % infusion 50 mL/hr, IV, Continuous New Bag, 50 mL/hr at 09/13 1805   PRN    Medication Ordered Dose/Rate, Route, Frequency Last Action   acetaminophen (TYLENOL) 160 MG/5ML solution 650 mg (Or Linked Group #1) 650 mg, PO, Q4H PRN Not Given:  See Alt, 09/13 1833   acetaminophen (TYLENOL) suppository 650 mg (Or Linked Group #1) 650 mg, RE, Q4H PRN Not Given:  See Alt, 09/13 1833   acetaminophen (TYLENOL) tablet 650 mg (Or Linked Group #1) 650 mg, PO, Q4H PRN Given, 650 mg at 09/13 1833   nitroglycerin (NITROSTAT) SL tablet 0.4 mg 0.4 mg, SL, Q5 Min PRN Ordered   ondansetron (ZOFRAN) injection 4 mg 4 mg, IV, Q6H PRN Given, 4 mg at 09/14 0656   sodium chloride 0.9 % flush 10 mL 10 mL, IV, PRN Ordered   sodium chloride 0.9 % flush 10 mL 10 mL, IV, PRN Ordered   sodium chloride 0.9 % flush 20 mL 20 mL, IV, PRN Ordered       Social History:   Social History     Tobacco Use   • Smoking status: Never Smoker   • Smokeless tobacco: Never Used   Substance Use Topics   • Alcohol use: Yes     Comment: ocasional      Family History:  Family History   Problem Relation Age of Onset   • Dementia Sister    • Stroke  "Brother         Review of Systems  Constitutional: No wt loss, fever   Gastrointestinal: No nausea , abdominal pain  Behavioral/Psych: No insomnia or anxiety   Cardiovascular ----positive for sob. All other systems reviewed and are negative                   Physical Exam  /95 (BP Location: Right arm, Patient Position: Lying)   Pulse 86   Temp 97.3 °F (36.3 °C) (Temporal)   Resp 16   Ht 154.9 cm (60.98\")   Wt 60.3 kg (133 lb)   SpO2 90%   BMI 25.14 kg/m²     General appearance: No acute changes   Eyes: Sclera conjunctiva normal, pupils reactive   HENT: Atraumatic; oropharynx clear with moist mucous membranes and no mucosal ulcerations;  Neck: Trachea midline; NECK, supple, no thyromegaly or lymphadenopathy   Lungs: Normal size and shape, normal breath sounds, equal distribution of air, no rales and rhonchi   CV: S1-S2 regular, no murmurs, no rub, no gallop   Abdomen: Soft, non-tender; no masses , no abnormal abdominal sounds   Extremities: No deformity , normal color , no peripheral edema   Skin: Normal temperature, turgor and texture; no rash, ulcers  Psych: Appropriate affect, alert and oriented to person, place and time                 Cardiographics  ECG: comparison  Telemetry: Unavailable    Echocardiogram:   Interpretation Summary    · Calculated EF = 46.9%.  · Left ventricular systolic function is low normal.  · Left amd right atrial cavity size is severely dilated.  · Right ventricular cavity is severely dilated.  · calcification of the aortic valve  · Severe mitral valve regurgitation is present  · Mitral annular calcification is present. Posterior leaflet very calcified and immobile.  · Severe tricuspid valve regurgitation is present.  · Estimated right ventricular systolic pressure from tricuspid regurgitation is markedly elevated (>55 mmHg). Calculated right ventricular systolic pressure from tricuspid regurgitation is 66.7 mmHg.       Imaging  Chest X-ray:     Lab Review           Results " from last 7 days   Lab Units 09/14/20  0531   SODIUM mmol/L 136   POTASSIUM mmol/L 3.4*   BUN mg/dL 16   CREATININE mg/dL 0.70   CALCIUM mg/dL 8.1*        Results from last 7 days   Lab Units 09/14/20  0531 09/13/20  1739 09/13/20  0803   WBC 10*3/mm3 5.78 4.86 4.44   HEMOGLOBIN g/dL 7.2* 7.2* 7.3*   HEMATOCRIT % 22.3* 21.8* 23.4*   PLATELETS 10*3/mm3 129* 107* 104*     Results from last 7 days   Lab Units 09/14/20  0531 09/13/20  0047 09/12/20  0829 09/11/20  1740   INR  1.38* 1.49* 2.26* 8.77*   APTT seconds  --   --   --  48.9*     The following medical decision was discussed in detail with Dr. Mcmahan    Assessment:  Acute upper gastrointestinal bleeding  Acute blood loss anemia  Chronic atrial fibrillation anticoagulated with warfarin  Bioprosthetic aortic valve replaced in 2009  Diabetes mellitus hypothyroidism  CAD  Hypertension        Recommendations / Plan:   .  Elderly white female admitted with a GI bleed significant elevated pro time with a history of the bioprosthetic aortic valve diabetes and coronary artery disease feeling better with the 2 units of blood cardiac remains stable anticoagulation on hold    Discussed with the daughter to reconsider anticoagulation for her           Bogdan Mcmahan MD  9/14/2020, 15:04 EDT      EMR Dragon/Transcription:   Dictated utilizing Dragon dictation

## 2020-09-14 NOTE — ANESTHESIA PREPROCEDURE EVALUATION
Anesthesia Evaluation     no history of anesthetic complications:  NPO Solid Status: > 8 hours  NPO Liquid Status: > 2 hours           Airway   Mallampati: II  no difficulty expected  Dental - normal exam     Pulmonary - normal exam   (+) pleural effusion, asthma,sleep apnea,   (-) COPD, not a smoker    PE comment: nonlabored  Cardiovascular - normal exam    Rhythm: regular  Rate: normal    (+) hypertension, valvular problems/murmurs (h/o AS--s/p AVR in 2009; severe MR & TR) AS, MR and TI, dysrhythmias Atrial Fib, CHF (Perfusion study with EF 60% but Echo in 2017 with EF 46%) , hyperlipidemia,   (-) angina    ROS comment: Congenital heart disease  Pulm HTN    Neuro/Psych  (+) CVA,     (-) seizures, TIA    ROS Comment: Cognitive dysfunction  GI/Hepatic/Renal/Endo    (+)  GERD, GI bleeding (melena) , renal disease (AMADEO), diabetes mellitus type 2, thyroid problem hypothyroidism  (-) liver disease    Musculoskeletal (-) negative ROS    Abdominal    Substance History      OB/GYN          Other   blood dyscrasia anemia,                     Anesthesia Plan    ASA 4     MAC       Anesthetic plan, all risks, benefits, and alternatives have been provided, discussed and informed consent has been obtained with: patient.

## 2020-09-14 NOTE — PROGRESS NOTES
Discharge Planning Assessment  [facility]     Patient Name: Jihan Teresa  MRN: [id number]  Today's Date: 9/14/2020    Admit Date: 9/11/2020    Discharge Needs Assessment     Row Name 09/14/20 1631       Living Environment    Lives With  child(david), adult    Current Living Arrangements  home/apartment/condo    Provides Primary Care For  no one    Family Caregiver if Needed  child(david), adult    Quality of Family Relationships  supportive       Resource/Environmental Concerns    Transportation Concerns  car, none       Transition Planning    Patient/Family Anticipates Transition to  inpatient rehabilitation facility        Discharge Plan     Row Name 09/14/20 1632       Plan    Plan  SNF    Provided Post Acute Provider List?  Yes    Post Acute Provider List  Inpatient Rehab    Provided Post Acute Provider Quality & Resource List?  Yes    Post Acute Provider Quality and Resource List  Inpatient Rehab    Delivered To  Support Person    Support Person  daughter/Dione    Method of Delivery  In person    Patient/Family in Agreement with Plan  yes    Plan Comments  Met with pt and daughter at bedside. Introduced self, explained CCP role, facesheet verified.  Pt has been at [facility] for rehab. Will need to return to rehab at discharge but requests a different facility.  Lift of facilities provided using CMS compare.  Pts daughter requests referrals to [facility].  CCP will follow.  GINGER Pelayo RN        Continued Care and Services - Admitted Since 9/11/2020     Destination     Service Provider Request Status Selected Services Address Phone Fax    [facility]  Pending - Request Sent N/A [address] [facility] [address] [phone]    [facility]  Pending - Request Sent N/A [address] [facility] [address] [phone]            Selected Continued Care - Prior Encounters Includes selections from prior encounters from 6/13/2020  to 9/14/2020    Discharged on 8/24/2020 Admission date: 8/3/2020 - Discharge disposition: Skilled Nursing Facility (DC - External)    Destination     Service Provider Selected Services Address Phone Fax    [facility] AT [address] [address] [phone]                      Demographic Summary     Row Name 09/14/20 1635       General Information    Admission Type  inpatient    Arrived From  home    Referral Source  admission list    Reason for Consult  discharge planning    Preferred Language  English       Contact Information    Permission Granted to Share Info With  family/designee Dione Teresa (daughter)        Functional Status    No documentation.       Psychosocial    No documentation.       Abuse/Neglect    No documentation.       Legal    No documentation.       Substance Abuse    No documentation.       Patient Forms    No documentation.           Ashley Pelayo RN

## 2020-09-14 NOTE — ANESTHESIA POSTPROCEDURE EVALUATION
"Patient: Jihan Teresa    Procedure Summary     Date: 09/14/20 Room / Location:  [facility]    Anesthesia Start: 0907 Anesthesia Stop: 0927    Procedure: ESOPHAGOGASTRODUODENOSCOPY with biopsies (N/A Esophagus) Diagnosis:       Melena      Blood loss anemia      (Melena [K92.1])      (Blood loss anemia [D50.0])    Surgeon: Keyanna Lemus MD Provider: Tony Deal MD    Anesthesia Type: MAC ASA Status: 4          Anesthesia Type: MAC    Vitals  Vitals Value Taken Time   /58 09/14/20 0927   Temp     Pulse 70 09/14/20 0927   Resp 12 09/14/20 0927   SpO2 100 % 09/14/20 0927           Post Anesthesia Care and Evaluation    Patient location during evaluation: bedside  Patient participation: complete - patient participated  Level of consciousness: sleepy but conscious  Pain management: adequate  Airway patency: patent  Anesthetic complications: No anesthetic complications    Cardiovascular status: acceptable  Respiratory status: acceptable  Hydration status: acceptable    Comments: */58 (BP Location: Right arm, Patient Position: Lying)   Pulse 70   Temp 36.9 °C (98.4 °F) (Oral)   Resp 12   Ht 154.9 cm (60.98\")   Wt 60.3 kg (133 lb)   SpO2 100%   BMI 25.14 kg/m²         "

## 2020-09-15 NOTE — PROGRESS NOTES
[facility]  Inpatient Progress Note    Reason for Follow Up: Melena, anemia    Subjective     Interval History:   EGD yesterday with erosive esophagitis, gastritis, duodenitis.  Hemoglobin is low but stable today.  She is sleeping comfortably today.  Her daughter says she has not had any complaints today.    Current Facility-Administered Medications:   •  acetaminophen (TYLENOL) tablet 650 mg, 650 mg, Oral, Q4H PRN, 650 mg at 09/13/20 1833 **OR** acetaminophen (TYLENOL) 160 MG/5ML solution 650 mg, 650 mg, Oral, Q4H PRN **OR** acetaminophen (TYLENOL) suppository 650 mg, 650 mg, Rectal, Q4H PRN, Keyanna Lemus MD  •  isosorbide mononitrate (IMDUR) 24 hr tablet 60 mg, 60 mg, Oral, Daily, Keyanna Lemus MD, 60 mg at 09/15/20 0807  •  lactated ringers infusion, 30 mL/hr, Intravenous, Continuous, Keyanna Lemus MD, Last Rate: 30 mL/hr at 09/14/20 1846, 30 mL/hr at 09/14/20 1846  •  levothyroxine (SYNTHROID, LEVOTHROID) tablet 75 mcg, 75 mcg, Oral, QAM, Keyanna Lemus MD, 75 mcg at 09/15/20 0807  •  metoprolol tartrate (LOPRESSOR) tablet 25 mg, 25 mg, Oral, Q12H, Keyanna Lemus MD, 25 mg at 09/15/20 0807  •  nitroglycerin (NITROSTAT) SL tablet 0.4 mg, 0.4 mg, Sublingual, Q5 Min PRN, Keyanna Lemus MD  •  ondansetron (ZOFRAN) injection 4 mg, 4 mg, Intravenous, Q6H PRN, Keyanna Lemus MD, 4 mg at 09/14/20 0656  •  pantoprazole (PROTONIX) EC tablet 40 mg, 40 mg, Oral, BID AC, Keyanna Lmeus MD, 40 mg at 09/15/20 0807  •  sodium chloride 0.9 % flush 10 mL, 10 mL, Intravenous, Q12H, Keyanna Lemus MD, 10 mL at 09/15/20 0908  •  sodium chloride 0.9 % flush 10 mL, 10 mL, Intravenous, PRN, Keyanna Lemus MD  •  sodium chloride 0.9 % flush 10 mL, 10 mL, Intravenous, Q12H, Keyanna Lemus MD, 10 mL at 09/15/20 0908  •  sodium chloride 0.9 % flush 10 mL, 10 mL, Intravenous, Q12H, Keyanna Lemus MD, 10 mL at 09/14/20 1357  •  sodium chloride 0.9 % flush 10 mL, 10 mL, Intravenous, PRN,  Keyanna Lemus MD  •  sodium chloride 0.9 % flush 20 mL, 20 mL, Intravenous, PRN, Keyanna Lemus MD  •  sodium chloride 0.9 % infusion, 50 mL/hr, Intravenous, Continuous, Keyanna Lemus MD, Last Rate: 50 mL/hr at 09/13/20 1805, 50 mL/hr at 09/13/20 1805  Review of Systems:     Unable to obtain review of systems due to: Patient currently sleeping but at baseline has confusion and dementia        Objective     Vital Signs  Temp:  [97.1 °F (36.2 °C)-98.5 °F (36.9 °C)] 97.4 °F (36.3 °C)  Heart Rate:  [66-86] 72  Resp:  [16-18] 16  BP: (145-166)/(68-95) 166/91  Body mass index is 25.14 kg/m².    Intake/Output Summary (Last 24 hours) at 9/15/2020 1033  Last data filed at 9/14/2020 1933  Gross per 24 hour   Intake 120 ml   Output 350 ml   Net -230 ml     No intake/output data recorded.     Physical Exam:   General: Sleeping, no distress, elderly, frail   Eyes: Normal lids and lashes, no scleral icterus   Neck: supple, normal ROM   Skin: warm and dry, not jaundiced   Cardiovascular: regular rhythm and rate, no murmurs auscultated   Pulm: clear to auscultation bilaterally, regular and unlabored   Abdomen: soft, no reaction to palpation, nondistended; normal bowel sounds   Rectal: deferred   Extremities: no rash or edema   Psychiatric: Currently sleeping     Results Review:     I reviewed the patient's new clinical results.    Results from last 7 days   Lab Units 09/15/20  0611 09/14/20 2019 09/14/20  0531   WBC 10*3/mm3 5.58 7.12 5.78   HEMOGLOBIN g/dL 7.0* 7.2* 7.2*   HEMATOCRIT % 21.0* 21.6* 22.3*   PLATELETS 10*3/mm3 125* 130* 129*     Results from last 7 days   Lab Units 09/15/20  0611 09/14/20  0531 09/13/20  0047  09/11/20  1740   SODIUM mmol/L 139 136 138   < > 138   POTASSIUM mmol/L 3.5 3.4* 3.5   < > 3.6   CHLORIDE mmol/L 109* 110* 109*   < > 104   CO2 mmol/L 20.7* 20.5* 21.8*   < > 25.2   BUN mg/dL 12 16 29*   < > 56*   CREATININE mg/dL 0.80 0.70 0.81   < > 1.07*   CALCIUM mg/dL 7.9* 8.1* 8.3*   < > 8.4*    BILIRUBIN mg/dL  --   --  0.5  --  0.3   ALK PHOS U/L  --   --  36*  --  38*   ALT (SGPT) U/L  --   --  6  --  7   AST (SGOT) U/L  --   --  6  --  8   GLUCOSE mg/dL 87 103* 89   < > 115*    < > = values in this interval not displayed.     Results from last 7 days   Lab Units 09/15/20  0611 09/14/20  0531 09/13/20  0047   INR  1.34* 1.38* 1.49*     No results found for: LIPASE    Radiology:  XR Chest Post CVA Port   Final Result      XR Chest 1 View   Final Result      CT Abdomen Pelvis With Contrast   Final Result          Assessment/Plan     Patient Active Problem List   Diagnosis   • Type 2 diabetes mellitus (CMS/[facility])   • Aortic valve replaced, equine valve   • Cerebral infarction (CMS/[facility])   • A-fib (CMS/[facility])   • GERD without esophagitis   • Sleep apnea in adult   • CHF (congestive heart failure) (CMS/[facility])   • Cognitive dysfunction   • Hypothyroidism (acquired)   • History of recurrent UTIs   • Mild reactive airways disease   • Essential hypertension   • Pulmonary hypertension (CMS/[facility])   • B12 deficiency   • Non-rheumatic mitral regurgitation   • Non-rheumatic tricuspid valve insufficiency   • Chronic anemia   • Grade III hemorrhoids   • ICH (intracerebral hemorrhage) (CMS/[facility])   • Nontraumatic subcortical hemorrhage of left cerebral hemisphere (CMS/[facility])   • Acute upper gastrointestinal bleeding   • HLD (hyperlipidemia)   • AMADEO (acute kidney injury) (CMS/[facility])   • Acute blood loss anemia   • Supratherapeutic INR   • Melena   • Blood loss anemia   • Esophagitis, [place] grade C   • Gastritis   • Duodenitis       Impression  1.  Melena: No further episodes.  EGD yesterday with erosive esophagitis, gastritis and duodenitis.    2.  Acute blood loss anemia: Low but stable    3.  Supratherapeutic INR: Corrected    4.  Dementia, hard of hearing, likely encephalopathy:     Plan  Twice daily PPI  Continue to follow hemoglobin  Monitor for further melena  Hold anticoagulation for the next week if possible  Appreciate  cardiology's input regarding anticoagulation  Diet as tolerated      I discussed the patients findings and my recommendations with patient and family.    Over 25 minutes was spent reviewing the patient's chart and records, in discussion with the patient, in examination of the patient, and in discussion with members of the patient's medical team.    Keyanna Lemus MD

## 2020-09-15 NOTE — PLAN OF CARE
Vss, pt sleepy today and family at bedside states this is normal for patient to be sleepy all day following a sedation procedure. 2L NC. Afib on monitor. CTM for signs of bleeding. Orders to transfuse if hemoglobin drops below 7.0 CTM

## 2020-09-15 NOTE — PROGRESS NOTES
[facility]  Cardiology Progress Note    Patient Identification:  Name: Jihan Teresa  Age: 87 y.o.  Sex: female  :  1933  MRN: [id number]                 Follow Up / Chief Complaint: GI bleed    Interval History:  Patient with atrial fibrillation remains in normal sinus rhythm  Subjective:  No chest pains or tightness in the chest    Objective:    Past Medical History:  Past Medical History:   Diagnosis Date   • Anemia    • Aortic valve stenosis     S/p AVR in    • Asthma    • Atelectasis    • CHF (congestive heart failure) (CMS/HCC)    • Congenital heart disease    • Diabetes mellitus (CMS/HCC)    • Esophageal reflux    • Essential hypertension    • HLD (hyperlipidemia)    • Hypotension    • LVH (left ventricular hypertrophy)    • Pleural effusion    • Pulmonary hypertension (CMS/HCC)      Past Surgical History:  Past Surgical History:   Procedure Laterality Date   • AORTIC VALVE REPAIR/REPLACEMENT  2009    Jerry Colmenares MD   • CATARACT EXTRACTION     • CEREBRAL ANGIOGRAM N/A 2020    Procedure: CEREBRAL ANGIOGRAM;  Surgeon: Ar Bunch MD;  Location: [facility] OR ;  Service: Neurosurgery;  Laterality: N/A;   • ENDOSCOPY N/A 2020    Procedure: ESOPHAGOGASTRODUODENOSCOPY with biopsies;  Surgeon: Keyanna Lemus MD;  Location: [facility] ENDOSCOPY;  Service: Gastroenterology;  Laterality: N/A;  Pre: anemia, melena  Post: esophagitis, retained food, gastritis   • FEMORAL THROMBECTOMY/EMBOLECTOMY Right 2020    Procedure: FEMORAL THROMBECTOMY/EMBOLECTOMY;  Surgeon: Winsome Valentin Jr., MD;  Location: [facility] OR;  Service: Vascular;  Laterality: Right;   • KNEE ARTHROPLASTY          Social History:   Social History     Tobacco Use   • Smoking status: Never Smoker   • Smokeless tobacco: Never Used   Substance Use Topics   • Alcohol use: Yes     Comment: ocasional      Family History:  Family History   Problem Relation Age of Onset   • Dementia  "Sister    • Stroke Brother           Allergies:  Allergies   Allergen Reactions   • Promethazine Other (See Comments)     Pt becomes \"out of it\" when on this medication  Pt becomes \"out of it\" when on this medication     Scheduled Meds:  isosorbide mononitrate, 60 mg, Daily  levothyroxine, 75 mcg, QAM  metoprolol tartrate, 25 mg, Q12H  pantoprazole, 40 mg, BID AC  sodium chloride, 10 mL, Q12H  sodium chloride, 10 mL, Q12H  sodium chloride, 10 mL, Q12H            INTAKE AND OUTPUT:    Intake/Output Summary (Last 24 hours) at 9/15/2020 1555  Last data filed at 9/15/2020 1500  Gross per 24 hour   Intake 60 ml   Output 1000 ml   Net -940 ml       Review of Systems:   GI:  Cardiac: No chest pain  Pulmonary:    Constitutional:  Temp:  [97.4 °F (36.3 °C)-98.5 °F (36.9 °C)] 97.4 °F (36.3 °C)  Heart Rate:  [66-72] 72  Resp:  [16-18] 16  BP: (153-166)/(68-91) 166/91    Physical Exam:  General:  Appears in no acute distress  Eyes: PERTL,  HEENT:  No JVD. Thyroid not visibly enlarged. No mucosal pallor or cyanosis  Respiratory: Respirations regular and unlabored at rest. BBS with good air entry in all fields. No crackles, rubs or wheezes auscultated  Cardiovascular: S1S2 Regular rate and rhythm. No murmur, rub or gallop. No carotid bruits. DP/PT pulses     . No pretibial pitting edema  Gastrointestinal: Abdomen soft, flat, non tender. Bowel sounds present. No hepatosplenomegaly. No ascites  Musculoskeletal: OLIVAS x4. No abnormal movements  Extremities: No digital clubbing or cyanosis  Skin: Color pink. Skin warm and dry to touch. No rashes    Neuro: AAO x3 CN II-XII grossly intact  Psych: Mood and affect normal, pleasant and cooperative          Cardiographics  Telemetry:     ECG:     Echocardiogram:     Lab Review   Results from last 7 days   Lab Units 09/15/20  0611   TROPONIN T ng/mL <0.010     Results from last 7 days   Lab Units 09/15/20  0611   MAGNESIUM mg/dL 1.7     Results from last 7 days   Lab Units 09/15/20  0611 " "  SODIUM mmol/L 139   POTASSIUM mmol/L 3.5   BUN mg/dL 12   CREATININE mg/dL 0.80   CALCIUM mg/dL 7.9*     @[id number]@  Results from last 7 days   Lab Units 09/15/20  0611 09/14/20  2019 09/14/20  0531   WBC 10*3/mm3 5.58 7.12 5.78   HEMOGLOBIN g/dL 7.0* 7.2* 7.2*   HEMATOCRIT % 21.0* 21.6* 22.3*   PLATELETS 10*3/mm3 125* 130* 129*     Results from last 7 days   Lab Units 09/15/20  0611 09/14/20  0531 09/13/20  0047  09/11/20  1740   INR  1.34* 1.38* 1.49*   < > 8.77*   APTT seconds  --   --   --   --  48.9*    < > = values in this interval not displayed.         Assessment:  Acute upper gastrointestinal bleeding  Acute blood loss anemia  Chronic atrial fibrillation anticoagulated with warfarin  Bioprosthetic aortic valve replaced in 2009  Diabetes mellitus hypothyroidism  CAD  Hypertension          Plan:  Continue to hold anticoagulations        )9/15/2020  MD DAVE Murphy/Transcription:   \"Dictated utilizing Dragon dictation\".     "

## 2020-09-15 NOTE — PROGRESS NOTES
Adult Nutrition  Assessment/PES    Patient Name:  Jihan Teresa  YOB: 1933  MRN: [id number]  Admit Date:  9/11/2020    Assessment Date:  9/15/2020  Nutrition follow up.  Spoke with patient's daughter via room phone. Reported good appetite/hungry but issues with fullness. EGD with biopsies performed yesterday.   Tolerating liquids-agreed to boost glucose control, ordered TID. Will continue to follow.   Reason for Assessment     Row Name 09/15/20 1343          Reason for Assessment    Reason For Assessment  follow-up protocol             Labs/Tests/Procedures/Meds     Row Name 09/15/20 1343          Labs/Procedures/Meds    Lab Results Reviewed  reviewed, pertinent        Diagnostic Tests/Procedures    Diagnostic Test/Procedure Reviewed  reviewed, pertinent        Medications    Pertinent Medications Reviewed  reviewed, pertinent         Physical Findings     Row Name 09/15/20 1343          Physical Findings    Overall Physical Appearance  on oxygen therapy           Nutrition Prescription Ordered     Row Name 09/15/20 1343          Nutrition Prescription PO    Common Modifiers  GI Soft/[place]         Evaluation of Received Nutrient/Fluid Intake     Row Name 09/15/20 1343          PO Evaluation    Number of Meals  2     % PO Intake  25         Problem/Interventions:    Intervention Goal     Row Name 09/15/20 1344          Intervention Goal    General  Maintain nutrition;Meet nutritional needs for age/condition     PO  Tolerate PO;Increase intake     Weight  Maintain weight         Nutrition Intervention     Row Name 09/15/20 1344          Nutrition Intervention    RD/Tech Action  Interview for preference;Recommend/ordered;Supplement provided;Encourage intake;Care plan reviewd;Follow Tx progress     Recommended/Ordered  Supplement         Nutrition Prescription     Row Name 09/15/20 1344          Nutrition Prescription PO    PO Prescription  Begin/change supplement     Supplement  Boost Glucose Control      Supplement Frequency  3 times a day     New PO Prescription Ordered?  Yes         Education/Evaluation     Row Name 09/15/20 7778          Education    Education  Will Instruct as appropriate        Monitor/Evaluation    Monitor  Per protocol;I&O;PO intake;Pertinent labs;Skin status;Symptoms           Electronically signed by:  Jen Chang RD  09/15/20 13:45 EDT

## 2020-09-15 NOTE — PLAN OF CARE
Goal Outcome Evaluation:  Plan of Care Reviewed With: patient  Progress: improving: VSS No c/o of pain. Pt confused not AO. Turn q 2 hours. CBC every 12 hours. Family at bedside. WCM  Problem: Skin Injury Risk (Adult)  Goal: Identify Related Risk Factors and Signs and Symptoms  Outcome: Ongoing, Progressing  Goal: Skin Health and Integrity  Outcome: Ongoing, Progressing     Problem: Fall Risk (Adult)  Goal: Identify Related Risk Factors and Signs and Symptoms  Outcome: Ongoing, Progressing  Goal: Absence of Fall  Outcome: Ongoing, Progressing  Intervention: Monitor/Assist with Self Care  Recent Flowsheet Documentation  Taken 9/15/2020 0242 by Mona Lemus, RN  Activity Assistance Provided: assistance, 2 people  Taken 9/15/2020 0047 by Mona Lemus, RN  Activity Assistance Provided: assistance, 2 people  Taken 9/14/2020 2210 by Mona Lemus, RN  Activity Assistance Provided: assistance, 2 people  Taken 9/14/2020 2010 by Mona Lemus, RN  Activity Assistance Provided: assistance, 2 people  Intervention: Promote Injury-Free Environment  Recent Flowsheet Documentation  Taken 9/15/2020 0242 by Mona Lemus, RN  Safety Promotion/Fall Prevention:   activity supervised   assistive device/personal items within reach   clutter free environment maintained   fall prevention program maintained   mobility aid in reach   muscle strengthening facilitated   nonskid shoes/slippers when out of bed   safety round/check completed  Taken 9/15/2020 0047 by Mona Lemus, RN  Safety Promotion/Fall Prevention:   clutter free environment maintained   assistive device/personal items within reach   activity supervised   lighting adjusted   mobility aid in reach   nonskid shoes/slippers when out of bed  Taken 9/14/2020 2210 by Mona Lemus, RN  Safety Promotion/Fall Prevention:   activity supervised   assistive device/personal items within reach   clutter free environment maintained   fall  prevention program maintained   mobility aid in reach   lighting adjusted   nonskid shoes/slippers when out of bed  Taken 9/14/2020 2010 by Mona Lemus, RN  Safety Promotion/Fall Prevention:   activity supervised   assistive device/personal items within reach   fall prevention program maintained   mobility aid in reach   nonskid shoes/slippers when out of bed  Intervention: Identify and Manage Contributors to Fall Injury Risk  Recent Flowsheet Documentation  Taken 9/15/2020 0242 by Mona Lemus, RN  Medication Review/Management: medications reviewed  Taken 9/15/2020 0047 by Mona Lemus, RN  Medication Review/Management: medications reviewed  Taken 9/14/2020 2210 by Mona Lemus, RN  Medication Review/Management: medications reviewed  Taken 9/14/2020 2010 by Mona Lemus, RN  Medication Review/Management: medications reviewed     Problem: Patient Care Overview  Goal: Plan of Care Review  Outcome: Ongoing, Progressing  Goal: Individualization and Mutuality  Outcome: Ongoing, Progressing  Goal: Discharge Needs Assessment  Outcome: Ongoing, Progressing  Goal: Interprofessional Rounds/Family Conf  Outcome: Ongoing, Progressing     Problem: Skin Injury Risk Increased  Goal: Skin Health and Integrity  Outcome: Ongoing, Progressing

## 2020-09-15 NOTE — PROGRESS NOTES
Name: Jihan Teresa ADMIT: 2020   : 1933  PCP: Yaron Ca Jr., MD    MRN: [id number] LOS: 4 days   AGE/SEX: 87 y.o. female  ROOM: [facility]     Subjective   Subjective   Patient has been sleeping most of the day per family.  This is not atypical for her after getting a procedure done according to the daughter at bedside.  No obvious bleeding present    Review of Systems     Objective   Objective   Vital Signs  Temp:  [97.4 °F (36.3 °C)-98.5 °F (36.9 °C)] 97.5 °F (36.4 °C)  Heart Rate:  [62-72] 63  Resp:  [16-18] 16  BP: (153-171)/(68-91) 171/73  SpO2:  [98 %-100 %] 99 %  on  Flow (L/min):  [2] 2;   Device (Oxygen Therapy): nasal cannula  Body mass index is 25.14 kg/m².  Physical Exam  Vitals signs reviewed.   Constitutional:       General: She is sleeping. She is not in acute distress.  HENT:      Head: Normocephalic and atraumatic.      Mouth/Throat:      Pharynx: No oropharyngeal exudate.   Eyes:      General: No scleral icterus.  Neck:      Musculoskeletal: Neck supple.      Vascular: No JVD.   Cardiovascular:      Rate and Rhythm: Normal rate. Rhythm irregularly irregular.      Heart sounds: Murmur (3-6) present.   Pulmonary:      Effort: Pulmonary effort is normal. No respiratory distress.      Breath sounds: Normal breath sounds.   Abdominal:      General: Bowel sounds are normal. There is no distension.      Palpations: Abdomen is soft.      Tenderness: There is no abdominal tenderness.   Musculoskeletal:         General: Edema present.   Skin:     General: Skin is warm and dry.      Coloration: Skin is not pale.         Results Review:       I reviewed the patient's new clinical results.  Results from last 7 days   Lab Units 09/15/20  1718 09/15/20  0611 20  2019 20  0531   WBC 10*3/mm3 5.89 5.58 7.12 5.78   HEMOGLOBIN g/dL 7.4* 7.0* 7.2* 7.2*   PLATELETS 10*3/mm3 135* 125* 130* 129*     Results from last 7 days   Lab Units 09/15/20  0611 20  0531 20  0047  09/12/20  0615   SODIUM mmol/L 139 136 138 136   POTASSIUM mmol/L 3.5 3.4* 3.5 4.1   CHLORIDE mmol/L 109* 110* 109* 106   CO2 mmol/L 20.7* 20.5* 21.8* 21.9*   BUN mg/dL 12 16 29* 46*   CREATININE mg/dL 0.80 0.70 0.81 0.93   GLUCOSE mg/dL 87 103* 89 101*   Estimated Creatinine Clearance: 41.3 mL/min (by C-G formula based on SCr of 0.8 mg/dL).  Results from last 7 days   Lab Units 09/13/20  0047 09/11/20  1740   ALBUMIN g/dL 3.00* 3.20*   BILIRUBIN mg/dL 0.5 0.3   ALK PHOS U/L 36* 38*   AST (SGOT) U/L 6 8   ALT (SGPT) U/L 6 7     Results from last 7 days   Lab Units 09/15/20  0611 09/14/20  0531 09/13/20  0047 09/12/20  0615 09/11/20  1740   CALCIUM mg/dL 7.9* 8.1* 8.3* 8.2* 8.4*   ALBUMIN g/dL  --   --  3.00*  --  3.20*   MAGNESIUM mg/dL 1.7  --   --   --   --        COVID19   Date Value Ref Range Status   08/03/2020 Not Detected Not Detected - Ref. Range Final     SARS-CoV-2 PCR   Date Value Ref Range Status   09/11/2020 Not Detected Not Detected Final     Comment:     Nucleic acid specific to SARS-CoV-2 (COVID-19) virus was not detected inthis sample by the TaqPath (TM) COVID-19 Combo Kit.SARS-CoV-2 (COVID-19) nucleic acid testing performed using mth sense Aptima (R) SARS-CoV-2 Assay or GetIntent TaqPath   (TM)COVID-19 Combo Kit as indicated above under Emergency Use Authorization (EUA) from the FDA. Aptima (R) and TaqPath (TM) test performancecharacteristics verified by [facility] in accordance with the FDAs Guidance Document (Policy for Diagnostic   Tests for Coronavirus Disease-2019during the Public Health Emergency) issued on March 16, 2020. The laboratory is regulated under CLIA as qualified to perform high-complexity testingUnless otherwise noted, all testing was performed at [facility],   CLIA No. [id number], [place] State Licensee No. [id number]     Glucose   Date/Time Value Ref Range Status   09/15/2020 0915 87 70 - 130 mg/dL Final       XR Chest Post CVA Port  ONE VIEW PORTABLE CHEST AT 10:47  PM     HISTORY: Repositioning of PICC line     FINDINGS: The left-sided PICC line has been repositioned and now ends in  the SVC and does not extend cephalad. The chest is otherwise unchanged  from the study performed approximately 2 hours ago. The heart remains  enlarged with mild vascular congestion.     This report was finalized on 9/12/2020 11:01 PM by Dr. Dionte Lockhart M.D.     XR Chest 1 View  ONE VIEW PORTABLE CHEST AT 8:35 PM     HISTORY: PICC line placement     FINDINGS: A left-sided PICC line crosses the midline and extends  cephalad into the SVC and extends to the level of the junction of the  SVC and right subclavian vein. It should be pulled back approximately 4  cm.     There is persistent cardiomegaly with some minimal vascular congestion  that is similar to the study of 08/12/2020.     This report was finalized on 9/12/2020 9:05 PM by Dr. Dionte Lockhart M.D.         isosorbide mononitrate, 60 mg, Oral, Daily  levothyroxine, 75 mcg, Oral, QAM  metoprolol tartrate, 25 mg, Oral, Q12H  pantoprazole, 40 mg, Oral, BID AC  sodium chloride, 10 mL, Intravenous, Q12H  sodium chloride, 10 mL, Intravenous, Q12H  sodium chloride, 10 mL, Intravenous, Q12H      sodium chloride, 50 mL/hr, Last Rate: 50 mL/hr (09/13/20 1805)    Diet Regular; GI Soft       Assessment/Plan     Active Hospital Problems    Diagnosis  POA   • **Acute blood loss anemia [D62]  Yes   • Esophagitis, [place] grade C [K20.8]  Yes   • Gastritis [K29.70]  Yes   • Duodenitis [K29.80]  Yes   • Acute upper gastrointestinal bleeding [K92.2]  Yes   • HLD (hyperlipidemia) [E78.5]  Yes   • AMADEO (acute kidney injury) (CMS/HCC) [N17.9]  Yes   • Supratherapeutic INR [R79.1]  Yes   • Melena [K92.1]  Unknown   • Blood loss anemia [D50.0]  Unknown   • Pulmonary hypertension (CMS/HCC) [I27.20]  Yes   • Type 2 diabetes mellitus (CMS/HCC) [E11.9]  Yes   • Hypothyroidism (acquired) [E03.9]  Yes   • Essential hypertension [I10]  Yes   • A-fib (CMS/HCC)  [I48.91]  Yes   • CHF (congestive heart failure) (CMS/HCC) [I50.9]  Yes      Resolved Hospital Problems   No resolved problems to display.       · Acute blood loss anemia due to GI bleed  · EGD Results noted.  Erosive esophagitis, gastritis and duodenitis.  Suspect she bled due to supratherapeutic INR along with these findings  · Hemoglobin stable but still very low.  I discussed risks and benefits of further transfusion with the daughter and I think we should proceed with 1 more unit of packed red cells to try to get her up closer to 8 especially in light of the fact that we will have to resume her anticoagulant at some point and she is already so marginal  · Twice daily PPI  · Coumadin toxicity- Coumadin on hold.   · AMADEO, resolved  · Chronic A. fib-Coumadin on hold obviously for reasons above  · History of bioprosthetic aortic valve replacement  · Mild thrombocytopenia-monitor  · Confusion/somnolence- likely toxic encephalopathy along with delirium related to recent hospital stays.  Monitor and avoid further sedating meds  · Recent hospitalization with right femoral thromboembolectomy and previously with subarachnoid hemorrhage.  · Obviously complicated issue but needs her anticoagulant back whenever deemed safe by GI given recent thrombus.      Discussed with her daughter and her niece.  CCP looking for new placement for her    Guido Khan MD  [facility]  09/15/20  18:35 EDT    I wore protective equipment throughout this patient encounter including a face mask, gloves and protective eyewear.  Hand hygiene was performed before donning protective equipment and after removal when leaving the room.

## 2020-09-16 NOTE — PLAN OF CARE
Problem: Patient Care Overview  Goal: Plan of Care Review  Outcome: Ongoing, Progressing  Flowsheets (Taken 9/16/2020 0615)  Progress: no change  Plan of Care Reviewed With: patient  Outcome Summary: 1 u prbc infused wo difficulty.  pt very sleepy this shift, pt dgt stated she does this w any type of anesthetic.  turned q2hrs, will monitor   Goal Outcome Evaluation:  Plan of Care Reviewed With: patient  Progress: no change  Outcome Summary: 1 u prbc infused wo difficulty.  pt very sleepy this shift, pt dgt stated she does this w any type of anesthetic.  turned q2hrs, will monitor

## 2020-09-16 NOTE — PROGRESS NOTES
Pharmacy Consult: Warfarin Dosing/ Monitoring    Jihan Teresa is a 87 y.o. female, estimated creatinine clearance is 41.3 mL/min (by C-G formula based on SCr of 0.8 mg/dL). weighing 60.3 kg (133 lb).     has a past medical history of Anemia, Aortic valve stenosis, Asthma, Atelectasis, CHF (congestive heart failure) (CMS/[facility]), Congenital heart disease, Diabetes mellitus (CMS/[facility]), Esophageal reflux, Essential hypertension, HLD (hyperlipidemia), Hypotension, LVH (left ventricular hypertrophy), Pleural effusion, and Pulmonary hypertension (CMS/[facility]).    Social History     Tobacco Use    Smoking status: Never Smoker    Smokeless tobacco: Never Used   Substance Use Topics    Alcohol use: Yes     Comment: ocasional    Drug use: No     Results from last 7 days   Lab Units 09/16/20  0532 09/15/20  1718 09/15/20  0611 09/14/20 2019 09/14/20  0531 09/13/20  1739 09/13/20  0803 09/13/20  0047  09/12/20  0829  09/11/20  1740   INR  1.31*  --  1.34*  --  1.38*  --   --  1.49*  --  2.26*  --  8.77*   APTT seconds  --   --   --   --   --   --   --   --   --   --   --  48.9*   HEMOGLOBIN g/dL 9.0* 7.4* 7.0* 7.2* 7.2* 7.2* 7.3* 7.1*   < > 8.2*   < > 5.8*   HEMATOCRIT % 27.0* 23.1* 21.0* 21.6* 22.3* 21.8* 23.4* 21.4*   < > 25.8*   < > 17.9*   PLATELETS 10*3/mm3 133* 135* 125* 130* 129* 107* 104* 109*  --   --    < > 132*    < > = values in this interval not displayed.     Results from last 7 days   Lab Units 09/15/20  0611 09/14/20  0531 09/13/20  0047  09/11/20  1740   SODIUM mmol/L 139 136 138   < > 138   POTASSIUM mmol/L 3.5 3.4* 3.5   < > 3.6   CHLORIDE mmol/L 109* 110* 109*   < > 104   CO2 mmol/L 20.7* 20.5* 21.8*   < > 25.2   BUN mg/dL 12 16 29*   < > 56*   CREATININE mg/dL 0.80 0.70 0.81   < > 1.07*   CALCIUM mg/dL 7.9* 8.1* 8.3*   < > 8.4*   BILIRUBIN mg/dL  --   --  0.5  --  0.3   ALK PHOS U/L  --   --  36*  --  38*   ALT (SGPT) U/L  --   --  6  --  7   AST (SGOT) U/L  --   --  6  --  8   GLUCOSE mg/dL 87 103* 89   < >  115*    < > = values in this interval not displayed.     Anticoagulation history: Her INR was elevated at admission I'm not sure what the dose was at the NH p/t warfarin being held   _________________________last warfarin consult below    Anticoagulation history: 3mg TuWeFrSaSu, 1.5mg MoTh     Hospital Anticoagulation:  Consulting provider: Dr. Evans  Start date: 8/11/2020  Indication: AFib,  R arterial occlusion d/t cardiac embolis 8/12 Right femoral thromboembolectomy   Target INR: 2-3  Expected duration: lifelong   Bridge Therapy: completed heparin bridge      Date 8/11 8/12 8/13 8/14  8/15 8/16 8/17 8/18 8/19 8/20   INR 1.3 1.24 1.32 1.4 1.44 1.89 2.34 2.27 2.52 2.8    Warfarin dose Not able to take po In OR not given  1.5 mg   3 mg 3 mg 3 mg HOLD  3 mg  1.5 mg  HOLD      Date 8/21 8/22 8/23                 INR 2.86   2.77  2.03                 Warfarin dose HOLD  1mg  2mg                    _________________________  Hospital Anticoagulation:  Consulting provider: Bill   Start date: 9/16  Indication: afib   Target INR: trying for INR 1.9-2.5 ideally  Expected duration: life    Bridge Therapy: Yes              and unfractionated heparin  Date             INR             Warfarin dose               Potential drug interactions: levothyroxine, protonix, APAP     Relevant nutrition status: regular diet, boost supplements    Other: pt admitted w/ GIB and inr 8.77 from [facility], her INR had been high for several days and apparently they did give vitamin k (I'm unsure what dose) ER hgb was 6.4     Education complete?/ Date: na    Assessment/Plan:  Again, its unclear what dosing lead to this high of a INR I can see her dosing hx from prior admit and copied above. She also has had poor po intake a;nd egd 9/14 so I think the best thing is to be very conservative w/ dosing     Warfarin 1 mg x1  INR daily     Pharmacy will continue to follow until discharge or discontinuation of warfarin.   Pushpa Montanez,  [facility]  9/16/2020

## 2020-09-16 NOTE — PROGRESS NOTES
[facility]  Inpatient Progress Note    Reason for Follow Up: Melena, anemia    Subjective     Interval History:   Hemoglobin stable.  She had a head CT this morning for excessive sleepiness.  No overt bleeding.    Current Facility-Administered Medications:   •  acetaminophen (TYLENOL) tablet 650 mg, 650 mg, Oral, Q4H PRN, 650 mg at 09/13/20 1833 **OR** acetaminophen (TYLENOL) 160 MG/5ML solution 650 mg, 650 mg, Oral, Q4H PRN **OR** acetaminophen (TYLENOL) suppository 650 mg, 650 mg, Rectal, Q4H PRN, Keyanna Lemus MD  •  amLODIPine (NORVASC) tablet 5 mg, 5 mg, Oral, Q24H, Guido Khan MD  •  dextrose (D50W) 25 g/ 50mL Intravenous Solution 25 g, 25 g, Intravenous, Q15 Min PRN, Guido Khan MD  •  dextrose (GLUTOSE) oral gel 15 g, 15 g, Oral, Q15 Min PRN, Guido Khan MD  •  glucagon (human recombinant) (GLUCAGEN DIAGNOSTIC) injection 1 mg, 1 mg, Subcutaneous, Q15 Min PRN, Guido Khan MD  •  isosorbide mononitrate (IMDUR) 24 hr tablet 60 mg, 60 mg, Oral, Daily, Keyanna Lemus MD, 60 mg at 09/14/20 1355  •  levothyroxine (SYNTHROID, LEVOTHROID) tablet 75 mcg, 75 mcg, Oral, QAM, Keyanna Lemus MD  •  metoprolol tartrate (LOPRESSOR) tablet 25 mg, 25 mg, Oral, Q12H, Keyanna Lemus MD, 25 mg at 09/14/20 2019  •  nitroglycerin (NITROSTAT) SL tablet 0.4 mg, 0.4 mg, Sublingual, Q5 Min PRN, Keyanna Lemus MD  •  ondansetron (ZOFRAN) injection 4 mg, 4 mg, Intravenous, Q6H PRN, Keyanna Lemus MD, 4 mg at 09/14/20 0656  •  pantoprazole (PROTONIX) EC tablet 40 mg, 40 mg, Oral, BID AC, Keyanna Lemus MD, 40 mg at 09/14/20 1857  •  sodium chloride 0.9 % flush 10 mL, 10 mL, Intravenous, Q12H, Keyanna Lemus MD, 10 mL at 09/15/20 2348  •  sodium chloride 0.9 % flush 10 mL, 10 mL, Intravenous, PRN, Keyanna Lemus MD  •  sodium chloride 0.9 % flush 10 mL, 10 mL, Intravenous, Q12H, Keyanna Lemus MD, 10 mL at 09/15/20 2348  •  sodium  chloride 0.9 % flush 10 mL, 10 mL, Intravenous, Q12H, Keyanna Lemus MD, 10 mL at 09/15/20 2347  •  sodium chloride 0.9 % flush 10 mL, 10 mL, Intravenous, PRN, Keyanna Lemus MD  •  sodium chloride 0.9 % flush 20 mL, 20 mL, Intravenous, PRN, Keyanna Lemus MD  •  sodium chloride 0.9 % infusion, 50 mL/hr, Intravenous, Continuous, Keyanna Lemus MD, Last Rate: 50 mL/hr at 09/13/20 1805, 50 mL/hr at 09/13/20 1805  Review of Systems:     Unable to obtain review of systems due to: Patient dementia        Objective     Vital Signs  Temp:  [97.5 °F (36.4 °C)-98.4 °F (36.9 °C)] 97.6 °F (36.4 °C)  Heart Rate:  [62-68] 68  Resp:  [16-18] 18  BP: (136-176)/(59-82) 176/82  Body mass index is 25.14 kg/m².    Intake/Output Summary (Last 24 hours) at 9/16/2020 1045  Last data filed at 9/16/2020 0800  Gross per 24 hour   Intake 310 ml   Output 1800 ml   Net -1490 ml     I/O this shift:  In: -   Out: 400 [Urine:400]     Physical Exam:   General: Sleeping, no distress, elderly, frail, rouses but not staying awake or  very cooperative   Eyes: Normal lids and lashes, no scleral icterus   Neck: supple, normal ROM   Skin: warm and dry, not jaundiced   Cardiovascular: regular rhythm and rate, no murmurs auscultated   Pulm: clear to auscultation bilaterally, regular and unlabored   Abdomen: soft, no reaction to palpation, nondistended; normal bowel sounds   Rectal: deferred   Extremities: no rash or edema   Psychiatric: Sleeping but arouses with stimulation briefly     Results Review:     I reviewed the patient's new clinical results.    Results from last 7 days   Lab Units 09/16/20  0532 09/15/20  1718 09/15/20  0611   WBC 10*3/mm3 5.95 5.89 5.58   HEMOGLOBIN g/dL 9.0* 7.4* 7.0*   HEMATOCRIT % 27.0* 23.1* 21.0*   PLATELETS 10*3/mm3 133* 135* 125*     Results from last 7 days   Lab Units 09/15/20  0611 09/14/20  0531 09/13/20  0047  09/11/20  1740   SODIUM mmol/L 139 136 138   < > 138   POTASSIUM mmol/L 3.5 3.4* 3.5   < > 3.6    CHLORIDE mmol/L 109* 110* 109*   < > 104   CO2 mmol/L 20.7* 20.5* 21.8*   < > 25.2   BUN mg/dL 12 16 29*   < > 56*   CREATININE mg/dL 0.80 0.70 0.81   < > 1.07*   CALCIUM mg/dL 7.9* 8.1* 8.3*   < > 8.4*   BILIRUBIN mg/dL  --   --  0.5  --  0.3   ALK PHOS U/L  --   --  36*  --  38*   ALT (SGPT) U/L  --   --  6  --  7   AST (SGOT) U/L  --   --  6  --  8   GLUCOSE mg/dL 87 103* 89   < > 115*    < > = values in this interval not displayed.     Results from last 7 days   Lab Units 09/16/20  0532 09/15/20  0611 09/14/20  0531   INR  1.31* 1.34* 1.38*     No results found for: LIPASE    Radiology:  XR Chest Post CVA Port   Final Result      XR Chest 1 View   Final Result      CT Abdomen Pelvis With Contrast   Final Result      CT Head Without Contrast    (Results Pending)       Assessment/Plan     Patient Active Problem List   Diagnosis   • Type 2 diabetes mellitus (CMS/[facility])   • Aortic valve replaced, equine valve   • Cerebral infarction (CMS/[facility])   • A-fib (CMS/[facility])   • GERD without esophagitis   • Sleep apnea in adult   • CHF (congestive heart failure) (CMS/[facility])   • Cognitive dysfunction   • Hypothyroidism (acquired)   • History of recurrent UTIs   • Mild reactive airways disease   • Essential hypertension   • Pulmonary hypertension (CMS/[facility])   • B12 deficiency   • Non-rheumatic mitral regurgitation   • Non-rheumatic tricuspid valve insufficiency   • Chronic anemia   • Grade III hemorrhoids   • ICH (intracerebral hemorrhage) (CMS/[facility])   • Nontraumatic subcortical hemorrhage of left cerebral hemisphere (CMS/[facility])   • Acute upper gastrointestinal bleeding   • HLD (hyperlipidemia)   • AMADEO (acute kidney injury) (CMS/[facility])   • Acute blood loss anemia   • Supratherapeutic INR   • Melena   • Blood loss anemia   • Esophagitis, Circle grade C   • Gastritis   • Duodenitis       Impression  1.  Melena: No further episodes.  EGD yesterday with erosive esophagitis, gastritis and duodenitis.    2.  Acute blood loss anemia: Low  but stable, she is up with 1 unit of blood    3.  Supratherapeutic INR: Corrected    4.  Erosive esophagitis, gastritis, duodenitis: on ppi    Plan  Continue twice daily PPI to heal up esophagitis  In the perfect world, we would be able to hold her anticoagulation a little bit longer but the patient's family has voiced concerns about her being off of this.  Subsequently, I am okay with resuming this given that she has had no overt bleeding and it would be best to see how she responds to anticoagulation while she is here in the hospital  Continue diet as tolerated  No indication for colonoscopy at this time as I suspect the etiology of her GI bleed was the esophagitis compounded by the supratherapeutic INR    I discussed the patients findings and my recommendations with patient, family and nursing staff.    Over 25 minutes was spent reviewing the patient's chart and records, in discussion with the patient, in examination of the patient, and in discussion with members of the patient's medical team.    Keyanna Lemus MD

## 2020-09-16 NOTE — PLAN OF CARE
Vss, drowsy but arouses to voice and improving as the day goes on. Head CT in the AM for suspicious lethargy, no new strokes. IVF infusing at 50/hr, adequate urine output in purewick. Family at bedside. Patient was able to wake up to eat a little bit today and take a few of her medications. CTM

## 2020-09-16 NOTE — PROGRESS NOTES
[facility]  Cardiology Progress Note    Patient Identification:  Name: Jihan Teresa  Age: 87 y.o.  Sex: female  :  1933  MRN: [id number]                 Follow Up / Chief Complaint: GI bleed    Interval History: Now in SR.         Subjective:  No chest pains or tightness in the chest    Objective:    Past Medical History:  Past Medical History:   Diagnosis Date   • Anemia    • Aortic valve stenosis     S/p AVR in    • Asthma    • Atelectasis    • CHF (congestive heart failure) (CMS/HCC)    • Congenital heart disease    • Diabetes mellitus (CMS/HCC)    • Esophageal reflux    • Essential hypertension    • HLD (hyperlipidemia)    • Hypotension    • LVH (left ventricular hypertrophy)    • Pleural effusion    • Pulmonary hypertension (CMS/HCC)      Past Surgical History:  Past Surgical History:   Procedure Laterality Date   • AORTIC VALVE REPAIR/REPLACEMENT  2009    Jerry Colmenares MD   • CATARACT EXTRACTION     • CEREBRAL ANGIOGRAM N/A 2020    Procedure: CEREBRAL ANGIOGRAM;  Surgeon: Ar Bunch MD;  Location: [facility] OR ;  Service: Neurosurgery;  Laterality: N/A;   • ENDOSCOPY N/A 2020    Procedure: ESOPHAGOGASTRODUODENOSCOPY with biopsies;  Surgeon: Keyanna Lemus MD;  Location: [facility] ENDOSCOPY;  Service: Gastroenterology;  Laterality: N/A;  Pre: anemia, melena  Post: esophagitis, retained food, gastritis   • FEMORAL THROMBECTOMY/EMBOLECTOMY Right 2020    Procedure: FEMORAL THROMBECTOMY/EMBOLECTOMY;  Surgeon: Winsome Valentin Jr., MD;  Location: [facility] OR;  Service: Vascular;  Laterality: Right;   • KNEE ARTHROPLASTY          Social History:   Social History     Tobacco Use   • Smoking status: Never Smoker   • Smokeless tobacco: Never Used   Substance Use Topics   • Alcohol use: Yes     Comment: ocasional      Family History:  Family History   Problem Relation Age of Onset   • Dementia Sister    • Stroke Brother      "      Allergies:  Allergies   Allergen Reactions   • Promethazine Other (See Comments)     Pt becomes \"out of it\" when on this medication  Pt becomes \"out of it\" when on this medication     Scheduled Meds:  amLODIPine, 5 mg, Q24H  isosorbide mononitrate, 60 mg, Daily  levothyroxine, 75 mcg, QAM  metoprolol tartrate, 25 mg, Q12H  pantoprazole, 40 mg, BID AC  sodium chloride, 10 mL, Q12H  sodium chloride, 10 mL, Q12H  sodium chloride, 10 mL, Q12H            INTAKE AND OUTPUT:    Intake/Output Summary (Last 24 hours) at 9/16/2020 1500  Last data filed at 9/16/2020 1416  Gross per 24 hour   Intake 1550 ml   Output 950 ml   Net 600 ml       ROS  Constitutional: Awake and alert, no fever. No nosebleeds  Abdomen           no abdominal pain   Cardiac              no chest pain  Pulmonary          no shortness of breath      /82 (BP Location: Right arm, Patient Position: Lying)   Pulse 61   Temp 98.7 °F (37.1 °C) (Oral)   Resp 16   Ht 154.9 cm (60.98\")   Wt 60.3 kg (133 lb)   SpO2 97%   BMI 25.14 kg/m²   General appearance: No acute changes   Neck: Trachea midline; NECK, supple, no thyromegaly or lymphadenopathy   Lungs: Normal size and shape, normal breath sounds, equal distribution of air, no rales and rhonchi   CV: S1-S2 regular, no murmurs, no rub, no gallop   Abdomen: Soft, non-tender; no masses , no abnormal abdominal sounds   Extremities: No deformity , normal color , no peripheral edema   Skin: Normal temperature, turgor and texture; no rash, ulcers                  Cardiographics  Telemetry:   Atrial fibrillation      ECG:     Echocardiogram:   Interpretation Summary    · Calculated EF = 46.9%.  · Left ventricular systolic function is low normal.  · Left amd right atrial cavity size is severely dilated.  · Right ventricular cavity is severely dilated.  · calcification of the aortic valve  · Severe mitral valve regurgitation is present  · Mitral annular calcification is present. Posterior leaflet very " calcified and immobile.  · Severe tricuspid valve regurgitation is present.  · Estimated right ventricular systolic pressure from tricuspid regurgitation is markedly elevated (>55 mmHg). Calculated right ventricular systolic pressure from tricuspid regurgitation is 66.7 mmHg.        Interpretation Summary    · Left ventricular ejection fraction is normal (Calculated EF = 60%).  · Myocardial perfusion imaging indicates a normal myocardial perfusion study with no evidence of ischemia.  · Impressions are consistent with a low risk study.  · Very small lateral wall ischemia can not be ruled out          Lab Review   Results from last 7 days   Lab Units 09/15/20  0611   TROPONIN T ng/mL <0.010     Results from last 7 days   Lab Units 09/15/20  0611   MAGNESIUM mg/dL 1.7     Results from last 7 days   Lab Units 09/15/20  0611   SODIUM mmol/L 139   POTASSIUM mmol/L 3.5   BUN mg/dL 12   CREATININE mg/dL 0.80   CALCIUM mg/dL 7.9*        Results from last 7 days   Lab Units 09/16/20  0532 09/15/20  1718 09/15/20  0611   WBC 10*3/mm3 5.95 5.89 5.58   HEMOGLOBIN g/dL 9.0* 7.4* 7.0*   HEMATOCRIT % 27.0* 23.1* 21.0*   PLATELETS 10*3/mm3 133* 135* 125*     Results from last 7 days   Lab Units 09/16/20  0532 09/15/20  0611 09/14/20  0531  09/11/20  1740   INR  1.31* 1.34* 1.38*   < > 8.77*   APTT seconds  --   --   --   --  48.9*    < > = values in this interval not displayed.     The following medical decision was discussed in detail with Dr. Mcmahan    Assessment:  Acute upper gastrointestinal bleeding  Acute blood loss anemia  Chronic atrial fibrillation anticoagulated with warfarin  Bioprosthetic aortic valve replaced in 2009  Diabetes mellitus hypothyroidism  CAD  Hypertension          Plan:  Blood pressure is slightly elevated.  Will increase amlodipine.  GI has okayed anticoagulation will have pharmacy dose and bridged with heparin.     Start Coumadin per pharmacy to dose, and bridge with heparin, INR in a.m., and  "CBC        )9/16/2020   MD DAVE Murphy/Transcription:   \"Dictated utilizing Dragon dictation\".     "

## 2020-09-16 NOTE — PROGRESS NOTES
Continued Stay Note  [facility]     Patient Name: Jihan Teresa  MRN: [id number]  Today's Date: 9/16/2020    Admit Date: 9/11/2020    Discharge Plan     Row Name 09/16/20 1144       Plan    Plan  SNF    Patient/Family in Agreement with Plan  yes    Plan Comments  Additional referrals placed for Reva Delgado, Sandy, Quentin Tran, Andrew, and Maira.  Await determination from all.  GINGER pelayo RN        Discharge Codes    No documentation.             Ashley Pelayo, RN

## 2020-09-16 NOTE — DISCHARGE PLACEMENT REQUEST
"Jihan Teresa N (87 y.o. Female)     Date of Birth Social Security Number Address Home Phone MRN    09/09/1933  [facility]  [address]  [facility] [id number] [phone] [id number]    [employer] Marital Status          Mosque        Admission Date Admission Type Admitting Provider Attending Provider Department, Room/Bed    9/11/20 Emergency Guido Pimentel MD [place], Matthew Brett, MD [address], [address]    Discharge Date Discharge Disposition Discharge Destination                       Attending Provider: Guido Khan MD    Allergies: Promethazine    Isolation: None   Infection: None   Code Status: CPR    Ht: 154.9 cm (60.98\")   Wt: 60.3 kg (133 lb)    Admission Cmt: None   Principal Problem: Acute blood loss anemia [D62]                 Active Insurance as of 9/11/2020     Primary Coverage     Payor Plan Insurance Group Employer/Plan Group    MEDICARE MEDICARE A ONLY      Payor Plan Address Payor Plan Phone Number Payor Plan Fax Number Effective Dates    [address] [phone]  9/1/1998 - None Entered    [facility] [id number]       Subscriber Name Subscriber Birth Date Member ID       JIHAN TERESA N 9/9/1933 [id number]           Secondary Coverage     Payor Plan Insurance Group Employer/Plan Group    [place]      Payor Plan Address Payor Plan Phone Number Payor Plan Fax Number Effective Dates    [address] [phone]  8/1/2020 - None Entered    [place] [address]       Subscriber Name Subscriber Birth Date Member ID       JIHAN TERESA N 9/9/1933 [id number]                 Emergency Contacts      (Rel.) Home Phone Work Phone Mobile Phone    Dione Teresa (Daughter) -- -- [phone]    AdrienneBharti (Daughter) [phone] -- --              "

## 2020-09-16 NOTE — PROGRESS NOTES
Name: Jihan Teresa ADMIT: 2020   : 1933  PCP: Yaron Ca Jr., MD    MRN: [id number] LOS: 5 days   AGE/SEX: 87 y.o. female  ROOM: [facility], [place]     Subjective   Subjective   Patient appears relatively comfortable and in no apparent distress.  Daughter at bedside--discussed CT head findings and plan of care.  No new concerns.    Review of Systems   Reason unable to perform ROS: unable to ascertain ROS 2/2 sedated state.        Objective   Objective   Vital Signs  Temp:  [97.6 °F (36.4 °C)-98.7 °F (37.1 °C)] 98.7 °F (37.1 °C)  Heart Rate:  [61-70] 61  Resp:  [16-18] 16  BP: (136-176)/(59-82) 154/82  SpO2:  [96 %-100 %] 97 %  on  Flow (L/min):  [2] 2;   Device (Oxygen Therapy): nasal cannula  Body mass index is 25.14 kg/m².     Physical Exam  Constitutional:       General: She is not in acute distress.     Appearance: She is not diaphoretic.      Comments: Generally weak   HENT:      Head: Normocephalic and atraumatic.   Eyes:      Extraocular Movements: EOM normal.      Pupils: Pupils are equal, round, and reactive to light.   Neck:      Musculoskeletal: Normal range of motion and neck supple.   Cardiovascular:      Rate and Rhythm: Normal rate.      Heart sounds: Murmur present.   Pulmonary:      Effort: Pulmonary effort is normal. No respiratory distress.      Breath sounds: Normal breath sounds.   Abdominal:      General: Bowel sounds are normal. There is no distension.      Palpations: Abdomen is soft.      Tenderness: There is no abdominal tenderness.   Musculoskeletal:         General: No deformity or edema.   Skin:     General: Skin is warm and dry.      Coloration: Skin is not pale.   Neurological:      Comments: Appears lethargic, non-focal, moving all extremities equally.   Psychiatric:      Comments: Lethargic & calm         Results Review:       I reviewed the patient's new clinical results.  Results from last 7 days   Lab Units 20  1545 20  0532 09/15/20  1718 09/15/20  0611      WBC 10*3/mm3 5.66 5.95 5.89 5.58   HEMOGLOBIN g/dL 8.7* 9.0* 7.4* 7.0*   PLATELETS 10*3/mm3 131* 133* 135* 125*     Results from last 7 days   Lab Units 09/15/20  0611 09/14/20  0531 09/13/20 0047 09/12/20  0615   SODIUM mmol/L 139 136 138 136   POTASSIUM mmol/L 3.5 3.4* 3.5 4.1   CHLORIDE mmol/L 109* 110* 109* 106   CO2 mmol/L 20.7* 20.5* 21.8* 21.9*   BUN mg/dL 12 16 29* 46*   CREATININE mg/dL 0.80 0.70 0.81 0.93   GLUCOSE mg/dL 87 103* 89 101*   Estimated Creatinine Clearance: 41.3 mL/min (by C-G formula based on SCr of 0.8 mg/dL).  Results from last 7 days   Lab Units 09/13/20 0047 09/11/20  1740   ALBUMIN g/dL 3.00* 3.20*   BILIRUBIN mg/dL 0.5 0.3   ALK PHOS U/L 36* 38*   AST (SGOT) U/L 6 8   ALT (SGPT) U/L 6 7     Results from last 7 days   Lab Units 09/15/20  0611 09/14/20  0531 09/13/20 0047 09/12/20  0615 09/11/20  1740   CALCIUM mg/dL 7.9* 8.1* 8.3* 8.2* 8.4*   ALBUMIN g/dL  --   --  3.00*  --  3.20*   MAGNESIUM mg/dL 1.7  --   --   --   --        COVID19   Date Value Ref Range Status   08/03/2020 Not Detected Not Detected - Ref. Range Final     SARS-CoV-2 PCR   Date Value Ref Range Status   09/11/2020 Not Detected Not Detected Final     Comment:     Nucleic acid specific to SARS-CoV-2 (COVID-19) virus was not detected inthis sample by the TaqPath (TM) COVID-19 Combo Kit.SARS-CoV-2 (COVID-19) nucleic acid testing performed using [employer] Aptima (R) SARS-CoV-2 Assay or [employer] TaqPath   (TM)COVID-19 Combo Kit as indicated above under Emergency Use Authorization (EUA) from the FDA. Aptima (R) and TaqPath (TM) test performancecharacteristics verified by [facility] in accordance with the FDAs Guidance Document (Policy for Diagnostic   Tests for Coronavirus Disease-2019during the Public Health Emergency) issued on March 16, 2020. The laboratory is regulated under CLIA as qualified to perform high-complexity testingUnless otherwise noted, all testing was performed at [facility],    [id number], [place] State Licensee No. [id number]     Glucose   Date/Time Value Ref Range Status   09/16/2020 1625 83 70 - 130 mg/dL Final   09/16/2020 1054 105 70 - 130 mg/dL Final   09/16/2020 0843 69 (L) 70 - 130 mg/dL Final   09/15/2020 0915 87 70 - 130 mg/dL Final       CT Head Without Contrast  Narrative: CT SCAN OF THE HEAD WITHOUT CONTRAST     CLINICAL HISTORY: Altered mental status.     CT scan of the head was obtained with 3 mm axial images. No intravenous  contrast was administered.     Comparison is made to previous head CT dated 09/01/2020 and previous MRI  of the brain dated 08/18/2020.     FINDINGS:     There are severe changes of chronic small vessel ischemic phenomena. 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 The largest contiguous area of  chronic infarction is seen within the left cerebellar hemisphere  measuring up to 2.8 x 1.3 cm in greatest axial dimensions and is within  the left PICA distribution. A small hyperdense focus is seen at the  level of the anterior aspect of the third ventricle measuring up to 4 mm  in diameter and the findings are stable when compared to the prior  study. This was the site of hemosiderin on the prior MRI of the brain  dated 08/18/2020. Overall, there is no significant interval change when  compared to the prior study of 09/01/2020.     The extracranial structures are incidentally notable for complete  opacification of the mastoid air cells and middle ear cavities.     Impression:    Stable findings when compared to the previous CT scan of head dated  09/01/2020.     There is old lacunar disease within the left thalamus and the lentiform  nuclei. 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 The largest contiguous area of  chronic infarction is seen within the  left cerebellar hemisphere within  the left PICA distribution. Severe changes of chronic small vessel  ischemic phenomena are noted.     Again noted is a 4 mm hyperdense focus at the level of the anterior  aspect of the third ventricle which was noted as a site of hemosiderin  deposition on the prior MRI of the brain dated 08/18/2020.     Note is made of complete opacification of both mastoid air cells and the  middle ear cavities.     If further assessment for intracranial pathology is indicated, one could  obtain an MRI of the brain for further evaluation.     Radiation dose reduction techniques were utilized, including automated  exposure control and exposure modulation based on body size.     This report was finalized on 9/16/2020 11:15 AM by Dr. Abram Branch M.D.         [START ON 9/17/2020] amLODIPine, 10 mg, Oral, Q24H  isosorbide mononitrate, 60 mg, Oral, Daily  levothyroxine, 75 mcg, Oral, QAM  metoprolol tartrate, 25 mg, Oral, Q12H  pantoprazole, 40 mg, Oral, BID AC  sodium chloride, 10 mL, Intravenous, Q12H  sodium chloride, 10 mL, Intravenous, Q12H  sodium chloride, 10 mL, Intravenous, Q12H  warfarin (COUMADIN) (dosing per levels), , Does not apply, Daily  warfarin, 1 mg, Oral, Once      heparin (porcine), 12 Units/kg/hr, Last Rate: 12 Units/kg/hr (09/16/20 1628)  Pharmacy to dose warfarin,   Pharmacy to dose warfarin,   sodium chloride, 50 mL/hr, Last Rate: 50 mL/hr (09/16/20 1416)    Diet Regular; GI Soft       Assessment/Plan     Active Hospital Problems    Diagnosis  POA   • **Acute blood loss anemia [D62]  Yes   • Esophagitis, [place] grade C [K20.8]  Yes   • Gastritis [K29.70]  Yes   • Duodenitis [K29.80]  Yes   • Acute upper gastrointestinal bleeding [K92.2]  Yes   • HLD (hyperlipidemia) [E78.5]  Yes   • AMADEO (acute kidney injury) (CMS/HCC) [N17.9]  Yes   • Supratherapeutic INR [R79.1]  Yes   • Melena [K92.1]  Unknown   • Blood loss anemia [D50.0]  Unknown   • Pulmonary hypertension (CMS/HCC)  [I27.20]  Yes   • Type 2 diabetes mellitus (CMS/HCC) [E11.9]  Yes   • Hypothyroidism (acquired) [E03.9]  Yes   • Essential hypertension [I10]  Yes   • A-fib (CMS/HCC) [I48.91]  Yes   • CHF (congestive heart failure) (CMS/HCC) [I50.9]  Yes      Resolved Hospital Problems   No resolved problems to display.       87 y.o. female admitted with Acute blood loss anemia.    · Acute blood loss anemia /acute upper gastrointestinal bleed.  Gastroenterology following, input appreciated--EGD with biopsy 9/14/2020 findings [place] grade C esophagitis, gastritis, duodenitis.  Plan resume anticoagulation 9/16/20--consult pharmacy to assist with warfarin dosing / INR monitoring.  Monitor H&H, hemoglobin stable s/p one unit pRBC on 9/15/20.  PPI p.o. twice daily.  Tolerating GI soft diet.  Type and screen, transfuse as necessary for Hgb < 7.0 for now.  · Acute kidney injury /metabolic acidosis, normal anion gap.  Resolved--noted creatinine 0.70 decreased from 1.07.  · Diabetes mellitus type 2.  Serum glucose acceptable.  · Atrial fibrillation/CHF/supratherapeutic INR.  BP acceptable, heart rate controlled.  Metoprolol.  INR 1.38 on 9/14/2020.  · Hypothyroidism.  Levothyroxine.  · AMS.  CT head w/o stable findings.    · HTN.  Cards following, plan increase amlodipine.     · SCD for DVT prophylaxis.  · CPR  · Discussed with Dr. Khan.   · Anticipate discharge possibly tomorrow pending GI recommendations & H/H s/p resumption of warfarin / CCP following--SNF    KAREN Yost  [facility]  09/16/20  16:43 EDT    I wore protective equipment throughout this patient encounter including a face mask, gloves and protective eyewear.  Hand hygiene was performed before donning protective equipment and after removal when leaving the room.

## 2020-09-16 NOTE — NURSING NOTE
Questioned dgt about pt's sleepiness.  dgt stated the pt does this after any anesthetic.  Dgt wasn't concerned about how sleepy pt is and stated she would expect pt to wake up later this afternoon.  This has been her normal occurrence after procedures requiring anesthesia.    Pt will open eyes w much prompting by daughter.     Will continue to monitor

## 2020-09-17 PROBLEM — H91.93 BILATERAL HEARING LOSS: Status: ACTIVE | Noted: 2020-01-01

## 2020-09-17 NOTE — PROGRESS NOTES
[facility]  Inpatient Progress Note    Reason for Follow Up: Melena, anemia    Subjective     Interval History:   H&H is stable, INR is 1.38.  Discussed with patient and family at bedside.  No evidence of active bleeding.  Advised to continue medications and monitor.    Current Facility-Administered Medications:   •  acetaminophen (TYLENOL) tablet 650 mg, 650 mg, Oral, Q4H PRN, 650 mg at 09/13/20 1833 **OR** acetaminophen (TYLENOL) 160 MG/5ML solution 650 mg, 650 mg, Oral, Q4H PRN **OR** acetaminophen (TYLENOL) suppository 650 mg, 650 mg, Rectal, Q4H PRN, Keyanna Lemus MD  •  amLODIPine (NORVASC) tablet 10 mg, 10 mg, Oral, Q24H, Reyna Tolbert APRN, 10 mg at 09/17/20 0840  •  carbamide peroxide (DEBROX) 6.5 % otic solution 5 drop, 5 drop, Both Ears, BID, Efren Damian APRN  •  dextrose (D50W) 25 g/ 50mL Intravenous Solution 25 g, 25 g, Intravenous, Q15 Min PRN, Guido Khan MD  •  dextrose (GLUTOSE) oral gel 15 g, 15 g, Oral, Q15 Min PRN, Guido Khan MD  •  glucagon (human recombinant) (GLUCAGEN DIAGNOSTIC) injection 1 mg, 1 mg, Subcutaneous, Q15 Min PRN, Guido Khan MD  •  heparin (porcine) 5000 UNIT/ML injection 1,800-3,600 Units, 30-60 Units/kg, Intravenous, Q6H PRN, Reyna Tolbert APRN  •  heparin 59855 units/250 mL (100 units/mL) in 0.45 % NaCl infusion, 12 Units/kg/hr, Intravenous, Titrated, Reyna Tolbert APRN, Last Rate: 5.42 mL/hr at 09/17/20 0100, 9 Units/kg/hr at 09/17/20 0100  •  isosorbide mononitrate (IMDUR) 24 hr tablet 60 mg, 60 mg, Oral, Daily, Keyanna Lemus MD, 60 mg at 09/17/20 0840  •  levothyroxine (SYNTHROID, LEVOTHROID) tablet 75 mcg, 75 mcg, Oral, QAM, Keyanna Lemus MD, 75 mcg at 09/17/20 0744  •  metoprolol tartrate (LOPRESSOR) tablet 25 mg, 25 mg, Oral, Q12H, Keyanna Lemus MD, 25 mg at 09/17/20 0839  •  nitroglycerin (NITROSTAT) SL tablet 0.4 mg, 0.4 mg, Sublingual, Q5 Min PRN, Mariah  Keyanna ORLANDO MD  •  ondansetron (ZOFRAN) injection 4 mg, 4 mg, Intravenous, Q6H PRN, Keyanna Lemus MD, 4 mg at 09/14/20 0656  •  pantoprazole (PROTONIX) EC tablet 40 mg, 40 mg, Oral, BID AC, Keyanna Lemus MD, 40 mg at 09/17/20 0744  •  Pharmacy to dose warfarin, , Does not apply, Continuous PRN, Guido Khan MD  •  Pharmacy to dose warfarin, , Does not apply, Continuous PRN, Reyna Tolbert APRN  •  sodium chloride 0.9 % flush 10 mL, 10 mL, Intravenous, Q12H, Keyanna Lemus MD, 10 mL at 09/16/20 2117  •  sodium chloride 0.9 % flush 10 mL, 10 mL, Intravenous, PRN, Keyanna Lemus MD  •  sodium chloride 0.9 % flush 10 mL, 10 mL, Intravenous, Q12H, Keyanna Lemus MD, 10 mL at 09/16/20 0800  •  sodium chloride 0.9 % flush 10 mL, 10 mL, Intravenous, Q12H, Keyanna Lemus MD, 10 mL at 09/17/20 0840  •  sodium chloride 0.9 % flush 10 mL, 10 mL, Intravenous, PRN, Keyanna Lemus MD  •  sodium chloride 0.9 % flush 20 mL, 20 mL, Intravenous, PRN, Keyanna Lemus MD  •  sodium chloride 0.9 % infusion, 50 mL/hr, Intravenous, Continuous, Keyanna Lemus MD, Last Rate: 50 mL/hr at 09/17/20 1021, 50 mL/hr at 09/17/20 1021  •  warfarin (COUMADIN) (dosing per levels), , Does not apply, Daily, Guido Khan MD  Review of Systems:    All systems were reviewed and negative except for:  Gastrointestinal: positive for  See HPI    Objective     Vital Signs  Temp:  [97 °F (36.1 °C)-98.7 °F (37.1 °C)] 97.6 °F (36.4 °C)  Heart Rate:  [61-84] 77  Resp:  [16-18] 18  BP: (142-166)/() 149/80  Body mass index is 25.14 kg/m².    Intake/Output Summary (Last 24 hours) at 9/17/2020 1440  Last data filed at 9/17/2020 1300  Gross per 24 hour   Intake 1731.97 ml   Output 800 ml   Net 931.97 ml     I/O this shift:  In: 600 [P.O.:600]  Out: -      Physical Exam:   General: patient awake, alert and cooperative   Eyes: Normal lids and lashes, no scleral icterus   Neck: supple, normal ROM   Skin: warm and dry,  not jaundiced   Cardiovascular: regular rhythm and rate, no murmurs auscultated   Pulm: clear to auscultation bilaterally, regular and unlabored   Abdomen: soft, nontender, nondistended; normal bowel sounds   Extremities: no rash or edema   Psychiatric: Normal mood and behavior; memory intact     Results Review:     I reviewed the patient's new clinical results.    Results from last 7 days   Lab Units 09/17/20  0743 09/16/20  2240 09/16/20  1545   WBC 10*3/mm3 5.75 6.54 5.66   HEMOGLOBIN g/dL 8.6* 8.9* 8.7*   HEMATOCRIT % 26.1* 26.0* 25.7*   PLATELETS 10*3/mm3 138* 141 131*     Results from last 7 days   Lab Units 09/15/20  0611 09/14/20  0531 09/13/20  0047  09/11/20  1740   SODIUM mmol/L 139 136 138   < > 138   POTASSIUM mmol/L 3.5 3.4* 3.5   < > 3.6   CHLORIDE mmol/L 109* 110* 109*   < > 104   CO2 mmol/L 20.7* 20.5* 21.8*   < > 25.2   BUN mg/dL 12 16 29*   < > 56*   CREATININE mg/dL 0.80 0.70 0.81   < > 1.07*   CALCIUM mg/dL 7.9* 8.1* 8.3*   < > 8.4*   BILIRUBIN mg/dL  --   --  0.5  --  0.3   ALK PHOS U/L  --   --  36*  --  38*   ALT (SGPT) U/L  --   --  6  --  7   AST (SGOT) U/L  --   --  6  --  8   GLUCOSE mg/dL 87 103* 89   < > 115*    < > = values in this interval not displayed.     Results from last 7 days   Lab Units 09/17/20  0743 09/16/20  1545 09/16/20  0532   INR  1.38* 1.60* 1.31*     No results found for: LIPASE    Radiology:  CT Head Without Contrast   Final Result       Stable findings when compared to the previous CT scan of head dated   09/01/2020.       There is old lacunar disease within the left thalamus and the lentiform   nuclei. Multiple chronic infarcts are identified within the cerebellar   hemispheres, left greater than right. The largest contiguous area of   chronic infarction is seen within the left cerebellar hemisphere within   the left PICA distribution. Severe changes of chronic small vessel   ischemic phenomena are noted.       Again noted is a 4 mm hyperdense focus at the level of  the anterior   aspect of the third ventricle which was noted as a site of hemosiderin   deposition on the prior MRI of the brain dated 08/18/2020.       Note is made of complete opacification of both mastoid air cells and the   middle ear cavities.       If further assessment for intracranial pathology is indicated, one could   obtain an MRI of the brain for further evaluation.       Radiation dose reduction techniques were utilized, including automated   exposure control and exposure modulation based on body size.       This report was finalized on 9/16/2020 11:15 AM by Dr. Abram Branch M.D.          XR Chest Post CVA Port   Final Result      XR Chest 1 View   Final Result      CT Abdomen Pelvis With Contrast   Final Result          Assessment/Plan     Patient Active Problem List   Diagnosis   • Type 2 diabetes mellitus (CMS/HCC)   • Aortic valve replaced, equine valve   • Cerebral infarction (CMS/HCC)   • A-fib (CMS/HCC)   • GERD without esophagitis   • Sleep apnea in adult   • CHF (congestive heart failure) (CMS/HCC)   • Cognitive dysfunction   • Hypothyroidism (acquired)   • History of recurrent UTIs   • Mild reactive airways disease   • Essential hypertension   • Pulmonary hypertension (CMS/HCC)   • B12 deficiency   • Non-rheumatic mitral regurgitation   • Non-rheumatic tricuspid valve insufficiency   • Chronic anemia   • Grade III hemorrhoids   • ICH (intracerebral hemorrhage) (CMS/HCC)   • 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)   • Acute upper gastrointestinal bleeding   • HLD (hyperlipidemia)   • AMADEO (acute kidney injury) (CMS/HCC)   • Acute blood loss anemia   • Supratherapeutic INR   • Melena   • Blood loss anemia   • Esophagitis, [place] grade C   • Gastritis   • Duodenitis   • Bilateral hearing loss       Assessment:  1. Melena: Endoscopy revealed erosive esophagitis gastritis and duodenitis  2. Acute blood loss anemia: H&H is stable  3. Supratherapeutic INR:  Corrected  4. Erosive esophagitis, gastritis, duodenitis: On PPI      Plan:  · Continue current medical regimen  · Monitor H&H  · Resume anticoagulant to maintain low therapeutic  I discussed the patients findings and my recommendations with patient and family.    Alen Bruno MD

## 2020-09-17 NOTE — PROGRESS NOTES
Continued Stay Note  [facility]     Patient Name: Jihan Teresa  MRN: [id number]  Today's Date: 9/17/2020    Admit Date: 9/11/2020    Discharge Plan     Row Name 09/17/20 1751       Plan    Plan  SNF    Plan Comments  [facility] ([facility]) and [facility] will accept or pt may return to [facility] ([facility] will accept back). [facility] bed is available anytime and through weekend. [facility] unable to confirm bed availability (possibly will have a bed over the weekend). Spoke w/ pt's daughter/Bharti, Bharti would like for pt to dc to [facility], but she wants to discuss w/ sister/Dione first. Follow up w/ sister/Dione at the bedside X3. She would not confirm dc plan as she was on her phone and unable to discuss. Dione stated she would leave a message to let me know the final decision. As of 1750 I have not rec'd a message. CCP will f/u again in AM (will need to arrange ambulance and notify [facility] to hold bed).        Discharge Codes    No documentation.             Deidre Miller, RN

## 2020-09-17 NOTE — PROGRESS NOTES
[facility]  Cardiology Progress Note    Patient Identification:  Name: Jihan Teresa  Age: 87 y.o.  Sex: female  :  1933  MRN: [id number]                 Follow Up / Chief Complaint: GI bleed    Interval History: A. fib on monitor.  She has beening bridged with heparin and Coumadin.         Subjective: She states she is feeling much better today.  She denies chest pain and palpitations.       Objective:    Past Medical History:  Past Medical History:   Diagnosis Date   • Anemia    • Aortic valve stenosis     S/p AVR in    • Asthma    • Atelectasis    • CHF (congestive heart failure) (CMS/HCC)    • Congenital heart disease    • Diabetes mellitus (CMS/HCC)    • Esophageal reflux    • Essential hypertension    • HLD (hyperlipidemia)    • Hypotension    • LVH (left ventricular hypertrophy)    • Pleural effusion    • Pulmonary hypertension (CMS/HCC)      Past Surgical History:  Past Surgical History:   Procedure Laterality Date   • AORTIC VALVE REPAIR/REPLACEMENT  2009    Jerry Colmenares MD   • CATARACT EXTRACTION     • CEREBRAL ANGIOGRAM N/A 2020    Procedure: CEREBRAL ANGIOGRAM;  Surgeon: Ar Bunch MD;  Location: [facility] HYBRID OR ;  Service: Neurosurgery;  Laterality: N/A;   • ENDOSCOPY N/A 2020    Procedure: ESOPHAGOGASTRODUODENOSCOPY with biopsies;  Surgeon: Keyanna Lemus MD;  Location: [facility] ENDOSCOPY;  Service: Gastroenterology;  Laterality: N/A;  Pre: anemia, melena  Post: esophagitis, retained food, gastritis   • FEMORAL THROMBECTOMY/EMBOLECTOMY Right 2020    Procedure: FEMORAL THROMBECTOMY/EMBOLECTOMY;  Surgeon: Winsome Valentin Jr., MD;  Location: [facility] OR;  Service: Vascular;  Laterality: Right;   • KNEE ARTHROPLASTY          Social History:   Social History     Tobacco Use   • Smoking status: Never Smoker   • Smokeless tobacco: Never Used   Substance Use Topics   • Alcohol use: Yes     Comment: ocasional      Family  "History:  Family History   Problem Relation Age of Onset   • Dementia Sister    • Stroke Brother           Allergies:  Allergies   Allergen Reactions   • Promethazine Other (See Comments)     Pt becomes \"out of it\" when on this medication  Pt becomes \"out of it\" when on this medication     Scheduled Meds:  amLODIPine, 10 mg, Q24H  carbamide peroxide, 5 drop, BID  isosorbide mononitrate, 60 mg, Daily  levothyroxine, 75 mcg, QAM  metoprolol tartrate, 25 mg, Q12H  pantoprazole, 40 mg, BID AC  sodium chloride, 10 mL, Q12H  sodium chloride, 10 mL, Q12H  sodium chloride, 10 mL, Q12H  warfarin (COUMADIN) (dosing per levels), , Daily  warfarin, 3 mg, Once            INTAKE AND OUTPUT:    Intake/Output Summary (Last 24 hours) at 9/17/2020 1633  Last data filed at 9/17/2020 1300  Gross per 24 hour   Intake 1731.97 ml   Output 800 ml   Net 931.97 ml       ROS  Constitutional: Awake and alert, no fever. No nosebleeds  Abdomen           no abdominal pain   Cardiac              no chest pain  Pulmonary          no shortness of breath      /88 (BP Location: Right arm, Patient Position: Sitting)   Pulse 70   Temp 98.1 °F (36.7 °C) (Oral)   Resp 18   Ht 154.9 cm (60.98\")   Wt 60.3 kg (133 lb)   SpO2 96%   BMI 25.14 kg/m²   General appearance: No acute changes   Neck: Trachea midline; NECK, supple, no thyromegaly or lymphadenopathy   Lungs: Normal size and shape, normal breath sounds, equal distribution of air, no rales and rhonchi   CV: S1-S2 regular, no murmurs, no rub, no gallop   Abdomen: Soft, non-tender; no masses , no abnormal abdominal sounds   Extremities: No deformity , normal color , no peripheral edema   Skin: Normal temperature, turgor and texture; no rash, ulcers                Cardiographics  Telemetry:   Atrial fibrillation      ECG:     Echocardiogram:   Interpretation Summary    · Calculated EF = 46.9%.  · Left ventricular systolic function is low normal.  · Left amd right atrial cavity size is severely " dilated.  · Right ventricular cavity is severely dilated.  · calcification of the aortic valve  · Severe mitral valve regurgitation is present  · Mitral annular calcification is present. Posterior leaflet very calcified and immobile.  · Severe tricuspid valve regurgitation is present.  · Estimated right ventricular systolic pressure from tricuspid regurgitation is markedly elevated (>55 mmHg). Calculated right ventricular systolic pressure from tricuspid regurgitation is 66.7 mmHg.        Interpretation Summary    · Left ventricular ejection fraction is normal (Calculated EF = 60%).  · Myocardial perfusion imaging indicates a normal myocardial perfusion study with no evidence of ischemia.  · Impressions are consistent with a low risk study.  · Very small lateral wall ischemia can not be ruled out          Lab Review   Results from last 7 days   Lab Units 09/15/20  0611   TROPONIN T ng/mL <0.010     Results from last 7 days   Lab Units 09/15/20  0611   MAGNESIUM mg/dL 1.7     Results from last 7 days   Lab Units 09/15/20  0611   SODIUM mmol/L 139   POTASSIUM mmol/L 3.5   BUN mg/dL 12   CREATININE mg/dL 0.80   CALCIUM mg/dL 7.9*        Results from last 7 days   Lab Units 09/17/20  0743 09/16/20  2240 09/16/20  1545   WBC 10*3/mm3 5.75 6.54 5.66   HEMOGLOBIN g/dL 8.6* 8.9* 8.7*   HEMATOCRIT % 26.1* 26.0* 25.7*   PLATELETS 10*3/mm3 138* 141 131*     Results from last 7 days   Lab Units 09/17/20  0743 09/16/20  2240 09/16/20  1545 09/16/20  0532   INR  1.38*  --  1.60* 1.31*   APTT seconds 83.7* 150.7* 30.5  --      The following medical decision was discussed in detail with Dr. Mcmahan    Assessment:  Acute upper gastrointestinal bleeding  Acute blood loss anemia  Chronic atrial fibrillation anticoagulated with warfarin  Bioprosthetic aortic valve replaced in 2009  Diabetes mellitus hypothyroidism  CAD  Hypertension          Plan:  Blood pressure improving.  Amlodipine was increased yesterday.  She was started on  "Coumadin per pharmacy to dose with bridge of heparin.  Okayed by GI.  INR today 1.38.    INR and CBC in a.m.      )9/17/2020   MD DAVE Murphy/Transcription:   \"Dictated utilizing Dragon dictation\".     "

## 2020-09-17 NOTE — PROGRESS NOTES
Pharmacy Consult: Warfarin Dosing/ Monitoring    Jihan Teresa is a 87 y.o. female, estimated creatinine clearance is 41.3 mL/min (by C-G formula based on SCr of 0.8 mg/dL). weighing 60.3 kg (133 lb).     has a past medical history of Anemia, Aortic valve stenosis, Asthma, Atelectasis, CHF (congestive heart failure) (CMS/[facility]), Congenital heart disease, Diabetes mellitus (CMS/[facility]), Esophageal reflux, Essential hypertension, HLD (hyperlipidemia), Hypotension, LVH (left ventricular hypertrophy), Pleural effusion, and Pulmonary hypertension (CMS/[facility]).    Social History     Tobacco Use    Smoking status: Never Smoker    Smokeless tobacco: Never Used   Substance Use Topics    Alcohol use: Yes     Comment: ocasional    Drug use: No     Results from last 7 days   Lab Units 09/17/20  0743 09/16/20  2240 09/16/20  1545 09/16/20  0532 09/15/20  1718 09/15/20  0611 09/14/20  2019 09/14/20  0531  09/13/20  0047  09/12/20  0829  09/11/20  1740   INR  1.38*  --  1.60* 1.31*  --  1.34*  --  1.38*  --  1.49*  --  2.26*  --  8.77*   APTT seconds 83.7* 150.7* 30.5  --   --   --   --   --   --   --   --   --   --  48.9*   HEMOGLOBIN g/dL 8.6* 8.9* 8.7* 9.0* 7.4* 7.0* 7.2* 7.2*   < > 7.1*   < > 8.2*   < > 5.8*   HEMATOCRIT % 26.1* 26.0* 25.7* 27.0* 23.1* 21.0* 21.6* 22.3*   < > 21.4*   < > 25.8*   < > 17.9*   PLATELETS 10*3/mm3 138* 141 131* 133* 135* 125* 130* 129*   < > 109*  --   --    < > 132*    < > = values in this interval not displayed.     Results from last 7 days   Lab Units 09/15/20  0611 09/14/20  0531 09/13/20  0047  09/11/20  1740   SODIUM mmol/L 139 136 138   < > 138   POTASSIUM mmol/L 3.5 3.4* 3.5   < > 3.6   CHLORIDE mmol/L 109* 110* 109*   < > 104   CO2 mmol/L 20.7* 20.5* 21.8*   < > 25.2   BUN mg/dL 12 16 29*   < > 56*   CREATININE mg/dL 0.80 0.70 0.81   < > 1.07*   CALCIUM mg/dL 7.9* 8.1* 8.3*   < > 8.4*   BILIRUBIN mg/dL  --   --  0.5  --  0.3   ALK PHOS U/L  --   --  36*  --  38*   ALT (SGPT) U/L  --   --  6   --  7   AST (SGOT) U/L  --   --  6  --  8   GLUCOSE mg/dL 87 103* 89   < > 115*    < > = values in this interval not displayed.     Anticoagulation history: Her INR was elevated at admission I'm not sure what the dose was at the NH p/t warfarin being held   _________________________last warfarin consult below    Anticoagulation history: 3mg TuWeFrSaSu, 1.5mg [facility]     Hospital Anticoagulation:  Consulting provider: Dr. Evans  Start date: 8/11/2020  Indication: AFib,  R arterial occlusion d/t cardiac embolis 8/12 Right femoral thromboembolectomy   Target INR: 2-3  Expected duration: lifelong   Bridge Therapy: completed heparin bridge      Date 8/11 8/12 8/13 8/14  8/15 8/16 8/17 8/18 8/19 8/20   INR 1.3 1.24 1.32 1.4 1.44 1.89 2.34 2.27 2.52 2.8    Warfarin dose Not able to take po In OR not given  1.5 mg   3 mg 3 mg 3 mg HOLD  3 mg  1.5 mg  HOLD      Date 8/21 8/22 8/23                 INR 2.86   2.77  2.03                 Warfarin dose HOLD  1mg  2mg                    _________________________  Hospital Anticoagulation:  Consulting provider: Bill   Start date: 9/16  Indication: afib   Target INR: trying for INR 1.9-2.5 ideally  Expected duration: life    Bridge Therapy: Yes              and unfractionated heparin  Date 9/16 9/17           INR 1.60 1.38           Warfarin dose 1 mg  3 mg              Potential drug interactions: levothyroxine, protonix, APAP     Relevant nutrition status: regular diet, boost supplements    Other: pt admitted w/ GIB and inr 8.77 from [facility], her INR had been high for several days and apparently they did give vitamin k (I'm unsure what dose) ER hgb was 6.4     Education complete?/ Date: na    Assessment/Plan:  Again, its unclear what dosing caused this high of an INR I can see her dosing hx from prior admit and copied above. She also has had poor po intake and egd 9/14 so I will be conservative w/ dosing     Warfarin 3 mg x1  INR daily     Pharmacy will continue to  follow until discharge or discontinuation of warfarin.   Pushpa Montanez RPH  9/17/2020

## 2020-09-17 NOTE — PLAN OF CARE
Problem: Skin Injury Risk (Adult)  Goal: Skin Health and Integrity  Outcome: Ongoing, Progressing   Goal Outcome Evaluation:  Plan of Care Reviewed With: patient, daughter  Progress: improving  Outcome Summary: pt more alert tonight, remains confused but taling and alert.  cont w heparin infusing per protocol, no co pain or discomfort.  resting at short intervals w eyes closed.  will cont to monitor

## 2020-09-17 NOTE — PROGRESS NOTES
Name: Jihan Teresa ADMIT: 2020   : 1933  PCP: Yaron Ca Jr., MD    MRN: [id number] LOS: 6 days   AGE/SEX: 87 y.o. female  ROOM: [facility]     Subjective   Subjective   Patient appears relatively comfortable and in no apparent distress.  Daughter at bedside--discussed plan of care.  Noted increased [place] ? Cerumen vs presbycusis--denies ear pain / pressure.     Review of Systems   HENT: Negative for congestion, ear discharge and ear pain.    Respiratory: Negative for cough and shortness of breath.    Cardiovascular: Negative for chest pain and leg swelling.        Objective   Objective   Vital Signs  Temp:  [97 °F (36.1 °C)-98.7 °F (37.1 °C)] 97.6 °F (36.4 °C)  Heart Rate:  [61-84] 77  Resp:  [16-18] 18  BP: (142-166)/() 149/80  SpO2:  [90 %-97 %] 90 %  on  Flow (L/min):  [2] 2;   Device (Oxygen Therapy): room air  Body mass index is 25.14 kg/m².     Physical Exam  Constitutional:       General: She is not in acute distress.     Appearance: She is not diaphoretic.      Comments: Generally weak   HENT:      Head: Normocephalic and atraumatic.        Comments: Severe [place], bilaterallyEyes:      Extraocular Movements: EOM normal.      Pupils: Pupils are equal, round, and reactive to light.   Neck:      Musculoskeletal: Normal range of motion and neck supple.   Cardiovascular:      Rate and Rhythm: Normal rate.      Heart sounds: Murmur present.   Pulmonary:      Effort: Pulmonary effort is normal. No respiratory distress.      Breath sounds: Normal breath sounds.   Abdominal:      General: Bowel sounds are normal. There is no distension.      Palpations: Abdomen is soft.      Tenderness: There is no abdominal tenderness.   Musculoskeletal:         General: No deformity or edema.   Skin:     General: Skin is warm and dry.      Coloration: Skin is not pale.   Neurological:      Mental Status: She is alert and oriented to person, place, and time.   Psychiatric:      Comments: Alert & calm                  Results Review:       I reviewed the patient's new clinical results.  Results from last 7 days   Lab Units 09/17/20  0743 09/16/20  2240 09/16/20  1545 09/16/20  0532   WBC 10*3/mm3 5.75 6.54 5.66 5.95   HEMOGLOBIN g/dL 8.6* 8.9* 8.7* 9.0*   PLATELETS 10*3/mm3 138* 141 131* 133*     Results from last 7 days   Lab Units 09/15/20  0611 09/14/20  0531 09/13/20  0047 09/12/20  0615   SODIUM mmol/L 139 136 138 136   POTASSIUM mmol/L 3.5 3.4* 3.5 4.1   CHLORIDE mmol/L 109* 110* 109* 106   CO2 mmol/L 20.7* 20.5* 21.8* 21.9*   BUN mg/dL 12 16 29* 46*   CREATININE mg/dL 0.80 0.70 0.81 0.93   GLUCOSE mg/dL 87 103* 89 101*   Estimated Creatinine Clearance: 41.3 mL/min (by C-G formula based on SCr of 0.8 mg/dL).  Results from last 7 days   Lab Units 09/13/20  0047 09/11/20  1740   ALBUMIN g/dL 3.00* 3.20*   BILIRUBIN mg/dL 0.5 0.3   ALK PHOS U/L 36* 38*   AST (SGOT) U/L 6 8   ALT (SGPT) U/L 6 7     Results from last 7 days   Lab Units 09/15/20  0611 09/14/20  0531 09/13/20  0047 09/12/20  0615 09/11/20  1740   CALCIUM mg/dL 7.9* 8.1* 8.3* 8.2* 8.4*   ALBUMIN g/dL  --   --  3.00*  --  3.20*   MAGNESIUM mg/dL 1.7  --   --   --   --        COVID19   Date Value Ref Range Status   08/03/2020 Not Detected Not Detected - Ref. Range Final     SARS-CoV-2 PCR   Date Value Ref Range Status   09/11/2020 Not Detected Not Detected Final     Comment:     Nucleic acid specific to SARS-CoV-2 (COVID-19) virus was not detected inthis sample by the [employer] (TM) COVID-19 Combo Kit.SARS-CoV-2 (COVID-19) nucleic acid testing performed using [email or web address] Aptima (R) SARS-CoV-2 Assay or [employer] TaqPath   (TM)COVID-19 Combo Kit as indicated above under Emergency Use Authorization (EUA) from the FDA. Aptima (R) and TaqPath (TM) test performancecharacteristics verified by [employer] in accordance with the FDAs Guidance Document (Policy for Diagnostic   Tests for Coronavirus Disease-2019during the Public Health Emergency) issued on  March 16, 2020. The laboratory is regulated under CLIA as qualified to perform high-complexity testingUnless otherwise noted, all testing was performed at [employer],   CLIA No. [id number], [place] State Licensee No. [id number]     Glucose   Date/Time Value Ref Range Status   09/17/2020 1122 119 70 - 130 mg/dL Final   09/16/2020 1625 83 70 - 130 mg/dL Final   09/16/2020 1054 105 70 - 130 mg/dL Final   09/16/2020 0843 69 (L) 70 - 130 mg/dL Final   09/15/2020 0915 87 70 - 130 mg/dL Final       CT Head Without Contrast  Narrative: CT SCAN OF THE HEAD WITHOUT CONTRAST     CLINICAL HISTORY: Altered mental status.     CT scan of the head was obtained with 3 mm axial images. No intravenous  contrast was administered.     Comparison is made to previous head CT dated 09/01/2020 and previous MRI  of the brain dated 08/18/2020.     FINDINGS:     There are severe changes of chronic small vessel ischemic phenomena. 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 The largest contiguous area of  chronic infarction is seen within the left cerebellar hemisphere  measuring up to 2.8 x 1.3 cm in greatest axial dimensions and is within  the left PICA distribution. A small hyperdense focus is seen at the  level of the anterior aspect of the third ventricle measuring up to 4 mm  in diameter and the findings are stable when compared to the prior  study. This was the site of hemosiderin on the prior MRI of the brain  dated 08/18/2020. Overall, there is no significant interval change when  compared to the prior study of 09/01/2020.     The extracranial structures are incidentally notable for complete  opacification of the mastoid air cells and middle ear cavities.     Impression:    Stable findings when compared to the previous CT scan of head dated  09/01/2020.     There is old lacunar disease within the  left thalamus and the lentiform  nuclei. 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 The largest contiguous area of  chronic infarction is seen within the left cerebellar hemisphere within  the left PICA distribution. Severe changes of chronic small vessel  ischemic phenomena are noted.     Again noted is a 4 mm hyperdense focus at the level of the anterior  aspect of the third ventricle which was noted as a site of hemosiderin  deposition on the prior MRI of the brain dated 08/18/2020.     Note is made of complete opacification of both mastoid air cells and the  middle ear cavities.     If further assessment for intracranial pathology is indicated, one could  obtain an MRI of the brain for further evaluation.     Radiation dose reduction techniques were utilized, including automated  exposure control and exposure modulation based on body size.     This report was finalized on 9/16/2020 11:15 AM by Dr. Abram Branch M.D.         amLODIPine, 10 mg, Oral, Q24H  carbamide peroxide, 5 drop, Both Ears, BID  isosorbide mononitrate, 60 mg, Oral, Daily  levothyroxine, 75 mcg, Oral, QAM  metoprolol tartrate, 25 mg, Oral, Q12H  pantoprazole, 40 mg, Oral, BID AC  sodium chloride, 10 mL, Intravenous, Q12H  sodium chloride, 10 mL, Intravenous, Q12H  sodium chloride, 10 mL, Intravenous, Q12H  warfarin (COUMADIN) (dosing per levels), , Does not apply, Daily      heparin (porcine), 12 Units/kg/hr, Last Rate: 9 Units/kg/hr (09/17/20 0100)  Pharmacy to dose warfarin,   Pharmacy to dose warfarin,   sodium chloride, 50 mL/hr, Last Rate: 50 mL/hr (09/17/20 1021)    Diet Regular; GI Soft       Assessment/Plan     Active Hospital Problems    Diagnosis  POA   • **Acute blood loss anemia [D62]  Yes   • Bilateral hearing loss [H91.93]  Yes   • Esophagitis, [place] grade C [K20.8]  Yes   • Gastritis [K29.70]  Yes   • Duodenitis [K29.80]  Yes   • Acute upper gastrointestinal  bleeding [K92.2]  Yes   • HLD (hyperlipidemia) [E78.5]  Yes   • AMADEO (acute kidney injury) (CMS/[facility]) [N17.9]  Yes   • Supratherapeutic INR [R79.1]  Yes   • Melena [K92.1]  Unknown   • Blood loss anemia [D50.0]  Unknown   • Pulmonary hypertension (CMS/HCC) [I27.20]  Yes   • Type 2 diabetes mellitus (CMS/[facility]) [E11.9]  Yes   • Hypothyroidism (acquired) [E03.9]  Yes   • Essential hypertension [I10]  Yes   • A-fib (CMS/[facility]) [I48.91]  Yes   • CHF (congestive heart failure) (CMS/[facility]) [I50.9]  Yes      Resolved Hospital Problems   No resolved problems to display.       87 y.o. female admitted with Acute blood loss anemia.    · Acute blood loss anemia /acute upper gastrointestinal bleed.  Gastroenterology following, input appreciated--EGD with biopsy 9/14/2020 findings LA grade C esophagitis, gastritis, duodenitis.  Resumed warfarin / heparin bridge 9/16/20--pharmacy following for warfarin dosing / INR monitoring.  Monitor H&H--stable since AC reintroduced, hemoglobin stable s/p one unit pRBC on 9/15/20.  PPI p.o. twice daily.  Tolerating GI soft diet.   · Diabetes mellitus type 2.  Serum glucose acceptable.  · Atrial fibrillation/CHF/supratherapeutic INR.  Heart rate controlled.  Metoprolol.  INR 1.38 on 9/1/2020--pharmacy following.  · AMS.  CT head w/o stable findings.    · HTN.  Cards following, BP intermittently elevated--increased amlodipine 9/16/20, current metoprolol.   · [place]. Cerumen vs presbycusis--denies ear pain / pressure. Ordering Debrox BID & recommend follow-up ENT (reportedly patient's neighbor to evaluate treat for presumed presbycusis.     · Heparin / warfarin bridge sufficient for DVT prophylaxis.  · CPR  · Discussed with Dr. Sheppard.   · Anticipate discharge possibly on Friday pending cardiology recommendations for INR / AC mgmt / CCP following--SNF    KAREN Yost  [facility]  09/17/20  13:36 EDT    I wore protective equipment throughout this patient encounter including a  face mask, gloves and protective eyewear.  Hand hygiene was performed before donning protective equipment and after removal when leaving the room.

## 2020-09-17 NOTE — PLAN OF CARE
Goal Outcome Evaluation:  Plan of Care Reviewed With: patient  Progress: improving  Outcome Summary: VSS, alert and oriented to self, heparin gtt continued, denies pain, skin care, q2 turn, will continue to monitor pt.

## 2020-09-18 NOTE — PLAN OF CARE
Goal Outcome Evaluation:  Plan of Care Reviewed With: patient  Progress: improving  Outcome Summary: VSS, denies pain, aler and oriented to self only, heparin gtt continued, INR 1.38 today,skin care, q2 turn, will continue to monitor pt.

## 2020-09-18 NOTE — PLAN OF CARE
Goal Outcome Evaluation:  Plan of Care Reviewed With: patient  Progress: improving  Outcome Summary: vss, no c/o pain, A XO to self, heparin gtt, turned Q2, labs in am, will continue to monitor.

## 2020-09-18 NOTE — PLAN OF CARE
Problem: Patient Care Overview  Goal: Plan of Care Review  Recent Flowsheet Documentation  Taken 9/18/2020 1127 by Alysa Yuen PT  Plan of Care Reviewed With:   patient   daughter  Outcome Summary: Pt admitted from SNF with anemia, Gi bleed. Pt's daughter reports she has been at SNF since last admission to hospital for CVA. Pt was walking up to 6 steps at rehab. Pt presents with generalized weakness, impaired balance, decrased endurance and difficulty walking. Pt would benefit from PT to address these impairments and work towards the outlined goals.   Patient was intermittently wearing a face mask during this therapy encounter. Therapist used appropriate personal protective equipment including eye protection, mask, and gloves.  Mask used was standard procedure mask. Appropriate PPE was worn during the entire therapy session. Hand hygiene was completed before and after therapy session. Patient is not in enhanced droplet precautions.

## 2020-09-18 NOTE — PROGRESS NOTES
[facility]  Cardiology Progress Note    Patient Identification:  Name: Jihan Teresa  Age: 87 y.o.  Sex: female  :  1933  MRN: [id number]                 Follow Up / Chief Complaint: GI bleed    Interval History: A. fib on monitor.  She has beening bridged with heparin and Coumadin.  Pharmacy dosing         Subjective:     Feels much better shortness of breath is less  Objective:    Past Medical History:  Past Medical History:   Diagnosis Date   • Anemia    • Aortic valve stenosis     S/p AVR in    • Asthma    • Atelectasis    • CHF (congestive heart failure) (CMS/[facility])    • Congenital heart disease    • Diabetes mellitus (CMS/[facility])    • Esophageal reflux    • Essential hypertension    • HLD (hyperlipidemia)    • Hypotension    • LVH (left ventricular hypertrophy)    • Pleural effusion    • Pulmonary hypertension (CMS/[facility])      Past Surgical History:  Past Surgical History:   Procedure Laterality Date   • AORTIC VALVE REPAIR/REPLACEMENT  2009    Jerry Colmenares MD   • CATARACT EXTRACTION     • CEREBRAL ANGIOGRAM N/A 2020    Procedure: CEREBRAL ANGIOGRAM;  Surgeon: Ar Bunch MD;  Location: [facility] HYBRID OR ;  Service: Neurosurgery;  Laterality: N/A;   • ENDOSCOPY N/A 2020    Procedure: ESOPHAGOGASTRODUODENOSCOPY with biopsies;  Surgeon: Keyanna Lemus MD;  Location: [facility] ENDOSCOPY;  Service: Gastroenterology;  Laterality: N/A;  Pre: anemia, melena  Post: esophagitis, retained food, gastritis   • FEMORAL THROMBECTOMY/EMBOLECTOMY Right 2020    Procedure: FEMORAL THROMBECTOMY/EMBOLECTOMY;  Surgeon: Winsome Valentin Jr., MD;  Location: [facility] OR;  Service: Vascular;  Laterality: Right;   • KNEE ARTHROPLASTY          Social History:   Social History     Tobacco Use   • Smoking status: Never Smoker   • Smokeless tobacco: Never Used   Substance Use Topics   • Alcohol use: Yes     Comment: ocasional      Family History:  Family History   Problem  "Relation Age of Onset   • Dementia Sister    • Stroke Brother           Allergies:  Allergies   Allergen Reactions   • Promethazine Other (See Comments)     Pt becomes \"out of it\" when on this medication  Pt becomes \"out of it\" when on this medication     Scheduled Meds:  amLODIPine, 10 mg, Q24H  carbamide peroxide, 5 drop, BID  furosemide, 20 mg, Daily  isosorbide mononitrate, 60 mg, Daily  levothyroxine, 75 mcg, QAM  metoprolol tartrate, 25 mg, Q12H  pantoprazole, 40 mg, BID AC  sodium chloride, 10 mL, Q12H  sodium chloride, 10 mL, Q12H  sodium chloride, 10 mL, Q12H  warfarin (COUMADIN) (dosing per levels), , Daily  warfarin, 3 mg, Once            INTAKE AND OUTPUT:    Intake/Output Summary (Last 24 hours) at 9/18/2020 1513  Last data filed at 9/18/2020 0900  Gross per 24 hour   Intake 360 ml   Output 500 ml   Net -140 ml       ROS  Constitutional: Awake and alert, no fever. No nosebleeds  Abdomen           no abdominal pain   Cardiac              no chest pain  Pulmonary          no shortness of breath      /75 (BP Location: Right arm, Patient Position: Lying)   Pulse 71   Temp 97.8 °F (36.6 °C) (Oral)   Resp 18   Ht 154.9 cm (60.98\")   Wt 60.3 kg (133 lb)   SpO2 100%   BMI 25.14 kg/m²   General appearance: No acute changes   Neck: Trachea midline; NECK, supple, no thyromegaly or lymphadenopathy   Lungs: Normal size and shape, normal breath sounds, equal distribution of air, no rales and rhonchi   CV: S1-S2 regular, sys murmurs, no rub, no gallop   Abdomen: Soft, non-tender; no masses , no abnormal abdominal sounds   Extremities: No deformity , normal color , no peripheral edema   Skin: Normal temperature, turgor and texture; no rash, ulcers            Cardiographics  Telemetry:       Atrial fibrillation      ECG:     Echocardiogram:   Interpretation Summary    · Calculated EF = 46.9%.  · Left ventricular systolic function is low normal.  · Left amd right atrial cavity size is severely " dilated.  · Right ventricular cavity is severely dilated.  · calcification of the aortic valve  · Severe mitral valve regurgitation is present  · Mitral annular calcification is present. Posterior leaflet very calcified and immobile.  · Severe tricuspid valve regurgitation is present.  · Estimated right ventricular systolic pressure from tricuspid regurgitation is markedly elevated (>55 mmHg). Calculated right ventricular systolic pressure from tricuspid regurgitation is 66.7 mmHg.        Interpretation Summary    · Left ventricular ejection fraction is normal (Calculated EF = 60%).  · Myocardial perfusion imaging indicates a normal myocardial perfusion study with no evidence of ischemia.  · Impressions are consistent with a low risk study.  · Very small lateral wall ischemia can not be ruled out          Lab Review   Results from last 7 days   Lab Units 09/15/20  0611   TROPONIN T ng/mL <0.010     Results from last 7 days   Lab Units 09/15/20  0611   MAGNESIUM mg/dL 1.7     Results from last 7 days   Lab Units 09/15/20  0611   SODIUM mmol/L 139   POTASSIUM mmol/L 3.5   BUN mg/dL 12   CREATININE mg/dL 0.80   CALCIUM mg/dL 7.9*        Results from last 7 days   Lab Units 09/18/20  0754 09/17/20  2110 09/17/20  0743   WBC 10*3/mm3 6.35 4.90 5.75   HEMOGLOBIN g/dL 9.2* 8.5* 8.6*   HEMATOCRIT % 28.0* 26.5* 26.1*   PLATELETS 10*3/mm3 152 146 138*     Results from last 7 days   Lab Units 09/18/20  0754 09/17/20  0743 09/16/20  2240 09/16/20  1545   INR  1.38* 1.38*  --  1.60*   APTT seconds 85.6* 83.7* 150.7* 30.5     The following medical decision was discussed in detail with Dr. Mcmahan    Assessment:  Acute upper gastrointestinal bleeding  Acute blood loss anemia  Chronic atrial fibrillation anticoagulated with warfarin  Bioprosthetic aortic valve replaced in 2009  Diabetes mellitus hypothyroidism  CAD  Hypertension          Plan:  Blood pressure continues to improve.  She has started on Coumadin per pharmacy to  "dose with bridge of heparin.  This was okayed by GI.  INR today 1.38.  HGB stable at 9.2.         INR and CBC in a.m.      )9/18/2020   MD DAVE Murphy/Transcription:   \"Dictated utilizing Dragon dictation\".     "

## 2020-09-18 NOTE — PROGRESS NOTES
LOS: 7 days     Name: Jihan Teresa  Age/Sex: 87 y.o. female  :  1933        PCP: Yaron Ca Jr., MD  Chief Complaint   Patient presents with   • Abnormal Lab      Subjective   Family is present at the bedside.  She had a little bit of nausea this morning and did not sleep well overnight.  Had some shortness of breath but is feeling much better this morning.  Denies any other new issues or complaints.  General: No Fever or Chills, Cardiac: No Chest Pain or Palpitations, Resp: No Cough or SOA, GI: No Nausea, Vomiting, or Diarrhea and Other: No bleeding    amLODIPine, 10 mg, Oral, Q24H  carbamide peroxide, 5 drop, Both Ears, BID  isosorbide mononitrate, 60 mg, Oral, Daily  levothyroxine, 75 mcg, Oral, QAM  metoprolol tartrate, 25 mg, Oral, Q12H  pantoprazole, 40 mg, Oral, BID AC  sodium chloride, 10 mL, Intravenous, Q12H  sodium chloride, 10 mL, Intravenous, Q12H  sodium chloride, 10 mL, Intravenous, Q12H  warfarin (COUMADIN) (dosing per levels), , Does not apply, Daily      heparin (porcine), 12 Units/kg/hr, Last Rate: 9 Units/kg/hr (20 0100)  Pharmacy to dose warfarin,   Pharmacy to dose warfarin,   sodium chloride, 50 mL/hr, Last Rate: 50 mL/hr (20 0632)        Objective   Vital Signs  Temp:  [97.5 °F (36.4 °C)-98.7 °F (37.1 °C)] 98 °F (36.7 °C)  Heart Rate:  [58-77] 60  Resp:  [18] 18  BP: (135-160)/(68-88) 159/84  Body mass index is 25.14 kg/m².    Intake/Output Summary (Last 24 hours) at 2020 0819  Last data filed at 2020 0300  Gross per 24 hour   Intake 840 ml   Output 500 ml   Net 340 ml       Physical Exam  Vitals signs and nursing note reviewed.   Constitutional:       General: She is not in acute distress.     Appearance: She is well-developed and well-nourished.      Comments: Very hard of hearing   HENT:      Head: Normocephalic and atraumatic.   Cardiovascular:      Rate and Rhythm: Normal rate. Rhythm irregularly irregular.      Heart sounds: S1 normal and S2  normal.   Pulmonary:      Effort: Pulmonary effort is normal. No respiratory distress.      Breath sounds: Normal breath sounds.   Abdominal:      General: Bowel sounds are normal. There is no distension.      Palpations: Abdomen is soft.   Musculoskeletal:         General: No edema.   Skin:     General: Skin is warm and dry.   Neurological:      Mental Status: She is alert.      Cranial Nerves: No cranial nerve deficit.   Psychiatric:         Mood and Affect: Mood and affect normal.           Results Review:       I reviewed the patient's new clinical results.  Results from last 7 days   Lab Units 09/17/20  2110 09/17/20  0743 09/16/20  2240 09/16/20  1545 09/16/20  0532 09/15/20  1718 09/15/20  0611   WBC 10*3/mm3 4.90 5.75 6.54 5.66 5.95 5.89 5.58   HEMOGLOBIN g/dL 8.5* 8.6* 8.9* 8.7* 9.0* 7.4* 7.0*   PLATELETS 10*3/mm3 146 138* 141 131* 133* 135* 125*     Results from last 7 days   Lab Units 09/15/20  0611 09/14/20  0531 09/13/20  0047 09/12/20  0615 09/11/20  1740   SODIUM mmol/L 139 136 138 136 138   POTASSIUM mmol/L 3.5 3.4* 3.5 4.1 3.6   CHLORIDE mmol/L 109* 110* 109* 106 104   CO2 mmol/L 20.7* 20.5* 21.8* 21.9* 25.2   BUN mg/dL 12 16 29* 46* 56*   CREATININE mg/dL 0.80 0.70 0.81 0.93 1.07*   CALCIUM mg/dL 7.9* 8.1* 8.3* 8.2* 8.4*   MAGNESIUM mg/dL 1.7  --   --   --   --    Estimated Creatinine Clearance: 41.3 mL/min (by C-G formula based on SCr of 0.8 mg/dL).    Lab Results   Component Value Date    HGBA1C 6.8 (H) 01/09/2020    HGBA1C 6.60 (H) 11/14/2017    HGBA1C 6.07 (H) 06/13/2017     Glucose   Date/Time Value Ref Range Status   09/18/2020 1117 133 (H) 70 - 130 mg/dL Final   09/17/2020 1122 119 70 - 130 mg/dL Final   09/16/2020 1625 83 70 - 130 mg/dL Final   09/16/2020 1054 105 70 - 130 mg/dL Final   09/16/2020 0843 69 (L) 70 - 130 mg/dL Final       Assessment/Plan   Active Hospital Problems    Diagnosis  POA   • **Acute blood loss anemia [D62]  Yes   • Bilateral hearing loss [H91.93]  Yes   •  Esophagitis, [place] grade C [K20.8]  Yes   • Gastritis [K29.70]  Yes   • Duodenitis [K29.80]  Yes   • Acute upper gastrointestinal bleeding [K92.2]  Yes   • HLD (hyperlipidemia) [E78.5]  Yes   • AMADEO (acute kidney injury) (CMS/[facility]) [N17.9]  Yes   • Supratherapeutic INR [R79.1]  Yes   • Melena [K92.1]  Unknown   • Blood loss anemia [D50.0]  Unknown   • Pulmonary hypertension (CMS/[facility]) [I27.20]  Yes   • Type 2 diabetes mellitus (CMS/[facility]) [E11.9]  Yes   • Hypothyroidism (acquired) [E03.9]  Yes   • Essential hypertension [I10]  Yes   • A-fib (CMS/[facility]) [I48.91]  Yes   • CHF (congestive heart failure) (CMS/[facility]) [I50.9]  Yes      Resolved Hospital Problems   No resolved problems to display.       PLAN  This is an 87-year-old female with a history of atrial fibrillation and valve replacement who presents to the hospital with acute blood loss anemia secondary to circulating anticoagulants and GI bleeding  1.  Acute blood loss anemia  -Hemoglobin stable currently  -No signs of active bleeding  2.  GI bleed  -Probably secondary to grade C esophagitis gastritis and duodenitis  -Also secondary to circulating anticoagulants on warfarin on admission  3.  High risk anticoagulation  -She is on a anticoagulation chronically  -continue heparin drip INR remains stable but subtherapeutic  4.  Atrial fibrillation/heart failure/bioprosthetic aortic valve replacement  -Cardiology is following heart rate controlled  5.  Diabetes  -Blood sugar stable  6.  Hypothyroidism  -TSH within normal limits  7.  Hearing loss  -ENT consulted but this is not emergent and can be followed up in the outpatient setting    Disposition  Hopefully able to discharge over the weekend once her INR is therapeutic      Tim Sheppard MD  [facility]  09/18/20  08:19 EDT

## 2020-09-18 NOTE — PROGRESS NOTES
Continued Stay Note  [facility]     Patient Name: Jihan Teresa  MRN: [id number]  Today's Date: 9/18/2020    Admit Date: 9/11/2020    Discharge Plan     Row Name 09/18/20 0949       Plan    Plan  [facility] [place]    Patient/Family in Agreement with Plan  yes    Plan Comments  Follow up w/ pt's daughter/Dione at the bedside. Pt/family would like dc to  [facility], [place]/[facility] notified. CCP will follow for transportation needs to rehab.        Discharge Codes    No documentation.             Deidre Miller RN

## 2020-09-18 NOTE — THERAPY EVALUATION
Patient Name: Jihan Teresa  : 1933    MRN: [id number]                              Today's Date: 2020       Admit Date: 2020    Visit Dx:     ICD-10-CM ICD-9-CM   1. Acute upper gastrointestinal bleeding  K92.2 578.9   2. Acute blood loss anemia  D62 285.1   3. Warfarin-induced coagulopathy (CMS/[facility])  D68.32 286.7    T45.515A E934.2   4. Supratherapeutic INR  R79.1 790.92   5. Melena  K92.1 578.1   6. Blood loss anemia  D50.0 280.0     Patient Active Problem List   Diagnosis   • Type 2 diabetes mellitus (CMS/[facility])   • Aortic valve replaced, equine valve   • Cerebral infarction (CMS/[facility])   • A-fib (CMS/[facility])   • GERD without esophagitis   • Sleep apnea in adult   • CHF (congestive heart failure) (CMS/[facility])   • Cognitive dysfunction   • Hypothyroidism (acquired)   • History of recurrent UTIs   • Mild reactive airways disease   • Essential hypertension   • Pulmonary hypertension (CMS/[facility])   • B12 deficiency   • Non-rheumatic mitral regurgitation   • Non-rheumatic tricuspid valve insufficiency   • Chronic anemia   • Grade III hemorrhoids   • ICH (intracerebral hemorrhage) (CMS/[facility])   • Nontraumatic subcortical hemorrhage of left cerebral hemisphere (CMS/[facility])   • Acute upper gastrointestinal bleeding   • HLD (hyperlipidemia)   • AMADEO (acute kidney injury) (CMS/[facility])   • Acute blood loss anemia   • Supratherapeutic INR   • Melena   • Blood loss anemia   • Esophagitis, [place] grade C   • Gastritis   • Duodenitis   • Bilateral hearing loss     Past Medical History:   Diagnosis Date   • Anemia    • Aortic valve stenosis     S/p AVR in    • Asthma    • Atelectasis    • CHF (congestive heart failure) (CMS/[facility])    • Congenital heart disease    • Diabetes mellitus (CMS/[facility])    • Esophageal reflux    • Essential hypertension    • HLD (hyperlipidemia)    • Hypotension    • LVH (left ventricular hypertrophy)    • Pleural effusion    • Pulmonary hypertension (CMS/[facility])      Past Surgical History:   Procedure  Laterality Date   • AORTIC VALVE REPAIR/REPLACEMENT  11/14/2009    Jerry Colmenares MD   • CATARACT EXTRACTION     • CEREBRAL ANGIOGRAM N/A 8/4/2020    Procedure: CEREBRAL ANGIOGRAM;  Surgeon: Ar Bunch MD;  Location: [facility] HYBRID OR 18/19;  Service: Neurosurgery;  Laterality: N/A;   • ENDOSCOPY N/A 9/14/2020    Procedure: ESOPHAGOGASTRODUODENOSCOPY with biopsies;  Surgeon: Keyanna Lemus MD;  Location: [facility] ENDOSCOPY;  Service: Gastroenterology;  Laterality: N/A;  Pre: anemia, melena  Post: esophagitis, retained food, gastritis   • FEMORAL THROMBECTOMY/EMBOLECTOMY Right 8/12/2020    Procedure: FEMORAL THROMBECTOMY/EMBOLECTOMY;  Surgeon: Winsome Valentin Jr., MD;  Location: [facility] MAIN OR;  Service: Vascular;  Laterality: Right;   • KNEE ARTHROPLASTY       General Information     Row Name 09/18/20 1124          Physical Therapy Time and Intention    Document Type  evaluation  -     Mode of Treatment  individual therapy;physical therapy  -     Row Name 09/18/20 1124          General Information    Existing Precautions/Restrictions  fall  -     Barriers to Rehab  previous functional deficit  -     Row Name 09/18/20 1124          Living Environment    Lives With  facility resident  -     Row Name 09/18/20 1124          Cognition    Orientation Status (Cognition)  oriented x 3  -     Row Name 09/18/20 1124          Safety Issues, Functional Mobility    Safety Issues Affecting Function (Mobility)  ability to follow commands [facility]  -     Impairments Affecting Function (Mobility)  balance;endurance/activity tolerance;strength  -       User Key  (r) = Recorded By, (t) = Taken By, (c) = Cosigned By    Initials Name Provider Type     Alysa Yuen, PT Physical Therapist        Mobility     Row Name 09/18/20 1125          Bed Mobility    Bed Mobility  supine-sit  -     Supine-Sit [place] (Bed Mobility)  moderate assist (50% patient effort)  -     Assistive Device (Bed  Mobility)  bed rails;draw sheet;head of bed elevated  -[facility] Name 09/18/20 1125          Transfers    Comment (Transfers)  stood twice, pivoted to chair on second stand  -[facility] Name 09/18/20 1125          Bed-Chair Transfer    Bed-Chair [place] (Transfers)  maximum assist (25% patient effort)  -     Assistive Device (Bed-Chair Transfers)  walker, front-wheeled  -     Row Name 09/18/20 1125          Sit-Stand Transfer    Sit-Stand [place] (Transfers)  moderate assist (50% patient effort)  -     Assistive Device (Sit-Stand Transfers)  walker, front-wheeled  -     Row Name 09/18/20 1125          Gait/Stairs (Locomotion)    [place] Level (Gait)  not tested  -     Comment (Gait/Stairs)  difficultly advancine feet to pivot, not safe to walk  -       User Key  (r) = Recorded By, (t) = Taken By, (c) = Cosigned By    Initials Name Provider Type    Alysa Munoz, JOSHUA Physical Therapist        Obj/Interventions     [facility] Name 09/18/20 1126          Range of Motion Comprehensive    Comment, General Range of Motion  BLE WFL  -[facility] Name 09/18/20 1126          Strength Comprehensive (MMT)    Comment, General Manual Muscle Testing (MMT) Assessment  BLE at least 3/5 functionally  -[facility] Name 09/18/20 1126          Motor Skills    Therapeutic Exercise  -- BLE AP, LAQ, seated marching x10 reps  -[facility] Name 09/18/20 1126          Balance    Balance Assessment  sitting static balance;sitting dynamic balance;standing static balance  -     Static Sitting Balance  WNL  -     Dynamic Sitting Balance  mild impairment  -     Static Standing Balance  mild impairment  -       User Key  (r) = Recorded By, (t) = Taken By, (c) = Cosigned By    Initials Name Provider Type    Alysa Munoz PT Physical Therapist        Goals/Plan     [facility] Name 09/18/20 1129          Bed Mobility Goal 1 (PT)    Activity/Assistive Device (Bed Mobility Goal 1, PT)  bed mobility  activities, all  -KH     Cost Level/Cues Needed (Bed Mobility Goal 1, PT)  minimum assist (75% or more patient effort)  -KH     Time Frame (Bed Mobility Goal 1, PT)  1 week  -     Row Name 09/18/20 1129          Transfer Goal 1 (PT)    Activity/Assistive Device (Transfer Goal 1, PT)  transfers, all;sit-to-stand/stand-to-sit;bed-to-chair/chair-to-bed;walker, rolling  -KH     Cost Level/Cues Needed (Transfer Goal 1, PT)  moderate assist (50-74% patient effort)  -KH     Time Frame (Transfer Goal 1, PT)  1 week  -     Row Name 09/18/20 1129          Gait Training Goal 1 (PT)    Activity/Assistive Device (Gait Training Goal 1, PT)  gait (walking locomotion);walker, rolling  -KH     Cost Level (Gait Training Goal 1, PT)  moderate assist (50-74% patient effort);2 person assist  -KH     Distance (Gait Training Goal 1, PT)  10 FT  -     Time Frame (Gait Training Goal 1, PT)  1 week  -     Row Name 09/18/20 1129          Patient Education Goal (PT)    Activity (Patient Education Goal, PT)  HEP  -KH     Cost/Cues/Accuracy (Memory Goal 2, PT)  demonstrates adequately  -KH     Time Frame (Patient Education Goal, PT)  1 week  -       User Key  (r) = Recorded By, (t) = Taken By, (c) = Cosigned By    Initials Name Provider Type    Alysa Munoz, PT Physical Therapist        Clinical Impression     Row Name 09/18/20 1127          Pain    Additional Documentation  Pain Scale: Numbers Pre/Post-Treatment (Group)  -     Row Name 09/18/20 1127          Pain Scale: Numbers Pre/Post-Treatment    Pretreatment Pain Rating  0/10 - no pain  -     Posttreatment Pain Rating  0/10 - no pain  -     Row Name 09/18/20 1127          Plan of Care Review    Plan of Care Reviewed With  patient;daughter  -     Outcome Summary  Pt admitted from SNF with anemia, Gi bleed. Pt's daughter reports she has been at SNF since last admission to hospital for CVA. Pt was walking up to 6 steps at rehab.  Pt presents with generalized weakness, impaired balance, decrased endurance and difficulty walking. Pt would benefit from PT to address these impairments and work towards the outlined goals.  -     Row Name 09/18/20 1127          Therapy Assessment/Plan (PT)    Patient/Family Therapy Goals Statement (PT)  return to SNF  -     Rehab Potential (PT)  good, to achieve stated therapy goals  -     Criteria for Skilled Interventions Met (PT)  yes  -     Row Name 09/18/20 1127          Positioning and Restraints    Pre-Treatment Position  in bed  -     Post Treatment Position  chair  -     In Chair  reclined;call light within reach;encouraged to call for assist;exit alarm on;with family/caregiver;notified nsg  -       User Key  (r) = Recorded By, (t) = Taken By, (c) = Cosigned By    Initials Name Provider Type    Alysa Munoz, PT Physical Therapist        Outcome Measures     Row Name 09/18/20 1139          How much help from another person do you currently need...    Turning from your back to your side while in flat bed without using bedrails?  3  -KH     Moving from lying on back to sitting on the side of a flat bed without bedrails?  2  -KH     Moving to and from a bed to a chair (including a wheelchair)?  2  -KH     Standing up from a chair using your arms (e.g., wheelchair, bedside chair)?  2  -KH     Climbing 3-5 steps with a railing?  1  -KH     To walk in hospital room?  1  -KH     AM-PAC 6 Clicks Score (PT)  11  -     Row Name 09/18/20 1139          Functional Assessment    Outcome Measure Options  AM-PAC 6 Clicks Basic Mobility (PT)  -       User Key  (r) = Recorded By, (t) = Taken By, (c) = Cosigned By    Initials Name Provider Type    Alysa Munoz PT Physical Therapist        Physical Therapy Education                 Title: PT OT SLP Therapies (Done)     Topic: Physical Therapy (Done)     Point: Mobility training (Done)     Learning Progress Summary            Patient Acceptance, E,D, VU,NR by  at 9/18/2020 1144                   Point: Home exercise program (Done)     Learning Progress Summary           Patient Acceptance, E,D, VU,NR by  at 9/18/2020 1144                   Point: Body mechanics (Done)     Learning Progress Summary           Patient Acceptance, E,D, VU,NR by  at 9/18/2020 1144                   Point: Precautions (Done)     Learning Progress Summary           Patient Acceptance, E,D, VU,NR by  at 9/18/2020 1144                               User Key     Initials Effective Dates Name Provider Type Discipline     02/05/19 -  Alysa Yuen, PT Physical Therapist PT              PT Recommendation and Plan  Planned Therapy Interventions (PT): balance training, bed mobility training, gait training, strengthening, transfer training, patient/family education  Plan of Care Reviewed With: patient, daughter  Outcome Summary: Pt admitted from SNF with anemia, Gi bleed. Pt's daughter reports she has been at SNF since last admission to hospital for CVA. Pt was walking up to 6 steps at rehab. Pt presents with generalized weakness, impaired balance, decrased endurance and difficulty walking. Pt would benefit from PT to address these impairments and work towards the outlined goals.     Time Calculation:   PT Charges     Row Name 09/18/20 1145             Time Calculation    Start Time  1030  -      Stop Time  1046  -      Time Calculation (min)  16 min  -      PT Received On  09/18/20  -      PT - Next Appointment  09/19/20  -      PT Goal Re-Cert Due Date  09/25/20  -         Time Calculation- PT    Total Timed Code Minutes- PT  8 minute(s)  -        User Key  (r) = Recorded By, (t) = Taken By, (c) = Cosigned By    Initials Name Provider Type    Alysa Munoz, PT Physical Therapist        Therapy Charges for Today     Code Description Service Date Service Provider Modifiers Qty    01751763324 HC PT EVAL MOD COMPLEXITY 2  9/18/2020 Alysa Yuen, PT GP 1    65435204868 HC PT THER PROC EA 15 MIN 9/18/2020 Alysa Yuen, PT GP 1          PT G-Codes  Outcome Measure Options: AM-PAC 6 Clicks Basic Mobility (PT)  AM-PAC 6 Clicks Score (PT): 11    Alysa Yuen, PT  9/18/2020

## 2020-09-18 NOTE — PROGRESS NOTES
"Physicians Statement of Medical Necessity for  Ambulance Transportation    GENERAL INFORMATION     Name: Jihan Teresa  YOB: 1933  Medicare #: see attached facesheet   Transport Date: 09/2020 (Valid for round trips this date, or for scheduled repetitive trips for 60 days from the date signed below.)  Origin: [facility] [id number]   Destination: [facility]   Is the Patient's stay covered under Medicare Part A (PPS/DRG?)Yes   Closest appropriate facility? Yes  If this a hosp-hosp transfer? No  Is this a hospice patient? No    MEDICAL NECESSITY QUESTIONAIRE    Ambulance Transportation is medically necessary only if other means of transportation are contraindicated or would be potentially harmful to the patient.  To meet this requirement, the patient must be either \"bed confined\" or suffer from a condition such that transport by means other than an ambulance is contraindicated by the patient's condition.  The following questions must be answered by the healthcare professional signing below for this form to be valid:     1) Describe the MEDICAL CONDITION (physical and/or mental) of this patient AT THE TIME OF AMBULANCE TRANSPORT that requires the patient to be transported in an ambulance, and why transport by other means is contraindicated by the patient's condition:   Past Medical History:   Diagnosis Date   • Anemia    • Aortic valve stenosis     S/p AVR in 09'   • Asthma    • Atelectasis    • CHF (congestive heart failure) (CMS/HCC)    • Congenital heart disease    • Diabetes mellitus (CMS/HCC)    • Esophageal reflux    • Essential hypertension    • HLD (hyperlipidemia)    • Hypotension    • LVH (left ventricular hypertrophy)    • Pleural effusion    • Pulmonary hypertension (CMS/HCC)       Past Surgical History:   Procedure Laterality Date   • AORTIC VALVE REPAIR/REPLACEMENT  11/14/2009    Jerry Colmenares MD   • CATARACT EXTRACTION     • CEREBRAL ANGIOGRAM N/A 8/4/2020    Procedure: CEREBRAL " "ANGIOGRAM;  Surgeon: Ar Bunch MD;  Location: [facility] HYBRID OR 18/19;  Service: Neurosurgery;  Laterality: N/A;   • ENDOSCOPY N/A 9/14/2020    Procedure: ESOPHAGOGASTRODUODENOSCOPY with biopsies;  Surgeon: Keyanna Lemus MD;  Location: [facility] ENDOSCOPY;  Service: Gastroenterology;  Laterality: N/A;  Pre: anemia, melena  Post: esophagitis, retained food, gastritis   • FEMORAL THROMBECTOMY/EMBOLECTOMY Right 8/12/2020    Procedure: FEMORAL THROMBECTOMY/EMBOLECTOMY;  Surgeon: Winsome Valentin Jr., MD;  Location: [facility] MAIN OR;  Service: Vascular;  Laterality: Right;   • KNEE ARTHROPLASTY        2) Is this patient \"bed confined\" as defined below?Yes    To be \"bed confined\" the patient must satisfy all three of the following criteria:  (1) unable to get up from bed without assistance; AND (2) unable to ambulate;  AND (3) unable to sit in a chair or wheelchair.  3) Can this patient safely be transported by car or wheelchair van (I.e., may safely sit during transport, without an attendant or monitoring?)No   4. In addition to completing questions 1-3 above, please check any of the following conditions that apply*:          *Note: supporting documentation for any boxes checked must be maintained in the patient's medical records Patient is confused, Moderate/severe pain on movement, Medical attendant required and Unable to tolerate seated position for time needed to transport      SIGNATURE OF PHYSICIAN OR OTHER AUTHORIZED HEALTHCARE PROFESSIONAL    I certify that the above information is true and correct based on my evaluation of this patient, and represent that the patient requires transport by ambulance and that other forms of transport are contraindicated.  I understand that this information will be used by the Centers for Medicare and Medicaid Services (CMS) to support the determiniation of medical necessity for ambulance services, and I represent that I have personal knowledge of the patient's condition " at the time of transport.    X   If this box is checked, I also certify that the patient is physically or mentally incapable of signing the ambulance service's claim form and that the institution with which I am affiliated has furnished care, services or assistance to the patient.  My signature below is made on behalf of the patient pursuant to 42 .36(b)(4). In accordance with 42 .37, the specific reason(s) that the patient is physically or mentally incapable of signing the claim for is as follows: confusion     Signature of Physician or Healthcare Professional  Date/Time:   09/2020     (For Scheduled repetitive transport, this form is not valid for transports performed more than 60 days after this date).                                                                                                                                            --------------------------------------------------------------------------------------------  Printed Name and Credentials of Physician or Authorized Healthcare Professional     *Form must be signed by patient's attending physician for scheduled, repetitive transports,.  For non-repetitive ambulance transports, if unable to obtain the signature of the attending physician, any of the following may sign (please select below):     Physician  Clinical Nurse Specialist  Registered Nurse     Physician Assistant  Discharge Planner  Licensed Practical Nurse     Nurse Practitioner

## 2020-09-18 NOTE — PROGRESS NOTES
[facility], [facility]  Inpatient Progress Note    Reason for Follow Up: Melena, anemia    Subjective     Interval History: Discussed with patient, discussed with RN.  H&H stable, no evidence of active GI bleeding.  Overall improvement from gastrointestinal standpoint.      Current Facility-Administered Medications:   •  acetaminophen (TYLENOL) tablet 650 mg, 650 mg, Oral, Q4H PRN, 650 mg at 09/13/20 1833 **OR** acetaminophen (TYLENOL) 160 MG/5ML solution 650 mg, 650 mg, Oral, Q4H PRN **OR** acetaminophen (TYLENOL) suppository 650 mg, 650 mg, Rectal, Q4H PRN, Keyanna Lemus MD  •  amLODIPine (NORVASC) tablet 10 mg, 10 mg, Oral, Q24H, Reyna Tolbert APRN, 10 mg at 09/18/20 1010  •  carbamide peroxide (DEBROX) 6.5 % otic solution 5 drop, 5 drop, Both Ears, BID, Efren Damian APRN  •  dextrose (D50W) 25 g/ 50mL Intravenous Solution 25 g, 25 g, Intravenous, Q15 Min PRN, Guido Khan MD  •  dextrose (GLUTOSE) oral gel 15 g, 15 g, Oral, Q15 Min PRN, Guido Khan MD  •  furosemide (LASIX) tablet 20 mg, 20 mg, Oral, Daily, Bogdan Mcmahan MD  •  glucagon (human recombinant) (GLUCAGEN DIAGNOSTIC) injection 1 mg, 1 mg, Subcutaneous, Q15 Min PRN, Guido Khan MD  •  heparin (porcine) 5000 UNIT/ML injection 1,800-3,600 Units, 30-60 Units/kg, Intravenous, Q6H PRN, Reyna Tolbert APRN  •  heparin 23330 units/250 mL (100 units/mL) in 0.45 % NaCl infusion, 12 Units/kg/hr, Intravenous, Titrated, Reyna Tolbert APRN, Last Rate: 5.42 mL/hr at 09/18/20 1329, 9 Units/kg/hr at 09/18/20 1329  •  ipratropium-albuterol (DUO-NEB) nebulizer solution 3 mL, 3 mL, Nebulization, Q4H PRN, Kirsten Polk, APRN, 3 mL at 09/18/20 0345  •  isosorbide mononitrate (IMDUR) 24 hr tablet 60 mg, 60 mg, Oral, Daily, Keyanna Lemus MD, 60 mg at 09/18/20 1010  •  levothyroxine (SYNTHROID, LEVOTHROID) tablet 75 mcg, 75 mcg, Oral, QAM, Keyanna Lemus MD, 75 mcg at  09/18/20 1010  •  metoprolol tartrate (LOPRESSOR) tablet 25 mg, 25 mg, Oral, Q12H, Keyanna Lemus MD, 25 mg at 09/18/20 0630  •  nitroglycerin (NITROSTAT) SL tablet 0.4 mg, 0.4 mg, Sublingual, Q5 Min PRN, Keyanna Lemus MD  •  ondansetron (ZOFRAN) injection 4 mg, 4 mg, Intravenous, Q6H PRN, Keyanna Lemus MD, 4 mg at 09/14/20 0656  •  pantoprazole (PROTONIX) EC tablet 40 mg, 40 mg, Oral, BID AC, Keyanna Lemus MD, 40 mg at 09/18/20 0630  •  Pharmacy to dose warfarin, , Does not apply, Continuous PRN, Guido Khan MD  •  Pharmacy to dose warfarin, , Does not apply, Continuous PRN, Reyna Tolbert APRN  •  sodium chloride 0.9 % flush 10 mL, 10 mL, Intravenous, Q12H, Keyanna Lemus MD, 10 mL at 09/16/20 2117  •  sodium chloride 0.9 % flush 10 mL, 10 mL, Intravenous, PRN, Keyanna Lemus MD  •  sodium chloride 0.9 % flush 10 mL, 10 mL, Intravenous, Q12H, Keyanna Lemus MD, 10 mL at 09/17/20 2057  •  sodium chloride 0.9 % flush 10 mL, 10 mL, Intravenous, Q12H, Keyanna Lemus MD, 10 mL at 09/17/20 2056  •  sodium chloride 0.9 % flush 10 mL, 10 mL, Intravenous, PRN, Keyanna Lemus MD  •  sodium chloride 0.9 % flush 20 mL, 20 mL, Intravenous, PRN, Keyanna Lemus MD  •  sodium chloride 0.9 % infusion, 50 mL/hr, Intravenous, Continuous, Keyanna Lemus MD, Last Rate: 50 mL/hr at 09/18/20 0632, 50 mL/hr at 09/18/20 0632  •  warfarin (COUMADIN) (dosing per levels), , Does not apply, Daily, Guido Khan MD  Review of Systems:    All systems were reviewed and negative except for:  Gastrointestinal: positive for  See HPI    Objective     Vital Signs  Temp:  [97.5 °F (36.4 °C)-98.7 °F (37.1 °C)] 97.5 °F (36.4 °C)  Heart Rate:  [58-75] 74  Resp:  [18] 18  BP: (135-160)/(68-88) 144/84  Body mass index is 25.14 kg/m².    Intake/Output Summary (Last 24 hours) at 9/18/2020 1357  Last data filed at 9/18/2020 0900  Gross per 24 hour   Intake 360 ml   Output 500 ml   Net -140 ml     I/O  this shift:  In: 120 [P.O.:120]  Out: -      Physical Exam:   General: patient awake, alert and cooperative   Eyes: Normal lids and lashes, no scleral icterus   Neck: supple, normal ROM   Skin: warm and dry, not jaundiced   Cardiovascular: regular rhythm and rate, no murmurs auscultated   Pulm: clear to auscultation bilaterally, regular and unlabored   Abdomen: soft, nontender, nondistended; normal bowel sounds   Extremities: no rash or edema   Psychiatric: Normal mood and behavior; memory intact     Results Review:     I reviewed the patient's new clinical results.    Results from last 7 days   Lab Units 09/18/20  0754 09/17/20  2110 09/17/20  0743   WBC 10*3/mm3 6.35 4.90 5.75   HEMOGLOBIN g/dL 9.2* 8.5* 8.6*   HEMATOCRIT % 28.0* 26.5* 26.1*   PLATELETS 10*3/mm3 152 146 138*     Results from last 7 days   Lab Units 09/15/20  0611 09/14/20  0531 09/13/20  0047  09/11/20  1740   SODIUM mmol/L 139 136 138   < > 138   POTASSIUM mmol/L 3.5 3.4* 3.5   < > 3.6   CHLORIDE mmol/L 109* 110* 109*   < > 104   CO2 mmol/L 20.7* 20.5* 21.8*   < > 25.2   BUN mg/dL 12 16 29*   < > 56*   CREATININE mg/dL 0.80 0.70 0.81   < > 1.07*   CALCIUM mg/dL 7.9* 8.1* 8.3*   < > 8.4*   BILIRUBIN mg/dL  --   --  0.5  --  0.3   ALK PHOS U/L  --   --  36*  --  38*   ALT (SGPT) U/L  --   --  6  --  7   AST (SGOT) U/L  --   --  6  --  8   GLUCOSE mg/dL 87 103* 89   < > 115*    < > = values in this interval not displayed.     Results from last 7 days   Lab Units 09/18/20  0754 09/17/20  0743 09/16/20  1545   INR  1.38* 1.38* 1.60*     No results found for: LIPASE    Radiology:  CT Head Without Contrast   Final Result       Stable findings when compared to the previous CT scan of head dated   09/01/2020.       There is old lacunar disease within the left thalamus and the lentiform   nuclei. Multiple chronic infarcts are identified within the cerebellar   hemispheres, left greater than right. The largest contiguous area of   chronic infarction is seen  within the left cerebellar hemisphere within   the left PICA distribution. Severe changes of chronic small vessel   ischemic phenomena are noted.       Again noted is a 4 mm hyperdense focus at the level of the anterior   aspect of the third ventricle which was noted as a site of hemosiderin   deposition on the prior MRI of the brain dated 08/18/2020.       Note is made of complete opacification of both mastoid air cells and the   middle ear cavities.       If further assessment for intracranial pathology is indicated, one could   obtain an MRI of the brain for further evaluation.       Radiation dose reduction techniques were utilized, including automated   exposure control and exposure modulation based on body size.       This report was finalized on 9/16/2020 11:15 AM by Dr. Abram Branch M.D.          XR Chest Post CVA Port   Final Result      XR Chest 1 View   Final Result      CT Abdomen Pelvis With Contrast   Final Result          Assessment/Plan     Patient Active Problem List   Diagnosis   • Type 2 diabetes mellitus (CMS/HCC)   • Aortic valve replaced, equine valve   • Cerebral infarction (CMS/HCC)   • A-fib (CMS/HCC)   • GERD without esophagitis   • Sleep apnea in adult   • CHF (congestive heart failure) (CMS/HCC)   • Cognitive dysfunction   • Hypothyroidism (acquired)   • History of recurrent UTIs   • Mild reactive airways disease   • Essential hypertension   • Pulmonary hypertension (CMS/HCC)   • B12 deficiency   • Non-rheumatic mitral regurgitation   • Non-rheumatic tricuspid valve insufficiency   • Chronic anemia   • Grade III hemorrhoids   • ICH (intracerebral hemorrhage) (CMS/HCC)   • 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)   • Acute upper gastrointestinal bleeding   • HLD (hyperlipidemia)   • AMADEO (acute kidney injury) (CMS/HCC)   • Acute blood loss anemia   • Supratherapeutic INR   • Melena   • Blood loss anemia   • Esophagitis, [place] grade C   • Gastritis    • Duodenitis   • Bilateral hearing loss       Assessment:  1. Melena: Evidence of erosive esophagitis, gastritis, duodenitis  2. Acute blood loss anemia: H&H stable      Plan:  · Continue PPI  · Nothing further to add at this point, will sign off but available as needed  I discussed the patients findings and my recommendations with patient and nursing staff.    Alen Bruno MD

## 2020-09-18 NOTE — PROGRESS NOTES
Pharmacy Consult: Warfarin Dosing/ Monitoring    Jihan Teresa is a 87 y.o. female, estimated creatinine clearance is 41.3 mL/min (by C-G formula based on SCr of 0.8 mg/dL). weighing 60.3 kg (133 lb).     has a past medical history of Anemia, Aortic valve stenosis, Asthma, Atelectasis, CHF (congestive heart failure) (CMS/[facility]), Congenital heart disease, Diabetes mellitus (CMS/[facility]), Esophageal reflux, Essential hypertension, HLD (hyperlipidemia), Hypotension, LVH (left ventricular hypertrophy), Pleural effusion, and Pulmonary hypertension (CMS/[facility]).    Social History     Tobacco Use    Smoking status: Never Smoker    Smokeless tobacco: Never Used   Substance Use Topics    Alcohol use: Yes     Comment: ocasional    Drug use: No     Results from last 7 days   Lab Units 09/18/20  0754 09/17/20  2110 09/17/20  0743 09/16/20  2240 09/16/20  1545 09/16/20  0532 09/15/20  1718 09/15/20  0611  09/14/20  0531  09/13/20  0047  09/11/20  1740   INR  1.38*  --  1.38*  --  1.60* 1.31*  --  1.34*  --  1.38*  --  1.49*   < > 8.77*   APTT seconds 85.6*  --  83.7* 150.7* 30.5  --   --   --   --   --   --   --   --  48.9*   HEMOGLOBIN g/dL 9.2* 8.5* 8.6* 8.9* 8.7* 9.0* 7.4* 7.0*   < > 7.2*   < > 7.1*   < > 5.8*   HEMATOCRIT % 28.0* 26.5* 26.1* 26.0* 25.7* 27.0* 23.1* 21.0*   < > 22.3*   < > 21.4*   < > 17.9*   PLATELETS 10*3/mm3 152 146 138* 141 131* 133* 135* 125*   < > 129*   < > 109*   < > 132*    < > = values in this interval not displayed.     Results from last 7 days   Lab Units 09/15/20  0611 09/14/20  0531 09/13/20  0047  09/11/20  1740   SODIUM mmol/L 139 136 138   < > 138   POTASSIUM mmol/L 3.5 3.4* 3.5   < > 3.6   CHLORIDE mmol/L 109* 110* 109*   < > 104   CO2 mmol/L 20.7* 20.5* 21.8*   < > 25.2   BUN mg/dL 12 16 29*   < > 56*   CREATININE mg/dL 0.80 0.70 0.81   < > 1.07*   CALCIUM mg/dL 7.9* 8.1* 8.3*   < > 8.4*   BILIRUBIN mg/dL  --   --  0.5  --  0.3   ALK PHOS U/L  --   --  36*  --  38*   ALT (SGPT) U/L  --   --  6   --  7   AST (SGOT) U/L  --   --  6  --  8   GLUCOSE mg/dL 87 103* 89   < > 115*    < > = values in this interval not displayed.     Anticoagulation history: Her INR was elevated at admission I'm not sure what the dose was at the NH p/t warfarin being held   _________________________last warfarin consult below    Anticoagulation history: 3mg TuWeFrSaSu, 1.5mg [facility]     Hospital Anticoagulation:  Consulting provider: Dr. Evans  Start date: 8/11/2020  Indication: AFib,  R arterial occlusion d/t cardiac embolis 8/12 Right femoral thromboembolectomy   Target INR: 2-3  Expected duration: lifelong   Bridge Therapy: completed heparin bridge      Date 8/11 8/12 8/13 8/14  8/15 8/16 8/17 8/18 8/19 8/20   INR 1.3 1.24 1.32 1.4 1.44 1.89 2.34 2.27 2.52 2.8    Warfarin dose Not able to take po In OR not given  1.5 mg   3 mg 3 mg 3 mg HOLD  3 mg  1.5 mg  HOLD      Date 8/21 8/22 8/23                 INR 2.86   2.77  2.03                 Warfarin dose HOLD  1mg  2mg                    _________________________  Hospital Anticoagulation:  Consulting provider: Bill   Start date: 9/16  Indication: afib   Target INR: trying for INR 1.9-2.5 ideally  Expected duration: life    Bridge Therapy: Yes              and unfractionated heparin  Date 9/16 9/17 9/18           INR 1.60 1.38 1.38          Warfarin dose 1 mg  3 mg  3 mg             Potential drug interactions: levothyroxine, protonix, APAP     Relevant nutrition status: regular diet, boost supplements    Other: pt admitted w/ GIB and inr 8.77 from [facility], her INR had been high for several days and apparently they did give vitamin k (I'm unsure what dose) ER hgb was 6.4     Education complete?/ Date: na    Assessment/Plan:  Again, its unclear what dosing caused this high of an INR I can see her dosing hx from prior admit and copied above. She also has had poor po intake and egd 9/14 so I will be conservative w/ dosing     Warfarin 3 mg x1  INR daily     Pharmacy will  continue to follow until discharge or discontinuation of warfarin.   Pushpa Montanez RPH  9/18/2020

## 2020-09-18 NOTE — PROGRESS NOTES
EGD biopsies show inflammation of the GE junction, stomach, and duodenum, no evidence of H. pylori infection.  No Santiago's esophagus.    Continue PPI    No NSAIDs.

## 2020-09-19 NOTE — PLAN OF CARE
Goal Outcome Evaluation:  Plan of Care Reviewed With: patient  Progress: no change  Outcome Summary: vss, no c/o pain, AxO to self. heparin gtt continued, turned Q2, pt rested much better tonight. labs in am. will continue to monitor.

## 2020-09-19 NOTE — PROGRESS NOTES
Pharmacy Consult: Warfarin Dosing/ Monitoring    Jihan Teresa is a 87 y.o. female, estimated creatinine clearance is 41.3 mL/min (by C-G formula based on SCr of 0.61 mg/dL). weighing 60.3 kg (133 lb).     has a past medical history of Anemia, Aortic valve stenosis, Asthma, Atelectasis, CHF (congestive heart failure) (CMS/[facility]), Congenital heart disease, Diabetes mellitus (CMS/[facility]), Esophageal reflux, Essential hypertension, HLD (hyperlipidemia), Hypotension, LVH (left ventricular hypertrophy), Pleural effusion, and Pulmonary hypertension (CMS/[facility]).    Social History     Tobacco Use    Smoking status: Never Smoker    Smokeless tobacco: Never Used   Substance Use Topics    Alcohol use: Yes     Comment: ocasional    Drug use: No     Results from last 7 days   Lab Units 09/19/20  0812 09/18/20 1959 09/18/20  0754 09/17/20  2110 09/17/20  0743 09/16/20  2240 09/16/20  1545 09/16/20  0532  09/15/20  0611  09/14/20  0531   INR  1.81*  --  1.38*  --  1.38*  --  1.60* 1.31*  --  1.34*  --  1.38*   APTT seconds 117.5*  --  85.6*  --  83.7* 150.7* 30.5  --   --   --   --   --    HEMOGLOBIN g/dL 9.6* 9.4* 9.2* 8.5* 8.6* 8.9* 8.7* 9.0*   < > 7.0*   < > 7.2*   HEMATOCRIT % 29.0* 28.9* 28.0* 26.5* 26.1* 26.0* 25.7* 27.0*   < > 21.0*   < > 22.3*   PLATELETS 10*3/mm3 188 179 152 146 138* 141 131* 133*   < > 125*   < > 129*    < > = values in this interval not displayed.     Results from last 7 days   Lab Units 09/19/20  0812 09/15/20  0611 09/14/20  0531 09/13/20  0047   SODIUM mmol/L 137 139 136 138   POTASSIUM mmol/L 3.0* 3.5 3.4* 3.5   CHLORIDE mmol/L 100 109* 110* 109*   CO2 mmol/L 26.5 20.7* 20.5* 21.8*   BUN mg/dL 2* 12 16 29*   CREATININE mg/dL 0.61 0.80 0.70 0.81   CALCIUM mg/dL 8.0* 7.9* 8.1* 8.3*   BILIRUBIN mg/dL  --   --   --  0.5   ALK PHOS U/L  --   --   --  36*   ALT (SGPT) U/L  --   --   --  6   AST (SGOT) U/L  --   --   --  6   GLUCOSE mg/dL 81 87 103* 89     Anticoagulation history: Her INR was elevated at  admission I'm not sure what the dose was at the NH p/t warfarin being held   _________________________last warfarin consult below    Anticoagulation history: 3mg TuWeFrSaSu, 1.5mg [facility]     Hospital Anticoagulation:  Consulting provider: Dr. Evans  Start date: 8/11/2020  Indication: AFib,  R arterial occlusion d/t cardiac embolis 8/12 Right femoral thromboembolectomy   Target INR: 2-3  Expected duration: lifelong   Bridge Therapy: completed heparin bridge      Date 8/11 8/12 8/13 8/14  8/15 8/16 8/17 8/18 8/19 8/20   INR 1.3 1.24 1.32 1.4 1.44 1.89 2.34 2.27 2.52 2.8    Warfarin dose Not able to take po In OR not given  1.5 mg   3 mg 3 mg 3 mg HOLD  3 mg  1.5 mg  HOLD      Date 8/21 8/22 8/23                 INR 2.86   2.77  2.03                 Warfarin dose HOLD  1mg  2mg                    _________________________  Hospital Anticoagulation:  Consulting provider: Bill   Start date: 9/16  Indication: afib   Target INR: trying for INR 1.9-2.5 ideally  Expected duration: life    Bridge Therapy: Yes              and unfractionated heparin  Date 9/16 9/17 9/18 9/19         INR 1.60 1.38 1.38 1.81         Warfarin dose 1 mg  3 mg  3 mg             Potential drug interactions: levothyroxine, protonix, APAP     Relevant nutrition status: regular diet, boost supplements    Other: pt admitted w/ GIB and inr 8.77 from [facility], her INR had been high for several days and apparently they did give vitamin k (I'm unsure what dose) ER hgb was 6.4     Education complete?/ Date: na    Assessment/Plan:  INR has increased to 1.81 this am and is close to therapeutic goal of 1.9-2.5.  Will redose with 3 mg today to hopefully get INR therapeutic tomorrow in anticipation of discharge home    Warfarin 3 mg x1  INR daily     Pharmacy will continue to follow until discharge or discontinuation of warfarin.   Kareem Rahman [facility]  9/19/2020

## 2020-09-19 NOTE — SIGNIFICANT NOTE
09/19/20 1634   OTHER   Discipline physical therapist   Rehab Time/Intention   Session Not Performed patient/family declined treatment  (Pt sleep, daughter in room states pt has been very lethargic and doesn't think pt will wake up enough to participate. PT will follow up tomorrow)   Recommendation   PT - Next Appointment 09/20/20

## 2020-09-19 NOTE — PROGRESS NOTES
LOS: 8 days     Name: Jihan Teresa  Age/Sex: 87 y.o. female  :  1933        PCP: Yaron Ca Jr., MD  Chief Complaint   Patient presents with   • Abnormal Lab      Subjective   Family is present at the bedside.  She did sleep better overnight but is been pretty sleepy this morning.  She does arouse but quickly goes back to sleep.  Per family this is an entirely unusual if she has a day or 2 where she does not sleep well.  General: No Fever or Chills, Cardiac: No Chest Pain or Palpitations, Resp: No Cough or SOA, GI: No Nausea, Vomiting, or Diarrhea and Other: No bleeding    amLODIPine, 10 mg, Oral, Q24H  carbamide peroxide, 5 drop, Both Ears, BID  furosemide, 20 mg, Oral, Daily  isosorbide mononitrate, 60 mg, Oral, Daily  levothyroxine, 75 mcg, Oral, QAM  metoprolol tartrate, 25 mg, Oral, Q12H  pantoprazole, 40 mg, Oral, BID AC  [START ON 2020] potassium chloride, 10 mEq, Oral, Daily  sodium chloride, 10 mL, Intravenous, Q12H  sodium chloride, 10 mL, Intravenous, Q12H  sodium chloride, 10 mL, Intravenous, Q12H  warfarin (COUMADIN) (dosing per levels), , Does not apply, Daily  warfarin, 3 mg, Oral, Once      heparin (porcine), 12 Units/kg/hr, Last Rate: Stopped (20 1058)  Pharmacy to dose warfarin,   Pharmacy to dose warfarin,         Objective   Vital Signs  Temp:  [97.8 °F (36.6 °C)-98.9 °F (37.2 °C)] 98.1 °F (36.7 °C)  Heart Rate:  [59-72] 59  Resp:  [18] 18  BP: (150-163)/(67-75) 160/74  Body mass index is 25.14 kg/m².    Intake/Output Summary (Last 24 hours) at 2020 1423  Last data filed at 2020 0858  Gross per 24 hour   Intake 0 ml   Output 2600 ml   Net -2600 ml       Physical Exam  Vitals signs and nursing note reviewed.   Constitutional:       General: She is not in acute distress.     Appearance: She is well-developed and well-nourished.      Comments: Very hard of hearing   HENT:      Head: Normocephalic and atraumatic.   Cardiovascular:      Rate and Rhythm: Normal  rate. Rhythm irregularly irregular.      Heart sounds: S1 normal and S2 normal.   Pulmonary:      Effort: Pulmonary effort is normal. No respiratory distress.      Breath sounds: Normal breath sounds.   Abdominal:      General: Bowel sounds are normal. There is no distension.      Palpations: Abdomen is soft.   Musculoskeletal:         General: No edema.   Skin:     General: Skin is warm and dry.   Neurological:      Mental Status: She is alert.      Cranial Nerves: No cranial nerve deficit.   Psychiatric:         Mood and Affect: Mood and affect normal.           Results Review:       I reviewed the patient's new clinical results.  Results from last 7 days   Lab Units 09/19/20  0812 09/18/20  1959 09/18/20  0754 09/17/20  2110 09/17/20  0743 09/16/20  2240 09/16/20  1545   WBC 10*3/mm3 4.26 5.23 6.35 4.90 5.75 6.54 5.66   HEMOGLOBIN g/dL 9.6* 9.4* 9.2* 8.5* 8.6* 8.9* 8.7*   PLATELETS 10*3/mm3 188 179 152 146 138* 141 131*     Results from last 7 days   Lab Units 09/19/20  0812 09/15/20  0611 09/14/20  0531 09/13/20  0047   SODIUM mmol/L 137 139 136 138   POTASSIUM mmol/L 3.0* 3.5 3.4* 3.5   CHLORIDE mmol/L 100 109* 110* 109*   CO2 mmol/L 26.5 20.7* 20.5* 21.8*   BUN mg/dL 2* 12 16 29*   CREATININE mg/dL 0.61 0.80 0.70 0.81   CALCIUM mg/dL 8.0* 7.9* 8.1* 8.3*   MAGNESIUM mg/dL  --  1.7  --   --    Estimated Creatinine Clearance: 41.3 mL/min (by C-G formula based on SCr of 0.61 mg/dL).    Lab Results   Component Value Date    HGBA1C 6.8 (H) 01/09/2020    HGBA1C 6.60 (H) 11/14/2017    HGBA1C 6.07 (H) 06/13/2017     Glucose   Date/Time Value Ref Range Status   09/18/2020 1117 133 (H) 70 - 130 mg/dL Final   09/17/2020 1122 119 70 - 130 mg/dL Final   09/16/2020 1625 83 70 - 130 mg/dL Final     Results from last 7 days   Lab Units 09/19/20  0812 09/18/20  0754 09/17/20  0743   INR  1.81* 1.38* 1.38*         Assessment/Plan   Active Hospital Problems    Diagnosis  POA   • **Acute blood loss anemia [D62]  Yes   • Bilateral  hearing loss [H91.93]  Yes   • Esophagitis, [place] grade C [K20.8]  Yes   • Gastritis [K29.70]  Yes   • Duodenitis [K29.80]  Yes   • Acute upper gastrointestinal bleeding [K92.2]  Yes   • HLD (hyperlipidemia) [E78.5]  Yes   • AMADEO (acute kidney injury) (CMS/[facility]) [N17.9]  Yes   • Supratherapeutic INR [R79.1]  Yes   • Melena [K92.1]  Unknown   • Blood loss anemia [D50.0]  Unknown   • Pulmonary hypertension (CMS/[facility]) [I27.20]  Yes   • Type 2 diabetes mellitus (CMS/[facility]) [E11.9]  Yes   • Hypothyroidism (acquired) [E03.9]  Yes   • Essential hypertension [I10]  Yes   • A-fib (CMS/[facility]) [I48.91]  Yes   • CHF (congestive heart failure) (CMS/[facility]) [I50.9]  Yes      Resolved Hospital Problems   No resolved problems to display.       PLAN  This is an 87-year-old female with a history of atrial fibrillation and valve replacement who presents to the hospital with acute blood loss anemia secondary to circulating anticoagulants and GI bleeding  1.  Acute blood loss anemia  -Hemoglobin stable currently  -No signs of active bleeding  2.  GI bleed  -Probably secondary to grade C esophagitis gastritis and duodenitis  -Also secondary to circulating anticoagulants on warfarin on admission  3.  High risk anticoagulation  -She is on a anticoagulation chronically  -continue heparin drip INR remains stable but subtherapeutic  4.  Atrial fibrillation/heart failure/bioprosthetic aortic valve replacement  -Cardiology is following heart rate controlled  5.  Diabetes  -Blood sugar stable  6.  Hypothyroidism  -TSH within normal limits  7.  Hearing loss  -ENT consulted but this is not emergent and can be followed up in the outpatient setting    Disposition  Hopefully able to discharge over the weekend once her INR is therapeutic      Tim Sheppard MD  [facility]  09/19/20  14:23 EDT

## 2020-09-19 NOTE — PROGRESS NOTES
"   LOS: 8 days   Patient Care Team:  Yaron Ca Jr., MD as PCP - General (Family Medicine)  Yaron Ca Jr., MD as PCP - Claims Attributed      Chief Complaint:  Following for a fib, CAD       Interval History:   Patient remains in rate controlled atrial fibrillation.  She is being bridged from heparin to warfarin.  Pharmacy is dosing warfarin.  Hemoglobin remains stable.  Potassium is low this morning--replacement protocol ordered.  Family member at bedside.          Objective   Vital Signs  Temp:  [97.5 °F (36.4 °C)-98.9 °F (37.2 °C)] 98.1 °F (36.7 °C)  Heart Rate:  [59-74] 59  Resp:  [18] 18  BP: (144-163)/(67-84) 160/74    Intake/Output Summary (Last 24 hours) at 9/19/2020 1046  Last data filed at 9/19/2020 0858  Gross per 24 hour   Intake 0 ml   Output 2600 ml   Net -2600 ml       Last Weight and Admission Weight        09/12/20  1004   Weight: (133# 9/11)     Flowsheet Rows      First Filed Value   Admission Height  154.9 cm (61\") Documented at 09/11/2020 1721   Admission Weight  68.9 kg (152 lb) Documented at 09/11/2020 1721                Physical Exam  Constitutional:       General: She is not in acute distress.     Appearance: She is well-developed. She is not diaphoretic.      Comments: Hard of hearing   HENT:      Head: Normocephalic and atraumatic.   Neck:      Musculoskeletal: Neck supple.   Cardiovascular:      Rate and Rhythm: Normal rate. Rhythm irregularly irregular.      Heart sounds: Murmur present. Systolic murmur present with a grade of 1/6. No friction rub. No gallop.    Pulmonary:      Effort: Pulmonary effort is normal. No respiratory distress.      Breath sounds: Normal breath sounds.   Abdominal:      General: Bowel sounds are normal.      Palpations: Abdomen is soft.   Musculoskeletal:         General: No edema.   Skin:     General: Skin is warm and dry.      Findings: No erythema.               Results Review:      Results from last 7 days   Lab Units 09/19/20  0812 09/15/20  0611 " 09/14/20  0531   SODIUM mmol/L 137 139 136   POTASSIUM mmol/L 3.0* 3.5 3.4*   CHLORIDE mmol/L 100 109* 110*   CO2 mmol/L 26.5 20.7* 20.5*   BUN mg/dL 2* 12 16   CREATININE mg/dL 0.61 0.80 0.70   GLUCOSE mg/dL 81 87 103*   CALCIUM mg/dL 8.0* 7.9* 8.1*     Results from last 7 days   Lab Units 09/15/20  0611   TROPONIN T ng/mL <0.010     Results from last 7 days   Lab Units 09/19/20  0812 09/18/20 1959 09/18/20  0754   WBC 10*3/mm3 4.26 5.23 6.35   HEMOGLOBIN g/dL 9.6* 9.4* 9.2*   HEMATOCRIT % 29.0* 28.9* 28.0*   PLATELETS 10*3/mm3 188 179 152     Results from last 7 days   Lab Units 09/19/20  0812 09/18/20 0754 09/17/20  0743   INR  1.81* 1.38* 1.38*   APTT seconds 117.5* 85.6* 83.7*         Results from last 7 days   Lab Units 09/15/20  0611   MAGNESIUM mg/dL 1.7           I reviewed the patient's new clinical results.  I reviewed patient's EKG/Telemetry data.              Medication Review:   amLODIPine, 10 mg, Oral, Q24H  carbamide peroxide, 5 drop, Both Ears, BID  furosemide, 20 mg, Oral, Daily  isosorbide mononitrate, 60 mg, Oral, Daily  levothyroxine, 75 mcg, Oral, QAM  metoprolol tartrate, 25 mg, Oral, Q12H  pantoprazole, 40 mg, Oral, BID AC  sodium chloride, 10 mL, Intravenous, Q12H  sodium chloride, 10 mL, Intravenous, Q12H  sodium chloride, 10 mL, Intravenous, Q12H  warfarin (COUMADIN) (dosing per levels), , Does not apply, Daily        heparin (porcine), 12 Units/kg/hr, Last Rate: 9 Units/kg/hr (09/18/20 1329)  Pharmacy to dose warfarin,   Pharmacy to dose warfarin,             Assessment/Plan   1. Chronic atrial fibrillation: Anticoagulated with warfarin  2. Hypertension: Also high this morning but has not had morning medications yet.  Recheck after medications.  3. Anemia: GI following.  Patient on PPI.  No NSAIDs, per GI.  4. Bioprosthetic aortic valve  5. Coronary artery disease  6. Hearing loss: ENT was consulted by medicine team  7. Diabetes  8. Hypothyroidism  9. Hypokalemia: Replace  potassium.      -Remains in rate controlled atrial fibrillation.  -Pharmacy dosing warfarin.  INR still subtherapeutic.  -Replace potassium and start daily low-dose potassium starting tomorrow.        Thanks,    Dimple Rosales DNP, APRN  09/19/20  10:46 EDT

## 2020-09-20 NOTE — PLAN OF CARE
Vss, 2L humidified NC, purewick in place. Afib on monitor. Heparin gtt stopped per order for therapeutic INR. Hemoglobin remains stable, no signs of bleeding. Family at bedside. Hopefully to be discharged in the next day or two. CTM

## 2020-09-20 NOTE — PLAN OF CARE
Goal Outcome Evaluation:  Plan of Care Reviewed With: patient  Progress: improving  Outcome Summary: vss, no c/o pain, A x O to place and slef, heparin gtt, turned q2, pt rested well awake off and on. finished replacing mg. labs in am. will continue to monitor.

## 2020-09-20 NOTE — PROGRESS NOTES
LOS: 9 days     Name: Jihan Teresa  Age/Sex: 87 y.o. female  :  1933        PCP: Yaron Ca Jr., MD  Chief Complaint   Patient presents with   • Abnormal Lab      Subjective   Family is present at the bedside.  More awake and doing better today  General: No Fever or Chills, Cardiac: No Chest Pain or Palpitations, Resp: No Cough or SOA, GI: No Nausea, Vomiting, or Diarrhea and Other: No bleeding    amLODIPine, 10 mg, Oral, Q24H  carbamide peroxide, 5 drop, Both Ears, BID  furosemide, 20 mg, Oral, Daily  isosorbide mononitrate, 60 mg, Oral, Daily  levothyroxine, 75 mcg, Oral, QAM  metoprolol tartrate, 25 mg, Oral, Q12H  pantoprazole, 40 mg, Oral, BID AC  potassium chloride, 10 mEq, Oral, Daily  sodium chloride, 10 mL, Intravenous, Q12H  sodium chloride, 10 mL, Intravenous, Q12H  sodium chloride, 10 mL, Intravenous, Q12H  warfarin (COUMADIN) (dosing per levels), , Does not apply, Daily      heparin (porcine), 12 Units/kg/hr, Last Rate: 7 Units/kg/hr (20 0222)  Pharmacy to dose warfarin,   Pharmacy to dose warfarin,         Objective   Vital Signs  Temp:  [97.8 °F (36.6 °C)-98.7 °F (37.1 °C)] 98.6 °F (37 °C)  Heart Rate:  [65-76] 67  Resp:  [18] 18  BP: (122-162)/(73-83) 122/74  Body mass index is 25.14 kg/m².    Intake/Output Summary (Last 24 hours) at 2020 0846  Last data filed at 2020 0500  Gross per 24 hour   Intake 120 ml   Output 1600 ml   Net -1480 ml       Physical Exam  Vitals signs and nursing note reviewed.   Constitutional:       General: She is not in acute distress.     Appearance: She is well-developed and well-nourished.      Comments: Very hard of hearing   HENT:      Head: Normocephalic and atraumatic.   Cardiovascular:      Rate and Rhythm: Normal rate. Rhythm irregularly irregular.      Heart sounds: S1 normal and S2 normal.   Pulmonary:      Effort: Pulmonary effort is normal. No respiratory distress.      Breath sounds: Normal breath sounds.   Abdominal:       General: Bowel sounds are normal. There is no distension.      Palpations: Abdomen is soft.   Musculoskeletal:         General: No edema.   Skin:     General: Skin is warm and dry.   Neurological:      Mental Status: She is alert.      Cranial Nerves: No cranial nerve deficit.   Psychiatric:         Mood and Affect: Mood and affect normal.           Results Review:       I reviewed the patient's new clinical results.  Results from last 7 days   Lab Units 09/20/20  0733 09/19/20  1811 09/19/20  0812 09/18/20  1959 09/18/20  0754 09/17/20  2110 09/17/20  0743   WBC 10*3/mm3 4.59 4.27 4.26 5.23 6.35 4.90 5.75   HEMOGLOBIN g/dL 9.4* 9.5* 9.6* 9.4* 9.2* 8.5* 8.6*   PLATELETS 10*3/mm3 171 166 188 179 152 146 138*     Results from last 7 days   Lab Units 09/20/20  0733 09/19/20  0812 09/15/20  0611 09/14/20  0531   SODIUM mmol/L 134* 137 139 136   POTASSIUM mmol/L 3.8 3.0* 3.5 3.4*   CHLORIDE mmol/L 99 100 109* 110*   CO2 mmol/L 26.9 26.5 20.7* 20.5*   BUN mg/dL 2* 2* 12 16   CREATININE mg/dL 0.54* 0.61 0.80 0.70   CALCIUM mg/dL 8.0* 8.0* 7.9* 8.1*   MAGNESIUM mg/dL  --   --  1.7  --    Estimated Creatinine Clearance: 41.3 mL/min (A) (by C-G formula based on SCr of 0.54 mg/dL (L)).    Lab Results   Component Value Date    HGBA1C 6.8 (H) 01/09/2020    HGBA1C 6.60 (H) 11/14/2017    HGBA1C 6.07 (H) 06/13/2017     Glucose   Date/Time Value Ref Range Status   09/18/2020 1117 133 (H) 70 - 130 mg/dL Final   09/17/2020 1122 119 70 - 130 mg/dL Final     Results from last 7 days   Lab Units 09/20/20  0826 09/19/20  0812 09/18/20  0754   INR  2.18* 1.81* 1.38*         Assessment/Plan   Active Hospital Problems    Diagnosis  POA   • **Acute blood loss anemia [D62]  Yes   • Bilateral hearing loss [H91.93]  Yes   • Esophagitis, [place] grade C [K20.8]  Yes   • Gastritis [K29.70]  Yes   • Duodenitis [K29.80]  Yes   • Acute upper gastrointestinal bleeding [K92.2]  Yes   • HLD (hyperlipidemia) [E78.5]  Yes   • AMADEO (acute kidney injury)  (CMS/HCC) [N17.9]  Yes   • Supratherapeutic INR [R79.1]  Yes   • Melena [K92.1]  Unknown   • Blood loss anemia [D50.0]  Unknown   • Pulmonary hypertension (CMS/HCC) [I27.20]  Yes   • Type 2 diabetes mellitus (CMS/HCC) [E11.9]  Yes   • Hypothyroidism (acquired) [E03.9]  Yes   • Essential hypertension [I10]  Yes   • A-fib (CMS/[facility]) [I48.91]  Yes   • CHF (congestive heart failure) (CMS/[facility]) [I50.9]  Yes      Resolved Hospital Problems   No resolved problems to display.       PLAN  This is an 87-year-old female with a history of atrial fibrillation and valve replacement who presents to the hospital with acute blood loss anemia secondary to circulating anticoagulants and GI bleeding  1.  Acute blood loss anemia  -Hemoglobin stable currently  -No signs of active bleeding  2.  GI bleed  -Probably secondary to grade C esophagitis gastritis and duodenitis  -Also secondary to circulating anticoagulants on warfarin on admission  3.  High risk anticoagulation  -She is on a anticoagulation chronically  -INR>2 will ask cardiology if they are ok discontinuing the heparin gtt and hopefully stop  4.  Atrial fibrillation/heart failure/bioprosthetic aortic valve replacement  -Cardiology is following heart rate controlled  5.  Diabetes  -Blood sugar stable  6.  Hypothyroidism  -TSH within normal limits  7.  Hearing loss  -ENT consulted but this is not emergent and can be followed up in the outpatient setting    Disposition  Hopefully able to discharge over the weekend once her INR is therapeutic      Tim Sheppard MD  [facility]  09/20/20  08:46 EDT

## 2020-09-20 NOTE — THERAPY TREATMENT NOTE
Patient Name: Jihan Teresa  : 1933    MRN: [id number]                              Today's Date: 2020       Admit Date: 2020    Visit Dx:     ICD-10-CM ICD-9-CM   1. Acute upper gastrointestinal bleeding  K92.2 578.9   2. Acute blood loss anemia  D62 285.1   3. Warfarin-induced coagulopathy (CMS/[facility])  D68.32 286.7    T45.515A E934.2   4. Supratherapeutic INR  R79.1 790.92   5. Melena  K92.1 578.1   6. Blood loss anemia  D50.0 280.0     Patient Active Problem List   Diagnosis   • Type 2 diabetes mellitus (CMS/[facility])   • Aortic valve replaced, equine valve   • Cerebral infarction (CMS/[facility])   • A-fib (CMS/[facility])   • GERD without esophagitis   • Sleep apnea in adult   • CHF (congestive heart failure) (CMS/[facility])   • Cognitive dysfunction   • Hypothyroidism (acquired)   • History of recurrent UTIs   • Mild reactive airways disease   • Essential hypertension   • Pulmonary hypertension (CMS/[facility])   • B12 deficiency   • Non-rheumatic mitral regurgitation   • Non-rheumatic tricuspid valve insufficiency   • Chronic anemia   • Grade III hemorrhoids   • ICH (intracerebral hemorrhage) (CMS/[facility])   • Nontraumatic subcortical hemorrhage of left cerebral hemisphere (CMS/[facility])   • Acute upper gastrointestinal bleeding   • HLD (hyperlipidemia)   • AMADEO (acute kidney injury) (CMS/[facility])   • Acute blood loss anemia   • Supratherapeutic INR   • Melena   • Blood loss anemia   • Esophagitis, [place] grade C   • Gastritis   • Duodenitis   • Bilateral hearing loss     Past Medical History:   Diagnosis Date   • Anemia    • Aortic valve stenosis     S/p AVR in    • Asthma    • Atelectasis    • CHF (congestive heart failure) (CMS/[facility])    • Congenital heart disease    • Diabetes mellitus (CMS/[facility])    • Esophageal reflux    • Essential hypertension    • HLD (hyperlipidemia)    • Hypotension    • LVH (left ventricular hypertrophy)    • Pleural effusion    • Pulmonary hypertension (CMS/[facility])      Past Surgical History:   Procedure  Laterality Date   • AORTIC VALVE REPAIR/REPLACEMENT  11/14/2009    Jerry Colmenares MD   • CATARACT EXTRACTION     • CEREBRAL ANGIOGRAM N/A 8/4/2020    Procedure: CEREBRAL ANGIOGRAM;  Surgeon: Ar Bunch MD;  Location: [facility] HYBRID OR 18/19;  Service: Neurosurgery;  Laterality: N/A;   • ENDOSCOPY N/A 9/14/2020    Procedure: ESOPHAGOGASTRODUODENOSCOPY with biopsies;  Surgeon: Keyanna Lemus MD;  Location: [facility] ENDOSCOPY;  Service: Gastroenterology;  Laterality: N/A;  Pre: anemia, melena  Post: esophagitis, retained food, gastritis   • FEMORAL THROMBECTOMY/EMBOLECTOMY Right 8/12/2020    Procedure: FEMORAL THROMBECTOMY/EMBOLECTOMY;  Surgeon: Winsome Valentin Jr., MD;  Location: [facility] MAIN OR;  Service: Vascular;  Laterality: Right;   • KNEE ARTHROPLASTY       General Information     Row Name 09/20/20 1621          Physical Therapy Time and Intention    Document Type  therapy note (daily note)  -     Mode of Treatment  individual therapy;physical therapy  -       User Key  (r) = Recorded By, (t) = Taken By, (c) = Cosigned By    Initials Name Provider Type     Alysa Yuen, PT Physical Therapist        Mobility     Row Name 09/20/20 1621          Bed Mobility    Bed Mobility  supine-sit  -     Supine-Sit [place] (Bed Mobility)  moderate assist (50% patient effort)  -     Assistive Device (Bed Mobility)  bed rails;draw sheet;head of bed elevated  -     Row Name 09/20/20 1621          Bed-Chair Transfer    Bed-Chair [place] (Transfers)  moderate assist (50% patient effort);2 person assist  -     Row Name 09/20/20 1621          Sit-Stand Transfer    Sit-Stand [place] (Transfers)  moderate assist (50% patient effort);2 person assist  -     Row Name 09/20/20 1621          Gait/Stairs (Locomotion)    [place] Level (Gait)  not tested  -     Comment (Gait/Stairs)  unable to take steps  -       User Key  (r) = Recorded By, (t) = Taken By, (c) = Cosigned By     Initials Name Provider Type    Alysa Munoz PT Physical Therapist        Obj/Interventions     Row Name 09/20/20 1622          Motor Skills    Therapeutic Exercise  -- BLE AP, LAQ, seated marching, GS, x 10 reps  -       User Key  (r) = Recorded By, (t) = Taken By, (c) = Cosigned By    Initials Name Provider Type    Alysa Munoz PT Physical Therapist        Goals/Plan    No documentation.       Clinical Impression     Row Name 09/20/20 1622          Pain    Additional Documentation  Pain Scale: Numbers Pre/Post-Treatment (Group)  -     Row Name 09/20/20 1622          Pain Scale: Numbers Pre/Post-Treatment    Pretreatment Pain Rating  0/10 - no pain  -     Posttreatment Pain Rating  0/10 - no pain  -     Row Name 09/20/20 1622          Plan of Care Review    Plan of Care Reviewed With  patient;daughter  -     Outcome Summary  Pt was more alert than yesterday and agreable t therapy. Her daughter states she has been wanting to sit in the chair. Pt stood twice with mod A x2. She was unable to take any steps forward, but pivoted to the chair. She performed BLE exercises x 10 reps. Educated daughter on HEP and encouraged pt to perform these multiple times per day.  -     Row Name 09/20/20 1622          Positioning and Restraints    Pre-Treatment Position  in bed  -     In Chair  call light within reach;encouraged to call for assist;sitting;with family/caregiver;notified nsg;exit alarm on  -       User Key  (r) = Recorded By, (t) = Taken By, (c) = Cosigned By    Initials Name Provider Type    Alysa Munoz PT Physical Therapist        Outcome Measures     Row Name 09/20/20 1626          How much help from another person do you currently need...    Turning from your back to your side while in flat bed without using bedrails?  3  -KH     Moving from lying on back to sitting on the side of a flat bed without bedrails?  2  -KH     Moving to and from a bed to a chair  (including a wheelchair)?  2  -KH     Standing up from a chair using your arms (e.g., wheelchair, bedside chair)?  2  -KH     Climbing 3-5 steps with a railing?  1  -KH     To walk in hospital room?  1  -KH     AM-PAC 6 Clicks Score (PT)  11  -     Row Name 09/20/20 1626          Functional Assessment    Outcome Measure Options  AM-PAC 6 Clicks Basic Mobility (PT)  -       User Key  (r) = Recorded By, (t) = Taken By, (c) = Cosigned By    Initials Name Provider Type    Alysa Munoz, PT Physical Therapist        Physical Therapy Education                 Title: PT OT SLP Therapies (In Progress)     Topic: Physical Therapy (In Progress)     Point: Mobility training (In Progress)     Learning Progress Summary           Patient Acceptance, E,D, NR by  at 9/20/2020 1626    Acceptance, E,D, VU,NR by  at 9/18/2020 1144   Family Acceptance, E,D, NR by  at 9/20/2020 1626                   Point: Home exercise program (In Progress)     Learning Progress Summary           Patient Acceptance, E,D, NR by  at 9/20/2020 1626    Acceptance, E,D, VU,NR by  at 9/18/2020 1144   Family Acceptance, E,D, NR by  at 9/20/2020 1626                   Point: Body mechanics (In Progress)     Learning Progress Summary           Patient Acceptance, E,D, NR by  at 9/20/2020 1626    Acceptance, E,D, VU,NR by  at 9/18/2020 1144   Family Acceptance, E,D, NR by  at 9/20/2020 1626                   Point: Precautions (In Progress)     Learning Progress Summary           Patient Acceptance, E,D, NR by  at 9/20/2020 1626    Acceptance, E,D, VU,NR by  at 9/18/2020 1144   Family Acceptance, E,D, NR by  at 9/20/2020 1626                               User Key     Initials Effective Dates Name Provider Type [facility] 02/05/19 -  Alysa Yuen, PT Physical Therapist PT              PT Recommendation and Plan  Planned Therapy Interventions (PT): balance training, bed mobility training, gait training,  strengthening, transfer training, patient/family education  Plan of Care Reviewed With: patient, daughter  Outcome Summary: Pt was more alert than yesterday and agreable t therapy. Her daughter states she has been wanting to sit in the chair. Pt stood twice with mod A x2. She was unable to take any steps forward, but pivoted to the chair. She performed BLE exercises x 10 reps. Educated daughter on HEP and encouraged pt to perform these multiple times per day.     Time Calculation:   PT Charges     Row Name 09/20/20 1627             Time Calculation    Start Time  1446  -      Stop Time  1505  -      Time Calculation (min)  19 min  -KH      PT Received On  09/20/20  -      PT - Next Appointment  09/21/20  -         Time Calculation- PT    Total Timed Code Minutes- PT  19 minute(s)  -        User Key  (r) = Recorded By, (t) = Taken By, (c) = Cosigned By    Initials Name Provider Type    Alysa Munoz, JOSHUA Physical Therapist        Therapy Charges for Today     Code Description Service Date Service Provider Modifiers Qty    59777276069 HC PT THER PROC EA 15 MIN 9/20/2020 Alysa Yuen, PT GP 1    02365984826 HC PT THER SUPP EA 15 MIN 9/20/2020 Alysa Yuen, PT GP 1          PT G-Codes  Outcome Measure Options: AM-PAC 6 Clicks Basic Mobility (PT)  AM-PAC 6 Clicks Score (PT): 11    Alysa Yuen PT  9/20/2020

## 2020-09-20 NOTE — PLAN OF CARE
Problem: Patient Care Overview  Goal: Plan of Care Review  Recent Flowsheet Documentation  Taken 9/20/2020 1622 by Alysa Yuen PT  Plan of Care Reviewed With:   patient   daughter  Outcome Summary: Pt was more alert than yesterday and agreable t therapy. Her daughter states she has been wanting to sit in the chair. Pt stood twice with mod A x2. She was unable to take any steps forward, but pivoted to the chair. She performed BLE exercises x 10 reps. Educated daughter on HEP and encouraged pt to perform these multiple times per day.   Patient was intermittently wearing a face mask during this therapy encounter. Therapist used appropriate personal protective equipment including eye protection, mask, and gloves.  Mask used was standard procedure mask. Appropriate PPE was worn during the entire therapy session by PT and Bessy santana. Hand hygiene was completed before and after therapy session. Patient is not in enhanced droplet precautions.

## 2020-09-20 NOTE — PROGRESS NOTES
Pharmacy Consult: Warfarin Dosing/ Monitoring    Jihan Teresa is a 87 y.o. female, estimated creatinine clearance is 41.3 mL/min (A) (by C-G formula based on SCr of 0.54 mg/dL (L)). weighing 60.3 kg (133 lb).     has a past medical history of Anemia, Aortic valve stenosis, Asthma, Atelectasis, CHF (congestive heart failure) (CMS/[facility]), Congenital heart disease, Diabetes mellitus (CMS/[facility]), Esophageal reflux, Essential hypertension, HLD (hyperlipidemia), Hypotension, LVH (left ventricular hypertrophy), Pleural effusion, and Pulmonary hypertension (CMS/[facility]).    Social History     Tobacco Use    Smoking status: Never Smoker    Smokeless tobacco: Never Used   Substance Use Topics    Alcohol use: Yes     Comment: ocasional    Drug use: No     Results from last 7 days   Lab Units 09/20/20  0826 09/20/20  0733 09/20/20  0123 09/19/20  1811 09/19/20  0812 09/18/20  1959 09/18/20  0754 09/17/20  2110 09/17/20  0743 09/16/20  2240 09/16/20  1545  09/16/20  0532  09/15/20  0611   INR  2.18*  --   --   --  1.81*  --  1.38*  --  1.38*  --  1.60*  --  1.31*  --  1.34*   APTT seconds 74.3*  --  53.6* 102.2* 117.5*  --  85.6*  --  83.7* 150.7* 30.5   < >  --   --   --    HEMOGLOBIN g/dL  --  9.4*  --  9.5* 9.6* 9.4* 9.2* 8.5* 8.6* 8.9* 8.7*  --  9.0*   < > 7.0*   HEMATOCRIT %  --  28.6*  --  28.8* 29.0* 28.9* 28.0* 26.5* 26.1* 26.0* 25.7*  --  27.0*   < > 21.0*   PLATELETS 10*3/mm3  --  171  --  166 188 179 152 146 138* 141 131*  --  133*   < > 125*    < > = values in this interval not displayed.     Results from last 7 days   Lab Units 09/20/20  0733 09/19/20  0812 09/15/20  0611   SODIUM mmol/L 134* 137 139   POTASSIUM mmol/L 3.8 3.0* 3.5   CHLORIDE mmol/L 99 100 109*   CO2 mmol/L 26.9 26.5 20.7*   BUN mg/dL 2* 2* 12   CREATININE mg/dL 0.54* 0.61 0.80   CALCIUM mg/dL 8.0* 8.0* 7.9*   GLUCOSE mg/dL 88 81 87     Anticoagulation history: Her INR was elevated at admission I'm not sure what the dose was at the NH p/t warfarin  being held   _________________________last warfarin consult below    Anticoagulation history: 3mg TuWeFrSaSu, 1.5mg MoTh     Hospital Anticoagulation:  Consulting provider: Dr. Evans  Start date: 8/11/2020  Indication: AFib,  R arterial occlusion d/t cardiac embolis 8/12 Right femoral thromboembolectomy   Target INR: 2-3  Expected duration: lifelong   Bridge Therapy: completed heparin bridge      Date 8/11 8/12 8/13 8/14  8/15 8/16 8/17 8/18 8/19 8/20   INR 1.3 1.24 1.32 1.4 1.44 1.89 2.34 2.27 2.52 2.8    Warfarin dose Not able to take po In OR not given  1.5 mg   3 mg 3 mg 3 mg HOLD  3 mg  1.5 mg  HOLD      Date 8/21 8/22 8/23                 INR 2.86   2.77  2.03                 Warfarin dose HOLD  1mg  2mg                    _________________________  Hospital Anticoagulation:  Consulting provider: Bill   Start date: 9/16  Indication: afib   Target INR: trying for INR 1.9-2.5 ideally  Expected duration: life    Bridge Therapy: Yes              and unfractionated heparin  Date 9/16 9/17 9/18 9/19 9/20        INR 1.60 1.38 1.38 1.81 2.18        Warfarin dose 1 mg  3 mg  3 mg  3 mg           Potential drug interactions: levothyroxine, protonix, APAP     Relevant nutrition status: regular diet, boost supplements    Other: pt admitted w/ GIB and inr 8.77 from [facility], her INR had been high for several days and apparently they did give vitamin k (I'm unsure what dose) ER hgb was 6.4     Education complete?/ Date: na    Assessment/Plan:  INR has increased to 2.18 this am and is within the therapeutic goal of 1.9-2.5. Will redose today with 1.5 mg.       Warfarin 1.5 mg today  INR daily     Pharmacy will continue to follow until discharge or discontinuation of warfarin.   Kareem Rahman, [facility]  9/20/2020

## 2020-09-20 NOTE — PROGRESS NOTES
"   LOS: 9 days   Patient Care Team:  Yaron Ca Jr., MD as PCP - General (Family Medicine)  Yaron Ca Jr., MD as PCP - Claims Attributed      Chief Complaint:  Following for a fib, cardiac disease        Interval History:   Family member at bedside.  Patient denies any chest pain or shortness of breath.  Heart rate remains well controlled-- 60s at bedside.  Warfarin drip has been stopped now that INR is therapeutic.  Appears euvolemic on exam.        Objective   Vital Signs  Temp:  [97.8 °F (36.6 °C)-98.7 °F (37.1 °C)] 98.3 °F (36.8 °C)  Heart Rate:  [52-76] 52  Resp:  [18] 18  BP: (122-162)/(72-83) 157/72    Intake/Output Summary (Last 24 hours) at 9/20/2020 1435  Last data filed at 9/20/2020 1200  Gross per 24 hour   Intake 0 ml   Output 2250 ml   Net -2250 ml       Last Weight and Admission Weight        09/12/20  1004   Weight: (133# 9/11)     Flowsheet Rows      First Filed Value   Admission Height  154.9 cm (61\") Documented at 09/11/2020 1721   Admission Weight  68.9 kg (152 lb) Documented at 09/11/2020 1721              Physical Exam  Constitutional:       General: She is not in acute distress.     Appearance: She is well-developed. She is not diaphoretic.      Comments: Hard of hearing   HENT:      Head: Normocephalic and atraumatic.   Neck:      Musculoskeletal: Neck supple.   Cardiovascular:      Rate and Rhythm: Normal rate. Rhythm irregularly irregular.      Heart sounds: Murmur present. Systolic murmur.  No friction rub. No gallop.    Pulmonary:      Effort: Pulmonary effort is normal. No respiratory distress.      Breath sounds: Normal breath sounds.   Abdominal:      General: Bowel sounds are normal.      Palpations: Abdomen is soft.   Musculoskeletal:         General: No edema.   Skin:     General: Skin is warm and dry.      Findings: No erythema.           Results Review:      Results from last 7 days   Lab Units 09/20/20  0733 09/19/20  0812 09/15/20  0611   SODIUM mmol/L 134* 137 139 "   POTASSIUM mmol/L 3.8 3.0* 3.5   CHLORIDE mmol/L 99 100 109*   CO2 mmol/L 26.9 26.5 20.7*   BUN mg/dL 2* 2* 12   CREATININE mg/dL 0.54* 0.61 0.80   GLUCOSE mg/dL 88 81 87   CALCIUM mg/dL 8.0* 8.0* 7.9*     Results from last 7 days   Lab Units 09/15/20  0611   TROPONIN T ng/mL <0.010     Results from last 7 days   Lab Units 09/20/20  0733 09/19/20  1811 09/19/20  0812   WBC 10*3/mm3 4.59 4.27 4.26   HEMOGLOBIN g/dL 9.4* 9.5* 9.6*   HEMATOCRIT % 28.6* 28.8* 29.0*   PLATELETS 10*3/mm3 171 166 188     Results from last 7 days   Lab Units 09/20/20  0826 09/20/20  0123 09/19/20  1811 09/19/20  0812 09/18/20  0754   INR  2.18*  --   --  1.81* 1.38*   APTT seconds 74.3* 53.6* 102.2* 117.5* 85.6*         Results from last 7 days   Lab Units 09/15/20  0611   MAGNESIUM mg/dL 1.7           I reviewed the patient's new clinical results.  I reviewed patient's telemetry data.            Medication Review:   amLODIPine, 10 mg, Oral, Q24H  carbamide peroxide, 5 drop, Both Ears, BID  furosemide, 20 mg, Oral, Daily  isosorbide mononitrate, 60 mg, Oral, Daily  levothyroxine, 75 mcg, Oral, QAM  metoprolol tartrate, 25 mg, Oral, Q12H  pantoprazole, 40 mg, Oral, BID AC  potassium chloride, 10 mEq, Oral, Daily  sodium chloride, 10 mL, Intravenous, Q12H  sodium chloride, 10 mL, Intravenous, Q12H  sodium chloride, 10 mL, Intravenous, Q12H  warfarin (COUMADIN) (dosing per levels), , Does not apply, Daily  warfarin, 1.5 mg, Oral, Once        Pharmacy to dose warfarin,   Pharmacy to dose warfarin,           Assessment/Plan   1. Chronic atrial fibrillation: On beta-blocker therapy and heart rate well controlled.  Anticoagulated with warfarin.  INR is therapeutic and heparin drip has been discontinued.  Hemoglobin remained stable.  2. Hypertension: Also high this morning but has not had morning medications yet.  Recheck after medications.  3. Anemia: GI following.  Patient on PPI.  No NSAIDs, per GI.  4. Bioprosthetic aortic valve  5. Coronary  artery disease: On Imdur, beta-blocker.  Denies anginal symptoms.  6. Hearing loss: ENT was consulted by medicine team  7. Diabetes  8. Hypothyroidism  9. Hypokalemia: Replace potassium.      -INR is therapeutic.  Okay to discharge from a cardiac standpoint once other teams feel that she is ready  -Would have her follow-up with her [place] cardiologist within 1 to 2 weeks of hospital discharge  -Would have nursing facility monitor INR carefully upon return        Thanks,    Dimple Rosales DNP, APRN  09/20/20  14:35 EDT

## 2020-09-21 NOTE — PROGRESS NOTES
Discharge Planning Assessment  [facility]     Patient Name: Jihan Teresa  MRN: [id number]  Today's Date: 9/21/2020    Admit Date: 9/11/2020    Discharge Needs Assessment    No documentation.       Discharge Plan     Row Name 09/21/20 1112       Plan    Plan  [facility] today    Final Discharge Disposition Code  03 - skilled nursing facility (SNF)    Final Note  Call received from daughter Lyudmila Teresa [phone] she stated her mother has chosen [facility] skilled rehab. Call placed to [place] [phone] and let her know patient is discharged. Radha Mathew RN        Continued Care and Services - Admitted Since 9/11/2020     Destination Coordination complete    Service Provider Request Status Selected Services Address Phone Fax    [facility] [place]   Selected Skilled Nursing [address] [address] [phone]    [facility]  Pending - Request Sent N/A [address] [facility] [address] [phone]    [facility]  Pending - Request Sent N/A [address] [address] [phone]    [facility]  Pending - Request Sent N/A [address] [address] [phone]    [facility]  Pending - No Request Sent N/A [address] [facility] [address] [phone] --    [facility]  Declined  No Medicaid Bed Available, unable to take Medicare A only N/A [address] [facility] [address] [phone]    [facility]  Declined  No Medicaid Bed Available N/A [address] [facility] [address] [phone]            Selected Continued Care - Prior Encounters Includes selections from prior encounters from 6/13/2020 to 9/21/2020    Discharged on 8/24/2020 Admission date: 8/3/2020 - Discharge disposition: Skilled Nursing Facility (DC -  External)    Destination     Service Provider Selected Services Address Phone Fax    [facility] [address] [address] [phone]                    Expected Discharge Date and Time     Expected Discharge Date Expected Discharge Time    Sep 21, 2020         Demographic Summary    No documentation.       Functional Status    No documentation.       Psychosocial    No documentation.       Abuse/Neglect    No documentation.       Legal    No documentation.       Substance Abuse    No documentation.       Patient Forms    No documentation.           Radha Mathew RN

## 2020-09-21 NOTE — PROGRESS NOTES
[facility]  Cardiology Progress Note    Patient Identification:  Name: Jihan Teresa  Age: 87 y.o.  Sex: female  :  1933  MRN: [id number]                 Follow Up / Chief Complaint: GI bleed    Interval History: A. fib on monitor.  She has beening bridged with heparin and Coumadin.  Pharmacy dosing.  INR today 2.75.         Subjective:   She is sleeping.  Daughter states she has been doing much better.  Denies any shortness of breath or chest pain.     Objective:    Past Medical History:  Past Medical History:   Diagnosis Date   • Anemia    • Aortic valve stenosis     S/p AVR in    • Asthma    • Atelectasis    • CHF (congestive heart failure) (CMS/HCC)    • Congenital heart disease    • Diabetes mellitus (CMS/HCC)    • Esophageal reflux    • Essential hypertension    • HLD (hyperlipidemia)    • Hypotension    • LVH (left ventricular hypertrophy)    • Pleural effusion    • Pulmonary hypertension (CMS/HCC)      Past Surgical History:  Past Surgical History:   Procedure Laterality Date   • AORTIC VALVE REPAIR/REPLACEMENT  2009    Jerry Colmenares MD   • CATARACT EXTRACTION     • CEREBRAL ANGIOGRAM N/A 2020    Procedure: CEREBRAL ANGIOGRAM;  Surgeon: Ar Bunch MD;  Location: [facility] OR ;  Service: Neurosurgery;  Laterality: N/A;   • ENDOSCOPY N/A 2020    Procedure: ESOPHAGOGASTRODUODENOSCOPY with biopsies;  Surgeon: Keyanna Lemus MD;  Location: [facility] ENDOSCOPY;  Service: Gastroenterology;  Laterality: N/A;  Pre: anemia, melena  Post: esophagitis, retained food, gastritis   • FEMORAL THROMBECTOMY/EMBOLECTOMY Right 2020    Procedure: FEMORAL THROMBECTOMY/EMBOLECTOMY;  Surgeon: Winsome Valentin Jr., MD;  Location: [facility] OR;  Service: Vascular;  Laterality: Right;   • KNEE ARTHROPLASTY          Social History:   Social History     Tobacco Use   • Smoking status: Never Smoker   • Smokeless tobacco: Never Used   Substance Use Topics   •  "Alcohol use: Yes     Comment: ocasional      Family History:  Family History   Problem Relation Age of Onset   • Dementia Sister    • Stroke Brother           Allergies:  Allergies   Allergen Reactions   • Promethazine Other (See Comments)     Pt becomes \"out of it\" when on this medication  Pt becomes \"out of it\" when on this medication     Scheduled Meds:  amLODIPine, 10 mg, Q24H  carbamide peroxide, 5 drop, BID  furosemide, 20 mg, Daily  isosorbide mononitrate, 60 mg, Daily  levothyroxine, 75 mcg, QAM  metoprolol tartrate, 25 mg, Q12H  pantoprazole, 40 mg, BID AC  potassium chloride, 10 mEq, Daily  sodium chloride, 10 mL, Q12H  sodium chloride, 10 mL, Q12H  sodium chloride, 10 mL, Q12H  warfarin (COUMADIN) (dosing per levels), , Daily            INTAKE AND OUTPUT:    Intake/Output Summary (Last 24 hours) at 9/21/2020 1300  Last data filed at 9/21/2020 1100  Gross per 24 hour   Intake 360 ml   Output 1350 ml   Net -990 ml       ROS  Constitutional: Awake and alert, no fever.  No nosebleeds  Abdomen           abdominal pain   cardiac          no chest pain  Pulmonary        no shortness of breath      /73 (BP Location: Right arm, Patient Position: Lying)   Pulse 67   Temp 98 °F (36.7 °C) (Oral)   Resp 18   Ht 154.9 cm (60.98\")   Wt 60.3 kg (133 lb)   SpO2 98%   BMI 25.14 kg/m²        Physical Exam:  General:  Appears in no acute distress, resting in bed   eyes: Eyes closed, no drainage noticed  HEENT:  No JVD. Thyroid not visibly enlarged. No mucosal pallor or cyanosis  Respiratory: Respirations regular and unlabored at rest. BBS with good air entry anteriorly. No crackles, rubs or wheezes auscultated  Cardiovascular: S1S2 Regular rate and rhythm. No murmur, rub or gallop. No carotid bruits. DP/PT pulses  +2   . No pretibial pitting edema  Gastrointestinal: Abdomen soft, flat, non tender. Bowel sounds present. No hepatosplenomegaly. No ascites  Skin:  Skin warm and dry to touch. No rashes    Neuro: AAO " x3 CN II-XII grossly intact  Psych: Mood and affect normal, pleasant and cooperative            Cardiographics  Telemetry:           Atrial fibrillation      ECG:     Echocardiogram:   Interpretation Summary    · Calculated EF = 46.9%.  · Left ventricular systolic function is low normal.  · Left amd right atrial cavity size is severely dilated.  · Right ventricular cavity is severely dilated.  · calcification of the aortic valve  · Severe mitral valve regurgitation is present  · Mitral annular calcification is present. Posterior leaflet very calcified and immobile.  · Severe tricuspid valve regurgitation is present.  · Estimated right ventricular systolic pressure from tricuspid regurgitation is markedly elevated (>55 mmHg). Calculated right ventricular systolic pressure from tricuspid regurgitation is 66.7 mmHg.        Interpretation Summary    · Left ventricular ejection fraction is normal (Calculated EF = 60%).  · Myocardial perfusion imaging indicates a normal myocardial perfusion study with no evidence of ischemia.  · Impressions are consistent with a low risk study.  · Very small lateral wall ischemia can not be ruled out          Lab Review   Results from last 7 days   Lab Units 09/15/20  0611   TROPONIN T ng/mL <0.010     Results from last 7 days   Lab Units 09/15/20  0611   MAGNESIUM mg/dL 1.7     Results from last 7 days   Lab Units 09/20/20  0733   SODIUM mmol/L 134*   POTASSIUM mmol/L 3.8   BUN mg/dL 2*   CREATININE mg/dL 0.54*   CALCIUM mg/dL 8.0*        Results from last 7 days   Lab Units 09/21/20  0804 09/20/20  1947 09/20/20  0733   WBC 10*3/mm3 5.58 6.18 4.59   HEMOGLOBIN g/dL 8.7* 10.1* 9.4*   HEMATOCRIT % 27.3* 30.1* 28.6*   PLATELETS 10*3/mm3 138* 170 171     Results from last 7 days   Lab Units 09/21/20  0804 09/20/20  0826 09/20/20  0123  09/19/20  0812   INR  2.75* 2.18*  --   --  1.81*   APTT seconds 36.9* 74.3* 53.6*   < > 117.5*    < > = values in this interval not displayed.     The  "following medical decision was discussed in detail with Dr. Mcmahan    Assessment:  Acute upper gastrointestinal bleeding  Acute blood loss anemia  Chronic atrial fibrillation anticoagulated with warfarin  Bioprosthetic aortic valve replaced in 2009  Diabetes mellitus hypothyroidism  CAD  Hypertension          Plan:  Vital signs are stable.  Pharmacy dosing Coumadin.  She is now off of heparin.  Today's INR is 2.75.  Hemoglobin stable. She will follow up with her cardiologist. CV stable. Ok to discharge from our standpoint.                 )9/21/2020   KAREN Graham/Transcription:   \"Dictated utilizing Dragon dictation\".     "

## 2020-09-21 NOTE — DISCHARGE SUMMARY
Patient Name: Jihan Teresa  : 1933  MRN: [id number]    Date of Admission: 2020  Date of Discharge:  2020  Primary Care Physician: Yaron Ca Jr., MD      Chief Complaint:   Abnormal Lab      Discharge Diagnoses     Active Hospital Problems    Diagnosis  POA   • **Acute blood loss anemia [D62]  Yes   • Bilateral hearing loss [H91.93]  Yes   • Esophagitis, [place] grade C [K20.8]  Yes   • Gastritis [K29.70]  Yes   • Duodenitis [K29.80]  Yes   • Acute upper gastrointestinal bleeding [K92.2]  Yes   • HLD (hyperlipidemia) [E78.5]  Yes   • AMADEO (acute kidney injury) (CMS/HCC) [N17.9]  Yes   • Supratherapeutic INR [R79.1]  Yes   • Melena [K92.1]  Unknown   • Blood loss anemia [D50.0]  Unknown   • Pulmonary hypertension (CMS/HCC) [I27.20]  Yes   • Type 2 diabetes mellitus (CMS/HCC) [E11.9]  Yes   • Hypothyroidism (acquired) [E03.9]  Yes   • Essential hypertension [I10]  Yes   • A-fib (CMS/HCC) [I48.91]  Yes   • CHF (congestive heart failure) (CMS/HCC) [I50.9]  Yes      Resolved Hospital Problems   No resolved problems to display.        Hospital Course     Ms. Teresa is a 87 y.o. female with a history of bioprosthetic valve replacement and atrial fibrillation who presented to [facility] initially complaining of abnormal labs.  Please see the admitting history and physical for further details.  She was found to have anemia and heme positive stools and was admitted to the hospital for further evaluation and treatment.  She is admitted to the hospital with a hemoglobin of 5 and a supratherapeutic INR.  Is felt that her blood loss was from a GI source.  She was transfused packed red blood cells and started on a Protonix drip and admitted to the hospital.  Gastroenterology evaluated the patient and took the patient for endoscopy with upper and lower scopes.  She was found to have esophagitis gastritis and duodenitis and was started on appropriate therapy with Protonix.  She was  started back on a heparin drip after 48 hours and no bleeding or blood loss was noted.  Her hemoglobin is shown slow rebound here in the hospital but is trending in the right direction.  Her Coumadin was restarted and she was bridged to therapeutic INR with a heparin drip.  She is been 24 hours off the heparin drip hemoglobin remained stable and INR remains therapeutic.  The plans been discussed with the patient and her family at the bedside and at this point they are stable for discharge to a skilled nursing facility for further evaluation and care.  INR at the time of discharge is 2.8.  Given the lability in these numbers in the past recommend rechecking INR in 48 hours and adjusting Coumadin as needed.  She is being discharged on 1.5 mg.  Also recommend checking a hemoglobin in 7 to 10 days to make sure her blood counts are remaining stable.      Day of Discharge     Rested well overnight no new issues today.  Plan discussed with family at the bedside    Physical Exam:  Temp:  [97.8 °F (36.6 °C)-98.4 °F (36.9 °C)] 98 °F (36.7 °C)  Heart Rate:  [63-67] 67  Resp:  [18] 18  BP: (127-149)/(62-74) 147/73  Body mass index is 25.14 kg/m².  Physical Exam  Vitals signs and nursing note reviewed.   Constitutional:       Appearance: She is well-developed and well-nourished.   HENT:      Head: Normocephalic and atraumatic.   Cardiovascular:      Rate and Rhythm: Normal rate. Rhythm irregularly irregular.   Pulmonary:      Effort: Pulmonary effort is normal.      Breath sounds: Normal breath sounds.   Abdominal:      Palpations: Abdomen is soft.   Skin:     General: Skin is warm.   Neurological:      Mental Status: She is alert and oriented to person, place, and time.         Consultants     Consult Orders (all) (From admission, onward)     Start     Ordered    09/17/20 1731  Inpatient ENT Consult  Once     Specialty:  Otolaryngology  Provider:  Neto Arreaga MD    09/17/20 1731    09/14/20 1645  Inpatient Cardiology  Consult  Once     Comments: Spoke with answering service   Specialty:  Cardiology  Provider:  Bogdan Mcmahan MD    09/14/20 1647    09/13/20 1658  Inpatient Cardiology Consult  Once     Specialty:  Cardiology  Provider:  Bogdan Mcmahan MD    09/13/20 1657 09/12/20 1858  IV TEAM - Consult for PICC Line (IV Team to Determine Number of Lumens)  Once     Provider:  (Not yet assigned)    09/12/20 1857 09/11/20 2236  Inpatient Case Management  Consult  Once     Provider:  (Not yet assigned)    09/11/20 2235 09/11/20 2236  Inpatient Nutrition Consult  Once     Provider:  (Not yet assigned)    09/11/20 2235 09/11/20 2048  Inpatient Gastroenterology Consult  Once     Specialty:  Gastroenterology  Provider:  Agus Lopez MD    09/11/20 2048 09/11/20 1829  [facility] (on-call MD unless specified) Details  Once     Specialty:  Hospitalist  Provider:  (Not yet assigned)    09/11/20 1828 09/11/20 1829  Gastroenterology (on-call MD unless specified)  Once     Specialty:  Gastroenterology  Provider:  (Not yet assigned)    09/11/20 1828              Procedures     Imaging Results (All)     Procedure Component Value Units Date/Time    CT Head Without Contrast [[id number]] Collected: 09/16/20 1053     Updated: 09/16/20 1118    Narrative:      CT SCAN OF THE HEAD WITHOUT CONTRAST     CLINICAL HISTORY: Altered mental status.     CT scan of the head was obtained with 3 mm axial images. No intravenous  contrast was administered.     Comparison is made to previous head CT dated 09/01/2020 and previous MRI  of the brain dated 08/18/2020.     FINDINGS:     There are severe changes of chronic small vessel ischemic phenomena. 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 The largest contiguous area of  chronic infarction is seen within the left cerebellar  hemisphere  measuring up to 2.8 x 1.3 cm in greatest axial dimensions and is within  the left PICA distribution. A small hyperdense focus is seen at the  level of the anterior aspect of the third ventricle measuring up to 4 mm  in diameter and the findings are stable when compared to the prior  study. This was the site of hemosiderin on the prior MRI of the brain  dated 08/18/2020. Overall, there is no significant interval change when  compared to the prior study of 09/01/2020.     The extracranial structures are incidentally notable for complete  opacification of the mastoid air cells and middle ear cavities.       Impression:         Stable findings when compared to the previous CT scan of head dated  09/01/2020.     There is old lacunar disease within the left thalamus and the lentiform  nuclei. 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 The largest contiguous area of  chronic infarction is seen within the left cerebellar hemisphere within  the left PICA distribution. Severe changes of chronic small vessel  ischemic phenomena are noted.     Again noted is a 4 mm hyperdense focus at the level of the anterior  aspect of the third ventricle which was noted as a site of hemosiderin  deposition on the prior MRI of the brain dated 08/18/2020.     Note is made of complete opacification of both mastoid air cells and the  middle ear cavities.     If further assessment for intracranial pathology is indicated, one could  obtain an MRI of the brain for further evaluation.     Radiation dose reduction techniques were utilized, including automated  exposure control and exposure modulation based on body size.     This report was finalized on 9/16/2020 11:15 AM by Dr. Abram Branch M.D.       XR Chest Post CVA Port [[id number]] Collected: 09/12/20 2300     Updated: 09/12/20 2304    Narrative:      ONE VIEW PORTABLE CHEST AT 10:47 PM     HISTORY: Repositioning of PICC  line     FINDINGS: The left-sided PICC line has been repositioned and now ends in  the SVC and does not extend cephalad. The chest is otherwise unchanged  from the study performed approximately 2 hours ago. The heart remains  enlarged with mild vascular congestion.     This report was finalized on 9/12/2020 11:01 PM by Dr. Dionte Lockhart M.D.       XR Chest 1 View [[id number]] Collected: 09/12/20 2103     Updated: 09/12/20 2108    Narrative:      ONE VIEW PORTABLE CHEST AT 8:35 PM     HISTORY: PICC line placement     FINDINGS: A left-sided PICC line crosses the midline and extends  cephalad into the SVC and extends to the level of the junction of the  SVC and right subclavian vein. It should be pulled back approximately 4  cm.     There is persistent cardiomegaly with some minimal vascular congestion  that is similar to the study of 08/12/2020.     This report was finalized on 9/12/2020 9:05 PM by Dr. Dionte Lockhart M.D.       CT Abdomen Pelvis With Contrast [[id number]] Collected: 09/11/20 2146     Updated: 09/11/20 2156    Narrative:      CT SCAN OF THE ABDOMEN AND PELVIS WITH INTRAVENOUS CONTRAST     HISTORY: Upper GI bleeding on anticoagulation. Recent retroperitoneal  hemorrhage.     TECHNIQUE: The CT scan was performed as an emergency procedure through  the abdomen and pelvis with intravenous contrast and compared to the  nonenhanced CT scan of the abdomen and pelvis dated 08/18/2020.      FINDINGS: The following findings are present:  1. There is a calcified granuloma at the right base. The liver, spleen,  and both adrenal glands are unremarkable. The kidneys are unremarkable  except for a right renal cyst measuring 2.1 cm which is unchanged. There  is no renal obstruction. Again noted is a cystic lesion in the body of  the pancreas measuring up to 1.7 x 2 cm that is similar to the recent CT  scan. This is also similar to a chest CT scan dating back to 06/11/2017  and most consistent with benign etiology. The  gallbladder appears to  contain some layering sludge without wall thickening.  2. There is extensive vascular calcification. There is no aortic  aneurysm or retroperitoneal lymphadenopathy. The large and small bowel  loops are normal in caliber.  3. In the pelvis there has been marked reduction in size of the right  groin and upper right thigh that is only partially visualized on the CT  scan. No new hematoma is seen. The urinary bladder has a smooth contour.                 Radiation dose reduction techniques were utilized, including automated  exposure control and exposure modulation based on body size.     This report was finalized on 9/11/2020 9:53 PM by Dr. Dionte Lockhart M.D.             Pertinent Labs     Results from last 7 days   Lab Units 09/21/20  0804 09/20/20  1947 09/20/20  0733 09/19/20  1811   WBC 10*3/mm3 5.58 6.18 4.59 4.27   HEMOGLOBIN g/dL 8.7* 10.1* 9.4* 9.5*   PLATELETS 10*3/mm3 138* 170 171 166     Results from last 7 days   Lab Units 09/20/20  0733 09/19/20  0812 09/15/20  0611   SODIUM mmol/L 134* 137 139   POTASSIUM mmol/L 3.8 3.0* 3.5   CHLORIDE mmol/L 99 100 109*   CO2 mmol/L 26.9 26.5 20.7*   BUN mg/dL 2* 2* 12   CREATININE mg/dL 0.54* 0.61 0.80   GLUCOSE mg/dL 88 81 87   Estimated Creatinine Clearance: 41.3 mL/min (A) (by C-G formula based on SCr of 0.54 mg/dL (L)).    Results from last 7 days   Lab Units 09/20/20  0733 09/19/20  0812 09/15/20  0611   CALCIUM mg/dL 8.0* 8.0* 7.9*   MAGNESIUM mg/dL  --   --  1.7       Results from last 7 days   Lab Units 09/15/20  0611   TROPONIN T ng/mL <0.010           Invalid input(s): LDLCALC        Test Results Pending at Discharge       Discharge Details        Discharge Medications      New Medications      Instructions Start Date   furosemide 20 MG tablet  Commonly known as: LASIX   20 mg, Oral, Daily      ipratropium-albuterol 0.5-2.5 mg/3 ml nebulizer  Commonly known as: DUO-NEB   3 mL, Nebulization, Every 4 Hours PRN      pantoprazole 40 MG  "EC tablet  Commonly known as: PROTONIX   40 mg, Oral, 2 Times Daily Before Meals      potassium chloride 10 MEQ CR capsule  Commonly known as: MICRO-K   10 mEq, Oral, Daily      warfarin 1 MG tablet  Commonly known as: Coumadin   1.5 mg, Oral, Nightly         Continue These Medications      Instructions Start Date   amLODIPine 5 MG tablet  Commonly known as: NORVASC   10 mg, Oral, Daily      bisacodyl 10 MG suppository  Commonly known as: DULCOLAX   10 mg, Rectal, Daily PRN      fluticasone 50 MCG/ACT nasal spray  Commonly known as: FLONASE   1 spray, Nasal, Daily      isosorbide mononitrate 60 MG 24 hr tablet  Commonly known as: IMDUR   60 mg, Oral, Daily      levothyroxine 75 MCG tablet  Commonly known as: SYNTHROID, LEVOTHROID   75 mcg, Oral, Every Morning      metoprolol tartrate 25 MG tablet  Commonly known as: LOPRESSOR   25 mg, Oral, Every 12 Hours Scheduled      zinc oxide 20 % ointment   Topical, 2 Times Daily PRN             Allergies   Allergen Reactions   • Promethazine Other (See Comments)     Pt becomes \"out of it\" when on this medication  Pt becomes \"out of it\" when on this medication         Discharge Disposition:  Skilled Nursing Facility (DC - External)    Discharge Diet:  Diet Order   Procedures   • Diet Regular; GI Soft       Discharge Activity:   Activity Instructions     Activity as Tolerated            CODE STATUS:    Code Status and Medical Interventions:   Ordered at: 09/11/20 2049     Code Status:    CPR     Medical Interventions (Level of Support Prior to Arrest):    Full       No future appointments.  Additional Instructions for the Follow-ups that You Need to Schedule     Discharge Follow-up with PCP   As directed       Currently Documented PCP:    Yaron Ca Jr., MD    PCP Phone Number:    [phone]     Follow Up Details: 4-6 weeks         Discharge Follow-up with Specified Provider: cardiology; 1 Month   As directed      To: cardiology    Follow Up: 1 Month         Discharge " Follow-up with Specified Provider: gastroenterology; 6 Weeks   As directed      To: gastroenterology    Follow Up: 6 Weeks            Contact information for follow-up providers     Yaron Ca Jr., MD .    Specialty: Family Medicine  Why: 4-6 weeks  Contact information:  [address]  [address] [address]  [phone]                   Contact information for after-discharge care     Destination     [facility] .    Service: Skilled Nursing  Contact information:  [address] [facility] [address] [phone]                             Additional Instructions for the Follow-ups that You Need to Schedule     Discharge Follow-up with PCP   As directed       Currently Documented PCP:    Yaron Ca Jr., MD    PCP Phone Number:    [phone]     Follow Up Details: 4-6 weeks         Discharge Follow-up with Specified Provider: cardiology; 1 Month   As directed      To: cardiology    Follow Up: 1 Month         Discharge Follow-up with Specified Provider: gastroenterology; 6 Weeks   As directed      To: gastroenterology    Follow Up: 6 Weeks           Time Spent on Discharge:  Greater than 30 minutes      Tim Sheppard MD  [facility]  09/21/20  11:47 EDT

## 2020-09-21 NOTE — PLAN OF CARE
Problem: Patient Care Overview  Goal: Plan of Care Review  Outcome: Ongoing, Progressing  Flowsheets  Taken 9/21/2020 0826 by Jessie Valencia RN  Outcome Summary: alert and talkative,  dgt at bedside, poss dc today if INR theraputic.  will continue to monitor  Taken 9/20/2020 1622 by Alysa Yuen, PT  Plan of Care Reviewed With:   patient   daughter  Taken 9/20/2020 0328 by Tiana Askew, RN  Progress: improving   Goal Outcome Evaluation:  Plan of Care Reviewed With: patient, daughter  Progress: improving  Outcome Summary: alert and talkative,  dgt at bedside, poss dc today if INR theraputic.  will continue to monitor

## 2020-09-21 NOTE — PROGRESS NOTES
Patient was scheduled to see me in the office within the next week or so after having a right femoral embolectomy for cardiac embolus.    She is doing better, more responsive and appropriate.  Her hearing appears to be worse.    Right groin incision is well-healed.  No evidence of any hematoma.  Right foot is warm and well-perfused.  There are strong Doppler dorsalis pedis and peroneal artery signals.    At this point no follow-up is needed in the office.  Her right lower extremity is well-perfused.  She is on chronic anticoagulation for A. fib and nothing further needed from our standpoint.  We will see her back as needed.

## 2020-09-24 NOTE — TELEPHONE ENCOUNTER
----- Message from Keyanna Lemus MD sent at 9/18/2020  4:37 PM EDT -----  EGD biopsies show inflammation of the GE junction, stomach, and duodenum, no evidence of H. pylori infection.  No Santiago's esophagus.    Continue PPI    No NSAIDs.

## 2020-09-25 NOTE — TELEPHONE ENCOUNTER
Inflammation was likely from stopping the acid reflux meds.    OK to follow up in the office as needed (would be with BELTRAN or brigida Lovelace)

## 2020-09-25 NOTE — TELEPHONE ENCOUNTER
Call from Dione.  Advise per DR Lemus note.  Verb understanding.     Asking if inflammation could be from acid reflux rx being stopped in August.      Asking if needs to f/u with DR Lemus in office.     Sending heartfelt gratitude to Dr Lemus for her wonderful care.     Message to DR Lemus.

## 2020-09-25 NOTE — TELEPHONE ENCOUNTER
Attempt return call to daughterDione.  VM left with request to contact office - advise if ok to leave VM.

## 2020-09-29 NOTE — TELEPHONE ENCOUNTER
Agree office visit 4 weeks, and yes she may stay with Dr. Coelho as a provider.  BMD.    **VM to Dione.  Advise of above.  Contact office to arrange.  Virgie Cardona RN.

## 2020-10-08 NOTE — TELEPHONE ENCOUNTER
----- Message from Leonardo Turcios sent at 10/8/2020  9:55 AM EDT -----  Regarding: appt  Contact: [phone]  Sarah from [place] was calling to schedule appt. Mariah next available is in December and wanted to see if she was going to be worked in.

## 2020-10-14 PROBLEM — R54 FRAILTY: Status: ACTIVE | Noted: 2020-01-01

## 2020-10-14 PROBLEM — K20.90 ESOPHAGITIS: Status: ACTIVE | Noted: 2020-01-01

## 2020-10-14 PROBLEM — Z79.01 ANTICOAGULATED: Status: ACTIVE | Noted: 2020-01-01

## 2020-10-14 NOTE — PROGRESS NOTES
Chief Complaint   Patient presents with   • Hospital Follow Up Visit     Subjective     HPI  Jihan Teresa is a 87 y.o. female who presents for hospital follow up.  She was hospitalized early September 2020 with melena, anemia in the setting of a supratherapeutic INR.  EGD with esophagitis, gastritis, and duodenitis.  Given her confusion, dementia, and overall frailty, it was decided not to pursue a colonoscopy.  She was treated with PPI and discharge.  She is currently at the [facility].    Back on warfarin.    Doing better per her daughter.  Less abdominal fullness.  She has adherent to twice daily PPI.  Her daughter says that initially following hospitalization she was not eating well but now is doing much better.  She has had a significant weight loss since the beginning of the year but seems to be doing better now.    The patient denies any issues with abdominal pain.  The daughter has not heard of any reports about bowel movements.  She did have labs on September 25 with a hemoglobin of 10.2 and on September 28 with a hemoglobin of 10.7.  Her INR has been followed there as well and has not been supratherapeutic recently.  She is going to have a blood count done tomorrow    She did have some vomiting recently when she ate a large quantity of food all at once.  This is not a regular event though.    Today's visit was in the office.  Both the patient and I were wearing face masks and proper hand hygiene was performed before and after the physical exam.     Past Medical History:   Diagnosis Date   • Anemia    • Aortic valve stenosis     S/p AVR in 09'   • Asthma    • Atelectasis    • CHF (congestive heart failure) (CMS/[facility])    • Congenital heart disease    • Diabetes mellitus (CMS/[facility])    • Esophageal reflux    • Essential hypertension    • HLD (hyperlipidemia)    • Hypotension    • LVH (left ventricular hypertrophy)    • Pleural effusion    • Pulmonary hypertension (CMS/HCC)        Social  History     Socioeconomic History   • Marital status:      Spouse name: Not on file   • Number of children: Not on file   • Years of education: Not on file   • Highest education level: Not on file   Tobacco Use   • Smoking status: Never Smoker   • Smokeless tobacco: Never Used   Substance and Sexual Activity   • Alcohol use: Yes     Comment: ocasional   • Drug use: No         Current Outpatient Medications:   •  amLODIPine (NORVASC) 5 MG tablet, Take 10 mg by mouth Daily., Disp: , Rfl:   •  bisacodyl (DULCOLAX) 10 MG suppository, Insert 1 suppository into the rectum Daily As Needed for Constipation., Disp: , Rfl:   •  fluticasone (FLONASE) 50 MCG/ACT nasal spray, 1 spray into the nostril(s) as directed by provider Daily., Disp: , Rfl:   •  furosemide (LASIX) 20 MG tablet, Take 1 tablet by mouth Daily., Disp:  , Rfl:   •  ipratropium-albuterol (DUO-NEB) 0.5-2.5 mg/3 ml nebulizer, Take 3 mL by nebulization Every 4 (Four) Hours As Needed for Shortness of Air., Disp: 360 mL, Rfl:   •  isosorbide mononitrate (IMDUR) 60 MG 24 hr tablet, Take 1 tablet by mouth Daily., Disp: , Rfl:   •  levothyroxine (SYNTHROID, LEVOTHROID) 75 MCG tablet, Take 1 tablet by mouth Every Morning., Disp: , Rfl:   •  metoprolol tartrate (LOPRESSOR) 25 MG tablet, Take 1 tablet by mouth Every 12 (Twelve) Hours., Disp: , Rfl:   •  pantoprazole (PROTONIX) 40 MG EC tablet, Take 1 tablet by mouth 2 (Two) Times a Day Before Meals., Disp:  , Rfl:   •  potassium chloride (MICRO-K) 10 MEQ CR capsule, Take 1 capsule by mouth Daily., Disp:  , Rfl:   •  warfarin (Coumadin) 1 MG tablet, Take 1.5 tablets by mouth Every Night., Disp: , Rfl:   •  zinc oxide 20 % ointment, Apply  topically to the appropriate area as directed 2 (Two) Times a Day As Needed., Disp: , Rfl:     Review of Systems   Constitutional: Negative for activity change, appetite change and fatigue.  Daughter reports confusion  HENT: Negative for sore throat and trouble swallowing.   "  Respiratory: Negative.    Cardiovascular: Negative.    Gastrointestinal: Episode of vomiting, no stomach pain, no diarrhea  Endocrine: Negative for cold intolerance and heat intolerance.   Genitourinary: Negative for difficulty urinating, dysuria and frequency.   Musculoskeletal: She does have sore lower extremities  Skin: Negative.    Hematological: Negative for adenopathy. Does not bruise/bleed easily.   All other systems reviewed and are negative.    Objective   Vitals:    10/14/20 1340   BP: 130/65         10/14/20  1340   Weight: 55.3 kg (122 lb)     Body mass index is 23.83 kg/m².          /65   Ht 152.4 cm (60\")   Wt 55.3 kg (122 lb)   BMI 23.83 kg/m²     General Appearance:  Alert, cooperative, no distress, appears stated age, quite frail and elderly-appearing   Head:  Normocephalic, without obvious abnormality, atraumatic   Eyes:  PERRL, conjunctiva/corneas clear, EOM's intact   Ears:  Normal external appearance of ear, hard of hearing   Nose: Nares normal,mucosa normal   Throat: Lips, mucosa, and tongue normal; teeth and gums normal   Neck: Supple, symmetrical, trachea midline, no adenopathy;  thyroid: not enlarged, symmetric, no tenderness/mass/nodule   Back:   Symmetric, + curvature, ROM normal   Lungs:   Clear to auscultation bilaterally, respirations unlabored, effort normal on nasal cannula   Heart:  Regular rate and rhythm, S1 and S2 normal, loud systolic ejection murmur, rub, or gallop, no lower extremity edema   Abdomen:   Soft, non-tender, bowel sounds active all four quadrants,  no masses, no organomegaly   Rectal: Deferred   Musculoskeletal:  Atrophy of lower extremities, she is in a wheelchair   Skin: Normal color, texture, and turgor, no rashes or lesions   Lymph nodes: Cervical and supraclavicular nodes normal   Psychiatric: Alert and oriented x2, limited memory, confused         WBC   Date Value Ref Range Status   09/21/2020 5.58 3.40 - 10.80 10*3/mm3 Final   01/09/2020 10.7 3.4 - " 10.8 x10E3/uL Final     RBC   Date Value Ref Range Status   09/21/2020 3.07 (L) 3.77 - 5.28 10*6/mm3 Final   01/09/2020 4.61 3.77 - 5.28 x10E6/uL Final     Hemoglobin   Date Value Ref Range Status   09/21/2020 8.7 (L) 12.0 - 15.9 g/dL Final     Hematocrit   Date Value Ref Range Status   09/21/2020 27.3 (L) 34.0 - 46.6 % Final     MCV   Date Value Ref Range Status   09/21/2020 88.9 79.0 - 97.0 fL Final     MCH   Date Value Ref Range Status   09/21/2020 28.3 26.6 - 33.0 pg Final     MCHC   Date Value Ref Range Status   09/21/2020 31.9 31.5 - 35.7 g/dL Final     RDW   Date Value Ref Range Status   09/21/2020 17.1 (H) 12.3 - 15.4 % Final     RDW-SD   Date Value Ref Range Status   09/21/2020 55.2 (H) 37.0 - 54.0 fl Final     MPV   Date Value Ref Range Status   09/21/2020 9.1 6.0 - 12.0 fL Final     Platelets   Date Value Ref Range Status   09/21/2020 138 (L) 140 - 450 10*3/mm3 Final     Neutrophil %   Date Value Ref Range Status   09/21/2020 70.5 42.7 - 76.0 % Final     Lymphocyte %   Date Value Ref Range Status   09/21/2020 21.5 19.6 - 45.3 % Final     Monocyte %   Date Value Ref Range Status   09/21/2020 6.5 5.0 - 12.0 % Final     Eosinophil %   Date Value Ref Range Status   09/21/2020 1.1 0.3 - 6.2 % Final     Basophil %   Date Value Ref Range Status   09/21/2020 0.2 0.0 - 1.5 % Final     Immature Grans %   Date Value Ref Range Status   09/21/2020 0.2 0.0 - 0.5 % Final     Neutrophils, Absolute   Date Value Ref Range Status   09/21/2020 3.94 1.70 - 7.00 10*3/mm3 Final     Lymphocytes, Absolute   Date Value Ref Range Status   09/21/2020 1.20 0.70 - 3.10 10*3/mm3 Final     Monocytes, Absolute   Date Value Ref Range Status   09/21/2020 0.36 0.10 - 0.90 10*3/mm3 Final     Eosinophils, Absolute   Date Value Ref Range Status   09/21/2020 0.06 0.00 - 0.40 10*3/mm3 Final     Basophils, Absolute   Date Value Ref Range Status   09/21/2020 0.01 0.00 - 0.20 10*3/mm3 Final     Immature Grans, Absolute   Date Value Ref Range Status    09/21/2020 0.01 0.00 - 0.05 10*3/mm3 Final     nRBC   Date Value Ref Range Status   09/21/2020 0.0 0.0 - 0.2 /100 WBC Final       Lab Results   Component Value Date    GLUCOSE 88 09/20/2020    BUN 2 (L) 09/20/2020    CREATININE 0.54 (L) 09/20/2020    EGFRIFNONA 107 09/20/2020    EGFRIFAFRI 60 01/09/2020    BCR 3.7 (L) 09/20/2020    CO2 26.9 09/20/2020    CALCIUM 8.0 (L) 09/20/2020    PROTENTOTREF 6.3 01/09/2020    ALBUMIN 3.00 (L) 09/13/2020    LABIL2 2.0 01/09/2020    AST 6 09/13/2020    ALT 6 09/13/2020          FINDINGS: The following findings are present:  1. There is a calcified granuloma at the right base. The liver, spleen,  and both adrenal glands are unremarkable. The kidneys are unremarkable  except for a right renal cyst measuring 2.1 cm which is unchanged. There  is no renal obstruction. Again noted is a cystic lesion in the body of  the pancreas measuring up to 1.7 x 2 cm that is similar to the recent CT  scan. This is also similar to a chest CT scan dating back to 06/11/2017  and most consistent with benign etiology. The gallbladder appears to  contain some layering sludge without wall thickening.  2. There is extensive vascular calcification. There is no aortic  aneurysm or retroperitoneal lymphadenopathy. The large and small bowel  loops are normal in caliber.  3. In the pelvis there has been marked reduction in size of the right  groin and upper right thigh that is only partially visualized on the CT  scan. No new hematoma is seen. The urinary bladder has a smooth contour.                 Radiation dose reduction techniques were utilized, including automated  exposure control and exposure modulation based on body size.     This report was finalized on 9/11/2020 9:53 PM by Dr. Dionte Lockhart M.D.      Assessment/Plan    1. Blood loss anemia: Stable labs at the end of September.  Plans for labs tomorrow    2. Esophagitis: Remains on twice daily PPI    3. Anticoagulated: Daughter reports that she is  slightly subtherapeutic    4. Frailty: She is in a nursing home, in a wheelchair today    Plan  I will get the results of her upcoming lab test including hemoglobin  Continue twice daily PPI  As long as she is doing well and with no declines in her hemoglobin, will consider decreasing pantoprazole to once daily after December which would give her 3 months total on this medication  Given her overall frailty, I would like to minimize any significant procedures/interventions for her and discussed with the patient and daughter today that I have no further plans for endoscopy for her at this time.      Diagnoses and all orders for this visit:    1. Blood loss anemia (Primary)    2. Esophagitis    3. Anticoagulated    4. Frailty        Dictated utilizing Dragon dictation

## 2020-10-15 NOTE — TELEPHONE ENCOUNTER
----- Message from Keynana Lemus MD sent at 10/14/2020  4:57 PM EDT -----  She is going to have labs drawn October 15 at the [facility].  Can we please call next week to get those results, thank you

## 2020-10-19 NOTE — TELEPHONE ENCOUNTER
Call to Brennen Ardon @ [phone] -  left with Karishma in client services requesting labs be faxed to .

## 2020-10-19 NOTE — TELEPHONE ENCOUNTER
Call from Karishma - no record of pt.     VM to daughter, Dione (see hipaa) with request to contact office. Checking where had lab obtained.

## 2020-10-20 NOTE — TELEPHONE ENCOUNTER
Attempt return call to Dione.  BILLY left with request as to where pt had labs on 101/5 - may leave message.

## 2020-10-20 NOTE — TELEPHONE ENCOUNTER
Teressa Palumbo, Leonardo Rep  [facility] Clinical 1 Pool             returning phone call from us. For us to get lab work from [facility]. Call [phone] ask nurse.     **Call to [facility] and spoke with Vannessa Corral RN.  Will fax labs of 10/13 to .  Virgie Cardona RN

## 2020-11-19 PROBLEM — R94.31 ABNORMAL EKG: Status: ACTIVE | Noted: 2020-01-01

## 2020-11-19 PROBLEM — I25.10 CORONARY ARTERY DISEASE: Status: ACTIVE | Noted: 2020-01-01

## 2020-11-19 NOTE — PROGRESS NOTES
Subjective   Jihan Teresa is a 87 y.o. female.     Chief Complaint   Patient presents with   • Atrial Fibrillation     follow up   • Hypertension   • Hyperlipidemia         History of Present Illness   Current outpatient and discharge medications have been reconciled for the patient.  Reviewed by: Yaron Ca Jr., MD    Delightful lady who is a complex patient.  She is living at [facility] in [place].  Labs are followed from recently with anemia hemoglobin 9.3 prior GI work-up in hospital.  She is currently on Lasix 20 twice daily and is to have Lasix increased to 40 twice daily discussed with her daughter about and will write a an order on a prescription pad to be go back to [facility].  She has hypoxemia in the office O2 sat less than 90 approximate 3% without severe symptoms.  She has nasal oxygen in the car and she will need nasal oxygen at [facility] as well which she does have there.    I discussed with her daughter about definitely increasing Lasix to 40 twice daily with follow-up BMP and CBC within the next week.  If respiratory distress becomes an increasing issue she needs to go back to the emergency room as discussed with her daughter as well.    She does have a stroke and some degree of cerebral hemorrhage while in the hospital recently.    The following portions of the patient's history were reviewed and updated as appropriate: allergies, current medications, past social history and problem list.    Review of Systems   Constitutional: Positive for fatigue.   HENT: Negative.    Eyes: Negative.    Respiratory: Positive for shortness of breath.    Cardiovascular: Positive for leg swelling.   Gastrointestinal: Negative.    Endocrine: Negative.    Genitourinary: Negative.    Musculoskeletal: Negative.    Skin: Negative.    Allergic/Immunologic: Negative.    Neurological: Negative.    Hematological: Negative.    Psychiatric/Behavioral: Positive for decreased concentration.       Objective      Vitals:    11/19/20 1200   BP: 124/70   Pulse: 75   Temp: 97.8 °F (36.6 °C)   SpO2: (!) 83%     Physical Exam  Vitals signs reviewed.   Constitutional:       Appearance: Normal appearance. She is not ill-appearing.   HENT:      Head: Normocephalic and atraumatic.      Right Ear: Tympanic membrane and external ear normal.      Left Ear: Tympanic membrane and external ear normal.   Eyes:      Conjunctiva/sclera: Conjunctivae normal.      Pupils: Pupils are equal, round, and reactive to light.   Neck:      Musculoskeletal: Normal range of motion and neck supple.      Thyroid: No thyromegaly.      Vascular: No JVD.   Cardiovascular:      Rate and Rhythm: Normal rate. Rhythm irregularly irregular.      Heart sounds: Normal heart sounds.   Pulmonary:      Effort: Pulmonary effort is normal.      Breath sounds: Examination of the right-lower field reveals decreased breath sounds. Examination of the left-lower field reveals decreased breath sounds. Decreased breath sounds present.   Abdominal:      General: Bowel sounds are normal.      Palpations: Abdomen is soft.   Musculoskeletal: Normal range of motion.        Legs:    Lymphadenopathy:      Cervical: No cervical adenopathy.   Skin:     General: Skin is warm and dry.      Findings: No rash.   Neurological:      Mental Status: She is alert and oriented to person, place, and time.      Cranial Nerves: No cranial nerve deficit.      Coordination: Coordination normal.   Psychiatric:         Mood and Affect: Affect is flat.         Speech: Speech normal.         Behavior: Behavior is slowed. Behavior is cooperative.         Thought Content: Thought content normal.         Cognition and Memory: Cognition is impaired. Memory is impaired.         Judgment: Judgment normal.         Assessment/Plan   Problems Addressed this Visit        Cardiovascular and Mediastinum    A-fib (CMS/[facility]) - Primary    CHF (congestive heart failure) (CMS/H[facility])    HLD (hyperlipidemia)    RESOLVED:  Cardiomyopathy (CMS/[facility])       Digestive    B12 deficiency       Genitourinary    RESOLVED: UTI (urinary tract infection), bacterial    Relevant Medications    doxycycline (VIBRAMYICN) 100 MG tablet       Other    History of recurrent UTIs      Other Visit Diagnoses     Hypoxemia          Diagnoses       Codes Comments    Persistent atrial fibrillation (CMS/[facility])    -  Primary ICD-10-CM: I48.19  ICD-9-CM: 427.31     Ischemic cardiomyopathy     ICD-10-CM: I25.5  ICD-9-CM: 414.8     Mixed hyperlipidemia     ICD-10-CM: E78.2  ICD-9-CM: 272.2     B12 deficiency     ICD-10-CM: E53.8  ICD-9-CM: 266.2     UTI (urinary tract infection), bacterial     ICD-10-CM: N39.0, A49.9  ICD-9-CM: 599.0, 041.9     History of recurrent UTIs     ICD-10-CM: Z87.440  ICD-9-CM: V13.02     Hypoxemia     ICD-10-CM: R09.02  ICD-9-CM: 799.02     Chronic systolic congestive heart failure (CMS/[facility])     ICD-10-CM: I50.22  ICD-9-CM: 428.22, 428.0       Increase Lasix to 40 twice daily and recheck BMP CBC next week.  Follow-up in a month sooner if needed.  If respiratory distress occurs she will need to go back to the emergency room.  She does not wish to go there today.

## 2020-11-24 PROBLEM — J96.01 ACUTE HYPOXEMIC RESPIRATORY FAILURE (HCC): Status: ACTIVE | Noted: 2020-01-01

## 2020-11-25 PROBLEM — I50.43 SYSTOLIC AND DIASTOLIC CHF, ACUTE ON CHRONIC (HCC): Status: ACTIVE | Noted: 2020-01-01

## 2020-12-30 PROBLEM — I63.9 CEREBROVASCULAR ACCIDENT (CVA) (HCC): Status: ACTIVE | Noted: 2020-01-01

## 2021-01-01 VITALS
OXYGEN SATURATION: 82 % | BODY MASS INDEX: 24.32 KG/M2 | HEIGHT: 60 IN | TEMPERATURE: 97.9 F | SYSTOLIC BLOOD PRESSURE: 121 MMHG | WEIGHT: 123.9 LBS | DIASTOLIC BLOOD PRESSURE: 66 MMHG

## 2021-01-01 LAB
GLUCOSE BLDC GLUCOMTR-MCNC: 110 MG/DL (ref 70–130)
GLUCOSE BLDC GLUCOMTR-MCNC: 325 MG/DL (ref 70–130)
INR PPP: 1.98 (ref 0.9–1.1)
PROTHROMBIN TIME: 22.2 SECONDS (ref 11.7–14.2)

## 2021-01-01 PROCEDURE — 82962 GLUCOSE BLOOD TEST: CPT

## 2021-01-01 PROCEDURE — 25010000003 LEVETIRACETAM IN NACL 0.75% 1000 MG/100ML SOLUTION: Performed by: PSYCHIATRY & NEUROLOGY

## 2021-01-01 PROCEDURE — 63710000001 INSULIN LISPRO (HUMAN) PER 5 UNITS: Performed by: HOSPITALIST

## 2021-01-01 PROCEDURE — 85610 PROTHROMBIN TIME: CPT | Performed by: NURSE PRACTITIONER

## 2021-01-01 PROCEDURE — 25010000002 ENOXAPARIN PER 10 MG: Performed by: NURSE PRACTITIONER

## 2021-01-01 RX ORDER — LORAZEPAM 2 MG/ML
2 INJECTION INTRAMUSCULAR
Status: DISCONTINUED | OUTPATIENT
Start: 2021-01-01 | End: 2021-01-01 | Stop reason: HOSPADM

## 2021-01-01 RX ORDER — KETOROLAC TROMETHAMINE 30 MG/ML
15 INJECTION, SOLUTION INTRAMUSCULAR; INTRAVENOUS EVERY 6 HOURS PRN
Status: DISCONTINUED | OUTPATIENT
Start: 2021-01-01 | End: 2021-01-01 | Stop reason: HOSPADM

## 2021-01-01 RX ORDER — MORPHINE SULFATE 20 MG/ML
10 SOLUTION ORAL
Status: DISCONTINUED | OUTPATIENT
Start: 2021-01-01 | End: 2021-01-01 | Stop reason: HOSPADM

## 2021-01-01 RX ORDER — ACETAMINOPHEN 160 MG/5ML
650 SOLUTION ORAL EVERY 4 HOURS PRN
Status: DISCONTINUED | OUTPATIENT
Start: 2021-01-01 | End: 2021-01-01

## 2021-01-01 RX ORDER — DIPHENOXYLATE HYDROCHLORIDE AND ATROPINE SULFATE 2.5; .025 MG/1; MG/1
1 TABLET ORAL
Status: DISCONTINUED | OUTPATIENT
Start: 2021-01-01 | End: 2021-01-01

## 2021-01-01 RX ORDER — HYDROMORPHONE HYDROCHLORIDE 1 MG/ML
0.5 INJECTION, SOLUTION INTRAMUSCULAR; INTRAVENOUS; SUBCUTANEOUS
Status: DISCONTINUED | OUTPATIENT
Start: 2021-01-01 | End: 2021-01-01 | Stop reason: HOSPADM

## 2021-01-01 RX ORDER — DIPHENOXYLATE HYDROCHLORIDE AND ATROPINE SULFATE 2.5; .025 MG/1; MG/1
1 TABLET ORAL
Status: DISCONTINUED | OUTPATIENT
Start: 2021-01-01 | End: 2021-01-01 | Stop reason: HOSPADM

## 2021-01-01 RX ORDER — ACETAMINOPHEN 325 MG/1
650 TABLET ORAL EVERY 4 HOURS PRN
Status: DISCONTINUED | OUTPATIENT
Start: 2021-01-01 | End: 2021-01-01

## 2021-01-01 RX ORDER — LORAZEPAM 2 MG/ML
1 INJECTION INTRAMUSCULAR
Status: DISCONTINUED | OUTPATIENT
Start: 2021-01-01 | End: 2021-01-01 | Stop reason: HOSPADM

## 2021-01-01 RX ORDER — ACETAMINOPHEN 650 MG/1
650 SUPPOSITORY RECTAL EVERY 4 HOURS PRN
Status: DISCONTINUED | OUTPATIENT
Start: 2021-01-01 | End: 2021-01-01

## 2021-01-01 RX ORDER — LORAZEPAM 2 MG/ML
0.5 INJECTION INTRAMUSCULAR
Status: DISCONTINUED | OUTPATIENT
Start: 2021-01-01 | End: 2021-01-01 | Stop reason: HOSPADM

## 2021-01-01 RX ORDER — MORPHINE SULFATE 2 MG/ML
2 INJECTION, SOLUTION INTRAMUSCULAR; INTRAVENOUS
Status: DISCONTINUED | OUTPATIENT
Start: 2021-01-01 | End: 2021-01-01 | Stop reason: HOSPADM

## 2021-01-01 RX ORDER — LORAZEPAM 2 MG/ML
1 CONCENTRATE ORAL
Status: DISCONTINUED | OUTPATIENT
Start: 2021-01-01 | End: 2021-01-01 | Stop reason: HOSPADM

## 2021-01-01 RX ORDER — INSULIN LISPRO 100 [IU]/ML
5 INJECTION, SOLUTION INTRAVENOUS; SUBCUTANEOUS ONCE
Status: COMPLETED | OUTPATIENT
Start: 2021-01-01 | End: 2021-01-01

## 2021-01-01 RX ORDER — LORAZEPAM 2 MG/ML
0.5 CONCENTRATE ORAL
Status: DISCONTINUED | OUTPATIENT
Start: 2021-01-01 | End: 2021-01-01 | Stop reason: HOSPADM

## 2021-01-01 RX ORDER — MORPHINE SULFATE 20 MG/ML
5 SOLUTION ORAL
Status: DISCONTINUED | OUTPATIENT
Start: 2021-01-01 | End: 2021-01-01 | Stop reason: HOSPADM

## 2021-01-01 RX ORDER — MORPHINE SULFATE 4 MG/ML
4 INJECTION, SOLUTION INTRAMUSCULAR; INTRAVENOUS
Status: DISCONTINUED | OUTPATIENT
Start: 2021-01-01 | End: 2021-01-01 | Stop reason: HOSPADM

## 2021-01-01 RX ORDER — LORAZEPAM 2 MG/ML
2 CONCENTRATE ORAL
Status: DISCONTINUED | OUTPATIENT
Start: 2021-01-01 | End: 2021-01-01 | Stop reason: HOSPADM

## 2021-01-01 RX ADMIN — INSULIN LISPRO 5 UNITS: 100 INJECTION, SOLUTION INTRAVENOUS; SUBCUTANEOUS at 06:49

## 2021-01-01 RX ADMIN — ENOXAPARIN SODIUM 60 MG: 60 INJECTION SUBCUTANEOUS at 06:49

## 2021-01-01 RX ADMIN — LEVETIRACETAM 1000 MG: 10 INJECTION INTRAVENOUS at 10:51

## 2021-01-01 NOTE — PROGRESS NOTES
Pharmacy Consult: Warfarin Dosing/ Monitoring    Jihan Tersea is a 87 y.o. female, estimated creatinine clearance is 36.7 mL/min (by C-G formula based on SCr of 0.85 mg/dL). weighing 56.2 kg (123 lb 14.4 oz).     has a past medical history of Anemia, Aortic valve stenosis, Asthma, Atelectasis, CHF (congestive heart failure) (CMS/[facility]), Congenital heart disease, Diabetes mellitus (CMS/[facility]), Esophageal reflux, Essential hypertension, HLD (hyperlipidemia), Hypotension, LVH (left ventricular hypertrophy), Pleural effusion, and Pulmonary hypertension (CMS/[facility]).    Social History     Tobacco Use    Smoking status: Never Smoker    Smokeless tobacco: Never Used   Substance Use Topics    Alcohol use: Yes     Comment: ocasional    Drug use: No     Results from last 7 days   Lab Units 01/01/21  0633 12/31/20  0735 12/30/20  0622   INR  1.98* 1.94* 1.63*   APTT seconds  --   --  31.5   HEMOGLOBIN g/dL  --  12.4 11.4*   HEMATOCRIT %  --  39.0 36.5   PLATELETS 10*3/mm3  --  178 164     Results from last 7 days   Lab Units 12/31/20  0735 12/30/20  0622   SODIUM mmol/L 146* 143   POTASSIUM mmol/L 3.5 3.1*   CHLORIDE mmol/L 105 105   CO2 mmol/L 23.7 28.7   BUN mg/dL 12 17   CREATININE mg/dL 0.85 0.86   CALCIUM mg/dL 9.2 9.0   BILIRUBIN mg/dL 0.3 0.4   ALK PHOS U/L 70 73   ALT (SGPT) U/L 15 17   AST (SGOT) U/L 21 24   GLUCOSE mg/dL 145* 157*   Anticoagulation history: reviewed dosing from prior admissions and note that dose range averages between 1.5 and 3 mg    Hospital Anticoagulation:  Consulting provider: Ling Kong APRN  Start date: 12/30/2020  Indication: afib  Target INR: 2-3  Expected duration: lifelong   Bridge Therapy: Yes              and enoxapain  Date 12/30 12/31 1/1/21          INR 1.63 1.94 1.98          Warfarin dose none 0.5mg NOT GIVEN No dose ordered            Potential drug interactions: asa, levothyroxine, lasix    Relevant nutrition status:     Other: rapid response called yesterday once  transferred to floor    Education complete?/ Date: n/a     Assessment/Plan:  Dose was not given yesterday due to patient unable to take anything PO. Note that family is exploring transition to palliative care. I have not ordered a warfarin dose for today since INR no without dose yesterday and day before.  Monitor INR  Follow up tomorrow    Pharmacy will continue to follow until discharge or discontinuation of warfarin.   Awilda Baptiste, [facility]  1/1/2021

## 2021-01-01 NOTE — PROGRESS NOTES
"Spoke with RN.  Patient has not had any purposeful movement and does not follow any commands.  She does move her feet spontaneously.  I went in and spoke with the daughter who was at bedside.  The patient did not respond to any verbal stimuli or commands.  She did move her feet to touch.  Daughter reports she has not noticed any purposeful movement since yesterday morning.  She reports the patient is unable to lift her right arm which she was able to do yesterday.  Occasionally will move her feet.  She reports she told the patient some \"good news\" this morning and she thought that she opened her mouth wider but did not verbally respond.  She is awaiting her sisters to wake up this morning so they can discuss moving her to palliative care today.  I discussed that if they were to have any questions that they can call us back.  At this time we will sign off, will see us again per request.    KAREN Granger    "

## 2021-01-01 NOTE — PROGRESS NOTES
Name: Jihan Teresa ADMIT: 2020   : 1933  PCP: Yaron Ca Jr., MD    MRN: [id number] LOS: 2 days   AGE/SEX: 87 y.o. female  ROOM: [facility]     Subjective   Subjective   Patient seen and examined chart reviewed.  No new problems noted overnight.  Daughter at bedside answered all of her questions.    Review of Systems   Unchanged  Objective   Objective   Vital Signs  Temp:  [97.5 °F (36.4 °C)-98.1 °F (36.7 °C)] 98.1 °F (36.7 °C)  Heart Rate:  [69-96] 96  Resp:  [14-18] 16  BP: (158-166)/(64-93) 163/93  SpO2:  [94 %-98 %] 94 %  on  Flow (L/min):  [3-4] 3;   Device (Oxygen Therapy): nasal cannula  Body mass index is 24.2 kg/m².  Physical Exam  Vitals signs and nursing note reviewed.   Constitutional:       Unresponsive.  Peers comfortable  HENT:      Head: Normocephalic and atraumatic.      Nose: Nose normal.      Mouth/Throat:      Mouth: Mucous membranes are moist.   Eyes:      General: No scleral icterus.  Neck:      Musculoskeletal: Neck supple.   Cardiovascular:      Rate and Rhythm: Normal rate. Rhythm irregular.      Pulses: Normal pulses.      Heart sounds: Normal heart sounds.   Pulmonary:      Effort: Pulmonary effort is normal.      Breath sounds: Normal breath sounds.   Abdominal:      General: Abdomen is flat. Bowel sounds are normal.      Palpations: Abdomen is soft.   Skin:     General: Skin is warm and dry.      Capillary Refill: Capillary refill takes less than 2 seconds.   Neurological:      Comments: Unresponsive no spontaneous movement bilateral Babinski's   Results Review     I reviewed the patient's new clinical results.  Results from last 7 days   Lab Units 20  0735 20  06   WBC 10*3/mm3 11.45* 9.08   HEMOGLOBIN g/dL 12.4 11.4*   PLATELETS 10*3/mm3 178 164     Results from last 7 days   Lab Units 20  0735 20  0622   SODIUM mmol/L 146* 143   POTASSIUM mmol/L 3.5 3.1*   CHLORIDE mmol/L 105 105   CO2 mmol/L 23.7 28.7   BUN mg/dL 12 17   CREATININE mg/dL  0.85 0.86   GLUCOSE mg/dL 145* 157*   Estimated Creatinine Clearance: 36.7 mL/min (by C-G formula based on SCr of 0.85 mg/dL).  Results from last 7 days   Lab Units 12/31/20  0735 12/30/20  0622   ALBUMIN g/dL 4.00 4.00   BILIRUBIN mg/dL 0.3 0.4   ALK PHOS U/L 70 73   AST (SGOT) U/L 21 24   ALT (SGPT) U/L 15 17     Results from last 7 days   Lab Units 12/31/20  0735 12/30/20  0622   CALCIUM mg/dL 9.2 9.0   ALBUMIN g/dL 4.00 4.00       COVID19   Date Value Ref Range Status   12/30/2020 Not Detected Not Detected - Ref. Range Final   11/24/2020 Not Detected Not Detected - Ref. Range Final     Hemoglobin A1C   Date/Time Value Ref Range Status   12/31/2020 0735 6.32 (H) 4.80 - 5.60 % Final     Glucose   Date/Time Value Ref Range Status   01/01/2021 0608 325 (H) 70 - 130 mg/dL Final   12/31/2020 2041 127 70 - 130 mg/dL Final   12/31/2020 1551 141 (H) 70 - 130 mg/dL Final   12/31/2020 1026 155 (H) 70 - 130 mg/dL Final   12/31/2020 0600 128 70 - 130 mg/dL Final   12/31/2020 0009 106 70 - 130 mg/dL Final   12/30/2020 1611 183 (H) 70 - 130 mg/dL Final       MRI Brain Without Contrast  Narrative: MRI OF THE BRAIN WITHOUT CONTRAST     CLINICAL HISTORY: Follow-up stroke.     TECHNIQUE: MRI of the brain was obtained with sagittal T1, axial T1,  axial FLAIR, axial T2, axial diffusion, and axial gradient echo images.     COMPARISON: Comparison is made to previous CT angiogram of the head  dated 12/30/2020 as well as previous MRI of the brain dated 08/18/2020.     FINDINGS:     There are very extensive areas of acute infarction involving the right  cerebral hemisphere which involve the entire right MCA and LIOR  distribution with sparing of the right PCA distribution. Additionally,  there is an acute infarct involving the posterior limb of the right  internal capsule. With the exception of the right thalamus as well as  the medial and inferior portions of the right temporal and occipital  lobes, there is complete infarction of the  right cerebral hemisphere.  This infarct was evident on the prior CT angiogram. There is another  smaller area of acute infarction within the left occipital lobe which  measures up to 6 mm in diameter and is within either the left MCA or  left PCA distribution. Yet other similar smaller discontiguous foci of  acute infarction are identified within the left occipital lobe. This  infarct would not be well delineated on the prior CT study. There is  marked mass effect with sulcal and ventricular effacement in addition to  shift of the midline structures to the left by approximately 8 mm. The  extent of mass effect demonstrates prominent interval progression when  compared to the prior CT angiogram. On the gradient echo sequence, there  are relatively small areas of new susceptibility artifact within the  right frontal lobe and right temporal lobe suggesting petechial  hemorrhagic transformation. The largest contiguous areas are noted  within the anterior portion of the right temporal lobe measuring up to 9  x 6 mm in greatest axial dimensions. There are yet other areas of  punctate susceptibility artifact within the corticomedullary interfaces  of the right parietal and frontal lobe that were evident on the prior  exam probably representative of chronic punctate microhemorrhages  secondary to amyloid angiopathy. The previously noted area of blood  products involving the anterior aspect of the 3rd ventricle as well as  the inferior and medial aspects of the frontal lobes adjacent to the  lateral ventricles is again incidentally noted.      There is an abnormal flow related signal void within the right internal  carotid artery and the right middle cerebral artery which is likely  completely occluded.     Multiple chronic infarcts are identified within the cerebellar  hemispheres. The largest of these is noted within the left cerebellar  hemisphere measuring up to approximately 1.7 x 1.8 cm in greatest axial  dimensions.  These chronic infarcts were evident on the prior study.  Extensive changes of chronic small vessel ischemic phenomena are  identified. There is old lacunar disease involving the thalami and  multiple punctate areas of T2 hyperintensity are seen within the  lentiform nuclei that probably represent some combination of enlarged  perivascular spaces and old lacunar disease.     Impression:    There are extensive areas of acute infarction involving the right  cerebral hemisphere which involve the entire right MCA and LIOR  distribution as well as the right anterior choroidal artery  distribution. With the exception of the right thalamus as well as the  inferior and medial aspects of the right temporal and occipital lobes,  there is infarction of the remaining right cerebral hemisphere. This  infarct was evident on the prior CT angiogram of the head dated  12/30/2020. However, there is marked progression of the extent of mass  effect with new midline shift to the left by approximately 8 mm. Areas  of petechial hemorrhagic transformation are noted within the right  frontal and temporal lobes as discussed above. There are also relatively  small foci of acute infarction involving the left occipital lobe  posteriorly either within the left MCA or the left PCA distribution.  These occipital lobe infarcts would not likely be well delineated on CT  imaging.     Again noted is an abnormal flow related signal void within the right  internal carotid artery and the right middle cerebral artery which are  likely completely occluded. This finding was also seen on the prior CT  angiogram of 12/30/2020.     Additional chronic findings are as discussed in detail above.     These findings were contemporaneously discussed with Dr. Zambrano on  12/31/2020.     This report was finalized on 12/31/2020 3:55 PM by Dr. Abram Branch M.D.       Scheduled Medications  aspirin, 325 mg, Oral, Daily    Or  aspirin, 300 mg, Rectal,  Daily  atorvastatin, 80 mg, Oral, Nightly  enoxaparin, 1 mg/kg, Subcutaneous, Q12H  levETIRAcetam, 1,000 mg, Intravenous, Q12H  warfarin, 0.5 mg, Oral, Once    Infusions  Pharmacy to dose warfarin,     Diet  NPO Diet       Assessment/Plan     Active Hospital Problems    Diagnosis  POA   • **Cerebrovascular accident (CVA) (CMS/[facility]) [I63.9]  Yes   • ICH (intracerebral hemorrhage) (CMS/[facility]) [I61.9]  Yes   • Hypokalemia [E87.6]  Yes   • A-fib (CMS/[facility]) [I48.91]  Yes   • CHF (congestive heart failure) (CMS/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       87 y.o. female admitted with Cerebrovascular accident (CVA) (CMS/[facility]).    · Large right middle cerebral artery CVA/chronic atrial fibrillation/chronic anticoagulation/dementia/history of intracranial hemorrhage: Patient has suffered a very large right middle cerebral artery infarction prognosis very poor.    Patient is now DNR.  Discussed palliative care with daughter she seems agreeable she is going to speak the other siblings patient likely to be moved to palliative care later this afternoon.    ·   ·   ·        Natalio Jenkins MD  [facility]  01/01/21  09:45 EST      I wore protective equipment throughout this patient encounter including a face mask, gloves and protective eyewear.  Hand hygiene was performed before donning protective equipment and after removal when leaving the room.

## 2021-01-01 NOTE — PLAN OF CARE
Problem: Adult Inpatient Plan of Care  Goal: Plan of Care Review  Flowsheets (Taken 1/1/2021 0500)  Progress: no change  Plan of Care Reviewed With: patient  Outcome Summary: Family still at bedside. It is still undediced by famly if they want patient to be moved to palliative care. Patients NIH is 33. Patient does not follow any commands or repsond to her name anymore. Frequent oral care is being provided. Q2 turns. Vitals stable. Will continue to monitor.

## 2021-01-01 NOTE — PLAN OF CARE
Goal Outcome Evaluation:  Plan of Care Reviewed With: patient  Progress: declining  Outcome Summary: pt transferred to unit for palliative comfort care. Pt repositioned in bed, daughter at bedside. Will continue to monitor

## 2021-01-01 NOTE — SIGNIFICANT NOTE
01/01/21 1045   OTHER   Discipline physical therapist   Rehab Time/Intention   Session Not Performed unable to treat, medical status change  (Pt.'s current NIH is 33 and she does not follow commands or respond to her name.  Family considering Palliative care given poor prognosis.  Pt. not appropriate for skilled inpt. P.T.; Will sign off.)

## 2021-01-01 NOTE — SIGNIFICANT NOTE
01/01/21 1033   OTHER   Discipline speech language pathologist   Rehab Time/Intention   Session Not Performed unable to evaluate, medical status change     SLP evaluation orders received and appreciated. Attempted bedside swallow evaluation today, upon chart review and discussion with RN, NIH 30 and GCS 3, pt is inappropriate and unresponsive.     Note family considering Palliative care. Please reconsult via a new order if pt changes in condition, or if aggressive measures desired by family. At this time will defer means of nutrition to MD team/RDN pending plan of care.

## 2021-01-02 NOTE — PROGRESS NOTES
Case Management Discharge Note                Selected Continued Care - Discharged on 2021 Admission date: 2020 - Discharge disposition:     Destination    No services have been selected for the patient.              Durable Medical Equipment    No services have been selected for the patient.              Dialysis/Infusion    No services have been selected for the patient.              Home Medical Care    No services have been selected for the patient.              Therapy    No services have been selected for the patient.              Community Resources    No services have been selected for the patient.                Selected Continued Care - Prior Encounters Includes selections from prior encounters from 10/1/2020 to 2021    Discharged on 2020 Admission date: 2020 - Discharge disposition: Skilled Nursing Facility (DC - External)    Destination     Service Provider Selected Services Address Phone Fax Patient Preferred    [address] [phone] --                         Final Discharge Disposition Code: 41 -  in medical facility

## 2021-01-08 LAB — CREAT BLDA-MCNC: 1 MG/DL (ref 0.6–1.3)

## 2021-01-17 NOTE — DISCHARGE SUMMARY
Patient Name: Jihan Teresa  : 1933  MRN: [id number]    Date of Admission: 2020  Date of Discharge:  2021  Primary Care Physician: Yaron Ca Jr., MD      Chief Complaint:   Altered Mental Status      Discharge Diagnoses     Active Hospital Problems    Diagnosis  POA   • **Cerebrovascular accident (CVA) (CMS/[facility]) [I63.9]  Yes   • ICH (intracerebral hemorrhage) (CMS/[facility]) [I61.9]  Yes   • Hypokalemia [E87.6]  Yes   • A-fib (CMS/[facility]) [I48.91]  Yes   • CHF (congestive heart failure) (CMS/[facility]) [I50.9]  Yes   • Hypothyroidism (acquired) [E03.9]  Yes   • Essential hypertension [I10]  Yes   • Aortic valve replaced, equine valve [Z95.2]  Not Applicable      Resolved Hospital Problems   No resolved problems to display.        Hospital Course     Ms. Teresa is a 87 y.o. female with a history of multiple medical problems including aortic valve stenosis status post aortic valve replacement, chronic atrial fibrillation, chronic anticoagulant therapy, with previous history of intracranial hemorrhage, with a previous supravascular accident who presented to [facility] initially complaining of altered mental status respiratory distress tonic-clonic movements of her right upper extremities.  Please see the admitting history and physical for further details.  She was found to have large right middle cerebral artery CVA, atrial fibrillation chronic anticoagulant therapy seizure disorder, hypertension chronic congestive heart failure hypothyroidism and was admitted to the hospital for further evaluation and treatment.      Hospital course:  Patient was admitted through the emergency department to the medical service CT scan revealed a large right middle cerebral artery infarction.  MRI of the brain performed on 2020 revealed extensive areas of acute infarction involving the right cerebral hemisphere which involve the entire right middle cerebral artery and anterior cerebral artery  distribution is well as the right anterior choridal artery distribution.  With the exception of the right thalamus as well as the inferior medial aspects of the right temporal and occipital lobes there is infarction of the remaining right cerebral hemisphere.  This was also noted on CT scan on 2020 however MRI revealed and extensive progression of mass-effect with midline shift to the left by approximately 8 mm along with areas of petechial hemorrhage in the right frontal and temporal lobes.  Neurology was consulted discussed with patient's family the extension of her stroke and her long-term prognosis poor also had extensive discussion with the patient's daughters who elected to make patient a DO NOT RESUSCITATE and she was transferred to palliative care.           Day of Discharge     Subjective: Patient     Review of Systems  Patient   Physical Exam:     Body mass index is 24.2 kg/m².  Physical Exam  Patient   Consultants     Consult Orders (all) (From admission, onward)     Start     Ordered    20 1705  Inpatient Spiritual Care Consult  Once     Provider:  (Not yet assigned)    20 1704    20 1704  Inpatient Palliative Care MD Consult  Once,   Status:  Canceled     Specialty:  Hospice and Palliative Medicine  Provider:  Kelsi Palma MD    20 1704    20 1429  Inpatient Palliative Care Nurse Consult  Once     Provider:  (Not yet assigned)    20 1428    20 1038  Inpatient Cardiology Consult  Once,   Status:  Canceled     Specialty:  Cardiology  Provider:  (Not yet assigned)    20 1037    20 0942  Inpatient Neurology Consult Stroke  Once     Specialty:  Neurology  Provider:  (Not yet assigned)    20 0941    20 0942  Notify Stroke Coordinator  Once,   Status:  Canceled     Provider:  (Not yet assigned)    20 0941    20 0942  Inpatient Rehab Admission Consult  Once     Provider:  (Not yet assigned)     12/30/20 0941    12/30/20 0942  Consult to Case Management   Once     Provider:  (Not yet assigned)    12/30/20 0941    12/30/20 0942  Consult to Diabetes Educator  Once,   Status:  Canceled     Provider:  (Not yet assigned)    12/30/20 0941    12/30/20 0704  [facility] (on-call MD unless specified) Details  Once     Specialty:  Hospitalist  Provider:  (Not yet assigned)    12/30/20 0703    12/30/20 0635  IP Palliative Care Nurse Consult  Once     Provider:  (Not yet assigned)    12/30/20 0634    12/30/20 0622  Inpatient Neurology Consult Stroke  Once,   Status:  Canceled     Specialty:  Neurology  Provider:  Paul Zambrano MD    12/30/20 0621 12/30/20 0622  Inpatient Neurology Consult Stroke  Once,   Status:  Canceled     Specialty:  Neurology  Provider:  Paul Zambrano MD    12/30/20 0621              Procedures     Imaging Results (All)     Procedure Component Value Units Date/Time    MRI Brain Without Contrast [[id number]] Collected: 12/31/20 1143     Updated: 12/31/20 1558    Narrative:      MRI OF THE BRAIN WITHOUT CONTRAST     CLINICAL HISTORY: Follow-up stroke.     TECHNIQUE: MRI of the brain was obtained with sagittal T1, axial T1,  axial FLAIR, axial T2, axial diffusion, and axial gradient echo images.     COMPARISON: Comparison is made to previous CT angiogram of the head  dated 12/30/2020 as well as previous MRI of the brain dated 08/18/2020.     FINDINGS:     There are very extensive areas of acute infarction involving the right  cerebral hemisphere which involve the entire right MCA and LIOR  distribution with sparing of the right PCA distribution. Additionally,  there is an acute infarct involving the posterior limb of the right  internal capsule. With the exception of the right thalamus as well as  the medial and inferior portions of the right temporal and occipital  lobes, there is complete infarction of the right cerebral hemisphere.  This infarct was evident on the prior  CT angiogram. There is another  smaller area of acute infarction within the left occipital lobe which  measures up to 6 mm in diameter and is within either the left MCA or  left PCA distribution. Yet other similar smaller discontiguous foci of  acute infarction are identified within the left occipital lobe. This  infarct would not be well delineated on the prior CT study. There is  marked mass effect with sulcal and ventricular effacement in addition to  shift of the midline structures to the left by approximately 8 mm. The  extent of mass effect demonstrates prominent interval progression when  compared to the prior CT angiogram. On the gradient echo sequence, there  are relatively small areas of new susceptibility artifact within the  right frontal lobe and right temporal lobe suggesting petechial  hemorrhagic transformation. The largest contiguous areas are noted  within the anterior portion of the right temporal lobe measuring up to 9  x 6 mm in greatest axial dimensions. There are yet other areas of  punctate susceptibility artifact within the corticomedullary interfaces  of the right parietal and frontal lobe that were evident on the prior  exam probably representative of chronic punctate microhemorrhages  secondary to amyloid angiopathy. The previously noted area of blood  products involving the anterior aspect of the 3rd ventricle as well as  the inferior and medial aspects of the frontal lobes adjacent to the  lateral ventricles is again incidentally noted.      There is an abnormal flow related signal void within the right internal  carotid artery and the right middle cerebral artery which is likely  completely occluded.     Multiple chronic infarcts are identified within the cerebellar  hemispheres. The largest of these is noted within the left cerebellar  hemisphere measuring up to approximately 1.7 x 1.8 cm in greatest axial  dimensions. These chronic infarcts were evident on the prior study.  Extensive  changes of chronic small vessel ischemic phenomena are  identified. There is old lacunar disease involving the thalami and  multiple punctate areas of T2 hyperintensity are seen within the  lentiform nuclei that probably represent some combination of enlarged  perivascular spaces and old lacunar disease.       Impression:         There are extensive areas of acute infarction involving the right  cerebral hemisphere which involve the entire right MCA and LIOR  distribution as well as the right anterior choroidal artery  distribution. With the exception of the right thalamus as well as the  inferior and medial aspects of the right temporal and occipital lobes,  there is infarction of the remaining right cerebral hemisphere. This  infarct was evident on the prior CT angiogram of the head dated  12/30/2020. However, there is marked progression of the extent of mass  effect with new midline shift to the left by approximately 8 mm. Areas  of petechial hemorrhagic transformation are noted within the right  frontal and temporal lobes as discussed above. There are also relatively  small foci of acute infarction involving the left occipital lobe  posteriorly either within the left MCA or the left PCA distribution.  These occipital lobe infarcts would not likely be well delineated on CT  imaging.     Again noted is an abnormal flow related signal void within the right  internal carotid artery and the right middle cerebral artery which are  likely completely occluded. This finding was also seen on the prior CT  angiogram of 12/30/2020.     Additional chronic findings are as discussed in detail above.     These findings were contemporaneously discussed with Dr. Zambrano on  12/31/2020.     This report was finalized on 12/31/2020 3:55 PM by Dr. Abram Branch M.D.       CT Angiogram Neck [[id number]] Collected: 12/30/20 1304     Updated: 12/30/20 1321    Narrative:      CT ANGIOGRAM NECK-, CT HEAD WO CONTRAST-, CT CEREBRAL  PERFUSION W WO  CONTRAST-  CT ANGIOGRAM HEAD AND NECK AND CT PERFUSION STUDY     CLINICAL HISTORY: Stroke        Radiation dose reduction techniques were utilized, including automated  exposure control and exposure modulation based on body size. CT scan of  the head was obtained with 3 mm axial images without the use  of IV contrast. The patient underwent a CT  perfusion study with a dynamic bolus of IV contrast. Standard perfusion  maps were constructed. The patient then underwent a CT angiogram of the  head and neck with 1 mm axial images following the administration of IV  contrast. Sagittal, coronal, and 3-dimensional reconstructed images were  obtained.  Percent stenosis was assessed in accordance with NASCET  criteria.     FINDINGS:     NONCONTRAST HEAD CT: On the noncontrast head CT, gray-white matter  differentiation is lost in right MCA territory, compatible with evolving  infarct. Sulcal effacement in this region is compatible with developing  edema and partial effacement right lateral ventricle is noted as well.  No acute hemorrhage or hydrocephalus is identified. Moderate to  prominent periventricular hypodensities suggest chronic small vessel  ischemic change in a patient this age.        CT PERFUSION STUDY:  CBF (under 30%) deficit in right MCA territory is  demonstrated with estimated volume of 236 cc.. Corresponding perfusion  abnormality is demonstrated where the T MAX is greater than 6 seconds,  with estimated volume of 248 cc. The calculated mismatch volume was 12  cc., Mismatch ratio 1.1     CTA HEAD and neck: The CT angiogram of the head and neck demonstrates  occlusion of the mid to upper right internal carotid artery with  apparent extension of thrombus into the right M1 segment. A trickle of  contrast material in the right MCA is however seen beyond the  trifurcation. Diffusely diminished opacification of the right LIOR is  also apparent. Estimated 60% narrowing at the origin of the  right  vertebral artery. Estimated 40% narrowing at in the proximal right P2  segment. Otherwise, no hemodynamically significant focal stenosis,  aneurysm, or dissection in the cervical carotid or vertebral arteries,  or in the arteries at the base of the brain. 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             Impression:            1. A very large region of completed acute infarct in right MCA territory  with slightly greater corresponding region of threatened acute infarct.  Associated edema with sulcal effacement in the right MCA territory,  partial effacement of right lateral ventricle, close follow-up  recommended.     2. 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 Diffusely  diminished opacification of the right LIOR. Estimated 60% narrowing at  the origin of the right vertebral artery, estimated 40% narrowing in the  proximal right P2 segment.        3. No acute intracranial hemorrhage. No enhancing lesions of brain.           Discussed by telephone with Dr. Zambrano at 1249, 1303, 12/30/2020.     This report was finalized on 12/30/2020 1:18 PM by Dr. Homero Wadsworth M.D.       CT Angiogram Head [[id number]] Collected: 12/30/20 1304     Updated: 12/30/20 1321    Narrative:      CT ANGIOGRAM NECK-, CT HEAD WO CONTRAST-, CT CEREBRAL PERFUSION W WO  CONTRAST-  CT ANGIOGRAM HEAD AND NECK AND CT PERFUSION STUDY     CLINICAL HISTORY: Stroke        Radiation dose reduction techniques were utilized, including automated  exposure control and exposure modulation based on body size. CT scan of  the head was obtained with 3 mm axial images without the use  of IV contrast. The patient underwent a CT  perfusion study with a dynamic bolus of IV contrast. Standard perfusion  maps were constructed. The patient then underwent a CT angiogram of the  head and neck with 1 mm axial images following the administration of  IV  contrast. Sagittal, coronal, and 3-dimensional reconstructed images were  obtained.  Percent stenosis was assessed in accordance with NASCET  criteria.     FINDINGS:     NONCONTRAST HEAD CT: On the noncontrast head CT, gray-white matter  differentiation is lost in right MCA territory, compatible with evolving  infarct. Sulcal effacement in this region is compatible with developing  edema and partial effacement right lateral ventricle is noted as well.  No acute hemorrhage or hydrocephalus is identified. Moderate to  prominent periventricular hypodensities suggest chronic small vessel  ischemic change in a patient this age.        CT PERFUSION STUDY:  CBF (under 30%) deficit in right MCA territory is  demonstrated with estimated volume of 236 cc.. Corresponding perfusion  abnormality is demonstrated where the T MAX is greater than 6 seconds,  with estimated volume of 248 cc. The calculated mismatch volume was 12  cc., Mismatch ratio 1.1     CTA HEAD and neck: The CT angiogram of the head and neck demonstrates  occlusion of the mid to upper right internal carotid artery with  apparent extension of thrombus into the right M1 segment. A trickle of  contrast material in the right MCA is however seen beyond the  trifurcation. Diffusely diminished opacification of the right LIOR is  also apparent. Estimated 60% narrowing at the origin of the right  vertebral artery. Estimated 40% narrowing at in the proximal right P2  segment. Otherwise, no hemodynamically significant focal stenosis,  aneurysm, or dissection in the cervical carotid or vertebral arteries,  or in the arteries at the base of the brain. 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             Impression:            1. A very large region of completed acute infarct in right MCA territory  with slightly greater corresponding region of threatened acute infarct.  Associated edema with sulcal effacement in the right MCA territory,  partial  effacement of right lateral ventricle, close follow-up  recommended.     2. 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 Diffusely  diminished opacification of the right LIOR. Estimated 60% narrowing at  the origin of the right vertebral artery, estimated 40% narrowing in the  proximal right P2 segment.        3. No acute intracranial hemorrhage. No enhancing lesions of brain.           Discussed by telephone with Dr. Zambrano at 1249, 1303, 12/30/2020.     This report was finalized on 12/30/2020 1:18 PM by Dr. Homero Wadsworth M.D.       CT Cerebral Perfusion With & Without Contrast [[id number]] Collected: 12/30/20 1304     Updated: 12/30/20 1321    Narrative:      CT ANGIOGRAM NECK-, CT HEAD WO CONTRAST-, CT CEREBRAL PERFUSION W WO  CONTRAST-  CT ANGIOGRAM HEAD AND NECK AND CT PERFUSION STUDY     CLINICAL HISTORY: Stroke        Radiation dose reduction techniques were utilized, including automated  exposure control and exposure modulation based on body size. CT scan of  the head was obtained with 3 mm axial images without the use  of IV contrast. The patient underwent a CT  perfusion study with a dynamic bolus of IV contrast. Standard perfusion  maps were constructed. The patient then underwent a CT angiogram of the  head and neck with 1 mm axial images following the administration of IV  contrast. Sagittal, coronal, and 3-dimensional reconstructed images were  obtained.  Percent stenosis was assessed in accordance with NASCET  criteria.     FINDINGS:     NONCONTRAST HEAD CT: On the noncontrast head CT, gray-white matter  differentiation is lost in right MCA territory, compatible with evolving  infarct. Sulcal effacement in this region is compatible with developing  edema and partial effacement right lateral ventricle is noted as well.  No acute hemorrhage or hydrocephalus is identified. Moderate to  prominent periventricular  hypodensities suggest chronic small vessel  ischemic change in a patient this age.        CT PERFUSION STUDY:  CBF (under 30%) deficit in right MCA territory is  demonstrated with estimated volume of 236 cc.. Corresponding perfusion  abnormality is demonstrated where the T MAX is greater than 6 seconds,  with estimated volume of 248 cc. The calculated mismatch volume was 12  cc., Mismatch ratio 1.1     CTA HEAD and neck: The CT angiogram of the head and neck demonstrates  occlusion of the mid to upper right internal carotid artery with  apparent extension of thrombus into the right M1 segment. A trickle of  contrast material in the right MCA is however seen beyond the  trifurcation. Diffusely diminished opacification of the right LIOR is  also apparent. Estimated 60% narrowing at the origin of the right  vertebral artery. Estimated 40% narrowing at in the proximal right P2  segment. Otherwise, no hemodynamically significant focal stenosis,  aneurysm, or dissection in the cervical carotid or vertebral arteries,  or in the arteries at the base of the brain. 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             Impression:            1. A very large region of completed acute infarct in right MCA territory  with slightly greater corresponding region of threatened acute infarct.  Associated edema with sulcal effacement in the right MCA territory,  partial effacement of right lateral ventricle, close follow-up  recommended.     2. 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 Diffusely  diminished opacification of the right LIOR. Estimated 60% narrowing at  the origin of the right vertebral artery, estimated 40% narrowing in the  proximal right P2 segment.        3. No acute intracranial hemorrhage. No enhancing lesions of brain.           Discussed by telephone with Dr. Zambrano at 1249, 1303, 12/30/2020.     This report was  finalized on 12/30/2020 1:18 PM by Dr. Homero Wadsworth M.D.       CT Head Without Contrast Stroke Protocol [[id number]] Collected: 12/30/20 0648     Updated: 12/30/20 0752    Narrative:      CT HEAD WITHOUT CONTRAST STROKE PROTOCOL-     Radiation dose reduction techniques were utilized, including automated  exposure control and exposure modulation based on body size.     CLINICAL: Stroke follow-up.     Note: This was ordered as a Team-D protocol. Unenhanced CT head report  called to Dr. Steele in the emergency room, no CTA is indicated.     COMPARISON: CT scan of the head dated 11/24/2020.     FINDINGS: No intracranial hemorrhage. The ventricles and basilar  cisterns are preserved. Diminished white matter attenuation compatible  with ischemic small vessel disease. No indication of acute infarction.  Posterior fossa contents satisfactory in appearance. Diminished white  matter attenuation without interval change consistent with ischemic  small vessel disease.     CONCLUSION: No acute intracranial abnormality.     Findings called to the emergency room at 6:40 AM.        This report was finalized on 12/30/2020 7:48 AM by Dr. Rikki Preston M.D.       XR Chest 1 View [[id number]] Collected: 12/30/20 0723     Updated: 12/30/20 0727    Narrative:      XR CHEST 1 VW-     Clinical: History of stroke     COMPARISON 11/26/2020     FINDINGS: There is a shallow inspiratory effort with crowding of the  bronchovascular markings bilaterally. There is cardiac enlargement with  atherosclerotic calcification of the aorta. Previous median sternotomy.  No gross pulmonary edema or pleural effusion seen. Similar to the  previous examination there are subtle areas of bilateral  infiltrate/atelectasis, greater on the right than the left. No acute  consolidation has developed. The remainder of examination is  unremarkable.     This report was finalized on 12/30/2020 7:24 AM by Dr. Rikki Preston M.D.             Pertinent Labs          Estimated Creatinine Clearance: 36.7 mL/min (by C-G formula based on SCr of 0.85 mg/dL).                Invalid input(s): LDLCALC        Test Results Pending at Discharge       Discharge Details        Discharge Medications      ASK your doctor about these medications      Instructions Start Date   albuterol 1.25 MG/3ML nebulizer solution  Commonly known as: ACCUNEB   1 ampule, Nebulization, 2 times daily      amLODIPine 5 MG tablet  Commonly known as: NORVASC   10 mg, Oral, Daily      bisacodyl 10 MG suppository  Commonly known as: DULCOLAX   10 mg, Rectal, Daily PRN      fluticasone 50 MCG/ACT nasal spray  Commonly known as: FLONASE   1 spray, Nasal, Daily      furosemide 20 MG tablet  Commonly known as: Lasix   60 mg, Oral, 2 Times Daily      ipratropium-albuterol 0.5-2.5 mg/3 ml nebulizer  Commonly known as: DUO-NEB   3 mL, Nebulization, Every 4 Hours PRN      isosorbide mononitrate 60 MG 24 hr tablet  Commonly known as: IMDUR   60 mg, Oral, Daily      levothyroxine 75 MCG tablet  Commonly known as: SYNTHROID, LEVOTHROID   75 mcg, Oral, Every Morning      lidocaine 5 %  Commonly known as: LIDODERM   1 patch, Transdermal, Every 24 Hours, Apply to right shoulder      magnesium hydroxide 400 MG/5ML suspension  Commonly known as: MILK OF MAGNESIA   30 mL, Oral, Daily PRN      metoprolol tartrate 25 MG tablet  Commonly known as: LOPRESSOR   25 mg, Oral, Every 12 Hours Scheduled      ondansetron 4 MG tablet  Commonly known as: ZOFRAN   4 mg, Oral, As Needed      pantoprazole 40 MG EC tablet  Commonly known as: PROTONIX   40 mg, Oral, 2 Times Daily Before Meals      potassium chloride 10 MEQ CR tablet  Commonly known as: K-DUR,KLOR-CON   10 mEq, Oral, Daily      saccharomyces boulardii 250 MG capsule  Commonly known as: FLORASTOR   250 mg, Oral, Daily      warfarin 1 MG tablet  Commonly known as: Coumadin   1.5 mg, Oral, Nightly      zinc oxide 20 % ointment   Topical, 2 Times Daily PRN             Allergies   Allergen  "Reactions   • Promethazine Other (See Comments)     Pt becomes \"out of it\" when on this medication  Pt becomes \"out of it\" when on this medication         Discharge Disposition:      Discharge Diet:  No active diet order      Discharge Activity:       CODE STATUS:    Code Status and Medical Interventions:   Ordered at: 20 1800     Level Of Support Discussed With:    Next of Kin (If No Surrogate)     Code Status:    No CPR     Medical Interventions (Level of Support Prior to Arrest):    Comfort Measures     Comments:    comfort measures only       No future appointments.  Follow-up Information     Yaron Ca Jr., MD .    Specialty: Family Medicine  Contact information:  [address] [address]  [phone]                   Time Spent on Discharge:  Greater than 30 minutes      Natalio Jenkins MD  [facility]  21  08:38 EST              "

## 2021-01-25 LAB — GLUCOSE BLDC GLUCOMTR-MCNC: 148 MG/DL (ref 70–130)

## 2021-09-04 NOTE — TELEPHONE ENCOUNTER
----- Message from Daphne Abraham sent at 5/30/2018  4:05 PM EDT -----  Need order for UA for tomorrow please.  Thanks.    [phone]

## 2022-04-24 NOTE — TELEPHONE ENCOUNTER
Call from [place].  Advise per DR Lemus note.  Verb understanding.     Asking if may stay with DR Lemus.  Advise will send request to DR Calvillo and DR Lemus - she is out of office this week.  Verb understanding.     States is in [facility] in [place].  Has received update that pt eating about 30% - has last 7 lbs.  Asking if should make f/u visit before 6 wks as recommended in d/c instructions.     Requests/question to Dr Calvillo and Dr Lemus.    PT WITH COUGH AND BODY ACHES SINCE Friday; AT HOME COVID TEST NEGATIVE; SYMPTOMS WERE IMPROVING BUT INTERMITTENTLY STILL COUGHING, THEN WOKE UP IN MIDDLE OF NIGHT WITH WHEEZING    NO FEVER

## 2023-05-23 NOTE — PROGRESS NOTES
Pharmacy Consult: Warfarin Dosing/ Monitoring    Jihan Teresa is a 86 y.o. female, estimated creatinine clearance is 43.2 mL/min (by C-G formula based on SCr of 0.63 mg/dL). weighing 67.3 kg (148 lb 5.9 oz).    She has a past medical history of Anemia, Aortic valve stenosis, Asthma, Atelectasis, CHF (congestive heart failure) (CMS/[facility]), Congenital heart disease, Diabetes mellitus (CMS/[facility]), Esophageal reflux, Essential hypertension, HLD (hyperlipidemia), Hypotension, LVH (left ventricular hypertrophy), Pleural effusion, and Pulmonary hypertension (CMS/[facility]).    Social History     Tobacco Use    Smoking status: Never Smoker    Smokeless tobacco: Never Used   Substance Use Topics    Alcohol use: Yes     Comment: ocasional    Drug use: No       Results from last 7 days   Lab Units 08/11/20  0611 08/10/20  0333 08/09/20  0652 08/08/20  0418 08/07/20  0656 08/05/20  0853   HEMOGLOBIN g/dL 12.6 11.2* 11.1* 11.6* 12.2 13.4   HEMATOCRIT % 37.9 34.4 34.3 33.9* 37.5 39.5   PLATELETS 10*3/mm3 168 147 161 179 189 210     Results from last 7 days   Lab Units 08/11/20  0611 08/10/20  0333 08/09/20  0652   SODIUM mmol/L 140 137 139   POTASSIUM mmol/L 3.9 3.8 3.8   CHLORIDE mmol/L 108* 106 110*   CO2 mmol/L 23.3 20.9* 22.2   BUN mg/dL 7* 8 9   CREATININE mg/dL 0.63 0.71 0.71   CALCIUM mg/dL 9.2 8.7 8.6   GLUCOSE mg/dL 108* 113* 111*     Anticoagulation history: 3mg TuWeFrSaSu, 1.5mg MoTh    Hospital Anticoagulation:  Consulting provider: Dr. Evans  Start date: 8/11/2020  Indication: AFib  Target INR: 2-3  Expected duration: lifelong   Bridge Therapy: No                Date 8/11            INR pend            Warfarin dose 3mg              Potential drug interactions: Cephalexin (moderate, increased risk of bleeding; last dose of cephalexin completed this am)    Relevant nutrition status: mechanical soft diet    Other: NA    Education complete?/ Date: defer    Assessment/Plan:  Warfarin management complicated by acute  intracerebral hemorrhage.  Cleared per neurosurgery to resume warfarin today with no loading doses.  Resume patient's home dose of 3mg TuWeFrSaSu, 1.5mg MoTh without load.  Anticipate >5 days to therapeutic range.  Follow-up daily INR.    Pharmacy will continue to follow until discharge or discontinuation of warfarin.   Teri Moralez, AmilcarD, MPH, BCPS       V-Y Flap Text: The defect edges were debeveled with a #15 scalpel blade.  Given the location of the defect, shape of the defect and the proximity to free margins a V-Y flap was deemed most appropriate.  Using a sterile surgical marker, an appropriate advancement flap was drawn incorporating the defect and placing the expected incisions within the relaxed skin tension lines where possible.    The area thus outlined was incised deep to adipose tissue with a #15 scalpel blade.  The skin margins were undermined to an appropriate distance in all directions utilizing iris scissors.

## (undated) DEVICE — PK AAA 40

## (undated) DEVICE — BITEBLOCK OMNI BLOC

## (undated) DEVICE — #1020 STERI DRAPE 41MM X 41MM SMALL: Brand: STERI-DRAPE™

## (undated) DEVICE — TBG ART PRESS 24 IN

## (undated) DEVICE — ADHS SKIN DERMABOND TOP ADVANCED

## (undated) DEVICE — SUT VIC 2/0 CT1 27IN J259H

## (undated) DEVICE — 3F 80 CM LEMAITRE EMBOLECTOMY CATHETER, EIFU: Brand: LEMAITRE EMBOLECTOMY CATHETER

## (undated) DEVICE — GW CERBRL JB PTFE STD .035IN 20X145CM

## (undated) DEVICE — SOL NACL 0.9PCT 1000ML

## (undated) DEVICE — TUBING, SUCTION, 1/4" X 10', STRAIGHT: Brand: MEDLINE

## (undated) DEVICE — 1 ML TUBERCULIN SYRINGE REGULAR TIP: Brand: MONOJECT

## (undated) DEVICE — TRAP FLD MINIVAC MEGADYNE 100ML

## (undated) DEVICE — SUT VIC 3/0 SH 27IN J416H

## (undated) DEVICE — PK ATS CUST W CARDIOTOMY RESEVOIR

## (undated) DEVICE — DRSNG SURESITE WNDW 4X4.5

## (undated) DEVICE — SUT PROLN 6/0 C1 D/A 30IN 8706H

## (undated) DEVICE — CATH IV INSYTE AUTOGARD 14G 1 1/2IN ORNG

## (undated) DEVICE — COPILOT BLEEDBACK CONTROL VALVE: Brand: COPILOT

## (undated) DEVICE — GLIDESHEATH SLENDER NITINOL HYDROPHILIC COATED INTRODUCER SHEATH: Brand: GLIDESHEATH SLENDER

## (undated) DEVICE — 5F 80 CM LEMAITRE EMBOLECTOMY CATHETER, EIFU: Brand: LEMAITRE EMBOLECTOMY CATHETER

## (undated) DEVICE — ANTIBACTERIAL UNDYED BRAIDED (POLYGLACTIN 910), SYNTHETIC ABSORBABLE SUTURE: Brand: COATED VICRYL

## (undated) DEVICE — KT ORCA ORCAPOD DISP STRL

## (undated) DEVICE — TR BAND RADIAL ARTERY COMPRESSION DEVICE: Brand: TR BAND

## (undated) DEVICE — SUT SILK 4/0 TIES 18IN A183H

## (undated) DEVICE — RADIFOCUS GLIDEWIRE: Brand: GLIDEWIRE

## (undated) DEVICE — RADIFOCUS GLIDECATH: Brand: GLIDECATH

## (undated) DEVICE — FRCP BX RADJAW4 NDL 2.8 240CM LG OG BX40

## (undated) DEVICE — TOTAL TRAY, 16FR 10ML SIL FOLEY, URN: Brand: MEDLINE

## (undated) DEVICE — DRP SLUSH WARMR MACH CIR 44X44IN

## (undated) DEVICE — PK ANGIO CERBRL RAD 40

## (undated) DEVICE — PENCL E/S ULTRAVAC TELESCP NOSE HOLSTR 10FT

## (undated) DEVICE — RADIFOCUS TORQUE DEVICE MULTI-TORQUE VISE: Brand: RADIFOCUS TORQUE DEVICE

## (undated) DEVICE — ISOLATION BAG: Brand: CONVERTORS

## (undated) DEVICE — BG WAST DISPOSABLE DEPOT W/TBG48IN S/COCK SPK1400

## (undated) DEVICE — STPCK 3WY HP ROT

## (undated) DEVICE — ST ACC MICROPUNCTURE STFF .018 ECHO/PLDM/TP 4F/10CM 21G/7CM

## (undated) DEVICE — ADAPT CLN BIOGUARD AIR/H2O DISP

## (undated) DEVICE — ADAPT Y ROT GATEWAY PLS

## (undated) DEVICE — CVR PROB 96IN LF STRL

## (undated) DEVICE — SOL NS 500ML

## (undated) DEVICE — SUT SILK 2/0 TIES 18IN A185H

## (undated) DEVICE — GLV SURG BIOGEL LTX PF 8

## (undated) DEVICE — GLV SURG SENSICARE PI MIC PF SZ7.5 LF STRL

## (undated) DEVICE — SENSR O2 OXIMAX FNGR A/ 18IN NONSTR

## (undated) DEVICE — TBG INJ CONTRL EXCITE RA 1200PSI 59IN

## (undated) DEVICE — MYNXGRIP 5F: Brand: MYNXGRIP

## (undated) DEVICE — 0.2 MICRON INTRAVENOUS FILTER FOR 96 HOUR USE WITH MICRO-BORE EXTENSION TUBING AN LUER-LOCK ADAPTER: Brand: PALL POSIDYNE® ELD FILTER

## (undated) DEVICE — LN SMPL CO2 SHTRM SD STREAM W/M LUER

## (undated) DEVICE — MSK PROC CURAPLEX O2 2/ADAPT 7FT

## (undated) DEVICE — APPL CHLORAPREP HI/LITE 26ML ORNG

## (undated) DEVICE — SUT SILK 3/0 TIES 18IN A184H

## (undated) DEVICE — MANIFLD BLCK 200PSI 2PORT OFF RT